# Patient Record
Sex: MALE | Race: WHITE | Employment: OTHER | URBAN - METROPOLITAN AREA
[De-identification: names, ages, dates, MRNs, and addresses within clinical notes are randomized per-mention and may not be internally consistent; named-entity substitution may affect disease eponyms.]

---

## 2017-01-03 ENCOUNTER — PROCEDURE VISIT (OUTPATIENT)
Dept: PHYSICAL MEDICINE AND REHAB | Age: 57
End: 2017-01-03

## 2017-01-03 DIAGNOSIS — M79.10 MYALGIA: ICD-10-CM

## 2017-01-03 DIAGNOSIS — M62.830 MUSCLE SPASM OF BACK: ICD-10-CM

## 2017-01-03 DIAGNOSIS — M79.7 FIBROMYALGIA: Primary | ICD-10-CM

## 2017-01-03 PROCEDURE — 20552 NJX 1/MLT TRIGGER POINT 1/2: CPT | Performed by: PHYSICAL MEDICINE & REHABILITATION

## 2017-01-09 RX ORDER — BUDESONIDE AND FORMOTEROL FUMARATE DIHYDRATE 160; 4.5 UG/1; UG/1
AEROSOL RESPIRATORY (INHALATION)
Qty: 10.2 INHALER | Refills: 3 | Status: SHIPPED | OUTPATIENT
Start: 2017-01-09 | End: 2018-03-30 | Stop reason: SDUPTHER

## 2017-01-23 RX ORDER — PRAVASTATIN SODIUM 40 MG
TABLET ORAL
Qty: 30 TABLET | Refills: 3 | Status: SHIPPED | OUTPATIENT
Start: 2017-01-23 | End: 2017-05-28 | Stop reason: SDUPTHER

## 2017-01-23 RX ORDER — LIDOCAINE 50 MG/G
OINTMENT TOPICAL
Refills: 0 | Status: ON HOLD | COMMUNITY
Start: 2017-01-10 | End: 2019-01-18 | Stop reason: ALTCHOICE

## 2017-01-23 RX ORDER — AMITRIPTYLINE HYDROCHLORIDE 50 MG/1
TABLET, FILM COATED ORAL
Qty: 30 TABLET | Refills: 3 | Status: SHIPPED | OUTPATIENT
Start: 2017-01-23 | End: 2017-05-28 | Stop reason: SDUPTHER

## 2017-01-30 DIAGNOSIS — G89.29 CHRONIC BILATERAL LOW BACK PAIN WITHOUT SCIATICA: ICD-10-CM

## 2017-01-30 DIAGNOSIS — M79.605 BILATERAL LEG PAIN: ICD-10-CM

## 2017-01-30 DIAGNOSIS — M54.50 CHRONIC BILATERAL LOW BACK PAIN WITHOUT SCIATICA: ICD-10-CM

## 2017-01-30 DIAGNOSIS — M62.830 MUSCLE SPASM OF BACK: ICD-10-CM

## 2017-01-30 DIAGNOSIS — M79.604 BILATERAL LEG PAIN: ICD-10-CM

## 2017-01-30 DIAGNOSIS — Z79.899 HIGH RISK MEDICATION USE: ICD-10-CM

## 2017-01-30 RX ORDER — HYDROCODONE BITARTRATE AND ACETAMINOPHEN 5; 325 MG/1; MG/1
TABLET ORAL
Qty: 40 TABLET | Refills: 0 | Status: SHIPPED | OUTPATIENT
Start: 2017-01-30 | End: 2017-02-15 | Stop reason: SDUPTHER

## 2017-02-07 ENCOUNTER — PROCEDURE VISIT (OUTPATIENT)
Dept: PHYSICAL MEDICINE AND REHAB | Age: 57
End: 2017-02-07

## 2017-02-07 DIAGNOSIS — M79.10 MYALGIA: Primary | ICD-10-CM

## 2017-02-07 PROCEDURE — 20552 NJX 1/MLT TRIGGER POINT 1/2: CPT | Performed by: PHYSICAL MEDICINE & REHABILITATION

## 2017-02-15 ENCOUNTER — OFFICE VISIT (OUTPATIENT)
Dept: PHYSICAL MEDICINE AND REHAB | Age: 57
End: 2017-02-15

## 2017-02-15 VITALS
BODY MASS INDEX: 29.54 KG/M2 | SYSTOLIC BLOOD PRESSURE: 160 MMHG | HEIGHT: 71 IN | WEIGHT: 211 LBS | DIASTOLIC BLOOD PRESSURE: 72 MMHG

## 2017-02-15 DIAGNOSIS — M51.36 DDD (DEGENERATIVE DISC DISEASE), LUMBAR: Primary | Chronic | ICD-10-CM

## 2017-02-15 DIAGNOSIS — Z79.899 HIGH RISK MEDICATION USE: ICD-10-CM

## 2017-02-15 DIAGNOSIS — M79.604 BILATERAL LEG PAIN: ICD-10-CM

## 2017-02-15 DIAGNOSIS — Z72.0 TOBACCO ABUSE: ICD-10-CM

## 2017-02-15 DIAGNOSIS — F10.20 ALCOHOLIC (HCC): ICD-10-CM

## 2017-02-15 DIAGNOSIS — M54.50 CHRONIC BILATERAL LOW BACK PAIN WITHOUT SCIATICA: ICD-10-CM

## 2017-02-15 DIAGNOSIS — M53.3 SI (SACROILIAC) PAIN: ICD-10-CM

## 2017-02-15 DIAGNOSIS — G89.29 CHRONIC BILATERAL LOW BACK PAIN WITHOUT SCIATICA: ICD-10-CM

## 2017-02-15 DIAGNOSIS — M62.830 MUSCLE SPASM OF BACK: ICD-10-CM

## 2017-02-15 DIAGNOSIS — M79.605 BILATERAL LEG PAIN: ICD-10-CM

## 2017-02-15 DIAGNOSIS — J42 CHRONIC BRONCHITIS, UNSPECIFIED CHRONIC BRONCHITIS TYPE (HCC): ICD-10-CM

## 2017-02-15 DIAGNOSIS — M54.17 LUMBOSACRAL RADICULOPATHY AT S1: ICD-10-CM

## 2017-02-15 DIAGNOSIS — M79.7 FIBROMYALGIA MUSCLE PAIN: ICD-10-CM

## 2017-02-15 DIAGNOSIS — G62.1 ALCOHOLIC POLYNEUROPATHY (HCC): Chronic | ICD-10-CM

## 2017-02-15 DIAGNOSIS — F41.9 ANXIETY DISORDER, UNSPECIFIED TYPE: ICD-10-CM

## 2017-02-15 PROCEDURE — 99213 OFFICE O/P EST LOW 20 MIN: CPT | Performed by: PHYSICAL MEDICINE & REHABILITATION

## 2017-02-15 RX ORDER — HYDROCODONE BITARTRATE AND ACETAMINOPHEN 5; 325 MG/1; MG/1
TABLET ORAL
Qty: 70 TABLET | Refills: 0 | Status: SHIPPED | OUTPATIENT
Start: 2017-02-15 | End: 2017-04-11 | Stop reason: SDUPTHER

## 2017-02-15 ASSESSMENT — ENCOUNTER SYMPTOMS
PHOTOPHOBIA: 0
ABDOMINAL PAIN: 0
APNEA: 0
DIARRHEA: 0
CHEST TIGHTNESS: 0
BACK PAIN: 1
BOWEL INCONTINENCE: 0
GASTROINTESTINAL NEGATIVE: 1
SHORTNESS OF BREATH: 0
VOMITING: 0
WHEEZING: 0
VISUAL CHANGE: 0
ALLERGIC/IMMUNOLOGIC NEGATIVE: 1
CONSTIPATION: 0
RECTAL PAIN: 0

## 2017-02-21 ENCOUNTER — PROCEDURE VISIT (OUTPATIENT)
Dept: PHYSICAL MEDICINE AND REHAB | Age: 57
End: 2017-02-21

## 2017-02-21 DIAGNOSIS — M25.552 LEFT HIP PAIN: Primary | ICD-10-CM

## 2017-02-21 PROCEDURE — 20610 DRAIN/INJ JOINT/BURSA W/O US: CPT | Performed by: PHYSICAL MEDICINE & REHABILITATION

## 2017-03-14 ENCOUNTER — PROCEDURE VISIT (OUTPATIENT)
Dept: PHYSICAL MEDICINE AND REHAB | Age: 57
End: 2017-03-14

## 2017-03-14 DIAGNOSIS — M79.10 MYALGIA: Primary | ICD-10-CM

## 2017-03-14 PROCEDURE — 20553 NJX 1/MLT TRIGGER POINTS 3/>: CPT | Performed by: PHYSICAL MEDICINE & REHABILITATION

## 2017-04-11 DIAGNOSIS — M79.604 BILATERAL LEG PAIN: ICD-10-CM

## 2017-04-11 DIAGNOSIS — M62.830 MUSCLE SPASM OF BACK: ICD-10-CM

## 2017-04-11 DIAGNOSIS — G89.29 CHRONIC BILATERAL LOW BACK PAIN WITHOUT SCIATICA: ICD-10-CM

## 2017-04-11 DIAGNOSIS — M79.605 BILATERAL LEG PAIN: ICD-10-CM

## 2017-04-11 DIAGNOSIS — Z79.899 HIGH RISK MEDICATION USE: ICD-10-CM

## 2017-04-11 DIAGNOSIS — M54.50 CHRONIC BILATERAL LOW BACK PAIN WITHOUT SCIATICA: ICD-10-CM

## 2017-04-12 RX ORDER — HYDROCODONE BITARTRATE AND ACETAMINOPHEN 5; 325 MG/1; MG/1
TABLET ORAL
Qty: 70 TABLET | Refills: 0 | Status: SHIPPED | OUTPATIENT
Start: 2017-04-12 | End: 2017-05-18 | Stop reason: SDUPTHER

## 2017-04-17 ENCOUNTER — HOSPITAL ENCOUNTER (INPATIENT)
Age: 57
LOS: 2 days | Discharge: HOME | DRG: 123 | End: 2017-04-19
Attending: EMERGENCY MEDICINE | Admitting: INTERNAL MEDICINE
Payer: COMMERCIAL

## 2017-04-17 ENCOUNTER — APPOINTMENT (OUTPATIENT)
Dept: CT IMAGING | Age: 57
DRG: 123 | End: 2017-04-17
Payer: COMMERCIAL

## 2017-04-17 DIAGNOSIS — H53.9 VISUAL CHANGES: ICD-10-CM

## 2017-04-17 DIAGNOSIS — I63.10 CEREBROVASCULAR ACCIDENT (CVA) DUE TO EMBOLISM OF PRECEREBRAL ARTERY (HCC): ICD-10-CM

## 2017-04-17 DIAGNOSIS — H02.401 PTOSIS OF RIGHT EYELID: Primary | ICD-10-CM

## 2017-04-17 LAB
ALBUMIN SERPL-MCNC: 4.4 G/DL (ref 3.9–4.9)
ALP BLD-CCNC: 142 U/L (ref 35–104)
ALT SERPL-CCNC: 51 U/L (ref 0–41)
ANION GAP SERPL CALCULATED.3IONS-SCNC: 13 MEQ/L (ref 7–13)
APTT: 31.3 SEC (ref 21.6–35.4)
AST SERPL-CCNC: 32 U/L (ref 0–40)
BASOPHILS ABSOLUTE: 0.1 K/UL (ref 0–0.2)
BASOPHILS RELATIVE PERCENT: 1 %
BILIRUB SERPL-MCNC: 0.6 MG/DL (ref 0–1.2)
BUN BLDV-MCNC: 12 MG/DL (ref 6–20)
CALCIUM SERPL-MCNC: 9.8 MG/DL (ref 8.6–10.2)
CHLORIDE BLD-SCNC: 96 MEQ/L (ref 98–107)
CO2: 25 MEQ/L (ref 22–29)
CREAT SERPL-MCNC: 0.72 MG/DL (ref 0.7–1.2)
EOSINOPHILS ABSOLUTE: 0.2 K/UL (ref 0–0.7)
EOSINOPHILS RELATIVE PERCENT: 1.4 %
GFR AFRICAN AMERICAN: >60
GFR NON-AFRICAN AMERICAN: >60
GLOBULIN: 3.8 G/DL (ref 2.3–3.5)
GLUCOSE BLD-MCNC: 97 MG/DL (ref 74–109)
HCT VFR BLD CALC: 49 % (ref 42–52)
HEMOGLOBIN: 16.6 G/DL (ref 14–18)
INR BLD: 1
LYMPHOCYTES ABSOLUTE: 3 K/UL (ref 1–4.8)
LYMPHOCYTES RELATIVE PERCENT: 22.4 %
MCH RBC QN AUTO: 30.7 PG (ref 27–31.3)
MCHC RBC AUTO-ENTMCNC: 33.9 % (ref 33–37)
MCV RBC AUTO: 90.6 FL (ref 80–100)
MONOCYTES ABSOLUTE: 1.3 K/UL (ref 0.2–0.8)
MONOCYTES RELATIVE PERCENT: 10 %
NEUTROPHILS ABSOLUTE: 8.8 K/UL (ref 1.4–6.5)
NEUTROPHILS RELATIVE PERCENT: 65.2 %
PDW BLD-RTO: 13.9 % (ref 11.5–14.5)
PLATELET # BLD: 286 K/UL (ref 130–400)
POTASSIUM SERPL-SCNC: 3.9 MEQ/L (ref 3.5–5.1)
PROTHROMBIN TIME: 10.5 SEC (ref 8.1–13.7)
RBC # BLD: 5.41 M/UL (ref 4.7–6.1)
SEDIMENTATION RATE, ERYTHROCYTE: 5 MM (ref 0–20)
SODIUM BLD-SCNC: 134 MEQ/L (ref 132–144)
TOTAL PROTEIN: 8.2 G/DL (ref 6.4–8.1)
WBC # BLD: 13.4 K/UL (ref 4.8–10.8)

## 2017-04-17 PROCEDURE — 85730 THROMBOPLASTIN TIME PARTIAL: CPT

## 2017-04-17 PROCEDURE — 6370000000 HC RX 637 (ALT 250 FOR IP): Performed by: EMERGENCY MEDICINE

## 2017-04-17 PROCEDURE — 94640 AIRWAY INHALATION TREATMENT: CPT

## 2017-04-17 PROCEDURE — 2580000003 HC RX 258: Performed by: RADIOLOGY

## 2017-04-17 PROCEDURE — 36415 COLL VENOUS BLD VENIPUNCTURE: CPT

## 2017-04-17 PROCEDURE — 80053 COMPREHEN METABOLIC PANEL: CPT

## 2017-04-17 PROCEDURE — 85025 COMPLETE CBC W/AUTO DIFF WBC: CPT

## 2017-04-17 PROCEDURE — 2580000003 HC RX 258: Performed by: EMERGENCY MEDICINE

## 2017-04-17 PROCEDURE — 6360000004 HC RX CONTRAST MEDICATION: Performed by: RADIOLOGY

## 2017-04-17 PROCEDURE — 93005 ELECTROCARDIOGRAM TRACING: CPT

## 2017-04-17 PROCEDURE — 85610 PROTHROMBIN TIME: CPT

## 2017-04-17 PROCEDURE — 6370000000 HC RX 637 (ALT 250 FOR IP): Performed by: INTERNAL MEDICINE

## 2017-04-17 PROCEDURE — 85652 RBC SED RATE AUTOMATED: CPT

## 2017-04-17 PROCEDURE — 70496 CT ANGIOGRAPHY HEAD: CPT

## 2017-04-17 PROCEDURE — 87086 URINE CULTURE/COLONY COUNT: CPT

## 2017-04-17 PROCEDURE — 99285 EMERGENCY DEPT VISIT HI MDM: CPT

## 2017-04-17 PROCEDURE — 94664 DEMO&/EVAL PT USE INHALER: CPT

## 2017-04-17 PROCEDURE — 1210000000 HC MED SURG R&B

## 2017-04-17 PROCEDURE — 6360000002 HC RX W HCPCS: Performed by: EMERGENCY MEDICINE

## 2017-04-17 PROCEDURE — 70450 CT HEAD/BRAIN W/O DYE: CPT

## 2017-04-17 PROCEDURE — 81001 URINALYSIS AUTO W/SCOPE: CPT

## 2017-04-17 PROCEDURE — 2580000003 HC RX 258: Performed by: INTERNAL MEDICINE

## 2017-04-17 PROCEDURE — 70498 CT ANGIOGRAPHY NECK: CPT

## 2017-04-17 RX ORDER — HEPARIN SODIUM 1000 [USP'U]/ML
40 INJECTION, SOLUTION INTRAVENOUS; SUBCUTANEOUS PRN
Status: DISCONTINUED | OUTPATIENT
Start: 2017-04-17 | End: 2017-04-17

## 2017-04-17 RX ORDER — 0.9 % SODIUM CHLORIDE 0.9 %
500 INTRAVENOUS SOLUTION INTRAVENOUS ONCE
Status: COMPLETED | OUTPATIENT
Start: 2017-04-17 | End: 2017-04-17

## 2017-04-17 RX ORDER — HEPARIN SODIUM 10000 [USP'U]/100ML
18 INJECTION, SOLUTION INTRAVENOUS CONTINUOUS
Status: DISCONTINUED | OUTPATIENT
Start: 2017-04-17 | End: 2017-04-17

## 2017-04-17 RX ORDER — ASPIRIN 325 MG
325 TABLET ORAL DAILY
Status: DISCONTINUED | OUTPATIENT
Start: 2017-04-18 | End: 2017-04-17

## 2017-04-17 RX ORDER — ACETAMINOPHEN 325 MG/1
650 TABLET ORAL EVERY 4 HOURS PRN
Status: DISCONTINUED | OUTPATIENT
Start: 2017-04-17 | End: 2017-04-19 | Stop reason: HOSPADM

## 2017-04-17 RX ORDER — PREDNISONE 20 MG/1
60 TABLET ORAL ONCE
Status: COMPLETED | OUTPATIENT
Start: 2017-04-17 | End: 2017-04-17

## 2017-04-17 RX ORDER — GABAPENTIN 400 MG/1
800 CAPSULE ORAL 3 TIMES DAILY
Status: DISCONTINUED | OUTPATIENT
Start: 2017-04-17 | End: 2017-04-19 | Stop reason: HOSPADM

## 2017-04-17 RX ORDER — ALBUTEROL SULFATE 2.5 MG/3ML
2.5 SOLUTION RESPIRATORY (INHALATION) EVERY 6 HOURS PRN
Status: DISCONTINUED | OUTPATIENT
Start: 2017-04-17 | End: 2017-04-17

## 2017-04-17 RX ORDER — SODIUM CHLORIDE 9 MG/ML
INJECTION, SOLUTION INTRAVENOUS CONTINUOUS
Status: DISCONTINUED | OUTPATIENT
Start: 2017-04-17 | End: 2017-04-19 | Stop reason: HOSPADM

## 2017-04-17 RX ORDER — SODIUM CHLORIDE 0.9 % (FLUSH) 0.9 %
3 SYRINGE (ML) INJECTION EVERY 8 HOURS
Status: DISCONTINUED | OUTPATIENT
Start: 2017-04-17 | End: 2017-04-19 | Stop reason: HOSPADM

## 2017-04-17 RX ORDER — SODIUM CHLORIDE 0.9 % (FLUSH) 0.9 %
10 SYRINGE (ML) INJECTION EVERY 12 HOURS SCHEDULED
Status: DISCONTINUED | OUTPATIENT
Start: 2017-04-17 | End: 2017-04-19 | Stop reason: HOSPADM

## 2017-04-17 RX ORDER — SODIUM CHLORIDE 0.9 % (FLUSH) 0.9 %
10 SYRINGE (ML) INJECTION ONCE
Status: COMPLETED | OUTPATIENT
Start: 2017-04-17 | End: 2017-04-17

## 2017-04-17 RX ORDER — HEPARIN SODIUM 10000 [USP'U]/100ML
12 INJECTION, SOLUTION INTRAVENOUS CONTINUOUS
Status: DISCONTINUED | OUTPATIENT
Start: 2017-04-17 | End: 2017-04-17

## 2017-04-17 RX ORDER — SODIUM CHLORIDE 0.9 % (FLUSH) 0.9 %
10 SYRINGE (ML) INJECTION PRN
Status: DISCONTINUED | OUTPATIENT
Start: 2017-04-17 | End: 2017-04-19 | Stop reason: HOSPADM

## 2017-04-17 RX ORDER — AMITRIPTYLINE HYDROCHLORIDE 25 MG/1
50 TABLET, FILM COATED ORAL NIGHTLY
Status: DISCONTINUED | OUTPATIENT
Start: 2017-04-17 | End: 2017-04-19 | Stop reason: HOSPADM

## 2017-04-17 RX ORDER — HEPARIN SODIUM 1000 [USP'U]/ML
80 INJECTION, SOLUTION INTRAVENOUS; SUBCUTANEOUS PRN
Status: DISCONTINUED | OUTPATIENT
Start: 2017-04-17 | End: 2017-04-17

## 2017-04-17 RX ORDER — ONDANSETRON 2 MG/ML
4 INJECTION INTRAMUSCULAR; INTRAVENOUS EVERY 6 HOURS PRN
Status: DISCONTINUED | OUTPATIENT
Start: 2017-04-17 | End: 2017-04-19 | Stop reason: HOSPADM

## 2017-04-17 RX ORDER — ALBUTEROL SULFATE 2.5 MG/3ML
2.5 SOLUTION RESPIRATORY (INHALATION) EVERY 4 HOURS PRN
Status: DISCONTINUED | OUTPATIENT
Start: 2017-04-17 | End: 2017-04-19 | Stop reason: HOSPADM

## 2017-04-17 RX ORDER — PRAVASTATIN SODIUM 40 MG
40 TABLET ORAL NIGHTLY
Status: DISCONTINUED | OUTPATIENT
Start: 2017-04-17 | End: 2017-04-19 | Stop reason: HOSPADM

## 2017-04-17 RX ADMIN — MOMETASONE FUROATE AND FORMOTEROL FUMARATE DIHYDRATE 2 PUFF: 200; 5 AEROSOL RESPIRATORY (INHALATION) at 20:34

## 2017-04-17 RX ADMIN — AMITRIPTYLINE HYDROCHLORIDE 50 MG: 25 TABLET, FILM COATED ORAL at 21:23

## 2017-04-17 RX ADMIN — Medication 10 ML: at 16:33

## 2017-04-17 RX ADMIN — SODIUM CHLORIDE: 9 INJECTION, SOLUTION INTRAVENOUS at 21:23

## 2017-04-17 RX ADMIN — HEPARIN SODIUM AND DEXTROSE 12 UNITS/KG/HR: 10000; 5 INJECTION INTRAVENOUS at 18:40

## 2017-04-17 RX ADMIN — ASPIRIN 325 MG: 325 TABLET, DELAYED RELEASE ORAL at 21:23

## 2017-04-17 RX ADMIN — GABAPENTIN 800 MG: 400 CAPSULE ORAL at 21:23

## 2017-04-17 RX ADMIN — IOPAMIDOL 100 ML: 755 INJECTION, SOLUTION INTRAVENOUS at 16:03

## 2017-04-17 RX ADMIN — Medication 3 ML: at 14:22

## 2017-04-17 RX ADMIN — SODIUM CHLORIDE 500 ML: 9 INJECTION, SOLUTION INTRAVENOUS at 14:23

## 2017-04-17 RX ADMIN — PRAVASTATIN SODIUM 40 MG: 40 TABLET ORAL at 21:23

## 2017-04-17 RX ADMIN — PREDNISONE 60 MG: 20 TABLET ORAL at 17:15

## 2017-04-17 ASSESSMENT — ENCOUNTER SYMPTOMS
ABDOMINAL PAIN: 0
VOICE CHANGE: 0
VOMITING: 0
DIARRHEA: 0
EYE PAIN: 0
SHORTNESS OF BREATH: 0
COUGH: 0
TROUBLE SWALLOWING: 0
PHOTOPHOBIA: 0
EYE ITCHING: 0
NAUSEA: 0
EYE REDNESS: 0
EYE DISCHARGE: 0
BACK PAIN: 0

## 2017-04-18 ENCOUNTER — APPOINTMENT (OUTPATIENT)
Dept: MRI IMAGING | Age: 57
DRG: 123 | End: 2017-04-18
Payer: COMMERCIAL

## 2017-04-18 PROBLEM — H02.409 PTOSIS: Status: ACTIVE | Noted: 2017-04-18

## 2017-04-18 PROBLEM — I10 HTN (HYPERTENSION): Chronic | Status: ACTIVE | Noted: 2017-04-18

## 2017-04-18 PROBLEM — I77.9 CAROTID ARTERY DISORDER (HCC): Chronic | Status: ACTIVE | Noted: 2017-04-18

## 2017-04-18 PROBLEM — H49.01: Status: ACTIVE | Noted: 2017-04-18

## 2017-04-18 LAB
BACTERIA: NORMAL /HPF
BILIRUBIN URINE: NEGATIVE
BLOOD, URINE: NEGATIVE
C-REACTIVE PROTEIN, HIGH SENSITIVITY: 4.3 MG/L (ref 0–5)
CHOLESTEROL, TOTAL: 199 MG/DL (ref 0–199)
CLARITY: CLEAR
COLOR: YELLOW
FOLATE: 15.2 NG/ML (ref 7.3–26.1)
GFR AFRICAN AMERICAN: >60
GFR NON-AFRICAN AMERICAN: >60
GLUCOSE URINE: NEGATIVE MG/DL
HDLC SERPL-MCNC: 34 MG/DL (ref 40–59)
KETONES, URINE: NEGATIVE MG/DL
LDL CHOLESTEROL CALCULATED: 139 MG/DL (ref 0–129)
LEUKOCYTE ESTERASE, URINE: ABNORMAL
LV EF: 65 %
LVEF MODALITY: NORMAL
NITRITE, URINE: NEGATIVE
PERFORMED ON: ABNORMAL
PH UA: 7 (ref 5–9)
POC CREATININE: 0.7 MG/DL (ref 0.9–1.3)
POC SAMPLE TYPE: ABNORMAL
PROTEIN UA: NEGATIVE MG/DL
RBC UA: NORMAL /HPF (ref 0–2)
SPECIFIC GRAVITY UA: 1.03 (ref 1–1.03)
TRIGL SERPL-MCNC: 132 MG/DL (ref 0–200)
TROPONIN: <0.01 NG/ML (ref 0–0.01)
UROBILINOGEN, URINE: 1 E.U./DL
VITAMIN B-12: 379 PG/ML (ref 211–946)
WBC UA: NORMAL /HPF (ref 0–5)

## 2017-04-18 PROCEDURE — 2500000003 HC RX 250 WO HCPCS

## 2017-04-18 PROCEDURE — 82607 VITAMIN B-12: CPT

## 2017-04-18 PROCEDURE — 82746 ASSAY OF FOLIC ACID SERUM: CPT

## 2017-04-18 PROCEDURE — 2580000003 HC RX 258: Performed by: INTERNAL MEDICINE

## 2017-04-18 PROCEDURE — 70551 MRI BRAIN STEM W/O DYE: CPT

## 2017-04-18 PROCEDURE — 70547 MR ANGIOGRAPHY NECK W/O DYE: CPT

## 2017-04-18 PROCEDURE — 86141 C-REACTIVE PROTEIN HS: CPT

## 2017-04-18 PROCEDURE — 36415 COLL VENOUS BLD VENIPUNCTURE: CPT

## 2017-04-18 PROCEDURE — 80061 LIPID PANEL: CPT

## 2017-04-18 PROCEDURE — 6370000000 HC RX 637 (ALT 250 FOR IP): Performed by: PSYCHIATRY & NEUROLOGY

## 2017-04-18 PROCEDURE — 6370000000 HC RX 637 (ALT 250 FOR IP): Performed by: INTERNAL MEDICINE

## 2017-04-18 PROCEDURE — 84484 ASSAY OF TROPONIN QUANT: CPT

## 2017-04-18 PROCEDURE — 94640 AIRWAY INHALATION TREATMENT: CPT

## 2017-04-18 PROCEDURE — 1210000000 HC MED SURG R&B

## 2017-04-18 PROCEDURE — 70544 MR ANGIOGRAPHY HEAD W/O DYE: CPT

## 2017-04-18 PROCEDURE — 83519 RIA NONANTIBODY: CPT

## 2017-04-18 PROCEDURE — 93306 TTE W/DOPPLER COMPLETE: CPT

## 2017-04-18 RX ORDER — CLOPIDOGREL BISULFATE 75 MG/1
75 TABLET ORAL DAILY
Status: DISCONTINUED | OUTPATIENT
Start: 2017-04-18 | End: 2017-04-19 | Stop reason: HOSPADM

## 2017-04-18 RX ORDER — WATER FOR INJ.,BACTERIOSTATIC
VIAL (ML) INJECTION
Status: COMPLETED
Start: 2017-04-18 | End: 2017-04-18

## 2017-04-18 RX ADMIN — CLOPIDOGREL BISULFATE 75 MG: 75 TABLET ORAL at 10:13

## 2017-04-18 RX ADMIN — GABAPENTIN 800 MG: 400 CAPSULE ORAL at 13:30

## 2017-04-18 RX ADMIN — ASPIRIN 325 MG: 325 TABLET, DELAYED RELEASE ORAL at 10:13

## 2017-04-18 RX ADMIN — Medication 10 ML: at 20:28

## 2017-04-18 RX ADMIN — GABAPENTIN 800 MG: 400 CAPSULE ORAL at 20:25

## 2017-04-18 RX ADMIN — GABAPENTIN 800 MG: 400 CAPSULE ORAL at 10:14

## 2017-04-18 RX ADMIN — MOMETASONE FUROATE AND FORMOTEROL FUMARATE DIHYDRATE 2 PUFF: 200; 5 AEROSOL RESPIRATORY (INHALATION) at 08:39

## 2017-04-18 RX ADMIN — PRAVASTATIN SODIUM 40 MG: 40 TABLET ORAL at 20:25

## 2017-04-18 RX ADMIN — AMITRIPTYLINE HYDROCHLORIDE 50 MG: 25 TABLET, FILM COATED ORAL at 20:25

## 2017-04-18 RX ADMIN — MOMETASONE FUROATE AND FORMOTEROL FUMARATE DIHYDRATE 2 PUFF: 200; 5 AEROSOL RESPIRATORY (INHALATION) at 19:24

## 2017-04-18 RX ADMIN — Medication 10 ML: at 10:14

## 2017-04-18 RX ADMIN — BACTERIOSTATIC WATER: 1 INJECTION, SOLUTION INTRAMUSCULAR; INTRAVENOUS; SUBCUTANEOUS at 10:12

## 2017-04-19 VITALS
OXYGEN SATURATION: 95 % | HEART RATE: 89 BPM | SYSTOLIC BLOOD PRESSURE: 115 MMHG | RESPIRATION RATE: 16 BRPM | TEMPERATURE: 97.7 F | HEIGHT: 71 IN | WEIGHT: 210 LBS | BODY MASS INDEX: 29.4 KG/M2 | DIASTOLIC BLOOD PRESSURE: 97 MMHG

## 2017-04-19 LAB
ACETYLCHOLINE BINDING ANTIBODY: 0.1 NMOL/L (ref 0–0.4)
ACETYLCHOLINE BLOCKING AB: 0 % (ref 0–26)
URINE CULTURE, ROUTINE: NORMAL

## 2017-04-19 PROCEDURE — G8980 MOBILITY D/C STATUS: HCPCS

## 2017-04-19 PROCEDURE — G8978 MOBILITY CURRENT STATUS: HCPCS

## 2017-04-19 PROCEDURE — G8989 SELF CARE D/C STATUS: HCPCS

## 2017-04-19 PROCEDURE — 6370000000 HC RX 637 (ALT 250 FOR IP): Performed by: INTERNAL MEDICINE

## 2017-04-19 PROCEDURE — 6370000000 HC RX 637 (ALT 250 FOR IP): Performed by: PSYCHIATRY & NEUROLOGY

## 2017-04-19 PROCEDURE — G8987 SELF CARE CURRENT STATUS: HCPCS

## 2017-04-19 PROCEDURE — 97165 OT EVAL LOW COMPLEX 30 MIN: CPT

## 2017-04-19 PROCEDURE — 97112 NEUROMUSCULAR REEDUCATION: CPT

## 2017-04-19 PROCEDURE — 2580000003 HC RX 258: Performed by: INTERNAL MEDICINE

## 2017-04-19 PROCEDURE — G8988 SELF CARE GOAL STATUS: HCPCS

## 2017-04-19 PROCEDURE — 97161 PT EVAL LOW COMPLEX 20 MIN: CPT

## 2017-04-19 PROCEDURE — G8979 MOBILITY GOAL STATUS: HCPCS

## 2017-04-19 PROCEDURE — 94640 AIRWAY INHALATION TREATMENT: CPT

## 2017-04-19 RX ORDER — CLOPIDOGREL BISULFATE 75 MG/1
75 TABLET ORAL DAILY
Qty: 30 TABLET | Refills: 1 | Status: SHIPPED | OUTPATIENT
Start: 2017-04-19 | End: 2018-03-27 | Stop reason: SDUPTHER

## 2017-04-19 RX ADMIN — MOMETASONE FUROATE AND FORMOTEROL FUMARATE DIHYDRATE 2 PUFF: 200; 5 AEROSOL RESPIRATORY (INHALATION) at 10:00

## 2017-04-19 RX ADMIN — Medication 10 ML: at 08:33

## 2017-04-19 RX ADMIN — ASPIRIN 325 MG: 325 TABLET, DELAYED RELEASE ORAL at 08:32

## 2017-04-19 RX ADMIN — CLOPIDOGREL BISULFATE 75 MG: 75 TABLET ORAL at 08:32

## 2017-04-19 RX ADMIN — GABAPENTIN 800 MG: 400 CAPSULE ORAL at 08:32

## 2017-04-20 ENCOUNTER — TELEPHONE (OUTPATIENT)
Dept: INTERNAL MEDICINE | Age: 57
End: 2017-04-20

## 2017-04-20 LAB
EKG ATRIAL RATE: 117 BPM
EKG P AXIS: 63 DEGREES
EKG P-R INTERVAL: 166 MS
EKG Q-T INTERVAL: 340 MS
EKG QRS DURATION: 98 MS
EKG QTC CALCULATION (BAZETT): 474 MS
EKG R AXIS: 86 DEGREES
EKG T AXIS: 71 DEGREES
EKG VENTRICULAR RATE: 117 BPM

## 2017-04-27 ENCOUNTER — OFFICE VISIT (OUTPATIENT)
Dept: INTERNAL MEDICINE | Age: 57
End: 2017-04-27

## 2017-04-27 VITALS
BODY MASS INDEX: 29.43 KG/M2 | SYSTOLIC BLOOD PRESSURE: 128 MMHG | TEMPERATURE: 98.7 F | WEIGHT: 211 LBS | DIASTOLIC BLOOD PRESSURE: 80 MMHG | HEART RATE: 100 BPM | OXYGEN SATURATION: 96 %

## 2017-04-27 DIAGNOSIS — I10 ESSENTIAL HYPERTENSION: Primary | Chronic | ICD-10-CM

## 2017-04-27 DIAGNOSIS — Z72.0 SMOKING TRYING TO QUIT: ICD-10-CM

## 2017-04-27 PROCEDURE — 99213 OFFICE O/P EST LOW 20 MIN: CPT | Performed by: INTERNAL MEDICINE

## 2017-04-27 RX ORDER — LOSARTAN POTASSIUM 25 MG/1
25 TABLET ORAL DAILY
Qty: 30 TABLET | Refills: 3 | Status: SHIPPED | OUTPATIENT
Start: 2017-04-27 | End: 2017-08-27 | Stop reason: SDUPTHER

## 2017-04-27 ASSESSMENT — ENCOUNTER SYMPTOMS
TROUBLE SWALLOWING: 0
GASTROINTESTINAL NEGATIVE: 1
CHOKING: 0
SHORTNESS OF BREATH: 0
COUGH: 0
VOICE CHANGE: 0

## 2017-05-16 LAB — ACETYLCHOL MODUL AB: 5 %

## 2017-05-18 ENCOUNTER — OFFICE VISIT (OUTPATIENT)
Dept: PHYSICAL MEDICINE AND REHAB | Age: 57
End: 2017-05-18

## 2017-05-18 VITALS
DIASTOLIC BLOOD PRESSURE: 60 MMHG | SYSTOLIC BLOOD PRESSURE: 120 MMHG | WEIGHT: 211 LBS | HEIGHT: 72 IN | BODY MASS INDEX: 28.58 KG/M2

## 2017-05-18 DIAGNOSIS — G62.1 ALCOHOLIC POLYNEUROPATHY (HCC): Chronic | ICD-10-CM

## 2017-05-18 DIAGNOSIS — F81.0 BASIC LEARNING DISABILITY, READING: Chronic | ICD-10-CM

## 2017-05-18 DIAGNOSIS — M54.50 CHRONIC BILATERAL LOW BACK PAIN WITHOUT SCIATICA: ICD-10-CM

## 2017-05-18 DIAGNOSIS — H49.01 CN III PALSY, RIGHT EYE: ICD-10-CM

## 2017-05-18 DIAGNOSIS — D47.2 IGG MONOCLONAL GAMMOPATHY OF UNCERTAIN SIGNIFICANCE: ICD-10-CM

## 2017-05-18 DIAGNOSIS — F41.9 ANXIETY DISORDER, UNSPECIFIED TYPE: ICD-10-CM

## 2017-05-18 DIAGNOSIS — Z79.899 HIGH RISK MEDICATION USE: ICD-10-CM

## 2017-05-18 DIAGNOSIS — M79.604 BILATERAL LEG PAIN: ICD-10-CM

## 2017-05-18 DIAGNOSIS — M62.830 MUSCLE SPASM OF BACK: ICD-10-CM

## 2017-05-18 DIAGNOSIS — G89.29 CHRONIC BILATERAL LOW BACK PAIN WITHOUT SCIATICA: ICD-10-CM

## 2017-05-18 DIAGNOSIS — H02.401 PTOSIS, RIGHT: ICD-10-CM

## 2017-05-18 DIAGNOSIS — M79.605 BILATERAL LEG PAIN: ICD-10-CM

## 2017-05-18 DIAGNOSIS — M51.36 DDD (DEGENERATIVE DISC DISEASE), LUMBAR: Chronic | ICD-10-CM

## 2017-05-18 DIAGNOSIS — M53.3 SI (SACROILIAC) PAIN: Primary | ICD-10-CM

## 2017-05-18 DIAGNOSIS — M54.17 LUMBOSACRAL RADICULOPATHY AT S1: ICD-10-CM

## 2017-05-18 DIAGNOSIS — M79.672 LEFT FOOT PAIN: ICD-10-CM

## 2017-05-18 DIAGNOSIS — R74.8 ALKALINE PHOSPHATASE ELEVATION: ICD-10-CM

## 2017-05-18 PROCEDURE — 99214 OFFICE O/P EST MOD 30 MIN: CPT | Performed by: PHYSICAL MEDICINE & REHABILITATION

## 2017-05-18 RX ORDER — GABAPENTIN 800 MG/1
TABLET ORAL
Qty: 90 TABLET | Refills: 3 | Status: SHIPPED | OUTPATIENT
Start: 2017-05-18 | End: 2017-07-21 | Stop reason: SDUPTHER

## 2017-05-18 RX ORDER — HYDROCODONE BITARTRATE AND ACETAMINOPHEN 5; 325 MG/1; MG/1
TABLET ORAL
Qty: 70 TABLET | Refills: 0 | Status: SHIPPED | OUTPATIENT
Start: 2017-05-18 | End: 2017-06-15 | Stop reason: SDUPTHER

## 2017-05-18 ASSESSMENT — ENCOUNTER SYMPTOMS
CHEST TIGHTNESS: 0
WHEEZING: 0
SHORTNESS OF BREATH: 0
DIARRHEA: 0
ALLERGIC/IMMUNOLOGIC NEGATIVE: 1
CONSTIPATION: 0
PHOTOPHOBIA: 0
APNEA: 0
BOWEL INCONTINENCE: 0
ABDOMINAL PAIN: 0
BACK PAIN: 1
VOMITING: 0
VISUAL CHANGE: 0

## 2017-05-30 RX ORDER — PRAVASTATIN SODIUM 40 MG
TABLET ORAL
Qty: 30 TABLET | Refills: 3 | Status: SHIPPED | OUTPATIENT
Start: 2017-05-30 | End: 2017-09-26 | Stop reason: SDUPTHER

## 2017-05-30 RX ORDER — AMITRIPTYLINE HYDROCHLORIDE 50 MG/1
TABLET, FILM COATED ORAL
Qty: 30 TABLET | Refills: 3 | Status: SHIPPED | OUTPATIENT
Start: 2017-05-30 | End: 2017-09-26 | Stop reason: SDUPTHER

## 2017-05-30 RX ORDER — ACETAMINOPHEN/DIPHENHYDRAMINE 500MG-25MG
TABLET ORAL
Qty: 30 TABLET | Refills: 3 | Status: SHIPPED | OUTPATIENT
Start: 2017-05-30 | End: 2017-09-26 | Stop reason: SDUPTHER

## 2017-06-06 ENCOUNTER — PROCEDURE VISIT (OUTPATIENT)
Dept: PHYSICAL MEDICINE AND REHAB | Age: 57
End: 2017-06-06

## 2017-06-06 DIAGNOSIS — M53.3 SI (SACROILIAC) PAIN: ICD-10-CM

## 2017-06-06 DIAGNOSIS — M79.7 FIBROMYALGIA: Primary | ICD-10-CM

## 2017-06-06 DIAGNOSIS — M79.10 MYALGIA: ICD-10-CM

## 2017-06-06 DIAGNOSIS — M62.830 MUSCLE SPASM OF BACK: ICD-10-CM

## 2017-06-06 PROCEDURE — 20553 NJX 1/MLT TRIGGER POINTS 3/>: CPT | Performed by: PHYSICAL MEDICINE & REHABILITATION

## 2017-06-07 ENCOUNTER — OFFICE VISIT (OUTPATIENT)
Dept: INTERNAL MEDICINE | Age: 57
End: 2017-06-07

## 2017-06-07 VITALS
DIASTOLIC BLOOD PRESSURE: 70 MMHG | HEIGHT: 71 IN | WEIGHT: 217 LBS | BODY MASS INDEX: 30.38 KG/M2 | SYSTOLIC BLOOD PRESSURE: 130 MMHG | OXYGEN SATURATION: 96 % | HEART RATE: 94 BPM | TEMPERATURE: 97.3 F

## 2017-06-07 DIAGNOSIS — J44.9 CHRONIC OBSTRUCTIVE PULMONARY DISEASE, UNSPECIFIED COPD TYPE (HCC): ICD-10-CM

## 2017-06-07 DIAGNOSIS — I10 ESSENTIAL HYPERTENSION: Primary | Chronic | ICD-10-CM

## 2017-06-07 PROCEDURE — 99213 OFFICE O/P EST LOW 20 MIN: CPT | Performed by: INTERNAL MEDICINE

## 2017-06-07 ASSESSMENT — ENCOUNTER SYMPTOMS
COUGH: 0
WHEEZING: 0
BACK PAIN: 1
TROUBLE SWALLOWING: 0
VOICE CHANGE: 0
SHORTNESS OF BREATH: 0
GASTROINTESTINAL NEGATIVE: 1
ABDOMINAL PAIN: 0
EYE PAIN: 0
RESPIRATORY NEGATIVE: 1

## 2017-06-07 ASSESSMENT — COPD QUESTIONNAIRES: COPD: 1

## 2017-06-15 DIAGNOSIS — M79.605 BILATERAL LEG PAIN: ICD-10-CM

## 2017-06-15 DIAGNOSIS — G89.29 CHRONIC BILATERAL LOW BACK PAIN WITHOUT SCIATICA: ICD-10-CM

## 2017-06-15 DIAGNOSIS — M79.604 BILATERAL LEG PAIN: ICD-10-CM

## 2017-06-15 DIAGNOSIS — M62.830 MUSCLE SPASM OF BACK: ICD-10-CM

## 2017-06-15 DIAGNOSIS — M54.50 CHRONIC BILATERAL LOW BACK PAIN WITHOUT SCIATICA: ICD-10-CM

## 2017-06-15 DIAGNOSIS — Z79.899 HIGH RISK MEDICATION USE: ICD-10-CM

## 2017-06-15 RX ORDER — HYDROCODONE BITARTRATE AND ACETAMINOPHEN 5; 325 MG/1; MG/1
TABLET ORAL
Qty: 70 TABLET | Refills: 0 | Status: SHIPPED | OUTPATIENT
Start: 2017-06-15 | End: 2017-07-11 | Stop reason: SDUPTHER

## 2017-07-11 ENCOUNTER — PROCEDURE VISIT (OUTPATIENT)
Dept: PHYSICAL MEDICINE AND REHAB | Age: 57
End: 2017-07-11

## 2017-07-11 DIAGNOSIS — M62.830 MUSCLE SPASM OF BACK: ICD-10-CM

## 2017-07-11 DIAGNOSIS — M79.605 BILATERAL LEG PAIN: ICD-10-CM

## 2017-07-11 DIAGNOSIS — M79.10 MYALGIA: ICD-10-CM

## 2017-07-11 DIAGNOSIS — M79.604 BILATERAL LEG PAIN: ICD-10-CM

## 2017-07-11 DIAGNOSIS — M79.7 FIBROMYALGIA: Primary | ICD-10-CM

## 2017-07-11 DIAGNOSIS — Z79.899 HIGH RISK MEDICATION USE: ICD-10-CM

## 2017-07-11 DIAGNOSIS — G89.29 CHRONIC BILATERAL LOW BACK PAIN WITHOUT SCIATICA: ICD-10-CM

## 2017-07-11 DIAGNOSIS — M54.50 CHRONIC BILATERAL LOW BACK PAIN WITHOUT SCIATICA: ICD-10-CM

## 2017-07-11 PROCEDURE — 20552 NJX 1/MLT TRIGGER POINT 1/2: CPT | Performed by: PHYSICAL MEDICINE & REHABILITATION

## 2017-07-11 RX ORDER — HYDROCODONE BITARTRATE AND ACETAMINOPHEN 5; 325 MG/1; MG/1
TABLET ORAL
Qty: 70 TABLET | Refills: 0 | Status: SHIPPED | OUTPATIENT
Start: 2017-07-11 | End: 2017-08-11 | Stop reason: SDUPTHER

## 2017-07-21 ENCOUNTER — OFFICE VISIT (OUTPATIENT)
Dept: PHYSICAL MEDICINE AND REHAB | Age: 57
End: 2017-07-21

## 2017-07-21 VITALS
SYSTOLIC BLOOD PRESSURE: 130 MMHG | BODY MASS INDEX: 29.53 KG/M2 | HEIGHT: 72 IN | DIASTOLIC BLOOD PRESSURE: 58 MMHG | WEIGHT: 218 LBS

## 2017-07-21 DIAGNOSIS — G89.29 CHRONIC MIDLINE LOW BACK PAIN WITH BILATERAL SCIATICA: ICD-10-CM

## 2017-07-21 DIAGNOSIS — G62.1 ALCOHOLIC POLYNEUROPATHY (HCC): Chronic | ICD-10-CM

## 2017-07-21 DIAGNOSIS — M79.672 LEFT FOOT PAIN: ICD-10-CM

## 2017-07-21 DIAGNOSIS — M54.41 CHRONIC MIDLINE LOW BACK PAIN WITH BILATERAL SCIATICA: ICD-10-CM

## 2017-07-21 DIAGNOSIS — M54.17 LUMBOSACRAL RADICULOPATHY AT S1: Primary | ICD-10-CM

## 2017-07-21 DIAGNOSIS — M54.42 CHRONIC MIDLINE LOW BACK PAIN WITH BILATERAL SCIATICA: ICD-10-CM

## 2017-07-21 DIAGNOSIS — Z79.899 HIGH RISK MEDICATION USE: ICD-10-CM

## 2017-07-21 DIAGNOSIS — M79.10 MYALGIA: ICD-10-CM

## 2017-07-21 DIAGNOSIS — D47.2 IGG MONOCLONAL GAMMOPATHY OF UNCERTAIN SIGNIFICANCE: ICD-10-CM

## 2017-07-21 PROCEDURE — 99213 OFFICE O/P EST LOW 20 MIN: CPT | Performed by: PHYSICAL MEDICINE & REHABILITATION

## 2017-07-21 RX ORDER — GABAPENTIN 800 MG/1
TABLET ORAL
Qty: 90 TABLET | Refills: 3 | Status: SHIPPED | OUTPATIENT
Start: 2017-07-21 | End: 2017-12-28 | Stop reason: SDUPTHER

## 2017-07-21 ASSESSMENT — ENCOUNTER SYMPTOMS
ABDOMINAL PAIN: 0
NAUSEA: 0
APNEA: 0
VISUAL CHANGE: 0
WHEEZING: 0
ALLERGIC/IMMUNOLOGIC NEGATIVE: 1
DIARRHEA: 0
BOWEL INCONTINENCE: 0
VOMITING: 0
PHOTOPHOBIA: 0
CHEST TIGHTNESS: 0
CONSTIPATION: 0
BACK PAIN: 1
SHORTNESS OF BREATH: 0

## 2017-08-11 DIAGNOSIS — M54.50 CHRONIC BILATERAL LOW BACK PAIN WITHOUT SCIATICA: ICD-10-CM

## 2017-08-11 DIAGNOSIS — G89.29 CHRONIC BILATERAL LOW BACK PAIN WITHOUT SCIATICA: ICD-10-CM

## 2017-08-11 DIAGNOSIS — M79.604 BILATERAL LEG PAIN: ICD-10-CM

## 2017-08-11 DIAGNOSIS — Z79.899 HIGH RISK MEDICATION USE: ICD-10-CM

## 2017-08-11 DIAGNOSIS — M62.830 MUSCLE SPASM OF BACK: ICD-10-CM

## 2017-08-11 DIAGNOSIS — M79.605 BILATERAL LEG PAIN: ICD-10-CM

## 2017-08-11 RX ORDER — HYDROCODONE BITARTRATE AND ACETAMINOPHEN 5; 325 MG/1; MG/1
TABLET ORAL
Qty: 70 TABLET | Refills: 0 | Status: SHIPPED | OUTPATIENT
Start: 2017-08-11 | End: 2017-09-12 | Stop reason: SDUPTHER

## 2017-08-14 ENCOUNTER — PROCEDURE VISIT (OUTPATIENT)
Dept: PHYSICAL MEDICINE AND REHAB | Age: 57
End: 2017-08-14

## 2017-08-14 DIAGNOSIS — M79.10 MYALGIA: ICD-10-CM

## 2017-08-14 DIAGNOSIS — M79.7 FIBROMYALGIA: Primary | ICD-10-CM

## 2017-08-14 PROCEDURE — 20553 NJX 1/MLT TRIGGER POINTS 3/>: CPT | Performed by: PHYSICAL MEDICINE & REHABILITATION

## 2017-08-28 RX ORDER — INFLUENZA VIRUS VACCINE 15; 15; 15; 15 UG/.5ML; UG/.5ML; UG/.5ML; UG/.5ML
SUSPENSION INTRAMUSCULAR
Refills: 0 | COMMUNITY
Start: 2017-08-15 | End: 2017-11-27 | Stop reason: ALTCHOICE

## 2017-08-28 RX ORDER — LOSARTAN POTASSIUM 25 MG/1
TABLET ORAL
Qty: 30 TABLET | Refills: 3 | Status: SHIPPED | OUTPATIENT
Start: 2017-08-28 | End: 2017-12-24 | Stop reason: SDUPTHER

## 2017-08-29 ENCOUNTER — OFFICE VISIT (OUTPATIENT)
Dept: INTERNAL MEDICINE | Age: 57
End: 2017-08-29

## 2017-08-29 VITALS
DIASTOLIC BLOOD PRESSURE: 88 MMHG | BODY MASS INDEX: 30.07 KG/M2 | OXYGEN SATURATION: 93 % | HEART RATE: 90 BPM | TEMPERATURE: 96.3 F | SYSTOLIC BLOOD PRESSURE: 128 MMHG | WEIGHT: 222 LBS | HEIGHT: 72 IN

## 2017-08-29 DIAGNOSIS — Z01.818 PREOP GENERAL PHYSICAL EXAM: Primary | ICD-10-CM

## 2017-08-29 PROCEDURE — 99213 OFFICE O/P EST LOW 20 MIN: CPT | Performed by: INTERNAL MEDICINE

## 2017-08-29 RX ORDER — LANOLIN ALCOHOL/MO/W.PET/CERES
1000 CREAM (GRAM) TOPICAL DAILY
Qty: 30 TABLET | Refills: 5 | Status: SHIPPED | OUTPATIENT
Start: 2017-08-29 | End: 2017-09-18

## 2017-08-29 ASSESSMENT — ENCOUNTER SYMPTOMS
RESPIRATORY NEGATIVE: 1
BACK PAIN: 1
COUGH: 0
TROUBLE SWALLOWING: 0
VOICE CHANGE: 0
EYE PAIN: 0
GASTROINTESTINAL NEGATIVE: 1
SHORTNESS OF BREATH: 0
WHEEZING: 0
ABDOMINAL PAIN: 0

## 2017-09-12 ENCOUNTER — PROCEDURE VISIT (OUTPATIENT)
Dept: PHYSICAL MEDICINE AND REHAB | Age: 57
End: 2017-09-12

## 2017-09-12 DIAGNOSIS — G89.29 CHRONIC BILATERAL LOW BACK PAIN WITHOUT SCIATICA: ICD-10-CM

## 2017-09-12 DIAGNOSIS — M79.605 BILATERAL LEG PAIN: ICD-10-CM

## 2017-09-12 DIAGNOSIS — M79.604 BILATERAL LEG PAIN: ICD-10-CM

## 2017-09-12 DIAGNOSIS — M79.10 MYALGIA: ICD-10-CM

## 2017-09-12 DIAGNOSIS — M79.7 FIBROMYALGIA: Primary | ICD-10-CM

## 2017-09-12 DIAGNOSIS — M62.830 MUSCLE SPASM OF BACK: ICD-10-CM

## 2017-09-12 DIAGNOSIS — M54.50 CHRONIC BILATERAL LOW BACK PAIN WITHOUT SCIATICA: ICD-10-CM

## 2017-09-12 DIAGNOSIS — Z79.899 HIGH RISK MEDICATION USE: ICD-10-CM

## 2017-09-12 PROCEDURE — 20553 NJX 1/MLT TRIGGER POINTS 3/>: CPT | Performed by: PHYSICAL MEDICINE & REHABILITATION

## 2017-09-12 RX ORDER — HYDROCODONE BITARTRATE AND ACETAMINOPHEN 5; 325 MG/1; MG/1
TABLET ORAL
Qty: 70 TABLET | Refills: 0 | Status: SHIPPED | OUTPATIENT
Start: 2017-09-12 | End: 2017-10-12 | Stop reason: SDUPTHER

## 2017-09-18 ENCOUNTER — TELEPHONE (OUTPATIENT)
Dept: INTERNAL MEDICINE | Age: 57
End: 2017-09-18

## 2017-09-18 RX ORDER — PSYLLIUM HUSK 3.4 G/7G
POWDER ORAL
Refills: 0 | COMMUNITY
Start: 2017-08-29 | End: 2018-02-27

## 2017-09-18 RX ORDER — DEXTROMETHORPHAN HYDROBROMIDE AND PROMETHAZINE HYDROCHLORIDE 15; 6.25 MG/5ML; MG/5ML
5 SYRUP ORAL 4 TIMES DAILY PRN
Qty: 118 ML | Refills: 0 | Status: SHIPPED | OUTPATIENT
Start: 2017-09-18 | End: 2018-01-22 | Stop reason: SDUPTHER

## 2017-09-21 ENCOUNTER — OFFICE VISIT (OUTPATIENT)
Dept: PHYSICAL MEDICINE AND REHAB | Age: 57
End: 2017-09-21

## 2017-09-21 VITALS
BODY MASS INDEX: 30.34 KG/M2 | SYSTOLIC BLOOD PRESSURE: 148 MMHG | DIASTOLIC BLOOD PRESSURE: 56 MMHG | WEIGHT: 224 LBS | HEIGHT: 72 IN

## 2017-09-21 DIAGNOSIS — Z79.899 HIGH RISK MEDICATION USE: ICD-10-CM

## 2017-09-21 DIAGNOSIS — M53.3 SI (SACROILIAC) PAIN: ICD-10-CM

## 2017-09-21 DIAGNOSIS — G89.29 CHRONIC MIDLINE LOW BACK PAIN WITH BILATERAL SCIATICA: ICD-10-CM

## 2017-09-21 DIAGNOSIS — M54.42 CHRONIC MIDLINE LOW BACK PAIN WITH BILATERAL SCIATICA: ICD-10-CM

## 2017-09-21 DIAGNOSIS — M79.10 MYALGIA: ICD-10-CM

## 2017-09-21 DIAGNOSIS — G62.1 ALCOHOLIC POLYNEUROPATHY (HCC): Chronic | ICD-10-CM

## 2017-09-21 DIAGNOSIS — M54.41 CHRONIC MIDLINE LOW BACK PAIN WITH BILATERAL SCIATICA: ICD-10-CM

## 2017-09-21 DIAGNOSIS — M54.17 LUMBOSACRAL RADICULOPATHY AT S1: Primary | ICD-10-CM

## 2017-09-21 DIAGNOSIS — F41.1 GENERALIZED ANXIETY DISORDER: ICD-10-CM

## 2017-09-21 DIAGNOSIS — M51.36 DDD (DEGENERATIVE DISC DISEASE), LUMBAR: Chronic | ICD-10-CM

## 2017-09-21 LAB
AMPHETAMINE SCREEN, URINE: NORMAL
BARBITURATE SCREEN URINE: NORMAL
BENZODIAZEPINE SCREEN, URINE: NORMAL
CANNABINOID SCREEN URINE: NORMAL
COCAINE METABOLITE SCREEN URINE: NORMAL
Lab: NORMAL
OPIATE SCREEN URINE: NORMAL
PHENCYCLIDINE SCREEN URINE: NORMAL

## 2017-09-21 PROCEDURE — 99214 OFFICE O/P EST MOD 30 MIN: CPT | Performed by: PHYSICAL MEDICINE & REHABILITATION

## 2017-09-21 ASSESSMENT — ENCOUNTER SYMPTOMS
APNEA: 0
BACK PAIN: 1
PHOTOPHOBIA: 0
VOMITING: 0
SHORTNESS OF BREATH: 0
CONSTIPATION: 0
ALLERGIC/IMMUNOLOGIC NEGATIVE: 1
CHEST TIGHTNESS: 0
WHEEZING: 0
DIARRHEA: 0
NAUSEA: 0
BOWEL INCONTINENCE: 0
VISUAL CHANGE: 0
ABDOMINAL PAIN: 0

## 2017-09-26 RX ORDER — PRAVASTATIN SODIUM 40 MG
TABLET ORAL
Qty: 30 TABLET | Refills: 3 | Status: SHIPPED | OUTPATIENT
Start: 2017-09-26 | End: 2018-01-22 | Stop reason: SDUPTHER

## 2017-09-26 RX ORDER — AMITRIPTYLINE HYDROCHLORIDE 50 MG/1
TABLET, FILM COATED ORAL
Qty: 30 TABLET | Refills: 3 | Status: SHIPPED | OUTPATIENT
Start: 2017-09-26 | End: 2018-01-22 | Stop reason: SDUPTHER

## 2017-09-26 RX ORDER — ACETAMINOPHEN/DIPHENHYDRAMINE 500MG-25MG
TABLET ORAL
Qty: 30 TABLET | Refills: 3 | Status: SHIPPED | OUTPATIENT
Start: 2017-09-26 | End: 2018-01-22 | Stop reason: SDUPTHER

## 2017-10-12 DIAGNOSIS — M54.50 CHRONIC BILATERAL LOW BACK PAIN WITHOUT SCIATICA: ICD-10-CM

## 2017-10-12 DIAGNOSIS — M79.604 BILATERAL LEG PAIN: ICD-10-CM

## 2017-10-12 DIAGNOSIS — G89.29 CHRONIC BILATERAL LOW BACK PAIN WITHOUT SCIATICA: ICD-10-CM

## 2017-10-12 DIAGNOSIS — M62.830 MUSCLE SPASM OF BACK: ICD-10-CM

## 2017-10-12 DIAGNOSIS — M79.605 BILATERAL LEG PAIN: ICD-10-CM

## 2017-10-12 DIAGNOSIS — Z79.899 HIGH RISK MEDICATION USE: ICD-10-CM

## 2017-10-13 RX ORDER — HYDROCODONE BITARTRATE AND ACETAMINOPHEN 5; 325 MG/1; MG/1
TABLET ORAL
Qty: 70 TABLET | Refills: 0 | Status: SHIPPED | OUTPATIENT
Start: 2017-10-13 | End: 2017-11-14 | Stop reason: SDUPTHER

## 2017-10-17 ENCOUNTER — PROCEDURE VISIT (OUTPATIENT)
Dept: PHYSICAL MEDICINE AND REHAB | Age: 57
End: 2017-10-17

## 2017-10-17 DIAGNOSIS — M54.32 SCIATICA OF LEFT SIDE: Primary | ICD-10-CM

## 2017-10-17 PROCEDURE — 64445 NJX AA&/STRD SCIATIC NRV IMG: CPT | Performed by: PHYSICAL MEDICINE & REHABILITATION

## 2017-10-31 ENCOUNTER — PROCEDURE VISIT (OUTPATIENT)
Dept: PHYSICAL MEDICINE AND REHAB | Age: 57
End: 2017-10-31

## 2017-10-31 DIAGNOSIS — M70.62 GREATER TROCHANTERIC BURSITIS OF LEFT HIP: Primary | ICD-10-CM

## 2017-10-31 PROCEDURE — 20610 DRAIN/INJ JOINT/BURSA W/O US: CPT | Performed by: PHYSICAL MEDICINE & REHABILITATION

## 2017-11-14 ENCOUNTER — PROCEDURE VISIT (OUTPATIENT)
Dept: PHYSICAL MEDICINE AND REHAB | Age: 57
End: 2017-11-14

## 2017-11-14 DIAGNOSIS — Z79.899 HIGH RISK MEDICATION USE: ICD-10-CM

## 2017-11-14 DIAGNOSIS — M54.50 CHRONIC BILATERAL LOW BACK PAIN WITHOUT SCIATICA: ICD-10-CM

## 2017-11-14 DIAGNOSIS — M79.605 BILATERAL LEG PAIN: ICD-10-CM

## 2017-11-14 DIAGNOSIS — M62.830 MUSCLE SPASM OF BACK: ICD-10-CM

## 2017-11-14 DIAGNOSIS — M79.604 BILATERAL LEG PAIN: ICD-10-CM

## 2017-11-14 DIAGNOSIS — M79.10 MYALGIA: ICD-10-CM

## 2017-11-14 DIAGNOSIS — M79.7 FIBROMYALGIA: Primary | ICD-10-CM

## 2017-11-14 DIAGNOSIS — G89.29 CHRONIC BILATERAL LOW BACK PAIN WITHOUT SCIATICA: ICD-10-CM

## 2017-11-14 PROCEDURE — 20553 NJX 1/MLT TRIGGER POINTS 3/>: CPT | Performed by: PHYSICAL MEDICINE & REHABILITATION

## 2017-11-14 RX ORDER — HYDROCODONE BITARTRATE AND ACETAMINOPHEN 5; 325 MG/1; MG/1
TABLET ORAL
Qty: 70 TABLET | Refills: 0 | Status: SHIPPED | OUTPATIENT
Start: 2017-11-14 | End: 2017-12-11 | Stop reason: SDUPTHER

## 2017-11-27 ENCOUNTER — OFFICE VISIT (OUTPATIENT)
Dept: PHYSICAL MEDICINE AND REHAB | Age: 57
End: 2017-11-27

## 2017-11-27 VITALS
SYSTOLIC BLOOD PRESSURE: 136 MMHG | DIASTOLIC BLOOD PRESSURE: 56 MMHG | WEIGHT: 228 LBS | HEIGHT: 72 IN | BODY MASS INDEX: 30.88 KG/M2

## 2017-11-27 DIAGNOSIS — F10.21 PERSONAL HISTORY OF ALCOHOLISM (HCC): Chronic | ICD-10-CM

## 2017-11-27 DIAGNOSIS — M51.36 DDD (DEGENERATIVE DISC DISEASE), LUMBAR: Chronic | ICD-10-CM

## 2017-11-27 DIAGNOSIS — G62.1 ALCOHOLIC POLYNEUROPATHY (HCC): Primary | Chronic | ICD-10-CM

## 2017-11-27 DIAGNOSIS — Z79.899 HIGH RISK MEDICATION USE: ICD-10-CM

## 2017-11-27 DIAGNOSIS — M79.10 MYALGIA: ICD-10-CM

## 2017-11-27 DIAGNOSIS — F81.0 BASIC LEARNING DISABILITY, READING: Chronic | ICD-10-CM

## 2017-11-27 PROCEDURE — 4004F PT TOBACCO SCREEN RCVD TLK: CPT | Performed by: PHYSICAL MEDICINE & REHABILITATION

## 2017-11-27 PROCEDURE — 99213 OFFICE O/P EST LOW 20 MIN: CPT | Performed by: PHYSICAL MEDICINE & REHABILITATION

## 2017-11-27 PROCEDURE — G8427 DOCREV CUR MEDS BY ELIG CLIN: HCPCS | Performed by: PHYSICAL MEDICINE & REHABILITATION

## 2017-11-27 PROCEDURE — G8484 FLU IMMUNIZE NO ADMIN: HCPCS | Performed by: PHYSICAL MEDICINE & REHABILITATION

## 2017-11-27 PROCEDURE — G8598 ASA/ANTIPLAT THER USED: HCPCS | Performed by: PHYSICAL MEDICINE & REHABILITATION

## 2017-11-27 PROCEDURE — G8417 CALC BMI ABV UP PARAM F/U: HCPCS | Performed by: PHYSICAL MEDICINE & REHABILITATION

## 2017-11-27 PROCEDURE — 3017F COLORECTAL CA SCREEN DOC REV: CPT | Performed by: PHYSICAL MEDICINE & REHABILITATION

## 2017-11-27 ASSESSMENT — ENCOUNTER SYMPTOMS
CHEST TIGHTNESS: 0
ABDOMINAL PAIN: 0
NAUSEA: 0
DIARRHEA: 0
PHOTOPHOBIA: 0
WHEEZING: 0
VOMITING: 0
CONSTIPATION: 0
VISUAL CHANGE: 0
BOWEL INCONTINENCE: 0
APNEA: 0
SHORTNESS OF BREATH: 0
BACK PAIN: 1
ALLERGIC/IMMUNOLOGIC NEGATIVE: 1

## 2017-11-27 NOTE — PROGRESS NOTES
fatigue, visual change, headaches or abdominal pain. He does not admit to suicidal ideas. He does not have a plan to commit suicide. He does not contemplate harming himself. He has not already injured self. He does not contemplate injuring another person. He has not already  injured another person. Risk factors that are present for mental illness include substance abuse.        Past Medical History:   Diagnosis Date    Alcoholic polyneuropathy (HCC)     Chronic back pain greater than 3 months duration     CN III palsy, right eye 2017    Dr Roe Wynne    COPD (chronic obstructive pulmonary disease) ([facility] [address] 2014    DDD (degenerative disc disease), lumbar 3/23/2015    Elevated liver enzymes 2014    IgA monoclonal gammopathy 2015    Dr Shabana Borjas    Insomnia, unspecified     LBP (low back pain)     Dr Kristen Levin Occlusion and stenosis of carotid artery without mention of cerebral infarction     Personal history of alcoholism ([facility] [address]     quit 11/05/2010, relapsed 2016    Sensorimotor neuropathy ([facility] [address]     Bilateral lower extremities-EMG 03/23/15 ([place])    Smoking trying to quit     Traumatic compression fracture of T11 thoracic vertebra ([facility] [address] 2188    Umbilical hernia      Past Surgical History:   Procedure Laterality Date    COLONOSCOPY  8/19/2014    TONSILLECTOMY      UPPER GASTROINTESTINAL ENDOSCOPY  8/19/2014    VENTRAL HERNIA REPAIR  09/01/15    W/MESH AND W/UMBILECTOMY     Social History     Social History    Marital status: Single     Spouse name: N/A    Number of children: 1    Years of education: N/A     Occupational History    SSI for learning disability      Social History Main Topics    Smoking status: Current Some Day Smoker     Packs/day: 0.50     Years: 40.00     Types: Cigarettes     Last attempt to quit: 12/31/2015    Smokeless tobacco: Never Used    Alcohol use No      Comment: QUIT 2010-past addiction     Drug use: No    Sexual activity: Not Asked     Other Topics Concern    None Social History Narrative    Born in [place], one of 5    Came to [place] at age 1 with parents    Never , one daughter    Never worked, disabled since 12 (mental)    Lives in [place] with brother, in a house        Attends [employer] daily    [address] riding bike, TV    One daughter, does keep in touch      Family History   Problem Relation Age of Onset    Cancer Mother      brain, dec 79    Alcohol Abuse Father        No Known Allergies    Review of Systems   Constitutional: Negative for activity change, fatigue, fever and unexpected weight change. Eyes: Positive for visual disturbance. Negative for photophobia. Respiratory: Negative for apnea, chest tightness, shortness of breath and wheezing. Cardiovascular: Negative for chest pain, palpitations and leg swelling. Gastrointestinal: Negative for abdominal pain, bowel incontinence, constipation, diarrhea, nausea and vomiting. Endocrine: Negative. Genitourinary: Negative. Negative for bladder incontinence and dysuria. Musculoskeletal: Positive for arthralgias, back pain and myalgias. Negative for gait problem, joint swelling, neck pain and neck stiffness. Skin: Negative. Allergic/Immunologic: Negative. Neurological: Positive for weakness and numbness. Negative for dizziness, light-headedness and headaches. Hematological: Negative. Psychiatric/Behavioral: Negative for dysphoric mood, hallucinations and sleep disturbance. The patient is not nervous/anxious. Objective    Vitals:    11/27/17 1106   BP: (!) 136/56   Weight: 228 lb (103.4 kg)   Height: 5' 11.5\" (1.816 m)     Pain Score: Zero (while sitting)     Physical Exam   Constitutional: He is oriented to person, place, and time. Vital signs are normal. He appears well-developed and well-nourished. Non-toxic appearance. He does not have a sickly appearance. He does not appear ill. No distress. HENT:   Head: Normocephalic and atraumatic.    Right Ear: Hearing normal.   Left Ear: Hearing normal.   Nose: Nose normal.   Mouth/Throat: Oropharynx is clear and moist and mucous membranes are normal. No oral lesions. Normal dentition. No oropharyngeal exudate. Right 3rd nerve palsy    No signs of toxic erosions. Eyes: Conjunctivae are normal. Pupils are equal, round, and reactive to light. Left eye exhibits no chemosis, no discharge and no exudate. No scleral icterus. Right eye exhibits abnormal extraocular motion. Right 3rd nerve palsy   Neck: Normal range of motion. Neck supple. No JVD present. No neck rigidity. No tracheal deviation and no edema present. No thyromegaly present. Cardiovascular: Intact distal pulses. Exam reveals no decreased pulses. Pulmonary/Chest: Effort normal. No accessory muscle usage. No apnea, no tachypnea and no bradypnea. No respiratory distress. He has no wheezes. He exhibits no tenderness. Abdominal: Soft. Bowel sounds are normal. He exhibits no distension and no mass. There is no hepatosplenomegaly. There is no tenderness. There is no rebound, no guarding and no CVA tenderness. A hernia is present. Hernia confirmed positive in the ventral area. Umbilical hernia   Musculoskeletal: He exhibits tenderness. He exhibits no edema. Right shoulder: Normal.        Left shoulder: Normal.        Right elbow: Normal.       Left elbow: Normal.        Right wrist: Normal.        Left wrist: Normal.        Right hip: Normal.        Left hip: Normal.        Right knee: Normal.        Left knee: Normal.        Right ankle: Normal. Achilles tendon normal.        Left ankle: Normal. Achilles tendon normal.        Cervical back: He exhibits decreased range of motion, tenderness, bony tenderness, pain and spasm. Thoracic back: He exhibits decreased range of motion, tenderness, bony tenderness, pain and spasm. Lumbar back: He exhibits decreased range of motion, tenderness, bony tenderness and pain.  He exhibits no swelling, no 11/05/2010, relapsed 2016    Sensorimotor neuropathy ([facility] [address]     Bilateral lower extremities-EMG 03/23/15 ([place])    Smoking trying to quit     Traumatic compression fracture of T11 thoracic vertebra ([facility] [address] 0589    Umbilical hernia            Given their medication, chronic pain and lifestyle and medications they are at risk for :    Falls, constipation, addiction  Loss of livelyhood due to severe pain, debility, weight gain and  vitamin D deficiency    The patient was educated regarding proper diet, fitness routine, and regulatory restrictions concerning pain medications. Previous notes, comprehensive past medical, surgical, family history, and diagnostics were reviewed. Patient education and councelling were provided regarding off label use,treatment options and medication and injection risks. Current and old OARRS ([place] Automated Prescription Reporting System) records reviewed, all refills reviewed since last visit,  Behavioral agreement/[place]ICK regulations   and Toxicology screen was reviewed with patient and is up to date. There are no current red flags. They are making good progress regarding pain relief, they are performing at a functional level regarding activities of daily living and psychological functioning, they're not having any adverse effects or side effects from the current medications, and I see no findings of aberrant drug taking her addiction related behaviors. Attestation: The Prescription Monitoring Report for this patient was reviewed today. (Jory Restrepo DO)  Documentation: Obtaining appropriate analgesic effect of treatment., No signs of potential drug abuse or diversion identified., Existing medication contract. (Jory Restrepo DO)       Patient is currently taking:       I have discontinued Mr. Tasha Rust QUADRIVALENT.  I am also having him maintain his SYMBICORT, lidocaine, clopidogrel, gabapentin, losartan, RA VITAMIN B-12 TR, RA ASPIRIN EC, amitriptyline, pravastatin, and HYDROcodone-acetaminophen. I also recommend the following Medications:    No orders of the defined types were placed in this encounter.       -which helps with pain and function. Otherwise, continue the current pain medications that I have prescibed. Radiologic:   Old films reviewed  ,     I discussed results with patients. see Follow up plans below  For any new studies. Care Everywhere Updates:  requested and reviewed. No new issues noted.              Labs:  Previous labs reviewed     Lab Results   Component Value Date     04/17/2017    K 3.9 04/17/2017    CL 96 04/17/2017    CO2 25 04/17/2017    BUN 12 04/17/2017    CREATININE 0.7 04/17/2017    CREATININE 0.72 04/17/2017    CALCIUM 9.8 04/17/2017    LABALBU 4.4 04/17/2017    BILITOT 0.6 04/17/2017    ALKPHOS 142 04/17/2017    AST 32 04/17/2017    ALT 51 04/17/2017     Lab Results   Component Value Date    WBC 13.4 04/17/2017    RBC 5.41 04/17/2017    HGB 16.6 04/17/2017    HCT 49.0 04/17/2017    MCV 90.6 04/17/2017    MCH 30.7 04/17/2017    MCHC 33.9 04/17/2017    RDW 13.9 04/17/2017     04/17/2017    MPV 8.8 08/25/2015       Lab Results   Component Value Date    LABAMPH Neg 09/21/2017    BARBSCNU Neg 09/21/2017    LABBENZ Neg 09/21/2017    CANSU Neg 09/21/2017    COCAIMETSCRU Neg 09/21/2017    PHENCYCLIDINESCREENURINE Neg 09/21/2017    DSCOMMENT see below 09/21/2017       Lab Results   Component Value Date    CODEINE Not Detected 09/20/2016    MORPHINE Not Detected 09/20/2016    ACETYLMORPHI Not Detected 09/20/2016    OXYCODONE Not Detected 09/20/2016    NOROXYCODONE Not Detected 09/20/2016    NOROXYMU Not Detected 09/20/2016    [facility] Not Detected 09/20/2016    NORHYDU Not Detected 09/20/2016    HYDROMO Not Detected 09/20/2016    Daquan Epley Not Detected 09/20/2016    Maybelle Asa Not Detected 09/20/2016    FENTA Not Detected 09/20/2016    NORFENT Not Detected 09/20/2016    MEPERIDINE Not Detected

## 2017-12-11 DIAGNOSIS — Z79.899 HIGH RISK MEDICATION USE: ICD-10-CM

## 2017-12-11 DIAGNOSIS — M79.604 BILATERAL LEG PAIN: ICD-10-CM

## 2017-12-11 DIAGNOSIS — M62.830 MUSCLE SPASM OF BACK: ICD-10-CM

## 2017-12-11 DIAGNOSIS — M54.50 CHRONIC BILATERAL LOW BACK PAIN WITHOUT SCIATICA: ICD-10-CM

## 2017-12-11 DIAGNOSIS — M79.605 BILATERAL LEG PAIN: ICD-10-CM

## 2017-12-11 DIAGNOSIS — G89.29 CHRONIC BILATERAL LOW BACK PAIN WITHOUT SCIATICA: ICD-10-CM

## 2017-12-11 RX ORDER — HYDROCODONE BITARTRATE AND ACETAMINOPHEN 5; 325 MG/1; MG/1
TABLET ORAL
Qty: 70 TABLET | Refills: 0 | Status: SHIPPED | OUTPATIENT
Start: 2017-12-11 | End: 2018-01-05 | Stop reason: SDUPTHER

## 2017-12-26 RX ORDER — LOSARTAN POTASSIUM 25 MG/1
TABLET ORAL
Qty: 30 TABLET | Refills: 3 | Status: SHIPPED | OUTPATIENT
Start: 2017-12-26 | End: 2018-02-16 | Stop reason: SDUPTHER

## 2017-12-28 DIAGNOSIS — M79.672 LEFT FOOT PAIN: ICD-10-CM

## 2017-12-28 DIAGNOSIS — D47.2 IGG MONOCLONAL GAMMOPATHY OF UNCERTAIN SIGNIFICANCE: ICD-10-CM

## 2017-12-28 DIAGNOSIS — G62.1 ALCOHOLIC POLYNEUROPATHY (HCC): Chronic | ICD-10-CM

## 2017-12-28 DIAGNOSIS — M54.17 LUMBOSACRAL RADICULOPATHY AT S1: ICD-10-CM

## 2018-01-02 RX ORDER — GABAPENTIN 800 MG/1
TABLET ORAL
Qty: 90 TABLET | Refills: 3 | Status: SHIPPED | OUTPATIENT
Start: 2018-01-02 | End: 2018-03-01 | Stop reason: SDUPTHER

## 2018-01-04 ENCOUNTER — PROCEDURE VISIT (OUTPATIENT)
Dept: PHYSICAL MEDICINE AND REHAB | Age: 58
End: 2018-01-04

## 2018-01-04 DIAGNOSIS — M79.7 FIBROMYALGIA: Primary | ICD-10-CM

## 2018-01-04 DIAGNOSIS — M79.10 MYALGIA: ICD-10-CM

## 2018-01-04 PROCEDURE — 20552 NJX 1/MLT TRIGGER POINT 1/2: CPT | Performed by: PHYSICAL MEDICINE & REHABILITATION

## 2018-01-05 DIAGNOSIS — M79.605 BILATERAL LEG PAIN: ICD-10-CM

## 2018-01-05 DIAGNOSIS — G89.29 CHRONIC BILATERAL LOW BACK PAIN WITHOUT SCIATICA: ICD-10-CM

## 2018-01-05 DIAGNOSIS — M62.830 MUSCLE SPASM OF BACK: ICD-10-CM

## 2018-01-05 DIAGNOSIS — Z79.899 HIGH RISK MEDICATION USE: ICD-10-CM

## 2018-01-05 DIAGNOSIS — M79.604 BILATERAL LEG PAIN: ICD-10-CM

## 2018-01-05 DIAGNOSIS — M54.50 CHRONIC BILATERAL LOW BACK PAIN WITHOUT SCIATICA: ICD-10-CM

## 2018-01-05 RX ORDER — HYDROCODONE BITARTRATE AND ACETAMINOPHEN 5; 325 MG/1; MG/1
TABLET ORAL
Qty: 70 TABLET | Refills: 0 | Status: SHIPPED | OUTPATIENT
Start: 2018-01-11 | End: 2018-02-21 | Stop reason: SDUPTHER

## 2018-01-22 RX ORDER — DEXTROMETHORPHAN HYDROBROMIDE AND PROMETHAZINE HYDROCHLORIDE 15; 6.25 MG/5ML; MG/5ML
SYRUP ORAL
Qty: 118 ML | Refills: 0 | Status: SHIPPED | OUTPATIENT
Start: 2018-01-22 | End: 2018-02-16

## 2018-01-23 RX ORDER — PRAVASTATIN SODIUM 40 MG
TABLET ORAL
Qty: 30 TABLET | Refills: 3 | Status: SHIPPED | OUTPATIENT
Start: 2018-01-23 | End: 2018-05-22 | Stop reason: SDUPTHER

## 2018-01-23 RX ORDER — ACETAMINOPHEN/DIPHENHYDRAMINE 500MG-25MG
TABLET ORAL
Qty: 30 TABLET | Refills: 3 | Status: SHIPPED | OUTPATIENT
Start: 2018-01-23 | End: 2018-05-22 | Stop reason: SDUPTHER

## 2018-01-23 RX ORDER — AMITRIPTYLINE HYDROCHLORIDE 50 MG/1
TABLET, FILM COATED ORAL
Qty: 30 TABLET | Refills: 3 | Status: SHIPPED | OUTPATIENT
Start: 2018-01-23 | End: 2018-05-22 | Stop reason: SDUPTHER

## 2018-01-26 ENCOUNTER — OFFICE VISIT (OUTPATIENT)
Dept: PHYSICAL MEDICINE AND REHAB | Age: 58
End: 2018-01-26
Payer: COMMERCIAL

## 2018-01-26 VITALS
SYSTOLIC BLOOD PRESSURE: 126 MMHG | BODY MASS INDEX: 29.12 KG/M2 | WEIGHT: 208 LBS | DIASTOLIC BLOOD PRESSURE: 46 MMHG | HEIGHT: 71 IN

## 2018-01-26 DIAGNOSIS — M79.10 MYALGIA: ICD-10-CM

## 2018-01-26 DIAGNOSIS — G62.1 ALCOHOLIC POLYNEUROPATHY (HCC): Chronic | ICD-10-CM

## 2018-01-26 DIAGNOSIS — M51.36 DDD (DEGENERATIVE DISC DISEASE), LUMBAR: Primary | Chronic | ICD-10-CM

## 2018-01-26 DIAGNOSIS — Z79.899 HIGH RISK MEDICATION USE: ICD-10-CM

## 2018-01-26 PROCEDURE — 3017F COLORECTAL CA SCREEN DOC REV: CPT | Performed by: PHYSICAL MEDICINE & REHABILITATION

## 2018-01-26 PROCEDURE — G8417 CALC BMI ABV UP PARAM F/U: HCPCS | Performed by: PHYSICAL MEDICINE & REHABILITATION

## 2018-01-26 PROCEDURE — G8484 FLU IMMUNIZE NO ADMIN: HCPCS | Performed by: PHYSICAL MEDICINE & REHABILITATION

## 2018-01-26 PROCEDURE — G8427 DOCREV CUR MEDS BY ELIG CLIN: HCPCS | Performed by: PHYSICAL MEDICINE & REHABILITATION

## 2018-01-26 PROCEDURE — 4004F PT TOBACCO SCREEN RCVD TLK: CPT | Performed by: PHYSICAL MEDICINE & REHABILITATION

## 2018-01-26 PROCEDURE — 20552 NJX 1/MLT TRIGGER POINT 1/2: CPT | Performed by: PHYSICAL MEDICINE & REHABILITATION

## 2018-01-26 PROCEDURE — G8598 ASA/ANTIPLAT THER USED: HCPCS | Performed by: PHYSICAL MEDICINE & REHABILITATION

## 2018-01-26 PROCEDURE — 99213 OFFICE O/P EST LOW 20 MIN: CPT | Performed by: PHYSICAL MEDICINE & REHABILITATION

## 2018-01-26 ASSESSMENT — ENCOUNTER SYMPTOMS
CHEST TIGHTNESS: 0
ALLERGIC/IMMUNOLOGIC NEGATIVE: 1
NAUSEA: 0
SHORTNESS OF BREATH: 0
WHEEZING: 0
BOWEL INCONTINENCE: 0
VOMITING: 0
CONSTIPATION: 0
PHOTOPHOBIA: 0
DIARRHEA: 0
VISUAL CHANGE: 0
APNEA: 0
ABDOMINAL PAIN: 0
BACK PAIN: 1

## 2018-01-26 NOTE — PROGRESS NOTES
mucous membranes are normal. No oral lesions. Normal dentition. No oropharyngeal exudate. Right 3rd nerve palsy    No signs of toxic erosions. Eyes: Conjunctivae are normal. Pupils are equal, round, and reactive to light. Left eye exhibits no chemosis, no discharge and no exudate. No scleral icterus. Right eye exhibits abnormal extraocular motion. Right 3rd nerve palsy   Neck: Normal range of motion. Neck supple. No JVD present. No neck rigidity. No tracheal deviation and no edema present. No thyromegaly present. Cardiovascular: Intact distal pulses. Exam reveals no decreased pulses. Pulmonary/Chest: Effort normal. No accessory muscle usage. No apnea, no tachypnea and no bradypnea. No respiratory distress. He has no wheezes. He exhibits no tenderness. Abdominal: Soft. Bowel sounds are normal. He exhibits no distension and no mass. There is no hepatosplenomegaly. There is no tenderness. There is no rebound, no guarding and no CVA tenderness. A hernia is present. Hernia confirmed positive in the ventral area. Umbilical hernia   Musculoskeletal: He exhibits tenderness. He exhibits no edema. Right shoulder: Normal.        Left shoulder: Normal.        Right elbow: Normal.       Left elbow: Normal.        Right wrist: Normal.        Left wrist: Normal.        Right hip: Normal.        Left hip: Normal.        Right knee: Normal.        Left knee: Normal.        Right ankle: Normal. Achilles tendon normal.        Left ankle: Normal. Achilles tendon normal.        Cervical back: He exhibits decreased range of motion, tenderness, bony tenderness, pain and spasm. Thoracic back: He exhibits decreased range of motion, tenderness, bony tenderness, pain and spasm. Lumbar back: He exhibits decreased range of motion, tenderness, bony tenderness and pain. He exhibits no swelling, no edema, no deformity, no laceration, no spasm and normal pulse.         Back:         Right upper arm: Normal. Left upper arm: Normal.        Right forearm: Normal.        Left forearm: Normal.        Right hand: Normal.        Left hand: Normal.        Right upper leg: Normal.        Left upper leg: Normal.        Right lower leg: Normal.        Left lower leg: Normal.        Legs:       Right foot: Normal.        Left foot: Normal.   Tender areas are indicated by numbered spot         Lymphadenopathy:     He has no cervical adenopathy. Neurological: He is alert and oriented to person, place, and time. He displays abnormal reflex. He displays no atrophy and no tremor. A sensory deficit is present. No cranial nerve deficit. He exhibits normal muscle tone. Coordination normal. He displays no Babinski's sign on the right side. He displays no Babinski's sign on the left side. Skin: Skin is warm, dry and intact. No abrasion, no bruising, no ecchymosis, no laceration, no petechiae and no rash noted. Rash is not macular, not pustular and not urticarial. He is not diaphoretic. No cyanosis or erythema. No pallor. Nails show no clubbing. Psychiatric: He has a normal mood and affect. His behavior is normal. Judgment and thought content normal. His mood appears not anxious. His affect is not angry, not blunt, not labile and not inappropriate. His speech is not rapid and/or pressured, not delayed, not tangential and not slurred. He is not agitated, not aggressive, not hyperactive, not slowed, not withdrawn, not actively hallucinating and not combative. Thought content is not paranoid and not delusional. Cognition and memory are normal. Cognition and memory are not impaired. He does not express impulsivity or inappropriate judgment. He does not exhibit a depressed mood. He expresses no homicidal and no suicidal ideation. He expresses no suicidal plans and no homicidal plans. He is communicative. He exhibits normal recent memory and normal remote memory. He is attentive. Vitals reviewed.     Ortho Exam  Neurologic Exam Mental Status   Oriented to person, place, and time. Speech: not slurred     Cranial Nerves     CN III, IV, VI   Pupils are equal, round, and reactive to light. After a thorough review and discussion of the previous medical records, patient comprehensive medical, surgical, and family and social history, Review of Systems, their OARRS, their Screener and Opioid Assessment for Patients with Pain (SOAPP®-R), recent diagnostics, and symptomatic results to previous treatment, it is my impression that the patients is suffering with progressive and severe:    1. DDD (degenerative disc disease), lumbar     2. Alcoholic polyneuropathy ([address]     3. High risk medication use - 07/20/17 OARRS PM&R, 11/22/17 OARRS PM&R, 02/15/17 Med Contract PM&R, 09/21/17 Urine Drug Screen: negative PM&R     4.  Myalgia  [place] INJECT TRIGGER POINTS, 3 OR GREATER    [address]552 - [place] INJECT TRIGGER POINT, 1 OR 2       I am also concerned by lifestyle and mood issues including:    Past Medical History:   Diagnosis Date    Alcoholic polyneuropathy (HCC)     Chronic back pain greater than 3 months duration     CN III palsy, right eye 2017    Dr Corey Rao    COPD (chronic obstructive pulmonary disease) ([facility] [address] 2014    DDD (degenerative disc disease), lumbar 3/23/2015    Elevated liver enzymes 2014    IgA monoclonal gammopathy 2015    Dr Horacio Wilkerson    Insomnia, unspecified     LBP (low back pain)     Dr Eladio Briseno Occlusion and stenosis of carotid artery without mention of cerebral infarction     Personal history of alcoholism ([address]     quit 11/05/2010, relapsed 2016    Sensorimotor neuropathy ([address]     Bilateral lower extremities-EMG 03/23/15 ([place])    Smoking trying to quit     Traumatic compression fracture of T11 thoracic vertebra ([address] 0172    Umbilical hernia            Given their medication, chronic pain and lifestyle and medications they are at risk for :    Falls, constipation, addiction  Loss of livelyhood due to severe pain,

## 2018-02-16 ENCOUNTER — OFFICE VISIT (OUTPATIENT)
Dept: INTERNAL MEDICINE CLINIC | Age: 58
End: 2018-02-16
Payer: COMMERCIAL

## 2018-02-16 VITALS
BODY MASS INDEX: 31.5 KG/M2 | DIASTOLIC BLOOD PRESSURE: 98 MMHG | WEIGHT: 225 LBS | SYSTOLIC BLOOD PRESSURE: 140 MMHG | HEART RATE: 98 BPM | HEIGHT: 71 IN | TEMPERATURE: 96 F | OXYGEN SATURATION: 95 %

## 2018-02-16 DIAGNOSIS — J44.9 CHRONIC OBSTRUCTIVE PULMONARY DISEASE, UNSPECIFIED COPD TYPE (HCC): Chronic | ICD-10-CM

## 2018-02-16 DIAGNOSIS — I10 UNCONTROLLED HYPERTENSION: Primary | Chronic | ICD-10-CM

## 2018-02-16 PROCEDURE — 4004F PT TOBACCO SCREEN RCVD TLK: CPT | Performed by: INTERNAL MEDICINE

## 2018-02-16 PROCEDURE — G8427 DOCREV CUR MEDS BY ELIG CLIN: HCPCS | Performed by: INTERNAL MEDICINE

## 2018-02-16 PROCEDURE — G8926 SPIRO NO PERF OR DOC: HCPCS | Performed by: INTERNAL MEDICINE

## 2018-02-16 PROCEDURE — 3017F COLORECTAL CA SCREEN DOC REV: CPT | Performed by: INTERNAL MEDICINE

## 2018-02-16 PROCEDURE — 3023F SPIROM DOC REV: CPT | Performed by: INTERNAL MEDICINE

## 2018-02-16 PROCEDURE — 99213 OFFICE O/P EST LOW 20 MIN: CPT | Performed by: INTERNAL MEDICINE

## 2018-02-16 PROCEDURE — G8417 CALC BMI ABV UP PARAM F/U: HCPCS | Performed by: INTERNAL MEDICINE

## 2018-02-16 PROCEDURE — G8598 ASA/ANTIPLAT THER USED: HCPCS | Performed by: INTERNAL MEDICINE

## 2018-02-16 PROCEDURE — G8484 FLU IMMUNIZE NO ADMIN: HCPCS | Performed by: INTERNAL MEDICINE

## 2018-02-16 RX ORDER — GABAPENTIN 800 MG/1
TABLET ORAL
Refills: 0 | COMMUNITY
Start: 2018-02-02 | End: 2018-03-02

## 2018-02-16 RX ORDER — LOSARTAN POTASSIUM 100 MG/1
TABLET ORAL
Qty: 30 TABLET | Refills: 2 | Status: SHIPPED | OUTPATIENT
Start: 2018-02-16 | End: 2018-05-14 | Stop reason: SDUPTHER

## 2018-02-16 RX ORDER — CARBOXYMETHYLCELLULOSE SODIUM 5 MG/ML
SOLUTION/ DROPS OPHTHALMIC
Refills: 1 | COMMUNITY
Start: 2018-02-01

## 2018-02-16 RX ORDER — GUAIFENESIN AND DEXTROMETHORPHAN HYDROBROMIDE 100; 10 MG/5ML; MG/5ML
5 SOLUTION ORAL EVERY 4 HOURS PRN
Qty: 120 ML | Refills: 1 | Status: SHIPPED | OUTPATIENT
Start: 2018-02-16 | End: 2018-11-15 | Stop reason: ALTCHOICE

## 2018-02-16 ASSESSMENT — ENCOUNTER SYMPTOMS
EYE PAIN: 0
ABDOMINAL PAIN: 0
GASTROINTESTINAL NEGATIVE: 1
WHEEZING: 0
BACK PAIN: 1
CHOKING: 0
CHEST TIGHTNESS: 0
COUGH: 1
TROUBLE SWALLOWING: 0
VOICE CHANGE: 0
SHORTNESS OF BREATH: 0

## 2018-02-16 ASSESSMENT — PATIENT HEALTH QUESTIONNAIRE - PHQ9
1. LITTLE INTEREST OR PLEASURE IN DOING THINGS: 0
SUM OF ALL RESPONSES TO PHQ QUESTIONS 1-9: 0
SUM OF ALL RESPONSES TO PHQ9 QUESTIONS 1 & 2: 0
2. FEELING DOWN, DEPRESSED OR HOPELESS: 0

## 2018-02-16 NOTE — PROGRESS NOTES
facility-administered medications on file prior to visit. Review of Systems   Constitutional: Negative for chills, diaphoresis, fatigue and fever. HENT: Positive for congestion. Negative for nosebleeds, postnasal drip, trouble swallowing and voice change. Eyes: Positive for visual disturbance (due to CN palsy). Negative for pain. Respiratory: Positive for cough. Negative for choking, chest tightness, shortness of breath and wheezing. Cardiovascular: Negative. Negative for chest pain, palpitations and leg swelling. Gastrointestinal: Negative. Negative for abdominal pain. Endocrine: Negative. Negative for cold intolerance, heat intolerance, polydipsia and polyuria. Genitourinary: Negative for hematuria. Musculoskeletal: Positive for back pain. Negative for neck pain and neck stiffness. Skin: Negative for rash. Neurological: Negative. Negative for dizziness, tremors, speech difficulty, weakness, light-headedness, numbness and headaches. Hematological: Does not bruise/bleed easily. Psychiatric/Behavioral: Positive for dysphoric mood. Negative for confusion, decreased concentration and sleep disturbance. The patient is nervous/anxious. Vitals:    02/16/18 1208 02/16/18 1226   BP: (!) 130/100 (!) 140/98   Site: Right Arm    Position: Sitting    Cuff Size: Medium Adult    Pulse: 98    Temp: 96 °F (35.6 °C)    TempSrc: Tympanic    SpO2: 95%    Weight: 225 lb (102.1 kg)    Height: 5' 11\" (1.803 m)        Physical Exam   Constitutional: He is oriented to person, place, and time. He appears distressed (anxious, depressed). Overweight, depressed, poorly groomed    HENT:   Head: Atraumatic. Eyes: Right eye exhibits no discharge. Left eye exhibits no discharge. No scleral icterus. Neck: Normal range of motion. Neck supple. No JVD present. No thyromegaly present. Cardiovascular: Regular rhythm and normal heart sounds. Exam reveals no gallop.     Pulmonary/Chest: Effort normal. No daily effort to improve diet (a consistent high fiber, low calorie, low sodium diet) and exercise habits (more aerobic exercise as tolerated), better stress management, will result in improved health and help in better management of the current chronic conditions in the long run. The 5 A steps strategy for tobacco cessation (Ask current status, Advise to quit, Assess wiliness to quit, Assist by suggesting a quit plan, Arrange for follow up) were followed today, with patient's cooperation. Side effects, adverse effects of the medication prescribed (or refilled) today, as well as treatment plan/ rationale and result expectations have (again) been discussed with the patient who expresses understanding and consents to proceed as outlined above. Additional advise included in the \"after visit summary\". Orders Placed This Encounter   Medications    Dextromethorphan-guaiFENesin (Q-TUSSIN DM)  MG/5ML SYRP     Sig: Take 5 mLs by mouth every 4 hours as needed for Cough     Dispense:  120 mL     Refill:  1    losartan (COZAAR) 100 MG tablet     Sig: Take 1 tab po daily     Dispense:  30 tablet     Refill:  2       Orders Placed This Encounter   Procedures    XR CHEST STANDARD (2 VW)     Standing Status:   Future     Standing Expiration Date:   2/16/2019     Further workup and plan will be determined based on clinical progression and follow up test/ treatment results. Close follow up needed to evaluate treatment results and for care coordination. Return in about 6 weeks (around 3/30/2018). I have reviewed the patient's medical and surgical, family and social history, health maintenance schedule, and updated the computerized patient record. Please note this report has been partially produced by using speech recognition hardware. It may contain errors related to the system, including grammar, punctuation and spelling as well as words and phrases that may seem inaccurate.  For any questions or concerns, please feel free to contact me for clarification.         Electronically signed by Claribel Garcia MD

## 2018-02-21 DIAGNOSIS — M79.604 BILATERAL LEG PAIN: ICD-10-CM

## 2018-02-21 DIAGNOSIS — M79.605 BILATERAL LEG PAIN: ICD-10-CM

## 2018-02-21 DIAGNOSIS — M54.17 LUMBOSACRAL RADICULOPATHY AT S1: ICD-10-CM

## 2018-02-21 DIAGNOSIS — M54.50 CHRONIC BILATERAL LOW BACK PAIN WITHOUT SCIATICA: ICD-10-CM

## 2018-02-21 DIAGNOSIS — M79.672 LEFT FOOT PAIN: ICD-10-CM

## 2018-02-21 DIAGNOSIS — Z79.899 HIGH RISK MEDICATION USE: ICD-10-CM

## 2018-02-21 DIAGNOSIS — D47.2 IGG MONOCLONAL GAMMOPATHY OF UNCERTAIN SIGNIFICANCE: ICD-10-CM

## 2018-02-21 DIAGNOSIS — G62.1 ALCOHOLIC POLYNEUROPATHY (HCC): Chronic | ICD-10-CM

## 2018-02-21 DIAGNOSIS — G89.29 CHRONIC BILATERAL LOW BACK PAIN WITHOUT SCIATICA: ICD-10-CM

## 2018-02-21 DIAGNOSIS — M62.830 MUSCLE SPASM OF BACK: ICD-10-CM

## 2018-02-21 RX ORDER — GABAPENTIN 800 MG/1
TABLET ORAL
Qty: 90 TABLET | Refills: 3 | Status: CANCELLED | OUTPATIENT
Start: 2018-02-21 | End: 2018-05-21

## 2018-02-23 RX ORDER — HYDROCODONE BITARTRATE AND ACETAMINOPHEN 5; 325 MG/1; MG/1
TABLET ORAL
Qty: 70 TABLET | Refills: 0 | Status: SHIPPED | OUTPATIENT
Start: 2018-02-23 | End: 2018-03-26 | Stop reason: SDUPTHER

## 2018-02-27 RX ORDER — PSYLLIUM HUSK 3.4 G/7G
POWDER ORAL
Qty: 30 TABLET | Refills: 5 | Status: SHIPPED | OUTPATIENT
Start: 2018-02-27 | End: 2018-08-17 | Stop reason: SDUPTHER

## 2018-02-27 RX ORDER — LOSARTAN POTASSIUM 25 MG/1
TABLET ORAL
Refills: 0 | COMMUNITY
Start: 2018-02-22 | End: 2018-05-15

## 2018-03-01 DIAGNOSIS — D47.2 IGG MONOCLONAL GAMMOPATHY OF UNCERTAIN SIGNIFICANCE: ICD-10-CM

## 2018-03-01 DIAGNOSIS — M54.17 LUMBOSACRAL RADICULOPATHY AT S1: ICD-10-CM

## 2018-03-01 DIAGNOSIS — M79.672 LEFT FOOT PAIN: ICD-10-CM

## 2018-03-01 DIAGNOSIS — G62.1 ALCOHOLIC POLYNEUROPATHY (HCC): Chronic | ICD-10-CM

## 2018-03-02 RX ORDER — GABAPENTIN 800 MG/1
TABLET ORAL
Qty: 90 TABLET | Refills: 3 | Status: SHIPPED | OUTPATIENT
Start: 2018-03-02 | End: 2018-09-19 | Stop reason: SDUPTHER

## 2018-03-12 ENCOUNTER — PROCEDURE VISIT (OUTPATIENT)
Dept: PHYSICAL MEDICINE AND REHAB | Age: 58
End: 2018-03-12
Payer: COMMERCIAL

## 2018-03-12 DIAGNOSIS — M79.7 FIBROMYALGIA: Primary | ICD-10-CM

## 2018-03-12 DIAGNOSIS — M79.10 MYALGIA: ICD-10-CM

## 2018-03-12 PROCEDURE — 20552 NJX 1/MLT TRIGGER POINT 1/2: CPT | Performed by: PHYSICAL MEDICINE & REHABILITATION

## 2018-03-20 ENCOUNTER — HOSPITAL ENCOUNTER (OUTPATIENT)
Dept: GENERAL RADIOLOGY | Age: 58
Discharge: HOME | End: 2018-03-22
Payer: COMMERCIAL

## 2018-03-20 DIAGNOSIS — J44.9 CHRONIC OBSTRUCTIVE PULMONARY DISEASE, UNSPECIFIED COPD TYPE (HCC): Chronic | ICD-10-CM

## 2018-03-20 PROCEDURE — 71046 X-RAY EXAM CHEST 2 VIEWS: CPT

## 2018-03-23 NOTE — PATIENT INSTRUCTIONS
Patient Education        Chronic Obstructive Pulmonary Disease (COPD): Care Instructions  Your Care Instructions    Chronic obstructive pulmonary disease (COPD) is a general term for a group of lung diseases, including emphysema and chronic bronchitis. People with COPD have decreased airflow in and out of the lungs, which makes it hard to breathe. The airways also can get clogged with thick mucus. Cigarette smoking is a major cause of COPD. Although there is no cure for COPD, you can slow its progress. Following your treatment plan and taking care of yourself can help you feel better and live longer. Follow-up care is a key part of your treatment and safety. Be sure to make and go to all appointments, and call your doctor if you are having problems. It's also a good idea to know your test results and keep a list of the medicines you take. How can you care for yourself at home? ?Staying healthy  ? · Do not smoke. This is the most important step you can take to prevent more damage to your lungs. If you need help quitting, talk to your doctor about stop-smoking programs and medicines. These can increase your chances of quitting for good. ? · Avoid colds and flu. Get a pneumococcal vaccine shot. If you have had one before, ask your doctor whether you need a second dose. Get the flu vaccine every fall. If you must be around people with colds or the flu, wash your hands often. ? · Avoid secondhand smoke, air pollution, and high altitudes. Also avoid cold, dry air and hot, humid air. Stay at home with your windows closed when air pollution is bad. ?Medicines and oxygen therapy  ? · Take your medicines exactly as prescribed. Call your doctor if you think you are having a problem with your medicine. ? · You may be taking medicines such as:  ¨ Bronchodilators. These help open your airways and make breathing easier.  Bronchodilators are either short-acting (work for 6 to 9 hours) or long-acting (work for [employer] hours). You inhale most bronchodilators, so they start to act quickly. Always carry your quick-relief inhaler with you in case you need it while you are away from home. ¨ Corticosteroids (prednisone, budesonide). These reduce airway inflammation. They come in pill or inhaled form. You must take these medicines every day for them to work well. ? · A spacer may help you get more inhaled medicine to your lungs. Ask your doctor or pharmacist if a spacer is right for you. If it is, ask how to use it properly. ? · Do not take any vitamins, over-the-counter medicine, or herbal products without talking to your doctor first.   ? · If your doctor prescribed antibiotics, take them as directed. Do not stop taking them just because you feel better. You need to take the full course of antibiotics. ? · Oxygen therapy boosts the amount of oxygen in your blood and helps you breathe easier. Use the flow rate your doctor has recommended, and do not change it without talking to your doctor first.   Activity  ? · Get regular exercise. Walking is an easy way to get exercise. Start out slowly, and walk a little more each day. ? · Pay attention to your breathing. You are exercising too hard if you cannot talk while you are exercising. ? · Take short rest breaks when doing household chores and other activities. ? · Learn breathing methods-such as breathing through pursed lips-to help you become less short of breath. ? · If your doctor has not set you up with a pulmonary rehabilitation program, talk to him or her about whether rehab is right for you. Rehab includes exercise programs, education about your disease and how to manage it, help with diet and other changes, and emotional support. Diet  ? · Eat regular, healthy meals. Use bronchodilators about 1 hour before you eat to make it easier to eat. Eat several small meals instead of three large ones. Drink beverages at the end of the meal. Avoid foods that are hard to chew.

## 2018-03-26 ENCOUNTER — OFFICE VISIT (OUTPATIENT)
Dept: PHYSICAL MEDICINE AND REHAB | Age: 58
End: 2018-03-26
Payer: COMMERCIAL

## 2018-03-26 VITALS
SYSTOLIC BLOOD PRESSURE: 110 MMHG | WEIGHT: 227 LBS | HEIGHT: 72 IN | BODY MASS INDEX: 30.75 KG/M2 | DIASTOLIC BLOOD PRESSURE: 50 MMHG

## 2018-03-26 DIAGNOSIS — Z79.899 HIGH RISK MEDICATION USE: ICD-10-CM

## 2018-03-26 DIAGNOSIS — M79.605 BILATERAL LEG PAIN: ICD-10-CM

## 2018-03-26 DIAGNOSIS — M51.36 DDD (DEGENERATIVE DISC DISEASE), LUMBAR: Primary | Chronic | ICD-10-CM

## 2018-03-26 DIAGNOSIS — M54.17 LUMBOSACRAL RADICULOPATHY AT S1: ICD-10-CM

## 2018-03-26 DIAGNOSIS — M79.10 MYALGIA: ICD-10-CM

## 2018-03-26 DIAGNOSIS — F41.1 GENERALIZED ANXIETY DISORDER: ICD-10-CM

## 2018-03-26 DIAGNOSIS — M62.830 MUSCLE SPASM OF BACK: ICD-10-CM

## 2018-03-26 DIAGNOSIS — S06.9X9S TRAUMATIC BRAIN INJURY WITH LOSS OF CONSCIOUSNESS, SEQUELA (HCC): Chronic | ICD-10-CM

## 2018-03-26 DIAGNOSIS — M79.604 BILATERAL LEG PAIN: ICD-10-CM

## 2018-03-26 DIAGNOSIS — G62.1 ALCOHOLIC POLYNEUROPATHY (HCC): Chronic | ICD-10-CM

## 2018-03-26 DIAGNOSIS — G89.29 CHRONIC BILATERAL LOW BACK PAIN WITHOUT SCIATICA: ICD-10-CM

## 2018-03-26 DIAGNOSIS — M54.50 CHRONIC BILATERAL LOW BACK PAIN WITHOUT SCIATICA: ICD-10-CM

## 2018-03-26 DIAGNOSIS — M53.3 SI (SACROILIAC) PAIN: ICD-10-CM

## 2018-03-26 PROCEDURE — 4004F PT TOBACCO SCREEN RCVD TLK: CPT | Performed by: PHYSICAL MEDICINE & REHABILITATION

## 2018-03-26 PROCEDURE — 99214 OFFICE O/P EST MOD 30 MIN: CPT | Performed by: PHYSICAL MEDICINE & REHABILITATION

## 2018-03-26 PROCEDURE — G8417 CALC BMI ABV UP PARAM F/U: HCPCS | Performed by: PHYSICAL MEDICINE & REHABILITATION

## 2018-03-26 PROCEDURE — G8427 DOCREV CUR MEDS BY ELIG CLIN: HCPCS | Performed by: PHYSICAL MEDICINE & REHABILITATION

## 2018-03-26 PROCEDURE — G8598 ASA/ANTIPLAT THER USED: HCPCS | Performed by: PHYSICAL MEDICINE & REHABILITATION

## 2018-03-26 PROCEDURE — 3017F COLORECTAL CA SCREEN DOC REV: CPT | Performed by: PHYSICAL MEDICINE & REHABILITATION

## 2018-03-26 PROCEDURE — G8484 FLU IMMUNIZE NO ADMIN: HCPCS | Performed by: PHYSICAL MEDICINE & REHABILITATION

## 2018-03-26 RX ORDER — HYDROCODONE BITARTRATE AND ACETAMINOPHEN 5; 325 MG/1; MG/1
TABLET ORAL
Qty: 70 TABLET | Refills: 0 | Status: SHIPPED | OUTPATIENT
Start: 2018-03-30 | End: 2018-03-26 | Stop reason: DRUGHIGH

## 2018-03-26 RX ORDER — HYDROCODONE BITARTRATE AND ACETAMINOPHEN 5; 325 MG/1; MG/1
TABLET ORAL
Qty: 60 TABLET | Refills: 0 | Status: SHIPPED | OUTPATIENT
Start: 2018-03-30 | End: 2018-03-26 | Stop reason: DRUGHIGH

## 2018-03-26 RX ORDER — HYDROCODONE BITARTRATE AND ACETAMINOPHEN 5; 325 MG/1; MG/1
TABLET ORAL
Qty: 60 TABLET | Refills: 0 | Status: SHIPPED | OUTPATIENT
Start: 2018-03-30 | End: 2018-03-26 | Stop reason: SDUPTHER

## 2018-03-26 RX ORDER — HYDROCODONE BITARTRATE AND ACETAMINOPHEN 5; 325 MG/1; MG/1
TABLET ORAL
Qty: 45 TABLET | Refills: 0 | Status: SHIPPED | OUTPATIENT
Start: 2018-03-30 | End: 2018-04-23 | Stop reason: SDUPTHER

## 2018-03-26 ASSESSMENT — ENCOUNTER SYMPTOMS
NAUSEA: 0
APNEA: 0
ALLERGIC/IMMUNOLOGIC NEGATIVE: 1
PHOTOPHOBIA: 0
CHEST TIGHTNESS: 0
VISUAL CHANGE: 0
DIARRHEA: 0
WHEEZING: 0
BACK PAIN: 1
VOMITING: 0
BOWEL INCONTINENCE: 0
CONSTIPATION: 0
ABDOMINAL PAIN: 0
SHORTNESS OF BREATH: 1

## 2018-03-26 NOTE — PROGRESS NOTES
Subjective  Vipin Said, 62 y.o. male presents today with:       Back Pain TP injections 03/12/18 gave 45-50% reduction in pain lasting maybe 1 day and would like to schedule for more. Hip Pain left hip    Leg Pain left leg    Foot Pain left foot    Medication Refill [place], pill count today-compliant       He is doing well on his condition at current doses -but is showing signs of decreased function. He is unkempt today and smells heavily of cigarettes. However  He is able to have Positive interactions with his friends family and coworkers. Still no sign of addiction- on  2-3 pills per day --  I will wean him to 2 pills a day and then 240 pills per month. Back Pain   This is a chronic problem. The current episode started more than 1 year ago. The problem occurs daily. The problem is unchanged. The pain is present in the lumbar spine. Radiates to: left leg, left foot. The pain is at a severity of 4/10 (Pain ranges from 2-10/10. 10/10 with walking long distances. ). The pain is moderate. The pain is the same all the time. The symptoms are aggravated by position. Associated symptoms include leg pain, numbness and weakness. Pertinent negatives include no abdominal pain, bladder incontinence, bowel incontinence, chest pain, dysuria, fever, headaches, paresis or perianal numbness. Risk factors include lack of exercise and sedentary lifestyle. He has tried chiropractic manipulation, ice, walking, home exercises, analgesics and NSAIDs (Narcotic Pain Medications, gabapentin, Trigger Point injections, Sciatica Block, Tylenol) for the symptoms. The treatment provided moderate relief. Mental Health Problem   The primary symptoms do not include dysphoric mood, delusions or hallucinations. Primary symptoms comment: etoh rel neuropathy--pt has not drank in 6 years --he is committed to no more ETOH use. The current episode started more than 1 month ago. This is a chronic problem.    The onset of the or inappropriate judgment. He does not exhibit a depressed mood. He expresses no homicidal and no suicidal ideation. He expresses no suicidal plans and no homicidal plans. He is communicative. He exhibits normal recent memory and normal remote memory. He is attentive. Vitals reviewed. Ortho Exam  Neurologic Exam     Mental Status   Oriented to person, place, and time. Speech: not slurred     Cranial Nerves     CN III, IV, VI   Pupils are equal, round, and reactive to light. After a thorough review and discussion of the previous medical records, patient comprehensive medical, surgical, and family and social history, Review of Systems, their OARRS, their Screener and Opioid Assessment for Patients with Pain (SOAPP®-R), recent diagnostics, and symptomatic results to previous treatment, it is my impression that the patients is suffering with progressive and severe:    1. DDD (degenerative disc disease), lumbar     2. Lumbosacral radiculopathy at S1     3. High risk medication use - 11/22/17 OARRS PM&R, 01/25/18 OARRS PM&R, 02/15/17 Med Contract PM&R, 09/21/17 Urine Drug Screen: negative PM&R     4. Alcoholic polyneuropathy ([facility] [address]     5. Traumatic brain injury with loss of consciousness, sequela ([facility] [address]     6. Generalized anxiety disorder     7. Muscle spasm of back  TX INJECT TRIGGER POINT, 1 OR 2    TX INJECT TRIGGER POINT, 1 OR 2    HYDROcodone-acetaminophen (NORCO) 5-325 MG per tablet    DISCONTINUED: HYDROcodone-acetaminophen (NORCO) 5-325 MG per tablet    DISCONTINUED: HYDROcodone-acetaminophen (NORCO) 5-325 MG per tablet   8. SI (sacroiliac) pain     9. Chronic bilateral low back pain without sciatica  HYDROcodone-acetaminophen (NORCO) 5-325 MG per tablet    DISCONTINUED: HYDROcodone-acetaminophen (NORCO) 5-325 MG per tablet    DISCONTINUED: HYDROcodone-acetaminophen (NORCO) 5-325 MG per tablet   10.  Bilateral leg pain  HYDROcodone-acetaminophen (NORCO) 5-325 MG per tablet    DISCONTINUED: function. Otherwise, continue the current pain medications that I have prescibed. Radiologic:   Old films reviewed   THE PULMONARY VASCULAR APPEARS MILDLY CONGESTED. COULD REPRESENT COMPONENT OF EARLY CHF. CORRELATE CLINICALLY. RADIOGRAPHIC FINDINGS SUGGESTIVE OF COPD.            ,     I discussed results with patients. see Follow up plans below  For any new studies. Care Everywhere Updates:  requested and reviewed. No new issues noted.              Labs:  Previous labs reviewed     Lab Results   Component Value Date     04/17/2017    K 3.9 04/17/2017    CL 96 04/17/2017    CO2 25 04/17/2017    BUN 12 04/17/2017    CREATININE 0.7 04/17/2017    CREATININE 0.72 04/17/2017    CALCIUM 9.8 04/17/2017    LABALBU 4.4 04/17/2017    BILITOT 0.6 04/17/2017    ALKPHOS 142 04/17/2017    AST 32 04/17/2017    ALT 51 04/17/2017     Lab Results   Component Value Date    WBC 13.4 04/17/2017    RBC 5.41 04/17/2017    HGB 16.6 04/17/2017    HCT 49.0 04/17/2017    MCV 90.6 04/17/2017    MCH 30.7 04/17/2017    MCHC 33.9 04/17/2017    RDW 13.9 04/17/2017     04/17/2017    MPV 8.8 08/25/2015       Lab Results   Component Value Date    LABAMPH Neg 09/21/2017    BARBSCNU Neg 09/21/2017    LABBENZ Neg 09/21/2017    CANSU Neg 09/21/2017    COCAIMETSCRU Neg 09/21/2017    PHENCYCLIDINESCREENURINE Neg 09/21/2017    DSCOMMENT see below 09/21/2017       Lab Results   Component Value Date    CODEINE Not Detected 09/20/2016    MORPHINE Not Detected 09/20/2016    ACETYLMORPHI Not Detected 09/20/2016    OXYCODONE Not Detected 09/20/2016    NOROXYCODONE Not Detected 09/20/2016    NOROXYMU Not Detected 09/20/2016    [facility] Not Detected 09/20/2016    NORHYDU Not Detected 09/20/2016    HYDROMO Not Detected 09/20/2016    Kermitt Norse Not Detected 09/20/2016    Sharlee Cherie Not Detected 09/20/2016    FENTA Not Detected 09/20/2016    NORFENT Not Detected 09/20/2016    MEPERIDINE Not Detected 09/20/2016    TAPENU Not Detected 09/20/2016    TAPOSULFUR Not Detected 09/20/2016    METHADONE Not Detected 09/20/2016    LABPROP Not Detected 09/20/2016    TRAM Not Detected 09/20/2016    AMPH Not Detected 09/20/2016    METHAMP Not Detected 09/20/2016    MDMA Not Detected 09/20/2016    ECMDA Not Detected 09/20/2016       Lab Results   Component Value Date    PHENTERMINE Not Detected 09/20/2016    BENZOYL Not Detected 09/20/2016    Aniyah Matter Not Detected 09/20/2016    ALPHAOHALPRA Not Detected 09/20/2016    CLONAZEPAM Not Detected 09/20/2016    Clayborne Rudy Not Detected 09/20/2016    DIAZEP Not Detected 09/20/2016    CESAR Not Detected 09/20/2016    OXAZ Not Detected 09/20/2016    Levester Jeannine Not Detected 09/20/2016    LORAZEPAM Not Detected 09/20/2016    MIDAZOLAM Not Detected 09/20/2016    ZOLPIDEM Not Detected 09/20/2016    CORWIN Not Detected 09/20/2016    ETG Not Detected 09/20/2016    MARIJMET Not Detected 09/20/2016    PCP Not Detected 09/20/2016    PAINMGTDRUGP See Below 09/20/2016    EERPAINMGTPA See Note 09/20/2016    LABCREA 55.5 09/20/2016         , I discussed results with patient. See follow-up plans for new studies. Therapies:  HEP-gentle stretching and relaxation techniques-demonstrated with patient-they are to do them twice a day. They are also advised to make the following lifestyle changes:  Goals      Exercise 3x per week (30 min per time)      SOAPP-R GOAL LESS THAN             09/20/16 SCORE: 13-MODERATE RISK   12/14/16 score: 9-low-moderate risk   02/15/17 score: 12-moderate risk   05/18/17 score: 14-moderate risk  07/21/17 score: 24-high risk  09/21/17 score: 11-moderate risk  11/27/17 score: 10-moderate risk  01/26/18 score: 8-low risk  03/26/18 score: 12-moderate risk       Stop Cigarette/Tobacco use           Injections or Epidurals:  Injection options were discussed. Patient gave verbal consent to ordered injections. See follow-up plans for planned injections.     Supplements:  Vitamin D,   Education was given on:   Dietary and Fitness             Follow up with Primary Care Physician regarding their general medical needs. They are to follow up in 2 months to review medication, efficacy of injections, pill counts, OARRS check, SOAPPR assessment, review diagnostics, to review previous and future treatment plans and assess appropriateness for continued therapy.         New Diagnostics  Orders Placed This Encounter   Procedures    MN INJECT TRIGGER POINT, 1 OR 2    MN INJECT TRIGGER POINT, 1 OR 2       Sherrie Swan, DO

## 2018-03-27 RX ORDER — CLOPIDOGREL BISULFATE 75 MG/1
75 TABLET ORAL DAILY
Qty: 30 TABLET | Refills: 1 | Status: SHIPPED | OUTPATIENT
Start: 2018-03-27 | End: 2018-05-22 | Stop reason: SDUPTHER

## 2018-04-02 RX ORDER — BUDESONIDE AND FORMOTEROL FUMARATE DIHYDRATE 160; 4.5 UG/1; UG/1
AEROSOL RESPIRATORY (INHALATION)
Qty: 10.2 G | Refills: 3 | Status: SHIPPED | OUTPATIENT
Start: 2018-04-02 | End: 2018-08-02 | Stop reason: SDUPTHER

## 2018-04-23 ENCOUNTER — PROCEDURE VISIT (OUTPATIENT)
Dept: PHYSICAL MEDICINE AND REHAB | Age: 58
End: 2018-04-23
Payer: COMMERCIAL

## 2018-04-23 DIAGNOSIS — M79.605 BILATERAL LEG PAIN: ICD-10-CM

## 2018-04-23 DIAGNOSIS — M62.830 MUSCLE SPASM OF BACK: ICD-10-CM

## 2018-04-23 DIAGNOSIS — M79.10 MYALGIA: ICD-10-CM

## 2018-04-23 DIAGNOSIS — Z79.899 HIGH RISK MEDICATION USE: ICD-10-CM

## 2018-04-23 DIAGNOSIS — M79.7 FIBROMYALGIA: Primary | ICD-10-CM

## 2018-04-23 DIAGNOSIS — G89.29 CHRONIC BILATERAL LOW BACK PAIN WITHOUT SCIATICA: ICD-10-CM

## 2018-04-23 DIAGNOSIS — M79.604 BILATERAL LEG PAIN: ICD-10-CM

## 2018-04-23 DIAGNOSIS — M54.50 CHRONIC BILATERAL LOW BACK PAIN WITHOUT SCIATICA: ICD-10-CM

## 2018-04-23 PROCEDURE — 20552 NJX 1/MLT TRIGGER POINT 1/2: CPT | Performed by: PHYSICAL MEDICINE & REHABILITATION

## 2018-04-23 RX ORDER — HYDROCODONE BITARTRATE AND ACETAMINOPHEN 5; 325 MG/1; MG/1
TABLET ORAL
Qty: 45 TABLET | Refills: 0 | Status: SHIPPED | OUTPATIENT
Start: 2018-04-23 | End: 2018-05-21 | Stop reason: SDUPTHER

## 2018-05-15 RX ORDER — LOSARTAN POTASSIUM 100 MG/1
TABLET ORAL
Qty: 30 TABLET | Refills: 2 | Status: SHIPPED | OUTPATIENT
Start: 2018-05-15 | End: 2018-08-17 | Stop reason: SDUPTHER

## 2018-05-21 DIAGNOSIS — M62.830 MUSCLE SPASM OF BACK: ICD-10-CM

## 2018-05-21 DIAGNOSIS — M54.50 CHRONIC BILATERAL LOW BACK PAIN WITHOUT SCIATICA: ICD-10-CM

## 2018-05-21 DIAGNOSIS — M79.604 BILATERAL LEG PAIN: ICD-10-CM

## 2018-05-21 DIAGNOSIS — G89.29 CHRONIC BILATERAL LOW BACK PAIN WITHOUT SCIATICA: ICD-10-CM

## 2018-05-21 DIAGNOSIS — M79.605 BILATERAL LEG PAIN: ICD-10-CM

## 2018-05-21 DIAGNOSIS — Z79.899 HIGH RISK MEDICATION USE: ICD-10-CM

## 2018-05-21 RX ORDER — HYDROCODONE BITARTRATE AND ACETAMINOPHEN 5; 325 MG/1; MG/1
1 TABLET ORAL DAILY PRN
Qty: 45 TABLET | Refills: 0 | Status: SHIPPED | OUTPATIENT
Start: 2018-05-23 | End: 2018-06-14 | Stop reason: SDUPTHER

## 2018-05-22 RX ORDER — ACETAMINOPHEN/DIPHENHYDRAMINE 500MG-25MG
TABLET ORAL
Qty: 30 TABLET | Refills: 3 | Status: SHIPPED | OUTPATIENT
Start: 2018-05-22 | End: 2018-09-14 | Stop reason: SDUPTHER

## 2018-05-22 RX ORDER — AMITRIPTYLINE HYDROCHLORIDE 50 MG/1
TABLET, FILM COATED ORAL
Qty: 30 TABLET | Refills: 3 | Status: SHIPPED | OUTPATIENT
Start: 2018-05-22 | End: 2018-09-14 | Stop reason: SDUPTHER

## 2018-05-22 RX ORDER — PRAVASTATIN SODIUM 40 MG
TABLET ORAL
Qty: 30 TABLET | Refills: 3 | Status: SHIPPED | OUTPATIENT
Start: 2018-05-22 | End: 2018-09-14 | Stop reason: SDUPTHER

## 2018-05-22 RX ORDER — CLOPIDOGREL BISULFATE 75 MG/1
TABLET ORAL
Qty: 30 TABLET | Refills: 3 | Status: SHIPPED | OUTPATIENT
Start: 2018-05-22 | End: 2018-09-14 | Stop reason: SDUPTHER

## 2018-05-23 ENCOUNTER — PROCEDURE VISIT (OUTPATIENT)
Dept: PHYSICAL MEDICINE AND REHAB | Age: 58
End: 2018-05-23
Payer: COMMERCIAL

## 2018-05-23 DIAGNOSIS — M79.10 MYALGIA: ICD-10-CM

## 2018-05-23 DIAGNOSIS — M62.830 MUSCLE SPASM OF BACK: ICD-10-CM

## 2018-05-23 PROCEDURE — 20552 NJX 1/MLT TRIGGER POINT 1/2: CPT | Performed by: PHYSICAL MEDICINE & REHABILITATION

## 2018-06-14 ENCOUNTER — OFFICE VISIT (OUTPATIENT)
Dept: PHYSICAL MEDICINE AND REHAB | Age: 58
End: 2018-06-14
Payer: COMMERCIAL

## 2018-06-14 VITALS
DIASTOLIC BLOOD PRESSURE: 80 MMHG | WEIGHT: 223 LBS | HEIGHT: 72 IN | BODY MASS INDEX: 30.2 KG/M2 | RESPIRATION RATE: 18 BRPM | SYSTOLIC BLOOD PRESSURE: 138 MMHG | HEART RATE: 109 BPM

## 2018-06-14 DIAGNOSIS — F06.4 ANXIETY DISORDER DUE TO KNOWN PHYSIOLOGICAL CONDITION: ICD-10-CM

## 2018-06-14 DIAGNOSIS — M54.42 CHRONIC MIDLINE LOW BACK PAIN WITH BILATERAL SCIATICA: Primary | ICD-10-CM

## 2018-06-14 DIAGNOSIS — M62.830 MUSCLE SPASM OF BACK: ICD-10-CM

## 2018-06-14 DIAGNOSIS — M54.41 CHRONIC MIDLINE LOW BACK PAIN WITH BILATERAL SCIATICA: Primary | ICD-10-CM

## 2018-06-14 DIAGNOSIS — F10.20 ALCOHOLIC (HCC): ICD-10-CM

## 2018-06-14 DIAGNOSIS — M54.50 CHRONIC BILATERAL LOW BACK PAIN WITHOUT SCIATICA: ICD-10-CM

## 2018-06-14 DIAGNOSIS — M79.604 BILATERAL LEG PAIN: ICD-10-CM

## 2018-06-14 DIAGNOSIS — Z79.899 HIGH RISK MEDICATION USE: ICD-10-CM

## 2018-06-14 DIAGNOSIS — Z72.0 TOBACCO ABUSE: ICD-10-CM

## 2018-06-14 DIAGNOSIS — S06.9X2D TRAUMATIC BRAIN INJURY, WITH LOSS OF CONSCIOUSNESS OF 31 MINUTES TO 59 MINUTES, SUBSEQUENT ENCOUNTER: Chronic | ICD-10-CM

## 2018-06-14 DIAGNOSIS — M53.3 SI (SACROILIAC) PAIN: ICD-10-CM

## 2018-06-14 DIAGNOSIS — M79.605 BILATERAL LEG PAIN: ICD-10-CM

## 2018-06-14 DIAGNOSIS — G89.29 CHRONIC MIDLINE LOW BACK PAIN WITH BILATERAL SCIATICA: Primary | ICD-10-CM

## 2018-06-14 DIAGNOSIS — M54.17 LUMBOSACRAL RADICULOPATHY AT S1: ICD-10-CM

## 2018-06-14 DIAGNOSIS — F41.1 GENERALIZED ANXIETY DISORDER: ICD-10-CM

## 2018-06-14 DIAGNOSIS — G62.1 ALCOHOLIC POLYNEUROPATHY (HCC): Chronic | ICD-10-CM

## 2018-06-14 DIAGNOSIS — G89.29 CHRONIC BILATERAL LOW BACK PAIN WITHOUT SCIATICA: ICD-10-CM

## 2018-06-14 PROCEDURE — G8428 CUR MEDS NOT DOCUMENT: HCPCS | Performed by: PHYSICAL MEDICINE & REHABILITATION

## 2018-06-14 PROCEDURE — 3017F COLORECTAL CA SCREEN DOC REV: CPT | Performed by: PHYSICAL MEDICINE & REHABILITATION

## 2018-06-14 PROCEDURE — G8598 ASA/ANTIPLAT THER USED: HCPCS | Performed by: PHYSICAL MEDICINE & REHABILITATION

## 2018-06-14 PROCEDURE — 4004F PT TOBACCO SCREEN RCVD TLK: CPT | Performed by: PHYSICAL MEDICINE & REHABILITATION

## 2018-06-14 PROCEDURE — G8417 CALC BMI ABV UP PARAM F/U: HCPCS | Performed by: PHYSICAL MEDICINE & REHABILITATION

## 2018-06-14 PROCEDURE — 99213 OFFICE O/P EST LOW 20 MIN: CPT | Performed by: PHYSICAL MEDICINE & REHABILITATION

## 2018-06-14 RX ORDER — HYDROCODONE BITARTRATE AND ACETAMINOPHEN 5; 325 MG/1; MG/1
1 TABLET ORAL DAILY PRN
Qty: 45 TABLET | Refills: 0 | Status: SHIPPED | OUTPATIENT
Start: 2018-06-22 | End: 2018-07-12 | Stop reason: SDUPTHER

## 2018-06-14 ASSESSMENT — ENCOUNTER SYMPTOMS
VISUAL CHANGE: 0
BOWEL INCONTINENCE: 0
PHOTOPHOBIA: 0
ALLERGIC/IMMUNOLOGIC NEGATIVE: 1
CONSTIPATION: 0
APNEA: 0
BACK PAIN: 1
SHORTNESS OF BREATH: 1
VOMITING: 0
NAUSEA: 0
DIARRHEA: 0
WHEEZING: 0
CHEST TIGHTNESS: 0
ABDOMINAL PAIN: 0

## 2018-07-09 ENCOUNTER — PROCEDURE VISIT (OUTPATIENT)
Dept: PHYSICAL MEDICINE AND REHAB | Age: 58
End: 2018-07-09
Payer: COMMERCIAL

## 2018-07-09 DIAGNOSIS — M79.7 FIBROMYALGIA: ICD-10-CM

## 2018-07-09 DIAGNOSIS — M79.10 MYALGIA: Primary | ICD-10-CM

## 2018-07-09 PROCEDURE — 20552 NJX 1/MLT TRIGGER POINT 1/2: CPT | Performed by: PHYSICAL MEDICINE & REHABILITATION

## 2018-07-12 DIAGNOSIS — Z79.899 HIGH RISK MEDICATION USE: ICD-10-CM

## 2018-07-12 DIAGNOSIS — G89.29 CHRONIC BILATERAL LOW BACK PAIN WITHOUT SCIATICA: ICD-10-CM

## 2018-07-12 DIAGNOSIS — M54.50 CHRONIC BILATERAL LOW BACK PAIN WITHOUT SCIATICA: ICD-10-CM

## 2018-07-12 DIAGNOSIS — M79.604 BILATERAL LEG PAIN: ICD-10-CM

## 2018-07-12 DIAGNOSIS — M79.605 BILATERAL LEG PAIN: ICD-10-CM

## 2018-07-12 DIAGNOSIS — M62.830 MUSCLE SPASM OF BACK: ICD-10-CM

## 2018-07-12 RX ORDER — HYDROCODONE BITARTRATE AND ACETAMINOPHEN 5; 325 MG/1; MG/1
1 TABLET ORAL DAILY PRN
Qty: 45 TABLET | Refills: 0 | Status: CANCELLED | OUTPATIENT
Start: 2018-07-21 | End: 2018-08-20

## 2018-07-12 RX ORDER — HYDROCODONE BITARTRATE AND ACETAMINOPHEN 5; 325 MG/1; MG/1
1 TABLET ORAL DAILY PRN
Qty: 45 TABLET | Refills: 0 | Status: SHIPPED | OUTPATIENT
Start: 2018-07-22 | End: 2018-08-07

## 2018-08-02 ENCOUNTER — OFFICE VISIT (OUTPATIENT)
Dept: INTERNAL MEDICINE CLINIC | Age: 58
End: 2018-08-02
Payer: COMMERCIAL

## 2018-08-02 VITALS
OXYGEN SATURATION: 97 % | HEIGHT: 72 IN | DIASTOLIC BLOOD PRESSURE: 60 MMHG | SYSTOLIC BLOOD PRESSURE: 102 MMHG | HEART RATE: 104 BPM | BODY MASS INDEX: 30.88 KG/M2 | WEIGHT: 228 LBS

## 2018-08-02 DIAGNOSIS — J44.9 CHRONIC OBSTRUCTIVE PULMONARY DISEASE, UNSPECIFIED COPD TYPE (HCC): Chronic | ICD-10-CM

## 2018-08-02 DIAGNOSIS — I10 ESSENTIAL HYPERTENSION: Primary | Chronic | ICD-10-CM

## 2018-08-02 DIAGNOSIS — Z23 NEED FOR 23-POLYVALENT PNEUMOCOCCAL POLYSACCHARIDE VACCINE: ICD-10-CM

## 2018-08-02 PROCEDURE — 3023F SPIROM DOC REV: CPT | Performed by: INTERNAL MEDICINE

## 2018-08-02 PROCEDURE — G8598 ASA/ANTIPLAT THER USED: HCPCS | Performed by: INTERNAL MEDICINE

## 2018-08-02 PROCEDURE — 90732 PPSV23 VACC 2 YRS+ SUBQ/IM: CPT | Performed by: INTERNAL MEDICINE

## 2018-08-02 PROCEDURE — 3017F COLORECTAL CA SCREEN DOC REV: CPT | Performed by: INTERNAL MEDICINE

## 2018-08-02 PROCEDURE — 99214 OFFICE O/P EST MOD 30 MIN: CPT | Performed by: INTERNAL MEDICINE

## 2018-08-02 PROCEDURE — 4004F PT TOBACCO SCREEN RCVD TLK: CPT | Performed by: INTERNAL MEDICINE

## 2018-08-02 PROCEDURE — G8427 DOCREV CUR MEDS BY ELIG CLIN: HCPCS | Performed by: INTERNAL MEDICINE

## 2018-08-02 PROCEDURE — G0009 ADMIN PNEUMOCOCCAL VACCINE: HCPCS | Performed by: INTERNAL MEDICINE

## 2018-08-02 PROCEDURE — G8417 CALC BMI ABV UP PARAM F/U: HCPCS | Performed by: INTERNAL MEDICINE

## 2018-08-02 PROCEDURE — G8926 SPIRO NO PERF OR DOC: HCPCS | Performed by: INTERNAL MEDICINE

## 2018-08-02 RX ORDER — BUDESONIDE AND FORMOTEROL FUMARATE DIHYDRATE 160; 4.5 UG/1; UG/1
AEROSOL RESPIRATORY (INHALATION)
Qty: 10.2 G | Refills: 5 | Status: SHIPPED | OUTPATIENT
Start: 2018-08-02 | End: 2019-11-22 | Stop reason: SDUPTHER

## 2018-08-02 ASSESSMENT — COPD QUESTIONNAIRES: COPD: 1

## 2018-08-02 ASSESSMENT — ENCOUNTER SYMPTOMS
COUGH: 1
CHOKING: 0
CHEST TIGHTNESS: 0
WHEEZING: 0
TROUBLE SWALLOWING: 0
ABDOMINAL PAIN: 0
GASTROINTESTINAL NEGATIVE: 1
BACK PAIN: 1
VOICE CHANGE: 0
SHORTNESS OF BREATH: 0
EYE PAIN: 0

## 2018-08-03 NOTE — PROGRESS NOTES
Kurtis Lopez is a 62 y.o. male smoker, history of alcoholism, obesity, carotid artery stenosis, who presents with     Chief Complaint   Patient presents with    Hypertension    COPD    Immunizations       Interim history: Since the past office visit, the patient has not been in the emergency room or hospital. He  continues to follow with pain management, goes to a meetings. States he's been sober from alcohol. Unfortunately, he has not been able to quit smoking. The following laboratory reports since the past visit were reviewed (the ones pertinent to today's visit were discussed with the patient):    No visits with results within 3 Month(s) from this visit. Latest known visit with results is:   Orders Only on 09/21/2017   Component Date Value Ref Range Status    Amphetamine Screen, Urine 09/21/2017 Neg  Negative <1000 ng/mL Final    Barbiturate Screen, Ur 09/21/2017 Neg  Negative < 200 ng/mL Final    Benzodiazepine Screen, Urine 09/21/2017 Neg  Negative < 200 ng/mL Final    Cannabinoid Scrn, Ur 09/21/2017 Neg  Negative < 50 ng/mL Final    Cocaine Metabolite Screen, Urine 09/21/2017 Neg  Negative < 300 ng/mL Final    Opiate Scrn, Ur 09/21/2017 Neg  Negative < 300 ng/mL Final    PCP Screen, Urine 09/21/2017 Neg  Negative < 25 ng/mL Final    Drug Screen Comment: 09/21/2017 see below   Final    Comment: This method is a screening test to detect only these drug  classes as part of a medical workup. Confirmatory testing  by another method should be ordered if clinically indicated. HPI:    Hypertension   This is a new problem. The current episode started 1 to 4 weeks ago. The problem has been gradually improving since onset. The problem is controlled. Associated symptoms include anxiety. Pertinent negatives include no chest pain, headaches, neck pain, palpitations, peripheral edema, PND or shortness of breath. Agents associated with hypertension include NSAIDs.  Risk factors for coronary artery disease include male gender, smoking/tobacco exposure and sedentary lifestyle. Past treatments include nothing. Compliance problems include diet and exercise. Hypertensive end-organ damage includes PVD. There is no history of angina, CAD/MI or CVA. There is no history of renovascular disease, sleep apnea or a thyroid problem. COPD   He complains of cough. There is no shortness of breath or wheezing. This is a chronic problem. The current episode started more than 1 year ago. The problem occurs intermittently. The cough is dry, hacking and paroxysmal. Pertinent negatives include no appetite change, chest pain, fever, headaches, nasal congestion, PND, postnasal drip or trouble swallowing. His symptoms are aggravated by change in weather, exposure to smoke, lying down and URI. His symptoms are alleviated by beta-agonist, oral steroids and steroid inhaler. He reports significant improvement on treatment. His symptoms are not alleviated by OTC cough suppressant and OTC inhaler. Risk factors for lung disease include smoking/tobacco exposure. His past medical history is significant for COPD. There is no history of bronchiectasis or pneumonia. More detail above in the chief complaint(s), interim history and below in the review of systems.      Past Medical History:   Diagnosis Date    Alcoholic polyneuropathy (HCC)     Chronic back pain greater than 3 months duration     CN III palsy, right eye 2017    Dr Marty Chilel COPD (chronic obstructive pulmonary disease) ([address] 2014    DDD (degenerative disc disease), lumbar 3/23/2015    Elevated liver enzymes 2014    IgA monoclonal gammopathy 2015    Dr Dwayne Desai    Insomnia, unspecified     LBP (low back pain)     Dr Dl Ochoa Occlusion and stenosis of carotid artery without mention of cerebral infarction     Personal history of alcoholism ([address]     quit 11/05/2010, relapsed 2016    Sensorimotor neuropathy ([address]     Bilateral lower extremities-EMG 03/23/15 ([facility]) tablet 3    pravastatin (PRAVACHOL) 40 MG tablet take 1 tablet by mouth once daily 30 tablet 3    clopidogrel (PLAVIX) 75 MG tablet take 1 tablet by mouth once daily 30 tablet 3    losartan (COZAAR) 100 MG tablet take 1 tablet by mouth once daily 30 tablet 2    RA VITAMIN B-12 TR 1000 MCG TBCR take 1 tablet by mouth once daily 30 tablet 5    REFRESH TEARS 0.5 % SOLN ophthalmic solution instill 1 drop into both eyes four times a day  1    Dextromethorphan-guaiFENesin (Q-TUSSIN DM)  MG/5ML SYRP Take 5 mLs by mouth every 4 hours as needed for Cough 120 mL 1    lidocaine (XYLOCAINE) 5 % ointment   0    gabapentin (NEURONTIN) 800 MG tablet take 1 tablet by mouth three times a day. 90 tablet 3     No current facility-administered medications on file prior to visit. Review of Systems   Constitutional: Negative for appetite change, chills, diaphoresis, fatigue and fever. HENT: Positive for congestion. Negative for nosebleeds, postnasal drip, trouble swallowing and voice change. Eyes: Positive for visual disturbance (due to CN palsy). Negative for pain. Respiratory: Positive for cough. Negative for choking, chest tightness, shortness of breath and wheezing. Cardiovascular: Negative for chest pain, palpitations, leg swelling and PND. Gastrointestinal: Negative. Negative for abdominal pain. Endocrine: Negative for cold intolerance, heat intolerance, polydipsia and polyuria. Genitourinary: Negative for hematuria. Musculoskeletal: Positive for back pain. Negative for neck pain and neck stiffness. Skin: Negative for rash. Neurological: Negative for dizziness, tremors, speech difficulty, weakness, light-headedness, numbness and headaches. Hematological: Does not bruise/bleed easily. Psychiatric/Behavioral: Positive for dysphoric mood. Negative for confusion, decreased concentration and sleep disturbance. The patient is nervous/anxious.         Vitals:    08/02/18 1737 08/02/18 1747 BP: (!) 152/70 102/60   Site: Right Arm Right Arm   Position: Sitting Sitting   Cuff Size: Medium Adult Medium Adult   Pulse: 104    SpO2: 97%    Weight: 228 lb (103.4 kg)    Height: 5' 11.5\" (1.816 m)        Physical Exam   Constitutional: He is oriented to person, place, and time. No distress. Overweight, depressed, poorly groomed    HENT:   Head: Atraumatic. Eyes: Right conjunctiva is injected. Left conjunctiva is injected. No scleral icterus. Neck: Normal range of motion. Neck supple. No JVD present. No thyromegaly present. Cardiovascular: Regular rhythm and normal heart sounds. Exam reveals no gallop. Pulmonary/Chest: Effort normal. No respiratory distress. He has no wheezes. He has no rales. Diminished breath sounds bilaterally     Abdominal: Soft. He exhibits no distension. Musculoskeletal: Normal range of motion. Lymphadenopathy:     He has no cervical adenopathy. Neurological: He is alert and oriented to person, place, and time. Skin: Skin is warm and dry. Psychiatric: His speech is normal and behavior is normal. Judgment normal. His mood appears anxious. He is not slowed and not withdrawn. Thought content is not delusional. Cognition and memory are normal. He does not express inappropriate judgment. He does not exhibit a depressed mood. He exhibits normal recent memory and normal remote memory. Insight and motivation improved  He is attentive. Assessment:    Claude Angus was seen today for hypertension, copd and immunizations. Diagnoses and all orders for this visit:    Essential hypertension  Comments:  Stable on current medication  Orders:  -     CBC; Future  -     Comprehensive Metabolic Panel; Future  -     Lipid Panel; Future  -     TSH without Reflex; Future  -     Urinalysis;  Future    Chronic obstructive pulmonary disease, unspecified COPD type ([facility] [address]  Comments:  Continue current inhalers and continue to focus on smoking cessation    Need for 23-polyvalent pneumococcal Order Specific Question:   SPECIFY(EX-CATH,MIDSTREAM,CYSTO,ETC)? Answer:   Midstream     Further workup and plan will be determined based on clinical progression and follow up test/ treatment results. t  Close follow up needed to evaluate treatment results and for care coordination. Return in about 3 months (around 11/2/2018). I have reviewed the patient's medical and surgical, family and social history, health maintenance schedule, and updated the computerized patient record. Please note this report has been partially produced by using speech recognition hardware. It may contain errors related to the system, including grammar, punctuation and spelling as well as words and phrases that may seem inaccurate. For any questions or concerns, please feel free to contact me for clarification.         Electronically signed by Nilson Jackson MD

## 2018-08-07 ENCOUNTER — OFFICE VISIT (OUTPATIENT)
Dept: PHYSICAL MEDICINE AND REHAB | Age: 58
End: 2018-08-07
Payer: COMMERCIAL

## 2018-08-07 VITALS
HEIGHT: 61 IN | DIASTOLIC BLOOD PRESSURE: 62 MMHG | SYSTOLIC BLOOD PRESSURE: 120 MMHG | WEIGHT: 228 LBS | BODY MASS INDEX: 43.05 KG/M2

## 2018-08-07 DIAGNOSIS — G89.29 CHRONIC BILATERAL LOW BACK PAIN WITHOUT SCIATICA: ICD-10-CM

## 2018-08-07 DIAGNOSIS — G89.29 CHRONIC MIDLINE LOW BACK PAIN WITH BILATERAL SCIATICA: ICD-10-CM

## 2018-08-07 DIAGNOSIS — M54.42 CHRONIC MIDLINE LOW BACK PAIN WITH BILATERAL SCIATICA: ICD-10-CM

## 2018-08-07 DIAGNOSIS — Z79.899 HIGH RISK MEDICATION USE: ICD-10-CM

## 2018-08-07 DIAGNOSIS — M53.3 SI (SACROILIAC) PAIN: Primary | ICD-10-CM

## 2018-08-07 DIAGNOSIS — M62.830 MUSCLE SPASM OF BACK: ICD-10-CM

## 2018-08-07 DIAGNOSIS — M54.41 CHRONIC MIDLINE LOW BACK PAIN WITH BILATERAL SCIATICA: ICD-10-CM

## 2018-08-07 DIAGNOSIS — M79.605 BILATERAL LEG PAIN: ICD-10-CM

## 2018-08-07 DIAGNOSIS — M79.10 MYALGIA: ICD-10-CM

## 2018-08-07 DIAGNOSIS — F06.4 ANXIETY DISORDER DUE TO KNOWN PHYSIOLOGICAL CONDITION: ICD-10-CM

## 2018-08-07 DIAGNOSIS — M79.604 BILATERAL LEG PAIN: ICD-10-CM

## 2018-08-07 DIAGNOSIS — Z72.0 TOBACCO ABUSE: ICD-10-CM

## 2018-08-07 DIAGNOSIS — F10.20 ALCOHOLIC (HCC): ICD-10-CM

## 2018-08-07 DIAGNOSIS — M54.17 LUMBOSACRAL RADICULOPATHY AT S1: ICD-10-CM

## 2018-08-07 DIAGNOSIS — M54.50 CHRONIC BILATERAL LOW BACK PAIN WITHOUT SCIATICA: ICD-10-CM

## 2018-08-07 DIAGNOSIS — M51.36 DDD (DEGENERATIVE DISC DISEASE), LUMBAR: Chronic | ICD-10-CM

## 2018-08-07 DIAGNOSIS — J41.0 SIMPLE CHRONIC BRONCHITIS (HCC): ICD-10-CM

## 2018-08-07 DIAGNOSIS — S06.9X9D TRAUMATIC BRAIN INJURY WITH LOSS OF CONSCIOUSNESS, SUBSEQUENT ENCOUNTER: Chronic | ICD-10-CM

## 2018-08-07 DIAGNOSIS — G62.1 ALCOHOLIC POLYNEUROPATHY (HCC): Chronic | ICD-10-CM

## 2018-08-07 PROCEDURE — G8427 DOCREV CUR MEDS BY ELIG CLIN: HCPCS | Performed by: PHYSICAL MEDICINE & REHABILITATION

## 2018-08-07 PROCEDURE — 99214 OFFICE O/P EST MOD 30 MIN: CPT | Performed by: PHYSICAL MEDICINE & REHABILITATION

## 2018-08-07 PROCEDURE — 4004F PT TOBACCO SCREEN RCVD TLK: CPT | Performed by: PHYSICAL MEDICINE & REHABILITATION

## 2018-08-07 PROCEDURE — G8417 CALC BMI ABV UP PARAM F/U: HCPCS | Performed by: PHYSICAL MEDICINE & REHABILITATION

## 2018-08-07 PROCEDURE — G8598 ASA/ANTIPLAT THER USED: HCPCS | Performed by: PHYSICAL MEDICINE & REHABILITATION

## 2018-08-07 PROCEDURE — G8926 SPIRO NO PERF OR DOC: HCPCS | Performed by: PHYSICAL MEDICINE & REHABILITATION

## 2018-08-07 PROCEDURE — 3023F SPIROM DOC REV: CPT | Performed by: PHYSICAL MEDICINE & REHABILITATION

## 2018-08-07 PROCEDURE — 3017F COLORECTAL CA SCREEN DOC REV: CPT | Performed by: PHYSICAL MEDICINE & REHABILITATION

## 2018-08-07 RX ORDER — HYDROCODONE BITARTRATE AND ACETAMINOPHEN 5; 325 MG/1; MG/1
1 TABLET ORAL EVERY 4 HOURS PRN
Qty: 60 TABLET | Refills: 0 | Status: SHIPPED | OUTPATIENT
Start: 2018-08-07 | End: 2018-09-13 | Stop reason: SDUPTHER

## 2018-08-07 ASSESSMENT — ENCOUNTER SYMPTOMS
NAUSEA: 0
CONSTIPATION: 0
COLOR CHANGE: 0
APNEA: 0
DIARRHEA: 0
ABDOMINAL PAIN: 0
SHORTNESS OF BREATH: 1
BOWEL INCONTINENCE: 0
PHOTOPHOBIA: 0
VISUAL CHANGE: 0
CHEST TIGHTNESS: 0
WHEEZING: 0
BACK PAIN: 1
VOMITING: 0

## 2018-08-07 NOTE — PROGRESS NOTES
Smokeless tobacco: Never Used    Alcohol use No      Comment: QUIT 2010-past addiction     Drug use: No    Sexual activity: Not Asked     Other Topics Concern    None     Social History Narrative    Born in [place], one of 5    Came to [place] at age 1 with parents    Never , one daughter    Never worked, disabled since 12 (mental)    Lives in [facility] with brother, in a house        Attends [employer] daily    [employer] riding bike, TV    One daughter, does keep in touch      Family History   Problem Relation Age of Onset    Cancer Mother         brain, dec 79    Alcohol Abuse Father        No Known Allergies    Review of Systems   Constitutional: Negative for activity change, fatigue, fever and unexpected weight change. Eyes: Negative for photophobia and visual disturbance. Respiratory: Positive for shortness of breath. Negative for apnea, chest tightness and wheezing. Cardiovascular: Negative for chest pain, palpitations and leg swelling. Gastrointestinal: Negative for abdominal pain, bowel incontinence, constipation, diarrhea, nausea and vomiting. Endocrine: Negative. Genitourinary: Negative. Negative for bladder incontinence and dysuria. Musculoskeletal: Positive for arthralgias, back pain and myalgias. Negative for gait problem, joint swelling, neck pain and neck stiffness. Skin: Negative. Negative for color change, pallor and wound. Allergic/Immunologic: Positive for immunocompromised state. Neurological: Positive for weakness and numbness. Negative for dizziness, light-headedness and headaches. Hematological: Negative. Psychiatric/Behavioral: Negative for dysphoric mood, hallucinations and sleep disturbance. The patient is not nervous/anxious.         Objective    Vitals:    08/07/18 1324   BP: 120/62   Site: Right Arm   Position: Sitting   Cuff Size: Medium Adult   Weight: 228 lb (103.4 kg)   Height: 5' 1.32\" (1.558 m)     Pain Score: EIGHT     Physical Exam Constitutional: He is oriented to person, place, and time. Vital signs are normal. He appears well-developed and well-nourished. Non-toxic appearance. He does not have a sickly appearance. He does not appear ill. No distress. HENT:   Head: Normocephalic and atraumatic. Right Ear: Hearing normal.   Left Ear: Hearing normal.   Nose: Nose normal.   Mouth/Throat: Oropharynx is clear and moist and mucous membranes are normal. No oral lesions. Normal dentition. No oropharyngeal exudate. Right 3rd nerve palsy    No signs of toxic erosions. Eyes: Conjunctivae are normal. Pupils are equal, round, and reactive to light. Left eye exhibits no chemosis, no discharge and no exudate. No scleral icterus. Right eye exhibits abnormal extraocular motion. Right 3rd nerve palsy   Neck: Normal range of motion. Neck supple. No JVD present. No neck rigidity. No tracheal deviation and no edema present. No thyromegaly present. Cardiovascular: Intact distal pulses. Exam reveals no decreased pulses. Pulmonary/Chest: Effort normal. No accessory muscle usage. No apnea, no tachypnea and no bradypnea. No respiratory distress. He has no wheezes. He exhibits no tenderness. Abdominal: Soft. Bowel sounds are normal. He exhibits no distension and no mass. There is no hepatosplenomegaly. There is no tenderness. There is no rebound, no guarding and no CVA tenderness. A hernia is present. Hernia confirmed positive in the ventral area. Umbilical hernia   Musculoskeletal: He exhibits tenderness. He exhibits no edema.         Right shoulder: Normal.        Left shoulder: Normal.        Right elbow: Normal.       Left elbow: Normal.        Right wrist: Normal.        Left wrist: Normal.        Right hip: Normal.        Left hip: Normal.        Right knee: Normal.        Left knee: Normal.        Right ankle: Normal. Achilles tendon normal.        Left ankle: Normal. Achilles tendon normal.        Cervical back: He exhibits TRIGGER POINTS, 3 OR GREATER   12. Myalgia     13. Chronic bilateral low back pain without sciatica     14. Bilateral leg pain     15. Muscle spasm of back     16. High risk medication use         I am also concerned by lifestyle and mood issues including:    Past Medical History:   Diagnosis Date    Alcoholic polyneuropathy (HCC)     Chronic back pain greater than 3 months duration     CN III palsy, right eye 2017    Dr Elke Vanegas COPD (chronic obstructive pulmonary disease) ([facility] [address] 2014    DDD (degenerative disc disease), lumbar 3/23/2015    Elevated liver enzymes 2014    IgA monoclonal gammopathy 2015    Dr Jenna Herndon    Insomnia, unspecified     LBP (low back pain)     Dr Leeann Sampson Occlusion and stenosis of carotid artery without mention of cerebral infarction     Personal history of alcoholism ([facility] [address]     quit 11/05/2010, relapsed 2016    Sensorimotor neuropathy     Bilateral lower extremities-EMG 03/23/15 ([facility])    Smoking trying to quit     Traumatic compression fracture of T11 thoracic vertebra ([facility] [address] [id number]    Umbilical hernia            Given their medication, chronic pain and lifestyle and medications they are at risk for :    Falls, constipation, addiction  Loss of livelyhood due to severe pain, debility, weight gain and  vitamin D deficiency    The patient was educated regarding proper diet, fitness routine, and regulatory restrictions concerning pain medications. Previous notes, comprehensive past medical, surgical, family history, and diagnostics were reviewed. Patient education and councelling were provided regarding off label use,treatment options and medication and injection risks. Current and old OARRS ([place] Automated Prescription Reporting System) records reviewed, all refills reviewed since last visit,  Behavioral agreement/[facility] regulations   and Toxicology screen was reviewed with patient and is up to date. There are no current red flags.    They are making good progress

## 2018-08-08 DIAGNOSIS — I10 ESSENTIAL HYPERTENSION: Chronic | ICD-10-CM

## 2018-08-08 LAB
ALBUMIN SERPL-MCNC: 3.7 G/DL (ref 3.9–4.9)
ALP BLD-CCNC: 124 U/L (ref 35–104)
ALT SERPL-CCNC: 50 U/L (ref 0–41)
ANION GAP SERPL CALCULATED.3IONS-SCNC: 13 MEQ/L (ref 7–13)
AST SERPL-CCNC: 34 U/L (ref 0–40)
BILIRUB SERPL-MCNC: 0.4 MG/DL (ref 0–1.2)
BILIRUBIN URINE: NEGATIVE
BLOOD, URINE: NEGATIVE
BUN BLDV-MCNC: 11 MG/DL (ref 6–20)
CALCIUM SERPL-MCNC: 9.3 MG/DL (ref 8.6–10.2)
CHLORIDE BLD-SCNC: 99 MEQ/L (ref 98–107)
CHOLESTEROL, TOTAL: 195 MG/DL (ref 0–199)
CLARITY: CLEAR
CO2: 27 MEQ/L (ref 22–29)
COLOR: YELLOW
CREAT SERPL-MCNC: 0.7 MG/DL (ref 0.7–1.2)
EPITHELIAL CELLS, UA: NORMAL /HPF
GFR AFRICAN AMERICAN: >60
GFR NON-AFRICAN AMERICAN: >60
GLOBULIN: 3.9 G/DL (ref 2.3–3.5)
GLUCOSE BLD-MCNC: 103 MG/DL (ref 74–109)
GLUCOSE URINE: NEGATIVE MG/DL
HCT VFR BLD CALC: 49.4 % (ref 42–52)
HDLC SERPL-MCNC: 34 MG/DL (ref 40–59)
HEMOGLOBIN: 17.1 G/DL (ref 14–18)
KETONES, URINE: NEGATIVE MG/DL
LDL CHOLESTEROL CALCULATED: 92 MG/DL (ref 0–129)
LEUKOCYTE ESTERASE, URINE: ABNORMAL
MCH RBC QN AUTO: 32.1 PG (ref 27–31.3)
MCHC RBC AUTO-ENTMCNC: 34.7 % (ref 33–37)
MCV RBC AUTO: 92.6 FL (ref 80–100)
NITRITE, URINE: NEGATIVE
PDW BLD-RTO: 14.1 % (ref 11.5–14.5)
PH UA: 6 (ref 5–9)
PLATELET # BLD: 331 K/UL (ref 130–400)
POTASSIUM SERPL-SCNC: 4.5 MEQ/L (ref 3.5–5.1)
PROTEIN UA: NEGATIVE MG/DL
RBC # BLD: 5.34 M/UL (ref 4.7–6.1)
RBC UA: NORMAL /HPF (ref 0–2)
SODIUM BLD-SCNC: 139 MEQ/L (ref 132–144)
SPECIFIC GRAVITY UA: 1.02 (ref 1–1.03)
TOTAL PROTEIN: 7.6 G/DL (ref 6.4–8.1)
TRIGL SERPL-MCNC: 347 MG/DL (ref 0–200)
TSH SERPL DL<=0.05 MIU/L-ACNC: 2.17 UIU/ML (ref 0.27–4.2)
UROBILINOGEN, URINE: 1 E.U./DL
WBC # BLD: 10.1 K/UL (ref 4.8–10.8)
WBC UA: NORMAL /HPF (ref 0–5)

## 2018-08-13 ENCOUNTER — PROCEDURE VISIT (OUTPATIENT)
Dept: PHYSICAL MEDICINE AND REHAB | Age: 58
End: 2018-08-13

## 2018-08-13 DIAGNOSIS — M79.7 FIBROMYALGIA: Primary | ICD-10-CM

## 2018-08-13 DIAGNOSIS — G89.29 CHRONIC BILATERAL LOW BACK PAIN WITHOUT SCIATICA: ICD-10-CM

## 2018-08-13 DIAGNOSIS — M79.10 MYALGIA: ICD-10-CM

## 2018-08-13 DIAGNOSIS — M54.50 CHRONIC BILATERAL LOW BACK PAIN WITHOUT SCIATICA: ICD-10-CM

## 2018-08-13 PROCEDURE — 20552 NJX 1/MLT TRIGGER POINT 1/2: CPT | Performed by: PHYSICAL MEDICINE & REHABILITATION

## 2018-08-16 RX ORDER — LIDOCAINE HYDROCHLORIDE 5 MG/ML
30 INJECTION, SOLUTION INFILTRATION; INTRAVENOUS ONCE
Status: COMPLETED | OUTPATIENT
Start: 2018-08-16 | End: 2018-08-16

## 2018-08-16 RX ADMIN — LIDOCAINE HYDROCHLORIDE 30 ML: 5 INJECTION, SOLUTION INFILTRATION; INTRAVENOUS at 16:32

## 2018-08-20 RX ORDER — PSYLLIUM HUSK 3.4 G/7G
POWDER ORAL
Qty: 30 TABLET | Refills: 5 | Status: SHIPPED | OUTPATIENT
Start: 2018-08-20 | End: 2019-02-05 | Stop reason: SDUPTHER

## 2018-08-20 RX ORDER — LOSARTAN POTASSIUM 100 MG/1
TABLET ORAL
Qty: 30 TABLET | Refills: 3 | Status: SHIPPED | OUTPATIENT
Start: 2018-08-20 | End: 2018-11-15 | Stop reason: SDUPTHER

## 2018-09-13 DIAGNOSIS — Z79.899 HIGH RISK MEDICATION USE: ICD-10-CM

## 2018-09-13 DIAGNOSIS — M53.3 SI (SACROILIAC) PAIN: ICD-10-CM

## 2018-09-13 DIAGNOSIS — G89.29 CHRONIC MIDLINE LOW BACK PAIN WITH BILATERAL SCIATICA: ICD-10-CM

## 2018-09-13 DIAGNOSIS — M51.36 DDD (DEGENERATIVE DISC DISEASE), LUMBAR: Chronic | ICD-10-CM

## 2018-09-13 DIAGNOSIS — M54.42 CHRONIC MIDLINE LOW BACK PAIN WITH BILATERAL SCIATICA: ICD-10-CM

## 2018-09-13 DIAGNOSIS — M54.41 CHRONIC MIDLINE LOW BACK PAIN WITH BILATERAL SCIATICA: ICD-10-CM

## 2018-09-13 DIAGNOSIS — G62.1 ALCOHOLIC POLYNEUROPATHY (HCC): Chronic | ICD-10-CM

## 2018-09-13 RX ORDER — HYDROCODONE BITARTRATE AND ACETAMINOPHEN 5; 325 MG/1; MG/1
1 TABLET ORAL EVERY 4 HOURS PRN
Qty: 60 TABLET | Refills: 0 | Status: SHIPPED | OUTPATIENT
Start: 2018-09-13 | End: 2018-10-04 | Stop reason: SDUPTHER

## 2018-09-14 RX ORDER — PRAVASTATIN SODIUM 40 MG
TABLET ORAL
Qty: 30 TABLET | Refills: 5 | Status: ON HOLD | OUTPATIENT
Start: 2018-09-14 | End: 2019-01-19 | Stop reason: ALTCHOICE

## 2018-09-14 RX ORDER — AMITRIPTYLINE HYDROCHLORIDE 50 MG/1
TABLET, FILM COATED ORAL
Qty: 30 TABLET | Refills: 5 | Status: SHIPPED | OUTPATIENT
Start: 2018-09-14 | End: 2019-03-04 | Stop reason: SDUPTHER

## 2018-09-14 RX ORDER — ACETAMINOPHEN/DIPHENHYDRAMINE 500MG-25MG
TABLET ORAL
Qty: 30 TABLET | Refills: 5 | Status: SHIPPED | OUTPATIENT
Start: 2018-09-14 | End: 2019-03-04 | Stop reason: SDUPTHER

## 2018-09-14 RX ORDER — CLOPIDOGREL BISULFATE 75 MG/1
TABLET ORAL
Qty: 30 TABLET | Refills: 5 | Status: SHIPPED | OUTPATIENT
Start: 2018-09-14 | End: 2019-03-04 | Stop reason: SDUPTHER

## 2018-09-17 ENCOUNTER — PROCEDURE VISIT (OUTPATIENT)
Dept: PHYSICAL MEDICINE AND REHAB | Age: 58
End: 2018-09-17
Payer: COMMERCIAL

## 2018-09-17 DIAGNOSIS — M79.7 FIBROMYALGIA: Primary | ICD-10-CM

## 2018-09-17 DIAGNOSIS — M79.10 MYALGIA: ICD-10-CM

## 2018-09-17 DIAGNOSIS — F06.4 ANXIETY DISORDER DUE TO KNOWN PHYSIOLOGICAL CONDITION: ICD-10-CM

## 2018-09-17 DIAGNOSIS — F10.20 ALCOHOLIC (HCC): ICD-10-CM

## 2018-09-17 DIAGNOSIS — M51.36 DDD (DEGENERATIVE DISC DISEASE), LUMBAR: Chronic | ICD-10-CM

## 2018-09-17 PROCEDURE — 20552 NJX 1/MLT TRIGGER POINT 1/2: CPT | Performed by: PHYSICAL MEDICINE & REHABILITATION

## 2018-09-19 DIAGNOSIS — D47.2 IGG MONOCLONAL GAMMOPATHY OF UNCERTAIN SIGNIFICANCE: ICD-10-CM

## 2018-09-19 DIAGNOSIS — M54.17 LUMBOSACRAL RADICULOPATHY AT S1: ICD-10-CM

## 2018-09-19 DIAGNOSIS — G62.1 ALCOHOLIC POLYNEUROPATHY (HCC): Chronic | ICD-10-CM

## 2018-09-19 DIAGNOSIS — M79.672 LEFT FOOT PAIN: ICD-10-CM

## 2018-09-20 RX ORDER — GABAPENTIN 800 MG/1
TABLET ORAL
Qty: 90 TABLET | Refills: 3 | Status: SHIPPED | OUTPATIENT
Start: 2018-09-20 | End: 2019-01-02 | Stop reason: SDUPTHER

## 2018-09-21 RX ORDER — LIDOCAINE HYDROCHLORIDE 5 MG/ML
30 INJECTION, SOLUTION INFILTRATION; INTRAVENOUS ONCE
Status: COMPLETED | OUTPATIENT
Start: 2018-09-21 | End: 2018-09-21

## 2018-09-21 RX ADMIN — LIDOCAINE HYDROCHLORIDE 30 ML: 5 INJECTION, SOLUTION INFILTRATION; INTRAVENOUS at 09:54

## 2018-10-04 ENCOUNTER — OFFICE VISIT (OUTPATIENT)
Dept: PHYSICAL MEDICINE AND REHAB | Age: 58
End: 2018-10-04
Payer: COMMERCIAL

## 2018-10-04 VITALS
WEIGHT: 230 LBS | SYSTOLIC BLOOD PRESSURE: 120 MMHG | DIASTOLIC BLOOD PRESSURE: 62 MMHG | BODY MASS INDEX: 43.43 KG/M2 | HEIGHT: 61 IN

## 2018-10-04 DIAGNOSIS — M51.36 DDD (DEGENERATIVE DISC DISEASE), LUMBAR: Chronic | ICD-10-CM

## 2018-10-04 DIAGNOSIS — G62.1 ALCOHOLIC POLYNEUROPATHY (HCC): Chronic | ICD-10-CM

## 2018-10-04 DIAGNOSIS — Z79.899 HIGH RISK MEDICATION USE: ICD-10-CM

## 2018-10-04 DIAGNOSIS — M54.41 CHRONIC MIDLINE LOW BACK PAIN WITH BILATERAL SCIATICA: ICD-10-CM

## 2018-10-04 DIAGNOSIS — M53.3 SI (SACROILIAC) PAIN: ICD-10-CM

## 2018-10-04 DIAGNOSIS — M79.10 MYALGIA: Primary | ICD-10-CM

## 2018-10-04 DIAGNOSIS — G89.29 CHRONIC MIDLINE LOW BACK PAIN WITH BILATERAL SCIATICA: ICD-10-CM

## 2018-10-04 DIAGNOSIS — M54.42 CHRONIC MIDLINE LOW BACK PAIN WITH BILATERAL SCIATICA: ICD-10-CM

## 2018-10-04 PROCEDURE — G8484 FLU IMMUNIZE NO ADMIN: HCPCS | Performed by: PHYSICAL MEDICINE & REHABILITATION

## 2018-10-04 PROCEDURE — 3017F COLORECTAL CA SCREEN DOC REV: CPT | Performed by: PHYSICAL MEDICINE & REHABILITATION

## 2018-10-04 PROCEDURE — G8417 CALC BMI ABV UP PARAM F/U: HCPCS | Performed by: PHYSICAL MEDICINE & REHABILITATION

## 2018-10-04 PROCEDURE — 99214 OFFICE O/P EST MOD 30 MIN: CPT | Performed by: PHYSICAL MEDICINE & REHABILITATION

## 2018-10-04 PROCEDURE — G8427 DOCREV CUR MEDS BY ELIG CLIN: HCPCS | Performed by: PHYSICAL MEDICINE & REHABILITATION

## 2018-10-04 PROCEDURE — 4004F PT TOBACCO SCREEN RCVD TLK: CPT | Performed by: PHYSICAL MEDICINE & REHABILITATION

## 2018-10-04 PROCEDURE — G8598 ASA/ANTIPLAT THER USED: HCPCS | Performed by: PHYSICAL MEDICINE & REHABILITATION

## 2018-10-04 RX ORDER — HYDROCODONE BITARTRATE AND ACETAMINOPHEN 5; 325 MG/1; MG/1
1 TABLET ORAL EVERY 4 HOURS PRN
Qty: 60 TABLET | Refills: 0 | Status: SHIPPED | OUTPATIENT
Start: 2018-10-12 | End: 2018-11-12 | Stop reason: SDUPTHER

## 2018-10-04 ASSESSMENT — ENCOUNTER SYMPTOMS
BOWEL INCONTINENCE: 0
APNEA: 0
BACK PAIN: 1
PHOTOPHOBIA: 0
NAUSEA: 0
COLOR CHANGE: 0
ABDOMINAL PAIN: 0
CHEST TIGHTNESS: 0
WHEEZING: 0
VISUAL CHANGE: 0
VOMITING: 0
DIARRHEA: 0
SHORTNESS OF BREATH: 1
CONSTIPATION: 0

## 2018-10-04 NOTE — PROGRESS NOTES
aggressive, not hyperactive, not slowed, not withdrawn, not actively hallucinating and not combative. Thought content is not paranoid and not delusional. Cognition and memory are normal. Cognition and memory are not impaired. He does not express impulsivity or inappropriate judgment. He does not exhibit a depressed mood. He expresses no homicidal and no suicidal ideation. He expresses no suicidal plans and no homicidal plans. He is communicative. He exhibits normal recent memory and normal remote memory. He is attentive. Vitals reviewed. Ortho Exam  Neurologic Exam     Mental Status   Oriented to person, place, and time. Speech: not slurred   Level of consciousness: alert  Knowledge: good. Able to name object. Able to read. Able to repeat. Able to write. Cranial Nerves     CN III, IV, VI   Pupils are equal, round, and reactive to light. After a thorough review and discussion of the previous medical records, patient comprehensive medical, surgical, and family and social history, Review of Systems, their OARRS, their Screener and Opioid Assessment for Patients with Pain (SOAPP®-R), recent diagnostics, and symptomatic results to previous treatment, it is my impression that the patients is suffering with progressive and severe:     Diagnosis Orders   1. Myalgia  MS INJECT TRIGGER POINTS, 3 OR GREATER    MS INJECT TRIGGER POINTS, 3 OR GREATER   2. SI (sacroiliac) pain  HYDROcodone-acetaminophen (NORCO) 5-325 MG per tablet   3. Chronic midline low back pain with bilateral sciatica  HYDROcodone-acetaminophen (NORCO) 5-325 MG per tablet   4. Alcoholic polyneuropathy (HCC)  HYDROcodone-acetaminophen (NORCO) 5-325 MG per tablet   5. DDD (degenerative disc disease), lumbar  HYDROcodone-acetaminophen (NORCO) 5-325 MG per tablet   6.  High risk medication use - 01/25/18 OARRS PM&R, 03/23/18 OARRS PM&R, 02/15/17 Med Contract PM&R, 09/21/17 Urine Drug Screen: negative PM&R  Urine Drug Screen

## 2018-10-29 ENCOUNTER — TELEPHONE (OUTPATIENT)
Dept: PHYSICAL MEDICINE AND REHAB | Age: 58
End: 2018-10-29

## 2018-10-30 DIAGNOSIS — Z79.899 HIGH RISK MEDICATION USE: ICD-10-CM

## 2018-11-12 DIAGNOSIS — G89.29 CHRONIC MIDLINE LOW BACK PAIN WITH BILATERAL SCIATICA: ICD-10-CM

## 2018-11-12 DIAGNOSIS — M53.3 SI (SACROILIAC) PAIN: ICD-10-CM

## 2018-11-12 DIAGNOSIS — G62.1 ALCOHOLIC POLYNEUROPATHY (HCC): Chronic | ICD-10-CM

## 2018-11-12 DIAGNOSIS — Z79.899 HIGH RISK MEDICATION USE: ICD-10-CM

## 2018-11-12 DIAGNOSIS — M54.41 CHRONIC MIDLINE LOW BACK PAIN WITH BILATERAL SCIATICA: ICD-10-CM

## 2018-11-12 DIAGNOSIS — M51.36 DDD (DEGENERATIVE DISC DISEASE), LUMBAR: Chronic | ICD-10-CM

## 2018-11-12 DIAGNOSIS — M54.42 CHRONIC MIDLINE LOW BACK PAIN WITH BILATERAL SCIATICA: ICD-10-CM

## 2018-11-12 RX ORDER — HYDROCODONE BITARTRATE AND ACETAMINOPHEN 5; 325 MG/1; MG/1
1 TABLET ORAL EVERY 4 HOURS PRN
Qty: 60 TABLET | Refills: 0 | Status: SHIPPED | OUTPATIENT
Start: 2018-11-12 | End: 2018-12-12 | Stop reason: SDUPTHER

## 2018-11-15 ENCOUNTER — OFFICE VISIT (OUTPATIENT)
Dept: INTERNAL MEDICINE CLINIC | Age: 58
End: 2018-11-15
Payer: COMMERCIAL

## 2018-11-15 VITALS
DIASTOLIC BLOOD PRESSURE: 88 MMHG | WEIGHT: 227 LBS | BODY MASS INDEX: 30.75 KG/M2 | SYSTOLIC BLOOD PRESSURE: 110 MMHG | OXYGEN SATURATION: 96 % | HEART RATE: 92 BPM | TEMPERATURE: 97 F | HEIGHT: 72 IN

## 2018-11-15 DIAGNOSIS — I10 ESSENTIAL HYPERTENSION: Primary | ICD-10-CM

## 2018-11-15 DIAGNOSIS — I65.29 STENOSIS OF CAROTID ARTERY, UNSPECIFIED LATERALITY: ICD-10-CM

## 2018-11-15 PROCEDURE — G8427 DOCREV CUR MEDS BY ELIG CLIN: HCPCS | Performed by: INTERNAL MEDICINE

## 2018-11-15 PROCEDURE — 99213 OFFICE O/P EST LOW 20 MIN: CPT | Performed by: INTERNAL MEDICINE

## 2018-11-15 PROCEDURE — G8598 ASA/ANTIPLAT THER USED: HCPCS | Performed by: INTERNAL MEDICINE

## 2018-11-15 PROCEDURE — G8417 CALC BMI ABV UP PARAM F/U: HCPCS | Performed by: INTERNAL MEDICINE

## 2018-11-15 PROCEDURE — G8482 FLU IMMUNIZE ORDER/ADMIN: HCPCS | Performed by: INTERNAL MEDICINE

## 2018-11-15 PROCEDURE — 3017F COLORECTAL CA SCREEN DOC REV: CPT | Performed by: INTERNAL MEDICINE

## 2018-11-15 PROCEDURE — 4004F PT TOBACCO SCREEN RCVD TLK: CPT | Performed by: INTERNAL MEDICINE

## 2018-11-15 RX ORDER — LOSARTAN POTASSIUM 100 MG/1
TABLET ORAL
Qty: 30 TABLET | Refills: 5 | Status: SHIPPED | OUTPATIENT
Start: 2018-11-15 | End: 2019-01-03

## 2018-11-15 ASSESSMENT — ENCOUNTER SYMPTOMS
EYE PAIN: 0
COUGH: 0
PHOTOPHOBIA: 0
BACK PAIN: 1
SHORTNESS OF BREATH: 0
CHOKING: 0
TROUBLE SWALLOWING: 0
VOICE CHANGE: 0
ABDOMINAL PAIN: 0
BLOOD IN STOOL: 0

## 2018-11-16 NOTE — PROGRESS NOTES
Adolph Askew is a 62 y.o. male smoker, history of COPD, low back pain, carotid artery stenosis, who presents with     Chief Complaint   Patient presents with    Hypertension    Discuss Labs    Medication Refill       Interim history: Since his last office visit with me 3 months ago, the patient has followed with a pain specialist.  Chaya Morris to maintain sobriety from alcohol but has been unable to quit smoking, smokes up to one pack cigarettes per day. No emergency room visits and has not seen any specialists, no new medications. Overall, he feels well. The following laboratory reports since the past visit were reviewed (the ones pertinent to today's visit were discussed with the patient):    Orders Only on 10/30/2018   Component Date Value Ref Range Status    Amphetamine Screen, Urine 10/30/2018 Neg  Negative <1000 ng/mL Final    Barbiturate Screen, Ur 10/30/2018 Neg  Negative < 200 ng/mL Final    Benzodiazepine Screen, Urine 10/30/2018 Neg  Negative < 200 ng/mL Final    Cannabinoid Scrn, Ur 10/30/2018 Neg  Negative < 50 ng/mL Final    Cocaine Metabolite Screen, Urine 10/30/2018 Neg  Negative < 300 ng/mL Final    Opiate Scrn, Ur 10/30/2018 Neg  Negative < 300 ng/mL Final    PCP Screen, Urine 10/30/2018 Neg  Negative < 25 ng/mL Final    Drug Screen Comment: 10/30/2018 see below   Final    Comment: This method is a screening test to detect only these drug  classes as part of a medical workup. Confirmatory testing  by another method should be ordered if clinically indicated. HPI:    Hypertension   This is a chronic problem. The current episode started more than 1 month ago. The problem is controlled. Associated symptoms include anxiety. Pertinent negatives include no chest pain, headaches, neck pain, palpitations, peripheral edema or shortness of breath. Agents associated with hypertension include NSAIDs.  Risk factors for coronary artery disease include male gender, smoking/tobacco exposure, sedentary lifestyle and obesity. Past treatments include angiotensin blockers. Compliance problems include diet and exercise. Hypertensive end-organ damage includes PVD. There is no history of angina, CAD/MI or CVA. There is no history of chronic renal disease, renovascular disease, sleep apnea or a thyroid problem. More detail above in the chiefcomplaint(s), interim history and below in the review of systems.      Past Medical History:   Diagnosis Date    Alcoholic polyneuropathy (HCC)     Chronic back pain greater than 3 months duration     CN III palsy, right eye 2017    Dr Lady Snyder    COPD (chronic obstructive pulmonary disease) ([facility] [address] 2014    DDD (degenerative disc disease), lumbar 3/23/2015    Elevated liver enzymes 2014    IgA monoclonal gammopathy 2015    Dr Ora Rob    Insomnia, unspecified     LBP (low back pain)     Dr Nay Ovalle Occlusion and stenosis of carotid artery without mention of cerebral infarction     Personal history of alcoholism ([facility] [address]     quit 11/05/2010, relapsed 2016    Sensorimotor neuropathy     Bilateral lower extremities-EMG 03/23/15 ([place])    Smoking trying to quit     Traumatic compression fracture of T11 thoracic vertebra ([facility] [address] 7761    Umbilical hernia        Past Surgical History:   Procedure Laterality Date    COLONOSCOPY  8/19/2014    TONSILLECTOMY      UPPER GASTROINTESTINAL ENDOSCOPY  8/19/2014    VENTRAL HERNIA REPAIR  09/01/15    W/MESH AND W/UMBILECTOMY       Social History     Social History    Marital status: Single     Spouse name: N/A    Number of children: 1    Years of education: N/A     Occupational History    [facility]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 Thought content is not delusional. He does not express inappropriate judgment. He does not exhibit a depressed mood. He exhibits normal recent memory and normal remote memory. Insight and motivation improved  He is attentive. Assessment:    Chelsea Dos Santos was seen today for hypertension, discuss labs and medication refill. Diagnoses and all orders for this visit:    Essential hypertension             Stable on the current medication, to focus more on lifestyle especially weight loss and smoking cessation    Stenosis of carotid artery, unspecified laterality  -     US CAROTID ARTERY LEFT; Future    Other orders  -     losartan (COZAAR) 100 MG tablet; take 1 tablet by mouth once daily        Plan:    Reviewed with the patient (/and caregiver if present): current health status, medications, activities and diet. See also orders and comments in the assessment section. Today's diagnosis (in the context ofchronic problems) was discussed with patient (/and caregiver if present), questions answered. Patient counseled and encouraged re: daily effort to improve diet (a high fiber, low calorie, low sodium and caffeine diet, divided into 3 main meals daily) and exercise habits (more aerobic exercise as tolerated), better stress management, will result in improved health and help in better management of the current chronic conditions in the long run. Smoking cessation discussed, risks explained, motivational counseling done, smoking cessation aids offered, patient prefers to focus on a personal approach to this problem and ask for our help as needed. Side effects, adverse effects of the medication prescribed (or refilled) today, treatment plan/ rationale and result expectations have (again) been discussed with the patient who expresses understanding and consents to proceed as outlined above. Additional advise included in the \"after visit summary\".        Orders Placed This Encounter Medications    losartan (COZAAR) 100 MG tablet     Sig: take 1 tablet by mouth once daily     Dispense:  30 tablet     Refill:  5       Orders Placed This Encounter   Procedures    US CAROTID ARTERY LEFT     Standing Status:   Future     Standing Expiration Date:   3/15/2019   Further workup and plan will be determined based on clinical progression and follow up test/ treatment results. Close follow up needed to evaluate treatment results and for care coordination. Return in about 4 months (around 3/15/2019). I have reviewed the patient's medical and surgical, family and social history, health maintenance schedule, and updated the computerized patient record. Please note this report has been partially produced by using speech recognition hardware. It may contain errors related to the system, including grammar, punctuation and spelling as well as words and phrases that may seem inaccurate. For anyquestions or concerns, please feel free to contact me for clarification.         Electronically signed by Jyotsna Thompson MD

## 2018-12-04 ENCOUNTER — HOSPITAL ENCOUNTER (OUTPATIENT)
Dept: ULTRASOUND IMAGING | Age: 58
Discharge: HOME | End: 2018-12-06
Payer: COMMERCIAL

## 2018-12-04 DIAGNOSIS — I65.29 STENOSIS OF CAROTID ARTERY, UNSPECIFIED LATERALITY: ICD-10-CM

## 2018-12-04 PROCEDURE — 93880 EXTRACRANIAL BILAT STUDY: CPT

## 2018-12-12 DIAGNOSIS — G89.29 CHRONIC MIDLINE LOW BACK PAIN WITH BILATERAL SCIATICA: ICD-10-CM

## 2018-12-12 DIAGNOSIS — M51.36 DDD (DEGENERATIVE DISC DISEASE), LUMBAR: Chronic | ICD-10-CM

## 2018-12-12 DIAGNOSIS — Z79.899 HIGH RISK MEDICATION USE: ICD-10-CM

## 2018-12-12 DIAGNOSIS — M53.3 SI (SACROILIAC) PAIN: ICD-10-CM

## 2018-12-12 DIAGNOSIS — M54.42 CHRONIC MIDLINE LOW BACK PAIN WITH BILATERAL SCIATICA: ICD-10-CM

## 2018-12-12 DIAGNOSIS — M54.41 CHRONIC MIDLINE LOW BACK PAIN WITH BILATERAL SCIATICA: ICD-10-CM

## 2018-12-12 DIAGNOSIS — G62.1 ALCOHOLIC POLYNEUROPATHY (HCC): Chronic | ICD-10-CM

## 2018-12-13 RX ORDER — HYDROCODONE BITARTRATE AND ACETAMINOPHEN 5; 325 MG/1; MG/1
1 TABLET ORAL EVERY 4 HOURS PRN
Qty: 60 TABLET | Refills: 0 | Status: SHIPPED | OUTPATIENT
Start: 2018-12-13 | End: 2019-01-02 | Stop reason: SDUPTHER

## 2018-12-14 ENCOUNTER — TELEPHONE (OUTPATIENT)
Dept: ENDOCRINOLOGY | Age: 58
End: 2018-12-14

## 2018-12-17 ENCOUNTER — TELEPHONE (OUTPATIENT)
Dept: INTERNAL MEDICINE CLINIC | Age: 58
End: 2018-12-17

## 2018-12-17 DIAGNOSIS — I65.22 LEFT CAROTID STENOSIS: Primary | ICD-10-CM

## 2018-12-27 ENCOUNTER — PROCEDURE VISIT (OUTPATIENT)
Dept: PHYSICAL MEDICINE AND REHAB | Age: 58
End: 2018-12-27
Payer: COMMERCIAL

## 2018-12-27 DIAGNOSIS — M25.552 PAIN OF LEFT HIP JOINT: Primary | ICD-10-CM

## 2018-12-27 PROCEDURE — 20611 DRAIN/INJ JOINT/BURSA W/US: CPT | Performed by: PHYSICAL MEDICINE & REHABILITATION

## 2018-12-28 RX ORDER — LIDOCAINE HYDROCHLORIDE 20 MG/ML
2 INJECTION, SOLUTION EPIDURAL; INFILTRATION; INTRACAUDAL; PERINEURAL ONCE
Status: COMPLETED | OUTPATIENT
Start: 2018-12-28 | End: 2018-12-28

## 2018-12-28 RX ORDER — ROPIVACAINE HYDROCHLORIDE 5 MG/ML
8 INJECTION, SOLUTION EPIDURAL; INFILTRATION; PERINEURAL ONCE
Status: COMPLETED | OUTPATIENT
Start: 2018-12-28 | End: 2018-12-28

## 2018-12-28 RX ADMIN — LIDOCAINE HYDROCHLORIDE 2 ML: 20 INJECTION, SOLUTION EPIDURAL; INFILTRATION; INTRACAUDAL; PERINEURAL at 13:53

## 2018-12-28 RX ADMIN — ROPIVACAINE HYDROCHLORIDE 8 ML: 5 INJECTION, SOLUTION EPIDURAL; INFILTRATION; PERINEURAL at 13:51

## 2019-01-02 ENCOUNTER — OFFICE VISIT (OUTPATIENT)
Dept: PHYSICAL MEDICINE AND REHAB | Age: 59
End: 2019-01-02
Payer: COMMERCIAL

## 2019-01-02 VITALS
WEIGHT: 227 LBS | SYSTOLIC BLOOD PRESSURE: 142 MMHG | HEIGHT: 61 IN | BODY MASS INDEX: 42.86 KG/M2 | DIASTOLIC BLOOD PRESSURE: 72 MMHG

## 2019-01-02 DIAGNOSIS — M54.42 CHRONIC MIDLINE LOW BACK PAIN WITH BILATERAL SCIATICA: ICD-10-CM

## 2019-01-02 DIAGNOSIS — Z79.899 HIGH RISK MEDICATION USE: ICD-10-CM

## 2019-01-02 DIAGNOSIS — G89.29 CHRONIC MIDLINE LOW BACK PAIN WITH BILATERAL SCIATICA: ICD-10-CM

## 2019-01-02 DIAGNOSIS — M79.672 LEFT FOOT PAIN: ICD-10-CM

## 2019-01-02 DIAGNOSIS — D47.2 IGG MONOCLONAL GAMMOPATHY OF UNCERTAIN SIGNIFICANCE: ICD-10-CM

## 2019-01-02 DIAGNOSIS — G62.1 ALCOHOLIC POLYNEUROPATHY (HCC): Chronic | ICD-10-CM

## 2019-01-02 DIAGNOSIS — M79.10 MYALGIA: Primary | ICD-10-CM

## 2019-01-02 DIAGNOSIS — M51.36 DDD (DEGENERATIVE DISC DISEASE), LUMBAR: Chronic | ICD-10-CM

## 2019-01-02 DIAGNOSIS — M54.41 CHRONIC MIDLINE LOW BACK PAIN WITH BILATERAL SCIATICA: ICD-10-CM

## 2019-01-02 DIAGNOSIS — M54.17 LUMBOSACRAL RADICULOPATHY AT S1: ICD-10-CM

## 2019-01-02 DIAGNOSIS — M53.3 SI (SACROILIAC) PAIN: ICD-10-CM

## 2019-01-02 PROCEDURE — 3017F COLORECTAL CA SCREEN DOC REV: CPT | Performed by: PHYSICAL MEDICINE & REHABILITATION

## 2019-01-02 PROCEDURE — G8417 CALC BMI ABV UP PARAM F/U: HCPCS | Performed by: PHYSICAL MEDICINE & REHABILITATION

## 2019-01-02 PROCEDURE — 99214 OFFICE O/P EST MOD 30 MIN: CPT | Performed by: PHYSICAL MEDICINE & REHABILITATION

## 2019-01-02 PROCEDURE — 4004F PT TOBACCO SCREEN RCVD TLK: CPT | Performed by: PHYSICAL MEDICINE & REHABILITATION

## 2019-01-02 PROCEDURE — G8598 ASA/ANTIPLAT THER USED: HCPCS | Performed by: PHYSICAL MEDICINE & REHABILITATION

## 2019-01-02 PROCEDURE — G8427 DOCREV CUR MEDS BY ELIG CLIN: HCPCS | Performed by: PHYSICAL MEDICINE & REHABILITATION

## 2019-01-02 PROCEDURE — G8482 FLU IMMUNIZE ORDER/ADMIN: HCPCS | Performed by: PHYSICAL MEDICINE & REHABILITATION

## 2019-01-02 RX ORDER — HYDROCODONE BITARTRATE AND ACETAMINOPHEN 5; 325 MG/1; MG/1
1 TABLET ORAL EVERY 4 HOURS PRN
Qty: 60 TABLET | Refills: 0 | Status: SHIPPED | OUTPATIENT
Start: 2019-01-12 | End: 2019-01-29 | Stop reason: SDUPTHER

## 2019-01-02 RX ORDER — GABAPENTIN 800 MG/1
TABLET ORAL
Qty: 90 TABLET | Refills: 3 | Status: SHIPPED | OUTPATIENT
Start: 2019-01-02 | End: 2019-05-06 | Stop reason: SDUPTHER

## 2019-01-02 RX ORDER — NALOXONE HYDROCHLORIDE 4 MG/.1ML
1 SPRAY NASAL PRN
Qty: 1 EACH | Refills: 2 | Status: SHIPPED | OUTPATIENT
Start: 2019-01-02 | End: 2020-06-23 | Stop reason: ALTCHOICE

## 2019-01-02 ASSESSMENT — ENCOUNTER SYMPTOMS
CONSTIPATION: 0
ABDOMINAL PAIN: 0
BACK PAIN: 1
NAUSEA: 0
PHOTOPHOBIA: 0
CHEST TIGHTNESS: 0
VOMITING: 0
BOWEL INCONTINENCE: 0
SHORTNESS OF BREATH: 1
APNEA: 0
DIARRHEA: 0
COLOR CHANGE: 0
WHEEZING: 0
VISUAL CHANGE: 0

## 2019-01-03 ENCOUNTER — OFFICE VISIT (OUTPATIENT)
Dept: INTERNAL MEDICINE CLINIC | Age: 59
End: 2019-01-03
Payer: COMMERCIAL

## 2019-01-03 VITALS
TEMPERATURE: 97.6 F | HEIGHT: 72 IN | SYSTOLIC BLOOD PRESSURE: 146 MMHG | WEIGHT: 227 LBS | DIASTOLIC BLOOD PRESSURE: 80 MMHG | HEART RATE: 103 BPM | BODY MASS INDEX: 30.75 KG/M2 | OXYGEN SATURATION: 98 %

## 2019-01-03 DIAGNOSIS — I10 UNCONTROLLED HYPERTENSION: Primary | ICD-10-CM

## 2019-01-03 PROCEDURE — G8417 CALC BMI ABV UP PARAM F/U: HCPCS | Performed by: INTERNAL MEDICINE

## 2019-01-03 PROCEDURE — 4004F PT TOBACCO SCREEN RCVD TLK: CPT | Performed by: INTERNAL MEDICINE

## 2019-01-03 PROCEDURE — G8482 FLU IMMUNIZE ORDER/ADMIN: HCPCS | Performed by: INTERNAL MEDICINE

## 2019-01-03 PROCEDURE — 99213 OFFICE O/P EST LOW 20 MIN: CPT | Performed by: INTERNAL MEDICINE

## 2019-01-03 PROCEDURE — 3017F COLORECTAL CA SCREEN DOC REV: CPT | Performed by: INTERNAL MEDICINE

## 2019-01-03 PROCEDURE — G8427 DOCREV CUR MEDS BY ELIG CLIN: HCPCS | Performed by: INTERNAL MEDICINE

## 2019-01-03 PROCEDURE — G8598 ASA/ANTIPLAT THER USED: HCPCS | Performed by: INTERNAL MEDICINE

## 2019-01-03 RX ORDER — VALSARTAN AND HYDROCHLOROTHIAZIDE 80; 12.5 MG/1; MG/1
1 TABLET, FILM COATED ORAL DAILY
Qty: 30 TABLET | Refills: 0 | Status: SHIPPED | OUTPATIENT
Start: 2019-01-03 | End: 2019-02-04 | Stop reason: SDUPTHER

## 2019-01-03 ASSESSMENT — ENCOUNTER SYMPTOMS
VOICE CHANGE: 0
ABDOMINAL PAIN: 0
BLOOD IN STOOL: 0
BACK PAIN: 1
COUGH: 0
EYE PAIN: 0
PHOTOPHOBIA: 0
SHORTNESS OF BREATH: 0
TROUBLE SWALLOWING: 0

## 2019-01-10 ENCOUNTER — PROCEDURE VISIT (OUTPATIENT)
Dept: PHYSICAL MEDICINE AND REHAB | Age: 59
End: 2019-01-10
Payer: COMMERCIAL

## 2019-01-10 DIAGNOSIS — M79.10 MYALGIA: ICD-10-CM

## 2019-01-10 DIAGNOSIS — M79.7 FIBROMYALGIA: Primary | ICD-10-CM

## 2019-01-10 PROCEDURE — 20553 NJX 1/MLT TRIGGER POINTS 3/>: CPT | Performed by: PHYSICAL MEDICINE & REHABILITATION

## 2019-01-10 RX ORDER — LIDOCAINE HYDROCHLORIDE 10 MG/ML
16 INJECTION, SOLUTION INFILTRATION; PERINEURAL ONCE
Status: COMPLETED | OUTPATIENT
Start: 2019-01-10 | End: 2019-01-10

## 2019-01-10 RX ADMIN — LIDOCAINE HYDROCHLORIDE 16 ML: 10 INJECTION, SOLUTION INFILTRATION; PERINEURAL at 15:01

## 2019-01-11 ENCOUNTER — HOSPITAL ENCOUNTER (OUTPATIENT)
Dept: PREADMISSION TESTING | Age: 59
Discharge: HOME | End: 2019-01-15
Payer: COMMERCIAL

## 2019-01-11 VITALS
HEIGHT: 72 IN | HEART RATE: 116 BPM | WEIGHT: 227 LBS | BODY MASS INDEX: 30.75 KG/M2 | TEMPERATURE: 98.8 F | DIASTOLIC BLOOD PRESSURE: 68 MMHG | RESPIRATION RATE: 20 BRPM | OXYGEN SATURATION: 98 % | SYSTOLIC BLOOD PRESSURE: 96 MMHG

## 2019-01-11 LAB
ABO/RH: NORMAL
ALBUMIN SERPL-MCNC: 4.3 G/DL (ref 3.9–4.9)
ALP BLD-CCNC: 168 U/L (ref 35–104)
ALT SERPL-CCNC: 112 U/L (ref 0–41)
ANION GAP SERPL CALCULATED.3IONS-SCNC: 13 MEQ/L (ref 7–13)
ANTIBODY SCREEN: NORMAL
AST SERPL-CCNC: 61 U/L (ref 0–40)
BASE EXCESS ARTERIAL: 4 (ref -3–3)
BILIRUB SERPL-MCNC: 0.7 MG/DL (ref 0–1.2)
BUN BLDV-MCNC: 13 MG/DL (ref 6–20)
CALCIUM IONIZED: 1.22 MMOL/L (ref 1.12–1.32)
CALCIUM SERPL-MCNC: 9.9 MG/DL (ref 8.6–10.2)
CHLORIDE BLD-SCNC: 96 MEQ/L (ref 98–107)
CO2: 28 MEQ/L (ref 22–29)
CREAT SERPL-MCNC: 0.75 MG/DL (ref 0.7–1.2)
EKG ATRIAL RATE: 108 BPM
EKG P AXIS: 54 DEGREES
EKG P-R INTERVAL: 172 MS
EKG Q-T INTERVAL: 360 MS
EKG QRS DURATION: 102 MS
EKG QTC CALCULATION (BAZETT): 482 MS
EKG R AXIS: 93 DEGREES
EKG T AXIS: 80 DEGREES
EKG VENTRICULAR RATE: 108 BPM
GFR AFRICAN AMERICAN: >60
GFR AFRICAN AMERICAN: >60
GFR NON-AFRICAN AMERICAN: >60
GFR NON-AFRICAN AMERICAN: >60
GLOBULIN: 4.2 G/DL (ref 2.3–3.5)
GLUCOSE BLD-MCNC: 109 MG/DL (ref 60–115)
GLUCOSE BLD-MCNC: 110 MG/DL (ref 74–109)
HCO3 ARTERIAL: 28 MMOL/L (ref 21–29)
HCT VFR BLD CALC: 51.5 % (ref 42–52)
HEMOGLOBIN: 18.4 G/DL (ref 14–18)
HEMOGLOBIN: 19.7 GM/DL (ref 13.5–17.5)
LACTATE: 1.24 MMOL/L (ref 0.4–2)
MCH RBC QN AUTO: 32.7 PG (ref 27–31.3)
MCHC RBC AUTO-ENTMCNC: 35.6 % (ref 33–37)
MCV RBC AUTO: 91.8 FL (ref 80–100)
O2 SAT, ARTERIAL: 94 % (ref 93–100)
PCO2 ARTERIAL: 41 MM HG (ref 35–45)
PDW BLD-RTO: 14.2 % (ref 11.5–14.5)
PERFORMED ON: ABNORMAL
PH ARTERIAL: 7.44 (ref 7.35–7.45)
PLATELET # BLD: 308 K/UL (ref 130–400)
PO2 ARTERIAL: 67 MM HG (ref 75–108)
POC CHLORIDE: 102 MEQ/L (ref 99–110)
POC CREATININE: 0.7 MG/DL (ref 0.9–1.3)
POC HEMATOCRIT: 58 % (ref 41–53)
POC POTASSIUM: 4.1 MEQ/L (ref 3.5–5.1)
POC SAMPLE TYPE: ABNORMAL
POC SODIUM: 136 MEQ/L (ref 136–145)
POTASSIUM SERPL-SCNC: 4.6 MEQ/L (ref 3.5–5.1)
RBC # BLD: 5.61 M/UL (ref 4.7–6.1)
SODIUM BLD-SCNC: 137 MEQ/L (ref 132–144)
TCO2 ARTERIAL: 29 (ref 22–29)
TOTAL PROTEIN: 8.5 G/DL (ref 6.4–8.1)
WBC # BLD: 13.1 K/UL (ref 4.8–10.8)

## 2019-01-11 PROCEDURE — 82803 BLOOD GASES ANY COMBINATION: CPT

## 2019-01-11 PROCEDURE — 84132 ASSAY OF SERUM POTASSIUM: CPT

## 2019-01-11 PROCEDURE — 82330 ASSAY OF CALCIUM: CPT

## 2019-01-11 PROCEDURE — 86850 RBC ANTIBODY SCREEN: CPT

## 2019-01-11 PROCEDURE — 82565 ASSAY OF CREATININE: CPT

## 2019-01-11 PROCEDURE — 82435 ASSAY OF BLOOD CHLORIDE: CPT

## 2019-01-11 PROCEDURE — 86900 BLOOD TYPING SEROLOGIC ABO: CPT

## 2019-01-11 PROCEDURE — 85027 COMPLETE CBC AUTOMATED: CPT

## 2019-01-11 PROCEDURE — 85014 HEMATOCRIT: CPT

## 2019-01-11 PROCEDURE — 93010 ELECTROCARDIOGRAM REPORT: CPT | Performed by: INTERNAL MEDICINE

## 2019-01-11 PROCEDURE — 80053 COMPREHEN METABOLIC PANEL: CPT

## 2019-01-11 PROCEDURE — 86901 BLOOD TYPING SEROLOGIC RH(D): CPT

## 2019-01-11 PROCEDURE — 93005 ELECTROCARDIOGRAM TRACING: CPT

## 2019-01-11 PROCEDURE — 84295 ASSAY OF SERUM SODIUM: CPT

## 2019-01-11 PROCEDURE — 83605 ASSAY OF LACTIC ACID: CPT

## 2019-01-11 RX ORDER — LIDOCAINE HYDROCHLORIDE 10 MG/ML
1 INJECTION, SOLUTION EPIDURAL; INFILTRATION; INTRACAUDAL; PERINEURAL
Status: CANCELLED | OUTPATIENT
Start: 2019-01-11 | End: 2019-01-11

## 2019-01-11 RX ORDER — SODIUM CHLORIDE 0.9 % (FLUSH) 0.9 %
10 SYRINGE (ML) INJECTION EVERY 12 HOURS SCHEDULED
Status: CANCELLED | OUTPATIENT
Start: 2019-01-11

## 2019-01-11 RX ORDER — SODIUM CHLORIDE 0.9 % (FLUSH) 0.9 %
10 SYRINGE (ML) INJECTION PRN
Status: CANCELLED | OUTPATIENT
Start: 2019-01-11

## 2019-01-11 RX ORDER — CEFAZOLIN SODIUM 2 G/50ML
2 SOLUTION INTRAVENOUS ONCE
Status: CANCELLED | OUTPATIENT
Start: 2019-01-18

## 2019-01-11 RX ORDER — SODIUM CHLORIDE, SODIUM LACTATE, POTASSIUM CHLORIDE, CALCIUM CHLORIDE 600; 310; 30; 20 MG/100ML; MG/100ML; MG/100ML; MG/100ML
INJECTION, SOLUTION INTRAVENOUS CONTINUOUS
Status: CANCELLED | OUTPATIENT
Start: 2019-01-11

## 2019-01-11 RX ORDER — DEXAMETHASONE SODIUM PHOSPHATE 4 MG/ML
4 INJECTION, SOLUTION INTRA-ARTICULAR; INTRALESIONAL; INTRAMUSCULAR; INTRAVENOUS; SOFT TISSUE ONCE
Status: CANCELLED | OUTPATIENT
Start: 2019-01-18

## 2019-01-15 ENCOUNTER — ANESTHESIA EVENT (OUTPATIENT)
Dept: OPERATING ROOM | Age: 59
DRG: 039 | End: 2019-01-15
Payer: COMMERCIAL

## 2019-01-18 ENCOUNTER — APPOINTMENT (OUTPATIENT)
Dept: GENERAL RADIOLOGY | Age: 59
DRG: 039 | End: 2019-01-18
Attending: THORACIC SURGERY (CARDIOTHORACIC VASCULAR SURGERY)
Payer: COMMERCIAL

## 2019-01-18 ENCOUNTER — ANESTHESIA (OUTPATIENT)
Dept: OPERATING ROOM | Age: 59
DRG: 039 | End: 2019-01-18
Payer: COMMERCIAL

## 2019-01-18 ENCOUNTER — HOSPITAL ENCOUNTER (INPATIENT)
Age: 59
LOS: 1 days | Discharge: HOME | DRG: 039 | End: 2019-01-19
Attending: THORACIC SURGERY (CARDIOTHORACIC VASCULAR SURGERY) | Admitting: THORACIC SURGERY (CARDIOTHORACIC VASCULAR SURGERY)
Payer: COMMERCIAL

## 2019-01-18 VITALS — TEMPERATURE: 99 F | OXYGEN SATURATION: 92 %

## 2019-01-18 PROBLEM — I65.22 CAROTID STENOSIS, SYMPTOMATIC W/O INFARCT, LEFT: Status: ACTIVE | Noted: 2019-01-18

## 2019-01-18 PROCEDURE — 6360000004 HC RX CONTRAST MEDICATION: Performed by: THORACIC SURGERY (CARDIOTHORACIC VASCULAR SURGERY)

## 2019-01-18 PROCEDURE — 88304 TISSUE EXAM BY PATHOLOGIST: CPT

## 2019-01-18 PROCEDURE — 2709999900 HC NON-CHARGEABLE SUPPLY: Performed by: THORACIC SURGERY (CARDIOTHORACIC VASCULAR SURGERY)

## 2019-01-18 PROCEDURE — 03UN0JZ SUPPLEMENT LEFT EXTERNAL CAROTID ARTERY WITH SYNTHETIC SUBSTITUTE, OPEN APPROACH: ICD-10-PCS | Performed by: THORACIC SURGERY (CARDIOTHORACIC VASCULAR SURGERY)

## 2019-01-18 PROCEDURE — 88311 DECALCIFY TISSUE: CPT

## 2019-01-18 PROCEDURE — 6360000002 HC RX W HCPCS: Performed by: THORACIC SURGERY (CARDIOTHORACIC VASCULAR SURGERY)

## 2019-01-18 PROCEDURE — 3600000015 HC SURGERY LEVEL 5 ADDTL 15MIN: Performed by: THORACIC SURGERY (CARDIOTHORACIC VASCULAR SURGERY)

## 2019-01-18 PROCEDURE — 6360000002 HC RX W HCPCS: Performed by: NURSE PRACTITIONER

## 2019-01-18 PROCEDURE — 2500000003 HC RX 250 WO HCPCS: Performed by: THORACIC SURGERY (CARDIOTHORACIC VASCULAR SURGERY)

## 2019-01-18 PROCEDURE — 3700000000 HC ANESTHESIA ATTENDED CARE: Performed by: THORACIC SURGERY (CARDIOTHORACIC VASCULAR SURGERY)

## 2019-01-18 PROCEDURE — 3600000005 HC SURGERY LEVEL 5 BASE: Performed by: THORACIC SURGERY (CARDIOTHORACIC VASCULAR SURGERY)

## 2019-01-18 PROCEDURE — 03UJ0JZ SUPPLEMENT LEFT COMMON CAROTID ARTERY WITH SYNTHETIC SUBSTITUTE, OPEN APPROACH: ICD-10-PCS | Performed by: THORACIC SURGERY (CARDIOTHORACIC VASCULAR SURGERY)

## 2019-01-18 PROCEDURE — 6370000000 HC RX 637 (ALT 250 FOR IP): Performed by: NURSE ANESTHETIST, CERTIFIED REGISTERED

## 2019-01-18 PROCEDURE — 2500000003 HC RX 250 WO HCPCS: Performed by: NURSE ANESTHETIST, CERTIFIED REGISTERED

## 2019-01-18 PROCEDURE — 03CL0ZZ EXTIRPATION OF MATTER FROM LEFT INTERNAL CAROTID ARTERY, OPEN APPROACH: ICD-10-PCS | Performed by: THORACIC SURGERY (CARDIOTHORACIC VASCULAR SURGERY)

## 2019-01-18 PROCEDURE — 2580000003 HC RX 258: Performed by: NURSE PRACTITIONER

## 2019-01-18 PROCEDURE — L8670 VASCULAR GRAFT, SYNTHETIC: HCPCS | Performed by: THORACIC SURGERY (CARDIOTHORACIC VASCULAR SURGERY)

## 2019-01-18 PROCEDURE — 6370000000 HC RX 637 (ALT 250 FOR IP)

## 2019-01-18 PROCEDURE — 76000 FLUOROSCOPY <1 HR PHYS/QHP: CPT

## 2019-01-18 PROCEDURE — 2580000003 HC RX 258

## 2019-01-18 PROCEDURE — 03UL0JZ SUPPLEMENT LEFT INTERNAL CAROTID ARTERY WITH SYNTHETIC SUBSTITUTE, OPEN APPROACH: ICD-10-PCS | Performed by: THORACIC SURGERY (CARDIOTHORACIC VASCULAR SURGERY)

## 2019-01-18 PROCEDURE — 2580000003 HC RX 258: Performed by: ANESTHESIOLOGY

## 2019-01-18 PROCEDURE — P9041 ALBUMIN (HUMAN),5%, 50ML: HCPCS | Performed by: NURSE ANESTHETIST, CERTIFIED REGISTERED

## 2019-01-18 PROCEDURE — 2580000003 HC RX 258: Performed by: THORACIC SURGERY (CARDIOTHORACIC VASCULAR SURGERY)

## 2019-01-18 PROCEDURE — 2580000003 HC RX 258: Performed by: NURSE ANESTHETIST, CERTIFIED REGISTERED

## 2019-01-18 PROCEDURE — 03CN0Z6 EXTIRPATE MATTER FROM L EXT CAROTID, BIFURC, OPEN: ICD-10-PCS | Performed by: THORACIC SURGERY (CARDIOTHORACIC VASCULAR SURGERY)

## 2019-01-18 PROCEDURE — 6360000002 HC RX W HCPCS: Performed by: NURSE ANESTHETIST, CERTIFIED REGISTERED

## 2019-01-18 PROCEDURE — 2720000010 HC SURG SUPPLY STERILE: Performed by: THORACIC SURGERY (CARDIOTHORACIC VASCULAR SURGERY)

## 2019-01-18 PROCEDURE — 7100000000 HC PACU RECOVERY - FIRST 15 MIN: Performed by: THORACIC SURGERY (CARDIOTHORACIC VASCULAR SURGERY)

## 2019-01-18 PROCEDURE — 7100000001 HC PACU RECOVERY - ADDTL 15 MIN: Performed by: THORACIC SURGERY (CARDIOTHORACIC VASCULAR SURGERY)

## 2019-01-18 PROCEDURE — 3700000001 HC ADD 15 MINUTES (ANESTHESIA): Performed by: THORACIC SURGERY (CARDIOTHORACIC VASCULAR SURGERY)

## 2019-01-18 PROCEDURE — 2000000000 HC ICU R&B

## 2019-01-18 PROCEDURE — 03CJ0ZZ EXTIRPATION OF MATTER FROM LEFT COMMON CAROTID ARTERY, OPEN APPROACH: ICD-10-PCS | Performed by: THORACIC SURGERY (CARDIOTHORACIC VASCULAR SURGERY)

## 2019-01-18 PROCEDURE — 6370000000 HC RX 637 (ALT 250 FOR IP): Performed by: THORACIC SURGERY (CARDIOTHORACIC VASCULAR SURGERY)

## 2019-01-18 DEVICE — GRAFT VSCLR L3XW1N THCKNSS 76MM TPRD END KNTTD DBLE VLR ST I: Type: IMPLANTABLE DEVICE | Site: CAROTID | Status: FUNCTIONAL

## 2019-01-18 RX ORDER — SODIUM CHLORIDE, SODIUM LACTATE, POTASSIUM CHLORIDE, CALCIUM CHLORIDE 600; 310; 30; 20 MG/100ML; MG/100ML; MG/100ML; MG/100ML
INJECTION, SOLUTION INTRAVENOUS
Status: COMPLETED
Start: 2019-01-18 | End: 2019-01-18

## 2019-01-18 RX ORDER — HYDROCODONE BITARTRATE AND ACETAMINOPHEN 5; 325 MG/1; MG/1
1 TABLET ORAL PRN
Status: DISCONTINUED | OUTPATIENT
Start: 2019-01-18 | End: 2019-01-18 | Stop reason: HOSPADM

## 2019-01-18 RX ORDER — LORAZEPAM 2 MG/ML
INJECTION INTRAMUSCULAR PRN
Status: DISCONTINUED | OUTPATIENT
Start: 2019-01-18 | End: 2019-01-18 | Stop reason: SDUPTHER

## 2019-01-18 RX ORDER — MAGNESIUM HYDROXIDE 1200 MG/15ML
LIQUID ORAL CONTINUOUS PRN
Status: COMPLETED | OUTPATIENT
Start: 2019-01-18 | End: 2019-01-18

## 2019-01-18 RX ORDER — ONDANSETRON 2 MG/ML
INJECTION INTRAMUSCULAR; INTRAVENOUS PRN
Status: DISCONTINUED | OUTPATIENT
Start: 2019-01-18 | End: 2019-01-18 | Stop reason: SDUPTHER

## 2019-01-18 RX ORDER — ROCURONIUM BROMIDE 10 MG/ML
INJECTION, SOLUTION INTRAVENOUS PRN
Status: DISCONTINUED | OUTPATIENT
Start: 2019-01-18 | End: 2019-01-18 | Stop reason: SDUPTHER

## 2019-01-18 RX ORDER — DIPHENHYDRAMINE HYDROCHLORIDE 50 MG/ML
12.5 INJECTION INTRAMUSCULAR; INTRAVENOUS
Status: DISCONTINUED | OUTPATIENT
Start: 2019-01-18 | End: 2019-01-18 | Stop reason: HOSPADM

## 2019-01-18 RX ORDER — PROPOFOL 10 MG/ML
INJECTION, EMULSION INTRAVENOUS PRN
Status: DISCONTINUED | OUTPATIENT
Start: 2019-01-18 | End: 2019-01-18 | Stop reason: SDUPTHER

## 2019-01-18 RX ORDER — CEFAZOLIN SODIUM 2 G/50ML
2 SOLUTION INTRAVENOUS ONCE
Status: COMPLETED | OUTPATIENT
Start: 2019-01-18 | End: 2019-01-18

## 2019-01-18 RX ORDER — SODIUM CHLORIDE, SODIUM LACTATE, POTASSIUM CHLORIDE, CALCIUM CHLORIDE 600; 310; 30; 20 MG/100ML; MG/100ML; MG/100ML; MG/100ML
INJECTION, SOLUTION INTRAVENOUS CONTINUOUS
Status: DISCONTINUED | OUTPATIENT
Start: 2019-01-18 | End: 2019-01-18

## 2019-01-18 RX ORDER — SODIUM CHLORIDE 9 MG/ML
INJECTION, SOLUTION INTRAVENOUS
Status: DISCONTINUED
Start: 2019-01-18 | End: 2019-01-18

## 2019-01-18 RX ORDER — ALBUMIN, HUMAN INJ 5% 5 %
SOLUTION INTRAVENOUS PRN
Status: DISCONTINUED | OUTPATIENT
Start: 2019-01-18 | End: 2019-01-18 | Stop reason: SDUPTHER

## 2019-01-18 RX ORDER — MEPERIDINE HYDROCHLORIDE 25 MG/ML
12.5 INJECTION INTRAMUSCULAR; INTRAVENOUS; SUBCUTANEOUS EVERY 5 MIN PRN
Status: DISCONTINUED | OUTPATIENT
Start: 2019-01-18 | End: 2019-01-18 | Stop reason: HOSPADM

## 2019-01-18 RX ORDER — LORAZEPAM 2 MG/ML
INJECTION INTRAMUSCULAR
Status: COMPLETED
Start: 2019-01-18 | End: 2019-01-18

## 2019-01-18 RX ORDER — SODIUM CHLORIDE 0.9 % (FLUSH) 0.9 %
10 SYRINGE (ML) INJECTION PRN
Status: DISCONTINUED | OUTPATIENT
Start: 2019-01-18 | End: 2019-01-18 | Stop reason: HOSPADM

## 2019-01-18 RX ORDER — HYDRALAZINE HYDROCHLORIDE 20 MG/ML
5 INJECTION INTRAMUSCULAR; INTRAVENOUS EVERY 6 HOURS PRN
Status: DISCONTINUED | OUTPATIENT
Start: 2019-01-18 | End: 2019-01-19 | Stop reason: HOSPADM

## 2019-01-18 RX ORDER — DEXAMETHASONE SODIUM PHOSPHATE 4 MG/ML
4 INJECTION, SOLUTION INTRA-ARTICULAR; INTRALESIONAL; INTRAMUSCULAR; INTRAVENOUS; SOFT TISSUE EVERY 6 HOURS
Status: DISCONTINUED | OUTPATIENT
Start: 2019-01-18 | End: 2019-01-19 | Stop reason: HOSPADM

## 2019-01-18 RX ORDER — LABETALOL HYDROCHLORIDE 5 MG/ML
INJECTION, SOLUTION INTRAVENOUS PRN
Status: DISCONTINUED | OUTPATIENT
Start: 2019-01-18 | End: 2019-01-18 | Stop reason: SDUPTHER

## 2019-01-18 RX ORDER — LIDOCAINE HYDROCHLORIDE 20 MG/ML
INJECTION, SOLUTION INFILTRATION; PERINEURAL PRN
Status: DISCONTINUED | OUTPATIENT
Start: 2019-01-18 | End: 2019-01-18 | Stop reason: SDUPTHER

## 2019-01-18 RX ORDER — TRAMADOL HYDROCHLORIDE 50 MG/1
50 TABLET ORAL EVERY 6 HOURS PRN
Status: DISCONTINUED | OUTPATIENT
Start: 2019-01-18 | End: 2019-01-19 | Stop reason: HOSPADM

## 2019-01-18 RX ORDER — HYDROCODONE BITARTRATE AND ACETAMINOPHEN 5; 325 MG/1; MG/1
2 TABLET ORAL PRN
Status: DISCONTINUED | OUTPATIENT
Start: 2019-01-18 | End: 2019-01-18 | Stop reason: HOSPADM

## 2019-01-18 RX ORDER — METOCLOPRAMIDE HYDROCHLORIDE 5 MG/ML
10 INJECTION INTRAMUSCULAR; INTRAVENOUS
Status: DISCONTINUED | OUTPATIENT
Start: 2019-01-18 | End: 2019-01-18 | Stop reason: HOSPADM

## 2019-01-18 RX ORDER — LIDOCAINE HYDROCHLORIDE 10 MG/ML
1 INJECTION, SOLUTION EPIDURAL; INFILTRATION; INTRACAUDAL; PERINEURAL
Status: DISCONTINUED | OUTPATIENT
Start: 2019-01-18 | End: 2019-01-18 | Stop reason: HOSPADM

## 2019-01-18 RX ORDER — HEPARIN SODIUM 5000 [USP'U]/ML
INJECTION, SOLUTION INTRAVENOUS; SUBCUTANEOUS PRN
Status: DISCONTINUED | OUTPATIENT
Start: 2019-01-18 | End: 2019-01-18 | Stop reason: SDUPTHER

## 2019-01-18 RX ORDER — TRAMADOL HYDROCHLORIDE 50 MG/1
100 TABLET ORAL EVERY 6 HOURS PRN
Status: DISCONTINUED | OUTPATIENT
Start: 2019-01-18 | End: 2019-01-19 | Stop reason: HOSPADM

## 2019-01-18 RX ORDER — ASPIRIN 81 MG/1
81 TABLET ORAL DAILY
Status: DISCONTINUED | OUTPATIENT
Start: 2019-01-18 | End: 2019-01-19 | Stop reason: HOSPADM

## 2019-01-18 RX ORDER — ALBUTEROL SULFATE 90 UG/1
AEROSOL, METERED RESPIRATORY (INHALATION) PRN
Status: DISCONTINUED | OUTPATIENT
Start: 2019-01-18 | End: 2019-01-18 | Stop reason: SDUPTHER

## 2019-01-18 RX ORDER — FENTANYL CITRATE 50 UG/ML
INJECTION, SOLUTION INTRAMUSCULAR; INTRAVENOUS PRN
Status: DISCONTINUED | OUTPATIENT
Start: 2019-01-18 | End: 2019-01-18 | Stop reason: SDUPTHER

## 2019-01-18 RX ORDER — DEXAMETHASONE SODIUM PHOSPHATE 4 MG/ML
4 INJECTION, SOLUTION INTRA-ARTICULAR; INTRALESIONAL; INTRAMUSCULAR; INTRAVENOUS; SOFT TISSUE ONCE
Status: COMPLETED | OUTPATIENT
Start: 2019-01-18 | End: 2019-01-18

## 2019-01-18 RX ORDER — KETOROLAC TROMETHAMINE 30 MG/ML
30 INJECTION, SOLUTION INTRAMUSCULAR; INTRAVENOUS EVERY 6 HOURS
Status: DISCONTINUED | OUTPATIENT
Start: 2019-01-18 | End: 2019-01-19 | Stop reason: HOSPADM

## 2019-01-18 RX ORDER — FENTANYL CITRATE 50 UG/ML
50 INJECTION, SOLUTION INTRAMUSCULAR; INTRAVENOUS EVERY 10 MIN PRN
Status: DISCONTINUED | OUTPATIENT
Start: 2019-01-18 | End: 2019-01-18 | Stop reason: HOSPADM

## 2019-01-18 RX ORDER — SODIUM CHLORIDE 450 MG/100ML
INJECTION, SOLUTION INTRAVENOUS
Status: COMPLETED
Start: 2019-01-18 | End: 2019-01-18

## 2019-01-18 RX ORDER — SODIUM CHLORIDE 0.9 % (FLUSH) 0.9 %
10 SYRINGE (ML) INJECTION EVERY 12 HOURS SCHEDULED
Status: DISCONTINUED | OUTPATIENT
Start: 2019-01-18 | End: 2019-01-18 | Stop reason: HOSPADM

## 2019-01-18 RX ORDER — ONDANSETRON 2 MG/ML
4 INJECTION INTRAMUSCULAR; INTRAVENOUS
Status: DISCONTINUED | OUTPATIENT
Start: 2019-01-18 | End: 2019-01-18 | Stop reason: HOSPADM

## 2019-01-18 RX ORDER — ESMOLOL HYDROCHLORIDE 10 MG/ML
INJECTION INTRAVENOUS PRN
Status: DISCONTINUED | OUTPATIENT
Start: 2019-01-18 | End: 2019-01-18 | Stop reason: SDUPTHER

## 2019-01-18 RX ORDER — SODIUM CHLORIDE 450 MG/100ML
INJECTION, SOLUTION INTRAVENOUS CONTINUOUS
Status: ACTIVE | OUTPATIENT
Start: 2019-01-18 | End: 2019-01-19

## 2019-01-18 RX ADMIN — PROPOFOL 150 MG: 10 INJECTION, EMULSION INTRAVENOUS at 08:39

## 2019-01-18 RX ADMIN — LORAZEPAM 2 MG: 2 INJECTION INTRAMUSCULAR; INTRAVENOUS at 09:07

## 2019-01-18 RX ADMIN — ONDANSETRON 4 MG: 2 INJECTION INTRAMUSCULAR; INTRAVENOUS at 10:54

## 2019-01-18 RX ADMIN — PHENYLEPHRINE HYDROCHLORIDE 200 MCG: 10 INJECTION INTRAVENOUS at 09:18

## 2019-01-18 RX ADMIN — KETOROLAC TROMETHAMINE 30 MG: 30 INJECTION, SOLUTION INTRAMUSCULAR; INTRAVENOUS at 13:58

## 2019-01-18 RX ADMIN — ALBUTEROL SULFATE 2 PUFF: 90 AEROSOL, METERED RESPIRATORY (INHALATION) at 11:26

## 2019-01-18 RX ADMIN — SUGAMMADEX 206 MG: 100 INJECTION, SOLUTION INTRAVENOUS at 11:15

## 2019-01-18 RX ADMIN — SODIUM CHLORIDE, POTASSIUM CHLORIDE, SODIUM LACTATE AND CALCIUM CHLORIDE: 600; 310; 30; 20 INJECTION, SOLUTION INTRAVENOUS at 09:55

## 2019-01-18 RX ADMIN — PHENYLEPHRINE HYDROCHLORIDE 100 MCG: 10 INJECTION INTRAVENOUS at 08:51

## 2019-01-18 RX ADMIN — FENTANYL CITRATE 100 MCG: 50 INJECTION, SOLUTION INTRAMUSCULAR; INTRAVENOUS at 08:40

## 2019-01-18 RX ADMIN — PHENYLEPHRINE HYDROCHLORIDE 100 MCG: 10 INJECTION INTRAVENOUS at 08:48

## 2019-01-18 RX ADMIN — SODIUM CHLORIDE, POTASSIUM CHLORIDE, SODIUM LACTATE AND CALCIUM CHLORIDE: 600; 310; 30; 20 INJECTION, SOLUTION INTRAVENOUS at 09:28

## 2019-01-18 RX ADMIN — SODIUM CHLORIDE, POTASSIUM CHLORIDE, SODIUM LACTATE AND CALCIUM CHLORIDE: 600; 310; 30; 20 INJECTION, SOLUTION INTRAVENOUS at 06:36

## 2019-01-18 RX ADMIN — ROCURONIUM BROMIDE 30 MG: 10 INJECTION INTRAVENOUS at 10:47

## 2019-01-18 RX ADMIN — KETOROLAC TROMETHAMINE 30 MG: 30 INJECTION, SOLUTION INTRAMUSCULAR; INTRAVENOUS at 20:29

## 2019-01-18 RX ADMIN — ESMOLOL HYDROCHLORIDE 20 MG: 100 INJECTION, SOLUTION INTRAVENOUS at 11:18

## 2019-01-18 RX ADMIN — PHENYLEPHRINE HYDROCHLORIDE 200 MCG: 10 INJECTION INTRAVENOUS at 08:55

## 2019-01-18 RX ADMIN — CEFAZOLIN SODIUM 2 G: 2 SOLUTION INTRAVENOUS at 08:34

## 2019-01-18 RX ADMIN — ROCURONIUM BROMIDE 10 MG: 10 INJECTION INTRAVENOUS at 10:01

## 2019-01-18 RX ADMIN — DEXAMETHASONE SODIUM PHOSPHATE 4 MG: 4 INJECTION, SOLUTION INTRA-ARTICULAR; INTRALESIONAL; INTRAMUSCULAR; INTRAVENOUS; SOFT TISSUE at 13:58

## 2019-01-18 RX ADMIN — PHENYLEPHRINE HYDROCHLORIDE 200 MCG: 10 INJECTION INTRAVENOUS at 09:20

## 2019-01-18 RX ADMIN — PHENYLEPHRINE HYDROCHLORIDE 100 MCG: 10 INJECTION INTRAVENOUS at 08:49

## 2019-01-18 RX ADMIN — PHENYLEPHRINE HYDROCHLORIDE 200 MCG: 10 INJECTION INTRAVENOUS at 09:15

## 2019-01-18 RX ADMIN — HEPARIN SODIUM 5000 UNITS: 5000 INJECTION INTRAVENOUS; SUBCUTANEOUS at 09:38

## 2019-01-18 RX ADMIN — PHENYLEPHRINE HYDROCHLORIDE 200 MCG: 10 INJECTION INTRAVENOUS at 08:54

## 2019-01-18 RX ADMIN — ROCURONIUM BROMIDE 50 MG: 10 INJECTION INTRAVENOUS at 09:09

## 2019-01-18 RX ADMIN — SODIUM CHLORIDE, POTASSIUM CHLORIDE, SODIUM LACTATE AND CALCIUM CHLORIDE: 600; 310; 30; 20 INJECTION, SOLUTION INTRAVENOUS at 06:41

## 2019-01-18 RX ADMIN — ROCURONIUM BROMIDE 50 MG: 10 INJECTION INTRAVENOUS at 08:40

## 2019-01-18 RX ADMIN — LABETALOL HYDROCHLORIDE 40 MG: 5 INJECTION, SOLUTION INTRAVENOUS at 08:40

## 2019-01-18 RX ADMIN — ALBUMIN (HUMAN) 50 G: 12.5 INJECTION, SOLUTION INTRAVENOUS at 09:28

## 2019-01-18 RX ADMIN — LIDOCAINE HYDROCHLORIDE 50 MG: 20 INJECTION, SOLUTION INFILTRATION; PERINEURAL at 08:39

## 2019-01-18 RX ADMIN — SODIUM CHLORIDE: 450 INJECTION, SOLUTION INTRAVENOUS at 13:57

## 2019-01-18 RX ADMIN — DEXAMETHASONE SODIUM PHOSPHATE 4 MG: 4 INJECTION, SOLUTION INTRAMUSCULAR; INTRAVENOUS at 08:31

## 2019-01-18 RX ADMIN — PHENYLEPHRINE HYDROCHLORIDE 100 MCG: 10 INJECTION INTRAVENOUS at 08:50

## 2019-01-18 RX ADMIN — PHENYLEPHRINE HYDROCHLORIDE 200 MCG: 10 INJECTION INTRAVENOUS at 08:53

## 2019-01-18 RX ADMIN — ESMOLOL HYDROCHLORIDE 20 MG: 100 INJECTION, SOLUTION INTRAVENOUS at 11:27

## 2019-01-18 RX ADMIN — SODIUM CHLORIDE: 4.5 INJECTION, SOLUTION INTRAVENOUS at 13:57

## 2019-01-18 RX ADMIN — PROPOFOL 30 MG: 10 INJECTION, EMULSION INTRAVENOUS at 10:47

## 2019-01-18 RX ADMIN — PHENYLEPHRINE HYDROCHLORIDE 200 MCG: 10 INJECTION INTRAVENOUS at 09:17

## 2019-01-18 RX ADMIN — FENTANYL CITRATE 100 MCG: 50 INJECTION, SOLUTION INTRAMUSCULAR; INTRAVENOUS at 08:39

## 2019-01-18 RX ADMIN — ESMOLOL HYDROCHLORIDE 20 MG: 100 INJECTION, SOLUTION INTRAVENOUS at 11:25

## 2019-01-18 RX ADMIN — PHENYLEPHRINE HYDROCHLORIDE 200 MCG: 10 INJECTION INTRAVENOUS at 08:52

## 2019-01-18 RX ADMIN — DEXAMETHASONE SODIUM PHOSPHATE 4 MG: 4 INJECTION, SOLUTION INTRA-ARTICULAR; INTRALESIONAL; INTRAMUSCULAR; INTRAVENOUS; SOFT TISSUE at 18:53

## 2019-01-18 RX ADMIN — ROCURONIUM BROMIDE 10 MG: 10 INJECTION INTRAVENOUS at 09:42

## 2019-01-18 RX ADMIN — PHENYLEPHRINE HYDROCHLORIDE 100 MCG/MIN: 10 INJECTION INTRAVENOUS at 08:50

## 2019-01-18 ASSESSMENT — PULMONARY FUNCTION TESTS
PIF_VALUE: 22
PIF_VALUE: 35
PIF_VALUE: 29
PIF_VALUE: 26
PIF_VALUE: 0
PIF_VALUE: 29
PIF_VALUE: 28
PIF_VALUE: 27
PIF_VALUE: 29
PIF_VALUE: 34
PIF_VALUE: 34
PIF_VALUE: 30
PIF_VALUE: 35
PIF_VALUE: 0
PIF_VALUE: 35
PIF_VALUE: 15
PIF_VALUE: 0
PIF_VALUE: 29
PIF_VALUE: 0
PIF_VALUE: 33
PIF_VALUE: 27
PIF_VALUE: 27
PIF_VALUE: 20
PIF_VALUE: 34
PIF_VALUE: 34
PIF_VALUE: 35
PIF_VALUE: 29
PIF_VALUE: 20
PIF_VALUE: 27
PIF_VALUE: 3
PIF_VALUE: 28
PIF_VALUE: 37
PIF_VALUE: 27
PIF_VALUE: 27
PIF_VALUE: 26
PIF_VALUE: 35
PIF_VALUE: 34
PIF_VALUE: 33
PIF_VALUE: 28
PIF_VALUE: 35
PIF_VALUE: 34
PIF_VALUE: 34
PIF_VALUE: 35
PIF_VALUE: 35
PIF_VALUE: 0
PIF_VALUE: 28
PIF_VALUE: 29
PIF_VALUE: 34
PIF_VALUE: 28
PIF_VALUE: 34
PIF_VALUE: 14
PIF_VALUE: 34
PIF_VALUE: 1
PIF_VALUE: 28
PIF_VALUE: 34
PIF_VALUE: 34
PIF_VALUE: 35
PIF_VALUE: 35
PIF_VALUE: 28
PIF_VALUE: 27
PIF_VALUE: 3
PIF_VALUE: 1
PIF_VALUE: 34
PIF_VALUE: 20
PIF_VALUE: 28
PIF_VALUE: 12
PIF_VALUE: 36
PIF_VALUE: 27
PIF_VALUE: 2
PIF_VALUE: 15
PIF_VALUE: 35
PIF_VALUE: 15
PIF_VALUE: 1
PIF_VALUE: 29
PIF_VALUE: 34
PIF_VALUE: 36
PIF_VALUE: 22
PIF_VALUE: 35
PIF_VALUE: 29
PIF_VALUE: 29
PIF_VALUE: 36
PIF_VALUE: 1
PIF_VALUE: 35
PIF_VALUE: 1
PIF_VALUE: 29
PIF_VALUE: 1
PIF_VALUE: 35
PIF_VALUE: 28
PIF_VALUE: 29
PIF_VALUE: 0
PIF_VALUE: 4
PIF_VALUE: 34
PIF_VALUE: 36
PIF_VALUE: 0
PIF_VALUE: 20
PIF_VALUE: 33
PIF_VALUE: 35
PIF_VALUE: 35
PIF_VALUE: 0
PIF_VALUE: 28
PIF_VALUE: 6
PIF_VALUE: 15
PIF_VALUE: 28
PIF_VALUE: 28
PIF_VALUE: 34
PIF_VALUE: 28
PIF_VALUE: 29
PIF_VALUE: 35
PIF_VALUE: 15
PIF_VALUE: 41
PIF_VALUE: 34
PIF_VALUE: 29
PIF_VALUE: 27
PIF_VALUE: 0
PIF_VALUE: 33
PIF_VALUE: 28
PIF_VALUE: 34
PIF_VALUE: 15
PIF_VALUE: 36
PIF_VALUE: 0
PIF_VALUE: 35
PIF_VALUE: 15
PIF_VALUE: 1
PIF_VALUE: 35
PIF_VALUE: 29
PIF_VALUE: 29
PIF_VALUE: 33
PIF_VALUE: 15
PIF_VALUE: 34
PIF_VALUE: 26
PIF_VALUE: 34
PIF_VALUE: 35
PIF_VALUE: 34
PIF_VALUE: 34
PIF_VALUE: 33
PIF_VALUE: 34
PIF_VALUE: 1
PIF_VALUE: 0
PIF_VALUE: 0
PIF_VALUE: 29
PIF_VALUE: 9
PIF_VALUE: 28
PIF_VALUE: 26
PIF_VALUE: 0
PIF_VALUE: 35
PIF_VALUE: 35
PIF_VALUE: 34
PIF_VALUE: 1
PIF_VALUE: 35
PIF_VALUE: 28
PIF_VALUE: 20
PIF_VALUE: 34
PIF_VALUE: 26
PIF_VALUE: 34
PIF_VALUE: 1
PIF_VALUE: 34
PIF_VALUE: 0
PIF_VALUE: 27
PIF_VALUE: 35
PIF_VALUE: 25
PIF_VALUE: 35
PIF_VALUE: 35
PIF_VALUE: 0
PIF_VALUE: 5
PIF_VALUE: 29
PIF_VALUE: 21
PIF_VALUE: 27
PIF_VALUE: 20
PIF_VALUE: 1
PIF_VALUE: 29
PIF_VALUE: 0
PIF_VALUE: 33
PIF_VALUE: 30
PIF_VALUE: 15
PIF_VALUE: 3
PIF_VALUE: 26
PIF_VALUE: 27
PIF_VALUE: 29
PIF_VALUE: 34
PIF_VALUE: 35
PIF_VALUE: 34
PIF_VALUE: 35
PIF_VALUE: 20
PIF_VALUE: 28
PIF_VALUE: 28
PIF_VALUE: 27
PIF_VALUE: 34
PIF_VALUE: 27
PIF_VALUE: 0
PIF_VALUE: 26
PIF_VALUE: 27
PIF_VALUE: 29
PIF_VALUE: 34
PIF_VALUE: 29
PIF_VALUE: 35
PIF_VALUE: 34
PIF_VALUE: 27
PIF_VALUE: 35

## 2019-01-18 ASSESSMENT — PAIN SCALES - GENERAL
PAINLEVEL_OUTOF10: 0

## 2019-01-18 ASSESSMENT — COPD QUESTIONNAIRES: CAT_SEVERITY: NO INTERVAL CHANGE

## 2019-01-19 VITALS
RESPIRATION RATE: 23 BRPM | OXYGEN SATURATION: 92 % | TEMPERATURE: 98.4 F | DIASTOLIC BLOOD PRESSURE: 69 MMHG | HEIGHT: 72 IN | SYSTOLIC BLOOD PRESSURE: 101 MMHG | HEART RATE: 114 BPM | WEIGHT: 227 LBS | BODY MASS INDEX: 30.75 KG/M2

## 2019-01-19 LAB
ANION GAP SERPL CALCULATED.3IONS-SCNC: 15 MEQ/L (ref 7–13)
BUN BLDV-MCNC: 18 MG/DL (ref 6–20)
CALCIUM SERPL-MCNC: 9.1 MG/DL (ref 8.6–10.2)
CHLORIDE BLD-SCNC: 101 MEQ/L (ref 98–107)
CHOLESTEROL, TOTAL: 162 MG/DL (ref 0–199)
CK MB: 55.7 NG/ML (ref 0–6.7)
CO2: 25 MEQ/L (ref 22–29)
CREAT SERPL-MCNC: 1.05 MG/DL (ref 0.7–1.2)
CREATINE KINASE-MB INDEX: 1.1 % (ref 0–3.5)
EKG ATRIAL RATE: 108 BPM
EKG P AXIS: 53 DEGREES
EKG P-R INTERVAL: 186 MS
EKG Q-T INTERVAL: 346 MS
EKG QRS DURATION: 92 MS
EKG QTC CALCULATION (BAZETT): 463 MS
EKG R AXIS: 87 DEGREES
EKG T AXIS: 77 DEGREES
EKG VENTRICULAR RATE: 108 BPM
GFR AFRICAN AMERICAN: >60
GFR NON-AFRICAN AMERICAN: >60
GLUCOSE BLD-MCNC: 158 MG/DL (ref 74–109)
HCT VFR BLD CALC: 42.3 % (ref 42–52)
HDLC SERPL-MCNC: 39 MG/DL (ref 40–59)
HEMOGLOBIN: 14.5 G/DL (ref 14–18)
LDL CHOLESTEROL CALCULATED: 95 MG/DL (ref 0–129)
MCH RBC QN AUTO: 32.1 PG (ref 27–31.3)
MCHC RBC AUTO-ENTMCNC: 34.1 % (ref 33–37)
MCV RBC AUTO: 93.9 FL (ref 80–100)
PDW BLD-RTO: 13.4 % (ref 11.5–14.5)
PLATELET # BLD: 276 K/UL (ref 130–400)
POTASSIUM SERPL-SCNC: 4.9 MEQ/L (ref 3.5–5.1)
RBC # BLD: 4.51 M/UL (ref 4.7–6.1)
SODIUM BLD-SCNC: 141 MEQ/L (ref 132–144)
TOTAL CK: 5157 U/L (ref 0–190)
TRIGL SERPL-MCNC: 139 MG/DL (ref 0–200)
TROPONIN: <0.01 NG/ML (ref 0–0.01)
WBC # BLD: 20 K/UL (ref 4.8–10.8)

## 2019-01-19 PROCEDURE — 6370000000 HC RX 637 (ALT 250 FOR IP): Performed by: INTERNAL MEDICINE

## 2019-01-19 PROCEDURE — 85027 COMPLETE CBC AUTOMATED: CPT

## 2019-01-19 PROCEDURE — 80061 LIPID PANEL: CPT

## 2019-01-19 PROCEDURE — 84484 ASSAY OF TROPONIN QUANT: CPT

## 2019-01-19 PROCEDURE — 82550 ASSAY OF CK (CPK): CPT

## 2019-01-19 PROCEDURE — 80048 BASIC METABOLIC PNL TOTAL CA: CPT

## 2019-01-19 PROCEDURE — 2500000003 HC RX 250 WO HCPCS

## 2019-01-19 PROCEDURE — 6370000000 HC RX 637 (ALT 250 FOR IP): Performed by: THORACIC SURGERY (CARDIOTHORACIC VASCULAR SURGERY)

## 2019-01-19 PROCEDURE — 82553 CREATINE MB FRACTION: CPT

## 2019-01-19 PROCEDURE — 2580000003 HC RX 258: Performed by: INTERNAL MEDICINE

## 2019-01-19 PROCEDURE — 93005 ELECTROCARDIOGRAM TRACING: CPT

## 2019-01-19 PROCEDURE — 6360000002 HC RX W HCPCS: Performed by: THORACIC SURGERY (CARDIOTHORACIC VASCULAR SURGERY)

## 2019-01-19 PROCEDURE — 6360000002 HC RX W HCPCS: Performed by: INTERNAL MEDICINE

## 2019-01-19 PROCEDURE — 36415 COLL VENOUS BLD VENIPUNCTURE: CPT

## 2019-01-19 RX ORDER — ICOSAPENT ETHYL 1000 MG/1
CAPSULE ORAL
Refills: 0 | COMMUNITY
Start: 2019-01-14 | End: 2020-12-29 | Stop reason: SDUPTHER

## 2019-01-19 RX ORDER — VALSARTAN AND HYDROCHLOROTHIAZIDE 80; 12.5 MG/1; MG/1
1 TABLET, FILM COATED ORAL DAILY
Status: DISCONTINUED | OUTPATIENT
Start: 2019-01-19 | End: 2019-01-19 | Stop reason: HOSPADM

## 2019-01-19 RX ORDER — SODIUM CHLORIDE 9 MG/ML
INJECTION, SOLUTION INTRAVENOUS CONTINUOUS
Status: DISPENSED | OUTPATIENT
Start: 2019-01-19 | End: 2019-01-19

## 2019-01-19 RX ORDER — METOPROLOL SUCCINATE 25 MG/1
25 TABLET, EXTENDED RELEASE ORAL DAILY
Status: DISCONTINUED | OUTPATIENT
Start: 2019-01-19 | End: 2019-01-19 | Stop reason: HOSPADM

## 2019-01-19 RX ORDER — ROSUVASTATIN CALCIUM 40 MG/1
TABLET, COATED ORAL
Refills: 0 | COMMUNITY
Start: 2019-01-11 | End: 2020-03-25 | Stop reason: SDUPTHER

## 2019-01-19 RX ORDER — METOPROLOL TARTRATE 5 MG/5ML
INJECTION INTRAVENOUS
Status: COMPLETED
Start: 2019-01-19 | End: 2019-01-19

## 2019-01-19 RX ORDER — METOPROLOL SUCCINATE 25 MG/1
25 TABLET, EXTENDED RELEASE ORAL DAILY
Qty: 30 TABLET | Refills: 5 | Status: SHIPPED | OUTPATIENT
Start: 2019-01-19 | End: 2020-09-09 | Stop reason: SDUPTHER

## 2019-01-19 RX ORDER — METOPROLOL TARTRATE 5 MG/5ML
5 INJECTION INTRAVENOUS EVERY 6 HOURS PRN
Status: DISCONTINUED | OUTPATIENT
Start: 2019-01-19 | End: 2019-01-19 | Stop reason: HOSPADM

## 2019-01-19 RX ADMIN — DEXAMETHASONE SODIUM PHOSPHATE 4 MG: 4 INJECTION, SOLUTION INTRA-ARTICULAR; INTRALESIONAL; INTRAMUSCULAR; INTRAVENOUS; SOFT TISSUE at 05:36

## 2019-01-19 RX ADMIN — KETOROLAC TROMETHAMINE 30 MG: 30 INJECTION, SOLUTION INTRAMUSCULAR; INTRAVENOUS at 01:57

## 2019-01-19 RX ADMIN — VALSARTAN AND HYDROCHLOROTHIAZIDE 1 TABLET: 80; 12.5 TABLET, FILM COATED ORAL at 10:15

## 2019-01-19 RX ADMIN — HYDRALAZINE HYDROCHLORIDE 5 MG: 20 INJECTION INTRAMUSCULAR; INTRAVENOUS at 00:18

## 2019-01-19 RX ADMIN — DEXAMETHASONE SODIUM PHOSPHATE 4 MG: 4 INJECTION, SOLUTION INTRA-ARTICULAR; INTRALESIONAL; INTRAMUSCULAR; INTRAVENOUS; SOFT TISSUE at 00:17

## 2019-01-19 RX ADMIN — DEXAMETHASONE SODIUM PHOSPHATE 4 MG: 4 INJECTION, SOLUTION INTRA-ARTICULAR; INTRALESIONAL; INTRAMUSCULAR; INTRAVENOUS; SOFT TISSUE at 11:35

## 2019-01-19 RX ADMIN — SODIUM CHLORIDE 250 ML: 9 INJECTION, SOLUTION INTRAVENOUS at 10:18

## 2019-01-19 RX ADMIN — HYDRALAZINE HYDROCHLORIDE 5 MG: 20 INJECTION INTRAMUSCULAR; INTRAVENOUS at 06:07

## 2019-01-19 RX ADMIN — ASPIRIN 81 MG: 81 TABLET, COATED ORAL at 09:39

## 2019-01-19 RX ADMIN — KETOROLAC TROMETHAMINE 30 MG: 30 INJECTION, SOLUTION INTRAMUSCULAR; INTRAVENOUS at 08:04

## 2019-01-19 RX ADMIN — METOPROLOL SUCCINATE 25 MG: 25 TABLET, EXTENDED RELEASE ORAL at 10:19

## 2019-01-19 RX ADMIN — METOPROLOL TARTRATE: 5 INJECTION, SOLUTION INTRAVENOUS at 08:52

## 2019-01-19 ASSESSMENT — PAIN SCALES - GENERAL
PAINLEVEL_OUTOF10: 0
PAINLEVEL_OUTOF10: 3
PAINLEVEL_OUTOF10: 0
PAINLEVEL_OUTOF10: 0

## 2019-01-19 ASSESSMENT — PAIN DESCRIPTION - PAIN TYPE: TYPE: SURGICAL PAIN

## 2019-01-19 ASSESSMENT — PAIN DESCRIPTION - ORIENTATION: ORIENTATION: LEFT

## 2019-01-19 ASSESSMENT — PAIN DESCRIPTION - LOCATION: LOCATION: NECK

## 2019-01-22 ENCOUNTER — TELEPHONE (OUTPATIENT)
Dept: INTERNAL MEDICINE CLINIC | Age: 59
End: 2019-01-22

## 2019-01-25 ENCOUNTER — OFFICE VISIT (OUTPATIENT)
Dept: INTERNAL MEDICINE CLINIC | Age: 59
End: 2019-01-25
Payer: COMMERCIAL

## 2019-01-25 VITALS
BODY MASS INDEX: 31.22 KG/M2 | WEIGHT: 227 LBS | HEART RATE: 86 BPM | DIASTOLIC BLOOD PRESSURE: 60 MMHG | SYSTOLIC BLOOD PRESSURE: 120 MMHG | OXYGEN SATURATION: 98 %

## 2019-01-25 DIAGNOSIS — J44.9 CHRONIC OBSTRUCTIVE PULMONARY DISEASE, UNSPECIFIED COPD TYPE (HCC): ICD-10-CM

## 2019-01-25 DIAGNOSIS — E78.5 HYPERLIPIDEMIA, UNSPECIFIED HYPERLIPIDEMIA TYPE: ICD-10-CM

## 2019-01-25 DIAGNOSIS — Z98.890 S/P CAROTID ENDARTERECTOMY: Primary | ICD-10-CM

## 2019-01-25 PROCEDURE — 99495 TRANSJ CARE MGMT MOD F2F 14D: CPT | Performed by: INTERNAL MEDICINE

## 2019-01-29 ENCOUNTER — PROCEDURE VISIT (OUTPATIENT)
Dept: PHYSICAL MEDICINE AND REHAB | Age: 59
End: 2019-01-29
Payer: COMMERCIAL

## 2019-01-29 DIAGNOSIS — M54.17 LUMBOSACRAL RADICULOPATHY AT S1: ICD-10-CM

## 2019-01-29 DIAGNOSIS — M54.42 CHRONIC MIDLINE LOW BACK PAIN WITH BILATERAL SCIATICA: ICD-10-CM

## 2019-01-29 DIAGNOSIS — G62.1 ALCOHOLIC POLYNEUROPATHY (HCC): Chronic | ICD-10-CM

## 2019-01-29 DIAGNOSIS — M70.61 GREATER TROCHANTERIC BURSITIS OF RIGHT HIP: ICD-10-CM

## 2019-01-29 DIAGNOSIS — M53.3 SI (SACROILIAC) PAIN: ICD-10-CM

## 2019-01-29 DIAGNOSIS — G89.29 CHRONIC MIDLINE LOW BACK PAIN WITH BILATERAL SCIATICA: ICD-10-CM

## 2019-01-29 DIAGNOSIS — Z79.899 HIGH RISK MEDICATION USE: ICD-10-CM

## 2019-01-29 DIAGNOSIS — M51.36 DDD (DEGENERATIVE DISC DISEASE), LUMBAR: Chronic | ICD-10-CM

## 2019-01-29 DIAGNOSIS — M54.41 CHRONIC MIDLINE LOW BACK PAIN WITH BILATERAL SCIATICA: ICD-10-CM

## 2019-01-29 DIAGNOSIS — R41.89 COGNITIVE DEFICIT DUE TO OLD HEAD INJURY: Chronic | ICD-10-CM

## 2019-01-29 DIAGNOSIS — M62.830 MUSCLE SPASM OF BACK: ICD-10-CM

## 2019-01-29 DIAGNOSIS — F10.20 ALCOHOLIC (HCC): ICD-10-CM

## 2019-01-29 DIAGNOSIS — G62.1 ALCOHOLIC POLYNEUROPATHY (HCC): Primary | Chronic | ICD-10-CM

## 2019-01-29 DIAGNOSIS — S09.90XS COGNITIVE DEFICIT DUE TO OLD HEAD INJURY: Chronic | ICD-10-CM

## 2019-01-29 PROCEDURE — 20611 DRAIN/INJ JOINT/BURSA W/US: CPT | Performed by: PHYSICAL MEDICINE & REHABILITATION

## 2019-01-29 RX ORDER — HYDROCODONE BITARTRATE AND ACETAMINOPHEN 5; 325 MG/1; MG/1
1 TABLET ORAL EVERY 4 HOURS PRN
Qty: 60 TABLET | Refills: 0 | Status: SHIPPED | OUTPATIENT
Start: 2019-02-11 | End: 2019-03-06 | Stop reason: SDUPTHER

## 2019-01-29 RX ORDER — LIDOCAINE HYDROCHLORIDE 20 MG/ML
2 INJECTION, SOLUTION INFILTRATION; PERINEURAL ONCE
Status: COMPLETED | OUTPATIENT
Start: 2019-01-29 | End: 2019-01-29

## 2019-01-29 RX ORDER — ROPIVACAINE HYDROCHLORIDE 5 MG/ML
8 INJECTION, SOLUTION EPIDURAL; INFILTRATION; PERINEURAL ONCE
Status: COMPLETED | OUTPATIENT
Start: 2019-01-29 | End: 2019-01-29

## 2019-01-29 RX ADMIN — LIDOCAINE HYDROCHLORIDE 2 ML: 20 INJECTION, SOLUTION INFILTRATION; PERINEURAL at 16:07

## 2019-01-29 RX ADMIN — ROPIVACAINE HYDROCHLORIDE 8 ML: 5 INJECTION, SOLUTION EPIDURAL; INFILTRATION; PERINEURAL at 16:10

## 2019-02-05 RX ORDER — PSYLLIUM HUSK 3.4 G/7G
POWDER ORAL
Qty: 30 TABLET | Refills: 5 | Status: SHIPPED | OUTPATIENT
Start: 2019-02-05 | End: 2019-07-31 | Stop reason: SDUPTHER

## 2019-02-05 RX ORDER — VALSARTAN AND HYDROCHLOROTHIAZIDE 80; 12.5 MG/1; MG/1
TABLET, FILM COATED ORAL
Qty: 30 TABLET | Refills: 5 | Status: SHIPPED | OUTPATIENT
Start: 2019-02-05 | End: 2019-07-31 | Stop reason: SDUPTHER

## 2019-02-05 RX ORDER — CIPROFLOXACIN 250 MG/1
TABLET, FILM COATED ORAL
Refills: 0 | COMMUNITY
Start: 2019-01-28 | End: 2019-06-12 | Stop reason: ALTCHOICE

## 2019-02-27 ENCOUNTER — PROCEDURE VISIT (OUTPATIENT)
Dept: PHYSICAL MEDICINE AND REHAB | Age: 59
End: 2019-02-27
Payer: COMMERCIAL

## 2019-02-27 DIAGNOSIS — M79.7 FIBROMYALGIA: Primary | ICD-10-CM

## 2019-02-27 PROCEDURE — 20553 NJX 1/MLT TRIGGER POINTS 3/>: CPT | Performed by: PHYSICAL MEDICINE & REHABILITATION

## 2019-02-28 RX ORDER — LIDOCAINE HYDROCHLORIDE 10 MG/ML
16 INJECTION, SOLUTION INFILTRATION; PERINEURAL ONCE
Status: COMPLETED | OUTPATIENT
Start: 2019-02-28 | End: 2019-02-28

## 2019-02-28 RX ADMIN — LIDOCAINE HYDROCHLORIDE 16 ML: 10 INJECTION, SOLUTION INFILTRATION; PERINEURAL at 17:58

## 2019-03-04 RX ORDER — AMITRIPTYLINE HYDROCHLORIDE 50 MG/1
TABLET, FILM COATED ORAL
Qty: 30 TABLET | Refills: 5 | Status: SHIPPED | OUTPATIENT
Start: 2019-03-04 | End: 2019-08-26 | Stop reason: SDUPTHER

## 2019-03-04 RX ORDER — CLOPIDOGREL BISULFATE 75 MG/1
TABLET ORAL
Qty: 30 TABLET | Refills: 5 | Status: SHIPPED | OUTPATIENT
Start: 2019-03-04 | End: 2019-08-26 | Stop reason: SDUPTHER

## 2019-03-04 RX ORDER — ACETAMINOPHEN/DIPHENHYDRAMINE 500MG-25MG
TABLET ORAL
Qty: 30 TABLET | Refills: 5 | Status: SHIPPED | OUTPATIENT
Start: 2019-03-04 | End: 2019-08-26 | Stop reason: SDUPTHER

## 2019-03-06 ENCOUNTER — OFFICE VISIT (OUTPATIENT)
Dept: PHYSICAL MEDICINE AND REHAB | Age: 59
End: 2019-03-06
Payer: COMMERCIAL

## 2019-03-06 VITALS
BODY MASS INDEX: 31.36 KG/M2 | WEIGHT: 224 LBS | HEIGHT: 71 IN | DIASTOLIC BLOOD PRESSURE: 58 MMHG | SYSTOLIC BLOOD PRESSURE: 108 MMHG

## 2019-03-06 DIAGNOSIS — Z79.899 HIGH RISK MEDICATION USE: ICD-10-CM

## 2019-03-06 DIAGNOSIS — M54.42 CHRONIC MIDLINE LOW BACK PAIN WITH BILATERAL SCIATICA: ICD-10-CM

## 2019-03-06 DIAGNOSIS — M25.512 BILATERAL SHOULDER PAIN, UNSPECIFIED CHRONICITY: ICD-10-CM

## 2019-03-06 DIAGNOSIS — M79.10 MYALGIA: Primary | ICD-10-CM

## 2019-03-06 DIAGNOSIS — M51.36 DDD (DEGENERATIVE DISC DISEASE), LUMBAR: Chronic | ICD-10-CM

## 2019-03-06 DIAGNOSIS — G62.1 ALCOHOLIC POLYNEUROPATHY (HCC): Chronic | ICD-10-CM

## 2019-03-06 DIAGNOSIS — M25.511 BILATERAL SHOULDER PAIN, UNSPECIFIED CHRONICITY: ICD-10-CM

## 2019-03-06 DIAGNOSIS — M53.3 SI (SACROILIAC) PAIN: ICD-10-CM

## 2019-03-06 DIAGNOSIS — M54.41 CHRONIC MIDLINE LOW BACK PAIN WITH BILATERAL SCIATICA: ICD-10-CM

## 2019-03-06 DIAGNOSIS — G89.29 CHRONIC MIDLINE LOW BACK PAIN WITH BILATERAL SCIATICA: ICD-10-CM

## 2019-03-06 PROCEDURE — G8427 DOCREV CUR MEDS BY ELIG CLIN: HCPCS | Performed by: PHYSICAL MEDICINE & REHABILITATION

## 2019-03-06 PROCEDURE — 1036F TOBACCO NON-USER: CPT | Performed by: PHYSICAL MEDICINE & REHABILITATION

## 2019-03-06 PROCEDURE — 99214 OFFICE O/P EST MOD 30 MIN: CPT | Performed by: PHYSICAL MEDICINE & REHABILITATION

## 2019-03-06 PROCEDURE — G8417 CALC BMI ABV UP PARAM F/U: HCPCS | Performed by: PHYSICAL MEDICINE & REHABILITATION

## 2019-03-06 PROCEDURE — 3017F COLORECTAL CA SCREEN DOC REV: CPT | Performed by: PHYSICAL MEDICINE & REHABILITATION

## 2019-03-06 PROCEDURE — G8482 FLU IMMUNIZE ORDER/ADMIN: HCPCS | Performed by: PHYSICAL MEDICINE & REHABILITATION

## 2019-03-06 PROCEDURE — G8598 ASA/ANTIPLAT THER USED: HCPCS | Performed by: PHYSICAL MEDICINE & REHABILITATION

## 2019-03-06 RX ORDER — HYDROCODONE BITARTRATE AND ACETAMINOPHEN 5; 325 MG/1; MG/1
1 TABLET ORAL EVERY 4 HOURS PRN
Qty: 60 TABLET | Refills: 0 | Status: SHIPPED | OUTPATIENT
Start: 2019-03-06 | End: 2019-03-27 | Stop reason: SDUPTHER

## 2019-03-06 RX ORDER — NALOXONE HYDROCHLORIDE 4 MG/.1ML
1 SPRAY NASAL PRN
Qty: 1 EACH | Refills: 0 | Status: CANCELLED | OUTPATIENT
Start: 2019-03-06

## 2019-03-06 ASSESSMENT — PATIENT HEALTH QUESTIONNAIRE - PHQ9
2. FEELING DOWN, DEPRESSED OR HOPELESS: 0
SUM OF ALL RESPONSES TO PHQ QUESTIONS 1-9: 0
SUM OF ALL RESPONSES TO PHQ9 QUESTIONS 1 & 2: 0
SUM OF ALL RESPONSES TO PHQ QUESTIONS 1-9: 0
1. LITTLE INTEREST OR PLEASURE IN DOING THINGS: 0

## 2019-03-06 ASSESSMENT — ENCOUNTER SYMPTOMS
BOWEL INCONTINENCE: 0
VOMITING: 0
WHEEZING: 0
ABDOMINAL PAIN: 0
PHOTOPHOBIA: 0
SHORTNESS OF BREATH: 1
DIARRHEA: 0
NAUSEA: 0
APNEA: 0
VOICE CHANGE: 1
VISUAL CHANGE: 0
CONSTIPATION: 1
CHEST TIGHTNESS: 0
COLOR CHANGE: 0
BACK PAIN: 1

## 2019-03-25 ENCOUNTER — PROCEDURE VISIT (OUTPATIENT)
Dept: PHYSICAL MEDICINE AND REHAB | Age: 59
End: 2019-03-25
Payer: COMMERCIAL

## 2019-03-25 DIAGNOSIS — M70.62 GREATER TROCHANTERIC BURSITIS OF LEFT HIP: ICD-10-CM

## 2019-03-25 PROCEDURE — 20611 DRAIN/INJ JOINT/BURSA W/US: CPT | Performed by: PHYSICAL MEDICINE & REHABILITATION

## 2019-03-27 DIAGNOSIS — Z79.899 HIGH RISK MEDICATION USE: ICD-10-CM

## 2019-03-27 DIAGNOSIS — M54.41 CHRONIC MIDLINE LOW BACK PAIN WITH BILATERAL SCIATICA: ICD-10-CM

## 2019-03-27 DIAGNOSIS — M53.3 SI (SACROILIAC) PAIN: ICD-10-CM

## 2019-03-27 DIAGNOSIS — M51.36 DDD (DEGENERATIVE DISC DISEASE), LUMBAR: Chronic | ICD-10-CM

## 2019-03-27 DIAGNOSIS — G62.1 ALCOHOLIC POLYNEUROPATHY (HCC): Chronic | ICD-10-CM

## 2019-03-27 DIAGNOSIS — M54.42 CHRONIC MIDLINE LOW BACK PAIN WITH BILATERAL SCIATICA: ICD-10-CM

## 2019-03-27 DIAGNOSIS — G89.29 CHRONIC MIDLINE LOW BACK PAIN WITH BILATERAL SCIATICA: ICD-10-CM

## 2019-03-27 RX ORDER — HYDROCODONE BITARTRATE AND ACETAMINOPHEN 5; 325 MG/1; MG/1
1 TABLET ORAL EVERY 4 HOURS PRN
Qty: 60 TABLET | Refills: 0 | Status: SHIPPED | OUTPATIENT
Start: 2019-03-27 | End: 2019-03-27 | Stop reason: SDUPTHER

## 2019-03-27 RX ORDER — HYDROCODONE BITARTRATE AND ACETAMINOPHEN 5; 325 MG/1; MG/1
1 TABLET ORAL EVERY 4 HOURS PRN
Qty: 60 TABLET | Refills: 0 | Status: SHIPPED | OUTPATIENT
Start: 2019-03-27 | End: 2019-05-06 | Stop reason: SDUPTHER

## 2019-03-28 ENCOUNTER — OFFICE VISIT (OUTPATIENT)
Dept: INTERNAL MEDICINE CLINIC | Age: 59
End: 2019-03-28
Payer: COMMERCIAL

## 2019-03-28 VITALS
TEMPERATURE: 96.7 F | WEIGHT: 225 LBS | SYSTOLIC BLOOD PRESSURE: 120 MMHG | DIASTOLIC BLOOD PRESSURE: 60 MMHG | HEART RATE: 105 BPM | HEIGHT: 71 IN | OXYGEN SATURATION: 95 % | BODY MASS INDEX: 31.5 KG/M2

## 2019-03-28 DIAGNOSIS — K59.04 CHRONIC IDIOPATHIC CONSTIPATION: ICD-10-CM

## 2019-03-28 DIAGNOSIS — I10 ESSENTIAL HYPERTENSION: Primary | ICD-10-CM

## 2019-03-28 PROCEDURE — 99213 OFFICE O/P EST LOW 20 MIN: CPT | Performed by: INTERNAL MEDICINE

## 2019-03-28 PROCEDURE — G8417 CALC BMI ABV UP PARAM F/U: HCPCS | Performed by: INTERNAL MEDICINE

## 2019-03-28 PROCEDURE — 3017F COLORECTAL CA SCREEN DOC REV: CPT | Performed by: INTERNAL MEDICINE

## 2019-03-28 PROCEDURE — G8482 FLU IMMUNIZE ORDER/ADMIN: HCPCS | Performed by: INTERNAL MEDICINE

## 2019-03-28 PROCEDURE — G8598 ASA/ANTIPLAT THER USED: HCPCS | Performed by: INTERNAL MEDICINE

## 2019-03-28 PROCEDURE — G8427 DOCREV CUR MEDS BY ELIG CLIN: HCPCS | Performed by: INTERNAL MEDICINE

## 2019-03-28 PROCEDURE — 1036F TOBACCO NON-USER: CPT | Performed by: INTERNAL MEDICINE

## 2019-03-28 RX ORDER — GABAPENTIN 800 MG/1
TABLET ORAL
COMMUNITY
Start: 2019-03-15 | End: 2019-05-07

## 2019-03-28 RX ORDER — LIDOCAINE HYDROCHLORIDE 10 MG/ML
8 INJECTION, SOLUTION INFILTRATION; PERINEURAL ONCE
Status: COMPLETED | OUTPATIENT
Start: 2019-03-28 | End: 2019-03-28

## 2019-03-28 RX ORDER — SENNA PLUS 8.6 MG/1
1 TABLET ORAL DAILY
Qty: 30 TABLET | Refills: 3 | Status: SHIPPED | OUTPATIENT
Start: 2019-03-28 | End: 2019-06-13

## 2019-03-28 RX ORDER — LIDOCAINE HYDROCHLORIDE 20 MG/ML
5 INJECTION, SOLUTION INFILTRATION; PERINEURAL ONCE
Status: COMPLETED | OUTPATIENT
Start: 2019-03-28 | End: 2019-03-28

## 2019-03-28 RX ADMIN — LIDOCAINE HYDROCHLORIDE 5 ML: 20 INJECTION, SOLUTION INFILTRATION; PERINEURAL at 12:02

## 2019-03-28 RX ADMIN — LIDOCAINE HYDROCHLORIDE 8 ML: 10 INJECTION, SOLUTION INFILTRATION; PERINEURAL at 12:01

## 2019-03-28 ASSESSMENT — ENCOUNTER SYMPTOMS
TROUBLE SWALLOWING: 0
ABDOMINAL PAIN: 0
VOICE CHANGE: 0
EYE PAIN: 0
PHOTOPHOBIA: 0
NAUSEA: 0
BLOOD IN STOOL: 0
COUGH: 0
HEMATOCHEZIA: 0
CONSTIPATION: 1
RECTAL PAIN: 0
BACK PAIN: 1
DIARRHEA: 0
SHORTNESS OF BREATH: 0

## 2019-04-01 ENCOUNTER — PROCEDURE VISIT (OUTPATIENT)
Dept: PHYSICAL MEDICINE AND REHAB | Age: 59
End: 2019-04-01
Payer: COMMERCIAL

## 2019-04-01 DIAGNOSIS — M79.7 FIBROMYALGIA: Primary | ICD-10-CM

## 2019-04-01 PROCEDURE — 20553 NJX 1/MLT TRIGGER POINTS 3/>: CPT | Performed by: PHYSICAL MEDICINE & REHABILITATION

## 2019-04-05 RX ORDER — LIDOCAINE HYDROCHLORIDE 10 MG/ML
16 INJECTION, SOLUTION INFILTRATION; PERINEURAL ONCE
Status: COMPLETED | OUTPATIENT
Start: 2019-04-05 | End: 2019-04-05

## 2019-04-05 RX ADMIN — LIDOCAINE HYDROCHLORIDE 16 ML: 10 INJECTION, SOLUTION INFILTRATION; PERINEURAL at 11:57

## 2019-04-15 ENCOUNTER — PROCEDURE VISIT (OUTPATIENT)
Dept: PHYSICAL MEDICINE AND REHAB | Age: 59
End: 2019-04-15
Payer: COMMERCIAL

## 2019-04-15 DIAGNOSIS — M70.62 GREATER TROCHANTERIC BURSITIS OF LEFT HIP: Primary | ICD-10-CM

## 2019-04-15 PROCEDURE — 20611 DRAIN/INJ JOINT/BURSA W/US: CPT | Performed by: PHYSICAL MEDICINE & REHABILITATION

## 2019-04-16 RX ORDER — LIDOCAINE HYDROCHLORIDE 20 MG/ML
2 INJECTION, SOLUTION INFILTRATION; PERINEURAL ONCE
Status: COMPLETED | OUTPATIENT
Start: 2019-04-16 | End: 2019-04-16

## 2019-04-16 RX ADMIN — LIDOCAINE HYDROCHLORIDE 2 ML: 20 INJECTION, SOLUTION INFILTRATION; PERINEURAL at 09:34

## 2019-04-16 NOTE — PROGRESS NOTES
Patient was here today for joint injections guided by U/S of Left hip. Patient placed in upright position areas marked and prepped with alcohol. Sites injected with  2% 2cc Lidocaine and 20 mg of Kenalog administered  By provider.

## 2019-05-06 DIAGNOSIS — M54.41 CHRONIC MIDLINE LOW BACK PAIN WITH BILATERAL SCIATICA: ICD-10-CM

## 2019-05-06 DIAGNOSIS — M54.42 CHRONIC MIDLINE LOW BACK PAIN WITH BILATERAL SCIATICA: ICD-10-CM

## 2019-05-06 DIAGNOSIS — M53.3 SI (SACROILIAC) PAIN: ICD-10-CM

## 2019-05-06 DIAGNOSIS — M54.17 LUMBOSACRAL RADICULOPATHY AT S1: ICD-10-CM

## 2019-05-06 DIAGNOSIS — Z79.899 HIGH RISK MEDICATION USE: ICD-10-CM

## 2019-05-06 DIAGNOSIS — M51.36 DDD (DEGENERATIVE DISC DISEASE), LUMBAR: Chronic | ICD-10-CM

## 2019-05-06 DIAGNOSIS — M79.672 LEFT FOOT PAIN: ICD-10-CM

## 2019-05-06 DIAGNOSIS — G89.29 CHRONIC MIDLINE LOW BACK PAIN WITH BILATERAL SCIATICA: ICD-10-CM

## 2019-05-06 DIAGNOSIS — G62.1 ALCOHOLIC POLYNEUROPATHY (HCC): Chronic | ICD-10-CM

## 2019-05-06 DIAGNOSIS — D47.2 IGG MONOCLONAL GAMMOPATHY OF UNCERTAIN SIGNIFICANCE: ICD-10-CM

## 2019-05-07 RX ORDER — HYDROCODONE BITARTRATE AND ACETAMINOPHEN 5; 325 MG/1; MG/1
1 TABLET ORAL EVERY 4 HOURS PRN
Qty: 60 TABLET | Refills: 0 | Status: SHIPPED | OUTPATIENT
Start: 2019-05-07 | End: 2019-05-21

## 2019-05-07 RX ORDER — GABAPENTIN 800 MG/1
TABLET ORAL
Qty: 90 TABLET | Refills: 3 | Status: SHIPPED | OUTPATIENT
Start: 2019-05-07 | End: 2019-06-12 | Stop reason: SDUPTHER

## 2019-05-14 ENCOUNTER — PROCEDURE VISIT (OUTPATIENT)
Dept: PHYSICAL MEDICINE AND REHAB | Age: 59
End: 2019-05-14
Payer: COMMERCIAL

## 2019-05-14 DIAGNOSIS — M79.7 FIBROMYALGIA: Primary | ICD-10-CM

## 2019-05-14 PROCEDURE — 20553 NJX 1/MLT TRIGGER POINTS 3/>: CPT | Performed by: PHYSICAL MEDICINE & REHABILITATION

## 2019-05-14 RX ORDER — LIDOCAINE HYDROCHLORIDE 10 MG/ML
16 INJECTION, SOLUTION INFILTRATION; PERINEURAL ONCE
Status: COMPLETED | OUTPATIENT
Start: 2019-05-14 | End: 2019-05-14

## 2019-05-14 RX ADMIN — LIDOCAINE HYDROCHLORIDE 16 ML: 10 INJECTION, SOLUTION INFILTRATION; PERINEURAL at 14:45

## 2019-05-14 NOTE — PROGRESS NOTES
Patient was here today for TP injections of lower back. Patient placed in upright position areas marked and prepped with alcohol. Sites injected with 16cc 1% Lidocaine administered  By provider.

## 2019-05-21 ENCOUNTER — OFFICE VISIT (OUTPATIENT)
Dept: PHYSICAL MEDICINE AND REHAB | Age: 59
End: 2019-05-21
Payer: COMMERCIAL

## 2019-05-21 VITALS
WEIGHT: 225 LBS | DIASTOLIC BLOOD PRESSURE: 58 MMHG | BODY MASS INDEX: 31.5 KG/M2 | SYSTOLIC BLOOD PRESSURE: 130 MMHG | HEIGHT: 71 IN

## 2019-05-21 DIAGNOSIS — F06.4 ANXIETY DISORDER DUE TO KNOWN PHYSIOLOGICAL CONDITION: ICD-10-CM

## 2019-05-21 DIAGNOSIS — G89.29 CHRONIC MIDLINE LOW BACK PAIN WITH BILATERAL SCIATICA: ICD-10-CM

## 2019-05-21 DIAGNOSIS — M54.17 LUMBOSACRAL RADICULOPATHY AT S1: ICD-10-CM

## 2019-05-21 DIAGNOSIS — M51.36 DDD (DEGENERATIVE DISC DISEASE), LUMBAR: Primary | Chronic | ICD-10-CM

## 2019-05-21 DIAGNOSIS — R41.89 COGNITIVE DEFICIT DUE TO OLD HEAD INJURY: Chronic | ICD-10-CM

## 2019-05-21 DIAGNOSIS — Z79.899 HIGH RISK MEDICATION USE: ICD-10-CM

## 2019-05-21 DIAGNOSIS — S06.9X9S TRAUMATIC BRAIN INJURY WITH LOSS OF CONSCIOUSNESS, SEQUELA (HCC): Chronic | ICD-10-CM

## 2019-05-21 DIAGNOSIS — G62.1 ALCOHOLIC POLYNEUROPATHY (HCC): Chronic | ICD-10-CM

## 2019-05-21 DIAGNOSIS — M54.42 CHRONIC MIDLINE LOW BACK PAIN WITH BILATERAL SCIATICA: ICD-10-CM

## 2019-05-21 DIAGNOSIS — M54.41 CHRONIC MIDLINE LOW BACK PAIN WITH BILATERAL SCIATICA: ICD-10-CM

## 2019-05-21 DIAGNOSIS — M53.3 SI (SACROILIAC) PAIN: ICD-10-CM

## 2019-05-21 DIAGNOSIS — S09.90XS COGNITIVE DEFICIT DUE TO OLD HEAD INJURY: Chronic | ICD-10-CM

## 2019-05-21 PROCEDURE — G8427 DOCREV CUR MEDS BY ELIG CLIN: HCPCS | Performed by: PHYSICAL MEDICINE & REHABILITATION

## 2019-05-21 PROCEDURE — G8598 ASA/ANTIPLAT THER USED: HCPCS | Performed by: PHYSICAL MEDICINE & REHABILITATION

## 2019-05-21 PROCEDURE — 99214 OFFICE O/P EST MOD 30 MIN: CPT | Performed by: PHYSICAL MEDICINE & REHABILITATION

## 2019-05-21 PROCEDURE — 3017F COLORECTAL CA SCREEN DOC REV: CPT | Performed by: PHYSICAL MEDICINE & REHABILITATION

## 2019-05-21 PROCEDURE — 1036F TOBACCO NON-USER: CPT | Performed by: PHYSICAL MEDICINE & REHABILITATION

## 2019-05-21 PROCEDURE — G8417 CALC BMI ABV UP PARAM F/U: HCPCS | Performed by: PHYSICAL MEDICINE & REHABILITATION

## 2019-05-21 RX ORDER — HYDROCODONE BITARTRATE AND ACETAMINOPHEN 7.5; 325 MG/1; MG/1
1 TABLET ORAL 2 TIMES DAILY
Qty: 60 TABLET | Refills: 0 | Status: SHIPPED | OUTPATIENT
Start: 2019-05-21 | End: 2019-06-19 | Stop reason: SDUPTHER

## 2019-05-21 ASSESSMENT — ENCOUNTER SYMPTOMS
DIARRHEA: 0
BOWEL INCONTINENCE: 0
VOMITING: 0
ABDOMINAL PAIN: 0
BACK PAIN: 1
VISUAL CHANGE: 0
CHEST TIGHTNESS: 0
APNEA: 0
VOICE CHANGE: 1
CONSTIPATION: 1
SHORTNESS OF BREATH: 1
COLOR CHANGE: 0
PHOTOPHOBIA: 0
WHEEZING: 0
NAUSEA: 0

## 2019-05-21 NOTE — PROGRESS NOTES
Subjective  Levi Paredes, 62 y.o. male presents today with:    Chief Complaint           Hip Pain Left hip 3/25/19 80% pain reduction X 1 day    Back Pain TP 4/1/19 80% pain reduction X 1 day TP 5/14/19 80% pain reduction X 1 day    Medication Refill pill count today- Pt compliant Would like to discuss higher dosage Noroc    Mental Health Problem        He is doing well on his condition at current doses -but is showing signs of decreased function. He is unkempt today and smells heavily of cigarettes. He states that he has quit. 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 I offered and prescribed Narcan just in case he wants to fall back on that in case he has an allergic reaction over responsive medication or accidentally overtakes assessments. However  He is able to have Positive interactions with his friends family. He notes that he currently is 60 per month. Active in AA does not know some eating. No signs of overuse or abuse. Back Pain   This is a chronic problem. The current episode started more than 1 year ago. The problem occurs daily. The problem is unchanged. The pain is present in the lumbar spine. Radiates to: left leg, left foot. The pain is at a severity of 3/10 (Pain ranges from 2-10/10. 10/10 with walking long distances. ). The pain is moderate. The pain is the same all the time. The symptoms are aggravated by position. Associated symptoms include leg pain, numbness (Left side of throat) and weakness. Pertinent negatives include no abdominal pain, bladder incontinence, bowel incontinence, chest pain, dysuria, fever, headaches, paresis or perianal numbness. Risk factors include lack of exercise, sedentary lifestyle and poor posture.  He has tried chiropractic manipulation, ice, walking, home exercises, analgesics and NSAIDs (Narcotic Pain Medications, gabapentin, Trigger Point injections, Sciatica Block, Tylenol) for the symptoms. The treatment provided significant relief. Mental Health Problem   The primary symptoms do not include dysphoric mood, delusions or hallucinations. Primary symptoms comment: etoh rel neuropathy--pt has not drank in 6 years --he is committed to no more ETOH use. The current episode started more than 1 month ago. This is a chronic problem. The onset of the illness is precipitated by a stressful event. The degree of incapacity that he is experiencing as a consequence of his illness is mild. Additional symptoms of the illness do not include unexpected weight change, fatigue, visual change, headaches or abdominal pain. He does not admit to suicidal ideas. He does not have a plan to commit suicide. He does not contemplate harming himself. He has not already injured self. He does not contemplate injuring another person. He has not already  injured another person. Risk factors that are present for mental illness include substance abuse.        Past Medical History:   Diagnosis Date    Al[facility] poly[facility]uropathy ([facility] [address]     Asthma     CAD (coronary artery disease)     [address] [facility]    Chronic back pain greater than 3 months duration     CN III palsy, right eye 2017    Dr Jackson Guerrier    COPD (chronic obstructive pulmonary disease) ([facility] [address] 2014    DDD (degenerative disc disease), lumbar 3/23/2015    Elevated liver enzymes 2014    Hyperlipidemia     on med > 10 yrs    Hypertension     on meds x 1 yr    IgA monoclonal gammopathy 2015    Dr Tello Shoulder    Insomnia, unspecified     LBP (low back pain)     Dr Pedro Esquivel Occlusion and stenosis of carotid artery without mention of cerebral infarction     Dr Varela Carry Personal history of alcoholism ([facility] [address]     quit 11/05/2010, relapsed 2016    S/P carotid endarterectomy 01/2019    Dr Andie Jain    Sensorimotor neuropathy     Bilateral lower extremities-EMG 03/23/15 ([place])    Smoking trying to quit     Traumatic compression fracture of T11 thoracic vertebra ([facility] [address] 3399    Umbilical hernia      Past Surgical History:   Procedure Laterality Date    CAROTID ENDARTERECTOMY Left 2019    LEFT CAROTID ENDARTERECTOMY performed by Ramses Bob MD at [address] [place] [address]  2014    ENDOSCOPY, COLON, DIAGNOSTIC      HERNIA REPAIR      TONSILLECTOMY      UPPER GASTROINTESTINAL ENDOSCOPY  2014    VENTRAL HERNIA REPAIR  09/01/15    W/MESH AND W/UMBILECTOMY     Social History     Socioeconomic History    Marital status: Single     Spouse name: None    Number of children: 1    Years of education: None    Highest education level: None   Occupational History    Occupation: [employer] for learning disability   Social Needs    Financial resource strain: None    Food insecurity:     Worry: None     Inability: None    Transportation needs:     Medical: None     Non-medical: None   Tobacco Use    Smoking status: Former Smoker     Packs/day: 0.50     Years: 40.00     Pack years: 20.00     Types: Cigarettes     Start date:      Last attempt to quit: 3/23/2019     Years since quittin.1    Smokeless tobacco: Never Used    Tobacco comment: patient would like to quit, encouraged to see PCP   Substance and Sexual Activity    Alcohol use: No     Alcohol/week: 0.0 oz     Comment: QUIT -past addiction     Drug use: No    Sexual activity: None   Lifestyle    Physical activity:     Days per week: None     Minutes per session: None    Stress: None   Relationships    Social connections:     Talks on phone: None     Gets together: None     Attends Yazdanism service: None     Active member of club or organization: None     Attends meetings of clubs or organizations: None     Relationship status: None    Intimate partner violence:     Fear of current or ex partner: None     Emotionally abused: None     Physically abused: None     Forced sexual activity: None   Other Topics Concern    None   Social History Narrative    Born in [facility] exhibits decreased range of motion, tenderness, bony tenderness, pain and spasm. Thoracic back: He exhibits decreased range of motion, tenderness, bony tenderness, pain and spasm. Lumbar back: He exhibits decreased range of motion, tenderness, bony tenderness and pain. He exhibits no swelling, no edema, no deformity, no laceration, no spasm and normal pulse. Back:         Right upper arm: Normal.        Left upper arm: Normal.        Right forearm: Normal.        Left forearm: Normal.        Right hand: Normal.        Left hand: Normal.        Right upper leg: Normal.        Left upper leg: Normal.        Right lower leg: Normal.        Left lower leg: Normal.        Legs:       Right foot: Normal.        Left foot: Normal.   Tender areas are indicated by numbered spot         Lymphadenopathy:     He has no cervical adenopathy. Neurological: He is alert and oriented to person, place, and time. He displays abnormal reflex. He displays no atrophy and no tremor. A sensory deficit is present. No cranial nerve deficit. He exhibits normal muscle tone. Coordination normal. He displays no Babinski's sign on the right side. He displays no Babinski's sign on the left side. Skin: Skin is warm, dry and intact. No abrasion, no bruising, no ecchymosis, no laceration, no petechiae and no rash noted. Rash is not macular, not pustular and not urticarial. He is not diaphoretic. No cyanosis or erythema. No pallor. Nails show no clubbing. Psychiatric: He has a normal mood and affect. His behavior is normal. Judgment and thought content normal. His mood appears not anxious. His affect is not angry, not blunt, not labile and not inappropriate. His speech is not rapid and/or pressured, not delayed, not tangential and not slurred. He is not agitated, not aggressive, not hyperactive, not slowed, not withdrawn, not actively hallucinating and not combative.  Thought content is not paranoid and not delusional. from the current medications, and I see no findings of aberrant drug taking or addiction related behaviors. The patient is aware that they have a chronic pain condition and they may require opiates dosing for life. All efforts will be made to wean to the lowest effective dose. Other therapies for pain have not been effective including nonopiate medications. Injections and exercises are only partially effective. A Rx for Narcan was offered to help prevent accidental overdose. RX Monitoring 5/21/2019   Attestation The Prescription Monitoring Report for this patient was reviewed today. Chronic Pain Routine Monitoring No signs of potential drug abuse or diversion identified: otherwise, see note documentation;Obtaining appropriate analgesic effect of treatment.;Possible medication side effects, risk of tolerance/dependence & alternative treatments discussed. Chronic Pain > 50 MEDD Obtained or confirmened \"Consent of Opioid Use\" on file.;Considered consultation with a specialist.;Re-evaluated the status of the patient's underlying condition causing pain. Chronic Pain > 80 MEDD -       Attestation: The Prescription Monitoring Report for this patient was reviewed today. (Manoj Carballo, )  Chronic Pain Routine Monitoring: No signs of potential drug abuse or diversion identified: otherwise, see note documentation, Obtaining appropriate analgesic effect of treatment. , Possible medication side effects, risk of tolerance/dependence & alternative treatments discussed. (Manoj Carballo, )       Patient is currently taking:       I am having Ranjith Rodriguez maintain his REFRESH TEARS, budesonide-formoterol, naloxone, rosuvastatin, VASCEPA, metoprolol succinate, valsartan-hydrochlorothiazide, RA VITAMIN B-12 TR, ciprofloxacin, amitriptyline, RA ASPIRIN EC, clopidogrel, senna, HYDROcodone-acetaminophen, and gabapentin.               I also recommend the following Medications:    No orders of the defined types were 09/20/2016    TAPOSULFUR Not Detected 09/20/2016    METHADONE Not Detected 09/20/2016    LABPROP Not Detected 09/20/2016    TRAM Not Detected 09/20/2016    AMPH Not Detected 09/20/2016    METHAMP Not Detected 09/20/2016    MDMA Not Detected 09/20/2016    ECMDA Not Detected 09/20/2016       Lab Results   Component Value Date    PHENTERMINE Not Detected 09/20/2016    BENZOYL Not Detected 09/20/2016    Joellyn Valdo Not Detected 09/20/2016    ALPHAOHALPRA Not Detected 09/20/2016    CLONAZEPAM Not Detected 09/20/2016    Gwendlyn Kita Not Detected 09/20/2016    DIAZEP Not Detected 09/20/2016    CESAR Not Detected 09/20/2016    OXAZ Not Detected 09/20/2016    Judeen Mimi Not Detected 09/20/2016    LORAZEPAM Not Detected 09/20/2016    MIDAZOLAM Not Detected 09/20/2016    ZOLPIDEM Not Detected 09/20/2016    CORWIN Not Detected 09/20/2016    ETG Not Detected 09/20/2016    MARIJMET Not Detected 09/20/2016    PCP Not Detected 09/20/2016    PAINMGTDRUGP See Below 09/20/2016    EERPAINMGTPA See Note 09/20/2016    LABCREA 55.5 09/20/2016         , I discussed results with patient. See follow-up plans for new studies. Therapies:  HEP-gentle stretching and relaxation techniques-demonstrated with patient-they are to do them twice a day. They are also advised to make the following lifestyle changes:  Goals      Exercise 3x per week (30 min per time)      SOAPP-R GOAL LESS THAN       09/20/16 SCORE: 13-MODERATE RISK   12/14/16 score: 9-low-moderate risk   02/15/17 score: 12-moderate risk   05/18/17 score: 14-moderate risk  07/21/17 score: 24-high risk  09/21/17 score: 11-moderate risk  11/27/17 score: 10-moderate risk  01/26/18 score: 8-low risk  03/26/18 score: 12-moderate risk  6/14/18 SCORE: 4-LOW RISK  8/7/18 SCORE: 6-LOW RISK   10/4/18 score: 11- moderate risk  3/6/19 score: 6- low risk  5/21/19 score 11- low risk       Stop Cigarette/Tobacco use           Injections or Epidurals:  Injection options were discussed.    Patient gave verbal consent to ordered injections. See follow-up plans for planned injections. Supplements:  Vitamin D with increased dosing during the rainy months,   Education was given on:   Dietary and Fitness--daily stretches and low carb diet-in chair Yoga when possible             Follow up with Primary Care Physician regarding their general medical needs. They are to follow up in 2 months to review medication, efficacy of injections, pill counts, OARRS check, SOAPPR assessment, review diagnostics, to review previous and future treatment plans and assess appropriateness for continued therapy. New Diagnostics  No orders of the defined types were placed in this encounter.       Juanjo Carlisle, DO

## 2019-06-12 ENCOUNTER — OFFICE VISIT (OUTPATIENT)
Dept: FAMILY MEDICINE CLINIC | Age: 59
End: 2019-06-12
Payer: COMMERCIAL

## 2019-06-12 VITALS
SYSTOLIC BLOOD PRESSURE: 128 MMHG | OXYGEN SATURATION: 98 % | HEIGHT: 71 IN | HEART RATE: 83 BPM | WEIGHT: 225 LBS | RESPIRATION RATE: 16 BRPM | TEMPERATURE: 98.1 F | BODY MASS INDEX: 31.5 KG/M2 | DIASTOLIC BLOOD PRESSURE: 60 MMHG

## 2019-06-12 DIAGNOSIS — D72.829 LEUKOCYTOSIS, UNSPECIFIED TYPE: ICD-10-CM

## 2019-06-12 DIAGNOSIS — Z72.0 TOBACCO ABUSE: ICD-10-CM

## 2019-06-12 DIAGNOSIS — K59.09 OTHER CONSTIPATION: Chronic | ICD-10-CM

## 2019-06-12 DIAGNOSIS — R73.09 ABNORMAL GLUCOSE: ICD-10-CM

## 2019-06-12 DIAGNOSIS — J41.0 SIMPLE CHRONIC BRONCHITIS (HCC): ICD-10-CM

## 2019-06-12 DIAGNOSIS — I10 ESSENTIAL HYPERTENSION: Primary | Chronic | ICD-10-CM

## 2019-06-12 PROCEDURE — G8598 ASA/ANTIPLAT THER USED: HCPCS | Performed by: FAMILY MEDICINE

## 2019-06-12 PROCEDURE — 1036F TOBACCO NON-USER: CPT | Performed by: FAMILY MEDICINE

## 2019-06-12 PROCEDURE — G8417 CALC BMI ABV UP PARAM F/U: HCPCS | Performed by: FAMILY MEDICINE

## 2019-06-12 PROCEDURE — G8427 DOCREV CUR MEDS BY ELIG CLIN: HCPCS | Performed by: FAMILY MEDICINE

## 2019-06-12 PROCEDURE — 3023F SPIROM DOC REV: CPT | Performed by: FAMILY MEDICINE

## 2019-06-12 PROCEDURE — 99214 OFFICE O/P EST MOD 30 MIN: CPT | Performed by: FAMILY MEDICINE

## 2019-06-12 PROCEDURE — G8926 SPIRO NO PERF OR DOC: HCPCS | Performed by: FAMILY MEDICINE

## 2019-06-12 PROCEDURE — 3017F COLORECTAL CA SCREEN DOC REV: CPT | Performed by: FAMILY MEDICINE

## 2019-06-12 RX ORDER — AMOXICILLIN 250 MG
2 CAPSULE ORAL DAILY PRN
Qty: 60 TABLET | Refills: 11 | Status: SHIPPED | OUTPATIENT
Start: 2019-06-12 | End: 2020-06-25 | Stop reason: SDUPTHER

## 2019-06-12 RX ORDER — GABAPENTIN 800 MG/1
TABLET ORAL
Refills: 0 | COMMUNITY
Start: 2019-06-10 | End: 2020-03-12

## 2019-06-12 NOTE — PROGRESS NOTES
Subjective  Terrienoch Machado, 62 y.o. male presents today with:  Chief Complaint   Patient presents with   [facility] Doctor     Former patient of Dr. Ian Pedro here for his 3 month follow up. HPI    This is a new patient to me. I have reviewed the past medical and surgical history, social history and family history provided. I have reviewed  medication, previous testing and working diagnoses. I have reviewed the allergies and health maintenance information and correlated it into my decision making for this patient for care, diagnostics, consultations and treatment for today's visit. A vague historian who informed this writer that he hates all doctors. Patient is here for f/u HTN. Is compliant with meds and has no side effects from them. Avoids added salt. Does not eat a healthy, low-carb diet nor does he exercise. Has no chest pain, shortness of breath, palpitations or edema. Understands that tobacco use causes significant health problems he does not intend to quit smoking. He is followed by pulmonology for his COPD. COPD. On LABA/ICS and prn albuterol - less than 3 times a week. No significant cough, mild baseline shortness of breath, occasional wheezing, mild, occasional chest tightness. Is as though he has been developing chronic leukocytosis which has been evaluated by Dr. Dinah Mcdonough in the past.     He has chronic constipation and his insurance would not pay for Colace. He does not drink adequate water. He does not consume adequate fiber. He does not exercise. No other questions and or concerns for today's visit      Review of Systems  Fevers, chills, sweats. No nausea, vomiting, diarrhea. No abdominal pain. No swollen glands. No rashes.     Past Medical History:   Diagnosis Date    Alcoholic polyneuropathy ([address]     Asthma     CAD (coronary artery disease)     [facility]    Chronic back pain greater than 3 months duration     CN III palsy, right eye 2017    Dr Afua Hernandez COPD (chronic obstructive pulmonary disease) [facility]     DDD (degenerative disc disease), lumbar 3/23/2015    Elevated liver enzymes 2014    Hyperlipidemia     on med > 10 yrs    Hypertension     on meds x 1 yr    IgA monoclonal gammopathy     Dr Brando Menon    Insomnia, unspecified     LBP (low back pain)     Dr Haro Seat Occlusion and stenosis of carotid artery without mention of cerebral infarction     Dr Beth Mehta history of alcoholism ([facility]     quit 2010, relapsed 2016    S/P carotid endarterectomy 2019    Dr Dayana Glaser    Sensorimotor neuropathy     Bilateral lower extremities-EMG 03/23/15 (Yaima Banegas)    Smoking trying to quit     Traumatic compression fracture of T11 thoracic vertebra ([facility] 4473    Umbilical hernia      Past Surgical History:   Procedure Laterality Date    CAROTID ENDARTERECTOMY Left 2019    LEFT CAROTID ENDARTERECTOMY performed by Madeline Barnard MD at [address] [place] [address]  2014    ENDOSCOPY, COLON, DIAGNOSTIC      HERNIA REPAIR      TONSILLECTOMY      UPPER GASTROINTESTINAL ENDOSCOPY  2014    VENTRAL HERNIA REPAIR  09/01/15    W/MESH AND W/UMBILECTOMY     Social History     Socioeconomic History    Marital status: Single     Spouse name: Not on file    Number of children: 1    Years of education: Not on file    Highest education level: Not on file   Occupational History    Occupation: SSI for learning disability   Social Needs    Financial resource strain: Not on file    Food insecurity:     Worry: Not on file     Inability: Not on file    Transportation needs:     Medical: Not on file     Non-medical: Not on file   Tobacco Use    Smoking status: Former Smoker     Packs/day: 0.50     Years: 40.00     Pack years: 20.00     Types: Cigarettes     Start date:      Last attempt to quit: 3/23/2019     Years since quittin.2    Smokeless tobacco: Never Used    Tobacco comment: patient would like to quit, encouraged to see PCP Substance and Sexual Activity    Alcohol use: No     Alcohol/week: 0.0 oz     Comment: QUIT 2010-past addiction     Drug use: No    Sexual activity: Not on file   Lifestyle    Physical activity:     Days per week: Not on file     Minutes per session: Not on file    Stress: Not on file   Relationships    Social connections:     Talks on phone: Not on file     Gets together: Not on file     Attends Hindu service: Not on file     Active member of club or organization: Not on file     Attends meetings of clubs or organizations: Not on file     Relationship status: Not on file    Intimate partner violence:     Fear of current or ex partner: Not on file     Emotionally abused: Not on file     Physically abused: Not on file     Forced sexual activity: Not on file   Other Topics Concern    Not on file   Social History Narrative    Born in [place], one of 5    Came to [place] at age 1 with parents    Never , one daughter    Never worked, disabled since 12 (mental)    Lives in [facility] with brother, in a house        Attends [employer] Martinez daily    Hobbies riding bike, TV    One daughter, does keep in touch      Family History   Problem Relation Age of Onset    Cancer Mother         brain, dec 79   [facility] Alcohol Abuse Father     Other Sister         unknown medical history    Other Brother         unknown medical history    High Blood Pressure Brother     Alcohol Abuse Daughter      No Known Allergies  Current Outpatient Medications   Medication Sig Dispense Refill    gabapentin (NEURONTIN) 800 MG tablet   0    senna-docusate (PERICOLACE) 8.6-50 MG per tablet Take 2 tablets by mouth daily as needed for Constipation 60 tablet 11    HYDROcodone-acetaminophen (NORCO) 7.5-325 MG per tablet Take 1 tablet by mouth 2 times daily for 30 days.  Intended supply: 30 days 60 tablet 0    amitriptyline (ELAVIL) 50 MG tablet take 1 tablet by mouth at bedtime 30 tablet 5    RA ASPIRIN EC 81 MG EC tablet take 1 tablet by affect. No results found for: LABA1C  Lab Results   Component Value Date    CREATININE 1.05 01/19/2019     Lab Results   Component Value Date     (H) 01/11/2019    AST 61 (H) 01/11/2019     Lab Results   Component Value Date    CHOL 162 01/19/2019    TRIG 139 01/19/2019    HDL 39 (L) 01/19/2019    [address] 01/19/2019        Assessment & Plan   Visit Diagnoses and Associated Orders     Essential hypertension    -  Primary    Stable and well-controlled on ARB and HCTZ. Tobacco abuse        Patient not willing to consider tobacco cessation. Understands complications related to tobacco use. Simple chronic bronchitis (HCC)        Table, fair control on Symbicort         Leukocytosis, unspecified type        Recheck CBC. CBC With Auto Differential [id number] Custom]   - Future Order         Abnormal glucose        States he does not want to know if he has diabetes and if he does have diabetes he does not want to treat it. Hemoglobin A1C [[id number] Custom]   - Future Order         Other constipation        Senna docusate as prescribed    senna-docusate (PERICOLACE) 8.6-50 MG per tablet [[id number]]           ORDERS WITHOUT AN ASSOCIATED DIAGNOSIS    gabapentin (NEURONTIN) 800 MG tablet [[id number]]          Given patient's reluctance to treat possible diabetes, it was suggested that he may want to find a different clinician who is willing to ignore diabetes. He then indicated he will play it by ear. Reviewed with the patient: all disease processes, current clinical status, medications, activities and diet.      Side effects, adverse effects of the medication prescribed today, as well as treatment plan/ rationale and result expectations have been discussed with the patient who expresses understanding and desires to proceed.     Close follow up to evaluate treatment results and for coordination of care.   I have reviewed the patient's medical history in detail and updated the computerized patient record. More than 50% of the appointment was spent in face-to-face counseling, education and care coordination. Please note this report has been partially produced using speech recognition software and may contain mistakes related to that system including errors in grammar, punctuation and spelling as well as words and phrases that may seem inappropriate. If there are questions or concerns, please feel free to contact me to clarify. Orders Placed This Encounter   Procedures    CBC With Auto Differential     Standing Status:   Future     Standing Expiration Date:   6/12/2020    Hemoglobin A1C     Standing Status:   Future     Standing Expiration Date:   8/12/2019     Orders Placed This Encounter   Medications    senna-docusate (PERICOLACE) 8.6-50 MG per tablet     Sig: Take 2 tablets by mouth daily as needed for Constipation     Dispense:  60 tablet     Refill:  11     Medications Discontinued During This Encounter   Medication Reason    gabapentin (NEURONTIN) 008 MG tablet DUPLICATE    ciprofloxacin (CIPRO) 250 MG tablet Therapy completed    senna (SENOKOT) 8.6 MG tablet LIST CLEANUP     Return in about 3 months (around 9/12/2019) for HTN, COPD. Controlled Substance Monitoring:    Acute and Chronic Pain Monitoring:   RX Monitoring 5/21/2019   Attestation The Prescription Monitoring Report for this patient was reviewed today. Periodic Controlled Substance Monitoring No signs of potential drug abuse or diversion identified: otherwise, see note documentation;Obtaining appropriate analgesic effect of treatment.;Possible medication side effects, risk of tolerance/dependence & alternative treatments discussed. Chronic Pain > 50 MEDD Obtained or confirmened \"Consent of Opioid Use\" on file.;Considered consultation with a specialist.;Re-evaluated the status of the patient's underlying condition causing pain.    Chronic Pain > 80 MEDD -           James SEGURA MD

## 2019-06-19 DIAGNOSIS — G89.29 CHRONIC MIDLINE LOW BACK PAIN WITH BILATERAL SCIATICA: ICD-10-CM

## 2019-06-19 DIAGNOSIS — Z79.899 HIGH RISK MEDICATION USE: ICD-10-CM

## 2019-06-19 DIAGNOSIS — M53.3 SI (SACROILIAC) PAIN: ICD-10-CM

## 2019-06-19 DIAGNOSIS — M54.41 CHRONIC MIDLINE LOW BACK PAIN WITH BILATERAL SCIATICA: ICD-10-CM

## 2019-06-19 DIAGNOSIS — M51.36 DDD (DEGENERATIVE DISC DISEASE), LUMBAR: Chronic | ICD-10-CM

## 2019-06-19 DIAGNOSIS — M54.17 LUMBOSACRAL RADICULOPATHY AT S1: ICD-10-CM

## 2019-06-19 DIAGNOSIS — M54.42 CHRONIC MIDLINE LOW BACK PAIN WITH BILATERAL SCIATICA: ICD-10-CM

## 2019-06-19 RX ORDER — HYDROCODONE BITARTRATE AND ACETAMINOPHEN 7.5; 325 MG/1; MG/1
1 TABLET ORAL 2 TIMES DAILY
Qty: 60 TABLET | Refills: 0 | Status: SHIPPED | OUTPATIENT
Start: 2019-06-20 | End: 2019-07-25 | Stop reason: SDUPTHER

## 2019-06-27 ENCOUNTER — PROCEDURE VISIT (OUTPATIENT)
Dept: PHYSICAL MEDICINE AND REHAB | Age: 59
End: 2019-06-27
Payer: COMMERCIAL

## 2019-06-27 DIAGNOSIS — M54.32 SCIATICA OF LEFT SIDE: Primary | ICD-10-CM

## 2019-06-27 PROCEDURE — 76942 ECHO GUIDE FOR BIOPSY: CPT | Performed by: PHYSICAL MEDICINE & REHABILITATION

## 2019-06-27 PROCEDURE — 64418 NJX AA&/STRD SPRSCAP NRV: CPT | Performed by: PHYSICAL MEDICINE & REHABILITATION

## 2019-06-27 RX ADMIN — LIDOCAINE HYDROCHLORIDE 5 ML: 20 INJECTION, SOLUTION INFILTRATION; PERINEURAL at 09:19

## 2019-06-27 RX ADMIN — LIDOCAINE HYDROCHLORIDE 8 ML: 10 INJECTION, SOLUTION INFILTRATION; PERINEURAL at 09:18

## 2019-06-28 RX ORDER — LIDOCAINE HYDROCHLORIDE 20 MG/ML
5 INJECTION, SOLUTION INFILTRATION; PERINEURAL ONCE
Status: COMPLETED | OUTPATIENT
Start: 2019-06-27 | End: 2019-06-27

## 2019-06-28 RX ORDER — LIDOCAINE HYDROCHLORIDE 10 MG/ML
8 INJECTION, SOLUTION INFILTRATION; PERINEURAL ONCE
Status: COMPLETED | OUTPATIENT
Start: 2019-06-27 | End: 2019-06-27

## 2019-07-11 ENCOUNTER — PROCEDURE VISIT (OUTPATIENT)
Dept: PHYSICAL MEDICINE AND REHAB | Age: 59
End: 2019-07-11
Payer: COMMERCIAL

## 2019-07-11 DIAGNOSIS — M54.32 SCIATICA OF LEFT SIDE: Primary | ICD-10-CM

## 2019-07-11 PROCEDURE — 76942 ECHO GUIDE FOR BIOPSY: CPT | Performed by: PHYSICAL MEDICINE & REHABILITATION

## 2019-07-11 PROCEDURE — 64445 NJX AA&/STRD SCIATIC NRV IMG: CPT | Performed by: PHYSICAL MEDICINE & REHABILITATION

## 2019-07-11 RX ADMIN — LIDOCAINE HYDROCHLORIDE 16 ML: 10 INJECTION, SOLUTION INFILTRATION; PERINEURAL at 08:21

## 2019-07-11 RX ADMIN — LIDOCAINE HYDROCHLORIDE 5 ML: 20 INJECTION, SOLUTION INFILTRATION; PERINEURAL at 08:22

## 2019-07-12 RX ORDER — LIDOCAINE HYDROCHLORIDE 20 MG/ML
5 INJECTION, SOLUTION INFILTRATION; PERINEURAL ONCE
Status: COMPLETED | OUTPATIENT
Start: 2019-07-12 | End: 2019-07-11

## 2019-07-12 RX ORDER — LIDOCAINE HYDROCHLORIDE 10 MG/ML
16 INJECTION, SOLUTION INFILTRATION; PERINEURAL ONCE
Status: COMPLETED | OUTPATIENT
Start: 2019-07-12 | End: 2019-07-11

## 2019-07-25 ENCOUNTER — OFFICE VISIT (OUTPATIENT)
Dept: PHYSICAL MEDICINE AND REHAB | Age: 59
End: 2019-07-25
Payer: COMMERCIAL

## 2019-07-25 VITALS
BODY MASS INDEX: 30.75 KG/M2 | SYSTOLIC BLOOD PRESSURE: 108 MMHG | WEIGHT: 227 LBS | DIASTOLIC BLOOD PRESSURE: 54 MMHG | HEIGHT: 72 IN

## 2019-07-25 DIAGNOSIS — M54.42 CHRONIC MIDLINE LOW BACK PAIN WITH BILATERAL SCIATICA: ICD-10-CM

## 2019-07-25 DIAGNOSIS — M51.36 DDD (DEGENERATIVE DISC DISEASE), LUMBAR: Primary | Chronic | ICD-10-CM

## 2019-07-25 DIAGNOSIS — F41.1 GENERALIZED ANXIETY DISORDER: ICD-10-CM

## 2019-07-25 DIAGNOSIS — G62.1 ALCOHOLIC POLYNEUROPATHY (HCC): Chronic | ICD-10-CM

## 2019-07-25 DIAGNOSIS — M54.41 CHRONIC MIDLINE LOW BACK PAIN WITH BILATERAL SCIATICA: ICD-10-CM

## 2019-07-25 DIAGNOSIS — Z79.899 HIGH RISK MEDICATION USE: ICD-10-CM

## 2019-07-25 DIAGNOSIS — F10.20 ALCOHOLIC (HCC): ICD-10-CM

## 2019-07-25 DIAGNOSIS — M53.3 SI (SACROILIAC) PAIN: ICD-10-CM

## 2019-07-25 DIAGNOSIS — Z72.0 TOBACCO ABUSE: ICD-10-CM

## 2019-07-25 DIAGNOSIS — G89.29 CHRONIC MIDLINE LOW BACK PAIN WITH BILATERAL SCIATICA: ICD-10-CM

## 2019-07-25 DIAGNOSIS — M54.17 LUMBOSACRAL RADICULOPATHY AT S1: ICD-10-CM

## 2019-07-25 PROCEDURE — 1036F TOBACCO NON-USER: CPT | Performed by: PHYSICAL MEDICINE & REHABILITATION

## 2019-07-25 PROCEDURE — G8417 CALC BMI ABV UP PARAM F/U: HCPCS | Performed by: PHYSICAL MEDICINE & REHABILITATION

## 2019-07-25 PROCEDURE — G8598 ASA/ANTIPLAT THER USED: HCPCS | Performed by: PHYSICAL MEDICINE & REHABILITATION

## 2019-07-25 PROCEDURE — 3017F COLORECTAL CA SCREEN DOC REV: CPT | Performed by: PHYSICAL MEDICINE & REHABILITATION

## 2019-07-25 PROCEDURE — 99214 OFFICE O/P EST MOD 30 MIN: CPT | Performed by: PHYSICAL MEDICINE & REHABILITATION

## 2019-07-25 PROCEDURE — G8427 DOCREV CUR MEDS BY ELIG CLIN: HCPCS | Performed by: PHYSICAL MEDICINE & REHABILITATION

## 2019-07-25 RX ORDER — HYDROCODONE BITARTRATE AND ACETAMINOPHEN 7.5; 325 MG/1; MG/1
1 TABLET ORAL EVERY 8 HOURS PRN
Qty: 80 TABLET | Refills: 0 | Status: SHIPPED | OUTPATIENT
Start: 2019-07-25 | End: 2019-08-21 | Stop reason: SDUPTHER

## 2019-07-25 ASSESSMENT — ENCOUNTER SYMPTOMS
COUGH: 0
CONSTIPATION: 1
EYE PAIN: 0
CHEST TIGHTNESS: 0
SHORTNESS OF BREATH: 0
NAUSEA: 0
COLOR CHANGE: 0
ABDOMINAL PAIN: 0
VISUAL CHANGE: 0
APNEA: 0
WHEEZING: 0
PHOTOPHOBIA: 0
VOICE CHANGE: 1
DIARRHEA: 0
VOMITING: 0
BACK PAIN: 1
BOWEL INCONTINENCE: 0

## 2019-07-25 NOTE — PROGRESS NOTES
Subjective  Ke Pulse, 61 y.o. male presents today with:       Back Pain Left sciatic injection 6/27/19 & 7/11/19 with 85% reduction in pain lasting 1 day. Hip Pain Left    Medication Refill [place], refill & pill count today- compliant        He is doing well on his condition at current doses -but is showing signs of decreased function. He is unkempt today and smells heavily of cigarettes. He states that he has quit. 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 I offered and prescribed Narcan just in case he wants to fall back on that in case he has an allergic reaction over responsive medication or accidentally overtakes assessments. However  He is able to have Positive interactions with his friends family. He notes that he currently is 60 per month. I have okayed him to increase to 80/month as his pain is flared up. Active in AA does not know some eating. No signs of overuse or abuse. Back Pain   This is a chronic problem. The current episode started more than 1 year ago. The problem occurs daily. The problem is unchanged. The pain is present in the lumbar spine. Radiates to: left leg, left foot. The pain is at a severity of 3/10 (Pain ranges from 2-10/10. 10/10 with walking long distances. ). The pain is moderate. The pain is the same all the time. The symptoms are aggravated by position. Associated symptoms include leg pain, numbness (Left side of throat) and weakness. Pertinent negatives include no abdominal pain, bladder incontinence, bowel incontinence, chest pain, dysuria, fever, headaches, paresis or perianal numbness. Risk factors include lack of exercise, sedentary lifestyle and poor posture.  He has tried chiropractic manipulation, ice, walking, home exercises, analgesics and NSAIDs (Narcotic Pain Medications, gabapentin, Trigger Point injections, Sciatica Block, Physically abused: Not on file     Forced sexual activity: Not on file   Other Topics Concern    Not on file   Social History Narrative    Born in [place], one of 5    Came to [place] at age 1 with parents    Never , one daughter    Never worked, disabled since 12 (mental)    Lives in [place] with brother, in a house        Attends [employer] Martinez daily    [employer] riding bike, TV    One daughter, does keep in touch      Family History   Problem Relation Age of Onset    Cancer Mother         brain, dec 79    Alcohol Abuse Father     Other Sister         unknown medical history    Other Brother         unknown medical history    High Blood Pressure Brother     Alcohol Abuse Daughter        No Known Allergies    Review of Systems   Constitutional: Negative for activity change, appetite change, fatigue, fever and unexpected weight change. HENT: Positive for voice change. Eyes: Negative for photophobia, pain and visual disturbance. Respiratory: Negative for apnea, cough, chest tightness, shortness of breath and wheezing. Cardiovascular: Negative for chest pain, palpitations and leg swelling. Gastrointestinal: Positive for constipation. Negative for abdominal pain, bowel incontinence, diarrhea, nausea and vomiting. Endocrine: Negative. Genitourinary: Negative. Negative for bladder incontinence and dysuria. Musculoskeletal: Positive for arthralgias, back pain and myalgias. Negative for gait problem, joint swelling, neck pain and neck stiffness. Skin: Negative. Negative for color change, pallor and wound. Allergic/Immunologic: Positive for immunocompromised state. Negative for environmental allergies and food allergies. Neurological: Positive for weakness and numbness (Left side of throat). Negative for dizziness, light-headedness and headaches. Hematological: Negative. Psychiatric/Behavioral: Negative for dysphoric mood, hallucinations and sleep disturbance.  The patient is not risks.    Current and old OARRS ([place] Automated Prescription Reporting System) records reviewed, all refills reviewed since last visit,  Behavioral agreement/[place] regulations   and Toxicology screen was reviewed with patient and is up to date. There are no current red flags. They are making good progress regarding pain relief, they are performing at a functional level regarding activities of daily living, family interactions and psychological functioning, they're not having any adverse effects or side effects from the current medications, and I see no findings of aberrant drug taking or addiction related behaviors. The patient is aware that they have a chronic pain condition and they may require opiates dosing for life. All efforts will be made to wean to the lowest effective dose. Other therapies for pain have not been effective including nonopiate medications. Injections and exercises are only partially effective. A Rx for Narcan was offered to help prevent accidental overdose. RX Monitoring 7/25/2019   Attestation -   Periodic Controlled Substance Monitoring Possible medication side effects, risk of tolerance/dependence & alternative treatments discussed. ;No signs of potential drug abuse or diversion identified. ;Assessed functional status. ;Obtaining appropriate analgesic effect of treatment. Chronic Pain > 50 MEDD Re-evaluated the status of the patient's underlying condition causing pain.;Obtained or confirmed \"Consent for Opioid Use\" on file. Chronic Pain > 80 MEDD -       Periodic Controlled Substance Monitoring: Possible medication side effects, risk of tolerance/dependence & alternative treatments discussed., No signs of potential drug abuse or diversion identified. , Assessed functional status., Obtaining appropriate analgesic effect of treatment. (Suleman Romero, DO)       Patient is currently taking:       I have changed Ranjith Rodriguez's HYDROcodone-acetaminophen.  I am also having him

## 2019-08-01 ENCOUNTER — TELEPHONE (OUTPATIENT)
Dept: FAMILY MEDICINE CLINIC | Age: 59
End: 2019-08-01

## 2019-08-15 ENCOUNTER — PROCEDURE VISIT (OUTPATIENT)
Dept: PHYSICAL MEDICINE AND REHAB | Age: 59
End: 2019-08-15
Payer: COMMERCIAL

## 2019-08-15 DIAGNOSIS — M70.62 GREATER TROCHANTERIC BURSITIS OF LEFT HIP: Primary | ICD-10-CM

## 2019-08-15 PROCEDURE — 20611 DRAIN/INJ JOINT/BURSA W/US: CPT | Performed by: PHYSICAL MEDICINE & REHABILITATION

## 2019-08-15 RX ORDER — LIDOCAINE HYDROCHLORIDE 20 MG/ML
2 INJECTION, SOLUTION INFILTRATION; PERINEURAL ONCE
Status: COMPLETED | OUTPATIENT
Start: 2019-08-15 | End: 2019-08-15

## 2019-08-15 RX ORDER — LIDOCAINE HYDROCHLORIDE 10 MG/ML
8 INJECTION, SOLUTION INFILTRATION; PERINEURAL ONCE
Status: COMPLETED | OUTPATIENT
Start: 2019-08-15 | End: 2019-08-15

## 2019-08-15 RX ADMIN — LIDOCAINE HYDROCHLORIDE 8 ML: 10 INJECTION, SOLUTION INFILTRATION; PERINEURAL at 16:18

## 2019-08-15 RX ADMIN — LIDOCAINE HYDROCHLORIDE 2 ML: 20 INJECTION, SOLUTION INFILTRATION; PERINEURAL at 16:19

## 2019-08-21 DIAGNOSIS — M79.672 LEFT FOOT PAIN: ICD-10-CM

## 2019-08-21 DIAGNOSIS — M51.36 DDD (DEGENERATIVE DISC DISEASE), LUMBAR: Chronic | ICD-10-CM

## 2019-08-21 DIAGNOSIS — M54.42 CHRONIC MIDLINE LOW BACK PAIN WITH BILATERAL SCIATICA: ICD-10-CM

## 2019-08-21 DIAGNOSIS — D47.2 IGG MONOCLONAL GAMMOPATHY OF UNCERTAIN SIGNIFICANCE: ICD-10-CM

## 2019-08-21 DIAGNOSIS — Z79.899 HIGH RISK MEDICATION USE: ICD-10-CM

## 2019-08-21 DIAGNOSIS — G89.29 CHRONIC MIDLINE LOW BACK PAIN WITH BILATERAL SCIATICA: ICD-10-CM

## 2019-08-21 DIAGNOSIS — M54.41 CHRONIC MIDLINE LOW BACK PAIN WITH BILATERAL SCIATICA: ICD-10-CM

## 2019-08-21 DIAGNOSIS — M54.17 LUMBOSACRAL RADICULOPATHY AT S1: ICD-10-CM

## 2019-08-21 DIAGNOSIS — M53.3 SI (SACROILIAC) PAIN: ICD-10-CM

## 2019-08-21 DIAGNOSIS — G62.1 ALCOHOLIC POLYNEUROPATHY (HCC): Chronic | ICD-10-CM

## 2019-08-22 RX ORDER — GABAPENTIN 800 MG/1
TABLET ORAL
Qty: 90 TABLET | Refills: 3 | Status: SHIPPED | OUTPATIENT
Start: 2019-08-22 | End: 2019-10-17 | Stop reason: SDUPTHER

## 2019-08-22 RX ORDER — HYDROCODONE BITARTRATE AND ACETAMINOPHEN 7.5; 325 MG/1; MG/1
1 TABLET ORAL EVERY 8 HOURS PRN
Qty: 80 TABLET | Refills: 0 | Status: SHIPPED | OUTPATIENT
Start: 2019-08-23 | End: 2019-09-18 | Stop reason: SDUPTHER

## 2019-08-29 ENCOUNTER — OFFICE VISIT (OUTPATIENT)
Dept: FAMILY MEDICINE CLINIC | Age: 59
End: 2019-08-29
Payer: COMMERCIAL

## 2019-08-29 VITALS
TEMPERATURE: 97.4 F | WEIGHT: 225.6 LBS | HEIGHT: 72 IN | OXYGEN SATURATION: 99 % | RESPIRATION RATE: 16 BRPM | BODY MASS INDEX: 30.56 KG/M2 | SYSTOLIC BLOOD PRESSURE: 132 MMHG | HEART RATE: 88 BPM | DIASTOLIC BLOOD PRESSURE: 76 MMHG

## 2019-08-29 DIAGNOSIS — K59.00 CONSTIPATION, UNSPECIFIED CONSTIPATION TYPE: ICD-10-CM

## 2019-08-29 DIAGNOSIS — R49.8 IMPAIRED VOCAL QUALITY: Primary | ICD-10-CM

## 2019-08-29 PROCEDURE — G8417 CALC BMI ABV UP PARAM F/U: HCPCS | Performed by: FAMILY MEDICINE

## 2019-08-29 PROCEDURE — 99214 OFFICE O/P EST MOD 30 MIN: CPT | Performed by: FAMILY MEDICINE

## 2019-08-29 PROCEDURE — G8427 DOCREV CUR MEDS BY ELIG CLIN: HCPCS | Performed by: FAMILY MEDICINE

## 2019-08-29 PROCEDURE — 1036F TOBACCO NON-USER: CPT | Performed by: FAMILY MEDICINE

## 2019-08-29 PROCEDURE — 3017F COLORECTAL CA SCREEN DOC REV: CPT | Performed by: FAMILY MEDICINE

## 2019-08-29 PROCEDURE — G8598 ASA/ANTIPLAT THER USED: HCPCS | Performed by: FAMILY MEDICINE

## 2019-08-29 RX ORDER — POLYETHYLENE GLYCOL 3350 17 G/17G
17 POWDER, FOR SOLUTION ORAL DAILY
Qty: 1530 G | Refills: 1 | Status: SHIPPED | OUTPATIENT
Start: 2019-08-29 | End: 2019-09-28

## 2019-08-29 RX ORDER — BISACODYL 10 MG
10 SUPPOSITORY, RECTAL RECTAL DAILY
Qty: 10 SUPPOSITORY | Refills: 0 | Status: SHIPPED | OUTPATIENT
Start: 2019-08-29 | End: 2019-09-28

## 2019-08-29 NOTE — PROGRESS NOTES
Surgical History:   Procedure Laterality Date    CAROTID ENDARTERECTOMY Left 2019    LEFT CAROTID ENDARTERECTOMY performed by [facility] at [facility]  2014    ENDOSCOPY, COLON, DIAGNOSTIC      HERNIA REPAIR      TONSILLECTOMY      UPPER GASTROINTESTINAL ENDOSCOPY  2014    VENTRAL HERNIA REPAIR  09/01/15    W/MESH AND W/UMBILECTOMY     Social History     Socioeconomic History    Marital status: Single     Spouse name: Not on file    Number of children: 1    Years of education: Not on file    Highest education level: Not on file   Occupational History    Occupation: SSI for learning disability   Social Needs    Financial resource strain: Not on file    Food insecurity:     Worry: Not on file     Inability: Not on file    Transportation needs:     Medical: Not on file     Non-medical: Not on file   Tobacco Use    Smoking status: Former Smoker     Packs/day: 0.50     Years: 40.00     Pack years: 20.00     Types: Cigarettes     Start date:      Last attempt to quit: 3/23/2019     Years since quittin.4    Smokeless tobacco: Never Used    Tobacco comment: patient would like to quit, encouraged to see PCP   Substance and Sexual Activity    Alcohol use: No     Alcohol/week: 0.0 standard drinks     Comment: QUIT -past addiction     Drug use: No    Sexual activity: Not on file   Lifestyle    Physical activity:     Days per week: Not on file     Minutes per session: Not on file    Stress: Not on file   Relationships    Social connections:     Talks on phone: Not on file     Gets together: Not on file     Attends Scientologist service: Not on file     Active member of club or organization: Not on file     Attends meetings of clubs or organizations: Not on file     Relationship status: Not on file    Intimate partner violence:     Fear of current or ex partner: Not on file     Emotionally abused: Not on file     Physically abused: Not on file     Forced sexual activity: Not on file   Other Topics Concern    Not on file   Social History Narrative    Born in [place], one of 5    Came to [place] at age 1 with parents    Never , one daughter    Never worked, disabled since 12 (mental)    Lives in [facility] with brother, in a house        Attends [employer] Martinez daily    Hobbies riding bike, TV    One daughter, does keep in touch      Family History   Problem Relation Age of Onset    Cancer Mother         brain, dec 79   [facility] Alcohol Abuse Father     Other Sister         unknown medical history    Other Brother         unknown medical history    High Blood Pressure Brother     Alcohol Abuse Daughter      No Known Allergies  Current Outpatient Medications   Medication Sig Dispense Refill    bisacodyl (DULCOLAX) 10 MG suppository Place 1 suppository rectally daily 10 suppository 0    polyethylene glycol (GLYCOLAX) powder Take 17 g by mouth daily 1530 g 1    clopidogrel (PLAVIX) 75 MG tablet take 1 tablet by mouth once daily 30 tablet 0    RA ASPIRIN EC 81 MG EC tablet take 1 tablet by mouth once daily 30 tablet 0    amitriptyline (ELAVIL) 50 MG tablet take 1 tablet by mouth at bedtime 30 tablet 0    HYDROcodone-acetaminophen (NORCO) 7.5-325 MG per tablet Take 1 tablet by mouth every 8 hours as needed for Pain for up to 30 days.  Max of 80 per month--max of 3 per day-Intended supply: 30 days 80 tablet 0    gabapentin (NEURONTIN) 800 MG tablet take 1 tablet by mouth three times a day 90 tablet 3    valsartan-hydrochlorothiazide (DIOVAN-HCT) 80-12.5 MG per tablet take 1 tablet by mouth once daily 30 tablet 5    RA VITAMIN B-12 TR 1000 MCG TBCR take 1 tablet by mouth once daily 30 tablet 5    senna-docusate (PERICOLACE) 8.6-50 MG per tablet Take 2 tablets by mouth daily as needed for Constipation 60 tablet 11    rosuvastatin (CRESTOR) 40 MG tablet take 1 tablet by mouth at bedtime  0    VASCEPA 1 g CAPS capsule   0    metoprolol succinate (TOPROL XL) 25 MG extended

## 2019-09-05 ENCOUNTER — PROCEDURE VISIT (OUTPATIENT)
Dept: PHYSICAL MEDICINE AND REHAB | Age: 59
End: 2019-09-05
Payer: COMMERCIAL

## 2019-09-05 DIAGNOSIS — M70.61 GREATER TROCHANTERIC BURSITIS OF RIGHT HIP: Primary | ICD-10-CM

## 2019-09-05 PROCEDURE — 20611 DRAIN/INJ JOINT/BURSA W/US: CPT | Performed by: PHYSICAL MEDICINE & REHABILITATION

## 2019-09-05 RX ADMIN — LIDOCAINE HYDROCHLORIDE 2 ML: 20 INJECTION, SOLUTION INFILTRATION; PERINEURAL at 13:54

## 2019-09-05 RX ADMIN — LIDOCAINE HYDROCHLORIDE 8 ML: 10 INJECTION, SOLUTION INFILTRATION; PERINEURAL at 13:54

## 2019-09-06 RX ORDER — LIDOCAINE HYDROCHLORIDE 10 MG/ML
8 INJECTION, SOLUTION INFILTRATION; PERINEURAL ONCE
Status: COMPLETED | OUTPATIENT
Start: 2019-09-05 | End: 2019-09-05

## 2019-09-06 RX ORDER — LIDOCAINE HYDROCHLORIDE 20 MG/ML
2 INJECTION, SOLUTION INFILTRATION; PERINEURAL ONCE
Status: COMPLETED | OUTPATIENT
Start: 2019-09-05 | End: 2019-09-05

## 2019-09-18 DIAGNOSIS — G89.29 CHRONIC MIDLINE LOW BACK PAIN WITH BILATERAL SCIATICA: ICD-10-CM

## 2019-09-18 DIAGNOSIS — M51.36 DDD (DEGENERATIVE DISC DISEASE), LUMBAR: Chronic | ICD-10-CM

## 2019-09-18 DIAGNOSIS — M54.17 LUMBOSACRAL RADICULOPATHY AT S1: ICD-10-CM

## 2019-09-18 DIAGNOSIS — M53.3 SI (SACROILIAC) PAIN: ICD-10-CM

## 2019-09-18 DIAGNOSIS — Z79.899 HIGH RISK MEDICATION USE: ICD-10-CM

## 2019-09-18 DIAGNOSIS — M54.41 CHRONIC MIDLINE LOW BACK PAIN WITH BILATERAL SCIATICA: ICD-10-CM

## 2019-09-18 DIAGNOSIS — M54.42 CHRONIC MIDLINE LOW BACK PAIN WITH BILATERAL SCIATICA: ICD-10-CM

## 2019-09-18 RX ORDER — HYDROCODONE BITARTRATE AND ACETAMINOPHEN 7.5; 325 MG/1; MG/1
1 TABLET ORAL EVERY 8 HOURS PRN
Qty: 80 TABLET | Refills: 0 | Status: SHIPPED | OUTPATIENT
Start: 2019-09-21 | End: 2019-10-17 | Stop reason: ALTCHOICE

## 2019-09-19 ENCOUNTER — PROCEDURE VISIT (OUTPATIENT)
Dept: PHYSICAL MEDICINE AND REHAB | Age: 59
End: 2019-09-19
Payer: COMMERCIAL

## 2019-09-19 DIAGNOSIS — M79.10 MYALGIA: Primary | ICD-10-CM

## 2019-09-19 PROCEDURE — 20553 NJX 1/MLT TRIGGER POINTS 3/>: CPT | Performed by: PHYSICAL MEDICINE & REHABILITATION

## 2019-09-19 RX ORDER — LIDOCAINE HYDROCHLORIDE 10 MG/ML
24 INJECTION, SOLUTION INFILTRATION; PERINEURAL ONCE
Status: COMPLETED | OUTPATIENT
Start: 2019-09-19 | End: 2019-09-19

## 2019-09-19 RX ADMIN — LIDOCAINE HYDROCHLORIDE 24 ML: 10 INJECTION, SOLUTION INFILTRATION; PERINEURAL at 16:26

## 2019-10-03 RX ORDER — ACETAMINOPHEN/DIPHENHYDRAMINE 500MG-25MG
TABLET ORAL
Qty: 30 TABLET | Refills: 0 | Status: SHIPPED | OUTPATIENT
Start: 2019-10-03 | End: 2020-03-04 | Stop reason: SDUPTHER

## 2019-10-03 RX ORDER — AMITRIPTYLINE HYDROCHLORIDE 50 MG/1
TABLET, FILM COATED ORAL
Qty: 30 TABLET | Refills: 0 | Status: SHIPPED | OUTPATIENT
Start: 2019-10-03 | End: 2019-11-22 | Stop reason: ALTCHOICE

## 2019-10-03 RX ORDER — CLOPIDOGREL BISULFATE 75 MG/1
TABLET ORAL
Qty: 30 TABLET | Refills: 0 | Status: SHIPPED | OUTPATIENT
Start: 2019-10-03 | End: 2019-11-22 | Stop reason: SDUPTHER

## 2019-10-15 DIAGNOSIS — M54.41 CHRONIC MIDLINE LOW BACK PAIN WITH BILATERAL SCIATICA: ICD-10-CM

## 2019-10-15 DIAGNOSIS — M54.42 CHRONIC MIDLINE LOW BACK PAIN WITH BILATERAL SCIATICA: ICD-10-CM

## 2019-10-15 DIAGNOSIS — M53.3 SI (SACROILIAC) PAIN: ICD-10-CM

## 2019-10-15 DIAGNOSIS — M54.17 LUMBOSACRAL RADICULOPATHY AT S1: ICD-10-CM

## 2019-10-15 DIAGNOSIS — Z79.899 HIGH RISK MEDICATION USE: ICD-10-CM

## 2019-10-15 DIAGNOSIS — M51.36 DDD (DEGENERATIVE DISC DISEASE), LUMBAR: Chronic | ICD-10-CM

## 2019-10-15 DIAGNOSIS — G89.29 CHRONIC MIDLINE LOW BACK PAIN WITH BILATERAL SCIATICA: ICD-10-CM

## 2019-10-15 RX ORDER — HYDROCODONE BITARTRATE AND ACETAMINOPHEN 7.5; 325 MG/1; MG/1
1 TABLET ORAL EVERY 8 HOURS PRN
Qty: 80 TABLET | Refills: 0 | Status: SHIPPED | OUTPATIENT
Start: 2019-10-21 | End: 2019-10-17 | Stop reason: ALTCHOICE

## 2019-10-17 ENCOUNTER — OFFICE VISIT (OUTPATIENT)
Dept: FAMILY MEDICINE CLINIC | Age: 59
End: 2019-10-17
Payer: MEDICAID

## 2019-10-17 VITALS
HEART RATE: 75 BPM | OXYGEN SATURATION: 98 % | BODY MASS INDEX: 30.2 KG/M2 | HEIGHT: 72 IN | DIASTOLIC BLOOD PRESSURE: 60 MMHG | RESPIRATION RATE: 18 BRPM | WEIGHT: 223 LBS | SYSTOLIC BLOOD PRESSURE: 120 MMHG | TEMPERATURE: 97.8 F

## 2019-10-17 DIAGNOSIS — K59.09 OTHER CONSTIPATION: Primary | Chronic | ICD-10-CM

## 2019-10-17 DIAGNOSIS — I10 ESSENTIAL HYPERTENSION: Chronic | ICD-10-CM

## 2019-10-17 DIAGNOSIS — Z72.0 TOBACCO ABUSE: ICD-10-CM

## 2019-10-17 PROCEDURE — G8598 ASA/ANTIPLAT THER USED: HCPCS | Performed by: FAMILY MEDICINE

## 2019-10-17 PROCEDURE — G8482 FLU IMMUNIZE ORDER/ADMIN: HCPCS | Performed by: FAMILY MEDICINE

## 2019-10-17 PROCEDURE — 3017F COLORECTAL CA SCREEN DOC REV: CPT | Performed by: FAMILY MEDICINE

## 2019-10-17 PROCEDURE — 1036F TOBACCO NON-USER: CPT | Performed by: FAMILY MEDICINE

## 2019-10-17 PROCEDURE — G8417 CALC BMI ABV UP PARAM F/U: HCPCS | Performed by: FAMILY MEDICINE

## 2019-10-17 PROCEDURE — 99214 OFFICE O/P EST MOD 30 MIN: CPT | Performed by: FAMILY MEDICINE

## 2019-10-17 PROCEDURE — G8427 DOCREV CUR MEDS BY ELIG CLIN: HCPCS | Performed by: FAMILY MEDICINE

## 2019-11-04 DIAGNOSIS — M54.17 LUMBOSACRAL RADICULOPATHY AT S1: ICD-10-CM

## 2019-11-04 DIAGNOSIS — G89.29 CHRONIC MIDLINE LOW BACK PAIN WITH BILATERAL SCIATICA: ICD-10-CM

## 2019-11-04 DIAGNOSIS — M54.41 CHRONIC MIDLINE LOW BACK PAIN WITH BILATERAL SCIATICA: ICD-10-CM

## 2019-11-04 DIAGNOSIS — M54.42 CHRONIC MIDLINE LOW BACK PAIN WITH BILATERAL SCIATICA: ICD-10-CM

## 2019-11-04 DIAGNOSIS — K59.09 OTHER CONSTIPATION: Chronic | ICD-10-CM

## 2019-11-04 DIAGNOSIS — Z79.899 HIGH RISK MEDICATION USE: ICD-10-CM

## 2019-11-04 DIAGNOSIS — M53.3 SI (SACROILIAC) PAIN: ICD-10-CM

## 2019-11-04 DIAGNOSIS — M51.36 DDD (DEGENERATIVE DISC DISEASE), LUMBAR: Chronic | ICD-10-CM

## 2019-11-04 RX ORDER — HYDROCODONE BITARTRATE AND ACETAMINOPHEN 7.5; 325 MG/1; MG/1
1 TABLET ORAL EVERY 8 HOURS PRN
Qty: 80 TABLET | Refills: 0 | Status: SHIPPED | OUTPATIENT
Start: 2019-11-04 | End: 2019-11-19 | Stop reason: SDUPTHER

## 2019-11-06 ENCOUNTER — OFFICE VISIT (OUTPATIENT)
Dept: GASTROENTEROLOGY | Age: 59
End: 2019-11-06
Payer: MEDICAID

## 2019-11-06 VITALS
DIASTOLIC BLOOD PRESSURE: 62 MMHG | HEART RATE: 96 BPM | BODY MASS INDEX: 31.64 KG/M2 | WEIGHT: 226 LBS | SYSTOLIC BLOOD PRESSURE: 118 MMHG | OXYGEN SATURATION: 98 % | HEIGHT: 71 IN

## 2019-11-06 DIAGNOSIS — K92.1 BLOOD IN THE STOOL: ICD-10-CM

## 2019-11-06 DIAGNOSIS — K59.00 CONSTIPATION, UNSPECIFIED CONSTIPATION TYPE: Primary | ICD-10-CM

## 2019-11-06 PROCEDURE — 99204 OFFICE O/P NEW MOD 45 MIN: CPT | Performed by: INTERNAL MEDICINE

## 2019-11-06 RX ORDER — POLYETHYLENE GLYCOL 3350, SODIUM CHLORIDE, SODIUM BICARBONATE, POTASSIUM CHLORIDE 420; 11.2; 5.72; 1.48 G/4L; G/4L; G/4L; G/4L
4000 POWDER, FOR SOLUTION ORAL ONCE
Qty: 1 BOTTLE | Refills: 0 | Status: SHIPPED | OUTPATIENT
Start: 2019-11-06 | End: 2019-11-06

## 2019-11-06 RX ORDER — POLYETHYLENE GLYCOL 3350 17 G/17G
17 POWDER, FOR SOLUTION ORAL DAILY
Qty: 510 G | Refills: 3 | Status: SHIPPED | OUTPATIENT
Start: 2019-11-06 | End: 2019-11-06 | Stop reason: SDUPTHER

## 2019-11-06 RX ORDER — POLYETHYLENE GLYCOL 3350 17 G/17G
17 POWDER, FOR SOLUTION ORAL 2 TIMES DAILY
Qty: 510 G | Refills: 3 | Status: SHIPPED | OUTPATIENT
Start: 2019-11-06 | End: 2019-12-06

## 2019-11-06 ASSESSMENT — ENCOUNTER SYMPTOMS
DIARRHEA: 0
ABDOMINAL PAIN: 0
TROUBLE SWALLOWING: 0
RECTAL PAIN: 0
VOICE CHANGE: 0
COLOR CHANGE: 0
SHORTNESS OF BREATH: 0
ABDOMINAL DISTENTION: 0
EYE PAIN: 0
PHOTOPHOBIA: 0
NAUSEA: 0
CHEST TIGHTNESS: 0
VOMITING: 0
BLOOD IN STOOL: 1
WHEEZING: 0
EYE REDNESS: 0

## 2019-11-08 ENCOUNTER — TELEPHONE (OUTPATIENT)
Dept: GASTROENTEROLOGY | Age: 59
End: 2019-11-08

## 2019-11-08 ENCOUNTER — TELEPHONE (OUTPATIENT)
Dept: FAMILY MEDICINE CLINIC | Age: 59
End: 2019-11-08

## 2019-11-11 ENCOUNTER — TELEPHONE (OUTPATIENT)
Dept: GASTROENTEROLOGY | Age: 59
End: 2019-11-11

## 2019-11-11 ENCOUNTER — TELEPHONE (OUTPATIENT)
Dept: FAMILY MEDICINE CLINIC | Age: 59
End: 2019-11-11

## 2019-11-18 ENCOUNTER — TELEPHONE (OUTPATIENT)
Dept: GASTROENTEROLOGY | Age: 59
End: 2019-11-18

## 2019-11-19 ENCOUNTER — OFFICE VISIT (OUTPATIENT)
Dept: PHYSICAL MEDICINE AND REHAB | Age: 59
End: 2019-11-19
Payer: MEDICAID

## 2019-11-19 VITALS
WEIGHT: 226 LBS | BODY MASS INDEX: 30.61 KG/M2 | DIASTOLIC BLOOD PRESSURE: 60 MMHG | SYSTOLIC BLOOD PRESSURE: 120 MMHG | HEIGHT: 72 IN

## 2019-11-19 DIAGNOSIS — M62.830 MUSCLE SPASM OF BACK: ICD-10-CM

## 2019-11-19 DIAGNOSIS — M25.552 HIP PAIN, BILATERAL: ICD-10-CM

## 2019-11-19 DIAGNOSIS — M53.3 SI (SACROILIAC) PAIN: ICD-10-CM

## 2019-11-19 DIAGNOSIS — D47.2 IGG MONOCLONAL GAMMOPATHY OF UNCERTAIN SIGNIFICANCE: ICD-10-CM

## 2019-11-19 DIAGNOSIS — G89.29 CHRONIC MIDLINE LOW BACK PAIN WITH BILATERAL SCIATICA: ICD-10-CM

## 2019-11-19 DIAGNOSIS — M51.36 DDD (DEGENERATIVE DISC DISEASE), LUMBAR: Primary | Chronic | ICD-10-CM

## 2019-11-19 DIAGNOSIS — Z79.899 HIGH RISK MEDICATION USE: ICD-10-CM

## 2019-11-19 DIAGNOSIS — G62.1 ALCOHOLIC POLYNEUROPATHY (HCC): Chronic | ICD-10-CM

## 2019-11-19 DIAGNOSIS — M54.41 CHRONIC MIDLINE LOW BACK PAIN WITH BILATERAL SCIATICA: ICD-10-CM

## 2019-11-19 DIAGNOSIS — R41.89 COGNITIVE DEFICIT DUE TO OLD HEAD INJURY: Chronic | ICD-10-CM

## 2019-11-19 DIAGNOSIS — M79.10 MYALGIA: ICD-10-CM

## 2019-11-19 DIAGNOSIS — M54.17 LUMBOSACRAL RADICULOPATHY AT S1: ICD-10-CM

## 2019-11-19 DIAGNOSIS — S09.90XS COGNITIVE DEFICIT DUE TO OLD HEAD INJURY: Chronic | ICD-10-CM

## 2019-11-19 DIAGNOSIS — M25.551 HIP PAIN, BILATERAL: ICD-10-CM

## 2019-11-19 DIAGNOSIS — F41.1 GENERALIZED ANXIETY DISORDER: ICD-10-CM

## 2019-11-19 DIAGNOSIS — M54.42 CHRONIC MIDLINE LOW BACK PAIN WITH BILATERAL SCIATICA: ICD-10-CM

## 2019-11-19 PROCEDURE — 99214 OFFICE O/P EST MOD 30 MIN: CPT | Performed by: PHYSICAL MEDICINE & REHABILITATION

## 2019-11-19 PROCEDURE — G8482 FLU IMMUNIZE ORDER/ADMIN: HCPCS | Performed by: PHYSICAL MEDICINE & REHABILITATION

## 2019-11-19 PROCEDURE — G8417 CALC BMI ABV UP PARAM F/U: HCPCS | Performed by: PHYSICAL MEDICINE & REHABILITATION

## 2019-11-19 PROCEDURE — G8598 ASA/ANTIPLAT THER USED: HCPCS | Performed by: PHYSICAL MEDICINE & REHABILITATION

## 2019-11-19 PROCEDURE — 3017F COLORECTAL CA SCREEN DOC REV: CPT | Performed by: PHYSICAL MEDICINE & REHABILITATION

## 2019-11-19 PROCEDURE — 4004F PT TOBACCO SCREEN RCVD TLK: CPT | Performed by: PHYSICAL MEDICINE & REHABILITATION

## 2019-11-19 PROCEDURE — G8427 DOCREV CUR MEDS BY ELIG CLIN: HCPCS | Performed by: PHYSICAL MEDICINE & REHABILITATION

## 2019-11-19 RX ORDER — CLOPIDOGREL BISULFATE 75 MG/1
TABLET ORAL
Qty: 30 TABLET | Refills: 5 | OUTPATIENT
Start: 2019-11-19

## 2019-11-19 RX ORDER — HYDROCODONE BITARTRATE AND ACETAMINOPHEN 7.5; 325 MG/1; MG/1
1 TABLET ORAL EVERY 8 HOURS PRN
Qty: 80 TABLET | Refills: 0 | Status: SHIPPED | OUTPATIENT
Start: 2019-12-03 | End: 2019-12-23 | Stop reason: SDUPTHER

## 2019-11-19 ASSESSMENT — ENCOUNTER SYMPTOMS
VOMITING: 0
ABDOMINAL PAIN: 0
WHEEZING: 0
NAUSEA: 0
COUGH: 0
COLOR CHANGE: 0
VOICE CHANGE: 1
CONSTIPATION: 1
PHOTOPHOBIA: 0
EYE PAIN: 0
APNEA: 0
BACK PAIN: 1
SHORTNESS OF BREATH: 0
DIARRHEA: 0
VISUAL CHANGE: 0
BOWEL INCONTINENCE: 0
CHEST TIGHTNESS: 0

## 2019-11-22 ENCOUNTER — OFFICE VISIT (OUTPATIENT)
Dept: FAMILY MEDICINE CLINIC | Age: 59
End: 2019-11-22
Payer: MEDICAID

## 2019-11-22 VITALS
OXYGEN SATURATION: 98 % | BODY MASS INDEX: 30.34 KG/M2 | RESPIRATION RATE: 20 BRPM | TEMPERATURE: 98.2 F | HEART RATE: 76 BPM | WEIGHT: 224 LBS | HEIGHT: 72 IN | SYSTOLIC BLOOD PRESSURE: 118 MMHG | DIASTOLIC BLOOD PRESSURE: 60 MMHG

## 2019-11-22 DIAGNOSIS — I63.9 CEREBROVASCULAR ACCIDENT (CVA), UNSPECIFIED MECHANISM (HCC): Primary | Chronic | ICD-10-CM

## 2019-11-22 DIAGNOSIS — K59.09 OTHER CONSTIPATION: Chronic | ICD-10-CM

## 2019-11-22 DIAGNOSIS — J41.0 SIMPLE CHRONIC BRONCHITIS (HCC): ICD-10-CM

## 2019-11-22 PROCEDURE — 99213 OFFICE O/P EST LOW 20 MIN: CPT | Performed by: FAMILY MEDICINE

## 2019-11-22 PROCEDURE — G8926 SPIRO NO PERF OR DOC: HCPCS | Performed by: FAMILY MEDICINE

## 2019-11-22 PROCEDURE — G8427 DOCREV CUR MEDS BY ELIG CLIN: HCPCS | Performed by: FAMILY MEDICINE

## 2019-11-22 PROCEDURE — G8417 CALC BMI ABV UP PARAM F/U: HCPCS | Performed by: FAMILY MEDICINE

## 2019-11-22 PROCEDURE — 3023F SPIROM DOC REV: CPT | Performed by: FAMILY MEDICINE

## 2019-11-22 PROCEDURE — 3017F COLORECTAL CA SCREEN DOC REV: CPT | Performed by: FAMILY MEDICINE

## 2019-11-22 PROCEDURE — G8598 ASA/ANTIPLAT THER USED: HCPCS | Performed by: FAMILY MEDICINE

## 2019-11-22 PROCEDURE — G8482 FLU IMMUNIZE ORDER/ADMIN: HCPCS | Performed by: FAMILY MEDICINE

## 2019-11-22 PROCEDURE — 4004F PT TOBACCO SCREEN RCVD TLK: CPT | Performed by: FAMILY MEDICINE

## 2019-11-22 RX ORDER — CLOPIDOGREL BISULFATE 75 MG/1
TABLET ORAL
Qty: 30 TABLET | Refills: 12 | Status: SHIPPED | OUTPATIENT
Start: 2019-11-22 | End: 2020-03-25

## 2019-11-22 RX ORDER — CLOPIDOGREL BISULFATE 75 MG/1
TABLET ORAL
Qty: 30 TABLET | Refills: 0 | OUTPATIENT
Start: 2019-11-22

## 2019-11-22 RX ORDER — BUDESONIDE AND FORMOTEROL FUMARATE DIHYDRATE 160; 4.5 UG/1; UG/1
AEROSOL RESPIRATORY (INHALATION)
Qty: 10.2 G | Refills: 12 | Status: SHIPPED | OUTPATIENT
Start: 2019-11-22 | End: 2020-09-09 | Stop reason: SDUPTHER

## 2019-11-22 RX ORDER — POLYETHYLENE GLYCOL 3350, SODIUM CHLORIDE, SODIUM BICARBONATE, POTASSIUM CHLORIDE 420; 11.2; 5.72; 1.48 G/4L; G/4L; G/4L; G/4L
POWDER, FOR SOLUTION ORAL
Refills: 0 | COMMUNITY
Start: 2019-11-06 | End: 2019-11-22 | Stop reason: ALTCHOICE

## 2019-12-10 ENCOUNTER — TELEPHONE (OUTPATIENT)
Dept: GASTROENTEROLOGY | Age: 59
End: 2019-12-10

## 2019-12-18 ENCOUNTER — ANESTHESIA EVENT (OUTPATIENT)
Dept: ENDOSCOPY | Age: 59
End: 2019-12-18
Payer: COMMERCIAL

## 2019-12-18 ENCOUNTER — HOSPITAL ENCOUNTER (OUTPATIENT)
Age: 59
Setting detail: OUTPATIENT SURGERY
Discharge: HOME | End: 2019-12-18
Attending: INTERNAL MEDICINE | Admitting: INTERNAL MEDICINE
Payer: COMMERCIAL

## 2019-12-18 ENCOUNTER — ANESTHESIA (OUTPATIENT)
Dept: ENDOSCOPY | Age: 59
End: 2019-12-18
Payer: COMMERCIAL

## 2019-12-18 VITALS
SYSTOLIC BLOOD PRESSURE: 113 MMHG | BODY MASS INDEX: 31.42 KG/M2 | OXYGEN SATURATION: 96 % | TEMPERATURE: 99 F | RESPIRATION RATE: 16 BRPM | DIASTOLIC BLOOD PRESSURE: 64 MMHG | HEIGHT: 72 IN | WEIGHT: 232 LBS | HEART RATE: 95 BPM

## 2019-12-18 VITALS
DIASTOLIC BLOOD PRESSURE: 64 MMHG | RESPIRATION RATE: 25 BRPM | SYSTOLIC BLOOD PRESSURE: 141 MMHG | OXYGEN SATURATION: 99 %

## 2019-12-18 PROCEDURE — 7100000010 HC PHASE II RECOVERY - FIRST 15 MIN: Performed by: INTERNAL MEDICINE

## 2019-12-18 PROCEDURE — 3609027000 HC COLONOSCOPY: Performed by: INTERNAL MEDICINE

## 2019-12-18 PROCEDURE — 7100000011 HC PHASE II RECOVERY - ADDTL 15 MIN: Performed by: INTERNAL MEDICINE

## 2019-12-18 PROCEDURE — 6360000002 HC RX W HCPCS: Performed by: NURSE ANESTHETIST, CERTIFIED REGISTERED

## 2019-12-18 PROCEDURE — 45380 COLONOSCOPY AND BIOPSY: CPT | Performed by: INTERNAL MEDICINE

## 2019-12-18 PROCEDURE — 88305 TISSUE EXAM BY PATHOLOGIST: CPT

## 2019-12-18 PROCEDURE — 2500000003 HC RX 250 WO HCPCS: Performed by: NURSE ANESTHETIST, CERTIFIED REGISTERED

## 2019-12-18 PROCEDURE — 3700000001 HC ADD 15 MINUTES (ANESTHESIA): Performed by: INTERNAL MEDICINE

## 2019-12-18 PROCEDURE — 3700000000 HC ANESTHESIA ATTENDED CARE: Performed by: INTERNAL MEDICINE

## 2019-12-18 PROCEDURE — 2580000003 HC RX 258: Performed by: INTERNAL MEDICINE

## 2019-12-18 RX ORDER — PROPOFOL 10 MG/ML
INJECTION, EMULSION INTRAVENOUS PRN
Status: DISCONTINUED | OUTPATIENT
Start: 2019-12-18 | End: 2019-12-18 | Stop reason: SDUPTHER

## 2019-12-18 RX ORDER — SODIUM CHLORIDE 9 MG/ML
INJECTION, SOLUTION INTRAVENOUS CONTINUOUS
Status: DISCONTINUED | OUTPATIENT
Start: 2019-12-18 | End: 2019-12-18 | Stop reason: HOSPADM

## 2019-12-18 RX ORDER — SODIUM CHLORIDE 0.9 % (FLUSH) 0.9 %
10 SYRINGE (ML) INJECTION PRN
Status: DISCONTINUED | OUTPATIENT
Start: 2019-12-18 | End: 2019-12-18 | Stop reason: HOSPADM

## 2019-12-18 RX ORDER — LIDOCAINE HYDROCHLORIDE 20 MG/ML
INJECTION, SOLUTION INFILTRATION; PERINEURAL PRN
Status: DISCONTINUED | OUTPATIENT
Start: 2019-12-18 | End: 2019-12-18 | Stop reason: SDUPTHER

## 2019-12-18 RX ORDER — SODIUM CHLORIDE 0.9 % (FLUSH) 0.9 %
10 SYRINGE (ML) INJECTION EVERY 12 HOURS SCHEDULED
Status: DISCONTINUED | OUTPATIENT
Start: 2019-12-18 | End: 2019-12-18 | Stop reason: HOSPADM

## 2019-12-18 RX ORDER — LIDOCAINE HYDROCHLORIDE 10 MG/ML
1 INJECTION, SOLUTION EPIDURAL; INFILTRATION; INTRACAUDAL; PERINEURAL
Status: DISCONTINUED | OUTPATIENT
Start: 2019-12-18 | End: 2019-12-18 | Stop reason: HOSPADM

## 2019-12-18 RX ADMIN — PROPOFOL 360 MG: 10 INJECTION, EMULSION INTRAVENOUS at 10:52

## 2019-12-18 RX ADMIN — SODIUM CHLORIDE: 9 INJECTION, SOLUTION INTRAVENOUS at 10:02

## 2019-12-18 RX ADMIN — LIDOCAINE HYDROCHLORIDE 50 MG: 20 INJECTION, SOLUTION INFILTRATION; PERINEURAL at 10:52

## 2019-12-18 ASSESSMENT — PAIN - FUNCTIONAL ASSESSMENT: PAIN_FUNCTIONAL_ASSESSMENT: 0-10

## 2019-12-18 ASSESSMENT — COPD QUESTIONNAIRES: CAT_SEVERITY: NO INTERVAL CHANGE

## 2019-12-18 ASSESSMENT — PAIN SCALES - GENERAL: PAINLEVEL_OUTOF10: 0

## 2019-12-23 DIAGNOSIS — M54.41 CHRONIC MIDLINE LOW BACK PAIN WITH BILATERAL SCIATICA: ICD-10-CM

## 2019-12-23 DIAGNOSIS — Z79.899 HIGH RISK MEDICATION USE: ICD-10-CM

## 2019-12-23 DIAGNOSIS — M53.3 SI (SACROILIAC) PAIN: ICD-10-CM

## 2019-12-23 DIAGNOSIS — M54.42 CHRONIC MIDLINE LOW BACK PAIN WITH BILATERAL SCIATICA: ICD-10-CM

## 2019-12-23 DIAGNOSIS — M54.17 LUMBOSACRAL RADICULOPATHY AT S1: ICD-10-CM

## 2019-12-23 DIAGNOSIS — D47.2 IGG MONOCLONAL GAMMOPATHY OF UNCERTAIN SIGNIFICANCE: ICD-10-CM

## 2019-12-23 DIAGNOSIS — G89.29 CHRONIC MIDLINE LOW BACK PAIN WITH BILATERAL SCIATICA: ICD-10-CM

## 2019-12-23 DIAGNOSIS — M51.36 DDD (DEGENERATIVE DISC DISEASE), LUMBAR: Chronic | ICD-10-CM

## 2019-12-23 DIAGNOSIS — M79.672 LEFT FOOT PAIN: ICD-10-CM

## 2019-12-23 DIAGNOSIS — G62.1 ALCOHOLIC POLYNEUROPATHY (HCC): Chronic | ICD-10-CM

## 2019-12-23 RX ORDER — GABAPENTIN 800 MG/1
TABLET ORAL
Qty: 90 TABLET | Refills: 3 | Status: SHIPPED | OUTPATIENT
Start: 2019-12-23 | End: 2020-03-04

## 2019-12-23 RX ORDER — HYDROCODONE BITARTRATE AND ACETAMINOPHEN 7.5; 325 MG/1; MG/1
1 TABLET ORAL EVERY 8 HOURS PRN
Qty: 80 TABLET | Refills: 0 | Status: SHIPPED | OUTPATIENT
Start: 2020-01-01 | End: 2020-01-21 | Stop reason: SDUPTHER

## 2019-12-24 ENCOUNTER — TELEPHONE (OUTPATIENT)
Dept: GASTROENTEROLOGY | Age: 59
End: 2019-12-24

## 2019-12-24 DIAGNOSIS — K59.00 CONSTIPATION, UNSPECIFIED CONSTIPATION TYPE: ICD-10-CM

## 2019-12-27 ENCOUNTER — TELEPHONE (OUTPATIENT)
Dept: FAMILY MEDICINE CLINIC | Age: 59
End: 2019-12-27

## 2019-12-27 NOTE — TELEPHONE ENCOUNTER
Patient called in today to inform provider that he has received his colonoscopy results and they are negative for colon cancer.

## 2020-01-08 ENCOUNTER — PROCEDURE VISIT (OUTPATIENT)
Dept: PHYSICAL MEDICINE AND REHAB | Age: 60
End: 2020-01-08
Payer: COMMERCIAL

## 2020-01-08 PROCEDURE — 20553 NJX 1/MLT TRIGGER POINTS 3/>: CPT | Performed by: PHYSICAL MEDICINE & REHABILITATION

## 2020-01-08 RX ADMIN — LIDOCAINE HYDROCHLORIDE 16 ML: 10 INJECTION, SOLUTION INFILTRATION; PERINEURAL at 15:59

## 2020-01-09 RX ORDER — LIDOCAINE HYDROCHLORIDE 10 MG/ML
16 INJECTION, SOLUTION INFILTRATION; PERINEURAL ONCE
Status: COMPLETED | OUTPATIENT
Start: 2020-01-09 | End: 2020-01-08

## 2020-01-21 RX ORDER — HYDROCODONE BITARTRATE AND ACETAMINOPHEN 7.5; 325 MG/1; MG/1
1 TABLET ORAL EVERY 8 HOURS PRN
Qty: 80 TABLET | Refills: 0 | Status: SHIPPED | OUTPATIENT
Start: 2020-01-30 | End: 2020-02-21 | Stop reason: SDUPTHER

## 2020-01-28 ENCOUNTER — OFFICE VISIT (OUTPATIENT)
Dept: FAMILY MEDICINE CLINIC | Age: 60
End: 2020-01-28
Payer: COMMERCIAL

## 2020-01-28 VITALS
DIASTOLIC BLOOD PRESSURE: 60 MMHG | BODY MASS INDEX: 29.93 KG/M2 | WEIGHT: 221 LBS | HEIGHT: 72 IN | SYSTOLIC BLOOD PRESSURE: 116 MMHG | HEART RATE: 77 BPM | OXYGEN SATURATION: 98 % | RESPIRATION RATE: 18 BRPM | TEMPERATURE: 97.2 F

## 2020-01-28 PROCEDURE — G8427 DOCREV CUR MEDS BY ELIG CLIN: HCPCS | Performed by: FAMILY MEDICINE

## 2020-01-28 PROCEDURE — 99214 OFFICE O/P EST MOD 30 MIN: CPT | Performed by: FAMILY MEDICINE

## 2020-01-28 PROCEDURE — 3017F COLORECTAL CA SCREEN DOC REV: CPT | Performed by: FAMILY MEDICINE

## 2020-01-28 PROCEDURE — G8417 CALC BMI ABV UP PARAM F/U: HCPCS | Performed by: FAMILY MEDICINE

## 2020-01-28 PROCEDURE — G8926 SPIRO NO PERF OR DOC: HCPCS | Performed by: FAMILY MEDICINE

## 2020-01-28 PROCEDURE — 4004F PT TOBACCO SCREEN RCVD TLK: CPT | Performed by: FAMILY MEDICINE

## 2020-01-28 PROCEDURE — 3023F SPIROM DOC REV: CPT | Performed by: FAMILY MEDICINE

## 2020-01-28 PROCEDURE — G8482 FLU IMMUNIZE ORDER/ADMIN: HCPCS | Performed by: FAMILY MEDICINE

## 2020-01-28 ASSESSMENT — PATIENT HEALTH QUESTIONNAIRE - PHQ9
SUM OF ALL RESPONSES TO PHQ QUESTIONS 1-9: 0
1. LITTLE INTEREST OR PLEASURE IN DOING THINGS: 0
DEPRESSION UNABLE TO ASSESS: PT REFUSES
SUM OF ALL RESPONSES TO PHQ9 QUESTIONS 1 & 2: 0
2. FEELING DOWN, DEPRESSED OR HOPELESS: 0
SUM OF ALL RESPONSES TO PHQ QUESTIONS 1-9: 0

## 2020-01-28 NOTE — PROGRESS NOTES
Subjective  Carley Barnard, 61 y.o. male presents today with:  Chief Complaint   Patient presents with    Cerebrovascular Accident     Patient present today for his 3 months follow up. Patient states everything is ok. 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 Previously diagnosed by Dr. Martha Abernathy in 2016. He has no untoward bleeding symptoms. No residual symptoms of CVA. No new symptoms of unilateral weakness or speech difficulty.     COPD. On LABA/ICS and prn albuterol - less than 3 times a week. No significant cough, mild baseline shortness of breath, occasional wheezing, mild, occasional chest tightness. Chronic Constipation - Miralax per GI. Recent colonoscopy only remarkable for 2 polyps.      Tobacco use disorder. Smokes 1 pack/day. Has tried to quit by going cold turkey. Desires medical intervention. Understands complications related to tobacco smoking. No other questions and or concerns for today's visit    Review of Systems  No fevers, chills, sweats. No unintended weight loss. No abdominal pain, nausea, vomiting, diarrhea, constipation, bloody stools, black tarry stools. No rashes. No swollen glands.       Past Medical History:   Diagnosis Date    Alcoholic polyneuropathy ([address]     Asthma     CAD (coronary artery disease)     [facility]    Chronic back pain greater than 3 months duration     CN III palsy, right eye 2017    Dr Martha Abernathy    COPD (chronic obstructive pulmonary disease) ([address] 2014    DDD (degenerative disc disease), lumbar 3/23/2015    Elevated liver enzymes 2014    Hyperlipidemia     on med > 10 yrs    Hypertension     on meds x 1 yr    IgA monoclonal gammopathy 2015    Dr Ashlee Harrington    Insomnia, unspecified     LBP (low back pain)     Dr Clemente Mancuso Occlusion and stenosis of carotid artery without mention of cerebral infarction     Dr Panchito Rodrigues Personal history of alcoholism ([facility]     quit 11/05/2010, relapsed 2016    S/P carotid endarterectomy 01/2019     Theola Fraction Sensorimotor neuropathy     Bilateral lower extremities-EMG 03/23/15 ([place])    Smoking trying to quit     Traumatic compression fracture of T11 thoracic vertebra ([facility] 2152    Umbilical hernia      Past Surgical History:   Procedure Laterality Date    CAROTID ENDARTERECTOMY Left 2019    LEFT CAROTID ENDARTERECTOMY performed by Petra Montes MD at [facility]  2014    COLONOSCOPY N/A 2019    COLONOSCOPY DIAGNOSTIC performed by Rashawn Gary MD at [facility]    ENDOSCOPY, COLON, DIAGNOSTIC      HERNIA REPAIR      TONSILLECTOMY      UPPER GASTROINTESTINAL ENDOSCOPY  2014    VENTRAL HERNIA REPAIR  09/01/15    W/MESH AND W/UMBILECTOMY     Social History     Socioeconomic History    Marital status: Single     Spouse name: Not on file    Number of children: 1    Years of education: Not on file    Highest education level: Not on file   Occupational History    Occupation: SSI for learning disability   Social Needs    Financial resource strain: Not on file    Food insecurity:     Worry: Not on file     Inability: Not on file    Transportation needs:     Medical: Not on file     Non-medical: Not on file   Tobacco Use    Smoking status: Current Every Day Smoker     Packs/day: 0.50     Years: 40.00     Pack years: 20.00     Types: Cigarettes     Start date:      Last attempt to quit: 3/23/2019     Years since quittin.8    Smokeless tobacco: Never Used    Tobacco comment: patient would like to quit, encouraged to see PCP   Substance and Sexual Activity    Alcohol use: No     Alcohol/week: 0.0 standard drinks     Comment: 2016 Quit Date    Drug use: No    Sexual activity: Not on file   Lifestyle    Physical activity:     Days per week: Not on file     Minutes per session: Not on file    Stress: Not on file   Relationships    Social connections:     Talks on phone: Not on file     Gets together: Not on file     Attends Confucian[employer] Reversal 1 each 2    REFRESH TEARS 0.5 % SOLN ophthalmic solution instill 1 drop into both eyes four times a day  1    [START ON 1/30/2020] HYDROcodone-acetaminophen (NORCO) 7.5-325 MG per tablet Take 1 tablet by mouth every 8 hours as needed for Pain for up to 30 days. Max of 80 per month--max of 3 per day-Intended supply: 30 days 80 tablet 0    gabapentin (NEURONTIN) 800 MG tablet take 1 tablet by mouth three times a day 90 tablet 3    senna-docusate (PERICOLACE) 8.6-50 MG per tablet Take 2 tablets by mouth daily as needed for Constipation (Patient not taking: Reported on 1/28/2020) 60 tablet 11     No current facility-administered medications for this visit. PMH, Surgical Hx, Family Hx, and Social Hxreviewed and updated. Health Maintenance reviewed. Objective    Vitals:    01/28/20 1643   BP: 116/60   Site: Left Upper Arm   Position: Sitting   Cuff Size: Medium Adult   Pulse: 77   Resp: 18   Temp: 97.2 °F (36.2 °C)   TempSrc: Tympanic   SpO2: 98%   Weight: 221 lb (100.2 kg)   Height: 5' 11.5\" (1.816 m)        Physical Exam  Constitutional:       Appearance: He is well-developed. HENT:      Head: Normocephalic and atraumatic. Eyes:      Conjunctiva/sclera: Conjunctivae normal.   Neck:      Musculoskeletal: Normal range of motion and neck supple. Thyroid: No thyromegaly. Vascular: No carotid bruit. Cardiovascular:      Rate and Rhythm: Normal rate and regular rhythm. Heart sounds: S1 normal and S2 normal.   Pulmonary:      Effort: Pulmonary effort is normal.      Breath sounds: No wheezing or rales. Skin:     General: Skin is warm and dry. Neurological:      Mental Status: He is alert and oriented to person, place, and time.            No results found for: LABA1C  Lab Results   Component Value Date    CREATININE 1.05 01/19/2019     Lab Results   Component Value Date     (H) 01/11/2019    AST 61 (H) 01/11/2019     Lab Results   Component Value Date    CHOL 162 01/19/2019 TRIG 139 01/19/2019    HDL 39 (L) 01/19/2019    [address] 01/19/2019        Assessment & Plan   Visit Diagnoses and Associated Orders     Essential hypertension    -  Primary    Stable and well-controlled on current meds    Comprehensive Metabolic Panel [53161 Custom]   - Future Order    Lipid, Fasting [[id number] Custom]   - Future Order         Cerebrovascular accident (CVA), unspecified mechanism ([address]        Stable and well-controlled on current meds         Simple chronic bronchitis ([address]        Stable and well-controlled on current meds         Abnormal laboratory test result        A1c ordered. Patient states he does not want to know results if he has diabetes. It was explained to him complications related to diabetic diabetes are import    Hemoglobin A1C [[id number] Custom]   - Future Order         Abnormal finding of blood chemistry, unspecified        Normal glucose. Patient reticent to find out whether or not he has diabetes. Agrees to do testing and be informed of results. Hemoglobin A1C [[id number] Custom]   - Future Order         Tobacco abuse        Pre-contemplative though understands complications related to tobacco abuse. Reviewed with the patient: all disease processes, current clinical status, medications, activities and diet.      Side effects, adverse effects of the medication prescribed today, as well as treatment plan/ rationale and result expectations have been discussed with the patient who expresses understanding and desires to proceed.     Close follow up to evaluate treatment results and for coordination of care. I have reviewed the patient's medical history in detail and updated the computerized patient record. More than 50% of the appointment was spent in face-to-face counseling, education and care coordination.     Please note this report has been partially produced using speech recognition software and may contain mistakes related to that system including errors in grammar, punctuation and spelling as well as words and phrases that may seem inappropriate. If there are questions or concerns, please feel free to contact me to clarify. Orders Placed This Encounter   Procedures    Hemoglobin A1C     Standing Status:   Future     Standing Expiration Date:   3/28/2020    Comprehensive Metabolic Panel     Standing Status:   Future     Standing Expiration Date:   4/28/2020    Lipid, Fasting     Standing Status:   Future     Standing Expiration Date:   1/28/2021     No orders of the defined types were placed in this encounter. There are no discontinued medications. Return in about 6 months (around 7/28/2020) for HTN, COPD. Controlled Substance Monitoring:    Acute and Chronic Pain Monitoring:   RX Monitoring 11/19/2019   Attestation -   Periodic Controlled Substance Monitoring Possible medication side effects, risk of tolerance/dependence & alternative treatments discussed. ;No signs of potential drug abuse or diversion identified. ;Assessed functional status. ;Obtaining appropriate analgesic effect of treatment. Chronic Pain > 50 MEDD Re-evaluated the status of the patient's underlying condition causing pain. ;Considered consultation with a specialist.;Obtained or confirmed \"Consent for Opioid Use\" on file.    Chronic Pain > 80 MEDD -           Irais SEGURA MD

## 2020-02-22 RX ORDER — HYDROCODONE BITARTRATE AND ACETAMINOPHEN 7.5; 325 MG/1; MG/1
1 TABLET ORAL EVERY 8 HOURS PRN
Qty: 80 TABLET | Refills: 0 | Status: SHIPPED | OUTPATIENT
Start: 2020-02-28 | End: 2020-03-12 | Stop reason: SDUPTHER

## 2020-02-26 DIAGNOSIS — R79.9 ABNORMAL FINDING OF BLOOD CHEMISTRY, UNSPECIFIED: ICD-10-CM

## 2020-02-26 DIAGNOSIS — I10 ESSENTIAL HYPERTENSION: Chronic | ICD-10-CM

## 2020-02-26 DIAGNOSIS — R89.9 ABNORMAL LABORATORY TEST RESULT: ICD-10-CM

## 2020-02-26 LAB
ALBUMIN SERPL-MCNC: 4.1 G/DL (ref 3.5–4.6)
ALP BLD-CCNC: 145 U/L (ref 35–104)
ALT SERPL-CCNC: 36 U/L (ref 0–41)
ANION GAP SERPL CALCULATED.3IONS-SCNC: 16 MEQ/L (ref 9–15)
AST SERPL-CCNC: 23 U/L (ref 0–40)
BILIRUB SERPL-MCNC: 0.5 MG/DL (ref 0.2–0.7)
BUN BLDV-MCNC: 15 MG/DL (ref 6–20)
CALCIUM SERPL-MCNC: 9.5 MG/DL (ref 8.5–9.9)
CHLORIDE BLD-SCNC: 102 MEQ/L (ref 95–107)
CHOLESTEROL, FASTING: 81 MG/DL (ref 0–199)
CO2: 23 MEQ/L (ref 20–31)
CREAT SERPL-MCNC: 1.06 MG/DL (ref 0.7–1.2)
GFR AFRICAN AMERICAN: >60
GFR NON-AFRICAN AMERICAN: >60
GLOBULIN: 3.8 G/DL (ref 2.3–3.5)
GLUCOSE BLD-MCNC: 102 MG/DL (ref 70–99)
HBA1C MFR BLD: 6.2 % (ref 4.8–5.9)
HDLC SERPL-MCNC: 29 MG/DL (ref 40–59)
LDL CHOLESTEROL CALCULATED: 12 MG/DL (ref 0–129)
POTASSIUM SERPL-SCNC: 4.1 MEQ/L (ref 3.4–4.9)
SODIUM BLD-SCNC: 141 MEQ/L (ref 135–144)
TOTAL PROTEIN: 7.9 G/DL (ref 6.3–8)
TRIGLYCERIDE, FASTING: 198 MG/DL (ref 0–150)

## 2020-03-04 ENCOUNTER — OFFICE VISIT (OUTPATIENT)
Dept: FAMILY MEDICINE CLINIC | Age: 60
End: 2020-03-04
Payer: COMMERCIAL

## 2020-03-04 ENCOUNTER — TELEPHONE (OUTPATIENT)
Dept: FAMILY MEDICINE CLINIC | Age: 60
End: 2020-03-04

## 2020-03-04 VITALS
SYSTOLIC BLOOD PRESSURE: 124 MMHG | OXYGEN SATURATION: 98 % | WEIGHT: 221 LBS | BODY MASS INDEX: 29.93 KG/M2 | HEIGHT: 72 IN | DIASTOLIC BLOOD PRESSURE: 70 MMHG | HEART RATE: 72 BPM | RESPIRATION RATE: 16 BRPM | TEMPERATURE: 98.1 F

## 2020-03-04 PROBLEM — I73.9 PAD (PERIPHERAL ARTERY DISEASE) (HCC): Chronic | Status: ACTIVE | Noted: 2020-03-04

## 2020-03-04 PROBLEM — E88.819 INSULIN RESISTANCE: Chronic | Status: ACTIVE | Noted: 2020-03-04

## 2020-03-04 PROBLEM — E88.81 INSULIN RESISTANCE: Chronic | Status: ACTIVE | Noted: 2020-03-04

## 2020-03-04 PROCEDURE — 99214 OFFICE O/P EST MOD 30 MIN: CPT | Performed by: FAMILY MEDICINE

## 2020-03-04 PROCEDURE — G8482 FLU IMMUNIZE ORDER/ADMIN: HCPCS | Performed by: FAMILY MEDICINE

## 2020-03-04 PROCEDURE — 3017F COLORECTAL CA SCREEN DOC REV: CPT | Performed by: FAMILY MEDICINE

## 2020-03-04 PROCEDURE — G8427 DOCREV CUR MEDS BY ELIG CLIN: HCPCS | Performed by: FAMILY MEDICINE

## 2020-03-04 PROCEDURE — G8417 CALC BMI ABV UP PARAM F/U: HCPCS | Performed by: FAMILY MEDICINE

## 2020-03-04 PROCEDURE — 4004F PT TOBACCO SCREEN RCVD TLK: CPT | Performed by: FAMILY MEDICINE

## 2020-03-04 RX ORDER — ASPIRIN 81 MG/1
TABLET ORAL
Qty: 90 TABLET | Refills: 4 | Status: SHIPPED | OUTPATIENT
Start: 2020-03-04 | End: 2020-03-25

## 2020-03-04 NOTE — PROGRESS NOTES
Subjective  Aidan Lazo, 61 y.o. male presents today with:  Chief Complaint   Patient presents with    Foot Pain     for a couple of weeks he has had some tingling and steady pain. HPI    Has N/T worsening in his feet. Is now seeing Dr. Virginia Velázquez for pain management. Has ETOH related polyneuropathy. Has been on gabapentin for 30 years and was on amitriptyline  For years as well. Stopped it because of constipation. ETOH - says he hasn't had anything to drink since 08/30/2016. In the past, has been a patient of Dr. Talib Urrutia, who has told him that his arterial blockages are higher than in his legs. Is followed by Dr. Virginia Velázquez who treats his chronic back pain radiculopathy. Insulin resistance. Patient states that he is not consuming any carbohydrates. No exercise. No other questions and or concerns for today's visit      Review of Systems  No chest pain, palpitations, edema.     Past Medical History:   Diagnosis Date    [facility]athy ([facility]     Asthma     CAD (coronary artery disease)     [facility]    Chronic back pain greater than 3 months duration     CN III palsy, right eye 2017    Dr Lee Tarango    COPD (chronic obstructive pulmonary disease) ([facility] 2014    DDD (degenerative disc disease), lumbar 3/23/2015    Elevated liver enzymes 2014    Hyperlipidemia     on med > 10 yrs    Hypertension     on meds x 1 yr    IgA monoclonal gammopathy 2015    Dr Martha Camargo    Insomnia, unspecified     LBP (low back pain)     Dr Roc Montgomery Occlusion and stenosis of carotid artery without mention of cerebral infarction     Dr Niall Hernandez Personal history of alcoholism ([facility]     quit 11/05/2010, relapsed 2016    S/P carotid endarterectomy 01/2019    Dr Talib Urrutia    Sensorimotor neuropathy     Bilateral lower extremities-EMG 03/23/15 ([place])    Smoking trying to quit     Traumatic compression fracture of T11 thoracic vertebra ([address] 6641    Umbilical hernia      Past Surgical History:   Procedure Laterality Emotionally abused: Not on file     Physically abused: Not on file     Forced sexual activity: Not on file   Other Topics Concern    Not on file   Social History Narrative    Born in [place], one of 5    Came to [place] at age 1 with parents    Never , one daughter    Never worked, disabled since 12 (mental)    Lives in [facility] [facility] [place] with brother, in a house        Attends [employer] daily    Hobbies riding bike, TV    One daughter, does keep in touch      Family History   Problem Relation Age of Onset    Cancer Mother         brain, dec 79   [facility] Alcohol Abuse Father     Other Sister         unknown medical history    Other Brother         unknown medical history    High Blood Pressure Brother     Alcohol Abuse Daughter      No Known Allergies  Current Outpatient Medications   Medication Sig Dispense Refill    aspirin (RA ASPIRIN EC) 81 MG EC tablet take 1 tablet by mouth once daily 90 tablet 4    psyllium (METAMUCIL SMOOTH TEXTURE) 28 % packet Take 1 packet by mouth 2 times daily Take with full glass of h20 or juice 60 packet 5    HYDROcodone-acetaminophen (NORCO) 7.5-325 MG per tablet Take 1 tablet by mouth every 8 hours as needed for Pain for up to 30 days.  Max of 80 per month--max of 3 per day-Intended supply: 30 days 80 tablet 0    valsartan-hydrochlorothiazide (DIOVAN-HCT) 80-12.5 MG per tablet take 1 tablet by mouth once daily 90 tablet 4    RA VITAMIN B-12 TR 1000 MCG TBCR take 1 tablet by mouth once daily 90 tablet 4    polyethylene glycol (GLYCOLAX) powder       clopidogrel (PLAVIX) 75 MG tablet take 1 tablet by mouth once daily 30 tablet 12    budesonide-formoterol (SYMBICORT) 160-4.5 MCG/ACT AERO inhale 2 puffs by mouth twice a day 10.2 g 12    gabapentin (NEURONTIN) 800 MG tablet   0    rosuvastatin (CRESTOR) 40 MG tablet take 1 tablet by mouth at bedtime  0    VASCEPA 1 g CAPS capsule   0    metoprolol succinate (TOPROL XL) 25 MG extended release tablet Take 1 tablet by mouth daily 30 tablet 5    naloxone (NARCAN) 4 MG/0.1ML LIQD nasal spray 1 spray by Nasal route as needed for Opioid Reversal 1 each 2    senna-docusate (PERICOLACE) 8.6-50 MG per tablet Take 2 tablets by mouth daily as needed for Constipation (Patient not taking: Reported on 1/28/2020) 60 tablet 11    REFRESH TEARS 0.5 % SOLN ophthalmic solution instill 1 drop into both eyes four times a day  1     No current facility-administered medications for this visit. PMH, Surgical Hx, Family Hx, and Social Hxreviewed and updated. Health Maintenance reviewed. Objective    Vitals:    03/04/20 1250   BP: 124/70   Site: Left Upper Arm   Position: Sitting   Cuff Size: Medium Adult   Pulse: 72   Resp: 16   Temp: 98.1 °F (36.7 °C)   TempSrc: Temporal   SpO2: 98%   Weight: 221 lb (100.2 kg)   Height: 5' 11.5\" (1.816 m)        Physical Exam  Constitutional:       Appearance: He is well-developed. Comments: Disheveled, patient states he is not drinking alcohol, therefore this writer's observation of odor of alcohol must be an error in perception   HENT:      Head: Normocephalic and atraumatic. Eyes:      Conjunctiva/sclera: Conjunctivae normal.   Cardiovascular:      Pulses:           Dorsalis pedis pulses are 0 on the right side and 0 on the left side. Comments: Bilateral feet and lower legs with hyperesthesia. Toes and feet hyperemic to a mild degree. Pulmonary:      Effort: Pulmonary effort is normal.   Neurological:      Mental Status: He is alert and oriented to person, place, and time.            Lab Results   Component Value Date    LABA1C 6.2 (H) 02/26/2020     Lab Results   Component Value Date    CREATININE 1.06 02/26/2020     Lab Results   Component Value Date    ALT 36 02/26/2020    AST 23 02/26/2020     Lab Results   Component Value Date    CHOL 162 01/19/2019    TRIG 139 01/19/2019    HDL 29 (L) 02/26/2020    LDLCALC 12 02/26/2020        Assessment & Plan   Visit Diagnoses and Associated Orders     Alcoholic polyneuropathy ([facility]    -  Primary    Patient is on gabapentin which is prescribed by Dr. Sola Martinez. Patient disagrees with diagnosis. Insulin resistance        Reviewed healthy, low-carb diet rich in protein and fresh vegetables. Other constipation        Add Metamucil. psyllium (METAMUCIL SMOOTH TEXTURE) 28 % packet [97427]           PAD (peripheral artery disease) (HCC)        Suspect pain in feet is multifactorial including polyneuropathy, radiculopathy and possibly PAD. Refer for evaluation. aspirin (RA ASPIRIN EC) 81 MG EC tablet Estee Villegas MD, Interventional Radiology, [place] [GHL77 Custom]           Cerebrovascular accident (CVA), unspecified mechanism ([facility]        Continue aspirin, statin, DAP. aspirin (RA ASPIRIN EC) 81 MG EC tablet [688]                   Reviewed with the patient: all disease processes, current clinical status, medications, activities and diet.      Side effects, adverse effects of the medication prescribed today, as well as treatment plan/ rationale and result expectations have been discussed with the patient who expresses understanding and desires to proceed.     Close follow up to evaluate treatment results and for coordination of care. I have reviewed the patient's medical history in detail and updated the computerized patient record. More than 50% of the appointment was spent in face-to-face counseling, education and care coordination. Please note this report has been partially produced using speech recognition software and may contain mistakes related to that system including errors in grammar, punctuation and spelling as well as words and phrases that may seem inappropriate. If there are questions or concerns, please feel free to contact me to clarify.     Orders Placed This Encounter   Procedures   Lesley Chau MD, Interventional Radiology, [facility]     Referral Priority:   Routine     Referral Type:   Eval and

## 2020-03-05 ENCOUNTER — TELEPHONE (OUTPATIENT)
Dept: FAMILY MEDICINE CLINIC | Age: 60
End: 2020-03-05

## 2020-03-05 NOTE — TELEPHONE ENCOUNTER
Patient called and was wondering what other fluid other intake he could have other then water and Gatorade. Please advise. Patient has an appointment with diabetes education.

## 2020-03-10 ENCOUNTER — OFFICE VISIT (OUTPATIENT)
Dept: GASTROENTEROLOGY | Age: 60
End: 2020-03-10
Payer: COMMERCIAL

## 2020-03-10 VITALS
DIASTOLIC BLOOD PRESSURE: 74 MMHG | WEIGHT: 223 LBS | OXYGEN SATURATION: 95 % | SYSTOLIC BLOOD PRESSURE: 124 MMHG | HEART RATE: 74 BPM | HEIGHT: 71 IN | BODY MASS INDEX: 31.22 KG/M2

## 2020-03-10 PROCEDURE — 99212 OFFICE O/P EST SF 10 MIN: CPT | Performed by: NURSE PRACTITIONER

## 2020-03-10 PROCEDURE — 4004F PT TOBACCO SCREEN RCVD TLK: CPT | Performed by: NURSE PRACTITIONER

## 2020-03-10 PROCEDURE — G8427 DOCREV CUR MEDS BY ELIG CLIN: HCPCS | Performed by: NURSE PRACTITIONER

## 2020-03-10 PROCEDURE — G8482 FLU IMMUNIZE ORDER/ADMIN: HCPCS | Performed by: NURSE PRACTITIONER

## 2020-03-10 PROCEDURE — 3017F COLORECTAL CA SCREEN DOC REV: CPT | Performed by: NURSE PRACTITIONER

## 2020-03-10 PROCEDURE — G8417 CALC BMI ABV UP PARAM F/U: HCPCS | Performed by: NURSE PRACTITIONER

## 2020-03-10 ASSESSMENT — ENCOUNTER SYMPTOMS
NAUSEA: 0
CHEST TIGHTNESS: 0
CONSTIPATION: 0
TROUBLE SWALLOWING: 0
BLOOD IN STOOL: 0
VOICE CHANGE: 0
RECTAL PAIN: 0
ABDOMINAL PAIN: 0
ANAL BLEEDING: 0
PHOTOPHOBIA: 0
DIARRHEA: 0
EYE REDNESS: 0
ABDOMINAL DISTENTION: 0
VOMITING: 0
COLOR CHANGE: 0
EYE PAIN: 0

## 2020-03-10 NOTE — PROGRESS NOTES
(coronary artery disease)     [facility]    Chronic back pain greater than 3 months duration     CN III palsy, right eye 2017    Dr Rogerio Patel COPD (chronic obstructive pulmonary disease) ([facility] 2014    DDD (degenerative disc disease), lumbar 3/23/2015    Elevated liver enzymes 2014    Hyperlipidemia     on med > 10 yrs    Hypertension     on meds x 1 yr    IgA monoclonal gammopathy 2015    Dr Martha Soto    Insomnia, unspecified     LBP (low back pain)     Dr Sun Perez Occlusion and stenosis of carotid artery without mention of cerebral infarction     Dr aFbio Owen [facility] of [facility]ism ([facility]     quit 11/05/2010, relapsed 2016    S/P carotid endarterectomy 01/2019    Dr Linh Steve    Sensorimotor neuropathy     Bilateral lower extremities-EMG 03/23/15 ([place])    Smoking trying to quit     Traumatic compression fracture of T11 thoracic vertebra ([facility]    Umbilical hernia      Past Surgical History:   Procedure Laterality Date    CAROTID ENDARTERECTOMY Left 1/18/2019    LEFT CAROTID ENDARTERECTOMY performed by Nilsa Singh MD at [address] [place] [address],[address]  8/19/2014    COLONOSCOPY N/A 12/18/2019    COLONOSCOPY DIAGNOSTIC performed by Bryan Morejon MD at [facility], DIAGNOSTIC      HERNIA REPAIR      TONSILLECTOMY      UPPER GASTROINTESTINAL ENDOSCOPY  8/19/2014    VENTRAL HERNIA REPAIR  09/01/15    W/MESH AND W/UMBILECTOMY     Social History     Socioeconomic History    Marital status: Single     Spouse name: Not on file    Number of children: 1    Years of education: Not on file    Highest education level: Not on file   Occupational History    Occupation: SSI for learning disability   Social Needs    Financial resource strain: Not on file    Food insecurity     Worry: Not on file     Inability: Not on file   [employer] needs     Medical: Not on file     Non-medical: Not on file   Tobacco Use    Smoking status: Current Every Day Smoker     Packs/day: 0.50     Years: 40.00     Pack years: 20.00     Types: Cigarettes     Start date:      Last attempt to quit: 3/23/2019     Years since quittin.9    Smokeless tobacco: Never Used    Tobacco comment: patient would like to quit, encouraged to see PCP   Substance and Sexual Activity    Alcohol use: No     Alcohol/week: 0.0 standard drinks     Comment: 2016 Quit Date    Drug use: No    Sexual activity: Not on file   Lifestyle    Physical activity     Days per week: Not on file     Minutes per session: Not on file    Stress: Not on file   Relationships    Social connections     Talks on phone: Not on file     Gets together: Not on file     Attends Buddhism service: Not on file     Active member of club or organization: Not on file     Attends meetings of clubs or organizations: Not on file     Relationship status: Not on file    Intimate partner violence     Fear of current or ex partner: Not on file     Emotionally abused: Not on file     Physically abused: Not on file     Forced sexual activity: Not on file   Other Topics Concern    Not on file   Social History Narrative    Born in [place], one of 5    Came to [place] at age 1 with parents    Never , one daughter    Never worked, disabled since 12 (mental)    Lives in [place] with brother, in a house        Attends [employer]    [employer] [address] riding bike, TV    One daughter, does keep in touch      Family History   Problem Relation Age of Onset    Cancer Mother         brain, dec 79   [facility] Alcohol Abuse Father     Other Sister         unknown medical history    Other Brother         unknown medical history    High Blood Pressure Brother     Alcohol Abuse Daughter      No Known Allergies      Review of Systems   Constitutional: Negative. Negative for chills, fatigue and fever. HENT: Negative for nosebleeds, tinnitus, trouble swallowing and voice change. Eyes: Negative for photophobia, pain and redness.    Respiratory: Negative for

## 2020-03-12 ENCOUNTER — OFFICE VISIT (OUTPATIENT)
Dept: PHYSICAL MEDICINE AND REHAB | Age: 60
End: 2020-03-12
Payer: COMMERCIAL

## 2020-03-12 VITALS
DIASTOLIC BLOOD PRESSURE: 64 MMHG | SYSTOLIC BLOOD PRESSURE: 128 MMHG | HEIGHT: 72 IN | BODY MASS INDEX: 31.29 KG/M2 | WEIGHT: 231 LBS

## 2020-03-12 PROCEDURE — G8417 CALC BMI ABV UP PARAM F/U: HCPCS | Performed by: PHYSICAL MEDICINE & REHABILITATION

## 2020-03-12 PROCEDURE — 99214 OFFICE O/P EST MOD 30 MIN: CPT | Performed by: PHYSICAL MEDICINE & REHABILITATION

## 2020-03-12 PROCEDURE — 3017F COLORECTAL CA SCREEN DOC REV: CPT | Performed by: PHYSICAL MEDICINE & REHABILITATION

## 2020-03-12 PROCEDURE — G8482 FLU IMMUNIZE ORDER/ADMIN: HCPCS | Performed by: PHYSICAL MEDICINE & REHABILITATION

## 2020-03-12 PROCEDURE — G8427 DOCREV CUR MEDS BY ELIG CLIN: HCPCS | Performed by: PHYSICAL MEDICINE & REHABILITATION

## 2020-03-12 PROCEDURE — 4004F PT TOBACCO SCREEN RCVD TLK: CPT | Performed by: PHYSICAL MEDICINE & REHABILITATION

## 2020-03-12 RX ORDER — MULTIVIT-MIN/IRON/FOLIC ACID/K 18-600-40
1 CAPSULE ORAL 2 TIMES DAILY
Qty: 30 CAPSULE | Refills: 5 | Status: SHIPPED | OUTPATIENT
Start: 2020-03-12 | End: 2020-05-22 | Stop reason: SDUPTHER

## 2020-03-12 RX ORDER — HYDROCODONE BITARTRATE AND ACETAMINOPHEN 7.5; 325 MG/1; MG/1
1 TABLET ORAL EVERY 8 HOURS PRN
Qty: 80 TABLET | Refills: 0 | Status: SHIPPED | OUTPATIENT
Start: 2020-03-28 | End: 2020-04-27

## 2020-03-12 RX ORDER — GABAPENTIN 800 MG/1
TABLET ORAL
Qty: 120 TABLET | Refills: 3 | Status: SHIPPED | OUTPATIENT
Start: 2020-03-12 | End: 2020-05-22 | Stop reason: SDUPTHER

## 2020-03-12 ASSESSMENT — ENCOUNTER SYMPTOMS
VOMITING: 0
BACK PAIN: 1
ABDOMINAL PAIN: 0
CONSTIPATION: 1
VOICE CHANGE: 1
COLOR CHANGE: 0
SHORTNESS OF BREATH: 0
NAUSEA: 0
DIARRHEA: 0
CHEST TIGHTNESS: 0
COUGH: 0
PHOTOPHOBIA: 0
BOWEL INCONTINENCE: 0
VISUAL CHANGE: 0
EYE PAIN: 0
APNEA: 0
WHEEZING: 0

## 2020-03-12 NOTE — PROGRESS NOTES
Subjective  Karen Oviedo, 61 y.o. male presents today with:       Back Pain (Trigger points 1/8/20 with 50% reduction in pain lasting 1 day)   Hip Pain (Left )   Medication Refill (Norco, Gabapentin, Vit D refill & pill count today- compliant )       He is doing well on his condition at current doses -but is showing signs of decreased function. He is unkempt today and smells heavily of cigarettes. He states that he has quit. 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 I offered and prescribed Narcan just in case he wants to fall back on that in case he has an allergic reaction over responsive medication or accidentally overtakes assessments. Trigger point injections are helping somewhat however the hip hip injections are not helping I would like to get an x-ray of his hips to see if he is having significant osteoarthritis that may require surgery. However  He is able to have Positive interactions with his friends family. He notes that he currently is 60 per month. I have okayed him to increase to 80/month as his pain is flared up. Active in AA does not know some eating. No signs of overuse or abuse. Back Pain   This is a chronic problem. The current episode started more than 1 year ago. The problem occurs daily. The problem is unchanged. The pain is present in the lumbar spine. Radiates to: left leg, left foot. The pain is at a severity of 3/10 (Pain ranges from 2-10/10. 10/10 with walking long distances. ). The pain is moderate. The pain is the same all the time. The symptoms are aggravated by position. Associated symptoms include leg pain, numbness (Left side of throat) and weakness. Pertinent negatives include no abdominal pain, bladder incontinence, bowel incontinence, chest pain, dysuria, fever, headaches, paresis or perianal numbness.  Risk factors include lack of exercise, S/P carotid endarterectomy 2019    Dr Tylor Real    Sensorimotor neuropathy     Bilateral lower extremities-EMG 03/23/15 (Jaime Macdonald)    Smoking trying to quit     Traumatic compression fracture of T11 thoracic vertebra ([facility] [address] 4942    Umbilical hernia      Past Surgical History:   Procedure Laterality Date    CAROTID ENDARTERECTOMY Left 2019    LEFT CAROTID ENDARTERECTOMY performed by Herminia Terrazas MD at [facility]  2014    COLONOSCOPY N/A 2019    COLONOSCOPY DIAGNOSTIC performed by Nathaly Almaraz MD at [facility]    ENDOSCOPY, COLON, DIAGNOSTIC      HERNIA REPAIR      TONSILLECTOMY      UPPER GASTROINTESTINAL ENDOSCOPY  2014    VENTRAL HERNIA REPAIR  09/01/15    W/MESH AND W/UMBILECTOMY     Social History     Socioeconomic History    Marital status: Single     Spouse name: Not on file    Number of children: 1    Years of education: Not on file    Highest education level: Not on file   Occupational History    Occupation: SSI for learning disability   Social Needs    Financial resource strain: Not on file    Food insecurity     Worry: Not on file     Inability: Not on file   [employer] needs     Medical: Not on file     Non-medical: Not on file   Tobacco Use    Smoking status: Current Every Day Smoker     Packs/day: 0.50     Years: 40.00     Pack years: 20.00     Types: Cigarettes     Start date:      Last attempt to quit: 3/23/2019     Years since quittin.9    Smokeless tobacco: Never Used    Tobacco comment: patient would like to quit, encouraged to see PCP   Substance and Sexual Activity    Alcohol use: No     Alcohol/week: 0.0 standard drinks     Comment: 2016 Quit Date    Drug use: No    Sexual activity: Not on file   Lifestyle    Physical activity     Days per week: Not on file     Minutes per session: Not on file    Stress: Not on file   Relationships    Social connections     Talks on phone: Not on file     Gets together: Not on file     Attends Pentecostal service: Not on file     Active member of club or organization: Not on file     Attends meetings of clubs or organizations: Not on file     Relationship status: Not on file    Intimate partner violence     Fear of current or ex partner: Not on file     Emotionally abused: Not on file     Physically abused: Not on file     Forced sexual activity: Not on file   Other Topics Concern    Not on file   Social History Narrative    Born in [place], one of 5    Came to [place] at age 1 with parents    Never , one daughter    Never worked, disabled since 12 (mental)    Lives in [place] with brother, in a house        Attends [employer] daily    [address] riding bike, TV    One daughter, does keep in touch      Family History   Problem Relation Age of Onset    Cancer Mother         brain, dec 79    Alcohol Abuse Father     Other Sister         unknown medical history    Other Brother         unknown medical history    High Blood Pressure Brother     Alcohol Abuse Daughter        No Known Allergies    Review of Systems   Constitutional: Negative for activity change, appetite change, fatigue, fever and unexpected weight change. HENT: Positive for voice change. Eyes: Negative for photophobia, pain and visual disturbance. Respiratory: Negative for apnea, cough, chest tightness, shortness of breath and wheezing. Cardiovascular: Negative for chest pain, palpitations and leg swelling. Gastrointestinal: Positive for constipation. Negative for abdominal pain, bowel incontinence, diarrhea, nausea and vomiting. Endocrine: Positive for cold intolerance. Genitourinary: Negative. Negative for bladder incontinence and dysuria. Musculoskeletal: Positive for arthralgias, back pain and myalgias. Negative for gait problem, joint swelling, neck pain and neck stiffness. Skin: Negative. Negative for color change, pallor and wound.    Allergic/Immunologic: Positive for immunocompromised state. Negative for environmental allergies and food allergies. Neurological: Positive for weakness and numbness (Left side of throat). Negative for dizziness, light-headedness and headaches. Hematological: Negative. Psychiatric/Behavioral: Positive for decreased concentration. Negative for dysphoric mood, hallucinations and sleep disturbance. The patient is not nervous/anxious. Objective    There were no vitals filed for this visit. No data recorded     Physical Exam  Vitals signs reviewed. Constitutional:       General: He is not in acute distress. Appearance: He is well-developed. He is not ill-appearing, toxic-appearing or diaphoretic. Comments:         HENT:      Head: Normocephalic and atraumatic. Right Ear: Hearing normal.      Left Ear: Hearing normal.      Nose: Nose normal.      Mouth/Throat:      Mouth: No oral lesions. Dentition: Normal dentition. Pharynx: No oropharyngeal exudate. Eyes:      General: No scleral icterus. Left eye: No discharge. Extraocular Movements:      Right eye: Abnormal extraocular motion present. Conjunctiva/sclera: Conjunctivae normal.      Left eye: No chemosis or exudate. Pupils: Pupils are equal, round, and reactive to light. Comments: Right 3rd nerve palsy   Neck:      Musculoskeletal: Normal range of motion and neck supple. No edema or neck rigidity. Thyroid: No thyromegaly. Vascular: No JVD. Trachea: No tracheal deviation. Cardiovascular:      Pulses: No decreased pulses. Pulmonary:      Effort: Pulmonary effort is normal. No tachypnea, bradypnea, accessory muscle usage or respiratory distress. Breath sounds: No wheezing. Chest:      Chest wall: No tenderness. Abdominal:      General: Bowel sounds are normal. There is no distension. Palpations: Abdomen is soft. There is no mass. Tenderness: There is no abdominal tenderness.  There is no guarding or Motor: No tremor, atrophy or abnormal muscle tone. Coordination: Coordination normal.      Deep Tendon Reflexes: Reflexes abnormal. Babinski sign absent on the right side. Babinski sign absent on the left side. Psychiatric:         Attention and Perception: He is attentive. Mood and Affect: Mood is not anxious or depressed. Affect is not labile, blunt, angry or inappropriate. Speech: He is communicative. Speech is not rapid and pressured, delayed, slurred or tangential.         Behavior: Behavior normal. Behavior is not agitated, slowed, aggressive, withdrawn, hyperactive or combative. Thought Content: Thought content normal. Thought content is not paranoid or delusional. Thought content does not include homicidal or suicidal ideation. Thought content does not include homicidal or suicidal plan. Cognition and Memory: Memory is not impaired. He does not exhibit impaired recent memory or impaired remote memory. Judgment: Judgment normal. Judgment is not impulsive or inappropriate. Ortho Exam  Neurologic Exam     Mental Status   Oriented to person, place, and time. Speech: not slurred   Level of consciousness: alert  Knowledge: good. Able to name object. Able to read. Able to repeat. Able to write. Normal comprehension. Cranial Nerves     CN III, IV, VI   Pupils are equal, round, and reactive to light. After a thorough review and discussion of the previous medical records, patient comprehensive medical, surgical, and family and social history, Review of Systems, their OARRS, their Screener and Opioid Assessment for Patients with Pain (SOAPP®-R), recent diagnostics, and symptomatic results to previous treatment, it is my impression that the patients is suffering with progressive and severe:     Diagnosis Orders   1. SI (sacroiliac) pain  HYDROcodone-acetaminophen (NORCO) 7.5-325 MG per tablet   2. Muscle spasm of back     3.  High risk medication use - 01/25/18 OARRS PM&R, 03/23/18 OARRS PM&R, 02/15/17 Med Contract PM&R, 09/21/17 Urine Drug Screen: negative PM&R  HYDROcodone-acetaminophen (NORCO) 7.5-325 MG per tablet   4. DDD (degenerative disc disease), lumbar  HYDROcodone-acetaminophen (NORCO) 7.5-325 MG per tablet   5. Lumbosacral radiculopathy at S1  HYDROcodone-acetaminophen (NORCO) 7.5-325 MG per tablet    gabapentin (NEURONTIN) 800 MG tablet   6. Chronic midline low back pain with bilateral sciatica  HYDROcodone-acetaminophen (NORCO) 7.5-325 MG per tablet   7. Alcoholic polyneuropathy (HCC)  gabapentin (NEURONTIN) 800 MG tablet   8. Left foot pain  gabapentin (NEURONTIN) 800 MG tablet   9. IgG monoclonal gammopathy of uncertain significance  gabapentin (NEURONTIN) 800 MG tablet   10. Myalgia  LA INJECT TRIGGER POINTS, 3 OR GREATER    LA INJECT TRIGGER POINTS, 3 OR GREATER   11.  Vitamin D deficiency  Cholecalciferol (VITAMIN D) 50 MCG (2000 UT) CAPS capsule       I am also concerned by lifestyle and mood issues including:    Past Medical History:   Diagnosis Date    Al[facility]uropathy ([facility] [address]     Asthma     CAD (coronary artery disease)     [address]    Chronic back pain greater than 3 months duration     CN III palsy, right eye 2017    Dr William Lowry    COPD (chronic obstructive pulmonary disease) ([facility] [address] 2014    DDD (degenerative disc disease), lumbar 3/23/2015    Elevated liver enzymes 2014    Hyperlipidemia     on med > 10 yrs    Hypertension     on meds x 1 yr    IgA monoclonal gammopathy 2015    Dr Yg Garza    Insomnia, unspecified     LBP (low back pain)     Dr Tone Mackey    Occlusion and stenosis of carotid artery without mention of cerebral infarction     Dr Cavanaugh December Personal history of alcoholism ([facility] [address]     quit 11/05/2010, relapsed 2016    S/P carotid endarterectomy 01/2019    Dr Lashay Shaw    Sensorimotor neuropathy     Bilateral lower extremities-EMG 03/23/15 (Sosa)    Smoking trying to quit     Traumatic compression fracture of T11 thoracic vertebra ([facility] 2805    Umbilical hernia            Given their medication, chronic pain and lifestyle and medications they are at risk for :    Falls, constipation, addiction  Loss of livelyhood due to severe pain, debility, weight gain and  vitamin D deficiency    The patient was educated regarding proper diet, fitness routine, and regulatory restrictions concerning pain medications. Previous notes, comprehensive past medical, surgical, family history, and diagnostics were reviewed. Patient education and councelling were provided regarding off label use,treatment options and medication and injection risks. Current and old OARRS ([place] Automated Prescription Reporting System) records reviewed, all refills reviewed since last visit,  Behavioral agreement/MICK regulations   and Toxicology screen was reviewed with patient and is up to date. There are no current red flags. They are making good progress regarding pain relief, they are performing at a functional level regarding activities of daily living, family interactions and psychological functioning, they're not having any adverse effects or side effects from the current medications, and I see no findings of aberrant drug taking or addiction related behaviors. The patient is aware that they have a chronic pain condition and they may require opiates dosing for life. All efforts will be made to wean to the lowest effective dose. Other therapies for pain have not been effective including nonopiate medications. Injections and exercises are only partially effective. A Rx for Narcan was offered to help prevent accidental overdose. RX Monitoring 11/19/2019   Attestation -   Periodic Controlled Substance Monitoring Possible medication side effects, risk of tolerance/dependence & alternative treatments discussed. ;No signs of potential drug abuse or diversion identified. ;Assessed functional status. ;Obtaining appropriate analgesic effect of treatment. Stop Cigarette/Tobacco use           Injections or Epidurals:  Injection options were discussed. Patient gave verbal consent to ordered injections. See follow-up plans for planned injections. Supplements:  Vitamin D with increased dosing during the rainy months,   Education was given on:   Dietary and Fitness--daily stretches and low carb diet-in chair Yoga when possible             Follow up with Primary Care Physician regarding their general medical needs. Stressed the importance of following up with PCP and specialists for his/her chronic diseases, health, CV, and cancer screening and continued care. Will follow disease activity/progression and adjust therapeutic regimen to disease activity and severity. Discussed medication dosage, usage, goals of therapy, and side effects. Available test results were reviewed -Discussed findings, impression and plan with patient. An additional 20 minutes were spent outside of the patient visit to review records. Additional time spent with the patient to discuss their questions. Additional time spent with the patient devoted to discussing treatment strategy, planning, and implementation. Patient understands above plan; questions asked and answered. Patient agrees to plan as noted above. F/U in 2-3months  At least 50% of the visit was involved in the discussion of the options for treatment. We discussed exercises, medication, interventional therapies and surgery. Healthy life style is essential with patient hard work to achieve the wellness. In addition; discussion with the patient and/or family about any of the diagnostic results, impressions and/or recommended diagnostic studies, prognosis, risks and benefits of treatment options, instructions for treatment and/or follow-up, importance of compliance with chosen treatment options, risk-factor reduction, and patient/family education.         They are to follow up in 2 months to review medication, efficacy of injections, pill counts, OARRS check, SOAPPR assessment, review diagnostics, to review previous and future treatment plans and assess appropriateness for continued therapy.         New Diagnostics  Orders Placed This Encounter   Procedures    MD INJECT TRIGGER POINTS, 3 OR GREATER    MD INJECT TRIGGER POINTS, 3 OR GREATER       Sherrie Scullin, DO

## 2020-03-16 ENCOUNTER — TELEPHONE (OUTPATIENT)
Dept: DIABETES SERVICES | Age: 60
End: 2020-03-16

## 2020-03-16 NOTE — PROGRESS NOTES
Called patient and appointment cancelled per administration recommendation due to precautions for COVID-19. To be rescheduled when permitted. Also called and cancelled his taxi with [employer]. Spoke with him for awhile and reviewed basics of diabetes. He believes he is pre-diabetes. He was drinking a lot of Pepsi and has cut that out and switched to diet Mt Dew and Gatorade Zero. He only eats once a day at dinner and not too large of a meal.  States he has a lot of \"belly fat\" and due to a serious back injury he cannot exercise or do much activity. He does not meter at this time-no meter in the home. No meds for Diabetes he stated. Did review some arm exercises, swimming or doing the back exercises he said physical therapy gave him. Continue to eat well and avoid sugary drinks. He would like to lose some weight especially the belly fat.

## 2020-03-25 ENCOUNTER — TELEMEDICINE (OUTPATIENT)
Dept: FAMILY MEDICINE CLINIC | Age: 60
End: 2020-03-25
Payer: COMMERCIAL

## 2020-03-25 PROCEDURE — 99443 PR PHYS/QHP TELEPHONE EVALUATION 21-30 MIN: CPT | Performed by: FAMILY MEDICINE

## 2020-03-25 RX ORDER — ACETAMINOPHEN 500 MG
500 TABLET ORAL 4 TIMES DAILY PRN
Qty: 90 TABLET | Refills: 2 | Status: SHIPPED | OUTPATIENT
Start: 2020-03-25 | End: 2020-03-31 | Stop reason: SDUPTHER

## 2020-03-25 RX ORDER — ASPIRIN 81 MG/1
81 TABLET ORAL DAILY
Qty: 90 TABLET | Refills: 4 | Status: SHIPPED | OUTPATIENT
Start: 2020-03-25 | End: 2020-11-25 | Stop reason: SDUPTHER

## 2020-03-25 RX ORDER — VALSARTAN AND HYDROCHLOROTHIAZIDE 80; 12.5 MG/1; MG/1
TABLET, FILM COATED ORAL
Qty: 90 TABLET | Refills: 4 | Status: SHIPPED | OUTPATIENT
Start: 2020-03-25 | End: 2020-09-09 | Stop reason: SDUPTHER

## 2020-03-25 RX ORDER — ROSUVASTATIN CALCIUM 40 MG/1
TABLET, COATED ORAL
Qty: 90 TABLET | Refills: 4 | Status: SHIPPED | OUTPATIENT
Start: 2020-03-25 | End: 2020-09-09 | Stop reason: SDUPTHER

## 2020-03-25 NOTE — PROGRESS NOTES
Subjective  Joseph Betty, 61 y.o. male presents today with:  Chief Complaint   Patient presents with    Other           HPI      This telephone encounter was conducted between this writer and the above named patient who is at home in lieu of a face-to-face visit in setting of [address] emergency. The patient understands that this encounter will be billed. Patient understands and wishes to proceed. 12:32-12:58    Patient is here for follow-up of CAD/HTN/PVD. No chest pain, shortness of breath, palpitations, edema, paroxysmal nocturnal dyspnea, orthopnea. Is compliant with meds which do not cause significant side effects. Avoids added salt; exercises occasionally and tries to eat a healthy diet. CVA. No new deficits. No facial droop. No speech impairment. No unilateral weakness. Patient is here for hyperlipidemia. Is compliant with medications and has no apparent side effects from them. No other questions and or concerns for today's visit    Review of Systems  No fevers, chills, sweats. No unintended weight loss. No abdominal pain, nausea, vomiting, diarrhea, constipation, bloody stools, black tarry stools. No rashes. No swollen glands.       Past Medical History:   Diagnosis Date    Al[facility]athy ([facility] [address]     Asthma     CAD (coronary artery disease)     [facility]    Chronic back pain greater than 3 months duration     CN III palsy, right eye 2017    Dr Wes Leahy    COPD (chronic obstructive pulmonary disease) ([facility] [address] 2014    DDD (degenerative disc disease), lumbar 3/23/2015    Elevated liver enzymes 2014    Hyperlipidemia     on med > 10 yrs    Hypertension     on meds x 1 yr    IgA monoclonal gammopathy 2015    Dr Mando Pal    Insomnia, unspecified     LBP (low back pain)     Dr Rocio Vincent Occlusion and stenosis of carotid artery without mention of cerebral infarction     Dr Gabbie Shen Personal history of alcoholism ([facility] [address]     quit 11/05/2010, relapsed 2016    S/P carotid file     Attends Latter day service: Not on file     Active member of club or organization: Not on file     Attends meetings of clubs or organizations: Not on file     Relationship status: Not on file    Intimate partner violence     Fear of current or ex partner: Not on file     Emotionally abused: Not on file     Physically abused: Not on file     Forced sexual activity: Not on file   Other Topics Concern    Not on file   Social History Narrative    Born in [place], one of 5    Came to [place] at age 1 with parents    Never , one daughter    Never worked, disabled since 12 (mental)    Lives in [place] with brother, in a house        Attends [employer] daily    [address] riding bike, TV    One daughter, does keep in touch      Family History   Problem Relation Age of Onset    Cancer Mother         brain, dec 79   [place] [employer] Alcohol Abuse Father     Other Sister         unknown medical history    Other Brother         unknown medical history    High Blood Pressure Brother     Alcohol Abuse Daughter      No Known Allergies  Current Outpatient Medications   Medication Sig Dispense Refill    rosuvastatin (CRESTOR) 40 MG tablet take 1 tablet by mouth at bedtime 90 tablet 4    valsartan-hydrochlorothiazide (DIOVAN-HCT) 80-12.5 MG per tablet take 1 tablet by mouth once daily 90 tablet 4    acetaminophen (TYLENOL) 500 MG tablet Take 1 tablet by mouth 4 times daily as needed for Pain 90 tablet 2    aspirin EC 81 MG EC tablet Take 1 tablet by mouth daily 90 tablet 4    [START ON 3/28/2020] HYDROcodone-acetaminophen (NORCO) 7.5-325 MG per tablet Take 1 tablet by mouth every 8 hours as needed for Pain for up to 30 days.  Max of 80 per month--max of 3 per day-Intended supply: 30 days 80 tablet 0    gabapentin (NEURONTIN) 800 MG tablet take 1 tablet by mouth four times a day 120 tablet 3    Cholecalciferol (VITAMIN D) 50 MCG (2000 UT) CAPS capsule Take 1 capsule by mouth 2 times daily 30 capsule 5    psyllium 90 tablet     Refill:  4     Medications Discontinued During This Encounter   Medication Reason    aspirin (RA ASPIRIN EC) 81 MG EC tablet LIST CLEANUP    clopidogrel (PLAVIX) 75 MG tablet LIST CLEANUP    rosuvastatin (CRESTOR) 40 MG tablet REORDER    valsartan-hydrochlorothiazide (DIOVAN-HCT) 80-12.5 MG per tablet REORDER     Return in about 3 months (around 6/25/2020) for HTN, CAD, CVD, PVD. Controlled Substance Monitoring:    Acute and Chronic Pain Monitoring:   RX Monitoring 11/19/2019   Attestation -   Periodic Controlled Substance Monitoring Possible medication side effects, risk of tolerance/dependence & alternative treatments discussed. ;No signs of potential drug abuse or diversion identified. ;Assessed functional status. ;Obtaining appropriate analgesic effect of treatment. Chronic Pain > 50 MEDD Re-evaluated the status of the patient's underlying condition causing pain. ;Considered consultation with a specialist.;Obtained or confirmed \"Consent for Opioid Use\" on file.    Chronic Pain > 80 MEDD -           Cirilo SEGURA MD

## 2020-03-26 ENCOUNTER — TELEPHONE (OUTPATIENT)
Dept: DIABETES SERVICES | Age: 60
End: 2020-03-26

## 2020-03-27 ENCOUNTER — TELEPHONE (OUTPATIENT)
Dept: DIABETES SERVICES | Age: 60
End: 2020-03-27

## 2020-03-27 ENCOUNTER — TELEPHONE (OUTPATIENT)
Dept: FAMILY MEDICINE CLINIC | Age: 60
End: 2020-03-27

## 2020-03-27 NOTE — TELEPHONE ENCOUNTER
Patient states that the Tylenol 500mg is not working to control his pain. He is asking forTramadol.  This worked in the past and he would like it sent to [facility] on [address]

## 2020-03-27 NOTE — PROGRESS NOTES
Returned Ranjith's call today and spoke with him regarding his diet and \"what he can eat\". Explained that portion control is the main concern with diabetes and having a balanced diet. Discussed what foods are carbohydrates and that carbohydrates are what raise our BG. He does not have a meter to check his BG at home. His main concern is that he didn't know \"what I can eat\"     Requests that we also mail him some info on foods and carbohydrates. Info gathered and mailed to patient. Encouraged him to call the office with any questions he may have in the future.      Luis May RN,diabetic educator

## 2020-03-29 ENCOUNTER — HOSPITAL ENCOUNTER (EMERGENCY)
Age: 60
Discharge: HOME | End: 2020-03-29
Payer: COMMERCIAL

## 2020-03-29 ENCOUNTER — APPOINTMENT (OUTPATIENT)
Dept: GENERAL RADIOLOGY | Age: 60
End: 2020-03-29
Payer: COMMERCIAL

## 2020-03-29 VITALS
WEIGHT: 231 LBS | SYSTOLIC BLOOD PRESSURE: 158 MMHG | OXYGEN SATURATION: 97 % | TEMPERATURE: 97.2 F | BODY MASS INDEX: 32.34 KG/M2 | HEIGHT: 71 IN | HEART RATE: 79 BPM | DIASTOLIC BLOOD PRESSURE: 62 MMHG | RESPIRATION RATE: 17 BRPM

## 2020-03-29 PROCEDURE — 73630 X-RAY EXAM OF FOOT: CPT

## 2020-03-29 PROCEDURE — 6360000002 HC RX W HCPCS: Performed by: PHYSICIAN ASSISTANT

## 2020-03-29 PROCEDURE — 96372 THER/PROPH/DIAG INJ SC/IM: CPT

## 2020-03-29 PROCEDURE — 99283 EMERGENCY DEPT VISIT LOW MDM: CPT

## 2020-03-29 RX ORDER — KETOROLAC TROMETHAMINE 30 MG/ML
30 INJECTION, SOLUTION INTRAMUSCULAR; INTRAVENOUS ONCE
Status: COMPLETED | OUTPATIENT
Start: 2020-03-29 | End: 2020-03-29

## 2020-03-29 RX ORDER — DOCUSATE SODIUM 100 MG/1
100 CAPSULE, LIQUID FILLED ORAL 2 TIMES DAILY
Qty: 30 CAPSULE | Refills: 0 | Status: SHIPPED | OUTPATIENT
Start: 2020-03-29 | End: 2020-04-08 | Stop reason: SDUPTHER

## 2020-03-29 RX ADMIN — KETOROLAC TROMETHAMINE 30 MG: 30 INJECTION, SOLUTION INTRAMUSCULAR at 10:09

## 2020-03-29 ASSESSMENT — PAIN SCALES - GENERAL
PAINLEVEL_OUTOF10: 5
PAINLEVEL_OUTOF10: 2

## 2020-03-29 ASSESSMENT — ENCOUNTER SYMPTOMS
RESPIRATORY NEGATIVE: 1
EYES NEGATIVE: 1
GASTROINTESTINAL NEGATIVE: 1

## 2020-03-29 ASSESSMENT — PAIN DESCRIPTION - LOCATION: LOCATION: FOOT

## 2020-03-29 ASSESSMENT — PAIN DESCRIPTION - ORIENTATION: ORIENTATION: RIGHT;LEFT

## 2020-03-29 NOTE — ED PROVIDER NOTES
[address] [facility] ED  eMERGENCY dEPARTMENT eNCOUnter      Pt Name: Norma Machado  MRN: [id number]  [place] 1960  Date of evaluation: 3/29/2020  Provider: Roseanne Gong PA-C      HISTORY OF PRESENT ILLNESS    Norma Machado is a 61 y.o. male who presents to the Emergency Department with chief complaint of bilateral foot pain. Patient states that started at the beginning of March. Patient has been referred to a vascular physician by his primary care physician from a visit back in february. Patient also admits to being \"borderline diabetic \"and is on gabapentin already, but states he ran out about 4 days ago. Patient denies a history of official diagnosis of neuropathy. Patient states it feels like pins-and-needles and also has some discomfort in the arch of his left foot. Patient has not yet followed up with podiatry. He denies any injury or trauma and has no other concerns at this time. Patient sees pain management physician for other reasons. REVIEW OF SYSTEMS       Review of Systems   Constitutional: Negative. HENT: Negative. Eyes: Negative. Respiratory: Negative. Cardiovascular: Negative. Gastrointestinal: Negative. Endocrine: Negative. Genitourinary: Negative. Musculoskeletal: Positive for arthralgias. Bilateral foot pain     Skin: Negative. Neurological: Negative. Psychiatric/Behavioral: Negative.           PAST MEDICAL HISTORY     Past Medical History:   Diagnosis Date    [facility] polyneuropathy ([facility]     Asthma     CAD (coronary artery disease)     [address] [facility]    Chronic back pain greater than 3 months duration     CN III palsy, right eye 2017    Dr Emma Low    COPD (chronic obstructive pulmonary disease) ([facility] 2014    DDD (degenerative disc disease), lumbar 3/23/2015    Elevated liver enzymes 2014    Hyperlipidemia     on med > 10 yrs    Hypertension     on meds x 1 yr    IgA monoclonal gammopathy 2015    Dr Tony Cheek Insomnia, unspecified PSYLLIUM (METAMUCIL SMOOTH TEXTURE) 28 % PACKET    Take 1 packet by mouth 2 times daily Take with full glass of h20 or juice    RA VITAMIN B-12 TR 1000 MCG TBCR    take 1 tablet by mouth once daily    REFRESH TEARS 0.5 % SOLN OPHTHALMIC SOLUTION    instill 1 drop into both eyes four times a day    ROSUVASTATIN (CRESTOR) 40 MG TABLET    take 1 tablet by mouth at bedtime    SENNA-DOCUSATE (PERICOLACE) 8.6-50 MG PER TABLET    Take 2 tablets by mouth daily as needed for Constipation    VALSARTAN-HYDROCHLOROTHIAZIDE (DIOVAN-HCT) 80-12.5 MG PER TABLET    take 1 tablet by mouth once daily    VASCEPA 1 G CAPS CAPSULE           ALLERGIES     Patient has no known allergies.     FAMILY HISTORY       Family History   Problem Relation Age of Onset    Cancer Mother         brain, [de-identified] 79   Luisa Crow Alcohol Abuse Father     Other Sister         unknown medical history    Other Brother         unknown medical history    High Blood Pressure Brother     Alcohol Abuse Daughter           SOCIAL HISTORY       Social History     Socioeconomic History    Marital status: Single     Spouse name: None    Number of children: 1    Years of education: None    Highest education level: None   Occupational History    Occupation: SSI for learning disability   Social Needs    Financial resource strain: None    Food insecurity     Worry: None     Inability: None    Transportation needs     Medical: None     Non-medical: None   Tobacco Use    Smoking status: Current Every Day Smoker     Packs/day: 0.50     Years: 40.00     Pack years: 20.00     Types: Cigarettes     Start date:      Last attempt to quit: 3/23/2019     Years since quittin.0    Smokeless tobacco: Never Used    Tobacco comment: patient would like to quit, encouraged to see PCP   Substance and Sexual Activity    Alcohol use: No     Alcohol/week: 0.0 standard drinks     Comment: 2016 Quit Date    Drug use: No    Sexual activity: None   Lifestyle    Physical activity

## 2020-03-30 ENCOUNTER — CARE COORDINATION (OUTPATIENT)
Dept: CARE COORDINATION | Age: 60
End: 2020-03-30

## 2020-03-31 ENCOUNTER — CLINICAL DOCUMENTATION (OUTPATIENT)
Dept: PHYSICAL MEDICINE AND REHAB | Age: 60
End: 2020-03-31

## 2020-03-31 RX ORDER — ACETAMINOPHEN 500 MG
500 TABLET ORAL 4 TIMES DAILY PRN
Qty: 90 TABLET | Refills: 2 | Status: ON HOLD | OUTPATIENT
Start: 2020-03-31 | End: 2020-09-28

## 2020-04-01 ENCOUNTER — TELEPHONE (OUTPATIENT)
Dept: FAMILY MEDICINE CLINIC | Age: 60
End: 2020-04-01

## 2020-04-01 NOTE — TELEPHONE ENCOUNTER
Patient call today requesting to see DR. Campos Benz in person after I explain Pt that we are not see face to face patient him just say \" You know what You can go to Hell\". And hung the phone.

## 2020-04-02 ENCOUNTER — TELEPHONE (OUTPATIENT)
Dept: FAMILY MEDICINE CLINIC | Age: 60
End: 2020-04-02

## 2020-04-02 NOTE — TELEPHONE ENCOUNTER
Patient called to see if a referral has been sent to the hospital for the neuropathy in his foot. I had a very hard time understanding what he was needing. He said Dr. Chris Martinez office told him he had to request a referral from his PCP. Patient said he wants someone to call him when the referral is complete.

## 2020-04-08 RX ORDER — DOCUSATE SODIUM 100 MG/1
100 CAPSULE, LIQUID FILLED ORAL 2 TIMES DAILY
Qty: 60 CAPSULE | Refills: 12 | Status: SHIPPED | OUTPATIENT
Start: 2020-04-08 | End: 2020-09-09 | Stop reason: SDUPTHER

## 2020-04-13 ENCOUNTER — TELEPHONE (OUTPATIENT)
Dept: GASTROENTEROLOGY | Age: 60
End: 2020-04-13

## 2020-04-13 ENCOUNTER — CARE COORDINATION (OUTPATIENT)
Dept: CARE COORDINATION | Age: 60
End: 2020-04-13

## 2020-04-13 RX ORDER — POLYETHYLENE GLYCOL 3350 17 G/17G
17 POWDER, FOR SOLUTION ORAL DAILY
Qty: 510 G | Refills: 2 | Status: SHIPPED | OUTPATIENT
Start: 2020-04-13 | End: 2020-07-12

## 2020-04-13 NOTE — TELEPHONE ENCOUNTER
The patient would like Virginia Stone to call in [place] to the [employer] on [facility]. The patient would like a call once this is done [phone].

## 2020-04-14 ENCOUNTER — CARE COORDINATION (OUTPATIENT)
Dept: CARE COORDINATION | Age: 60
End: 2020-04-14

## 2020-04-17 ENCOUNTER — TELEPHONE (OUTPATIENT)
Dept: DIABETES SERVICES | Age: 60
End: 2020-04-17

## 2020-04-17 NOTE — PROGRESS NOTES
Pt called in and left message. called him back at [phone]. Still has some questions about diet. Reviewed info with him. He is eating raisin bran cereal-(made him read me the label with carbs and serving size) often and some sweets. Only one large meal a day like a hamburger or pork chops with a little mashed potatoes. Drinks Gatorade zero. Suggested less sweets, healthy snacks, meals instead of snacks. He received the sample menus we sent and seemed to think he had to eat only what was on them. Reviewed again with him.

## 2020-04-21 ENCOUNTER — CARE COORDINATION (OUTPATIENT)
Dept: CARE COORDINATION | Age: 60
End: 2020-04-21

## 2020-04-21 NOTE — CARE COORDINATION
You Patient resolved from the Care Transitions episode on -was already resolved    Patient/family has been provided the following resources and education related to COVID-19:                         Signs, symptoms and red flags related to COVID-19            CDC exposure and quarantine guidelines            Conduit exposure contact - [phone]            Contact for their local Department of Health                 Patient currently reports that the following symptoms have improved:  no new/worsening symptoms     No further outreach scheduled with this CTN/ACM. Episode of Care resolved. Patient has this CTN/ACM contact information if future needs arise. Received call from tricia. He states he did not receive call back in 2 weeks. Explained 2 calls were made to him but phone not accepting messages. He states phone was not working. He reports improvement in pain of feet. He has had appt with pcp and is attempting one from pain management.  He is aware he will not receive another covid call

## 2020-04-22 RX ORDER — HYDROCODONE BITARTRATE AND ACETAMINOPHEN 7.5; 325 MG/1; MG/1
1 TABLET ORAL EVERY 8 HOURS PRN
Qty: 80 TABLET | Refills: 0 | Status: SHIPPED | OUTPATIENT
Start: 2020-04-26 | End: 2020-05-22 | Stop reason: SDUPTHER

## 2020-05-22 RX ORDER — HYDROCODONE BITARTRATE AND ACETAMINOPHEN 7.5; 325 MG/1; MG/1
1 TABLET ORAL EVERY 8 HOURS PRN
Qty: 80 TABLET | Refills: 0 | Status: SHIPPED | OUTPATIENT
Start: 2020-05-25 | End: 2020-06-25 | Stop reason: SDUPTHER

## 2020-05-22 RX ORDER — GABAPENTIN 800 MG/1
TABLET ORAL
Qty: 120 TABLET | Refills: 3 | Status: SHIPPED | OUTPATIENT
Start: 2020-05-22 | End: 2020-08-20 | Stop reason: SDUPTHER

## 2020-05-22 RX ORDER — MULTIVIT-MIN/IRON/FOLIC ACID/K 18-600-40
1 CAPSULE ORAL 2 TIMES DAILY
Qty: 30 CAPSULE | Refills: 5 | Status: SHIPPED | OUTPATIENT
Start: 2020-05-22 | End: 2020-08-20 | Stop reason: SDUPTHER

## 2020-06-23 ENCOUNTER — OFFICE VISIT (OUTPATIENT)
Dept: INTERVENTIONAL RADIOLOGY/VASCULAR | Age: 60
End: 2020-06-23
Payer: COMMERCIAL

## 2020-06-23 VITALS
DIASTOLIC BLOOD PRESSURE: 60 MMHG | HEART RATE: 55 BPM | WEIGHT: 231 LBS | OXYGEN SATURATION: 97 % | BODY MASS INDEX: 32.34 KG/M2 | SYSTOLIC BLOOD PRESSURE: 120 MMHG | RESPIRATION RATE: 16 BRPM | HEIGHT: 71 IN

## 2020-06-23 PROCEDURE — G8417 CALC BMI ABV UP PARAM F/U: HCPCS | Performed by: NURSE PRACTITIONER

## 2020-06-23 PROCEDURE — 99205 OFFICE O/P NEW HI 60 MIN: CPT | Performed by: NURSE PRACTITIONER

## 2020-06-23 PROCEDURE — G8427 DOCREV CUR MEDS BY ELIG CLIN: HCPCS | Performed by: NURSE PRACTITIONER

## 2020-06-23 ASSESSMENT — ENCOUNTER SYMPTOMS
ABDOMINAL DISTENTION: 0
NAUSEA: 0
SORE THROAT: 0
EYES NEGATIVE: 1
TROUBLE SWALLOWING: 0
ABDOMINAL PAIN: 0
SINUS PRESSURE: 0
DIARRHEA: 0
VOMITING: 0
SHORTNESS OF BREATH: 0
WHEEZING: 0
BACK PAIN: 1
CONSTIPATION: 0
COUGH: 1
SINUS PAIN: 0

## 2020-06-23 NOTE — PROGRESS NOTES
Tres Mullins, a male of 61 y.o. came to the office 6/23/2020. Chief Complaint   Patient presents with   [address]     Referral from Dr. Frankey Dallas    Varicose Veins    Peripheral Neuropathy    Other     PAD       6/23/2020 HPI: Tres Mullins presents to clinic for consultation at the request of his PCP for evaluation of PAD for LE pain. Patient presents with symptoms of: both feet with left worse than right, onset one month. Pain occurs with ambulation. Describes pain as stabbing. Left leg with fatigue and heaviness, chronic. States also has numbness and tingling in feet and toes both at rest and with activity. Patient is a poor historian as he changes his story often so is difficulty getting a thorough evaluation. Affect on activities of daily living: Must stop and take frequent rest periods. Denies LE swelling or symptomatic varicose veins. NSAIDS: Takes Neurontin daily with minimal relief. Leg elevation: Elevates without relief. Stocking use and dates: Has never done conservative therapy with compression stockings. Claudication: Symptoms consistent with. Lower leg and foot pain with ambulation. PAD risk factors: Borderline DM, HTN, Hypercholesteremia, overweight, smoker.      Family History   Problem Relation Age of Onset    Cancer Mother         brain, [de-identified] 79   Bart Miners Alcohol Abuse Father     Other Sister         unknown medical history    Other Brother         unknown medical history    High Blood Pressure Brother     Alcohol Abuse Daughter        Past Surgical History:   Procedure Laterality Date    CAROTID ENDARTERECTOMY Left 1/18/2019    LEFT CAROTID ENDARTERECTOMY performed by Siva Crouch MD at [facility]  8/19/2014    COLONOSCOPY N/A 12/18/2019    COLONOSCOPY DIAGNOSTIC performed by Silvia Mcleod MD at [facility], COLON, DIAGNOSTIC      HERNIA REPAIR      TONSILLECTOMY      UPPER GASTROINTESTINAL ENDOSCOPY  8/19/2014 tablet 4    polyethylene glycol (GLYCOLAX) powder       budesonide-formoterol (SYMBICORT) 160-4.5 MCG/ACT AERO inhale 2 puffs by mouth twice a day 10.2 g 12    senna-docusate (PERICOLACE) 8.6-50 MG per tablet Take 2 tablets by mouth daily as needed for Constipation 60 tablet 11    VASCEPA 1 g CAPS capsule   0    metoprolol succinate (TOPROL XL) 25 MG extended release tablet Take 1 tablet by mouth daily 30 tablet 5    REFRESH TEARS 0.5 % SOLN ophthalmic solution instill 1 drop into both eyes four times a day  1    gabapentin (NEURONTIN) 800 MG tablet take 1 tablet by mouth four times a day 120 tablet 3     No current facility-administered medications on file prior to visit. Review of Systems   Constitutional: Negative. Negative for appetite change, chills, fatigue and fever. HENT: Negative. Negative for congestion, sinus pressure, sinus pain, sore throat and trouble swallowing. Eyes: Negative. Respiratory: Positive for cough (dry). Negative for shortness of breath and wheezing. Cardiovascular: Negative for chest pain, palpitations and leg swelling. Claudication   Gastrointestinal: Negative for abdominal distention, abdominal pain, constipation, diarrhea, nausea and vomiting. Genitourinary: Negative for difficulty urinating, frequency, hematuria and urgency. Musculoskeletal: Positive for back pain (lower). Bilateral foot pain. Skin: Negative for rash and wound. Neurological: Positive for numbness (tingling feet and toes). Negative for dizziness, light-headedness and headaches. Hematological: Does not bruise/bleed easily. Psychiatric/Behavioral: Negative. OBJECTIVE:  /60   Pulse 55   Resp 16   Ht 5' 11\" (1.803 m)   Wt 231 lb (104.8 kg)   SpO2 97% Comment: ON ROOM AIR  BMI 32.22 kg/m²     Physical Exam  Constitutional:       Appearance: Normal appearance. HENT:      Head: Normocephalic and atraumatic.       Nose: Nose normal.   Neck:

## 2020-06-25 ENCOUNTER — HOSPITAL ENCOUNTER (OUTPATIENT)
Dept: DIABETES SERVICES | Age: 60
Setting detail: THERAPIES SERIES
Discharge: HOME | End: 2020-06-25
Payer: COMMERCIAL

## 2020-06-25 ENCOUNTER — OFFICE VISIT (OUTPATIENT)
Dept: FAMILY MEDICINE CLINIC | Age: 60
End: 2020-06-25
Payer: COMMERCIAL

## 2020-06-25 VITALS
BODY MASS INDEX: 31.29 KG/M2 | DIASTOLIC BLOOD PRESSURE: 68 MMHG | HEIGHT: 72 IN | SYSTOLIC BLOOD PRESSURE: 126 MMHG | WEIGHT: 231 LBS

## 2020-06-25 VITALS
OXYGEN SATURATION: 99 % | HEIGHT: 72 IN | TEMPERATURE: 98.1 F | HEART RATE: 65 BPM | BODY MASS INDEX: 31.29 KG/M2 | RESPIRATION RATE: 16 BRPM | DIASTOLIC BLOOD PRESSURE: 68 MMHG | WEIGHT: 231 LBS | SYSTOLIC BLOOD PRESSURE: 126 MMHG

## 2020-06-25 PROCEDURE — 99214 OFFICE O/P EST MOD 30 MIN: CPT | Performed by: FAMILY MEDICINE

## 2020-06-25 PROCEDURE — 4004F PT TOBACCO SCREEN RCVD TLK: CPT | Performed by: FAMILY MEDICINE

## 2020-06-25 PROCEDURE — G0108 DIAB MANAGE TRN  PER INDIV: HCPCS

## 2020-06-25 PROCEDURE — G8427 DOCREV CUR MEDS BY ELIG CLIN: HCPCS | Performed by: FAMILY MEDICINE

## 2020-06-25 PROCEDURE — G8926 SPIRO NO PERF OR DOC: HCPCS | Performed by: FAMILY MEDICINE

## 2020-06-25 PROCEDURE — 3023F SPIROM DOC REV: CPT | Performed by: FAMILY MEDICINE

## 2020-06-25 PROCEDURE — G8417 CALC BMI ABV UP PARAM F/U: HCPCS | Performed by: FAMILY MEDICINE

## 2020-06-25 PROCEDURE — 3017F COLORECTAL CA SCREEN DOC REV: CPT | Performed by: FAMILY MEDICINE

## 2020-06-25 RX ORDER — HYDROCODONE BITARTRATE AND ACETAMINOPHEN 7.5; 325 MG/1; MG/1
1 TABLET ORAL EVERY 8 HOURS PRN
Qty: 80 TABLET | Refills: 0 | Status: SHIPPED | OUTPATIENT
Start: 2020-06-25 | End: 2020-07-15 | Stop reason: SDUPTHER

## 2020-06-25 RX ORDER — AMOXICILLIN 250 MG
2 CAPSULE ORAL DAILY PRN
Qty: 60 TABLET | Refills: 11 | Status: SHIPPED | OUTPATIENT
Start: 2020-06-25 | End: 2021-03-29 | Stop reason: SDUPTHER

## 2020-06-25 SDOH — ECONOMIC STABILITY: FOOD INSECURITY: ADDITIONAL INFORMATION: NO

## 2020-06-25 ASSESSMENT — PROBLEM AREAS IN DIABETES QUESTIONNAIRE (PAID)
FEELING SCARED WHEN YOU THINK ABOUT LIVING WITH DIABETES: 0
PAID-5 TOTAL SCORE: 0
WORRYING ABOUT THE FUTURE AND THE POSSIBILITY OF SERIOUS COMPLICATIONS: 0
COPING WITH COMPLICATIONS OF DIABETES: 0
FEELING THAT DIABETES IS TAKING UP TOO MUCH OF YOUR MENTAL AND PHYSICAL ENERGY EVERY DAY: 0
FEELING DEPRESSED WHEN YOU THINK ABOUT LIVING WITH DIABETES: 0

## 2020-06-25 ASSESSMENT — SLEEP AND FATIGUE QUESTIONNAIRES
HOW DO YOU RATE THE QUALITY OF YOUR SLEEP: GOOD
HOW MANY HOURS OF SLEEP ARE YOU GETTING, ON AVERAGE: 7 OR MORE
HAVE YOU BEEN TOLD, OR NOTICED ON YOUR OWN, THAT YOU STOP BREATHING OR STRUGGLE TO BREATHE IN YOUR SLEEP: NO
HAVE YOU EVER BEEN TESTED FOR SLEEP APNEA: NO

## 2020-06-25 NOTE — PROGRESS NOTES
Subjective  Tres Mullins, 61 y.o. male presents today with:  Chief Complaint   Patient presents with    Hypertension     Patient present today for his 3 months.  Coronary Artery Disease           HPI    Patient is here for f/u HTN and PAD. Is compliant with meds and has no side effects from them. Avoids added salt. Tries to eat healthy. Exercises occasionally. Has no chest pain, shortness of breath, palpitations or edema. Claudication persists. He is under evaluation by interventional radiology. Complains of neuropathic pain in b/l feet. Treated by Dr. Theresa Quinones with gabapentin and Quita Arms. Has been on amitriptyline but it cause constipation so he stopped taking them. Chronic constipation. He is not taking his Mary-Colace as instructed. CVA. No new deficits. No facial droop. No speech impairment. No unilateral weakness. COPD. On LABA/ICS and prn albuterol - less than 3 times a week. No significant cough, mild baseline shortness of breath, occasional wheezing, mild, occasional chest tightness.            No other questions and or concerns for today's visit      Review of Systems      Past Medical History:   Diagnosis Date    [facility] ([facility]     Asthma     CAD (coronary artery disease)     [address] [facility] [facility]    Chronic back pain greater than 3 months duration     CN III palsy, right eye 2017    Dr Maria Alejandra Ortega    COPD (chronic obstructive pulmonary disease) ([facility] 2014    DDD (degenerative disc disease), lumbar 3/23/2015    Elevated liver enzymes 2014    Hyperlipidemia     on med > 10 yrs    Hypertension     on meds x 1 yr    IgA monoclonal gammopathy 2015    Dr Geena Delong    Insomnia, unspecified     LBP (low back pain)     Dr Ronald Mercedes Occlusion and stenosis of carotid artery without mention of cerebral infarction     Dr Francisco Heath Personal history of alcoholism ([facility]     quit 11/05/2010, relapsed 2016    S/P carotid endarterectomy 01/2019    Dr Leni Clemons    Sensorimotor neuropathy Return in about 6 months (around 12/25/2020) for HTN. Controlled Substance Monitoring:    Acute and Chronic Pain Monitoring:   RX Monitoring 11/19/2019   Attestation -   Periodic Controlled Substance Monitoring Possible medication side effects, risk of tolerance/dependence & alternative treatments discussed. ;No signs of potential drug abuse or diversion identified. ;Assessed functional status. ;Obtaining appropriate analgesic effect of treatment. Chronic Pain > 50 MEDD Re-evaluated the status of the patient's underlying condition causing pain. ;Considered consultation with a specialist.;Obtained or confirmed \"Consent for Opioid Use\" on file.    Chronic Pain > 80 MEDD -           Emilia SEGURA MD

## 2020-06-25 NOTE — PROGRESS NOTES
guideline   06/25/20  Arvin Charles RN  1    06/25/20  Pt does not know what to eat. Reviewed counting carbs booklet and he wants to do the weight loss plan. He does not eat a lot of sweets, mostly one meal a day of meat and potatoes or burgers. His brother cooks his meals and shops for him. He is not a snacker. Drinks gatorade zero, water, occasional diet pop and black coffee. Arvin Charles RN;    What to eat - Food groups, When to eat - timing of meals and snacks, and How much to eat - portions control. calories/ day   CHO choices/ meal   CHO choices/  day   grams of protein /day   gram of fat /day     Correctly read food labels & demonstrate CHO counting & portion control with personalized meal plan. Identify dining out strategies, & dietary sick day guidelines. 06/25/20  Arvin Charles RN  1    06/25/20  Reviewed handout on label reading. Arvin Charles RN     Incorporating physical activity into lifestyle:   Verbalize effect of exercise on blood glucose levels; benefits of regular exercise; safety considerations; contraindications; maintenance of activity. 06/25/20  Arvin Charles RN  1    06/25/20  Pt is very sedentary I think. He has bad neuropathy of his feet and does not move much. Reviewed the importance of muscles. Arvin Charles RN     Using medications safely:  Identify effects of diabetes medicines on blood glucose levels; List diabetes medication taken, action & side effects;    06/25/20  1  Arvin Charles RN      06/25/20   No meds now but explained in the future if he does not improve he may need Metformin and what that does. Arvin Charles RN       Insulin / Injectable - Appropriate injection sites; proper storage; supplies needed; proper technique; safe needle disposal guidelines.    [facility]   Monitoring blood glucose, interpreting and using results:  Identify recommended & personal blood glucose targets; importance of testing; testing supplies; HgbA1C target levels;

## 2020-07-06 ENCOUNTER — TELEPHONE (OUTPATIENT)
Dept: PHYSICAL MEDICINE AND REHAB | Age: 60
End: 2020-07-06

## 2020-07-06 NOTE — TELEPHONE ENCOUNTER
Spoke with patient and he is aware of his upcoming appointment and that if there is a cancellation for a sooner appointment the office will contact him.

## 2020-07-06 NOTE — TELEPHONE ENCOUNTER
----- Message from Ml Cunningham DO sent at 7/6/2020  7:38 AM EDT -----  Thank you I will discuss with him and move up apptmt  Lela Ray -please get him in asap   Thanks    ----- Message -----  From: Colleen Agudelo MD  Sent: 6/25/2020   3:01 PM EDT  To: DO Hi Cope Sherrie, hope all is well. Lyssa has persistent neuropathic pain in spite of gabapentin and Norco. I promised him I would mention the pain to you.  He wonders if Lyrica is an option low factors: (Dr. Diane Haw he has been high appointment upper

## 2020-07-08 ENCOUNTER — HOSPITAL ENCOUNTER (OUTPATIENT)
Dept: DIABETES SERVICES | Age: 60
Setting detail: THERAPIES SERIES
Discharge: HOME | End: 2020-07-08
Payer: COMMERCIAL

## 2020-07-08 PROCEDURE — G0109 DIAB MANAGE TRN IND/GROUP: HCPCS

## 2020-07-08 NOTE — PROGRESS NOTES
Diabetes Self- Management Education Program Assessment and Education Record-   Also see Diabetic Screening    Patient, Nishant Cantu,  here for diabetes self-management education  visit/ assessment. Today's visit was in an individual setting. Diet History :  Diet Questionnaire and typical meal /portion sheet completed  []Yes  [x] NO    Goals setting:  Initial Goal Set with Patient  [x]Yes  [] NO  (SEE  EDUCATION AND GOALS)    MEDICAL HISTORY:  Past Medical History:   Diagnosis Date    Alcoholic polyneuropathy ([facility]     Asthma     CAD (coronary artery disease)     [address] [facility] [place]    Chronic back pain greater than 3 months duration     CN III palsy, right eye 2017    Dr Ye Nicholson    COPD (chronic obstructive pulmonary disease) ([facility] 2014    DDD (degenerative disc disease), lumbar 3/23/2015    Elevated liver enzymes 2014    Hyperlipidemia     on med > 10 yrs    Hypertension     on meds x 1 yr    IgA monoclonal gammopathy 2015    Dr Abdoul Wilson    Insomnia, unspecified     LBP (low back pain)     Dr Michael Martinez    Occlusion and stenosis of carotid artery without mention of cerebral infarction     Dr Néstor June Personal history of alcoholism ([facility]     quit 11/05/2010, relapsed 2016    S/P carotid endarterectomy 01/2019    Dr Silvia Carpenter    Sensorimotor neuropathy     Bilateral lower extremities-EMG 03/23/15 (Michael Martinez)    Smoking trying to quit     Traumatic compression fracture of T11 thoracic vertebra ([facility] 4643    Umbilical hernia      Family History   Problem Relation Age of Onset    Cancer Mother         brain, dec 79   [de-identified] Alcohol Abuse Father     Other Sister         unknown medical history    Other Brother         unknown medical history    High Blood Pressure Brother     Alcohol Abuse Daughter      Patient has no known allergies.    Immunization History   Administered Date(s) Administered    Influenza Vaccine, unspecified formulation 10/26/2015, 09/01/2016, 09/15/2016, 08/15/2017    Influenza Virus Vaccine 10/15/2014    Influenza Whole 10/15/2014, 10/01/2015    Influenza, MDCK Quadv, with preserv IM (Flucelvax 4 yrs and older) 07/09/2019    Influenza, Quadv, IM, PF (6 mo and older Fluzone, Flulaval, Fluarix, and 3 yrs and older Afluria) 08/15/2017, 10/17/2018, 08/25/2019    Influenza, Triv, 3 Years and older, IM (Afluria (5 yrs and older) 09/15/2016, 10/17/2018    MMR 04/04/2019    Pneumococcal Polysaccharide ([id number]) 08/02/2018    Zoster Recombinant (Shingrix) 04/04/2019, 07/09/2019       Current Medications  Current Outpatient Medications   Medication Sig Dispense Refill    HYDROcodone-acetaminophen (NORCO) 7.5-325 MG per tablet Take 1 tablet by mouth every 8 hours as needed for Pain (Max 3 per day) for up to 30 days.  Intended supply: 30 days 80 tablet 0    senna-docusate (PERICOLACE) 8.6-50 MG per tablet Take 2 tablets by mouth daily as needed for Constipation 60 tablet 11    Cholecalciferol (VITAMIN D) 50 MCG (2000 UT) CAPS capsule Take 1 capsule by mouth 2 times daily 30 capsule 5    gabapentin (NEURONTIN) 800 MG tablet take 1 tablet by mouth four times a day 120 tablet 3    polyethylene glycol (GLYCOLAX) powder Take 17 g by mouth daily 510 g 2    docusate sodium (COLACE) 100 MG capsule Take 1 capsule by mouth 2 times daily 60 capsule 12    acetaminophen (TYLENOL) 500 MG tablet Take 1 tablet by mouth 4 times daily as needed for Pain 90 tablet 2    rosuvastatin (CRESTOR) 40 MG tablet take 1 tablet by mouth at bedtime 90 tablet 4    valsartan-hydrochlorothiazide (DIOVAN-HCT) 80-12.5 MG per tablet take 1 tablet by mouth once daily 90 tablet 4    aspirin EC 81 MG EC tablet Take 1 tablet by mouth daily 90 tablet 4    RA VITAMIN B-12 TR 1000 MCG TBCR take 1 tablet by mouth once daily 90 tablet 4    polyethylene glycol (GLYCOLAX) powder       budesonide-formoterol (SYMBICORT) 160-4.5 MCG/ACT AERO inhale 2 puffs by mouth twice a day 10.2 g 12    VASCEPA 1 g CAPS capsule   0    metoprolol succinate (TOPROL XL) 25 MG extended release tablet Take 1 tablet by mouth daily 30 tablet 5    REFRESH TEARS 0.5 % SOLN ophthalmic solution instill 1 drop into both eyes four times a day  1     No current facility-administered medications for this encounter.    :     Comments:  Allergies:  No Known Allergies    Diabetes 5  / Health Status    A1C blood level - at goal < 7%   Lab Results   Component Value Date    LABA1C 6.2 (H) 02/26/2020     Lab Results   Component Value Date    LDLCALC 12 02/26/2020    CREATININE 1.06 02/26/2020       Blood pressure (140/90)  Or less  BP Readings from Last 3 Encounters:   06/25/20 126/68   06/25/20 126/68   06/23/20 120/60        Cholesterol ( LDL under  100)   Lab Results   Component Value Date    LDLCALC 12 02/26/2020       4 . Smoking ? []Yes   []No    5. Taking an Asprin daily? []Yes   []No            Diabetes Self- Management Education Record    Participant Name: Noemi Barnett  Referring Provider: Trey Pina MD   Assessment/Evaluation Ratings:  1=Needs Instruction   4=Demonstrates Understanding/Competency  2=Needs Review   NC=Not Covered    3=Comprehends Key Points  N/A=Not Applicable    Topics/Learning Objectives Initial Assessment Date:   Instr. Date Reinforce Date Post- session Eval Comments   Diabetes disease process & Treatment process: Define diabetes & pre-diabetes; Identify own type of diabetes; role of the pancreas; signs/symptoms; diagnostic criteria; prevention & treatment options; contributing factors. 06/25/20  Ever Mann RN  1    06/25/20  Patient does not know anything about Diabetes he states. He just wants to know what he can eat. Reviewed a few basics of diabetes especially regarding insulin resistance and obesity and early stages. Ever Mann RN     Incorporating nutritional management into lifestyle: Describe effect of type, amount & timing of food on blood glucose;  Describe basic meal planning techniques & current nutrition guidelines. [facility]   Monitoring blood glucose, interpreting and using results:  Identify recommended & personal blood glucose targets; importance of testing; testing supplies; HgbA1C target levels; Factors affecting blood glucose; Importance of logging blood glucose levels for pattern recognition; ketone testing; safe lancet disposal.   [facility]   Prevention, detection & treatment of acute complications:  Identify symptoms of hyper & hypoglycemia, and prevention & treatment strategies. [facility]   Describe sick day guidelines & indications for physician notification. Identify short term consequences of poor control. [facility]   Prevention, detection & treatment of chronic complications:  Define the natural course of diabetes & describe the relationship of blood glucose levels to long term complications of diabetes. Identify preventative measures & standards of care. [facility]   Developing strategies to address psychosocial issues:  Describe feelings about living with diabetes; Describe how stress, depression & anxiety affect blood glucose; Identify coping strategies; Identify support needed & support network available. 06/25/20  Dwayne Pena RN  1    06/25/20  Pt is limited with his ability to understand and read and hx of TBI in teens. He lives with his brother who does the shopping and cooking and I am unable to determine how supportive he is. Pt had to have a taxi voucher for a ride to appointments today. .sounds like pt just has to take what brother provides. Dwayne Pena RN     Developing strategies to promote health/change behavior: Identify 7 self-care behaviors; Personal health risk factors; Benefits, challenges & strategies for behavioral change;    06/25/20  Dwayne Pena RN  1      06/25/20  Pt is focused on diet and what he needs to eat. Dwayne Pena RN     Individualized goal selection. My goal , to help me improve my health, I will:   1. HEALTHY EATING-follow a meal plan of 3-4 choices per meal for wt loss and a snack with protein. Tatyana Rand RN        2. BEING ACTIVE:  Begin to exercise by doing chair exercises while watching the news at least 4 x week. Tatyana Rand RN         Plan  Follow-up Appointments planned in group setting. Next Appointment in 2 weeks. Instruction Method: [x]Lecture/Discussion  []Power Point Presentation  [x]Handouts  []Return Demonstration      Education Materials/Equipment Provided:      [x]Self-Management  - Initial Assessment - Where do I Begin booklet and Counting Carbs from the ADA. []Self-Management  Class 1 - On the Road to better managing your diabetes - Packet of Handouts from Diabetes Department    [x] Self-Management  Class 2 - Meal Plan,  Choose your Foods - Food Lists for [employer] and Nutrition in the [employer] - fast facts about fast food    [] Self-Management  Class 3 -  Diabetes ID card,  foot care tips sheet,  Continuing Your Journey with Diabetes, Individualized Diabetes report card, Sick Day Rules, Diabetes Cookbooks, Magazines and Pedometers when available    []Glucose Meter     []Insulin Kit     []Other      Encounter Type Date Start Time End Time Comments No Show Dates   Assessment 06/25/20  Tatyana Rand, RN  1   46 1400 Pt is slow learner, hx TBI as a teen, per chart \"basic reading disability\". He mostly wants diet teaching but lives with his brother and has to eat what is bought and prepared for him. Likes meat and potato mostly. Needs much repeat of info. Will benefit from dietician class. Class 1 - Understanding diabetes         Class 2- Nutrition and diabetes   7/8/20  Fifi Wilson RDN LD 1300 1600 Pt struggled in class setting. He states he was able to understand carb counting. May need 1:1 in future.     Class 3 - Preventing Complications         Individual MNT         3 Month Follow-up      []In Person  []Telephone    Meter Instrx        Insulin Instrx      []Pen  []Vial & Syringe DSMS Support Plan:  Follow-up plan:     [] MNT referral request / Appointment     [] Annual update referral request and appointment after      []ADA  Where do I Begin, Living with Type 2 Diabetes ADA home support program     [] [address]     [] Fit Walks : [employer] or [facility]    []  Diabetes support Group      []   Emotional Support      [] Alicia on phone      []  Internet web sites - ADA and D- life    []  Journals/Magazines    []  Other ___________________________      Post Education Referrals:      [] [facility] information sheet and [address] [facility] [address]       [] Dental care-dentures    [] Podiatrist-PCP     []  Opthamologist-done recently      []Other

## 2020-07-13 ENCOUNTER — TELEPHONE (OUTPATIENT)
Dept: GASTROENTEROLOGY | Age: 60
End: 2020-07-13

## 2020-07-13 RX ORDER — POLYETHYLENE GLYCOL 3350 17 G/17G
17 POWDER, FOR SOLUTION ORAL DAILY
Qty: 510 G | Refills: 3 | Status: SHIPPED | OUTPATIENT
Start: 2020-07-13 | End: 2020-08-12

## 2020-07-15 ENCOUNTER — OFFICE VISIT (OUTPATIENT)
Dept: PHYSICAL MEDICINE AND REHAB | Age: 60
End: 2020-07-15
Payer: COMMERCIAL

## 2020-07-15 VITALS
SYSTOLIC BLOOD PRESSURE: 120 MMHG | HEIGHT: 72 IN | WEIGHT: 221 LBS | DIASTOLIC BLOOD PRESSURE: 86 MMHG | BODY MASS INDEX: 29.93 KG/M2

## 2020-07-15 PROCEDURE — 4004F PT TOBACCO SCREEN RCVD TLK: CPT | Performed by: PHYSICAL MEDICINE & REHABILITATION

## 2020-07-15 PROCEDURE — G8417 CALC BMI ABV UP PARAM F/U: HCPCS | Performed by: PHYSICAL MEDICINE & REHABILITATION

## 2020-07-15 PROCEDURE — 99214 OFFICE O/P EST MOD 30 MIN: CPT | Performed by: PHYSICAL MEDICINE & REHABILITATION

## 2020-07-15 PROCEDURE — G8427 DOCREV CUR MEDS BY ELIG CLIN: HCPCS | Performed by: PHYSICAL MEDICINE & REHABILITATION

## 2020-07-15 PROCEDURE — 3017F COLORECTAL CA SCREEN DOC REV: CPT | Performed by: PHYSICAL MEDICINE & REHABILITATION

## 2020-07-15 RX ORDER — PREGABALIN 25 MG/1
25 CAPSULE ORAL 3 TIMES DAILY
Qty: 60 CAPSULE | Refills: 2 | Status: SHIPPED | OUTPATIENT
Start: 2020-07-15 | End: 2020-08-20 | Stop reason: SDUPTHER

## 2020-07-15 RX ORDER — HYDROCODONE BITARTRATE AND ACETAMINOPHEN 7.5; 325 MG/1; MG/1
1 TABLET ORAL EVERY 8 HOURS PRN
Qty: 80 TABLET | Refills: 0 | Status: SHIPPED | OUTPATIENT
Start: 2020-07-24 | End: 2020-08-20 | Stop reason: SDUPTHER

## 2020-07-15 ASSESSMENT — ENCOUNTER SYMPTOMS
BACK PAIN: 1
ABDOMINAL PAIN: 0
DIARRHEA: 0
APNEA: 0
SHORTNESS OF BREATH: 0
WHEEZING: 0
PHOTOPHOBIA: 0
CHEST TIGHTNESS: 0
COUGH: 0
COLOR CHANGE: 0
CONSTIPATION: 1
VOICE CHANGE: 1
VISUAL CHANGE: 0
VOMITING: 0
NAUSEA: 0
BOWEL INCONTINENCE: 0
EYE PAIN: 0

## 2020-07-15 NOTE — PROGRESS NOTES
Subjective  Liz Houston, 61 y.o. male presents today with:       Chief Complaint    Back Pain    Hip Pain (Left)       He is doing well on his condition at current doses -but is showing signs of decreased function. He is unkempt today and smells heavily of cigarettes. He states that he has quit. 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 I offered and prescribed Narcan just in case he wants to fall back on that in case he has an allergic reaction over responsive medication or accidentally overtakes assessments. Trigger point injections are helping somewhat however the hip hip injections are not helping I would like to get an x-ray of his hips to see if he is having significant osteoarthritis that may require surgery. However  He is able to have Positive interactions with his friends family. He notes that he currently is 60 per month. I have okayed him to increase to 80/month as his pain is flared up. Active in AA does not know some eating. No signs of overuse or abuse. He has not been getting much help with the gabapentin we are switching him over from Lyrica to gabapentin will slowly titrate the Lyrica dose and monitor for cognitive side effects which it is known for. We are hoping he will have better pain control and be able to switch eventually off of the gabapentin to the Lyrica but for now we will overlap. Back Pain   This is a chronic problem. The current episode started more than 1 year ago. The problem occurs daily. The problem is unchanged. The pain is present in the lumbar spine. Radiates to: left leg, left foot. The pain is at a severity of 3/10 (Pain ranges from 2-10/10. 10/10 with walking long distances. ). The pain is moderate. The pain is the same all the time. The symptoms are aggravated by position.  Associated symptoms include leg pain, numbness (Left side of unspecified     LBP (low back pain)     Dr Yang Stephens Neuropathy     Occlusion and stenosis of carotid artery without mention of cerebral infarction     Dr Thien Dunn history of alcoholism ([facility] [address]     quit 2010, relapsed 2016    S/P carotid endarterectomy 2019    Dr Matilda Paredes    Sensorimotor neuropathy     Bilateral lower extremities-EMG 03/23/15 (Ileana Velásquez)    Smoking trying to quit     Traumatic compression fracture of T11 thoracic vertebra ([facility] [address] 8269    Umbilical hernia      Past Surgical History:   Procedure Laterality Date    CAROTID ENDARTERECTOMY Left 2019    LEFT CAROTID ENDARTERECTOMY performed by Camryn Desouza MD at [address]  2014    COLONOSCOPY N/A 2019    COLONOSCOPY DIAGNOSTIC performed by Joanne Gaona MD at [address] [facility] [place], DIAGNOSTIC      HERNIA REPAIR      TONSILLECTOMY      UPPER GASTROINTESTINAL ENDOSCOPY  2014    VENTRAL HERNIA REPAIR  09/01/15    W/MESH AND W/UMBILECTOMY     Social History     Socioeconomic History    Marital status: Single     Spouse name: None    Number of children: 1    Years of education: None    Highest education level: None   Occupational History    Occupation: SSI for learning disability   Social Needs    Financial resource strain: None    Food insecurity     Worry: None     Inability: None    Transportation needs     Medical: None     Non-medical: None   Tobacco Use    Smoking status: Current Every Day Smoker     Packs/day: 0.50     Years: 40.00     Pack years: 20.00     Types: Cigarettes     Start date:      Last attempt to quit: 3/23/2019     Years since quittin.3    Smokeless tobacco: Never Used    Tobacco comment: patient would like to quit, encouraged to see PCP   Substance and Sexual Activity    Alcohol use: No     Alcohol/week: 0.0 standard drinks     Comment: 2016 Quit Date    Drug use: No    Sexual activity: None   Lifestyle    Physical activity     Days per week: None     Minutes per session: None    Stress: None   Relationships    Social connections     Talks on phone: None     Gets together: None     Attends [employer] service: None     Active member of club or organization: None     Attends meetings of clubs or organizations: None     Relationship status: None    Intimate partner violence     Fear of current or ex partner: None     Emotionally abused: None     Physically abused: None     Forced sexual activity: None   Other Topics Concern    None   Social History Narrative    Born in [place], one of 5    Came to [place] at age 1 with parents    Never , one daughter    Never worked, disabled since 12 (mental)    Lives in [facility] with brother, in a house        Attends [employer] daily    [employer] riding bike, TV    One daughter, does keep in touch      Family History   Problem Relation Age of Onset    Cancer Mother         brain, dec 79    Alcohol Abuse Father     Other Sister         unknown medical history    Other Brother         unknown medical history    High Blood Pressure Brother     Alcohol Abuse Daughter        No Known Allergies    Review of Systems   Constitutional: Negative for activity change, appetite change, fatigue, fever and unexpected weight change. HENT: Positive for voice change. Eyes: Negative for photophobia, pain and visual disturbance. Respiratory: Negative for apnea, cough, chest tightness, shortness of breath and wheezing. Cardiovascular: Negative for chest pain, palpitations and leg swelling. Gastrointestinal: Positive for constipation. Negative for abdominal pain, bowel incontinence, diarrhea, nausea and vomiting. Endocrine: Positive for cold intolerance. Genitourinary: Negative. Negative for bladder incontinence and dysuria. Musculoskeletal: Positive for arthralgias, back pain and myalgias. Negative for gait problem, joint swelling, neck pain and neck stiffness. Skin: Negative. wall: No tenderness. Abdominal:      General: Bowel sounds are normal. There is no distension. Palpations: Abdomen is soft. There is no mass. Tenderness: There is no abdominal tenderness. There is no guarding or rebound. Hernia: A hernia is present. Hernia is present in the ventral area. Comments: Umbilical hernia   Musculoskeletal:         General: Tenderness present. Right shoulder: Normal.      Left shoulder: Normal.      Right elbow: Normal.     Left elbow: Normal.      Right wrist: Normal.      Left wrist: Normal.      Right hip: He exhibits decreased range of motion, decreased strength and tenderness. Left hip: He exhibits decreased range of motion and tenderness. Right knee: Normal.      Left knee: Normal.      Right ankle: Normal. Achilles tendon normal.      Left ankle: Normal. Achilles tendon normal.      Cervical back: He exhibits decreased range of motion, tenderness, bony tenderness, pain and spasm. Thoracic back: He exhibits decreased range of motion, tenderness, bony tenderness, pain and spasm. Lumbar back: He exhibits decreased range of motion, tenderness, bony tenderness and pain. He exhibits no swelling, no edema, no deformity, no laceration, no spasm and normal pulse. Back:       Right upper arm: Normal.      Left upper arm: Normal.      Right forearm: Normal.      Left forearm: Normal.      Right hand: Normal.      Left hand: Normal.      Right upper leg: Normal.      Left upper leg: Normal.      Right lower leg: Normal.      Left lower leg: Normal.        Legs:       Right foot: Normal.      Left foot: Normal.      Comments: Tender areas are indicated by numbered spot         Lymphadenopathy:      Cervical: No cervical adenopathy. Skin:     General: Skin is warm and dry. Coloration: Skin is not pale. Findings: No abrasion, bruising, ecchymosis, erythema, laceration, petechiae or rash.  Rash is not macular, pustular or urticarial.      Nails: There is no clubbing. Neurological:      Mental Status: He is alert and oriented to person, place, and time. Cranial Nerves: No cranial nerve deficit. Sensory: Sensory deficit present. Motor: No tremor, atrophy or abnormal muscle tone. Coordination: Coordination normal.      Deep Tendon Reflexes: Reflexes abnormal. Babinski sign absent on the right side. Babinski sign absent on the left side. Psychiatric:         Attention and Perception: He is attentive. Mood and Affect: Mood is not anxious or depressed. Affect is not labile, blunt, angry or inappropriate. Speech: He is communicative. Speech is not rapid and pressured, delayed, slurred or tangential.         Behavior: Behavior normal. Behavior is not agitated, slowed, aggressive, withdrawn, hyperactive or combative. Thought Content: Thought content normal. Thought content is not paranoid or delusional. Thought content does not include homicidal or suicidal ideation. Thought content does not include homicidal or suicidal plan. Cognition and Memory: Memory is not impaired. He does not exhibit impaired recent memory or impaired remote memory. Judgment: Judgment normal. Judgment is not impulsive or inappropriate. Ortho Exam  Neurologic Exam     Mental Status   Oriented to person, place, and time. Speech: not slurred   Level of consciousness: alert  Knowledge: good. Able to name object. Able to read. Able to repeat. Able to write. Normal comprehension. Cranial Nerves     CN III, IV, VI   Pupils are equal, round, and reactive to light.         After a thorough review and discussion of the previous medical records, patient comprehensive medical, surgical, and family and social history, Review of Systems, their OARRS, their Screener and Opioid Assessment for Patients with Pain (SOAPP®-R), recent diagnostics, and symptomatic results to previous treatment, it is my impression that the patients is suffering with progressive and severe:     Diagnosis Orders   1. DDD (degenerative disc disease), lumbar  HYDROcodone-acetaminophen (NORCO) 7.5-325 MG per tablet   2. Basic learning disability, reading     3. Bilateral carotid artery stenosis     4. High risk medication use - 01/25/18 OARRS PM&R, 03/23/18 OARRS PM&R, 02/15/17 Med Contract PM&R, 09/21/17 Urine Drug Screen: negative PM&R  HYDROcodone-acetaminophen (NORCO) 7.5-325 MG per tablet   5. Anxiety disorder due to known physiological condition  pregabalin (LYRICA) 25 MG capsule   6. Chronic midline low back pain with bilateral sciatica     7. Generalized anxiety disorder  pregabalin (LYRICA) 25 MG capsule   8. Alcoholic (HCC)-remote Hx     9. Alkaline phosphatase elevation     10. SI (sacroiliac) pain  HYDROcodone-acetaminophen (NORCO) 7.5-325 MG per tablet   11. Lumbosacral radiculopathy at S1  HYDROcodone-acetaminophen (NORCO) 7.5-325 MG per tablet   12. IgG monoclonal gammopathy of uncertain significance  HYDROcodone-acetaminophen (NORCO) 7.5-325 MG per tablet    pregabalin (LYRICA) 25 MG capsule   13. Myalgia  LA INJECT TRIGGER POINTS, 3 OR GREATER   14.  Neuropathic pain of both legs  pregabalin (LYRICA) 25 MG capsule       I am also concerned by lifestyle and mood issues including:    Past Medical History:   Diagnosis Date    Alcoholic polyneuropathy ([facility] [address]     Asthma     CAD (coronary artery disease)     [address] [facility]    Chronic back pain greater than 3 months duration     CN III palsy, right eye 2017    Dr Cheryl Middleton    COPD (chronic obstructive pulmonary disease) ([facility] [address] 2014    DDD (degenerative disc disease), lumbar 3/23/2015    Elevated liver enzymes 2014    Hyperlipidemia     on med > 10 yrs    Hypertension     on meds x 1 yr    IgA monoclonal gammopathy 2015    Dr Jessica Reis    Insomnia, unspecified     LBP (low back pain)     Dr Vincent Green Neuropathy     Occlusion and stenosis of carotid artery without mention of cerebral infarction     Dr Thien Dunn history of alcoholism ([facility]     quit 11/05/2010, relapsed 2016    S/P carotid endarterectomy 01/2019    Dr Matilda Paredes    Sensorimotor neuropathy     Bilateral lower extremities-EMG 03/23/15 (Ileana Velásquez)    Smoking trying to quit     Traumatic compression fracture of T11 thoracic vertebra ([facility] 0304    Umbilical hernia            Given their medication, chronic pain and lifestyle and medications they are at risk for :    Falls, constipation, addiction  Loss of livelyhood due to severe pain, debility, weight gain and  vitamin D deficiency    The patient was educated regarding proper diet, fitness routine, and regulatory restrictions concerning pain medications. Previous notes, comprehensive past medical, surgical, family history, and diagnostics were reviewed. Patient education and councelling were provided regarding off label use,treatment options and medication and injection risks. Current and old OARRS ([place] Automated Prescription Reporting System) records reviewed, all refills reviewed since last visit,  Behavioral agreement/MICK regulations   and Toxicology screen was reviewed with patient and is up to date. There are no current red flags. They are making good progress regarding pain relief, they are performing at a functional level regarding activities of daily living, family interactions and psychological functioning, they're not having any adverse effects or side effects from the current medications, and I see no findings of aberrant drug taking or addiction related behaviors. The patient is aware that they have a chronic pain condition and they may require opiates dosing for life. All efforts will be made to wean to the lowest effective dose. Other therapies for pain have not been effective including nonopiate medications. Injections and exercises are only partially effective. A Rx for Narcan was offered to help prevent accidental overdose.     Analilia Dela Cruz Monitoring 7/15/2020   Attestation -   Acute Pain Prescriptions Prescription exceeds daily limit for a specific reason. See comments or note. ;Not required given exclusionary diagnoses. ..;Severe pain not adequately treated with lower dose. Periodic Controlled Substance Monitoring Possible medication side effects, risk of tolerance/dependence & alternative treatments discussed. ;No signs of potential drug abuse or diversion identified. ;Assessed functional status. ;Obtaining appropriate analgesic effect of treatment. Chronic Pain > 50 MEDD Re-evaluated the status of the patient's underlying condition causing pain. ;Considered consultation with a specialist.;Obtained or confirmed \"Consent for Opioid Use\" on file. Chronic Pain > 80 MEDD -       Periodic Controlled Substance Monitoring: Possible medication side effects, risk of tolerance/dependence & alternative treatments discussed., No signs of potential drug abuse or diversion identified. , Assessed functional status., Obtaining appropriate analgesic effect of treatment. ([facility], DO)       Patient is currently taking:       I am having Ranjith Rodriguez start on pregabalin. I am also having him maintain his Refresh Tears, Vascepa, metoprolol succinate, budesonide-formoterol, RA Vitamin B-12 TR, rosuvastatin, valsartan-hydrochlorothiazide, aspirin EC, acetaminophen, docusate sodium, vitamin D, gabapentin, senna-docusate, polyethylene glycol, and HYDROcodone-acetaminophen. I also recommend the following Medications:    Orders Placed This Encounter   Medications    HYDROcodone-acetaminophen (NORCO) 7.5-325 MG per tablet     Sig: Take 1 tablet by mouth every 8 hours as needed for Pain (Max 3 per day) for up to 30 days. Intended supply: 30 days     Dispense:  80 tablet     Refill:  0     Reduce doses taken as pain becomes manageable    pregabalin (LYRICA) 25 MG capsule     Sig: Take 1 capsule by mouth 3 times daily for 30 days.      Dispense:  60 capsule     Refill:  2        -which helps with pain and function. Otherwise, continue the current pain medications that I have prescibed. Radiologic:   Old films reviewed    XR FOOT LEFT (MIN 3 VIEWS), XR FOOT RIGHT (MIN 3 VIEWS)         CLINICAL HISTORY: Generalized pain. No history of trauma reported.         COMPARISON: Left foot March 20, 2020         FINDINGS:         Three  views of the right foot are submitted. No acute fractures. No dislocations. No focal bony abnormalities. No radiographic foreign body                                                                                           Impression    NO ACUTE FRACTURES         XR FOOT LEFT (MIN 3 VIEWS), XR FOOT RIGHT (MIN 3 VIEWS)         CLINICAL HISTORY: Generalized pain. No history of trauma reported.         COMPARISON: Right foot March 20, 2020         FINDINGS:         Three  views of the left foot are submitted. No acute fractures. No dislocations. No focal bony abnormalities. No radiographic foreign body                                                                                      IMPRESSION: NO ACUTE FRACTURES      ,     I discussed results with patients. see Follow up plans below  For any new studies. Care Everywhere Updates:  requested and reviewed. No new issues noted. Electrodiagnostic:  Previous studies requested,     I discussed results with patient. See follow-up plans for new studies.         Labs:  Previous labs reviewed     Lab Results   Component Value Date     02/26/2020    K 4.1 02/26/2020     02/26/2020    CO2 23 02/26/2020    BUN 15 02/26/2020    CREATININE 1.06 02/26/2020    CALCIUM 9.5 02/26/2020    LABALBU 4.1 02/26/2020    BILITOT 0.5 02/26/2020    ALKPHOS 145 02/26/2020    AST 23 02/26/2020    ALT 36 02/26/2020     Lab Results   Component Value Date    WBC 20.0 01/19/2019    RBC 4.51 01/19/2019    HGB 14.5 01/19/2019    HCT 42.3 01/19/2019    MCV 93.9 01/19/2019    MCH 32.1 01/19/2019    MCHC 34.1 01/19/2019    RDW 13.4 01/19/2019     01/19/2019    MPV 8.8 08/25/2015       Lab Results   Component Value Date    LABAMPH Neg 10/30/2018    BARBSCNU Neg 10/30/2018    LABBENZ Neg 10/30/2018    CANSU Neg 10/30/2018    COCAIMETSCRU Neg 10/30/2018    PHENCYCLIDINESCREENURINE Neg 10/30/2018    DSCOMMENT see below 10/30/2018       Lab Results   Component Value Date    CODEINE Not Detected 09/20/2016    MORPHINE Not Detected 09/20/2016    Yaneli Adie Not Detected 09/20/2016    OXYCODONE Not Detected 09/20/2016    NOROXYCODONE Not Detected 09/20/2016    NOROXYMU Not Detected 09/20/2016    HYDRCO Not Detected 09/20/2016    NORHYDU Not Detected 09/20/2016    HYDROMO Not Detected 09/20/2016    Windell Bars Not Detected 09/20/2016    Kat Floor Not Detected 09/20/2016    FENTA Not Detected 09/20/2016    NORFENT Not Detected 09/20/2016    MEPERIDINE Not Detected 09/20/2016    TAPENU Not Detected 09/20/2016    TAPOSULFUR Not Detected 09/20/2016    METHADONE Not Detected 09/20/2016    LABPROP Not Detected 09/20/2016    TRAM Not Detected 09/20/2016    AMPH Not Detected 09/20/2016    METHAMP Not Detected 09/20/2016    MDMA Not Detected 09/20/2016    ECMDA Not Detected 09/20/2016       Lab Results   Component Value Date    PHENTERMINE Not Detected 09/20/2016    BENZOYL Not Detected 09/20/2016    Monica Cater Not Detected 09/20/2016    Diaz Sprinkles Not Detected 09/20/2016    CLONAZEPAM Not Detected 09/20/2016    [place] Not Detected 09/20/2016    DIAZEP Not Detected 09/20/2016    CESAR Not Detected 09/20/2016    OXAZ Not Detected 09/20/2016    Kathreen Dilcia Not Detected 09/20/2016    LORAZEPAM Not Detected 09/20/2016    MIDAZOLAM Not Detected 09/20/2016    ZOLPIDEM Not Detected 09/20/2016    CORWIN Not Detected 09/20/2016    ETG Not Detected 09/20/2016    MARIJMET Not Detected 09/20/2016    PCP Not Detected 09/20/2016    PAINMGTDRUGP See Below 09/20/2016    EERPAINMGTPA See Note 09/20/2016

## 2020-07-20 ENCOUNTER — TELEPHONE (OUTPATIENT)
Dept: PHYSICAL MEDICINE AND REHAB | Age: 60
End: 2020-07-20

## 2020-07-20 DIAGNOSIS — Z79.899 HIGH RISK MEDICATION USE: ICD-10-CM

## 2020-07-20 LAB
AMPHETAMINE SCREEN, URINE: NORMAL
BARBITURATE SCREEN URINE: NORMAL
BENZODIAZEPINE SCREEN, URINE: NORMAL
CANNABINOID SCREEN URINE: NORMAL
COCAINE METABOLITE SCREEN URINE: NORMAL
Lab: NORMAL
METHADONE SCREEN, URINE: NORMAL
OPIATE SCREEN URINE: NORMAL
OXYCODONE URINE: NORMAL
PHENCYCLIDINE SCREEN URINE: NORMAL
PROPOXYPHENE SCREEN: NORMAL

## 2020-07-20 NOTE — TELEPHONE ENCOUNTER
----- Message from Christianne Loja DO sent at 7/20/2020 10:21 AM EDT -----  Regarding: RE: Lyrica  Yes he should increase Lyrica to 2 tablets twice a day for now. He was actually supposed to keep the gabapentin and overlap them.  ----- Message -----  From: Lyndia Kobus, CMA Violeta Hodgkins)  Sent: 7/20/2020   8:46 AM EDT  To: Christianne Loja DO  Subject: Lyrica                                           Patient states that the Lyrica is not helping with his foot pain. I reviewed his chart and he was to be taking his Gabapentin 800 mg along with the Lyrica 25 mg. Patient states that he threw away the remaining Gabapentin he had at home the same day he picked-up his Lyrica prescription. I advised him that he was to continue with the Gabapentin. Patient is would like to know if he can increase the Lyrica to 2 tablets at least twice a day? He is not due for a refill on his Gabapentin 800 mg until 08/06/20. Please advise.

## 2020-07-30 ENCOUNTER — OFFICE VISIT (OUTPATIENT)
Dept: INTERVENTIONAL RADIOLOGY/VASCULAR | Age: 60
End: 2020-07-30
Payer: COMMERCIAL

## 2020-07-30 VITALS
RESPIRATION RATE: 16 BRPM | SYSTOLIC BLOOD PRESSURE: 122 MMHG | BODY MASS INDEX: 29.93 KG/M2 | DIASTOLIC BLOOD PRESSURE: 70 MMHG | HEART RATE: 90 BPM | WEIGHT: 221 LBS | HEIGHT: 72 IN | OXYGEN SATURATION: 94 %

## 2020-07-30 DIAGNOSIS — M79.672 FOOT PAIN, BILATERAL: ICD-10-CM

## 2020-07-30 DIAGNOSIS — M79.671 FOOT PAIN, BILATERAL: ICD-10-CM

## 2020-07-30 DIAGNOSIS — I73.9 CLAUDICATION OF BOTH LOWER EXTREMITIES (HCC): ICD-10-CM

## 2020-07-30 DIAGNOSIS — M79.606 LEG PAIN, DIFFUSE, UNSPECIFIED LATERALITY: ICD-10-CM

## 2020-07-30 DIAGNOSIS — R29.898 HEAVY SENSATION OF LOWER EXTREMITY: ICD-10-CM

## 2020-07-30 DIAGNOSIS — I70.203 ATHEROSCLEROSIS OF ARTERY OF BOTH LOWER EXTREMITIES (HCC): ICD-10-CM

## 2020-07-30 LAB
ANION GAP SERPL CALCULATED.3IONS-SCNC: 12 MEQ/L (ref 9–15)
BUN BLDV-MCNC: 21 MG/DL (ref 8–23)
CALCIUM SERPL-MCNC: 10.4 MG/DL (ref 8.5–9.9)
CHLORIDE BLD-SCNC: 99 MEQ/L (ref 95–107)
CO2: 28 MEQ/L (ref 20–31)
CREAT SERPL-MCNC: 0.98 MG/DL (ref 0.7–1.2)
GFR AFRICAN AMERICAN: >60
GFR NON-AFRICAN AMERICAN: >60
GLUCOSE BLD-MCNC: 87 MG/DL (ref 70–99)
POTASSIUM SERPL-SCNC: 4.1 MEQ/L (ref 3.4–4.9)
SODIUM BLD-SCNC: 139 MEQ/L (ref 135–144)

## 2020-07-30 PROCEDURE — 3017F COLORECTAL CA SCREEN DOC REV: CPT | Performed by: NURSE PRACTITIONER

## 2020-07-30 PROCEDURE — G8427 DOCREV CUR MEDS BY ELIG CLIN: HCPCS | Performed by: NURSE PRACTITIONER

## 2020-07-30 PROCEDURE — 99215 OFFICE O/P EST HI 40 MIN: CPT | Performed by: NURSE PRACTITIONER

## 2020-07-30 PROCEDURE — G8417 CALC BMI ABV UP PARAM F/U: HCPCS | Performed by: NURSE PRACTITIONER

## 2020-07-30 PROCEDURE — 4004F PT TOBACCO SCREEN RCVD TLK: CPT | Performed by: NURSE PRACTITIONER

## 2020-07-30 NOTE — PROGRESS NOTES
Noemi Barnett, a male of 61 y.o. came to the office 7/30/2020. Chief Complaint   Patient presents with    Results     Arterial US     SUBJECTIVE:     7/30/2020: Noemi Barnett returns for results of LE arterial US. US reports significant LE arterial disease bilaterally. Symptoms to bilateral LE's remain. Pain during night now with difficulty sleeping. Denies LE swelling, fatigue. LLE heavniness. 6/23/2020 HPI: Noemi Barnett presents to clinic for consultation at the request of his PCP for evaluation of PAD for LE pain. Patient presents with symptoms of: both feet with left worse than right, onset one month. Pain occurs with ambulation. Describes pain as stabbing. Left leg with fatigue and heaviness, chronic. States also has numbness and tingling in feet and toes both at rest and with activity. Patient is a poor historian as he changes his story often so is difficulty getting a thorough evaluation. Affect on activities of daily living: Must stop and take frequent rest periods. Denies LE swelling or symptomatic varicose veins. NSAIDS: Takes Neurontin daily with minimal relief. Leg elevation: Elevates without relief. Stocking use and dates: Has never done conservative therapy with compression stockings. Claudication: Symptoms consistent with. Lower leg and foot pain with ambulation. PAD risk factors: Borderline DM, HTN, Hypercholesteremia, overweight, smoker. Review of Systems   Constitutional: Negative. Negative for appetite change, chills, fatigue and fever. HENT: Negative. Negative for congestion, sinus pressure, sinus pain, sore throat and trouble swallowing. Eyes: Negative. Respiratory: Positive for cough (dry). Negative for shortness of breath and wheezing. Cardiovascular: Negative for chest pain, palpitations and leg swelling.         Claudication   Gastrointestinal: Negative for abdominal distention, abdominal pain, constipation, diarrhea, nausea and vomiting. Genitourinary: Negative for difficulty urinating, frequency, hematuria and urgency. Musculoskeletal: Positive for back pain (lower). Bilateral foot pain. Skin: Negative for rash and wound. Neurological: Positive for numbness (tingling feet and toes). Negative for dizziness, light-headedness and headaches. Hematological: Does not bruise/bleed easily. Psychiatric/Behavioral: Negative. OBJECTIVE:  /70   Pulse 90   Resp 16   Ht 5' 11.5\" (1.816 m)   Wt 221 lb (100.2 kg)   SpO2 94% Comment: on room air  BMI 30.39 kg/m²     Physical Exam  Constitutional:       Appearance: Normal appearance. HENT:      Head: Normocephalic and atraumatic. Nose: Nose normal.   Neck:      Musculoskeletal: Normal range of motion and neck supple. Cardiovascular:      Rate and Rhythm: Normal rate and regular rhythm. Pulses:           Posterior tibial pulses are detected w/ Doppler on the right side and detected w/ Doppler on the left side. Heart sounds: Normal heart sounds. Murmur  Pulmonary:      Effort: Pulmonary effort is normal.      Breath sounds: Normal breath sounds. Abdominal:      General: Bowel sounds are normal. There is no distension. Musculoskeletal:         General: Tenderness (feet) present. No swelling, deformity or signs of injury. Right lower leg: No edema. Left lower leg: No edema. Skin:     General: Skin is warm and dry. Capillary Refill: Capillary refill takes 2 to 3 seconds. Neurological:      Mental Status: He is alert and oriented to person, place, and time.    Psychiatric:         Mood and Affect: Mood normal.         Behavior: Behavior normal.     No radiation information found for this patient    Narrative    EXAMINATION: US DUP LOWER ART/BYPASS GRAFTS BILATERAL COMPLETE         DATE AND TIME:7/24/2020 2:21 PM         CLINICAL HISTORY: M79.671 Foot pain, bilateral ICD10  T46.094 Foot pain, bilateral ICD10         COMPARISON: None available.         Technique:  The following arteries were evaluated both legs: CFA, SFA, popliteal artery and a few of the runoff vessels. The measurements of the systolic and diastolic velocities at the various levels of these arteries are noted in the graph below.         FINDINGS:         RIGHT LOWER EXTREMITY: There is blunted monophasic waveform pattern of blood flow in the arteries throughout the entire right lower extremity. Large amounts of plaque are visualized throughout the arteries.         LEFT LOWER EXTREMITY: There is blunted monophasic waveform pattern of blood flow in the arteries throughout the entire left lower extremity. Large amounts of plaque are visualized throughout the arteries.              Impression         SIGNIFICANT ARTERIAL DISEASE IS SEEN IN THE BILATERAL LOWER EXTREMITIES. FURTHER EVALUATION WITH MRA WITH CONTRAST IS WARRANTED FOR FURTHER DETAIL.               ASSESSMENT AND PLAN:    Assessment:   1. Claudication  2. LE chronic pain. Could be component of CVI. Plan:   1. Bilateral LE MRA rule out PAD  2. LE US Venous scan to rule out chronic venous insufficiency Office visit for results of both.    3. BMP       Joe Jacobs, APRN - CNP

## 2020-08-04 RX ORDER — METOPROLOL SUCCINATE 25 MG/1
25 TABLET, EXTENDED RELEASE ORAL DAILY
Qty: 30 TABLET | Refills: 5 | OUTPATIENT
Start: 2020-08-04

## 2020-08-04 NOTE — TELEPHONE ENCOUNTER
Rx request   Requested Prescriptions     Pending Prescriptions Disp Refills    metoprolol succinate (TOPROL XL) 25 MG extended release tablet 30 tablet 5     Sig: Take 1 tablet by mouth daily     LOV 6/25/2020  Next Visit Date:  Future Appointments   Date Time Provider Loan Lisset   8/18/2020 10:00 AM [facility]   8/18/2020 11:00 AM [facility]   8/21/2020  1:00 PM DR Anjelica Deleon 97   8/27/2020  1:00 PM Charity Sarah, APRN - CNP [facility]   9/9/2020  2:30 PM Kylee Kunz   9/16/2020  2:15 PM Didi Montaño DO [facility] [place]   9/23/2020  1:15 PM Didi Montaño DO [facility] EMERGENCY MEDICAL [place] AT [place]   1/4/2021  1:30 PM Rasheed Romo MD [facility]   2/4/2021  1:30 PM Rasheed Romo MD [address] [place] [address] 
no palpitations, no chest pain and no edema.

## 2020-08-07 ENCOUNTER — TELEPHONE (OUTPATIENT)
Dept: GASTROENTEROLOGY | Age: 60
End: 2020-08-07

## 2020-08-07 NOTE — TELEPHONE ENCOUNTER
The patient wanted to know if there is a cancellation if he could talk to Ronald Anderson regarding constipation before his f/u in Sept.

## 2020-08-07 NOTE — TELEPHONE ENCOUNTER
Returned patients call, has been taking Miralax once daily for good effect, no constipation, melena or hematochezia, had specific question about a commercial that he saw for [employer] for constipation. Advised to continue Miralax 1-2 times daily, drink adequate water, and add colace 1-2 times daily if needed. Recent colonoscopy results noted.  Follow up sooner for worsening symptoms otherwise as scheduled in Sept 2020  Gio Bhagat

## 2020-08-18 ENCOUNTER — HOSPITAL ENCOUNTER (OUTPATIENT)
Dept: MRI IMAGING | Age: 60
Discharge: HOME | End: 2020-08-20
Payer: COMMERCIAL

## 2020-08-18 PROCEDURE — 73725 MR ANG LWR EXT W OR W/O DYE: CPT | Performed by: RADIOLOGY

## 2020-08-18 PROCEDURE — 73725 MR ANG LWR EXT W OR W/O DYE: CPT

## 2020-08-18 PROCEDURE — 6360000004 HC RX CONTRAST MEDICATION: Performed by: RADIOLOGY

## 2020-08-18 PROCEDURE — A9577 INJ MULTIHANCE: HCPCS | Performed by: RADIOLOGY

## 2020-08-18 RX ORDER — 0.9 % SODIUM CHLORIDE 0.9 %
40 VIAL (ML) INJECTION ONCE
Status: DISCONTINUED | OUTPATIENT
Start: 2020-08-18 | End: 2020-08-21 | Stop reason: HOSPADM

## 2020-08-18 RX ADMIN — GADOBENATE DIMEGLUMINE 20 ML: 529 INJECTION, SOLUTION INTRAVENOUS at 10:52

## 2020-08-20 RX ORDER — HYDROCODONE BITARTRATE AND ACETAMINOPHEN 7.5; 325 MG/1; MG/1
1 TABLET ORAL EVERY 8 HOURS PRN
Qty: 80 TABLET | Refills: 0 | Status: SHIPPED | OUTPATIENT
Start: 2020-08-22 | End: 2020-09-16 | Stop reason: SDUPTHER

## 2020-08-20 RX ORDER — MULTIVIT-MIN/IRON/FOLIC ACID/K 18-600-40
1 CAPSULE ORAL 2 TIMES DAILY
Qty: 30 CAPSULE | Refills: 5 | Status: SHIPPED | OUTPATIENT
Start: 2020-08-20 | End: 2020-11-06 | Stop reason: SDUPTHER

## 2020-08-20 RX ORDER — GABAPENTIN 800 MG/1
TABLET ORAL
Qty: 120 TABLET | Refills: 3 | Status: SHIPPED | OUTPATIENT
Start: 2020-08-20 | End: 2020-10-12 | Stop reason: SDUPTHER

## 2020-08-20 RX ORDER — PREGABALIN 25 MG/1
25 CAPSULE ORAL 3 TIMES DAILY
Qty: 90 CAPSULE | Refills: 2 | Status: SHIPPED | OUTPATIENT
Start: 2020-08-20 | End: 2020-09-16

## 2020-08-27 ENCOUNTER — OFFICE VISIT (OUTPATIENT)
Dept: INTERVENTIONAL RADIOLOGY/VASCULAR | Age: 60
End: 2020-08-27
Payer: COMMERCIAL

## 2020-08-27 VITALS
BODY MASS INDEX: 29.93 KG/M2 | HEART RATE: 73 BPM | DIASTOLIC BLOOD PRESSURE: 88 MMHG | WEIGHT: 221 LBS | RESPIRATION RATE: 16 BRPM | SYSTOLIC BLOOD PRESSURE: 138 MMHG | HEIGHT: 72 IN

## 2020-08-27 PROBLEM — I87.2 EDEMA OF BOTH LOWER EXTREMITIES DUE TO PERIPHERAL VENOUS INSUFFICIENCY: Status: ACTIVE | Noted: 2020-08-27

## 2020-08-27 PROCEDURE — 99214 OFFICE O/P EST MOD 30 MIN: CPT | Performed by: NURSE PRACTITIONER

## 2020-08-27 PROCEDURE — G8417 CALC BMI ABV UP PARAM F/U: HCPCS | Performed by: NURSE PRACTITIONER

## 2020-08-27 PROCEDURE — 3017F COLORECTAL CA SCREEN DOC REV: CPT | Performed by: NURSE PRACTITIONER

## 2020-08-27 PROCEDURE — 4004F PT TOBACCO SCREEN RCVD TLK: CPT | Performed by: NURSE PRACTITIONER

## 2020-08-27 PROCEDURE — G8427 DOCREV CUR MEDS BY ELIG CLIN: HCPCS | Performed by: NURSE PRACTITIONER

## 2020-08-27 NOTE — PROGRESS NOTES
Negative for congestion, sinus pressure, sinus pain, sore throat and trouble swallowing. Eyes: Negative. Respiratory: Positive for cough (dry). Negative for shortness of breath and wheezing. Cardiovascular: Negative for chest pain, palpitations and leg swelling. Claudication   Gastrointestinal: Negative for abdominal distention, abdominal pain, constipation, diarrhea, nausea and vomiting. Genitourinary: Negative for difficulty urinating, frequency, hematuria and urgency. Musculoskeletal: Positive for back pain (lower). Bilateral foot pain. Skin: Negative for rash and wound. Neurological: Positive for numbness (tingling feet and toes). Negative for dizziness, light-headedness and headaches. Hematological: Does not bruise/bleed easily. Psychiatric/Behavioral: Negative. OBJECTIVE:  /88   Pulse 73   Resp 16   Ht 5' 11.5\" (1.816 m)   Wt 221 lb (100.2 kg)   BMI 30.39 kg/m²     Physical Exam  Constitutional:       Appearance: Normal appearance. HENT:      Head: Normocephalic and atraumatic. Nose: Nose normal.   Neck:      Musculoskeletal: Normal range of motion and neck supple. Cardiovascular:      Rate and Rhythm: Normal rate and regular rhythm. Pulses:           Posterior tibial pulses are detected w/ Doppler on the right side and detected w/ Doppler on the left side. Heart sounds: Normal heart sounds. Murmur  Pulmonary:      Effort: Pulmonary effort is normal.      Breath sounds: Normal breath sounds. Abdominal:      General: Bowel sounds are normal. There is no distension. Musculoskeletal:         General: Tenderness (feet) present. No swelling, deformity or signs of injury. Right lower leg: No edema. Left lower leg: No edema. Skin:     General: Skin is warm and dry. Capillary Refill: Capillary refill takes 2 to 3 seconds. Neurological:      Mental Status: He is alert and oriented to person, place, and time.    Psychiatric: specified symptoms and signs involving the circulatory and respiratory systems ICD10         COMMENTS: Bilateral foot pain, heaviness sensation         TECHNIQUE: Real-time scanning of the deep venous system of the bilateral lower extremity was performed and representative sections obtained.  Compression sonography as well as pulsed Doppler and color flow Doppler imaging was performed.         FINDINGS: There is no signs of deep venous thrombosis within the common femoral, superficial femoral or popliteal veins of the lower extremity.  There is venous insufficiency of the right great saphenous vein starting at the level of the mid thigh and    extending continuously to the mid calf. Duration is 1 second, diameter is 9 mm. There is venous insufficiency of the left great saphenous vein starting at the level of the proximal calf and extending to the mid calf. Duration is 1.3 seconds, diameter is 8 mm. Note is made of varicose veins in the right and left calves. 8/18/2020: Impression    1. There is mild atherosclerotic plaque in the distal abdominal aorta. There is moderate plaque in the bilateral common iliac arteries which causes stenosis of the bilateral arteries, though flow is maintained. Since flow is slow, unable to quantify    degree of stenosis. 2. There is atherosclerotic plaque throughout the entire right superficial femoral artery with a total occlusion of the mid right superficial femoral artery approximately 3 cm in length. 3. There is diffuse atherosclerotic disease of the left superficial femoral artery causing approximately 70% stenosis, though flow is maintained.     4. There is bilateral trivessel runoff, though there is diffuse disease which is not obstructing flow.              CLINICAL HISTORY: Claudication.         TECHNIQUE: Time-of-flight multislice real-time MRI angiogram imaging of the pelvis, and bilateral lower extremities in axial, coronal, and sagittal planes were performed without contrast. Afterwards, gadolinium was injected in intravenous route, and    real-time video 3-D TRICKS blood flow imaging of the bilateral lower extremity arteries were performed. Delayed imaging of the blood flow through the venous return were also seen in 3-D. Images were reconstructed in 3-D model.         FINDINGS:         PELVIS MAGNETIC RESONANCE ANGIOGRAM FINDINGS: Mild atherosclerotic plaque in the distal abdominal aorta, though flow is maintained. There is moderate atherosclerotic plaque in the bilateral common iliac arteries, since blood flow is slow, it is difficult     to quantify degree of stenosis. Though flow is maintained in the bilateral iliac and common femoral arteries.         RIGHT LOWER EXTREMITY MAGNETIC RESONANCE ANGIOGRAM: There is diffuse atherosclerotic disease in the right superficial femoral artery with total occlusion at the mid right SFA of approximately 3 cm in length. There is trivessel runoff with diffuse    moderate atherosclerotic disease, though flow is maintained in the popliteal artery and trivessel's.         LEFT LOWER EXTREMITY MAGNETIC RESONANCE ANGIOGRAM: There is diffuse atherosclerotic disease in the left superficial femoral artery, approximately 70% stenosis, though flow is maintained in the right superficial femoral artery and popliteal arteries. There is diffuse atherosclerotic disease in the trivessel's below the knee, though flow is maintained.         The visualized venous system is patent and unremarkable.             ASSESSMENT AND PLAN:    Assessment:   1. Bilateral LE PAD per MRA report   2. Claudication  3. Chronic Venous Insufficiency to bilateral GSV. Has not done conservative therapy with compression socks. Plan:   1. Refer to [facility], not [facility], per patient request, for evaluation of bilateral LE PAD.    2. Office follow up in Vascular Clinic after Cardiology evaluation at which time will discuss treatment for Venous

## 2020-09-09 ENCOUNTER — VIRTUAL VISIT (OUTPATIENT)
Dept: GASTROENTEROLOGY | Age: 60
End: 2020-09-09
Payer: COMMERCIAL

## 2020-09-09 PROBLEM — K57.90 DIVERTICULOSIS: Status: ACTIVE | Noted: 2020-09-09

## 2020-09-09 PROBLEM — K59.00 CONSTIPATION: Status: ACTIVE | Noted: 2019-06-12

## 2020-09-09 PROCEDURE — G8427 DOCREV CUR MEDS BY ELIG CLIN: HCPCS | Performed by: NURSE PRACTITIONER

## 2020-09-09 PROCEDURE — G8417 CALC BMI ABV UP PARAM F/U: HCPCS | Performed by: NURSE PRACTITIONER

## 2020-09-09 PROCEDURE — 4004F PT TOBACCO SCREEN RCVD TLK: CPT | Performed by: NURSE PRACTITIONER

## 2020-09-09 PROCEDURE — 3017F COLORECTAL CA SCREEN DOC REV: CPT | Performed by: NURSE PRACTITIONER

## 2020-09-09 PROCEDURE — 99213 OFFICE O/P EST LOW 20 MIN: CPT | Performed by: NURSE PRACTITIONER

## 2020-09-09 RX ORDER — POLYETHYLENE GLYCOL 3350 17 G/17G
17 POWDER, FOR SOLUTION ORAL DAILY
Qty: 510 G | Refills: 5 | Status: SHIPPED | OUTPATIENT
Start: 2020-09-09 | End: 2021-03-04

## 2020-09-09 RX ORDER — METOPROLOL SUCCINATE 25 MG/1
25 TABLET, EXTENDED RELEASE ORAL DAILY
Qty: 30 TABLET | Refills: 5 | Status: SHIPPED | OUTPATIENT
Start: 2020-09-09 | End: 2020-11-25 | Stop reason: SDUPTHER

## 2020-09-09 RX ORDER — VALSARTAN AND HYDROCHLOROTHIAZIDE 80; 12.5 MG/1; MG/1
TABLET, FILM COATED ORAL
Qty: 90 TABLET | Refills: 4 | Status: SHIPPED | OUTPATIENT
Start: 2020-09-09 | End: 2020-11-25 | Stop reason: SDUPTHER

## 2020-09-09 RX ORDER — ROSUVASTATIN CALCIUM 40 MG/1
TABLET, COATED ORAL
Qty: 90 TABLET | Refills: 4 | Status: ON HOLD | OUTPATIENT
Start: 2020-09-09 | End: 2020-09-28

## 2020-09-09 RX ORDER — POLYETHYLENE GLYCOL 3350 17 G/17G
POWDER, FOR SOLUTION ORAL
COMMUNITY
Start: 2020-09-02 | End: 2020-09-09 | Stop reason: SDUPTHER

## 2020-09-09 RX ORDER — CLOPIDOGREL BISULFATE 75 MG/1
TABLET ORAL
COMMUNITY
Start: 2020-08-20 | End: 2020-11-25 | Stop reason: SDUPTHER

## 2020-09-09 RX ORDER — BUDESONIDE AND FORMOTEROL FUMARATE DIHYDRATE 160; 4.5 UG/1; UG/1
AEROSOL RESPIRATORY (INHALATION)
Qty: 10.2 G | Refills: 12 | Status: SHIPPED | OUTPATIENT
Start: 2020-09-09 | End: 2021-03-29 | Stop reason: SDUPTHER

## 2020-09-09 RX ORDER — DOCUSATE SODIUM 100 MG/1
100 CAPSULE, LIQUID FILLED ORAL 2 TIMES DAILY
Qty: 30 CAPSULE | Refills: 3 | Status: SHIPPED | OUTPATIENT
Start: 2020-09-09 | End: 2020-11-16

## 2020-09-09 ASSESSMENT — ENCOUNTER SYMPTOMS
COLOR CHANGE: 0
ABDOMINAL PAIN: 0
VOICE CHANGE: 0
CHEST TIGHTNESS: 0
TROUBLE SWALLOWING: 0
EYE PAIN: 0
ABDOMINAL DISTENTION: 0
PHOTOPHOBIA: 0
CONSTIPATION: 0
NAUSEA: 0
DIARRHEA: 0
EYE REDNESS: 0
RECTAL PAIN: 0
BLOOD IN STOOL: 0
ANAL BLEEDING: 0
VOMITING: 0

## 2020-09-09 NOTE — PROGRESS NOTES
2020    TELEHEALTH EVALUATION -- Audio/Visual (During QSCZU-53 public health emergency)    Due to COVID 19 outbreak, patient's office visit was converted to a virtual visit. Patient was contacted and agreed to proceed with a virtual visit via Telephone Visit, 15 minutes  The risks and benefits of converting to a virtual visit were discussed in light of the current infectious disease epidemic. Patient also understood that insurance coverage and co-pays are up to their individual insurance plans. HPI:    Tonyyael Vegane (:  1960) has requested an audio/video evaluation for the following concern(s):  Follow up to constipation, pt was previously seen in the office for constipation underwent colonoscopy results noted below. Endoscopic hx:  Colonoscopy  Background  Review of Systems   Constitutional: Negative. Negative for chills, fatigue and fever. HENT: Negative for nosebleeds, tinnitus, trouble swallowing and voice change. Eyes: Negative for photophobia, pain and redness. Respiratory: Negative for chest tightness. Cardiovascular: Negative for chest pain, palpitations and leg swelling. Gastrointestinal: Negative for abdominal distention, abdominal pain, anal bleeding, blood in stool, constipation, diarrhea, nausea, rectal pain and vomiting. Endocrine: Negative for polydipsia, polyphagia and polyuria. Genitourinary: Negative for difficulty urinating and hematuria. Skin: Negative for color change, pallor and rash. Neurological: Negative for dizziness, speech difficulty and headaches. Psychiatric/Behavioral: Negative for confusion, sleep disturbance and suicidal ideas. Prior to Visit Medications    Medication Sig Taking?  Authorizing Provider   clopidogrel (PLAVIX) 75 MG tablet take 1 tablet by mouth once daily Yes Historical Provider, MD   polyethylene glycol (GLYCOLAX) 17 GM/SCOOP powder take 17GM (DISSOLVED IN WATER) by mouth once daily Yes Historical Provider, MD HYDROcodone-acetaminophen (NORCO) 7.5-325 MG per tablet Take 1 tablet by mouth every 8 hours as needed for Pain (Max 3 per day) for up to 30 days. Intended supply: 30 days Yes Sherrie Swan DO   pregabalin (LYRICA) 25 MG capsule Take 1 capsule by mouth 3 times daily for 30 days.  Yes Sherrie Swan DO   Cholecalciferol (VITAMIN D) 50 MCG (2000 UT) CAPS capsule Take 1 capsule by mouth 2 times daily Yes Sherrie Swan DO   gabapentin (NEURONTIN) 800 MG tablet take 1 tablet by mouth four times a day Yes Sherrie Swan DO   senna-docusate (PERICOLACE) 8.6-50 MG per tablet Take 2 tablets by mouth daily as needed for Constipation Yes Gisela Camarillo MD   docusate sodium (COLACE) 100 MG capsule Take 1 capsule by mouth 2 times daily Yes Gisela Camarillo MD   acetaminophen (TYLENOL) 500 MG tablet Take 1 tablet by mouth 4 times daily as needed for Pain Yes Gisela Camarillo MD   rosuvastatin (CRESTOR) 40 MG tablet take 1 tablet by mouth at bedtime Yes Gisela Camarillo MD   valsartan-hydrochlorothiazide (DIOVAN-HCT) 80-12.5 MG per tablet take 1 tablet by mouth once daily Yes Gisela Camarillo MD   aspirin EC 81 MG EC tablet Take 1 tablet by mouth daily Yes Gisela Camarillo MD   RA VITAMIN B-12 TR 1000 MCG TBCR take 1 tablet by mouth once daily Yes Gisela Camarillo MD   budesonide-formoterol (SYMBICORT) 160-4.5 MCG/ACT AERO inhale 2 puffs by mouth twice a day Yes Gisela Camarillo MD   VASCEPA 1 g CAPS capsule  Yes Historical Provider, MD   metoprolol succinate (TOPROL XL) 25 MG extended release tablet Take 1 tablet by mouth daily Yes Khoi Brandt MD   REFRESH TEARS 0.5 % SOLN ophthalmic solution instill 1 drop into both eyes four times a day Yes Historical Provider, MD       Social History     Tobacco Use    Smoking status: Current Every Day Smoker     Packs/day: 0.50     Years: 40.00     Pack years: 20.00     Types: Cigarettes     Start date:      Last attempt to quit: 3/23/2019     Years since quittin.4    Smokeless tobacco: Never Used    Tobacco comment: patient would like to quit, encouraged to see PCP   Substance Use Topics    Alcohol use: No     Alcohol/week: 0.0 standard drinks     Comment: 2016 Quit Date    Drug use: No            PHYSICAL EXAMINATION:  [ INSTRUCTIONS:  \"[x]\" Indicates a positive item  \"[]\" Indicates a negative item  -- DELETE ALL ITEMS NOT EXAMINED]  [] Alert  [] Oriented to person/place/time    [] No apparent distress  [] Toxic appearing    [] Face flushed appearing [] Sclera clear  [] Lips are cyanotic      [] Breathing appears normal  [] Appears tachypneic      [] Rash on visible skin    [] Cranial Nerves II-XII grossly intact    [] Motor grossly intact in visible upper extremities    [] Motor grossly intact in visible lower extremities    [] Normal Mood  [] Anxious appearing    [] Depressed appearing  [] Confused appearing      [] Poor short term memory  [] Poor long term memory    [] OTHER:      Due to this being a TeleHealth encounter, evaluation of the following organ systems is limited: Vitals/Constitutional/EENT/Resp/CV/GI//MS/Neuro/Skin/Heme-Lymph-Imm. ASSESSMENT/PLAN:  1. Constipation, hx of colon polyps  Longstanding history of constipation well controlled with MiraLAX, Colace, and daily fiber supplement  Previous colonoscopy in 2019 showed colonic diverticulosis in the rectum and sigmoid colon, 2 polyps one sigmoid and one rectal, nonbleeding external and internal hemorrhoids biopsies were consistent with hyperplastic polyps patient needs repeat colonoscopy in 3 years  Discuss the natural Hx of diverticular disease / Diverticulosis   Recommend increase fiber intake and exercise program .   Dietary fiber is associated with a decreased risk of symptomatic diverticular disease.  A diet high in total fat and red meat is associated with an increased risk of symptomatic diverticular disease. There is no clear association between nut, corn, and popcorn consumption and the risk of diverticulosis and diverticular bleeding. Physical activity appears to reduce the risk of diverticulitis and diverticular bleeding    2. Associated medical conditions including but not limited to peripheral vascular disease, degenerative disc disease, hypertension, CVA, diabetes, carotid artery disease on Plavix        Return in about 3 years (around 9/9/2023), or if symptoms worsen or fail to improve, for colonoscopy. An  electronic signature was used to authenticate this note. --JESSICA Howe CNP on 9/9/2020 at 1:31 PM        Pursuant to the emergency declaration under the [facility], [facility] waiver authority and the [employer] Act, this Virtual  Visit was conducted, with patient's consent, to reduce the patient's risk of exposure to COVID-19 and provide continuity of care for an established patient. Services were provided through a video synchronous discussion virtually to substitute for in-person clinic visit.

## 2020-09-09 NOTE — TELEPHONE ENCOUNTER
Rx request   Requested Prescriptions     Pending Prescriptions Disp Refills    budesonide-formoterol (SYMBICORT) 160-4.5 MCG/ACT AERO 10.2 g 12     Sig: inhale 2 puffs by mouth twice a day    rosuvastatin (CRESTOR) 40 MG tablet 90 tablet 4     Sig: take 1 tablet by mouth at bedtime    metoprolol succinate (TOPROL XL) 25 MG extended release tablet 30 tablet 5     Sig: Take 1 tablet by mouth daily    valsartan-hydrochlorothiazide (DIOVAN-HCT) 80-12.5 MG per tablet 90 tablet 4     Sig: take 1 tablet by mouth once daily     LOV 6/25/2020  Next Visit Date:  Future Appointments   Date Time Provider Loan Darling   9/15/2020  2:45 PM Yury Arriaga  [facility]   9/16/2020  2:15 PM Ml Cunningham DO [address] [place] [address]   9/17/2020  1:00 PM [facility], APRN - CNP [facility]   9/23/2020  1:15 PM Ml Cunningham DO Replaced by [facility]   1/4/2021  1:30 PM MD [facility]   2/4/2021  1:30 PM Colleen Agudelo MD [address] [place] [address]

## 2020-09-15 ENCOUNTER — PREP FOR PROCEDURE (OUTPATIENT)
Dept: CARDIOLOGY CLINIC | Age: 60
End: 2020-09-15

## 2020-09-15 ENCOUNTER — OFFICE VISIT (OUTPATIENT)
Dept: CARDIOLOGY CLINIC | Age: 60
End: 2020-09-15
Payer: COMMERCIAL

## 2020-09-15 VITALS
RESPIRATION RATE: 18 BRPM | HEART RATE: 76 BPM | DIASTOLIC BLOOD PRESSURE: 72 MMHG | WEIGHT: 221 LBS | BODY MASS INDEX: 30.39 KG/M2 | OXYGEN SATURATION: 97 % | SYSTOLIC BLOOD PRESSURE: 126 MMHG

## 2020-09-15 PROCEDURE — 93000 ELECTROCARDIOGRAM COMPLETE: CPT | Performed by: INTERNAL MEDICINE

## 2020-09-15 PROCEDURE — 99205 OFFICE O/P NEW HI 60 MIN: CPT | Performed by: INTERNAL MEDICINE

## 2020-09-15 PROCEDURE — 4004F PT TOBACCO SCREEN RCVD TLK: CPT | Performed by: INTERNAL MEDICINE

## 2020-09-15 PROCEDURE — G8427 DOCREV CUR MEDS BY ELIG CLIN: HCPCS | Performed by: INTERNAL MEDICINE

## 2020-09-15 PROCEDURE — G8417 CALC BMI ABV UP PARAM F/U: HCPCS | Performed by: INTERNAL MEDICINE

## 2020-09-15 PROCEDURE — 3017F COLORECTAL CA SCREEN DOC REV: CPT | Performed by: INTERNAL MEDICINE

## 2020-09-15 RX ORDER — NITROGLYCERIN 0.4 MG/1
0.4 TABLET SUBLINGUAL EVERY 5 MIN PRN
Status: CANCELLED | OUTPATIENT
Start: 2020-09-15

## 2020-09-15 RX ORDER — DIPHENHYDRAMINE HYDROCHLORIDE 50 MG/ML
50 INJECTION INTRAMUSCULAR; INTRAVENOUS ONCE
Status: CANCELLED | OUTPATIENT
Start: 2020-09-15 | End: 2020-09-15

## 2020-09-15 RX ORDER — SODIUM CHLORIDE 450 MG/100ML
75 INJECTION, SOLUTION INTRAVENOUS CONTINUOUS
Status: CANCELLED | OUTPATIENT
Start: 2020-09-15

## 2020-09-15 RX ORDER — ATORVASTATIN CALCIUM 40 MG/1
40 TABLET, FILM COATED ORAL DAILY
Qty: 30 TABLET | Refills: 3 | Status: SHIPPED | OUTPATIENT
Start: 2020-09-15 | End: 2020-11-25 | Stop reason: SDUPTHER

## 2020-09-15 RX ORDER — SODIUM CHLORIDE 0.9 % (FLUSH) 0.9 %
10 SYRINGE (ML) INJECTION EVERY 12 HOURS SCHEDULED
Status: CANCELLED | OUTPATIENT
Start: 2020-09-15

## 2020-09-15 RX ORDER — ONDANSETRON 2 MG/ML
4 INJECTION INTRAMUSCULAR; INTRAVENOUS EVERY 6 HOURS PRN
Status: CANCELLED | OUTPATIENT
Start: 2020-09-15

## 2020-09-15 RX ORDER — SODIUM CHLORIDE 0.9 % (FLUSH) 0.9 %
10 SYRINGE (ML) INJECTION PRN
Status: CANCELLED | OUTPATIENT
Start: 2020-09-15

## 2020-09-15 ASSESSMENT — ENCOUNTER SYMPTOMS
WHEEZING: 0
GASTROINTESTINAL NEGATIVE: 1
RESPIRATORY NEGATIVE: 1
CHEST TIGHTNESS: 0
STRIDOR: 0
BLOOD IN STOOL: 0
COUGH: 0
SHORTNESS OF BREATH: 0
NAUSEA: 0
BACK PAIN: 1
EYES NEGATIVE: 1

## 2020-09-15 NOTE — PROGRESS NOTES
NEW PATIENT        Patient: Yanni Lea  YOB: 1960  MRN: [id number]    Chief Complaint: L carotid stent claudication   Chief Complaint   Patient presents with   [facility] Cardiologist     referral Barbie Alegre CNP    Claudication     PAD    Leg Pain     both legs    Carotid Disease     H/O carotid stent       CV Data:  4/2017 echo ef 65  2019 Left Carotid stent - Dr. Rani Alford    Subjective/HPI pt has significant claudication L>R. He has chronic cough. He is minimally active. Describes no cp nor sob currently. No bleed. Has back pain. He has no cardiac stents. Smoke  No etoh  Lives with brother  Disabled all his life.       EKG: SR    Past Medical History:   Diagnosis Date    Alcoholic polyneuropathy (HCC)     Asthma     CAD (coronary artery disease)     [address] [facility]    Chronic back pain greater than 3 months duration     CN III palsy, right eye 2017    Dr Pablo Garza    COPD (chronic obstructive pulmonary disease) ([facility] 2014    DDD (degenerative disc disease), lumbar 3/23/2015    Elevated liver enzymes 2014    Hyperlipidemia     on med > 10 yrs    Hypertension     on meds x 1 yr    IgA monoclonal gammopathy 2015    Dr Rebecca Marin    Insomnia, unspecified     LBP (low back pain)     Dr Juan Scott Neuropathy     Occlusion and stenosis of carotid artery without mention of cerebral infarction     Dr Valeria Wray history of alcoholism ([address]     quit 11/05/2010, relapsed 2016    S/P carotid endarterectomy 01/2019    Dr Rani Alford    Sensorimotor neuropathy     Bilateral lower extremities-EMG 03/23/15 ([place])    Smoking trying to quit     Traumatic compression fracture of T11 thoracic vertebra ([address] 3852    Umbilical hernia        Past Surgical History:   Procedure Laterality Date    CAROTID ENDARTERECTOMY Left 1/18/2019    LEFT CAROTID ENDARTERECTOMY performed by Kolby Martin MD at [facility]  8/19/2014    COLONOSCOPY N/A 12/18/2019    COLONOSCOPY DIAGNOSTIC performed by None   Other Topics Concern    None   Social History Narrative    Born in [place], one of 5    Came to [place] at age 1 with parents    Never , one daughter    Never worked, disabled since 12 (mental)    Lives in [facility] with brother, in a house        Attends [employer] daily    Hobbies riding bike, TV    One daughter, does keep in touch        No Known Allergies    Current Outpatient Medications   Medication Sig Dispense Refill    atorvastatin (LIPITOR) 40 MG tablet Take 1 tablet by mouth daily 30 tablet 3    clopidogrel (PLAVIX) 75 MG tablet take 1 tablet by mouth once daily      polyethylene glycol (GLYCOLAX) 17 GM/SCOOP powder Take 17 g by mouth daily 510 g 5    docusate sodium (COLACE) 100 MG capsule Take 1 capsule by mouth 2 times daily 30 capsule 3    budesonide-formoterol (SYMBICORT) 160-4.5 MCG/ACT AERO inhale 2 puffs by mouth twice a day 10.2 g 12    rosuvastatin (CRESTOR) 40 MG tablet take 1 tablet by mouth at bedtime 90 tablet 4    metoprolol succinate (TOPROL XL) 25 MG extended release tablet Take 1 tablet by mouth daily 30 tablet 5    valsartan-hydrochlorothiazide (DIOVAN-HCT) 80-12.5 MG per tablet take 1 tablet by mouth once daily 90 tablet 4    HYDROcodone-acetaminophen (NORCO) 7.5-325 MG per tablet Take 1 tablet by mouth every 8 hours as needed for Pain (Max 3 per day) for up to 30 days. Intended supply: 30 days 80 tablet 0    pregabalin (LYRICA) 25 MG capsule Take 1 capsule by mouth 3 times daily for 30 days.  90 capsule 2    Cholecalciferol (VITAMIN D) 50 MCG (2000 UT) CAPS capsule Take 1 capsule by mouth 2 times daily 30 capsule 5    gabapentin (NEURONTIN) 800 MG tablet take 1 tablet by mouth four times a day 120 tablet 3    senna-docusate (PERICOLACE) 8.6-50 MG per tablet Take 2 tablets by mouth daily as needed for Constipation 60 tablet 11    aspirin EC 81 MG EC tablet Take 1 tablet by mouth daily 90 tablet 4    RA VITAMIN B-12 TR 1000 MCG TBCR take 1 tablet by mouth once daily 90 tablet 4    VASCEPA 1 g CAPS capsule   0    REFRESH TEARS 0.5 % SOLN ophthalmic solution instill 1 drop into both eyes four times a day  1    acetaminophen (TYLENOL) 500 MG tablet Take 1 tablet by mouth 4 times daily as needed for Pain 90 tablet 2     No current facility-administered medications for this visit. Review of Systems:   Review of Systems   Constitutional: Negative. Negative for diaphoresis and fatigue. HENT: Negative. Eyes: Negative. Respiratory: Negative. Negative for cough, chest tightness, shortness of breath, wheezing and stridor. Cardiovascular: Negative. Negative for chest pain, palpitations and leg swelling. Gastrointestinal: Negative. Negative for blood in stool and nausea. Genitourinary: Negative. Musculoskeletal: Positive for arthralgias, back pain and gait problem. Skin: Negative. Neurological: Negative for dizziness, syncope, weakness and light-headedness. Hematological: Negative. Psychiatric/Behavioral: Negative. Physical Examination:    /72 (Site: Left Upper Arm, Position: Sitting, Cuff Size: Medium Adult)   Pulse 76   Resp 18   Wt 221 lb (100.2 kg)   SpO2 97%   BMI 30.39 kg/m²    Physical Exam   Constitutional: He appears healthy. No distress. HENT:   Normal cephalic and Atraumatic   Eyes: Pupils are equal, round, and reactive to light. Neck: Normal range of motion and thyroid normal. Neck supple. No JVD present. No neck adenopathy. No thyromegaly present. Cardiovascular: Normal rate and regular rhythm. Exam reveals decreased pulses. Murmur heard. Pulmonary/Chest: Effort normal and breath sounds normal. He has no wheezes. He has no rales. He exhibits no tenderness. Abdominal: Soft. Bowel sounds are normal. There is no abdominal tenderness. Musculoskeletal: Normal range of motion. General: No tenderness or edema. Neurological: He is alert and oriented to person, place, and time.    Skin: Skin is warm. No cyanosis. Nails show no clubbing.        LABS:  CBC:   Lab Results   Component Value Date    WBC 20.0 01/19/2019    RBC 4.51 01/19/2019    HGB 14.5 01/19/2019    HCT 42.3 01/19/2019    MCV 93.9 01/19/2019    MCH 32.1 01/19/2019    MCHC 34.1 01/19/2019    RDW 13.4 01/19/2019     01/19/2019    MPV 8.8 08/25/2015     Lipids:  Lab Results   Component Value Date    CHOL 162 01/19/2019    CHOL 195 08/08/2018    CHOL 199 04/18/2017     Lab Results   Component Value Date    TRIG 139 01/19/2019    TRIG 347 (H) 08/08/2018    TRIG 132 04/18/2017     Lab Results   Component Value Date    HDL 29 (L) 02/26/2020    HDL 39 (L) 01/19/2019    HDL 34 (L) 08/08/2018     Lab Results   Component Value Date    LDLCALC 12 02/26/2020    LDLCALC 95 01/19/2019    LDLCALC 92 08/08/2018     No results found for: LABVLDL, VLDL  No results found for: CHOLHDLRATIO  CMP:    Lab Results   Component Value Date     07/30/2020    K 4.1 07/30/2020    CL 99 07/30/2020    CO2 28 07/30/2020    BUN 21 07/30/2020    CREATININE 0.98 07/30/2020    GFRAA >60.0 07/30/2020    LABGLOM >60.0 07/30/2020    GLUCOSE 87 07/30/2020    PROT 7.9 02/26/2020    LABALBU 4.1 02/26/2020    CALCIUM 10.4 07/30/2020    BILITOT 0.5 02/26/2020    ALKPHOS 145 02/26/2020    AST 23 02/26/2020    ALT 36 02/26/2020     BMP:    Lab Results   Component Value Date     07/30/2020    K 4.1 07/30/2020    CL 99 07/30/2020    CO2 28 07/30/2020    BUN 21 07/30/2020    LABALBU 4.1 02/26/2020    CREATININE 0.98 07/30/2020    CALCIUM 10.4 07/30/2020    GFRAA >60.0 07/30/2020    LABGLOM >60.0 07/30/2020    GLUCOSE 87 07/30/2020     Magnesium:  No results found for: MG  TSH:  Lab Results   Component Value Date    TSH 2.170 08/08/2018             Patient Active Problem List   Diagnosis    [facility]athy ([facility]    Personal history of alcoholism ([facility]    Carotid stenosis    TBI (traumatic brain injury) ([facility]    Basic learning disability, reading    PVD [facility]peripheral [facility]) [facility]    Lumbosacral radiculopathy at S1    Tobacco abuse    DDD (degenerative disc disease), lumbar    Cognitive deficit due to old head injury    Anxiety disorder    Incarcerated ventral hernia    Alkaline phosphatase elevation    High risk medication use - 01/25/18 OARRS PM&R, 03/23/18 OARRS PM&R, 02/15/17 Med Contract PM&R, 09/21/17 Urine Drug Screen: negative PM&R    Umbilical hernia    Alcoholic (HCC)-remote Hx    IgG monoclonal gammopathy of uncertain significance    [facility]ch[facility] obstructive [facility] disease) [facility]    Muscle spasm of back    Generalized anxiety disorder    SI (sacroiliac) pain    Chronic midline low back pain with bilateral sciatica    Ptosis    CN III palsy, right eye    Carotid artery disorder ([facility])    HTN (hypertension)    Carotid stenosis, symptomatic w/o infarct, left    Constipation    CVA (cerebral vascular accident) ([facility]    Polyp of colon    External hemorrhoid    Insulin resistance    PAD (peripheral artery disease) ([facility])    Edema of both lower extremities due to peripheral venous insufficiency    Diverticulosis       There are no discontinued medications. Modified Medications    No medications on file       Orders Placed This Encounter   Medications    atorvastatin (LIPITOR) 40 MG tablet     Sig: Take 1 tablet by mouth daily     Dispense:  30 tablet     Refill:  3       Assessment/Plan:    1. PAD (peripheral artery disease) ([facility])  RBA Peripheral Angio/PTA discussed. Labs today    2. Essential hypertension     - EKG 12 Lead    3. Carotid stenosis, symptomatic w/o infarct, left       4. PVD (peripheral vascular disease) ([facility]      5. Lipid- start Atorvastatin 40 mg qd. 6. We will address coronary ischemic eval in near future. Counseling:  Heart Healthy Lifestyle, Improve BMI, Stop Smoking, Low Salt Diet, Take Precautions to Prevent Falls and Walk Daily    Return for Schedule Cath.     Electronically signed by Jaxon De Guzman MD on 9/15/2020 at 2:58 PM

## 2020-09-16 ENCOUNTER — NURSE ONLY (OUTPATIENT)
Dept: PRIMARY CARE CLINIC | Age: 60
End: 2020-09-16

## 2020-09-16 ENCOUNTER — OFFICE VISIT (OUTPATIENT)
Dept: PHYSICAL MEDICINE AND REHAB | Age: 60
End: 2020-09-16
Payer: COMMERCIAL

## 2020-09-16 VITALS
SYSTOLIC BLOOD PRESSURE: 140 MMHG | BODY MASS INDEX: 29.93 KG/M2 | WEIGHT: 221 LBS | DIASTOLIC BLOOD PRESSURE: 54 MMHG | HEIGHT: 72 IN

## 2020-09-16 PROCEDURE — G8427 DOCREV CUR MEDS BY ELIG CLIN: HCPCS | Performed by: PHYSICAL MEDICINE & REHABILITATION

## 2020-09-16 PROCEDURE — 3017F COLORECTAL CA SCREEN DOC REV: CPT | Performed by: PHYSICAL MEDICINE & REHABILITATION

## 2020-09-16 PROCEDURE — G8417 CALC BMI ABV UP PARAM F/U: HCPCS | Performed by: PHYSICAL MEDICINE & REHABILITATION

## 2020-09-16 PROCEDURE — 4004F PT TOBACCO SCREEN RCVD TLK: CPT | Performed by: PHYSICAL MEDICINE & REHABILITATION

## 2020-09-16 PROCEDURE — 99214 OFFICE O/P EST MOD 30 MIN: CPT | Performed by: PHYSICAL MEDICINE & REHABILITATION

## 2020-09-16 RX ORDER — HYDROCODONE BITARTRATE AND ACETAMINOPHEN 7.5; 325 MG/1; MG/1
1 TABLET ORAL EVERY 8 HOURS PRN
Qty: 80 TABLET | Refills: 0 | Status: SHIPPED | OUTPATIENT
Start: 2020-09-21 | End: 2020-10-12 | Stop reason: SDUPTHER

## 2020-09-16 RX ORDER — PREGABALIN 50 MG/1
50 CAPSULE ORAL 3 TIMES DAILY
Qty: 90 CAPSULE | Refills: 3 | Status: SHIPPED | OUTPATIENT
Start: 2020-09-16 | End: 2021-01-08 | Stop reason: SDUPTHER

## 2020-09-16 ASSESSMENT — ENCOUNTER SYMPTOMS
BACK PAIN: 1
SHORTNESS OF BREATH: 0
PHOTOPHOBIA: 0
DIARRHEA: 0
EYE PAIN: 0
COLOR CHANGE: 0
VOMITING: 0
CONSTIPATION: 1
NAUSEA: 0
WHEEZING: 0
CHEST TIGHTNESS: 0
APNEA: 0
BOWEL INCONTINENCE: 0
COUGH: 0
VOICE CHANGE: 1
VISUAL CHANGE: 0
ABDOMINAL PAIN: 0

## 2020-09-16 NOTE — PROGRESS NOTES
Subjective  Catrachito Suh, 61 y.o. male presents today with:    Back Pain    Hip Pain (Left)   Foot Pain (Bilateral)   Medication Refill (Norco, Gabapentin, Lyrica, Vit D refill & pill count today)       He is doing well on his condition at current doses -but is showing signs of decreased function. He is unkempt today and smells heavily of cigarettes -has not quit smoking. 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 I offered and prescribed Narcan just in case he wants to fall back on that in case he has an allergic reaction over responsive medication or accidentally overtakes assessments. He has severe peripheral vascular disease ischemic pain his pain in his lower extremities he is to possibly get stenting in his legs by Dr. Shorty Mcclain on 9/23/2020       However  He is able to have Positive interactions with his friends family. He notes that he currently is  80/month as his pain is flared up. Active in AA-on hold dt virus. No signs of overuse or abuse. He has not been getting much help with the gabapentin we are switching him over from Lyrica to gabapentin will slowly titrate the Lyrica dose and monitor for cognitive side effects which it is known for. We are hoping he will have better pain control and be able to switch eventually off of the gabapentin to the Lyrica but for now we will overlap. Back Pain   This is a chronic problem. The current episode started more than 1 year ago. The problem occurs daily. The problem is unchanged. The pain is present in the lumbar spine. Radiates to: left leg, left foot. The pain is at a severity of 3/10 (Pain ranges from 2-10/10. 10/10 with walking long distances. ). The pain is moderate. The pain is the same all the time. The symptoms are aggravated by position. Associated symptoms include leg pain, numbness (Left side of throat) and weakness. Pertinent negatives include no abdominal pain, bladder incontinence, bowel incontinence, chest pain, dysuria, fever, headaches, paresis or perianal numbness. Risk factors include lack of exercise, sedentary lifestyle and poor posture. He has tried chiropractic manipulation, ice, walking, home exercises, analgesics and NSAIDs (Narcotic Pain Medications, gabapentin, Trigger Point injections, Sciatica Block, Tylenol) for the symptoms. The treatment provided significant relief. Mental Health Problem   The primary symptoms do not include dysphoric mood, delusions or hallucinations. Primary symptoms comment: etoh rel neuropathy--pt has not drank in 6 years --he is committed to no more ETOH use. The current episode started more than 1 month ago. This is a chronic problem. The onset of the illness is precipitated by a stressful event. The degree of incapacity that he is experiencing as a consequence of his illness is mild. Additional symptoms of the illness do not include appetite change, unexpected weight change, fatigue, visual change, headaches or abdominal pain. He does not admit to suicidal ideas. He does not have a plan to attempt suicide. He does not contemplate harming himself. He has not already injured self. He does not contemplate injuring another person. He has not already  injured another person. Risk factors that are present for mental illness include substance abuse.        Past Medical History:   Diagnosis Date    Alcoholic polyneuropathy ([facility] [address]     Asthma     CAD (coronary artery disease)     [facility]    Chronic back pain greater than 3 months duration     CN III palsy, right eye 2017    Dr Sandi Paul    COPD (chronic obstructive pulmonary disease) ([facility] [address] 2014    DDD (degenerative disc disease), lumbar 3/23/2015    Elevated liver enzymes 2014    Hyperlipidemia     on med > 10 yrs    Hypertension     on meds x 1 yr    IgA monoclonal gammopathy 2015    Dr Sherif Varma    Insomnia, unspecified     LBP (low back pain)     Dr Lien Lynch Neuropathy     Occlusion and stenosis of carotid artery without mention of cerebral infarction     Dr Ariella Rader history of alcoholism ([facility] [address]     quit 2010, relapsed 2016    S/P carotid endarterectomy 2019    Dr Anay Winchester    Sensorimotor neuropathy     Bilateral lower extremities-EMG 03/23/15 ([facility])    Smoking trying to quit     Traumatic compression fracture of T11 thoracic vertebra ([facility] [address] 8299    Umbilical hernia      Past Surgical History:   Procedure Laterality Date    CAROTID ENDARTERECTOMY Left 2019    LEFT CAROTID ENDARTERECTOMY performed by Radha Overton MD at [facility]  2014    COLONOSCOPY N/A 2019    COLONOSCOPY DIAGNOSTIC performed by Phuong Herrera MD at [facility], COLON, DIAGNOSTIC      HERNIA REPAIR      TONSILLECTOMY      UPPER GASTROINTESTINAL ENDOSCOPY  2014    VENTRAL HERNIA REPAIR  09/01/15    W/MESH AND W/UMBILECTOMY     Social History     Socioeconomic History    Marital status: Single     Spouse name: None    Number of children: 1    Years of education: None    Highest education level: None   Occupational History    Occupation: SSI for learning disability   Social Needs    Financial resource strain: None    Food insecurity     Worry: None     Inability: None    Transportation needs     Medical: None     Non-medical: None   Tobacco Use    Smoking status: Current Every Day Smoker     Packs/day: 0.50     Years: 40.00     Pack years: 20.00     Types: Cigarettes     Start date:      Last attempt to quit: 3/23/2019     Years since quittin.4    Smokeless tobacco: Never Used    Tobacco comment: patient would like to quit, encouraged to see PCP   Substance and Sexual Activity    Alcohol use: No     Alcohol/week: 0.0 standard drinks     Comment: 2016 Quit Date    Drug use: No    Sexual activity: None   Lifestyle    Physical activity     Days per week: None wound. Allergic/Immunologic: Positive for immunocompromised state. Negative for environmental allergies and food allergies. Neurological: Positive for weakness and numbness (Left side of throat). Negative for dizziness, light-headedness and headaches. Hematological: Negative. Psychiatric/Behavioral: Positive for decreased concentration. Negative for dysphoric mood, hallucinations and sleep disturbance. The patient is not nervous/anxious. Objective    Vitals:    09/16/20 1341   BP: (!) 140/54   Site: Left Upper Arm   Position: Sitting   Cuff Size: Small Adult   Weight: 221 lb (100.2 kg)   Height: 5' 11.5\" (1.816 m)     Pain Score: EIGHT (With medication)       Physical Exam  Vitals signs reviewed. Constitutional:       General: He is not in acute distress. Appearance: He is well-developed. He is not ill-appearing, toxic-appearing or diaphoretic. Comments:         HENT:      Head: Normocephalic and atraumatic. Right Ear: Hearing normal.      Left Ear: Hearing normal.      Nose: Nose normal.      Mouth/Throat:      Mouth: No oral lesions. Dentition: Normal dentition. Pharynx: No oropharyngeal exudate. Eyes:      General: No scleral icterus. Left eye: No discharge. Extraocular Movements:      Right eye: Abnormal extraocular motion present. Conjunctiva/sclera: Conjunctivae normal.      Left eye: No chemosis or exudate. Pupils: Pupils are equal, round, and reactive to light. Comments: Right 3rd nerve palsy   Neck:      Musculoskeletal: Normal range of motion and neck supple. No edema or neck rigidity. Thyroid: No thyromegaly. Vascular: No JVD. Trachea: No tracheal deviation. Cardiovascular:      Pulses: No decreased pulses. Pulmonary:      Effort: Pulmonary effort is normal. No tachypnea, bradypnea, accessory muscle usage or respiratory distress. Breath sounds: No wheezing. Chest:      Chest wall: No tenderness.    Abdominal: General: Bowel sounds are normal. There is no distension. Palpations: Abdomen is soft. There is no mass. Tenderness: There is no abdominal tenderness. There is no guarding or rebound. Hernia: A hernia is present. Hernia is present in the ventral area. Comments: Umbilical hernia   Musculoskeletal:         General: Tenderness present. Right shoulder: Normal.      Left shoulder: Normal.      Right elbow: Normal.     Left elbow: Normal.      Right wrist: Normal.      Left wrist: Normal.      Right hip: He exhibits decreased range of motion, decreased strength and tenderness. Left hip: He exhibits decreased range of motion and tenderness. Right knee: Normal.      Left knee: Normal.      Right ankle: Normal. Achilles tendon normal.      Left ankle: Normal. Achilles tendon normal.      Cervical back: He exhibits decreased range of motion, tenderness, bony tenderness, pain and spasm. Thoracic back: He exhibits decreased range of motion, tenderness, bony tenderness, pain and spasm. Lumbar back: He exhibits decreased range of motion, tenderness, bony tenderness and pain. He exhibits no swelling, no edema, no deformity, no laceration, no spasm and normal pulse. Back:       Right upper arm: Normal.      Left upper arm: Normal.      Right forearm: Normal.      Left forearm: Normal.      Right hand: Normal.      Left hand: Normal.      Right upper leg: Normal.      Left upper leg: Normal.      Right lower leg: Normal.      Left lower leg: Normal.        Legs:       Right foot: Normal.      Left foot: Normal.      Comments: Tender areas are indicated by numbered spot         Lymphadenopathy:      Cervical: No cervical adenopathy. Skin:     General: Skin is warm and dry. Coloration: Skin is not pale. Findings: No abrasion, bruising, ecchymosis, erythema, laceration, petechiae or rash. Rash is not macular, pustular or urticarial.      Nails: There is no clubbing. Neurological:      Mental Status: He is alert and oriented to person, place, and time. Cranial Nerves: No cranial nerve deficit. Sensory: Sensory deficit present. Motor: No tremor, atrophy or abnormal muscle tone. Coordination: Coordination normal.      Deep Tendon Reflexes: Reflexes abnormal. Babinski sign absent on the right side. Babinski sign absent on the left side. Psychiatric:         Attention and Perception: He is attentive. Mood and Affect: Mood is not anxious or depressed. Affect is not labile, blunt, angry or inappropriate. Speech: He is communicative. Speech is not rapid and pressured, delayed, slurred or tangential.         Behavior: Behavior normal. Behavior is not agitated, slowed, aggressive, withdrawn, hyperactive or combative. Thought Content: Thought content normal. Thought content is not paranoid or delusional. Thought content does not include homicidal or suicidal ideation. Thought content does not include homicidal or suicidal plan. Cognition and Memory: Memory is not impaired. He does not exhibit impaired recent memory or impaired remote memory. Judgment: Judgment normal. Judgment is not impulsive or inappropriate. Ortho Exam  Neurologic Exam     Mental Status   Oriented to person, place, and time. Speech: not slurred   Level of consciousness: alert  Knowledge: good. Able to name object. Able to read. Able to repeat. Able to write. Normal comprehension. Cranial Nerves     CN III, IV, VI   Pupils are equal, round, and reactive to light.       After a thorough review and discussion of the previous medical records, patient comprehensive medical, surgical, and family and social history, Review of Systems, their OARRS, their Screener and Opioid Assessment for Patients with Pain (SOAPP®-R), recent diagnostics, and symptomatic results to previous treatment, it is my impression that the patients is suffering with progressive and severe:     Diagnosis Orders   1. DDD (degenerative disc disease), lumbar  HYDROcodone-acetaminophen (NORCO) 7.5-325 MG per tablet    pregabalin (LYRICA) 50 MG capsule   2. Chronic midline low back pain with bilateral sciatica     3. SI (sacroiliac) pain  HYDROcodone-acetaminophen (NORCO) 7.5-325 MG per tablet    pregabalin (LYRICA) 50 MG capsule   4. High risk medication use - 01/25/18 OARRS PM&R, 03/23/18 OARRS PM&R, 02/15/17 Med Contract PM&R, 09/21/17 Urine Drug Screen: negative PM&R  HYDROcodone-acetaminophen (NORCO) 7.5-325 MG per tablet    pregabalin (LYRICA) 50 MG capsule   5. Generalized anxiety disorder     6. Muscle spasm of back     7. IgG monoclonal gammopathy of uncertain significance  HYDROcodone-acetaminophen (NORCO) 7.5-325 MG per tablet    pregabalin (LYRICA) 50 MG capsule   8. Alcoholic (HCC)-remote Hx     9.  Lumbosacral radiculopathy at S1  HYDROcodone-acetaminophen (NORCO) 7.5-325 MG per tablet    pregabalin (LYRICA) 50 MG capsule       I am also concerned by lifestyle and mood issues including:    Past Medical History:   Diagnosis Date    [facility] ([facility] [address]     Asthma     CAD (coronary artery disease)     [facility]    Chronic back pain greater than 3 months duration     CN III palsy, right eye 2017    Dr Cheryl Middleton    COPD (chronic obstructive pulmonary disease) ([facility] [address] 2014    DDD (degenerative disc disease), lumbar 3/23/2015    Elevated liver enzymes 2014    Hyperlipidemia     on med > 10 yrs    Hypertension     on meds x 1 yr    IgA monoclonal gammopathy 2015    Dr Jessica Reis    Insomnia, unspecified     LBP (low back pain)     Dr Vincent Green Neuropathy     Occlusion and stenosis of carotid artery without mention of cerebral infarction     Dr Marcial Corona Personal history of alcoholism ([facility] [address]     quit 11/05/2010, relapsed 2016    S/P carotid endarterectomy 01/2019    Dr Bjorn Christiansen    Sensorimotor neuropathy     Bilateral lower extremities-EMG 03/23/15 ([place])    Smoking trying to quit     Traumatic compression fracture of T11 thoracic vertebra ([facility] [address] 3090    Umbilical hernia            Given their medication, chronic pain and lifestyle and medications they are at risk for :    Falls, constipation, addiction  Loss of livelyhood due to severe pain, debility, weight gain and  vitamin D deficiency    The patient was educated regarding proper diet, fitness routine, and regulatory restrictions concerning pain medications. Previous notes, comprehensive past medical, surgical, family history, and diagnostics were reviewed. Patient education and councelling were provided regarding off label use,treatment options and medication and injection risks. Current and old OARRS ([place] Automated Prescription Reporting System) records reviewed, all refills reviewed since last visit,  Behavioral agreement/[place] regulations   and Toxicology screen was reviewed with patient and is up to date. There are no current red flags. They are making good progress regarding pain relief, they are performing at a functional level regarding activities of daily living, family interactions and psychological functioning, they're not having any adverse effects or side effects from the current medications, and I see no findings of aberrant drug taking or addiction related behaviors. The patient is aware that they have a chronic pain condition and they may require opiates dosing for life. All efforts will be made to wean to the lowest effective dose. Other therapies for pain have not been effective including nonopiate medications. Injections and exercises are only partially effective. A Rx for Narcan was offered to help prevent accidental overdose. RX Monitoring 7/15/2020   Attestation -   Acute Pain Prescriptions Prescription exceeds daily limit for a specific reason. See comments or note. ;Not required given exclusionary diagnoses. ..;Severe pain not adequately treated with lower dose.    Periodic Controlled Substance Monitoring Possible medication side effects, risk of tolerance/dependence & alternative treatments discussed. ;No signs of potential drug abuse or diversion identified. ;Assessed functional status. ;Obtaining appropriate analgesic effect of treatment. Chronic Pain > 50 MEDD Re-evaluated the status of the patient's underlying condition causing pain. ;Considered consultation with a specialist.;Obtained or confirmed \"Consent for Opioid Use\" on file. Chronic Pain > 80 MEDD -               Patient is currently taking:       I am having Ranjith Rodriguez start on pregabalin. I am also having him maintain his Refresh Tears, Vascepa, RA Vitamin B-12 TR, aspirin EC, acetaminophen, senna-docusate, vitamin D, gabapentin, clopidogrel, polyethylene glycol, docusate sodium, budesonide-formoterol, rosuvastatin, metoprolol succinate, valsartan-hydrochlorothiazide, atorvastatin, and HYDROcodone-acetaminophen. I also recommend the following Medications:    Orders Placed This Encounter   Medications    HYDROcodone-acetaminophen (NORCO) 7.5-325 MG per tablet     Sig: Take 1 tablet by mouth every 8 hours as needed for Pain (Max 3 per day) for up to 30 days. Intended supply: 30 days     Dispense:  80 tablet     Refill:  0     Reduce doses taken as pain becomes manageable    pregabalin (LYRICA) 50 MG capsule     Sig: Take 1 capsule by mouth 3 times daily for 30 days. Dispense:  90 capsule     Refill:  3        -which helps with pain and function. Otherwise, continue the current pain medications that I have prescibed. Radiologic:   Old films reviewed  ,     I discussed results with patients. see Follow up plans below  For any new studies. Care Everywhere Updates:  requested and reviewed. No new issues noted. Electrodiagnostic:  Previous studies requested,     I discussed results with patient. See follow-up plans for new studies.         Labs:  Previous labs reviewed Lab Results   Component Value Date     07/30/2020    K 4.1 07/30/2020    CL 99 07/30/2020    CO2 28 07/30/2020    BUN 21 07/30/2020    CREATININE 0.98 07/30/2020    CALCIUM 10.4 07/30/2020    LABALBU 4.1 02/26/2020    BILITOT 0.5 02/26/2020    ALKPHOS 145 02/26/2020    AST 23 02/26/2020    ALT 36 02/26/2020     Lab Results   Component Value Date    WBC 20.0 01/19/2019    RBC 4.51 01/19/2019    HGB 14.5 01/19/2019    HCT 42.3 01/19/2019    MCV 93.9 01/19/2019    MCH 32.1 01/19/2019    MCHC 34.1 01/19/2019    RDW 13.4 01/19/2019     01/19/2019    MPV 8.8 08/25/2015       Lab Results   Component Value Date    LABAMPH Neg 07/20/2020    BARBSCNU Neg 07/20/2020    LABBENZ Neg 07/20/2020    CANSU Neg 07/20/2020    COCAIMETSCRU Neg 07/20/2020    PHENCYCLIDINESCREENURINE Neg 07/20/2020    DSCOMMENT see below 07/20/2020       Lab Results   Component Value Date    CODEINE Not Detected 09/20/2016    MORPHINE Not Detected 09/20/2016    ACETYLMORPHI Not Detected 09/20/2016    OXYCODONE Not Detected 09/20/2016    NOROXYCODONE Not Detected 09/20/2016    NOROXYMU Not Detected 09/20/2016    HYDRCO Not Detected 09/20/2016    NORHYDU Not Detected 09/20/2016    HYDROMO Not Detected 09/20/2016    BUPREN Not Detected 09/20/2016    NORBUPRNOR Not Detected 09/20/2016    FENTA Not Detected 09/20/2016    NORFENT Not Detected 09/20/2016    MEPERIDINE Not Detected 09/20/2016    TAPENU Not Detected 09/20/2016    TAPOSULFUR Not Detected 09/20/2016    METHADONE Not Detected 09/20/2016    LABPROP Not Detected 09/20/2016    TRAM Not Detected 09/20/2016    AMPH Not Detected 09/20/2016    METHAMP Not Detected 09/20/2016    MDMA Not Detected 09/20/2016    ECMDA Not Detected 09/20/2016       Lab Results   Component Value Date    PHENTERMINE Not Detected 09/20/2016    BENZOYL Not Detected 09/20/2016    Shantell Closs Not Detected 09/20/2016    ALPHAOHALPRA Not Detected 09/20/2016    CLONAZEPAM Not Detected 09/20/2016    7AMINOCLONAZ Not Detected 09/20/2016    DIAZEP Not Detected 09/20/2016    CESAR Not Detected 09/20/2016    OXAZ Not Detected 09/20/2016    Cheryln [place] Not Detected 09/20/2016    LORAZEPAM Not Detected 09/20/2016    MIDAZOLAM Not Detected 09/20/2016    ZOLPIDEM Not Detected 09/20/2016    CORWIN Not Detected 09/20/2016    ETG Not Detected 09/20/2016    MARIJMET Not Detected 09/20/2016    PCP Not Detected 09/20/2016    PAINMGTDRUGP See Below 09/20/2016    EERPAINMGTPA See Note 09/20/2016    LABCREA 55.5 09/20/2016         , I discussed results with patient. See follow-up plans for new studies. Therapies:  HEP-gentle stretching and relaxation techniques-demonstrated with patient-they are to do them twice a day. They are also advised to make the following lifestyle changes:  Goals      Exercise 3x per week (30 min per time)      SOAPP-R GOAL LESS THAN       09/20/16 SCORE: 13-MODERATE RISK   12/14/16 score: 9-low-moderate risk   02/15/17 score: 12-moderate risk   05/18/17 score: 14-moderate risk  07/21/17 score: 24-high risk  09/21/17 score: 11-moderate risk  11/27/17 score: 10-moderate risk  01/26/18 score: 8-low risk  03/26/18 score: 12-moderate risk  6/14/18 SCORE: 4-LOW RISK  8/7/18 SCORE: 6-LOW RISK   10/4/18 score: 11- moderate risk  3/6/19 score: 6- low risk  5/21/19 score 11- low risk  7/25/19 score: 1- low risk   11/19/19 score: 8- low risk   3/12/20 score: 15- moderate risk   7/15/20 score: 10- moderate risk  9/16/20 score: 15- moderate risk       Stop Cigarette/Tobacco use           Injections or Epidurals:  Injection options were discussed. Patient gave verbal consent to ordered injections. See follow-up plans for planned injections. Supplements:  Vitamin D with increased dosing during the rainy months,   Education was given on:   Dietary and Fitness--daily stretches and low carb diet-in chair Yoga when possible             Follow up with Primary Care Physician regarding their general medical needs.      Stressed the importance of following up with PCP and specialists for his/her chronic diseases, health, CV, and cancer screening and continued care. Will follow disease activity/progression and adjust therapeutic regimen to disease activity and severity. Discussed medication dosage, usage, goals of therapy, and side effects. Available test results were reviewed -Discussed findings, impression and plan with patient. An additional 20 minutes were spent outside of the patient visit to review records. Additional time spent with the patient to discuss their questions. Additional time spent with the patient devoted to discussing treatment strategy, planning, and implementation. Patient understands above plan; questions asked and answered. Patient agrees to plan as noted above. F/U in 2-3months  At least 50% of the visit was involved in the discussion of the options for treatment. We discussed exercises, medication, interventional therapies and surgery. Healthy life style is essential with patient hard work to achieve the wellness. In addition; discussion with the patient and/or family about any of the diagnostic results, impressions and/or recommended diagnostic studies, prognosis, risks and benefits of treatment options, instructions for treatment and/or follow-up, importance of compliance with chosen treatment options, risk-factor reduction, and patient/family education. They are to follow up in 2 months to review medication, efficacy of injections, pill counts, OARRS check, SOAPPR assessment, review diagnostics, to review previous and future treatment plans and assess appropriateness for continued therapy. New Diagnostics  No orders of the defined types were placed in this encounter.       Aubrie Maldonado DO

## 2020-09-17 ENCOUNTER — OFFICE VISIT (OUTPATIENT)
Dept: INTERVENTIONAL RADIOLOGY/VASCULAR | Age: 60
End: 2020-09-17
Payer: COMMERCIAL

## 2020-09-17 VITALS
DIASTOLIC BLOOD PRESSURE: 59 MMHG | BODY MASS INDEX: 30.12 KG/M2 | WEIGHT: 219 LBS | HEART RATE: 72 BPM | SYSTOLIC BLOOD PRESSURE: 116 MMHG

## 2020-09-17 DIAGNOSIS — I10 ESSENTIAL HYPERTENSION: Chronic | ICD-10-CM

## 2020-09-17 LAB
ANION GAP SERPL CALCULATED.3IONS-SCNC: 12 MEQ/L (ref 9–15)
BUN BLDV-MCNC: 21 MG/DL (ref 8–23)
CALCIUM SERPL-MCNC: 10 MG/DL (ref 8.5–9.9)
CHLORIDE BLD-SCNC: 100 MEQ/L (ref 95–107)
CO2: 27 MEQ/L (ref 20–31)
CREAT SERPL-MCNC: 0.83 MG/DL (ref 0.7–1.2)
GFR AFRICAN AMERICAN: >60
GFR NON-AFRICAN AMERICAN: >60
GLUCOSE BLD-MCNC: 70 MG/DL (ref 70–99)
HCT VFR BLD CALC: 50.2 % (ref 42–52)
HEMOGLOBIN: 16.5 G/DL (ref 14–18)
MCH RBC QN AUTO: 29.8 PG (ref 27–31.3)
MCHC RBC AUTO-ENTMCNC: 32.9 % (ref 33–37)
MCV RBC AUTO: 90.7 FL (ref 80–100)
PDW BLD-RTO: 14.4 % (ref 11.5–14.5)
PLATELET # BLD: 305 K/UL (ref 130–400)
POTASSIUM SERPL-SCNC: 4.8 MEQ/L (ref 3.4–4.9)
RBC # BLD: 5.54 M/UL (ref 4.7–6.1)
SODIUM BLD-SCNC: 139 MEQ/L (ref 135–144)
WBC # BLD: 15.3 K/UL (ref 4.8–10.8)

## 2020-09-17 PROCEDURE — 4004F PT TOBACCO SCREEN RCVD TLK: CPT | Performed by: NURSE PRACTITIONER

## 2020-09-17 PROCEDURE — 3017F COLORECTAL CA SCREEN DOC REV: CPT | Performed by: NURSE PRACTITIONER

## 2020-09-17 PROCEDURE — G8417 CALC BMI ABV UP PARAM F/U: HCPCS | Performed by: NURSE PRACTITIONER

## 2020-09-17 PROCEDURE — 99214 OFFICE O/P EST MOD 30 MIN: CPT | Performed by: NURSE PRACTITIONER

## 2020-09-17 PROCEDURE — G8427 DOCREV CUR MEDS BY ELIG CLIN: HCPCS | Performed by: NURSE PRACTITIONER

## 2020-09-17 NOTE — PROGRESS NOTES
Vicente Fields, a male of 61 y.o. came to the office 9/17/2020. Chief Complaint   Patient presents with    Follow-up     pad     still burning      SUBJECTIVE:     9/17/2020: Vicente Fields returns as he is requesting follow up office visit to discuss LE venous insufficiency treatment. S/P Venous Insufficiency to bilateral GSV. Symptoms persist and remain unchanged including bilateral LE claudication, pain, heaviness, fatigue. He is scheduled next Wednesday with Dr. Darcie Coleman for lower extremity angioplasty with possible intervention for a evaluation/treatment of PAD.    8/27/2020: Vicente Fields is here for results of LE MRA and Venous US. States LE symptoms are unchanged and persist. MRA reports bilateral LE PAD. States he follows with [facility] Dr. Kobe Mcdowell. Venous US reports venous insufficiency to bilateral GSV. Has not done conservative therapy with compression. Denies any LE troublesome varicose veins. LLE symptoms remain > RLE.    7/30/2020: Vicente Fields returns for results of LE arterial US. US reports significant LE arterial disease bilaterally. Symptoms to bilateral LE's remain. Pain during night now with difficulty sleeping. Denies LE swelling, fatigue. LLE heavniness. 6/23/2020 HPI: Vicente Fields presents to clinic for consultation at the request of his PCP for evaluation of PAD for LE pain. Patient presents with symptoms of: both feet with left worse than right, onset one month. Pain occurs with ambulation. Describes pain as stabbing. Left leg with fatigue and heaviness, chronic. States also has numbness and tingling in feet and toes both at rest and with activity. Patient is a poor historian as he changes his story often so is difficulty getting a thorough evaluation. Affect on activities of daily living: Must stop and take frequent rest periods. Denies LE swelling or symptomatic varicose veins. NSAIDS: Takes Neurontin daily with minimal relief.      Leg elevation: Elevates without relief. Stocking use and dates: Has never done conservative therapy with compression stockings. Claudication: Symptoms consistent with. Lower leg and foot pain with ambulation. PAD risk factors: Borderline DM, HTN, Hypercholesteremia, overweight, smoker. Review of systems:  Constitutional: Negative. Negative for appetite change, chills, fatigue and fever. HENT: Negative. Negative for congestion, sinus pressure, sinus pain, sore throat and trouble swallowing. Eyes: Negative. Respiratory: Positive for cough (dry). Negative for shortness of breath and wheezing. Cardiovascular: Negative for chest pain, palpitations and leg swelling. Claudication   Gastrointestinal: Negative for abdominal distention, abdominal pain, constipation, diarrhea, nausea and vomiting. Genitourinary: Negative for difficulty urinating, frequency, hematuria and urgency. Musculoskeletal: Positive for back pain (lower). Bilateral foot and leg pain, fatigue, heaviness. Skin: Negative for rash and wound. Neurological: Positive for numbness (tingling feet and toes). Negative for dizziness, light-headedness and headaches. Hematological: Does not bruise/bleed easily. Psychiatric/Behavioral: Negative. OBJECTIVE:  BP (!) 116/59   Pulse 72   Wt 219 lb (99.3 kg)   BMI 30.12 kg/m²     Physical:  Constitutional:       Appearance: Normal appearance. HENT:      Head: Normocephalic and atraumatic. Nose: Nose normal.   Neck:      Musculoskeletal: Normal range of motion and neck supple. Cardiovascular:      Rate and Rhythm: Normal rate and regular rhythm. Pulses:           Posterior tibial pulses are detected w/ Doppler on the right side and detected w/ Doppler on the left side. Heart sounds: Normal heart sounds. Murmur  Pulmonary:      Effort: Pulmonary effort is normal.      Breath sounds: Normal breath sounds.    Abdominal:      General: Bowel sounds are normal. There is no distension. Musculoskeletal:         General: Tenderness (feet) present. No swelling, deformity or signs of injury. Right lower leg: No edema. Left lower leg: No edema. Skin:     General: Skin is warm and dry. Capillary Refill: Capillary refill takes 2 to 3 seconds. Neurological:      Mental Status: He is alert and oriented to person, place, and time. Psychiatric:         Mood and Affect: Mood normal.         Behavior: Behavior normal.     8/21/2020: Impression    1.  No signs of deep venous thrombosis in the bilateral lower extremity. 2. Monika Friday is venous insufficiency of the right great saphenous vein starting at the level of the mid thigh and extending continuously to the mid calf. Duration is 1 second, diameter is 9 mm. 3. Monika Friday is venous insufficiency of the left great saphenous vein starting at the level of the proximal calf and extending to the mid calf. Duration is 1.3 seconds, diameter is 8 mm. 4.  Note is made of varicose veins in the right and left calves.              COMPARISON:No prior studies are available for comparison.  .         DIAGNOSIS: R09.89 Other specified symptoms and signs involving the circulatory and respiratory systems ICD10         COMMENTS: Bilateral foot pain, heaviness sensation         TECHNIQUE: Real-time scanning of the deep venous system of the bilateral lower extremity was performed and representative sections obtained.  Compression sonography as well as pulsed Doppler and color flow Doppler imaging was performed.         FINDINGS: There is no signs of deep venous thrombosis within the common femoral, superficial femoral or popliteal veins of the lower extremity.  There is venous insufficiency of the right great saphenous vein starting at the level of the mid thigh and    extending continuously to the mid calf. Duration is 1 second, diameter is 9 mm.     There is venous insufficiency of the left great saphenous vein starting at the level of the proximal calf and extending to the mid calf. Duration is 1.3 seconds, diameter is 8 mm. Note is made of varicose veins in the right and left calves. 8/18/2020: Impression    1. There is mild atherosclerotic plaque in the distal abdominal aorta. There is moderate plaque in the bilateral common iliac arteries which causes stenosis of the bilateral arteries, though flow is maintained. Since flow is slow, unable to quantify    degree of stenosis. 2. There is atherosclerotic plaque throughout the entire right superficial femoral artery with a total occlusion of the mid right superficial femoral artery approximately 3 cm in length. 3. There is diffuse atherosclerotic disease of the left superficial femoral artery causing approximately 70% stenosis, though flow is maintained. 4. There is bilateral trivessel runoff, though there is diffuse disease which is not obstructing flow.              CLINICAL HISTORY: Claudication.         TECHNIQUE: Time-of-flight multislice real-time MRI angiogram imaging of the pelvis, and bilateral lower extremities in axial, coronal, and sagittal planes were performed without contrast. Afterwards, gadolinium was injected in intravenous route, and    real-time video 3-D TRICKS blood flow imaging of the bilateral lower extremity arteries were performed. Delayed imaging of the blood flow through the venous return were also seen in 3-D. Images were reconstructed in 3-D model.         FINDINGS:         PELVIS MAGNETIC RESONANCE ANGIOGRAM FINDINGS: Mild atherosclerotic plaque in the distal abdominal aorta, though flow is maintained. There is moderate atherosclerotic plaque in the bilateral common iliac arteries, since blood flow is slow, it is difficult     to quantify degree of stenosis.  Though flow is maintained in the bilateral iliac and common femoral arteries.         RIGHT LOWER EXTREMITY MAGNETIC RESONANCE ANGIOGRAM: There is diffuse atherosclerotic disease in the right superficial femoral artery with total occlusion at the mid right SFA of approximately 3 cm in length. There is trivessel runoff with diffuse    moderate atherosclerotic disease, though flow is maintained in the popliteal artery and trivessel's.         LEFT LOWER EXTREMITY MAGNETIC RESONANCE ANGIOGRAM: There is diffuse atherosclerotic disease in the left superficial femoral artery, approximately 70% stenosis, though flow is maintained in the right superficial femoral artery and popliteal arteries. There is diffuse atherosclerotic disease in the trivessel's below the knee, though flow is maintained.         The visualized venous system is patent and unremarkable.             ASSESSMENT AND PLAN:    Assessment:   1. PAD  2. Claudication  3. Chronic Venous Insufficiency to bilateral GSV. Has not done conservative therapy with compression socks. Plan:   1. He is scheduled next Wednesday with Dr. Anne Travis for lower extremity angioplasty with possible intervention for a evaluation/treatment of PAD. 2.  Discussed in detail disease process of PAD and venous insufficiency. At this time he is requesting to wait on venous treatment until PAD is treated. Once PAD is treated by cardiology, he will call office when he is ready to begin treatment and at which time will fax prescription for compression stockings to drug [facility] of choice. He will wear 20 to 30 mmHg thigh-high compression daily during the day and off nightly. Wear as tolerated for initial treatment of lower extremity venous insufficiency. He will return to the office in 2 to 3 months after this for evaluation of effectiveness of compression stockings for treatment of CVI.      Mohinder Tong, APRN - CNP No

## 2020-09-18 LAB
SARS-COV-2: NOT DETECTED
SOURCE: NORMAL

## 2020-09-28 ENCOUNTER — HOSPITAL ENCOUNTER (OUTPATIENT)
Dept: CARDIAC CATH/INVASIVE PROCEDURES | Age: 60
Discharge: HOME | End: 2020-09-29
Attending: INTERNAL MEDICINE | Admitting: INTERNAL MEDICINE
Payer: COMMERCIAL

## 2020-09-28 VITALS
SYSTOLIC BLOOD PRESSURE: 137 MMHG | WEIGHT: 215 LBS | HEART RATE: 86 BPM | DIASTOLIC BLOOD PRESSURE: 60 MMHG | RESPIRATION RATE: 18 BRPM | BODY MASS INDEX: 30.1 KG/M2 | OXYGEN SATURATION: 92 % | HEIGHT: 71 IN | TEMPERATURE: 97.9 F

## 2020-09-28 PROBLEM — I73.9 CLAUDICATION (HCC): Status: ACTIVE | Noted: 2020-09-28

## 2020-09-28 LAB
ABO/RH: NORMAL
ANTIBODY SCREEN: NORMAL
PERFORMED ON: NORMAL
POC ACTIVATED CLOTTING TIME KAOLIN: 136 SEC (ref 82–152)
POC SAMPLE TYPE: NORMAL

## 2020-09-28 PROCEDURE — C1724 CATH, TRANS ATHEREC,ROTATION: HCPCS

## 2020-09-28 PROCEDURE — 75774 ARTERY X-RAY EACH VESSEL: CPT | Performed by: INTERNAL MEDICINE

## 2020-09-28 PROCEDURE — 6360000004 HC RX CONTRAST MEDICATION

## 2020-09-28 PROCEDURE — 75716 ARTERY X-RAYS ARMS/LEGS: CPT | Performed by: INTERNAL MEDICINE

## 2020-09-28 PROCEDURE — 37227 HC FEM/POPL REVASC STNT & ATHER: CPT | Performed by: INTERNAL MEDICINE

## 2020-09-28 PROCEDURE — C1725 CATH, TRANSLUMIN NON-LASER: HCPCS

## 2020-09-28 PROCEDURE — C1769 GUIDE WIRE: HCPCS

## 2020-09-28 PROCEDURE — 86850 RBC ANTIBODY SCREEN: CPT

## 2020-09-28 PROCEDURE — 86900 BLOOD TYPING SEROLOGIC ABO: CPT

## 2020-09-28 PROCEDURE — 86901 BLOOD TYPING SEROLOGIC RH(D): CPT

## 2020-09-28 PROCEDURE — 2580000003 HC RX 258: Performed by: INTERNAL MEDICINE

## 2020-09-28 PROCEDURE — 2709999900 HC NON-CHARGEABLE SUPPLY

## 2020-09-28 PROCEDURE — 6370000000 HC RX 637 (ALT 250 FOR IP): Performed by: INTERNAL MEDICINE

## 2020-09-28 PROCEDURE — C1887 CATHETER, GUIDING: HCPCS

## 2020-09-28 PROCEDURE — 37227 PR REVSC OPN/PRQ FEM/POP W/STNT/ATHRC/ANGIOP SM VSL: CPT | Performed by: INTERNAL MEDICINE

## 2020-09-28 PROCEDURE — C1894 INTRO/SHEATH, NON-LASER: HCPCS

## 2020-09-28 PROCEDURE — 6360000002 HC RX W HCPCS

## 2020-09-28 PROCEDURE — 85347 COAGULATION TIME ACTIVATED: CPT

## 2020-09-28 PROCEDURE — 37221 PR REVSC OPN/PRQ ILIAC ART W/STNT PLMT & ANGIOPLSTY: CPT | Performed by: INTERNAL MEDICINE

## 2020-09-28 PROCEDURE — C1876 STENT, NON-COA/NON-COV W/DEL: HCPCS

## 2020-09-28 PROCEDURE — 37226 HC FEM POP TERR PLASTY AND STENT: CPT | Performed by: INTERNAL MEDICINE

## 2020-09-28 PROCEDURE — C2623 CATH, TRANSLUMIN, DRUG-COAT: HCPCS

## 2020-09-28 PROCEDURE — 2580000003 HC RX 258

## 2020-09-28 PROCEDURE — 2500000003 HC RX 250 WO HCPCS

## 2020-09-28 PROCEDURE — 37221 HC ILIAC TERRITORY PLASTY STENT: CPT | Performed by: INTERNAL MEDICINE

## 2020-09-28 RX ORDER — ONDANSETRON 2 MG/ML
4 INJECTION INTRAMUSCULAR; INTRAVENOUS EVERY 6 HOURS PRN
Status: DISCONTINUED | OUTPATIENT
Start: 2020-09-28 | End: 2020-09-28 | Stop reason: DRUGHIGH

## 2020-09-28 RX ORDER — SODIUM CHLORIDE 0.9 % (FLUSH) 0.9 %
10 SYRINGE (ML) INJECTION PRN
Status: DISCONTINUED | OUTPATIENT
Start: 2020-09-28 | End: 2020-09-29 | Stop reason: HOSPADM

## 2020-09-28 RX ORDER — MORPHINE SULFATE 4 MG/ML
2 INJECTION, SOLUTION INTRAMUSCULAR; INTRAVENOUS ONCE
Status: DISCONTINUED | OUTPATIENT
Start: 2020-09-28 | End: 2020-09-28 | Stop reason: CLARIF

## 2020-09-28 RX ORDER — POLYETHYLENE GLYCOL 3350 17 G/17G
17 POWDER, FOR SOLUTION ORAL DAILY
Status: DISCONTINUED | OUTPATIENT
Start: 2020-09-28 | End: 2020-09-29 | Stop reason: HOSPADM

## 2020-09-28 RX ORDER — GABAPENTIN 400 MG/1
800 CAPSULE ORAL 3 TIMES DAILY
Status: DISCONTINUED | OUTPATIENT
Start: 2020-09-28 | End: 2020-09-29 | Stop reason: HOSPADM

## 2020-09-28 RX ORDER — ROSUVASTATIN CALCIUM 20 MG/1
40 TABLET, COATED ORAL NIGHTLY
Status: DISCONTINUED | OUTPATIENT
Start: 2020-09-28 | End: 2020-09-28

## 2020-09-28 RX ORDER — ASPIRIN 81 MG/1
81 TABLET ORAL DAILY
Status: DISCONTINUED | OUTPATIENT
Start: 2020-09-28 | End: 2020-09-29 | Stop reason: HOSPADM

## 2020-09-28 RX ORDER — CLOPIDOGREL BISULFATE 75 MG/1
75 TABLET ORAL DAILY
Status: DISCONTINUED | OUTPATIENT
Start: 2020-09-28 | End: 2020-09-29 | Stop reason: HOSPADM

## 2020-09-28 RX ORDER — MORPHINE SULFATE 2 MG/ML
2 INJECTION, SOLUTION INTRAMUSCULAR; INTRAVENOUS ONCE
Status: COMPLETED | OUTPATIENT
Start: 2020-09-28 | End: 2020-09-28

## 2020-09-28 RX ORDER — NITROGLYCERIN 0.4 MG/1
0.4 TABLET SUBLINGUAL EVERY 5 MIN PRN
Status: DISCONTINUED | OUTPATIENT
Start: 2020-09-28 | End: 2020-09-29 | Stop reason: HOSPADM

## 2020-09-28 RX ORDER — ONDANSETRON 2 MG/ML
4 INJECTION INTRAMUSCULAR; INTRAVENOUS EVERY 6 HOURS PRN
Status: DISCONTINUED | OUTPATIENT
Start: 2020-09-28 | End: 2020-09-29 | Stop reason: HOSPADM

## 2020-09-28 RX ORDER — VITAMIN B COMPLEX
2000 TABLET ORAL 2 TIMES DAILY
Status: DISCONTINUED | OUTPATIENT
Start: 2020-09-28 | End: 2020-09-29 | Stop reason: HOSPADM

## 2020-09-28 RX ORDER — SODIUM CHLORIDE 450 MG/100ML
75 INJECTION, SOLUTION INTRAVENOUS CONTINUOUS
Status: DISCONTINUED | OUTPATIENT
Start: 2020-09-28 | End: 2020-09-29 | Stop reason: HOSPADM

## 2020-09-28 RX ORDER — AMOXICILLIN 250 MG
2 CAPSULE ORAL DAILY PRN
Status: DISCONTINUED | OUTPATIENT
Start: 2020-09-28 | End: 2020-09-29 | Stop reason: HOSPADM

## 2020-09-28 RX ORDER — HYDROCODONE BITARTRATE AND ACETAMINOPHEN 7.5; 325 MG/1; MG/1
1 TABLET ORAL EVERY 8 HOURS PRN
Status: DISCONTINUED | OUTPATIENT
Start: 2020-09-28 | End: 2020-09-29 | Stop reason: HOSPADM

## 2020-09-28 RX ORDER — ATORVASTATIN CALCIUM 40 MG/1
40 TABLET, FILM COATED ORAL DAILY
Status: DISCONTINUED | OUTPATIENT
Start: 2020-09-28 | End: 2020-09-29 | Stop reason: HOSPADM

## 2020-09-28 RX ORDER — HYDRALAZINE HYDROCHLORIDE 20 MG/ML
10 INJECTION INTRAMUSCULAR; INTRAVENOUS EVERY 10 MIN PRN
Status: DISCONTINUED | OUTPATIENT
Start: 2020-09-28 | End: 2020-09-29 | Stop reason: HOSPADM

## 2020-09-28 RX ORDER — PREGABALIN 50 MG/1
50 CAPSULE ORAL 3 TIMES DAILY
Status: DISCONTINUED | OUTPATIENT
Start: 2020-09-28 | End: 2020-09-29 | Stop reason: HOSPADM

## 2020-09-28 RX ORDER — LABETALOL HYDROCHLORIDE 5 MG/ML
10 INJECTION, SOLUTION INTRAVENOUS EVERY 30 MIN PRN
Status: DISCONTINUED | OUTPATIENT
Start: 2020-09-28 | End: 2020-09-29 | Stop reason: HOSPADM

## 2020-09-28 RX ORDER — METOPROLOL SUCCINATE 25 MG/1
25 TABLET, EXTENDED RELEASE ORAL DAILY
Status: DISCONTINUED | OUTPATIENT
Start: 2020-09-28 | End: 2020-09-29 | Stop reason: HOSPADM

## 2020-09-28 RX ORDER — SODIUM CHLORIDE 9 MG/ML
INJECTION, SOLUTION INTRAVENOUS CONTINUOUS
Status: DISCONTINUED | OUTPATIENT
Start: 2020-09-28 | End: 2020-09-29 | Stop reason: HOSPADM

## 2020-09-28 RX ORDER — SODIUM CHLORIDE 0.9 % (FLUSH) 0.9 %
10 SYRINGE (ML) INJECTION EVERY 12 HOURS SCHEDULED
Status: DISCONTINUED | OUTPATIENT
Start: 2020-09-28 | End: 2020-09-29 | Stop reason: HOSPADM

## 2020-09-28 RX ORDER — ASPIRIN 325 MG
325 TABLET ORAL ONCE
Status: DISCONTINUED | OUTPATIENT
Start: 2020-09-28 | End: 2020-09-29 | Stop reason: HOSPADM

## 2020-09-28 RX ORDER — DOCUSATE SODIUM 100 MG/1
100 CAPSULE, LIQUID FILLED ORAL 2 TIMES DAILY
Status: DISCONTINUED | OUTPATIENT
Start: 2020-09-28 | End: 2020-09-29 | Stop reason: HOSPADM

## 2020-09-28 RX ORDER — BUDESONIDE AND FORMOTEROL FUMARATE DIHYDRATE 160; 4.5 UG/1; UG/1
2 AEROSOL RESPIRATORY (INHALATION) 2 TIMES DAILY
Status: DISCONTINUED | OUTPATIENT
Start: 2020-09-28 | End: 2020-09-29 | Stop reason: HOSPADM

## 2020-09-28 RX ADMIN — MORPHINE SULFATE 2 MG: 2 INJECTION, SOLUTION INTRAMUSCULAR; INTRAVENOUS at 17:13

## 2020-09-28 RX ADMIN — DOCUSATE SODIUM 100 MG: 100 CAPSULE, LIQUID FILLED ORAL at 19:55

## 2020-09-28 RX ADMIN — PREGABALIN 50 MG: 50 CAPSULE ORAL at 19:55

## 2020-09-28 RX ADMIN — Medication 10 ML: at 19:57

## 2020-09-28 RX ADMIN — GABAPENTIN 800 MG: 400 CAPSULE ORAL at 21:17

## 2020-09-28 ASSESSMENT — PAIN SCALES - GENERAL: PAINLEVEL_OUTOF10: 7

## 2020-09-28 NOTE — BRIEF OP NOTE
Section of Cardiology  Adult Brief LE angio Procedure Note        Procedure(s):  B/l LE angio, left MAHAD stenting, left SFA ATH/PTA with DCB/stent    Pre-operative Diagnosis:  Claudication, abn non invasive testing. H&P Status: Completed and reviewed. Post-operative Diagnosis:      Left LE: MAHAD 95% proximal. Diffuse SFA disease with proximal 85%, distal 85%. 3 vessel run off      Right LE: severe right SFA diffuse disease, up to 95%    Findings:  See full report    Complications:  none    Primary Proceduralist:   Dr. Gadiel Hinton    DAPT  Right LE PVI in future.          Full procedure note to follow

## 2020-09-28 NOTE — PROGRESS NOTES
Report called to Fara Hardwick RN. Vitals are stable, no hematoma or bleeding at puncture site, will continue to monitor.

## 2020-09-28 NOTE — PROGRESS NOTES
Hemostasis achieved, no bleeding or hematoma at puncture site, vitals are stable, will continue to monitor.

## 2020-09-28 NOTE — PROGRESS NOTES
Pt returned from procedure, vitals are stable, no bleeding or hematoma at puncture site, A/O x 4, will continue to monitor.

## 2020-09-29 LAB
ANION GAP SERPL CALCULATED.3IONS-SCNC: 14 MEQ/L (ref 9–15)
BUN BLDV-MCNC: 26 MG/DL (ref 8–23)
CALCIUM SERPL-MCNC: 9.7 MG/DL (ref 8.5–9.9)
CHLORIDE BLD-SCNC: 105 MEQ/L (ref 95–107)
CO2: 22 MEQ/L (ref 20–31)
CREAT SERPL-MCNC: 1.05 MG/DL (ref 0.7–1.2)
GFR AFRICAN AMERICAN: >60
GFR NON-AFRICAN AMERICAN: >60
GLUCOSE BLD-MCNC: 95 MG/DL (ref 70–99)
HCT VFR BLD CALC: 49 % (ref 42–52)
HEMOGLOBIN: 16 G/DL (ref 14–18)
MCH RBC QN AUTO: 29.3 PG (ref 27–31.3)
MCHC RBC AUTO-ENTMCNC: 32.7 % (ref 33–37)
MCV RBC AUTO: 89.6 FL (ref 80–100)
PDW BLD-RTO: 14.7 % (ref 11.5–14.5)
PLATELET # BLD: 273 K/UL (ref 130–400)
POTASSIUM SERPL-SCNC: 4 MEQ/L (ref 3.4–4.9)
RBC # BLD: 5.47 M/UL (ref 4.7–6.1)
SODIUM BLD-SCNC: 141 MEQ/L (ref 135–144)
WBC # BLD: 13.7 K/UL (ref 4.8–10.8)

## 2020-09-29 PROCEDURE — 85027 COMPLETE CBC AUTOMATED: CPT

## 2020-09-29 PROCEDURE — 36415 COLL VENOUS BLD VENIPUNCTURE: CPT

## 2020-09-29 PROCEDURE — 80048 BASIC METABOLIC PNL TOTAL CA: CPT

## 2020-09-29 PROCEDURE — 99217 PR OBSERVATION CARE DISCHARGE MANAGEMENT: CPT | Performed by: INTERNAL MEDICINE

## 2020-09-29 PROCEDURE — 6370000000 HC RX 637 (ALT 250 FOR IP): Performed by: INTERNAL MEDICINE

## 2020-09-29 RX ORDER — NITROGLYCERIN 0.4 MG/1
TABLET SUBLINGUAL
Qty: 25 TABLET | Refills: 3 | Status: SHIPPED | OUTPATIENT
Start: 2020-09-29 | End: 2022-09-21

## 2020-09-29 RX ADMIN — DOCUSATE SODIUM 100 MG: 100 CAPSULE, LIQUID FILLED ORAL at 09:26

## 2020-09-29 RX ADMIN — ASPIRIN 81 MG: 81 TABLET, COATED ORAL at 09:26

## 2020-09-29 RX ADMIN — CLOPIDOGREL BISULFATE 75 MG: 75 TABLET ORAL at 09:26

## 2020-09-29 RX ADMIN — POLYETHYLENE GLYCOL 3350 17 G: 17 POWDER, FOR SOLUTION ORAL at 09:26

## 2020-09-29 RX ADMIN — GABAPENTIN 800 MG: 400 CAPSULE ORAL at 09:26

## 2020-09-29 RX ADMIN — Medication 2000 UNITS: at 09:26

## 2020-09-29 RX ADMIN — METOPROLOL SUCCINATE 25 MG: 25 TABLET, EXTENDED RELEASE ORAL at 09:26

## 2020-09-29 RX ADMIN — ATORVASTATIN CALCIUM 40 MG: 40 TABLET, FILM COATED ORAL at 09:26

## 2020-09-29 RX ADMIN — PREGABALIN 50 MG: 50 CAPSULE ORAL at 09:26

## 2020-09-29 NOTE — PROGRESS NOTES
Pt given discharge instructions, verbalizes understanding. Transport called. Stent cards given to pt.    Electronically signed by Adalberto Cason RN on 9/29/2020 at 11:13 AM

## 2020-09-29 NOTE — DISCHARGE SUMMARY
Cardiology Discharge Summary      Patient Identification:  Eric Parada  : 1960  MRN: [id number]   Account: [de-identified]     Admit date: 2020  Discharge date: 2020   Attending provider: Cal Kern DO        Primary care provider: Theresa León MD     Admission Diagnoses:  <principal problem not specified>   PAD      Discharge Diagnoses: Active Hospital Problems    Diagnosis Date Noted    Claudication [facility]) [I73.9] 2020     Priority: High    Abnormal ankle brachial index (RAO) [R68.89]      Priority: High    Claudication in peripheral vascular disease [facility]) [I73.9]      Priority: High     PAD     Hospital Course:   Eric Parada is a 61 y.o. male admitted to [facility] on 2020 for . PTA  Procedures:   1. LLE PTA via RFA - stable      Consults:   None    Examination:  /60   Pulse 86   Temp 97.9 °F (36.6 °C) (Oral)   Resp 18   Ht 5' 11\" (1.803 m)   Wt 215 lb (97.5 kg)   SpO2 92%   BMI 29.99 kg/m²    Physical Exam   Constitutional: He appears healthy. No distress. HENT:   Normal cephalic and Atraumatic   Eyes: Pupils are equal, round, and reactive to light. Neck: Normal range of motion and thyroid normal. Neck supple. No JVD present. No neck adenopathy. No thyromegaly present. Cardiovascular: Normal rate, regular rhythm and normal heart sounds. Exam reveals decreased pulses. Pulmonary/Chest: Effort normal and breath sounds normal. He has no wheezes. He has no rales. He exhibits no tenderness. Abdominal: Soft. Bowel sounds are normal. There is no abdominal tenderness. Musculoskeletal: Normal range of motion. General: No tenderness or edema. Neurological: He is alert and oriented to person, place, and time. Skin: Skin is warm. No cyanosis. Nails show no clubbing.        Medications:  Current Discharge Medication List      START taking these medications Details   nitroGLYCERIN (NITROSTAT) 0.4 MG SL tablet up to max of 3 total doses. If no relief after 1 dose, call 911. Qty: 25 tablet, Refills: 3         CONTINUE these medications which have NOT CHANGED    Details   HYDROcodone-acetaminophen (NORCO) 7.5-325 MG per tablet Take 1 tablet by mouth every 8 hours as needed for Pain (Max 3 per day) for up to 30 days. Intended supply: 30 days  Qty: 80 tablet, Refills: 0    Comments: Reduce doses taken as pain becomes manageable  Associated Diagnoses: SI (sacroiliac) pain; DDD (degenerative disc disease), lumbar; Lumbosacral radiculopathy at S1; High risk medication use; IgG monoclonal gammopathy of uncertain significance      pregabalin (LYRICA) 50 MG capsule Take 1 capsule by mouth 3 times daily for 30 days. Qty: 90 capsule, Refills: 3    Associated Diagnoses: DDD (degenerative disc disease), lumbar; SI (sacroiliac) pain; High risk medication use;  IgG monoclonal gammopathy of uncertain significance; Lumbosacral radiculopathy at S1      atorvastatin (LIPITOR) 40 MG tablet Take 1 tablet by mouth daily  Qty: 30 tablet, Refills: 3      clopidogrel (PLAVIX) 75 MG tablet take 1 tablet by mouth once daily      polyethylene glycol (GLYCOLAX) 17 GM/SCOOP powder Take 17 g by mouth daily  Qty: 510 g, Refills: 5    Associated Diagnoses: Other constipation      docusate sodium (COLACE) 100 MG capsule Take 1 capsule by mouth 2 times daily  Qty: 30 capsule, Refills: 3    Associated Diagnoses: Constipation, unspecified constipation type      budesonide-formoterol (SYMBICORT) 160-4.5 MCG/ACT AERO inhale 2 puffs by mouth twice a day  Qty: 10.2 g, Refills: 12    Associated Diagnoses: Cerebrovascular accident (CVA), unspecified mechanism (HCC)      metoprolol succinate (TOPROL XL) 25 MG extended release tablet Take 1 tablet by mouth daily  Qty: 30 tablet, Refills: 5      valsartan-hydrochlorothiazide (DIOVAN-HCT) 80-12.5 MG per tablet take 1 tablet by mouth once daily  Qty: 90 tablet, Refills: 4    Associated Diagnoses: Essential hypertension      Cholecalciferol (VITAMIN D) 50 MCG (2000 UT) CAPS capsule Take 1 capsule by mouth 2 times daily  Qty: 30 capsule, Refills: 5    Associated Diagnoses: Vitamin D deficiency      senna-docusate (PERICOLACE) 8.6-50 MG per tablet Take 2 tablets by mouth daily as needed for Constipation  Qty: 60 tablet, Refills: 11    Associated Diagnoses: Other constipation      aspirin EC 81 MG EC tablet Take 1 tablet by mouth daily  Qty: 90 tablet, Refills: 4    Associated Diagnoses: Cerebrovascular accident (CVA), unspecified mechanism (HCC)      RA VITAMIN B-12 TR 1000 MCG TBCR take 1 tablet by mouth once daily  Qty: 90 tablet, Refills: 4      VASCEPA 1 g CAPS capsule Refills: 0      gabapentin (NEURONTIN) 800 MG tablet take 1 tablet by mouth four times a day  Qty: 120 tablet, Refills: 3    Associated Diagnoses: Alcoholic polyneuropathy ([address] Lumbosacral radiculopathy at S1; Left foot pain; IgG monoclonal gammopathy of uncertain significance      REFRESH TEARS 0.5 % SOLN ophthalmic solution instill 1 drop into both eyes four times a day  Refills: 1         STOP taking these medications       rosuvastatin (CRESTOR) 40 MG tablet Comments:   Reason for Stopping:         acetaminophen (TYLENOL) 500 MG tablet Comments:   Reason for Stopping:               Significant Diagnostics:   Radiology: No results found.     Labs:   Recent Results (from the past 72 hour(s))   TYPE AND SCREEN    Collection Time: 09/28/20 12:04 PM   Result Value Ref Range    ABO/Rh A POS     Antibody Screen NEG    POCT Arterial    Collection Time: 09/28/20  4:43 PM   Result Value Ref Range    ACT-K 136 82 - 152 sec    Sample Type ART     Performed on SEE BELOW    CBC    Collection Time: 09/29/20  5:28 AM   Result Value Ref Range    WBC 13.7 (H) 4.8 - 10.8 K/uL    RBC 5.47 4.70 - 6.10 M/uL    Hemoglobin 16.0 14.0 - 18.0 g/dL    Hematocrit 49.0 42.0 - 52.0 %    MCV 89.6 80.0 - 100.0 fL    MCH 29.3 27.0 - 31.3 pg    MCHC 32.7 (L) 33.0 - 37.0 %    RDW 14.7 (H) 11.5 - 14.5 %    Platelets 322 140 - 834 K/uL   Basic Metabolic Panel    Collection Time: 09/29/20  5:28 AM   Result Value Ref Range    Sodium 141 135 - 144 mEq/L    Potassium 4.0 3.4 - 4.9 mEq/L    Chloride 105 95 - 107 mEq/L    CO2 22 20 - 31 mEq/L    Anion Gap 14 9 - 15 mEq/L    Glucose 95 70 - 99 mg/dL    BUN 26 (H) 8 - 23 mg/dL    CREATININE 1.05 0.70 - 1.20 mg/dL    GFR Non-African American >60.0 >60    GFR  >60.0 >60    Calcium 9.7 8.5 - 9.9 mg/dL           Follow-up visits:   Suzan Rahman MD  [address] [place] [address]  [phone]    Schedule an appointment as soon as possible for a visit in 2 weeks         [facility]    Diagnosis Date Noted    Claudication [facility]) [I73.9] 09/28/2020     Priority: High    Abnormal ankle brachial index (RAO) [R68.89]      Priority: High    Claudication in peripheral vascular disease ([address] [I73.9]      Priority: High     1. PAD s/p LLE Revasc/PTA- will need staged RLE PTA  2. DAPT Statin BB Arb  3. STOP SMOKING  4. F/u 2 weeks. Will need f/u Labs at that time.                 Electronically signed by Mitchell Carlos MD on 9/29/2020 at 8:49 AM

## 2020-09-29 NOTE — PROGRESS NOTES
Spiritual Care Services     Summary of Visit:  Initial visit with this patient. He is quite anxious to be discharged. Expressed that he feels better and is grateful for the work Dr. Ubaldo Chadwick did on his behalf. Patient lives with his brother and has a daughter. Patient expressed that he does not have any advanced directives and would not want his daughter to make decisions for him, but wasn't sure about his brother. Patient was clear about Ventilation or CPR. He will discuss these matters and the Advanced Directives with his brother. The patient was educated about the documents and instructed to contact Spiritual Care department if further assistance is required. Spiritual Assessment/Intervention/Outcomes:    Encounter Summary  Services provided to[de-identified] Patient  Referral/Consult From[de-identified] Rounding  Support System: Family members  Place of Zoroastrian: None  Continue Visiting: No  Complexity of Encounter: Low  Length of Encounter: 15 minutes  Spiritual Assessment Completed: Yes  Advance Care Planning: Yes  Routine  Type: Initial  Assessment: Calm, Approachable, Coping  Intervention: Explored feelings, thoughts, concerns, Nurtured hope, Sustaining presence/ Ministry of presence, Discussed illness/injury and it's impact, Discussed belief system/Mu-ism practices/shania  Outcome: Expressed gratitude, Engaged in conversation, Receptive              Advance Directives (For Healthcare)  Healthcare Directive: No, patient does not have an advance directive for healthcare treatment  Information on Healthcare Directives Requested: Yes  Patient Requests Assistance: Yes, will do independently  Advance Directives: Documents given, Documents explained, Pt. not interested at this time                Care Plan:    No follow up required, patient to be discharged today.     Spiritual Care Services   Electronically signed by Tayo Briseno on 9/29/20 at 9:56 AM EDT     To reach a  for emotional and spiritual support, place an EPIC consult request.   If a  is needed immediately, dial 0 and ask to page the on-call .

## 2020-09-29 NOTE — FLOWSHEET NOTE
Beth Barker 281 care of pt from [facility], [address] [place] [address] PM assessment completed. Pt refusing all medications except lyrica, docusate, and gabapentin. Cath site without bruising, drainage, or hematoma.

## 2020-10-07 NOTE — PROCEDURES
disease. The right proximal tibial vessels appeared to be patent. ASSESSMENT:  1.  Status post successful left common iliac artery stenting with 0%  residual stenosis. 2.  Status post successful left SFA orbital atherectomy in the proximal  and distal segments followed by PTA with drug-coated balloon as well as  stenting with Supera stent with 0% residual stenosis. 3.  Severe right SFA disease as described above. PLAN:  1. Postprocedure care as usual.  2.  As always, aggressive risk factor modification. 3.  Maximize medical therapy. 4.  Plan for right SFA orbital atherectomy in the future.         Lisandra Edouard DO    D: 10/07/2020 9:36:24       T: 10/07/2020 10:36:06     [email or web address]  Job#: [id number]     Doc#: [id number]    CC:

## 2020-10-08 RX ORDER — ACETAMINOPHEN 500 MG
500 TABLET ORAL 4 TIMES DAILY PRN
Qty: 90 TABLET | Refills: 2 | OUTPATIENT
Start: 2020-10-08

## 2020-10-08 NOTE — TELEPHONE ENCOUNTER
Rx request   Requested Prescriptions     Pending Prescriptions Disp Refills    acetaminophen (TYLENOL) 500 MG tablet 90 tablet 2     Sig: Take 1 tablet by mouth 4 times daily as needed for Pain     LOV 6/25/2020  Next Visit Date:  Future Appointments   Date Time Provider Loan Darling   10/12/2020  2:30 PM Griffin Diaz MD [facility]   10/21/2020 10:30 AM [facility] CATH LAB RM [address]   11/4/2020  1:30 PM DO Carmen Jim [facility] EMERGENCY [facility] AT [place]   12/14/2020  9:45 AM [facility] AT [place]   1/4/2021  1:30 PM Emiliano Reynoso MD [facility]   2/4/2021  1:30 PM Emiliano Reynoso MD [address] [place] [address]

## 2020-10-12 ENCOUNTER — OFFICE VISIT (OUTPATIENT)
Dept: CARDIOLOGY CLINIC | Age: 60
End: 2020-10-12
Payer: COMMERCIAL

## 2020-10-12 VITALS — OXYGEN SATURATION: 98 % | HEART RATE: 72 BPM | DIASTOLIC BLOOD PRESSURE: 60 MMHG | SYSTOLIC BLOOD PRESSURE: 110 MMHG

## 2020-10-12 DIAGNOSIS — I10 ESSENTIAL HYPERTENSION: ICD-10-CM

## 2020-10-12 LAB
ANION GAP SERPL CALCULATED.3IONS-SCNC: 11 MEQ/L (ref 9–15)
BUN BLDV-MCNC: 16 MG/DL (ref 8–23)
CALCIUM SERPL-MCNC: 10.1 MG/DL (ref 8.5–9.9)
CHLORIDE BLD-SCNC: 99 MEQ/L (ref 95–107)
CO2: 29 MEQ/L (ref 20–31)
CREAT SERPL-MCNC: 0.73 MG/DL (ref 0.7–1.2)
GFR AFRICAN AMERICAN: >60
GFR NON-AFRICAN AMERICAN: >60
GLUCOSE BLD-MCNC: 91 MG/DL (ref 70–99)
HCT VFR BLD CALC: 48.5 % (ref 42–52)
HEMOGLOBIN: 16.1 G/DL (ref 14–18)
MCH RBC QN AUTO: 30 PG (ref 27–31.3)
MCHC RBC AUTO-ENTMCNC: 33.1 % (ref 33–37)
MCV RBC AUTO: 90.5 FL (ref 80–100)
PDW BLD-RTO: 15.1 % (ref 11.5–14.5)
PLATELET # BLD: 352 K/UL (ref 130–400)
POTASSIUM SERPL-SCNC: 4.5 MEQ/L (ref 3.4–4.9)
RBC # BLD: 5.36 M/UL (ref 4.7–6.1)
SODIUM BLD-SCNC: 139 MEQ/L (ref 135–144)
WBC # BLD: 11 K/UL (ref 4.8–10.8)

## 2020-10-12 PROCEDURE — 99215 OFFICE O/P EST HI 40 MIN: CPT | Performed by: INTERNAL MEDICINE

## 2020-10-12 PROCEDURE — G8417 CALC BMI ABV UP PARAM F/U: HCPCS | Performed by: INTERNAL MEDICINE

## 2020-10-12 PROCEDURE — G8427 DOCREV CUR MEDS BY ELIG CLIN: HCPCS | Performed by: INTERNAL MEDICINE

## 2020-10-12 PROCEDURE — 3017F COLORECTAL CA SCREEN DOC REV: CPT | Performed by: INTERNAL MEDICINE

## 2020-10-12 PROCEDURE — 4004F PT TOBACCO SCREEN RCVD TLK: CPT | Performed by: INTERNAL MEDICINE

## 2020-10-12 PROCEDURE — G8484 FLU IMMUNIZE NO ADMIN: HCPCS | Performed by: INTERNAL MEDICINE

## 2020-10-12 RX ORDER — NITROGLYCERIN 0.4 MG/1
0.4 TABLET SUBLINGUAL EVERY 5 MIN PRN
Status: CANCELLED | OUTPATIENT
Start: 2020-10-12

## 2020-10-12 RX ORDER — SODIUM CHLORIDE 0.9 % (FLUSH) 0.9 %
10 SYRINGE (ML) INJECTION PRN
Status: CANCELLED | OUTPATIENT
Start: 2020-10-12

## 2020-10-12 RX ORDER — DIPHENHYDRAMINE HYDROCHLORIDE 50 MG/ML
50 INJECTION INTRAMUSCULAR; INTRAVENOUS ONCE
Status: CANCELLED | OUTPATIENT
Start: 2020-10-12 | End: 2020-10-12

## 2020-10-12 RX ORDER — ONDANSETRON 2 MG/ML
4 INJECTION INTRAMUSCULAR; INTRAVENOUS EVERY 6 HOURS PRN
Status: CANCELLED | OUTPATIENT
Start: 2020-10-12

## 2020-10-12 RX ORDER — HYDROCODONE BITARTRATE AND ACETAMINOPHEN 7.5; 325 MG/1; MG/1
1 TABLET ORAL EVERY 8 HOURS PRN
Qty: 80 TABLET | Refills: 0 | Status: SHIPPED | OUTPATIENT
Start: 2020-10-20 | End: 2020-11-11 | Stop reason: SDUPTHER

## 2020-10-12 RX ORDER — GABAPENTIN 800 MG/1
TABLET ORAL
Qty: 120 TABLET | Refills: 3 | Status: SHIPPED | OUTPATIENT
Start: 2020-10-12 | End: 2021-01-08 | Stop reason: SDUPTHER

## 2020-10-12 RX ORDER — SODIUM CHLORIDE 0.9 % (FLUSH) 0.9 %
10 SYRINGE (ML) INJECTION EVERY 12 HOURS SCHEDULED
Status: CANCELLED | OUTPATIENT
Start: 2020-10-12

## 2020-10-12 RX ORDER — SODIUM CHLORIDE 450 MG/100ML
75 INJECTION, SOLUTION INTRAVENOUS CONTINUOUS
Status: CANCELLED | OUTPATIENT
Start: 2020-10-12

## 2020-10-12 ASSESSMENT — ENCOUNTER SYMPTOMS
BLOOD IN STOOL: 0
BACK PAIN: 1
SHORTNESS OF BREATH: 0
RESPIRATORY NEGATIVE: 1
CHEST TIGHTNESS: 0
STRIDOR: 0
NAUSEA: 0
EYES NEGATIVE: 1
WHEEZING: 0
COUGH: 0
GASTROINTESTINAL NEGATIVE: 1

## 2020-10-12 NOTE — PROGRESS NOTES
NEW PATIENT        Patient: Kassie Snyder  YOB: 1960  MRN: [id number]    Chief Complaint: L carotid stent claudication   Chief Complaint   Patient presents with    2 Week Follow-Up       CV Data:  4/2017 echo ef 65  2019 Left Carotid stent - Dr. Gil Schuler  9/28/2020 B/l LE angio, left MAHAD stenting, left SFA ATH/PTA with DCB/stent. Right LE will be staged    Subjective/HPI pt has significant claudication L>R. He has chronic cough. He is minimally active. Describes no cp nor sob currently. No bleed. Has back pain. 10/12/2020 Left leg feels better since PTA. Right leg still bothers him. No cp no sob. No bleed. He has no cardiac stents. Smoke  No etoh  Lives with brother  Disabled all his life.       EKG: SR    Past Medical History:   Diagnosis Date    Alcoholic polyneuropathy (HCC)     Asthma     CAD (coronary artery disease)     [facility]    Chronic back pain greater than 3 months duration     CN III palsy, right eye 2017    Dr Migue Hernández    COPD (chronic obstructive pulmonary disease) ([facility] 2014    DDD (degenerative disc disease), lumbar 3/23/2015    Elevated liver enzymes 2014    Hyperlipidemia     on med > 10 yrs    Hypertension     on meds x 1 yr    IgA monoclonal gammopathy 2015    Dr Emily Williamson    Insomnia, unspecified     LBP (low back pain)     Dr Shae Yao Neuropathy     Occlusion and stenosis of carotid artery without mention of cerebral infarction     Dr Sergio Thompson history of alcoholism ([facility] [address] [facility]     quit 11/05/2010, relapsed 2016    S/P carotid endarterectomy 01/2019    Dr Gil Schuler    Sensorimotor neuropathy     Bilateral lower extremities-EMG 03/23/15 ([place])    Smoking trying to quit     Traumatic compression fracture of T11 thoracic vertebra ([facility] [address] [facility] 5446    Umbilical hernia        Past Surgical History:   Procedure Laterality Date    CAROTID ENDARTERECTOMY Left 1/18/2019    LEFT CAROTID ENDARTERECTOMY performed by Chris Dubon MD at [address] [place] [address]  8/19/2014  COLONOSCOPY N/A 2019    COLONOSCOPY DIAGNOSTIC performed by Jaylan Burch MD at [facility], COLON, DIAGNOSTIC      HERNIA REPAIR      TONSILLECTOMY      UPPER GASTROINTESTINAL ENDOSCOPY  2014    VENTRAL HERNIA REPAIR  09/01/15    W/MESH AND W/UMBILECTOMY       Family History   Problem Relation Age of Onset    Cancer Mother         brain, dec 79    Alcohol Abuse Father     Other Sister         unknown medical history    Other Brother         unknown medical history    High Blood Pressure Brother     Alcohol Abuse Daughter        Social History     Socioeconomic History    Marital status: Single     Spouse name: Not on file    Number of children: 1    Years of education: Not on file    Highest education level: Not on file   Occupational History    Occupation: SSI for learning disability   Social Needs    Financial resource strain: Not on file    Food insecurity     Worry: Not on file     Inability: Not on file    Transportation needs     Medical: Not on file     Non-medical: Not on file   Tobacco Use    Smoking status: Current Every Day Smoker     Packs/day: 0.50     Years: 40.00     Pack years: 20.00     Types: Cigarettes     Start date:      Last attempt to quit: 3/23/2019     Years since quittin.5    Smokeless tobacco: Never Used    Tobacco comment: patient would like to quit, encouraged to see PCP   Substance and Sexual Activity    Alcohol use: No     Alcohol/week: 0.0 standard drinks     Comment: 2016 Quit Date    Drug use: No    Sexual activity: Not on file   Lifestyle    Physical activity     Days per week: Not on file     Minutes per session: Not on file    Stress: Not on file   Relationships    Social connections     Talks on phone: Not on file     Gets together: Not on file     Attends Religion service: Not on file     Active member of club or organization: Not on file     Attends meetings of clubs or organizations: Not on file     Relationship status: Not on file    Intimate partner violence     Fear of current or ex partner: Not on file     Emotionally abused: Not on file     Physically abused: Not on file     Forced sexual activity: Not on file   Other Topics Concern    Not on file   Social History Narrative    Born in [place], one of 5    Came to [place] at age 1 with parents    Never , one daughter    Never worked, disabled since 12 (mental)    Lives in [place] with brother, in a house        Attends [facility] meetings daily    [address] bike, TV    One daughter, does keep in touch        No Known Allergies    Current Outpatient Medications   Medication Sig Dispense Refill    [START ON 10/20/2020] HYDROcodone-acetaminophen (NORCO) 7.5-325 MG per tablet Take 1 tablet by mouth every 8 hours as needed for Pain (Max 3 per day) for up to 30 days. Intended supply: 30 days 80 tablet 0    gabapentin (NEURONTIN) 800 MG tablet take 1 tablet by mouth four times a day 120 tablet 3    nitroGLYCERIN (NITROSTAT) 0.4 MG SL tablet up to max of 3 total doses. If no relief after 1 dose, call 911. 25 tablet 3    pregabalin (LYRICA) 50 MG capsule Take 1 capsule by mouth 3 times daily for 30 days.  90 capsule 3    atorvastatin (LIPITOR) 40 MG tablet Take 1 tablet by mouth daily 30 tablet 3    clopidogrel (PLAVIX) 75 MG tablet take 1 tablet by mouth once daily      polyethylene glycol (GLYCOLAX) 17 GM/SCOOP powder Take 17 g by mouth daily 510 g 5    docusate sodium (COLACE) 100 MG capsule Take 1 capsule by mouth 2 times daily 30 capsule 3    budesonide-formoterol (SYMBICORT) 160-4.5 MCG/ACT AERO inhale 2 puffs by mouth twice a day 10.2 g 12    metoprolol succinate (TOPROL XL) 25 MG extended release tablet Take 1 tablet by mouth daily 30 tablet 5    valsartan-hydrochlorothiazide (DIOVAN-HCT) 80-12.5 MG per tablet take 1 tablet by mouth once daily 90 tablet 4    Cholecalciferol (VITAMIN D) 50 MCG (2000 UT) CAPS capsule Take 1 capsule by mouth 2 times daily 30 capsule 5    senna-docusate (PERICOLACE) 8.6-50 MG per tablet Take 2 tablets by mouth daily as needed for Constipation 60 tablet 11    aspirin EC 81 MG EC tablet Take 1 tablet by mouth daily 90 tablet 4    RA VITAMIN B-12 TR 1000 MCG TBCR take 1 tablet by mouth once daily 90 tablet 4    VASCEPA 1 g CAPS capsule   0    REFRESH TEARS 0.5 % SOLN ophthalmic solution instill 1 drop into both eyes four times a day  1     No current facility-administered medications for this visit. Review of Systems:   Review of Systems   Constitutional: Negative. Negative for diaphoresis and fatigue. HENT: Negative. Eyes: Negative. Respiratory: Negative. Negative for cough, chest tightness, shortness of breath, wheezing and stridor. Cardiovascular: Negative. Negative for chest pain, palpitations and leg swelling. Gastrointestinal: Negative. Negative for blood in stool and nausea. Genitourinary: Negative. Musculoskeletal: Positive for arthralgias, back pain and gait problem. Skin: Negative. Neurological: Negative for dizziness, syncope, weakness and light-headedness. Hematological: Negative. Psychiatric/Behavioral: Negative. Physical Examination:    /60 (Site: Left Upper Arm, Position: Sitting, Cuff Size: Large Adult)   Pulse 72   SpO2 98%    Physical Exam   Constitutional: He appears healthy. No distress. HENT:   Normal cephalic and Atraumatic   Eyes: Pupils are equal, round, and reactive to light. Neck: Normal range of motion and thyroid normal. Neck supple. No JVD present. No neck adenopathy. No thyromegaly present. Cardiovascular: Normal rate and regular rhythm. Exam reveals decreased pulses. Murmur heard. Pulmonary/Chest: Effort normal and breath sounds normal. He has no wheezes. He has no rales. He exhibits no tenderness. Abdominal: Soft. Bowel sounds are normal. There is no abdominal tenderness. Musculoskeletal: Normal range of motion. General: No tenderness or edema. Neurological: He is alert and oriented to person, place, and time. Skin: Skin is warm. No cyanosis. Nails show no clubbing.        LABS:  CBC:   Lab Results   Component Value Date    WBC 13.7 09/29/2020    RBC 5.47 09/29/2020    HGB 16.0 09/29/2020    HCT 49.0 09/29/2020    MCV 89.6 09/29/2020    MCH 29.3 09/29/2020    MCHC 32.7 09/29/2020    RDW 14.7 09/29/2020     09/29/2020    MPV 8.8 08/25/2015     Lipids:  Lab Results   Component Value Date    CHOL 162 01/19/2019    CHOL 195 08/08/2018    CHOL 199 04/18/2017     Lab Results   Component Value Date    TRIG 139 01/19/2019    TRIG 347 (H) 08/08/2018    TRIG 132 04/18/2017     Lab Results   Component Value Date    HDL 29 (L) 02/26/2020    HDL 39 (L) 01/19/2019    HDL 34 (L) 08/08/2018     Lab Results   Component Value Date    LDLCALC 12 02/26/2020    LDLCALC 95 01/19/2019    LDLCALC 92 08/08/2018     No results found for: LABVLDL, VLDL  No results found for: CHOLHDLRATIO  CMP:    Lab Results   Component Value Date     09/29/2020    K 4.0 09/29/2020     09/29/2020    CO2 22 09/29/2020    BUN 26 09/29/2020    CREATININE 1.05 09/29/2020    GFRAA >60.0 09/29/2020    LABGLOM >60.0 09/29/2020    GLUCOSE 95 09/29/2020    PROT 7.9 02/26/2020    LABALBU 4.1 02/26/2020    CALCIUM 9.7 09/29/2020    BILITOT 0.5 02/26/2020    ALKPHOS 145 02/26/2020    AST 23 02/26/2020    ALT 36 02/26/2020     BMP:    Lab Results   Component Value Date     09/29/2020    K 4.0 09/29/2020     09/29/2020    CO2 22 09/29/2020    BUN 26 09/29/2020    LABALBU 4.1 02/26/2020    CREATININE 1.05 09/29/2020    CALCIUM 9.7 09/29/2020    GFRAA >60.0 09/29/2020    LABGLOM >60.0 09/29/2020    GLUCOSE 95 09/29/2020     Magnesium:  No results found for: MG  TSH:  Lab Results   Component Value Date    TSH 2.170 08/08/2018             Patient Active Problem List   Diagnosis    Alcoholic polyneuropathy ([facility] [address]    Personal history of future. Stop smoking    Counseling:  Heart Healthy Lifestyle, Improve BMI, Stop Smoking, Low Salt Diet, Take Precautions to Prevent Falls and Walk Daily    Return in about 6 weeks (around 11/23/2020).     Electronically signed by Oksana Evans MD on 10/12/2020 at 2:51 PM

## 2020-10-21 ENCOUNTER — HOSPITAL ENCOUNTER (OUTPATIENT)
Dept: CARDIAC CATH/INVASIVE PROCEDURES | Age: 60
Discharge: HOME | End: 2020-10-21
Attending: INTERNAL MEDICINE | Admitting: INTERNAL MEDICINE
Payer: COMMERCIAL

## 2020-10-21 ENCOUNTER — APPOINTMENT (OUTPATIENT)
Dept: ULTRASOUND IMAGING | Age: 60
End: 2020-10-21
Attending: INTERNAL MEDICINE
Payer: COMMERCIAL

## 2020-10-21 VITALS
SYSTOLIC BLOOD PRESSURE: 131 MMHG | RESPIRATION RATE: 18 BRPM | HEART RATE: 87 BPM | OXYGEN SATURATION: 97 % | HEIGHT: 71 IN | DIASTOLIC BLOOD PRESSURE: 55 MMHG | TEMPERATURE: 97.9 F | WEIGHT: 214.95 LBS | BODY MASS INDEX: 30.09 KG/M2

## 2020-10-21 LAB
ABO/RH: NORMAL
ANTIBODY SCREEN: NORMAL
GLUCOSE BLD-MCNC: 145 MG/DL (ref 60–115)
PERFORMED ON: ABNORMAL
PERFORMED ON: NORMAL
POC ACTIVATED CLOTTING TIME KAOLIN: 131 SEC (ref 82–152)
POC ACTIVATED CLOTTING TIME KAOLIN: 191 SEC (ref 82–152)
POC ACTIVATED CLOTTING TIME KAOLIN: 213 SEC (ref 82–152)
POC ACTIVATED CLOTTING TIME KAOLIN: 257 SEC (ref 82–152)
POC ACTIVATED CLOTTING TIME KAOLIN: 324 SEC (ref 82–152)
POC SAMPLE TYPE: ABNORMAL
POC SAMPLE TYPE: NORMAL

## 2020-10-21 PROCEDURE — C2623 CATH, TRANSLUMIN, DRUG-COAT: HCPCS

## 2020-10-21 PROCEDURE — 2580000003 HC RX 258

## 2020-10-21 PROCEDURE — 6370000000 HC RX 637 (ALT 250 FOR IP): Performed by: INTERNAL MEDICINE

## 2020-10-21 PROCEDURE — 75774 ARTERY X-RAY EACH VESSEL: CPT | Performed by: INTERNAL MEDICINE

## 2020-10-21 PROCEDURE — 86850 RBC ANTIBODY SCREEN: CPT

## 2020-10-21 PROCEDURE — 2500000003 HC RX 250 WO HCPCS

## 2020-10-21 PROCEDURE — C1887 CATHETER, GUIDING: HCPCS

## 2020-10-21 PROCEDURE — 85347 COAGULATION TIME ACTIVATED: CPT

## 2020-10-21 PROCEDURE — 86901 BLOOD TYPING SEROLOGIC RH(D): CPT

## 2020-10-21 PROCEDURE — 2709999900 HC NON-CHARGEABLE SUPPLY

## 2020-10-21 PROCEDURE — 75710 ARTERY X-RAYS ARM/LEG: CPT | Performed by: INTERNAL MEDICINE

## 2020-10-21 PROCEDURE — C1876 STENT, NON-COA/NON-COV W/DEL: HCPCS

## 2020-10-21 PROCEDURE — C1724 CATH, TRANS ATHEREC,ROTATION: HCPCS

## 2020-10-21 PROCEDURE — C1894 INTRO/SHEATH, NON-LASER: HCPCS

## 2020-10-21 PROCEDURE — 6360000002 HC RX W HCPCS

## 2020-10-21 PROCEDURE — 37227 PR REVSC OPN/PRQ FEM/POP W/STNT/ATHRC/ANGIOP SM VSL: CPT | Performed by: INTERNAL MEDICINE

## 2020-10-21 PROCEDURE — 6360000004 HC RX CONTRAST MEDICATION

## 2020-10-21 PROCEDURE — 86900 BLOOD TYPING SEROLOGIC ABO: CPT

## 2020-10-21 PROCEDURE — C1725 CATH, TRANSLUMIN NON-LASER: HCPCS

## 2020-10-21 PROCEDURE — C1769 GUIDE WIRE: HCPCS

## 2020-10-21 PROCEDURE — 6360000002 HC RX W HCPCS: Performed by: INTERNAL MEDICINE

## 2020-10-21 PROCEDURE — 37227 HC FEM/POPL REVASC STNT & ATHER: CPT | Performed by: INTERNAL MEDICINE

## 2020-10-21 RX ORDER — DIPHENHYDRAMINE HYDROCHLORIDE 50 MG/ML
50 INJECTION INTRAMUSCULAR; INTRAVENOUS ONCE
Status: DISCONTINUED | OUTPATIENT
Start: 2020-10-21 | End: 2020-10-21

## 2020-10-21 RX ORDER — NITROGLYCERIN 0.4 MG/1
0.4 TABLET SUBLINGUAL EVERY 5 MIN PRN
Status: DISCONTINUED | OUTPATIENT
Start: 2020-10-21 | End: 2020-10-21

## 2020-10-21 RX ORDER — GABAPENTIN 400 MG/1
800 CAPSULE ORAL ONCE
Status: COMPLETED | OUTPATIENT
Start: 2020-10-21 | End: 2020-10-21

## 2020-10-21 RX ORDER — SODIUM CHLORIDE 9 MG/ML
INJECTION, SOLUTION INTRAVENOUS CONTINUOUS
Status: DISCONTINUED | OUTPATIENT
Start: 2020-10-21 | End: 2020-10-21

## 2020-10-21 RX ORDER — PROTAMINE SULFATE 10 MG/ML
10 INJECTION, SOLUTION INTRAVENOUS ONCE
Status: COMPLETED | OUTPATIENT
Start: 2020-10-21 | End: 2020-10-21

## 2020-10-21 RX ORDER — PREGABALIN 50 MG/1
50 CAPSULE ORAL ONCE
Status: COMPLETED | OUTPATIENT
Start: 2020-10-21 | End: 2020-10-21

## 2020-10-21 RX ORDER — LABETALOL HYDROCHLORIDE 5 MG/ML
10 INJECTION, SOLUTION INTRAVENOUS EVERY 30 MIN PRN
Status: DISCONTINUED | OUTPATIENT
Start: 2020-10-21 | End: 2020-10-21

## 2020-10-21 RX ORDER — SODIUM CHLORIDE 450 MG/100ML
75 INJECTION, SOLUTION INTRAVENOUS CONTINUOUS
Status: DISCONTINUED | OUTPATIENT
Start: 2020-10-21 | End: 2020-10-21

## 2020-10-21 RX ORDER — PREGABALIN 50 MG/1
50 CAPSULE ORAL 3 TIMES DAILY
Status: COMPLETED | OUTPATIENT
Start: 2020-10-21 | End: 2020-10-21

## 2020-10-21 RX ORDER — ONDANSETRON 2 MG/ML
4 INJECTION INTRAMUSCULAR; INTRAVENOUS EVERY 6 HOURS PRN
Status: DISCONTINUED | OUTPATIENT
Start: 2020-10-21 | End: 2020-10-21

## 2020-10-21 RX ORDER — IODIXANOL 320 MG/ML
100 INJECTION, SOLUTION INTRAVASCULAR ONCE
Status: DISCONTINUED | OUTPATIENT
Start: 2020-10-21 | End: 2020-10-21

## 2020-10-21 RX ORDER — HYDRALAZINE HYDROCHLORIDE 20 MG/ML
10 INJECTION INTRAMUSCULAR; INTRAVENOUS EVERY 10 MIN PRN
Status: DISCONTINUED | OUTPATIENT
Start: 2020-10-21 | End: 2020-10-21

## 2020-10-21 RX ORDER — ACETAMINOPHEN 325 MG/1
650 TABLET ORAL EVERY 4 HOURS PRN
Status: DISCONTINUED | OUTPATIENT
Start: 2020-10-21 | End: 2020-10-21

## 2020-10-21 RX ORDER — SODIUM CHLORIDE 0.9 % (FLUSH) 0.9 %
10 SYRINGE (ML) INJECTION EVERY 12 HOURS SCHEDULED
Status: DISCONTINUED | OUTPATIENT
Start: 2020-10-21 | End: 2020-10-21 | Stop reason: HOSPADM

## 2020-10-21 RX ORDER — SODIUM CHLORIDE 0.9 % (FLUSH) 0.9 %
10 SYRINGE (ML) INJECTION PRN
Status: DISCONTINUED | OUTPATIENT
Start: 2020-10-21 | End: 2020-10-21

## 2020-10-21 RX ORDER — MORPHINE SULFATE 4 MG/ML
2 INJECTION, SOLUTION INTRAMUSCULAR; INTRAVENOUS
Status: COMPLETED | OUTPATIENT
Start: 2020-10-21 | End: 2020-10-21

## 2020-10-21 RX ADMIN — PREGABALIN 50 MG: 50 CAPSULE ORAL at 18:27

## 2020-10-21 RX ADMIN — MORPHINE SULFATE 2 MG: 4 INJECTION, SOLUTION INTRAMUSCULAR; INTRAVENOUS at 15:42

## 2020-10-21 RX ADMIN — PROTAMINE SULFATE 10 MG: 10 INJECTION, SOLUTION INTRAVENOUS at 13:15

## 2020-10-21 RX ADMIN — GABAPENTIN 800 MG: 400 CAPSULE ORAL at 18:27

## 2020-10-21 RX ADMIN — PREGABALIN 50 MG: 50 CAPSULE ORAL at 10:19

## 2020-10-21 ASSESSMENT — PAIN SCALES - GENERAL: PAINLEVEL_OUTOF10: 4

## 2020-10-21 NOTE — BRIEF OP NOTE
Section of Cardiology  Adult Brief LE angio Procedure Note        Procedure(s):  Right LE angio, right SFA orbital ATH/PTA with DCB and selective stenting in prox and distal segment. Pre-operative Diagnosis:  claudication    H&P Status: Completed and reviewed.      Post-operative Diagnosis:      Right LE: 100% mid to distal SFA, prox 80%        Findings:  See full report    Complications:  none    Primary Proceduralist:   Dr. Chester Jansen    DAPT      Full procedure note to follow

## 2020-10-21 NOTE — PROGRESS NOTES
1305: Patient received post procedure to pre post space 1. Oriented x4. Lightly sedated with eyes open. Answers questions appropritely. 4 Western Fiona in right pedal. 6 Divehi sheath in left femoral artery. During bedside report approximate 8 cm diameter firm hematoma observed at femoral site. Immediate pressure applied to reduce hematoma. Call placed to Dr. Lalo Valle by charge nurse. 1315: Protamine IV given. Manual pressure remains due to expanding hematoma when pressure removed. 1325 4 Bulgarian pedal sheath removed and manual pressure ongoing by ANGEL RN. ACT running. 1330 , 6 Bulgarian femoral sheath removed and manual pressure applied   1400: Manual pressure remove and hemostasis achieved. Hematoma completely reduced. Patient tolerated well. Tegaderm dressing applied.

## 2020-10-21 NOTE — PROGRESS NOTES
Groin remains stable, no bleeding or hematoma from L fem or Right pedal .Report called to [address] [facility] on [facility]. Patient back discomfort gone post morphine.

## 2020-10-22 ENCOUNTER — TELEPHONE (OUTPATIENT)
Dept: CARDIOLOGY CLINIC | Age: 60
End: 2020-10-22

## 2020-10-22 ENCOUNTER — TELEPHONE (OUTPATIENT)
Dept: INTERNAL MEDICINE CLINIC | Age: 60
End: 2020-10-22

## 2020-10-22 NOTE — TELEPHONE ENCOUNTER
Encourage patient to address question about Bengay to Dr. Ramone Self whom he will be seeing before me. In general, Felicia Sos may be used up to 4 times a day on sore joints.

## 2020-10-22 NOTE — TELEPHONE ENCOUNTER
[facility] Initial Follow Up Call    Outreach made within 2 business days of discharge: Yes    Patient: Mckayla Moreira Patient : 1960   MRN: [id number]  Reason for Admission: There are no discharge diagnoses documented for the most recent discharge. Discharge Date: 10/21/20       Spoke with: Patient    Discharge department/facility: [address] [facility] Patient Contact:  Was patient able to fill all prescriptions: Yes  Was patient instructed to bring all medications to the follow-up visit: Yes  Is patient taking all medications as directed in the discharge summary? Yes  Does patient understand their discharge instructions: Yes  Does patient have questions or concerns that need addressed prior to 7-14 day follow up office visit: yes - PATIENT REQUESTS [facility] PCP OFFICE RE: USE OF BENGAY POSSIBLY TO APPLY TO RT KNEE, WT BEARING PAIN WITH MOBILITY INTACT. WILL ROUTE TO PCP AT THIS TIME. PLEASE CALL: [phone]. Scheduled appointment with PCP within 7-14 days - [address] AND WILL SPEAK WITH PCP AS SCHEDULED.     Follow Up  Future Appointments   Date Time Provider Loan Darling   2020  1:30 PM Jaspal Kuhn DO [address]   2020  2:15 PM Nikolas Acharya MD [address] [place] [address]   2021  1:30 PM Radha Jack MD [facility]   2021  1:15 PM Jaspal Kuhn DO [address]   2021  1:30 PM Radha Jack MD [address]

## 2020-10-22 NOTE — TELEPHONE ENCOUNTER
Mr. Kade Nuñez called and he is post peripheral intervention with Dr. Mariam Llanes yesterday. He is asking is he cleared to proceed with treatment from [facility] strong? He has a follow up with you in about 3 weeks. Do you want us to have him wait on that decision until you evaluate him at that follow up.

## 2020-10-22 NOTE — FLOWSHEET NOTE
1918- Pt discharged. Completed paperwork, reviewed and pt signed. Removed IV. Transported pt via wheelchair to front lobby for brother to .

## 2020-10-23 ENCOUNTER — TELEPHONE (OUTPATIENT)
Dept: INTERNAL MEDICINE CLINIC | Age: 60
End: 2020-10-23

## 2020-10-23 NOTE — TELEPHONE ENCOUNTER
Per PCP request, pt called back, LMOM encouraging pt to reach out to Dr. Karol Leonard re topical ointment question of yesterday.

## 2020-10-26 NOTE — PROCEDURES
[address]                      [address], [address] [facility]                                 PROCEDURE NOTE    PATIENT NAME: David Pack                     :        1960  MED REC NO:   [id number]                            ROOM:       L636  ACCOUNT NO:   [de-identified]                           ADMIT DATE: 10/21/2020  PROVIDER:     Lsea Sandhu DO    DATE OF PROCEDURE:  10/21/2020    PROCEDURES PERFORMED:  Right lower extremity angiography, right SFA  orbital atherectomy followed by PTA with drug-coated balloon as well as  Supera stent placement. INDICATIONS:  Claudication, abnormal noninvasive testing. PROCEDURE PERFORMED BY:  Jim Sandhu DO    COMPLICATIONS:  None. ACCESS SITE:  Left common femoral artery. DESCRIPTION OF PROCEDURE:  The patient was brought to the cardiac cath  lab suite, where he was sterilely prepped and draped in usual fashion. 1% lidocaine was used to anesthetize the left inguinal area and a  6-Tajik arterial sheath was placed without any difficulty. Next, a  5-Tajik CEDRIC was engaged in the right common iliac artery. Right lower  extremity angiogram with runoff was performed. Next, over an 0.035  wire, a 6-Tajik 55 cm sheath was placed over the wire into the right  common femoral artery. Next, utilizing an 0.014 support catheter and  0.014 wires, the right distal SFA _____  was able to be traversed and  0.014 wire was placed on the distal tibial vessel and exchanged for an  0.014 Viper wire and CSI orbital atherectomy was performed across the  entire right SFA with a 1.5 crown jada at low and medium speeds followed  by PTA with a 5.0 x 250 Pacific Xtreme balloon to nominal pressure  followed by IN. PACT Admiral drug-coated balloon at 5.0 x 250 mm for 3  minutes.   Next, Supera stent placement was performed across the distal  right SFA as well as proximal segment with a 5.0 x 120 distally and 5.0  x 60 in the proximal segment. Complete angiography showed excellent  results. The balloons and wires were removed from the patient. The  sheath was pulled back to the left common femoral artery and angiogram  of the left common femoral artery was performed and a sheath was left in  place and the patient was transferred to the postopcath holding area in  stable and satisfactory condition. FINDINGS:  Right lower extremity. The right common iliac artery is  heavily calcified _____. The right internal iliac artery, external  iliac artery, common femoral artery, and profunda are patent. The right  SFA has severe diffuse disease of 80% to 90% in the proximal mid segment  as well as 100% stenosis by Sumeet's canal and reconstitution at the P1  segment of the popliteal artery. The right popliteal artery, anterior  tibial artery, TP trunk, peroneal, and posterior tibial artery are all  patent with mild diffuse disease. ASSESSMENT:  Status post successful right SFA orbital atherectomy  followed by PTA with drug-coated balloon as well as stenting in the  distal and proximal segment with Supera stents with 0% residual  stenosis. PLAN:  1. Postprocedure care as usual.  2.  As always, aggressive risk factor modification. 3.  Maximize medical therapy.         Dara Echevarria DO    D: 10/26/2020 11:15:37       T: 10/26/2020 12:01:54     [email or web address]  Job#: [id number]     Doc#: [id number]    CC:

## 2020-11-02 ENCOUNTER — HOSPITAL ENCOUNTER (EMERGENCY)
Age: 60
Discharge: HOME | End: 2020-11-02
Attending: EMERGENCY MEDICINE
Payer: COMMERCIAL

## 2020-11-02 ENCOUNTER — APPOINTMENT (OUTPATIENT)
Dept: GENERAL RADIOLOGY | Age: 60
End: 2020-11-02
Payer: COMMERCIAL

## 2020-11-02 VITALS
DIASTOLIC BLOOD PRESSURE: 94 MMHG | WEIGHT: 215 LBS | RESPIRATION RATE: 18 BRPM | TEMPERATURE: 98 F | SYSTOLIC BLOOD PRESSURE: 145 MMHG | OXYGEN SATURATION: 95 % | BODY MASS INDEX: 30.1 KG/M2 | HEIGHT: 71 IN | HEART RATE: 70 BPM

## 2020-11-02 PROCEDURE — 99283 EMERGENCY DEPT VISIT LOW MDM: CPT

## 2020-11-02 PROCEDURE — 2500000003 HC RX 250 WO HCPCS: Performed by: EMERGENCY MEDICINE

## 2020-11-02 PROCEDURE — 71045 X-RAY EXAM CHEST 1 VIEW: CPT

## 2020-11-02 PROCEDURE — 96374 THER/PROPH/DIAG INJ IV PUSH: CPT

## 2020-11-02 RX ADMIN — GLUCAGON HYDROCHLORIDE 1 MG: 1 INJECTION, POWDER, FOR SOLUTION INTRAMUSCULAR; INTRAVENOUS; SUBCUTANEOUS at 20:29

## 2020-11-02 ASSESSMENT — ENCOUNTER SYMPTOMS
COLOR CHANGE: 0
FACIAL SWELLING: 0
RHINORRHEA: 0
VOMITING: 0
ABDOMINAL PAIN: 0
SHORTNESS OF BREATH: 0
EYE DISCHARGE: 0

## 2020-11-02 ASSESSMENT — PAIN DESCRIPTION - FREQUENCY: FREQUENCY: CONTINUOUS

## 2020-11-02 ASSESSMENT — PAIN SCALES - GENERAL: PAINLEVEL_OUTOF10: 2

## 2020-11-02 ASSESSMENT — PAIN DESCRIPTION - PAIN TYPE: TYPE: ACUTE PAIN

## 2020-11-02 ASSESSMENT — PAIN DESCRIPTION - LOCATION: LOCATION: CHEST

## 2020-11-02 ASSESSMENT — PAIN DESCRIPTION - ORIENTATION: ORIENTATION: MID

## 2020-11-02 NOTE — ED NOTES
Pt given water and was able to hold it down speaking clearer pt reports that he is having pain midline trach      Theresa Molina RN  11/02/20 [id number]

## 2020-11-02 NOTE — ED PROVIDER NOTES
[facility] ED  eMERGENCY dEPARTMENT eNCOUnter      Pt Name: Micky Andrea  MRN: [id number]  [place] 1960  Date of evaluation: 11/2/2020  Provider: Cayden Link, [address] [place] [address]       Chief Complaint   Patient presents with    Other     food stuck in throat         HISTORY OF PRESENT ILLNESS   (Location/Symptom, Timing/Onset,Context/Setting, Quality, Duration, Modifying Factors, Severity)  Note limiting factors. Micky Andrea is a 61 y.o. male who presents to the emergency department with a chief complaint of feeling like food is stuck in his throat. Patient was eating about an hour ago including pork chops and felt like something stuck in his throat. He points to his central chest.  He states he is having to keep spitting up his spit. HPI    NursingNotes were reviewed. REVIEW OF SYSTEMS    (2-9 systems for level 4, 10 or more for level 5)     Review of Systems   Constitutional: Negative for activity change, appetite change and fatigue. HENT: Negative for congestion, facial swelling and rhinorrhea. Eyes: Negative for discharge. Respiratory: Negative for shortness of breath. Cardiovascular: Positive for chest pain. Gastrointestinal: Negative for abdominal pain and vomiting. Endocrine: Negative for cold intolerance. Musculoskeletal: Negative for arthralgias and myalgias. Skin: Negative for color change. Allergic/Immunologic: Negative for environmental allergies. Neurological: Negative for dizziness and light-headedness. Hematological: Negative for adenopathy. Psychiatric/Behavioral: Negative for behavioral problems. Except as noted above the remainder of the review of systems was reviewed and negative.        PAST MEDICAL HISTORY     Past Medical History:   Diagnosis Date    Alcoholic polyneuropathy ([address]     Asthma     CAD (coronary artery disease)     [facility] [place]    Chronic back pain greater than 3 months duration     CN III palsy, right eye 2017 Dr Alfonzo Andrews COPD (chronic obstructive pulmonary disease) ([facility] 2014    DDD (degenerative disc disease), lumbar 3/23/2015    Elevated liver enzymes 2014    Hyperlipidemia     on med > 10 yrs    Hypertension     on meds x 1 yr    IgA monoclonal gammopathy 2015    Dr Bong Puente    Insomnia, unspecified     LBP (low back pain)     Dr Cande Covington Neuropathy     Occlusion and stenosis of carotid artery without mention of cerebral infarction     Dr Rita Fajardo history of alcoholism ([facility]     quit 11/05/2010, relapsed 2016    S/P carotid endarterectomy 01/2019    Dr Lawson Larose    Sensorimotor neuropathy     Bilateral lower extremities-EMG 03/23/15 ([facility])    Smoking trying to quit     Traumatic compression fracture of T11 thoracic vertebra ([facility] 8610    Umbilical hernia          SURGICALHISTORY       Past Surgical History:   Procedure Laterality Date    CAROTID ENDARTERECTOMY Left 1/18/2019    LEFT CAROTID ENDARTERECTOMY performed by Tana Rodriguez MD at [facility]  8/19/2014    COLONOSCOPY N/A 12/18/2019    COLONOSCOPY DIAGNOSTIC performed by Jan Iniguez MD at [facility], COLON, DIAGNOSTIC      HERNIA REPAIR      TONSILLECTOMY      UPPER GASTROINTESTINAL ENDOSCOPY  8/19/2014    VENTRAL HERNIA REPAIR  09/01/15    W/MESH AND W/UMBILECTOMY         CURRENT MEDICATIONS       Previous Medications    ASPIRIN EC 81 MG EC TABLET    Take 1 tablet by mouth daily    ATORVASTATIN (LIPITOR) 40 MG TABLET    Take 1 tablet by mouth daily    BUDESONIDE-FORMOTEROL (SYMBICORT) 160-4.5 MCG/ACT AERO    inhale 2 puffs by mouth twice a day    CHOLECALCIFEROL (VITAMIN D) 50 MCG (2000 UT) CAPS CAPSULE    Take 1 capsule by mouth 2 times daily    CLOPIDOGREL (PLAVIX) 75 MG TABLET    take 1 tablet by mouth once daily    DOCUSATE SODIUM (COLACE) 100 MG CAPSULE    Take 1 capsule by mouth 2 times daily    GABAPENTIN (NEURONTIN) 800 MG TABLET    take 1 tablet by mouth four times a day HYDROCODONE-ACETAMINOPHEN (NORCO) 7.5-325 MG PER TABLET    Take 1 tablet by mouth every 8 hours as needed for Pain (Max 3 per day) for up to 30 days. Intended supply: 30 days    METOPROLOL SUCCINATE (TOPROL XL) 25 MG EXTENDED RELEASE TABLET    Take 1 tablet by mouth daily    NITROGLYCERIN (NITROSTAT) 0.4 MG SL TABLET    up to max of 3 total doses. If no relief after 1 dose, call 911. POLYETHYLENE GLYCOL (GLYCOLAX) 17 GM/SCOOP POWDER    Take 17 g by mouth daily    PREGABALIN (LYRICA) 50 MG CAPSULE    Take 1 capsule by mouth 3 times daily for 30 days. RA VITAMIN B-12 TR 1000 MCG TBCR    take 1 tablet by mouth once daily    REFRESH TEARS 0.5 % SOLN OPHTHALMIC SOLUTION    instill 1 drop into both eyes four times a day    SENNA-DOCUSATE (PERICOLACE) 8.6-50 MG PER TABLET    Take 2 tablets by mouth daily as needed for Constipation    VALSARTAN-HYDROCHLOROTHIAZIDE (DIOVAN-HCT) 80-12.5 MG PER TABLET    take 1 tablet by mouth once daily    VASCEPA 1 G CAPS CAPSULE           ALLERGIES     Patient has no known allergies.     FAMILY HISTORY       Family History   Problem Relation Age of Onset    Cancer Mother         brain, [de-identified] 79   Floydene Ada Alcohol Abuse Father     Other Sister         unknown medical history    Other Brother         unknown medical history    High Blood Pressure Brother     Alcohol Abuse Daughter           SOCIAL HISTORY       Social History     Socioeconomic History    Marital status: Single     Spouse name: None    Number of children: 1    Years of education: None    Highest education level: None   Occupational History    Occupation: SSI for learning disability   Social Needs    Financial resource strain: None    Food insecurity     Worry: None     Inability: None    Transportation needs     Medical: None     Non-medical: None   Tobacco Use    Smoking status: Current Every Day Smoker     Packs/day: 0.50     Years: 40.00     Pack years: 20.00     Types: Cigarettes     Start date: 1968     Last attempt to quit: 3/23/2019     Years since quittin.6    Smokeless tobacco: Never Used    Tobacco comment: patient would like to quit, encouraged to see PCP   Substance and Sexual Activity    Alcohol use: No     Alcohol/week: 0.0 standard drinks     Comment: 2016 Quit Date    Drug use: No    Sexual activity: None   Lifestyle    Physical activity     Days per week: None     Minutes per session: None    Stress: None   Relationships    Social connections     Talks on phone: None     Gets together: None     Attends Pentecostal service: None     Active member of club or organization: None     Attends meetings of clubs or organizations: None     Relationship status: None    Intimate partner violence     Fear of current or ex partner: None     Emotionally abused: None     Physically abused: None     Forced sexual activity: None   Other Topics Concern    None   Social History Narrative    Born in [place], one of 5    Came to [place] at age 1 with parents    Never , one daughter    Never worked, disabled since 12 (mental)    Lives in [facility] [place] with brother, in a house        Attends Santosh Martinez daily    [employer] riding bike, TV    One daughter, does keep in touch        [facility]    (up to 7 for level 4, 8 or more for level 5)     ED Triage Vitals [20 1737]   BP Temp Temp Source Pulse Resp SpO2 Height Weight   (!) 209/62 98 °F (36.7 °C) Oral 102 16 97 % 5' 11\" (1.803 m) 215 lb (97.5 kg)       Physical Exam  Constitutional:       General: He is in acute distress. Appearance: He is well-developed. He is not ill-appearing or toxic-appearing. Comments: Patient is leaning forward to find comfort and intermittently spitting into a bucket    HENT:      Head: Normocephalic and atraumatic. Eyes:      Conjunctiva/sclera: Conjunctivae normal.      Pupils: Pupils are equal, round, and reactive to light.    Neck:      Musculoskeletal: Normal range of motion improves. He wants to be discharged and is tolerating water pretty well. He drinks in front of me and is able to hold it down for five minutes at time of discharge. He knows to return if symtpoms worsen and he can't keep liquids or food down when he tries to eat again. MDM    CRITICAL CARE TIME   Total Critical Care time was 0 minutes, excluding separately reportableprocedures. There was a high probability of clinicallysignificant/life threatening deterioration in the patient's condition which required my urgent intervention. CONSULTS:  None    PROCEDURES:  Unless otherwise noted below, none     Procedures    FINAL IMPRESSION      1.  Foreign body in esophagus, initial encounter          DISPOSITION/PLAN   DISPOSITION Decision To Discharge 11/02/2020 09:01:37 PM      PATIENT REFERRED TO:  [facility]) ED  [address] [address]  [phone]    If symptoms worsen      DISCHARGE MEDICATIONS:  New Prescriptions    No medications on file          (Please note that portions of this note were completed with a voice recognition program.  Efforts were made to edit the dictations but occasionally words are mis-transcribed.)    Kavon Gaspar DO (electronically signed)  Attending Emergency Physician          Kavon Gaspar DO  11/02/20 [facility]  11/02/20 3053

## 2020-11-02 NOTE — ED NOTES
In in room able to speaking rr even non labored clear fleam noted pt is spitting up alert x4 Dr Squires Cece at bedside pt feels as if there is something stuck in his throat      Adan Oshea, ESTHER  11/02/20 [address] [facility] [facility] [address], RN  11/02/20 [facility], RN  11/02/20 [id number]

## 2020-11-02 NOTE — ED TRIAGE NOTES
Pt here with complaints of food stuck in his throat. He had pork chops, mashed potatoes, and green beans for dinner around 1600. Pt states there was no bone in the pork chops. Pt reports 2/10 pain in his mid chest/ throat. Pt denies difficulty swallowing or breathing.

## 2020-11-03 PROBLEM — I73.9 PAD (PERIPHERAL ARTERY DISEASE) (HCC): Status: RESOLVED | Noted: 2020-03-04 | Resolved: 2020-11-03

## 2020-11-03 NOTE — ED NOTES
DR. Toshia Ricks AT BEDSIDE TO UPDATE PT, PT STABLE, 0 C/O, 0 DISTRESS, 0 N&V, 0 PROBLEMS, PER DR. VIVEROS TO D/C PT HOME.      Barbie Guardado RN  11/02/20 [id number]

## 2020-11-03 NOTE — ED NOTES
PT NOTED SPITTING UP LARGE AMOUNT OF SPUTUM OUT, CLEAR IN COLOR, SEEMS LIKE PT CAN;T KEEP WATER DOWN, PT A&OX4,S KIN W/D/PINK, PULSES PALP, MSP'S INTACT, 0 DISTRESS, 0 SOB, CALM, COOPERATIVE, PT RESTING IN BED DR. SANTIAGO NOTIFIED, PENDING DISCHARGE. RASPY  VOICE NOTED, AIRWAY PATENT.      Funmilayo Thompson RN  11/02/20 2021       Funmilayo Thompson RN  11/02/20 2022

## 2020-11-04 ENCOUNTER — PROCEDURE VISIT (OUTPATIENT)
Dept: PHYSICAL MEDICINE AND REHAB | Age: 60
End: 2020-11-04
Payer: COMMERCIAL

## 2020-11-04 PROCEDURE — 20552 NJX 1/MLT TRIGGER POINT 1/2: CPT | Performed by: PHYSICAL MEDICINE & REHABILITATION

## 2020-11-04 RX ORDER — LIDOCAINE HYDROCHLORIDE 10 MG/ML
16 INJECTION, SOLUTION INFILTRATION; PERINEURAL ONCE
Status: COMPLETED | OUTPATIENT
Start: 2020-11-04 | End: 2020-11-04

## 2020-11-04 RX ADMIN — LIDOCAINE HYDROCHLORIDE 16 ML: 10 INJECTION, SOLUTION INFILTRATION; PERINEURAL at 16:03

## 2020-11-06 RX ORDER — MULTIVIT-MIN/IRON/FOLIC ACID/K 18-600-40
1 CAPSULE ORAL 2 TIMES DAILY
Qty: 30 CAPSULE | Refills: 5 | Status: SHIPPED | OUTPATIENT
Start: 2020-11-06 | End: 2021-01-08 | Stop reason: SDUPTHER

## 2020-11-11 RX ORDER — HYDROCODONE BITARTRATE AND ACETAMINOPHEN 7.5; 325 MG/1; MG/1
1 TABLET ORAL EVERY 8 HOURS PRN
Qty: 80 TABLET | Refills: 0 | Status: SHIPPED | OUTPATIENT
Start: 2020-11-18 | End: 2020-12-11 | Stop reason: SDUPTHER

## 2020-11-16 RX ORDER — DOCUSATE SODIUM 100 MG/1
CAPSULE, LIQUID FILLED ORAL
Qty: 30 CAPSULE | Refills: 3 | Status: SHIPPED | OUTPATIENT
Start: 2020-11-16 | End: 2021-01-08

## 2020-11-23 ENCOUNTER — OFFICE VISIT (OUTPATIENT)
Dept: CARDIOLOGY CLINIC | Age: 60
End: 2020-11-23
Payer: COMMERCIAL

## 2020-11-23 VITALS
HEART RATE: 74 BPM | WEIGHT: 220 LBS | SYSTOLIC BLOOD PRESSURE: 112 MMHG | DIASTOLIC BLOOD PRESSURE: 70 MMHG | HEIGHT: 71 IN | BODY MASS INDEX: 30.8 KG/M2 | RESPIRATION RATE: 18 BRPM | OXYGEN SATURATION: 98 %

## 2020-11-23 PROCEDURE — 4004F PT TOBACCO SCREEN RCVD TLK: CPT | Performed by: INTERNAL MEDICINE

## 2020-11-23 PROCEDURE — G8427 DOCREV CUR MEDS BY ELIG CLIN: HCPCS | Performed by: INTERNAL MEDICINE

## 2020-11-23 PROCEDURE — 3017F COLORECTAL CA SCREEN DOC REV: CPT | Performed by: INTERNAL MEDICINE

## 2020-11-23 PROCEDURE — 99214 OFFICE O/P EST MOD 30 MIN: CPT | Performed by: INTERNAL MEDICINE

## 2020-11-23 PROCEDURE — G8484 FLU IMMUNIZE NO ADMIN: HCPCS | Performed by: INTERNAL MEDICINE

## 2020-11-23 PROCEDURE — G8417 CALC BMI ABV UP PARAM F/U: HCPCS | Performed by: INTERNAL MEDICINE

## 2020-11-23 ASSESSMENT — ENCOUNTER SYMPTOMS
EYES NEGATIVE: 1
STRIDOR: 0
CHEST TIGHTNESS: 0
BLOOD IN STOOL: 0
NAUSEA: 0
RESPIRATORY NEGATIVE: 1
SHORTNESS OF BREATH: 0
COUGH: 0
WHEEZING: 0
BACK PAIN: 1
GASTROINTESTINAL NEGATIVE: 1

## 2020-11-23 NOTE — PROGRESS NOTES
NEW PATIENT        Patient: Shekhar Hannah  YOB: 1960  MRN: [id number]    Chief Complaint: L carotid stent claudication   Chief Complaint   Patient presents with    Follow Up After Procedure     R peripheral angio    Claudication     PAD       CV Data:  4/2017 echo ef 65  2019 Left Carotid stent - Dr. Richard Ugalde  9/28/2020 B/l LE angio, left MAHAD stenting, left SFA ATH/PTA with DCB/stent. Right LE will be staged  10/21/2020 RLE:  RSFA Stent    Subjective/HPI pt has significant claudication L>R. He has chronic cough. He is minimally active. Describes no cp nor sob currently. No bleed. Has back pain. 10/12/2020 Left leg feels better since PTA. Right leg still bothers him. No cp no sob. No bleed. 11/23/2020 no cp no sob no falls no bleed. Has not smoked in 1 week. Legs feel better     He has no cardiac stents. Smoke  No etoh  Lives with brother  Disabled all his life.       EKG: SR    Past Medical History:   Diagnosis Date    Alcoholic polyneuropathy (HCC)     Asthma     CAD (coronary artery disease)     [facility]    Chronic back pain greater than 3 months duration     CN III palsy, right eye 2017    Dr Rafael Zayas    COPD (chronic obstructive pulmonary disease) ([facility] 2014    DDD (degenerative disc disease), lumbar 3/23/2015    Elevated liver enzymes 2014    Hyperlipidemia     on med > 10 yrs    Hypertension     on meds x 1 yr    IgA monoclonal gammopathy 2015    Dr Jg Ortiz    Insomnia, unspecified     LBP (low back pain)     Dr Da Silva Stands Neuropathy     Occlusion and stenosis of carotid artery without mention of cerebral infarction     Dr Sindhu Inman history of alcoholism ([facility]     quit 11/05/2010, relapsed 2016    S/P carotid endarterectomy 01/2019    Dr Richard Ugalde    Sensorimotor neuropathy     Bilateral lower extremities-EMG 03/23/15 ([place])    Smoking trying to quit     Traumatic compression fracture of T11 thoracic vertebra ([facility] 0133    Umbilical hernia        Past Surgical History:   Procedure Laterality Date    CAROTID ENDARTERECTOMY Left 2019    LEFT CAROTID ENDARTERECTOMY performed by Javed Epps MD at [facility]  2014    COLONOSCOPY N/A 2019    COLONOSCOPY DIAGNOSTIC performed by Tramaine Escobar MD at [facility], COLON, DIAGNOSTIC      HERNIA REPAIR      TONSILLECTOMY      UPPER GASTROINTESTINAL ENDOSCOPY  2014    VENTRAL HERNIA REPAIR  09/01/15    W/MESH AND W/UMBILECTOMY       Family History   Problem Relation Age of Onset    Cancer Mother         brain, dec 79    Alcohol Abuse Father     Other Sister         unknown medical history    Other Brother         unknown medical history    High Blood Pressure Brother     Alcohol Abuse Daughter        Social History     Socioeconomic History    Marital status: Single     Spouse name: None    Number of children: 1    Years of education: None    Highest education level: None   Occupational History    Occupation: SSI for learning disability   Social Needs    Financial resource strain: None    Food insecurity     Worry: None     Inability: None    Transportation needs     Medical: None     Non-medical: None   Tobacco Use    Smoking status: Current Every Day Smoker     Packs/day: 0.50     Years: 40.00     Pack years: 20.00     Types: Cigarettes     Start date:      Last attempt to quit: 3/23/2019     Years since quittin.6    Smokeless tobacco: Never Used    Tobacco comment: patient would like to quit, encouraged to see PCP   Substance and Sexual Activity    Alcohol use: No     Alcohol/week: 0.0 standard drinks     Comment: 2016 Quit Date    Drug use: No    Sexual activity: None   Lifestyle    Physical activity     Days per week: None     Minutes per session: None    Stress: None   Relationships    Social connections     Talks on phone: None     Gets together: None     Attends Judaism service: None     Active member of club or organization: None     Attends meetings of clubs or organizations: None     Relationship status: None    Intimate partner violence     Fear of current or ex partner: None     Emotionally abused: None     Physically abused: None     Forced sexual activity: None   Other Topics Concern    None   Social History Narrative    Born in [place], one of 5    Came to [place] at age 1 with parents    Never , one daughter    Never worked, disabled since 12 (mental)    Lives in [facility] with brother, in a house        Attends Santosh Martinez daily    Hobbies riding bike, TV    One daughter, does keep in touch        No Known Allergies    Current Outpatient Medications   Medication Sig Dispense Refill     MG capsule take 1 capsule by mouth twice a day 30 capsule 3    HYDROcodone-acetaminophen (NORCO) 7.5-325 MG per tablet Take 1 tablet by mouth every 8 hours as needed for Pain (Max 3 per day) for up to 30 days. Intended supply: 30 days 80 tablet 0    Cholecalciferol (VITAMIN D) 50 MCG (2000 UT) CAPS capsule Take 1 capsule by mouth 2 times daily 30 capsule 5    nitroGLYCERIN (NITROSTAT) 0.4 MG SL tablet up to max of 3 total doses.  If no relief after 1 dose, call 911. 25 tablet 3    atorvastatin (LIPITOR) 40 MG tablet Take 1 tablet by mouth daily 30 tablet 3    clopidogrel (PLAVIX) 75 MG tablet take 1 tablet by mouth once daily      polyethylene glycol (GLYCOLAX) 17 GM/SCOOP powder Take 17 g by mouth daily 510 g 5    budesonide-formoterol (SYMBICORT) 160-4.5 MCG/ACT AERO inhale 2 puffs by mouth twice a day 10.2 g 12    metoprolol succinate (TOPROL XL) 25 MG extended release tablet Take 1 tablet by mouth daily 30 tablet 5    valsartan-hydrochlorothiazide (DIOVAN-HCT) 80-12.5 MG per tablet take 1 tablet by mouth once daily 90 tablet 4    senna-docusate (PERICOLACE) 8.6-50 MG per tablet Take 2 tablets by mouth daily as needed for Constipation 60 tablet 11    aspirin EC 81 MG EC tablet Take 1 tablet by mouth daily 90 tablet 4    RA VITAMIN B-12 TR 1000 MCG TBCR take 1 tablet by mouth once daily 90 tablet 4    VASCEPA 1 g CAPS capsule   0    REFRESH TEARS 0.5 % SOLN ophthalmic solution instill 1 drop into both eyes four times a day  1    gabapentin (NEURONTIN) 800 MG tablet take 1 tablet by mouth four times a day 120 tablet 3    pregabalin (LYRICA) 50 MG capsule Take 1 capsule by mouth 3 times daily for 30 days. 90 capsule 3     No current facility-administered medications for this visit. Review of Systems:   Review of Systems   Constitutional: Negative. Negative for diaphoresis and fatigue. HENT: Negative. Eyes: Negative. Respiratory: Negative. Negative for cough, chest tightness, shortness of breath, wheezing and stridor. Cardiovascular: Negative. Negative for chest pain, palpitations and leg swelling. Gastrointestinal: Negative. Negative for blood in stool and nausea. Genitourinary: Negative. Musculoskeletal: Positive for arthralgias, back pain and gait problem. Skin: Negative. Neurological: Negative for dizziness, syncope, weakness and light-headedness. Hematological: Negative. Psychiatric/Behavioral: Negative. Physical Examination:    /70 (Site: Right Upper Arm, Position: Sitting, Cuff Size: Large Adult)   Pulse 74   Resp 18   Ht 5' 11\" (1.803 m)   Wt 220 lb (99.8 kg)   SpO2 98%   BMI 30.68 kg/m²    Physical Exam   Constitutional: He appears healthy. No distress. HENT:   Normal cephalic and Atraumatic   Eyes: Pupils are equal, round, and reactive to light. Neck: Normal range of motion and thyroid normal. Neck supple. No JVD present. No neck adenopathy. No thyromegaly present. Cardiovascular: Normal rate and regular rhythm. Exam reveals decreased pulses. Murmur heard. Pulmonary/Chest: Effort normal and breath sounds normal. He has no wheezes. He has no rales. He exhibits no tenderness. Abdominal: Soft.  Bowel sounds are normal. There is no abdominal tenderness. Musculoskeletal: Normal range of motion. General: No tenderness or edema. Neurological: He is alert and oriented to person, place, and time. Skin: Skin is warm. No cyanosis. Nails show no clubbing.        LABS:  CBC:   Lab Results   Component Value Date    WBC 11.0 10/12/2020    RBC 5.36 10/12/2020    HGB 16.1 10/12/2020    HCT 48.5 10/12/2020    MCV 90.5 10/12/2020    MCH 30.0 10/12/2020    MCHC 33.1 10/12/2020    RDW 15.1 10/12/2020     10/12/2020    MPV 8.8 08/25/2015     Lipids:  Lab Results   Component Value Date    CHOL 162 01/19/2019    CHOL 195 08/08/2018    CHOL 199 04/18/2017     Lab Results   Component Value Date    TRIG 139 01/19/2019    TRIG 347 (H) 08/08/2018    TRIG 132 04/18/2017     Lab Results   Component Value Date    HDL 29 (L) 02/26/2020    HDL 39 (L) 01/19/2019    HDL 34 (L) 08/08/2018     Lab Results   Component Value Date    LDLCALC 12 02/26/2020    [address] 01/19/2019    LDLCALC 92 08/08/2018     No results found for: LABVLDL, VLDL  No results found for: CHOLHDLRATIO  CMP:    Lab Results   Component Value Date     10/12/2020    K 4.5 10/12/2020    CL 99 10/12/2020    CO2 29 10/12/2020    BUN 16 10/12/2020    CREATININE 0.73 10/12/2020    GFRAA >60.0 10/12/2020    LABGLOM >60.0 10/12/2020    GLUCOSE 91 10/12/2020    PROT 7.9 02/26/2020    LABALBU 4.1 02/26/2020    CALCIUM 10.1 10/12/2020    BILITOT 0.5 02/26/2020    ALKPHOS 145 02/26/2020    AST 23 02/26/2020    ALT 36 02/26/2020     BMP:    Lab Results   Component Value Date     10/12/2020    K 4.5 10/12/2020    CL 99 10/12/2020    CO2 29 10/12/2020    BUN 16 10/12/2020    LABALBU 4.1 02/26/2020    CREATININE 0.73 10/12/2020    CALCIUM 10.1 10/12/2020    GFRAA >60.0 10/12/2020    LABGLOM >60.0 10/12/2020    GLUCOSE 91 10/12/2020     Magnesium:  No results found for: MG  TSH:  Lab Results   Component Value Date    TSH 2.170 08/08/2018             Patient Active Problem List   Diagnosis    [facility] ([facility]    Personal history of alcoholism ([facility]    Carotid stenosis    TBI (traumatic brain injury) ([facility]    Basic learning disability, reading    Lumbosacral radiculopathy at S1    Tobacco abuse    DDD (degenerative disc disease), lumbar    Cognitive deficit due to old head injury    Anxiety disorder    Incarcerated ventral hernia    Alkaline phosphatase elevation    High risk medication use - 01/25/18 OARRS PM&R, 03/23/18 OARRS PM&R, 02/15/17 Med Contract PM&R, 09/21/17 Urine Drug Screen: negative PM&R    Umbilical hernia    Alcoholic (HCC)-remote Hx    IgG monoclonal gammopathy of uncertain significance    COPD (chronic obstructive pulmonary disease) (HCC)    Muscle spasm of back    Generalized anxiety disorder    SI (sacroiliac) pain    Chronic midline low back pain with bilateral sciatica    Ptosis    CN III palsy, right eye    Carotid artery disorder (HCC)    HTN (hypertension)    Carotid stenosis, symptomatic w/o infarct, left    Constipation    CVA (cerebral vascular accident) ([facility]    Polyp of colon    External hemorrhoid    Insulin resistance    PAD (peripheral artery disease) (HCC)    Edema of both lower extremities due to peripheral venous insufficiency    Diverticulosis    Abnormal ankle brachial index (RAO)    Claudication in peripheral vascular disease (HCC)    Claudication (HCC)       There are no discontinued medications. Modified Medications    No medications on file       No orders of the defined types were placed in this encounter. Assessment/Plan:    1. PAD (peripheral artery disease) ([facility]  RBA Peripheral Angio/PTA discussed again for Staged RLE- scheduled for 10/21/ 10:30 AM.  Labs today - stable after pta. 2. Essential hypertension     - EKG 12 Lead    3. Carotid stenosis, symptomatic w/o infarct, left       4. PVD (peripheral vascular disease) ([facility]      5. Lipid- start Atorvastatin 40 mg qd.      6. We will address coronary ischemic eval in near future. Stop smoking    Counseling:  Heart Healthy Lifestyle, Improve BMI, Stop Smoking, Low Salt Diet, Take Precautions to Prevent Falls and Walk Daily    Return in about 3 months (around 2/23/2021).     Electronically signed by Hannah Klein MD on 11/23/2020 at 2:26 PM

## 2020-11-25 RX ORDER — ASPIRIN 81 MG/1
81 TABLET ORAL DAILY
Qty: 90 TABLET | Refills: 2 | Status: SHIPPED | OUTPATIENT
Start: 2020-11-25 | End: 2021-08-05 | Stop reason: SDUPTHER

## 2020-11-25 RX ORDER — METOPROLOL SUCCINATE 25 MG/1
25 TABLET, EXTENDED RELEASE ORAL DAILY
Qty: 90 TABLET | Refills: 2 | Status: SHIPPED | OUTPATIENT
Start: 2020-11-25 | End: 2021-04-12 | Stop reason: SDUPTHER

## 2020-11-25 RX ORDER — ATORVASTATIN CALCIUM 40 MG/1
40 TABLET, FILM COATED ORAL DAILY
Qty: 90 TABLET | Refills: 2 | Status: SHIPPED | OUTPATIENT
Start: 2020-11-25 | End: 2021-08-05 | Stop reason: SDUPTHER

## 2020-11-25 RX ORDER — VALSARTAN AND HYDROCHLOROTHIAZIDE 80; 12.5 MG/1; MG/1
TABLET, FILM COATED ORAL
Qty: 90 TABLET | Refills: 2 | Status: SHIPPED | OUTPATIENT
Start: 2020-11-25 | End: 2021-06-09 | Stop reason: SDUPTHER

## 2020-11-25 RX ORDER — CLOPIDOGREL BISULFATE 75 MG/1
TABLET ORAL
Qty: 90 TABLET | Refills: 2 | Status: SHIPPED | OUTPATIENT
Start: 2020-11-25 | End: 2021-08-02

## 2020-12-07 ENCOUNTER — TELEPHONE (OUTPATIENT)
Dept: FAMILY MEDICINE CLINIC | Age: 60
End: 2020-12-07

## 2020-12-08 ENCOUNTER — HOSPITAL ENCOUNTER (EMERGENCY)
Age: 60
Discharge: HOME | End: 2020-12-08
Payer: COMMERCIAL

## 2020-12-08 ENCOUNTER — APPOINTMENT (OUTPATIENT)
Dept: GENERAL RADIOLOGY | Age: 60
End: 2020-12-08
Payer: COMMERCIAL

## 2020-12-08 VITALS
BODY MASS INDEX: 30.1 KG/M2 | HEART RATE: 90 BPM | OXYGEN SATURATION: 96 % | HEIGHT: 71 IN | DIASTOLIC BLOOD PRESSURE: 89 MMHG | WEIGHT: 215 LBS | RESPIRATION RATE: 17 BRPM | TEMPERATURE: 99 F | SYSTOLIC BLOOD PRESSURE: 161 MMHG

## 2020-12-08 PROCEDURE — 73110 X-RAY EXAM OF WRIST: CPT

## 2020-12-08 PROCEDURE — 99283 EMERGENCY DEPT VISIT LOW MDM: CPT

## 2020-12-08 PROCEDURE — 73090 X-RAY EXAM OF FOREARM: CPT

## 2020-12-08 PROCEDURE — 6370000000 HC RX 637 (ALT 250 FOR IP): Performed by: NURSE PRACTITIONER

## 2020-12-08 RX ORDER — OXYCODONE HYDROCHLORIDE AND ACETAMINOPHEN 5; 325 MG/1; MG/1
1 TABLET ORAL ONCE
Status: COMPLETED | OUTPATIENT
Start: 2020-12-08 | End: 2020-12-08

## 2020-12-08 RX ORDER — PREDNISONE 20 MG/1
60 TABLET ORAL ONCE
Status: COMPLETED | OUTPATIENT
Start: 2020-12-08 | End: 2020-12-08

## 2020-12-08 RX ORDER — PREDNISONE 20 MG/1
40 TABLET ORAL DAILY
Qty: 20 TABLET | Refills: 0 | Status: SHIPPED | OUTPATIENT
Start: 2020-12-08 | End: 2020-12-16 | Stop reason: ALTCHOICE

## 2020-12-08 RX ORDER — INDOMETHACIN 50 MG/1
50 CAPSULE ORAL 2 TIMES DAILY WITH MEALS
Qty: 60 CAPSULE | Refills: 0 | Status: SHIPPED | OUTPATIENT
Start: 2020-12-08 | End: 2021-02-04 | Stop reason: DRUGHIGH

## 2020-12-08 RX ADMIN — OXYCODONE AND ACETAMINOPHEN 1 TABLET: 5; 325 TABLET ORAL at 13:55

## 2020-12-08 RX ADMIN — PREDNISONE 60 MG: 20 TABLET ORAL at 13:55

## 2020-12-08 ASSESSMENT — ENCOUNTER SYMPTOMS
VOMITING: 0
EYE DISCHARGE: 0
SHORTNESS OF BREATH: 0
COUGH: 0
TROUBLE SWALLOWING: 0
SORE THROAT: 0
EYE REDNESS: 0
DIARRHEA: 0
CONSTIPATION: 0
BACK PAIN: 0
NAUSEA: 0
RHINORRHEA: 0
EYE PAIN: 0
WHEEZING: 0
ABDOMINAL PAIN: 0
BLOOD IN STOOL: 0
COLOR CHANGE: 0

## 2020-12-08 ASSESSMENT — PAIN SCALES - GENERAL
PAINLEVEL_OUTOF10: 2
PAINLEVEL_OUTOF10: 2

## 2020-12-08 ASSESSMENT — PAIN DESCRIPTION - LOCATION: LOCATION: WRIST

## 2020-12-08 ASSESSMENT — PAIN DESCRIPTION - PAIN TYPE: TYPE: ACUTE PAIN

## 2020-12-08 ASSESSMENT — PAIN DESCRIPTION - ORIENTATION: ORIENTATION: LEFT

## 2020-12-08 NOTE — ED PROVIDER NOTES
syncope, weakness, light-headedness and headaches. Psychiatric/Behavioral: Negative for behavioral problems. All other systems reviewed and are negative. Except as noted above the remainder of the review of systems was reviewed and negative.        PAST MEDICAL HISTORY     Past Medical History:   Diagnosis Date    [facility]     Asthma     CAD (coronary artery disease)     [address] [facility]    Chronic back pain greater than 3 months duration     CN III palsy, right eye 2017    Dr Carol Gee    COPD (chronic obstructive pulmonary disease) ([facility] 2014    DDD (degenerative disc disease), lumbar 3/23/2015    Elevated liver enzymes 2014    Hyperlipidemia     on med > 10 yrs    Hypertension     on meds x 1 yr    IgA monoclonal gammopathy 2015    Dr Lo Aguilar    Insomnia, unspecified     LBP (low back pain)     Dr Angela Gudino Neuropathy     Occlusion and stenosis of carotid artery without mention of cerebral infarction     Dr Devorah Winter [facility]     quit 11/05/2010, relapsed 2016    S/P carotid endarterectomy 01/2019    Dr Carla Erickson    Sensorimotor neuropathy     Bilateral lower extremities-EMG 03/23/15 ([place])    Smoking trying to quit     Traumatic compression fracture of T11 thoracic vertebra ([facility] 3577    Umbilical hernia      Past Surgical History:   Procedure Laterality Date    CAROTID ENDARTERECTOMY Left 1/18/2019    LEFT CAROTID ENDARTERECTOMY performed by Kathe Morataya MD at [address] [place] [address]  8/19/2014    COLONOSCOPY N/A 12/18/2019    COLONOSCOPY DIAGNOSTIC performed by Yaw Leblanc MD at [facility] [place], DIAGNOSTIC      HERNIA REPAIR      TONSILLECTOMY      UPPER GASTROINTESTINAL ENDOSCOPY  8/19/2014    VENTRAL HERNIA REPAIR  09/01/15    W/MESH AND W/UMBILECTOMY     Social History     Socioeconomic History    Marital status: Single     Spouse name: None    Number of children: 1    Years of education: None    HENT:      Head: Normocephalic and atraumatic. Nose: Nose normal.   Eyes:      Conjunctiva/sclera: Conjunctivae normal.      Pupils: Pupils are equal, round, and reactive to light. Neck:      Musculoskeletal: Normal range of motion and neck supple. Cardiovascular:      Rate and Rhythm: Normal rate and regular rhythm. Heart sounds: Normal heart sounds. Pulmonary:      Effort: Pulmonary effort is normal. No respiratory distress. Breath sounds: Normal breath sounds. Abdominal:      General: Bowel sounds are normal.      Palpations: Abdomen is soft. Tenderness: There is no abdominal tenderness. Skin:     General: Skin is warm and dry. Capillary Refill: Capillary refill takes less than 2 seconds. Findings: No rash. Comments: Erythema in the left wrist extending up to forearm   Neurological:      Mental Status: He is alert and oriented to person, place, and time. Cranial Nerves: No cranial nerve deficit. Psychiatric:         Behavior: Behavior normal.         RESULTS     EKG: All EKG's are interpreted by the Emergency Department Physician who either signs or Co-signsthis chart in the absence of a cardiologist.        RADIOLOGY:   Cody Horn such as CT, Ultrasound and MRI are read by the radiologist. Plain radiographic images are visualized and preliminarily interpreted by the emergency physician with the below findings:    Negative x-ray    Interpretation per the Radiologist below, if available at the time ofthis note:    XR RADIUS ULNA LEFT (2 VIEWS)    (Results Pending)   XR WRIST LEFT (MIN 3 VIEWS)    (Results Pending)         ED BEDSIDE ULTRASOUND:   Performed by ED Physician - none    LABS:  Labs Reviewed - No data to display    All other labs were within normal range or not returned as of this dictation.     EMERGENCY DEPARTMENT COURSE and DIFFERENTIAL DIAGNOSIS/MDM:   Vitals:    Vitals:    12/08/20 1236 12/08/20 1238   BP: (!) 175/73 (!) 161/89 Pulse: 90    Resp: 17    Temp: 99 °F (37.2 °C)    TempSrc: Temporal    SpO2: 96%    Weight: 215 lb (97.5 kg)    Height: 5' 11\" (1.803 m)             MDM   Patient is a 49-year-old male presenting to the ER with a chief complaint of left wrist pain. Patient is hemodynamically stable nontoxic-appearing. X-rays are negative. I think patient is having an episode of gout. Patient was never diagnosed with gout before but he does eat a lot of red meat and he has history of alcohol use. I prescribed patient a course of prednisone and indomethacin. He is instructed to follow-up with primary care. He is discharged in stable condition with stable vitals. CRITICAL CARE TIME       CONSULTS:  None    PROCEDURES:  Unless otherwise noted below, none     Procedures    FINAL IMPRESSION      1.  Acute idiopathic gout, unspecified site          DISPOSITION/PLAN   DISPOSITION Decision To Discharge 12/08/2020 01:57:55 PM      PATIENT REFERRED TO:  Griselda Restrepo MD  [place] [address], [address] [facility]  [phone]    Schedule an appointment as soon as possible for a visit in 1 day        DISCHARGE MEDICATIONS:  New Prescriptions    INDOMETHACIN (INDOCIN) 50 MG CAPSULE    Take 1 capsule by mouth 2 times daily (with meals)    PREDNISONE (DELTASONE) 20 MG TABLET    Take 2 tablets by mouth daily for 10 days          (Please notethat portions of this note were completed with a voice recognition program.  Efforts were made to edit the dictations but occasionally words are mis-transcribed.)    JESSICA Epps CNP (electronically signed)  Attending Emergency Physician          JESSICA Jimenez CNP  12/08/20 6206

## 2020-12-13 RX ORDER — HYDROCODONE BITARTRATE AND ACETAMINOPHEN 7.5; 325 MG/1; MG/1
1 TABLET ORAL EVERY 8 HOURS PRN
Qty: 80 TABLET | Refills: 0 | Status: SHIPPED | OUTPATIENT
Start: 2020-12-17 | End: 2021-01-08 | Stop reason: SDUPTHER

## 2020-12-16 ENCOUNTER — OFFICE VISIT (OUTPATIENT)
Dept: FAMILY MEDICINE CLINIC | Age: 60
End: 2020-12-16
Payer: COMMERCIAL

## 2020-12-16 ENCOUNTER — VIRTUAL VISIT (OUTPATIENT)
Dept: FAMILY MEDICINE CLINIC | Age: 60
End: 2020-12-16
Payer: COMMERCIAL

## 2020-12-16 DIAGNOSIS — Z00.00 ROUTINE GENERAL MEDICAL EXAMINATION AT A HEALTH CARE FACILITY: Primary | ICD-10-CM

## 2020-12-16 PROBLEM — M10.032 ACUTE IDIOPATHIC GOUT OF LEFT WRIST: Chronic | Status: ACTIVE | Noted: 2020-12-16

## 2020-12-16 PROCEDURE — 3017F COLORECTAL CA SCREEN DOC REV: CPT | Performed by: FAMILY MEDICINE

## 2020-12-16 PROCEDURE — G8482 FLU IMMUNIZE ORDER/ADMIN: HCPCS | Performed by: FAMILY MEDICINE

## 2020-12-16 PROCEDURE — 99214 OFFICE O/P EST MOD 30 MIN: CPT | Performed by: FAMILY MEDICINE

## 2020-12-16 PROCEDURE — G8427 DOCREV CUR MEDS BY ELIG CLIN: HCPCS | Performed by: FAMILY MEDICINE

## 2020-12-16 PROCEDURE — G0438 PPPS, INITIAL VISIT: HCPCS | Performed by: FAMILY MEDICINE

## 2020-12-16 SDOH — ECONOMIC STABILITY: TRANSPORTATION INSECURITY
IN THE PAST 12 MONTHS, HAS LACK OF TRANSPORTATION KEPT YOU FROM MEETINGS, WORK, OR FROM GETTING THINGS NEEDED FOR DAILY LIVING?: NO

## 2020-12-16 SDOH — ECONOMIC STABILITY: FOOD INSECURITY: WITHIN THE PAST 12 MONTHS, YOU WORRIED THAT YOUR FOOD WOULD RUN OUT BEFORE YOU GOT MONEY TO BUY MORE.: NOT ASKED

## 2020-12-16 ASSESSMENT — PATIENT HEALTH QUESTIONNAIRE - PHQ9
SUM OF ALL RESPONSES TO PHQ9 QUESTIONS 1 & 2: 0
SUM OF ALL RESPONSES TO PHQ QUESTIONS 1-9: 0

## 2020-12-16 NOTE — PROGRESS NOTES
Medicare Annual Wellness Visit  Are Name: Dolores Dumont Date: 2020   MRN: [id number] Sex: Male   Age: 61 y.o. Ethnicity: Non-/Non    : 1960 Race: Ernesto Sicard is here for Medicare AWV    Screenings for behavioral, psychosocial and functional/safety risks, and cognitive dysfunction are all negative except as indicated below. These results, as well as other patient data from the [address] form, are documented in Flowsheets linked to this Encounter. No Known Allergies    Prior to Visit Medications    Medication Sig Taking? Authorizing Provider   HYDROcodone-acetaminophen (NORCO) 7.5-325 MG per tablet Take 1 tablet by mouth every 8 hours as needed for Pain (Max 3 per day) for up to 30 days. Intended supply: 30 days Yes Sherrie Swan DO   predniSONE (DELTASONE) 20 MG tablet Take 2 tablets by mouth daily for 10 days Yes JESSICA Wilson CNP   indomethacin (INDOCIN) 50 MG capsule Take 1 capsule by mouth 2 times daily (with meals) Yes JESSICA Wilson CNP   atorvastatin (LIPITOR) 40 MG tablet Take 1 tablet by mouth daily Yes Ingris Bush MD   clopidogrel (PLAVIX) 75 MG tablet take 1 tablet by mouth once daily Yes Ingris Bush MD   metoprolol succinate (TOPROL XL) 25 MG extended release tablet Take 1 tablet by mouth daily Yes Ingris Bush MD   valsartan-hydrochlorothiazide (DIOVAN-HCT) 80-12.5 MG per tablet take 1 tablet by mouth once daily Yes Ingris Bush MD   aspirin EC 81 MG EC tablet Take 1 tablet by mouth daily Yes Ingris Bush MD    MG capsule take 1 capsule by mouth twice a day Yes JESSICA Randhawa CNP   Cholecalciferol (VITAMIN D) 50 MCG (2000) CAPS capsule Take 1 capsule by mouth 2 times daily Yes Sherrie Swan DO   gabapentin (NEURONTIN) 800 MG tablet take 1 tablet by mouth four times a day Yes Sherrie Swan DO   nitroGLYCERIN (NITROSTAT) 0.4 MG SL tablet up to max of 3 total doses.  If no relief after 1 dose, call 911. Yes Mariaa Perez MD   pregabalin (LYRICA) 50 MG capsule Take 1 capsule by mouth 3 times daily for 30 days.  Yes Sherrie Swan DO   polyethylene glycol (GLYCOLAX) 17 GM/SCOOP powder Take 17 g by mouth daily Yes Jazmín Thompson APRN - CNP   budesonide-formoterol (SYMBICORT) 160-4.5 MCG/ACT AERO inhale 2 puffs by mouth twice a day Yes Brown Sevilla MD   senna-docusate (PERICOLACE) 8.6-50 MG per tablet Take 2 tablets by mouth daily as needed for Constipation Yes Brown Sevilla MD   RA VITAMIN B-12 TR 1000 MCG TBCR take 1 tablet by mouth once daily Yes Brown Sevilla MD   VASCEPA 1 g CAPS capsule  Yes Historical Provider, MD   REFRESH TEARS 0.5 % SOLN ophthalmic solution instill 1 drop into both eyes four times a day Yes Historical Provider, MD       Past Medical History:   Diagnosis Date    [facility] ([facility]     Asthma     CAD (coronary artery disease)     [facility]    Chronic back pain greater than 3 months duration     CN III palsy, right eye 2017    Dr Kiko Noland    COPD (chronic obstructive pulmonary disease) ([facility] 2014    DDD (degenerative disc disease), lumbar 3/23/2015    Elevated liver enzymes 2014    Hyperlipidemia     on med > 10 yrs    Hypertension     on meds x 1 yr    IgA monoclonal gammopathy 2015    Dr Felix Garcia    Insomnia, unspecified     LBP (low back pain)     Dr Greg Jameson Neuropathy     Occlusion and stenosis of carotid artery without mention of cerebral infarction     Dr Lubna Humphries Personal history of alcoholism ([facility]     quit 11/05/2010, relapsed 2016    S/P carotid endarterectomy 01/2019    Dr Jose Cai    Sensorimotor neuropathy     Bilateral lower extremities-EMG 03/23/15 ([place])    Smoking trying to quit     Traumatic compression fracture of T11 thoracic vertebra ([address]    Umbilical hernia        Past Surgical History:   Procedure Laterality Date    CAROTID ENDARTERECTOMY Left 1/18/2019    LEFT CAROTID ENDARTERECTOMY performed by Libby Sandifer, MD at [address] [facility] [address]  8/19/2014    COLONOSCOPY N/A 12/18/2019    COLONOSCOPY DIAGNOSTIC performed by Rianna Ariza MD at [address] [facility], COLON, DIAGNOSTIC      HERNIA REPAIR      TONSILLECTOMY      UPPER GASTROINTESTINAL ENDOSCOPY  8/19/2014    VENTRAL HERNIA REPAIR  09/01/15    W/MESH AND W/UMBILECTOMY       Family History   Problem Relation Age of Onset    Cancer Mother         brain, dec 79    Alcohol Abuse Father     Other Sister         unknown medical history    Other Brother         unknown medical history    High Blood Pressure Brother     Alcohol Abuse Daughter        CareTeam (Including outside providers/suppliers regularly involved in providing care):   Patient Care Team:  Gee Colon MD as PCP - General (Family Medicine)  Gee Colon MD as PCP - [facility] Empaneled Provider  Mynor Gaytan MD (General Surgery)  Mary Anne Bishop MD as Cardiologist (Cardiology)    Wt Readings from Last 3 Encounters:   12/08/20 215 lb (97.5 kg)   11/23/20 220 lb (99.8 kg)   11/02/20 215 lb (97.5 kg)      No flowsheet data found. There is no height or weight on file to calculate BMI. Based upon direct observation of the patient, evaluation of cognition reveals recent and remote memory intact. Patient's complete Health Risk Assessment and screening values have been reviewed and are found in Flowsheets. The following problems were reviewed today and where indicated follow up appointments were made and/or referrals ordered.     Positive Risk Factor Screenings with Interventions:         Substance History:  Social History     Tobacco History     Smoking Status  Current Every Day Smoker Smoking Start Date  1/1/1968 Smoking Frequency  0.5 packs/day for 40 years (20 pk yrs) Smoking Tobacco Type  Cigarettes    Smokeless Tobacco Use  Never Used          Alcohol History     Alcohol Use Status  No Comment  8/31/2016 Quit Date          Drug Use     Drug Use Status  No          Sexual Activity     Sexually Active  Not Asked               Alcohol Screening:       A score of 8 or more is associated with harmful or hazardous drinking. A score of 13 or more in women, and 15 or more in men, is likely to indicate alcohol dependence. Substance Abuse Interventions:  · Alcohol misuse/dependence:  patient agrees to limit alcohol intake to a moderate level- no more than 14 drinks/week and 4 drinks per occasion for men, or no more than 7 drinks/week and 3 drinks/occasion for women    General Health and ACP:  General  In general, how would you say your health is?: Good  In the past 7 days, have you experienced any of the following?  New or Increased Pain, New or Increased Fatigue, Loneliness, Social Isolation, Stress or Anger?: (!) New or Increased Pain  Do you get the social and emotional support that you need?: Yes  Do you have a Living Will?: (!) No  Advance Directives     Power of  Living Will ACP-Advance Directive ACP-Power of     Not on File Not on File Filed Not on File      General Health Risk Interventions:  · Pain issues: home exercises provided, patient advised to follow-up in this office for further evaluation and treatment within 4 month(s)  · Social isolation: patient declines any further intervention for this issue    Health Habits/Nutrition:  Health Habits/Nutrition  Do you exercise for at least 20 minutes 2-3 times per week?: Yes  Have you lost any weight without trying in the past 3 months?: (!) Yes  Do you eat fewer than 2 meals per day?: (!) Yes(once daily)  Have you seen a dentist within the past year?: (!) No     Health Habits/Nutrition Interventions:  · Inadequate physical activity:  educational materials provided to promote increased physical activity  · Nutritional issues:  educational materials for healthy, well-balanced diet provided     Safety:  Safety  Do you have working smoke provided to the patient in written form: see Patient Instructions/AVS.    Chris Oquendo was seen today for medicare awv. Diagnoses and all orders for this visit:    Routine general medical examination at a health care facility               Tiara Newman is a 61 y.o. male being evaluated by a Virtual Visit (phone) encounter to address concerns as mentioned above. A caregiver was present when appropriate. Due to this being a TeleHealth encounter (During CPDXC-03 public health emergency), evaluation of the following organ systems was limited: Vitals/Constitutional/EENT/Resp/CV/GI//MS/Neuro/Skin/Heme-Lymph-Imm. Pursuant to the emergency declaration under the [address] [place] [address] authority and the [employer] and [employer] Act, this Virtual Visit was conducted with patient's (and/or legal guardian's) consent, to reduce the patient's risk of exposure to COVID-19 and provide necessary medical care. The patient (and/or legal guardian) has also been advised to contact this office for worsening conditions or problems, and seek emergency medical treatment and/or call 911 if deemed necessary. Patient identification was verified at the start of the visit: Yes    Services were provided through phone to substitute for in-person clinic visit. Patient and provider were located at their individual homes. --Baldomero Jarrett MD on 12/16/2020 at 1:53 PM    An electronic signature was used to authenticate this note.

## 2020-12-16 NOTE — PROGRESS NOTES
Byron Son is a 61 y.o. male evaluated via telephone on 2020. Consent:  He and/or health care decision maker is aware that that he may receive a bill for this telephone service, depending on his insurance coverage, and has provided verbal consent to proceed: Yes      Documentation:  I communicated with the patient and/or health care decision maker about see below. Details of this discussion including any medical advice provided: see below      I affirm this is a Patient Initiated Episode with an Established Patient who has not had a related appointment within my department in the past 7 days or scheduled within the next 24 hours. Total Time: minutes: 11-20 minutes    Note: not billable if this call serves to triage the patient into an appointment for the relevant concern      Ronda SEGURA     2020    TELEHEALTH EVALUATION -- Audio (During [employer] Health[employer]-90 public health emergency)    HPI:    Byron Son (:  1960) has requested an audio evaluation for the following concern(s):    Patient recently was seen urgently for acute swelling, pain, redness of rest.  He has a history of gout. He was prescribed indomethacin which was effective. He requests refill. He avoids alcohol and red meat. He is going to be relocating and has primary care set up there. Review of Systems    Prior to Visit Medications    Medication Sig Taking? Authorizing Provider   indomethacin (INDOCIN) 25 MG capsule Take 1 capsule by mouth 3 times daily as needed for Pain (gout pain) Yes Estela Cantor MD   HYDROcodone-acetaminophen (NORCO) 7.5-325 MG per tablet Take 1 tablet by mouth every 8 hours as needed for Pain (Max 3 per day) for up to 30 days.  Intended supply: 30 days Yes Sherrie Swan DO   indomethacin (INDOCIN) 50 MG capsule Take 1 capsule by mouth 2 times daily (with meals) Yes Bonnie Jhaveri, APRN - CNP atorvastatin (LIPITOR) 40 MG tablet Take 1 tablet by mouth daily Yes Nikolas Acharya MD   clopidogrel (PLAVIX) 75 MG tablet take 1 tablet by mouth once daily Yes Nikolas Acharya MD   metoprolol succinate (TOPROL XL) 25 MG extended release tablet Take 1 tablet by mouth daily Yes Nikolas Acharya MD   valsartan-hydrochlorothiazide (DIOVAN-HCT) 80-12.5 MG per tablet take 1 tablet by mouth once daily Yes Nikolas Acharya MD   aspirin EC 81 MG EC tablet Take 1 tablet by mouth daily Yes Nikolas Acharya MD    MG capsule take 1 capsule by mouth twice a day Yes JESSICA Suazo CNP   Cholecalciferol (VITAMIN D) 50 MCG (2000 UT) CAPS capsule Take 1 capsule by mouth 2 times daily Yes Sherrie Swan DO   gabapentin (NEURONTIN) 800 MG tablet take 1 tablet by mouth four times a day Yes Sherrie Swan DO   nitroGLYCERIN (NITROSTAT) 0.4 MG SL tablet up to max of 3 total doses. If no relief after 1 dose, call 911. Yes Nikolas Acharya MD   pregabalin (LYRICA) 50 MG capsule Take 1 capsule by mouth 3 times daily for 30 days.  Yes Sherrie Swan DO   polyethylene glycol (GLYCOLAX) 17 GM/SCOOP powder Take 17 g by mouth daily Yes JESSICA Suazo CNP   budesonide-formoterol (SYMBICORT) 160-4.5 MCG/ACT AERO inhale 2 puffs by mouth twice a day Yes Radha Jack MD   senna-docusate (PERICOLACE) 8.6-50 MG per tablet Take 2 tablets by mouth daily as needed for Constipation Yes Radha Jack MD   REFRESH TEARS 0.5 % SOLN ophthalmic solution instill 1 drop into both eyes four times a day Yes Tim De Oliveira MD   VASCEPA 1 g CAPS capsule Take 2 capsules by mouth 2 times daily  Nikolas Acharya MD   vitamin B-12 (CYANOCOBALAMIN) 1000 MCG tablet Take 1 tablet by mouth daily  Radha Jack MD   naloxone [facility]) 4 MG/0.1ML LIQD nasal spray 1 spray by Nasal route as needed for Opioid Reversal  [employer], DO       Social History     Tobacco Use  Smoking status: Current Every Day Smoker     Packs/day: 0.50     Years: 40.00     Pack years: 20.00     Types: Cigarettes     Start date: 1968    Smokeless tobacco: Never Used   Substance Use Topics    Alcohol use: No     Alcohol/week: 0.0 standard drinks     Comment: 8/31/2016 Quit Date    Drug use: No        No Known Allergies,   Past Medical History:   Diagnosis Date    Acute idiopathic gout of left wrist 89/61/5510    Alcoholic polyneuropathy (HCC)     Asthma     CAD (coronary artery disease)     [facility]    Chronic back pain greater than 3 months duration     CN III palsy, right eye 2017    Dr Jie Garzon    COPD (chronic obstructive pulmonary disease) ([facility] 2014    DDD (degenerative disc disease), lumbar 3/23/2015    Elevated liver enzymes 2014    Hyperlipidemia     on med > 10 yrs    Hypertension     on meds x 1 yr    IgA monoclonal gammopathy 2015    Dr Kayce Last    Insomnia, unspecified     LBP (low back pain)     Dr Enriqueta Gallardorador Neuropathy     Occlusion and stenosis of carotid artery without mention of cerebral infarction     Dr All Martinez history of alcoholism ([facility]     quit 11/05/2010, relapsed 2016    S/P carotid endarterectomy 01/2019    Dr Elzbieta Montenegro    Sensorimotor neuropathy     Bilateral lower extremities-EMG 03/23/15 ([place])    Smoking trying to quit     Traumatic compression fracture of T11 thoracic vertebra ([facility] 0892    Umbilical hernia    ,   Past Surgical History:   Procedure Laterality Date    CAROTID ENDARTERECTOMY Left 1/18/2019    LEFT CAROTID ENDARTERECTOMY performed by Gee Jorgensen MD at [address] [place] [address]  8/19/2014    COLONOSCOPY N/A 12/18/2019    COLONOSCOPY DIAGNOSTIC performed by Yordan Velasquez MD at [address] [facility], COLON, DIAGNOSTIC      HERNIA REPAIR      TONSILLECTOMY      UPPER GASTROINTESTINAL ENDOSCOPY  8/19/2014    VENTRAL HERNIA REPAIR  09/01/15    W/MESH AND W/UMBILECTOMY   ,   Social History     Tobacco Use  Smoking status: Current Every Day Smoker     Packs/day: 0.50     Years: 40.00     Pack years: 20.00     Types: Cigarettes     Start date: 1968    Smokeless tobacco: Never Used   Substance Use Topics    Alcohol use: No     Alcohol/week: 0.0 standard drinks     Comment: 8/31/2016 Quit Date    Drug use: No   ,   Family History   Problem Relation Age of Onset    Cancer Mother         brain, dec 79   [facility] Alcohol Abuse Father     Other Sister         unknown medical history    Other Brother         unknown medical history    High Blood Pressure Brother     Alcohol Abuse Daughter        PHYSICAL EXAMINATION:  [ INSTRUCTIONS:  \"[x]\" Indicates a positive item  \"[]\" Indicates a negative item  -- DELETE ALL ITEMS NOT EXAMINED]  Vital Signs: unavailable    Patient appears to be alert and oriented to person, place, time, situation and is in no acute distress. Respiratory effort appears normal. Mood appears stable and speech and thought are grossly normal.    ASSESSMENT/PLAN:  Ely Taveras was seen today for ed follow-up and gout. Diagnoses and all orders for this visit:    Acute idiopathic gout of left wrist  Comments: Will prescribe indomethacin. Patient advised to follow-up with primary care clinician soon as possible and evaluate for definitive gout treatment  Orders:  -     Cancel: Uric Acid  -     indomethacin (INDOCIN) 25 MG capsule; Take 1 capsule by mouth 3 times daily as needed for Pain (gout pain)          Return if symptoms worsen or fail to improve. Ann Obrien is a 61 y.o. male being evaluated by a Virtual Visit (telephonic visit) encounter to address concerns as mentioned above. A caregiver was present when appropriate. Due to this being a TeleHealth encounter (During SIENW-97 public health emergency), evaluation of the following organ systems was limited: Vitals/Constitutional/EENT/Resp/CV/GI//MS/Neuro/Skin/Heme-Lymph-Imm. Pursuant to the emergency declaration under the [address] [place] [address] and the [employer] and [employer] Act, this Virtual Visit was conducted with patient's (and/or legal guardian's) consent, to reduce the patient's risk of exposure to COVID-19 and provide necessary medical care. The patient (and/or legal guardian) has also been advised to contact this office for worsening conditions or problems, and seek emergency medical treatment and/or call 911 if deemed necessary. Services were provided through a telephonic synchronous discussion virtually to substitute for in-person clinic visit. Patient and provider were located at their individual homes. --Mabel Levy MD on 1/4/2021 at 12:27 PM    An electronic signature was used to authenticate this note.

## 2020-12-16 NOTE — PATIENT INSTRUCTIONS
Personalized Preventive Plan for Mariah Barraza - 12/16/2020  Medicare offers a range of preventive health benefits. Some of the tests and screenings are paid in full while other may be subject to a deductible, co-insurance, and/or copay. Some of these benefits include a comprehensive review of your medical history including lifestyle, illnesses that may run in your family, and various assessments and screenings as appropriate. After reviewing your medical record and screening and assessments performed today your provider may have ordered immunizations, labs, imaging, and/or referrals for you. A list of these orders (if applicable) as well as your Preventive Care list are included within your After Visit Summary for your review. Other Preventive Recommendations:    · A preventive eye exam performed by an eye specialist is recommended every 1-2 years to screen for glaucoma; cataracts, macular degeneration, and other eye disorders. · A preventive dental visit is recommended every 6 months. · Try to get at least 150 minutes of exercise per week or 10,000 steps per day on a pedometer . · Order or download the FREE \"Exercise & Physical Activity: Your Everyday Guide\" from [employer]. Call 0-883.705.8391 or search [employer] online. · You need 6382-6779 mg of calcium and 8098-3780 IU of vitamin D per day. It is possible to meet your calcium requirement with diet alone, but a vitamin D supplement is usually necessary to meet this goal.  · When exposed to the sun, use a sunscreen that protects against both UVA and UVB radiation with an SPF of 30 or greater. Reapply every 2 to 3 hours or after sweating, drying off with a towel, or swimming. · Always wear a seat belt when traveling in a car. Always wear a helmet when riding a bicycle or motorcycle. Heart-Healthy Diet   Sodium, Fat, and Cholesterol Controlled Diet       What Is a Heart Healthy Diet?    A heart-healthy diet is one that limits sodium , certain types of fat , and cholesterol . This type of diet is recommended for:   People with any form of cardiovascular disease (eg, coronary heart disease , peripheral vascular disease , previous heart attack , previous stroke )   People with risk factors for cardiovascular disease, such as high blood pressure , high cholesterol , or diabetes   Anyone who wants to lower their risk of developing cardiovascular disease   Sodium    Sodium is a mineral found in many foods. In general, most people consume much more sodium than they need. Diets high in sodium can increase blood pressure and lead to edema (water retention). On a heart-healthy diet, you should consume no more than 2,300 mg (milligrams) of sodium per dayabout the amount in one teaspoon of table salt. The foods highest in sodium include table salt (about 50% sodium), processed foods, convenience foods, and preserved foods. Cholesterol    Cholesterol is a fat-like, waxy substance in your blood. Our bodies make some cholesterol. It is also found in animal products, with the highest amounts in fatty meat, egg yolks, whole milk, cheese, shellfish, and organ meats. On a heart-healthy diet, you should limit your cholesterol intake to less than 200 mg per day. It is normal and important to have some cholesterol in your bloodstream. But too much cholesterol can cause plaque to build up within your arteries, which can eventually lead to a heart attack or stroke. The two types of cholesterol that are most commonly referred to are:   Low-density lipoprotein (LDL) cholesterol  Also known as bad cholesterol, this is the cholesterol that tends to build up along your arteries. Bad cholesterol levels are increased by eating fats that are saturated or hydrogenated. Optimal level of this cholesterol is less than 100. Over 130 starts to get risky for heart disease.    High-density lipoprotein (HDL) cholesterol  Also known as good cholesterol, this type of cholesterol actually carries cholesterol away from your arteries and may, therefore, help lower your risk of having a heart attack. You want this level to be high (ideally greater than 60). It is a risk to have a level less than 40. You can raise this good cholesterol by eating olive oil, canola oil, avocados, or nuts. Exercise raises this level, too. Fat    Fat is calorie dense and packs a lot of calories into a small amount of food. Even though fats should be limited due to their high calorie content, not all fats are bad. In fact, some fats are quite healthful. Fat can be broken down into four main types. The good-for-you fats are:   Monounsaturated fat  found in oils such as olive and canola, avocados, and nuts and natural nut butters; can decrease cholesterol levels, while keeping levels of HDL cholesterol high   Polyunsaturated fat  found in oils such as safflower, sunflower, soybean, corn, and sesame; can decrease total cholesterol and LDL cholesterol   Omega-3 fatty acids  particularly those found in fatty fish (such as salmon, trout, tuna, mackerel, herring, and sardines); can decrease risk of arrhythmias, decrease triglyceride levels, and slightly lower blood pressure   The fats that you want to limit are:   Saturated fat  found in animal products, many fast foods, and a few vegetables; increases total blood cholesterol, including LDL levels   Animal fats that are saturated include: butter, lard, whole-milk dairy products, meat fat, and poultry skin   Vegetable fats that are saturated include: hydrogenated shortening, palm oil, coconut oil, cocoa butter   Hydrogenated or trans fat  found in margarine and vegetable shortening, most shelf stable snack foods, and fried foods; increases LDL and decreases HDL     It is generally recommended that you limit your total fat for the day to less than 30% of your total calories.  If you follow an 1800-calorie heart healthy diet, for example, this would mean 60 grams of fat or less per day. Saturated fat and trans fat in your diet raises your blood cholesterol the most, much more than dietary cholesterol does. For this reason, on a heart-healthy diet, less than 7% of your calories should come from saturated fat and ideally 0% from trans fat. On an 1800-calorie diet, this translates into less than 14 grams of saturated fat per day, leaving 46 grams of fat to come from mono- and polyunsaturated fats.    Food Choices on a Heart Healthy Diet   Food Category   Foods Recommended   Foods to Avoid   Grains   Breads and rolls without salted tops Most dry and cooked cereals Unsalted crackers and breadsticks Low-sodium or homemade breadcrumbs or stuffing All rice and pastas   Breads, rolls, and crackers with salted tops High-fat baked goods (eg, muffins, donuts, pastries) Quick breads, self-rising flour, and biscuit mixes Regular bread crumbs Instant hot cereals Commercially prepared rice, pasta, or stuffing mixes   Vegetables   Most fresh, frozen, and low-sodium canned vegetables Low-sodium and salt-free vegetable juices Canned vegetables if unsalted or rinsed   Regular canned vegetables and juices, including sauerkraut and pickled vegetables Frozen vegetables with sauces Commercially prepared potato and vegetable mixes   Fruits   Most fresh, frozen, and canned fruits All fruit juices   Fruits processed with salt or sodium   Milk   Nonfat or low-fat (1%) milk Nonfat or low-fat yogurt Cottage cheese, low-fat ricotta, cheeses labeled as low-fat and low-sodium   Whole milk Reduced-fat (2%) milk Malted and chocolate milk Full fat yogurt Most cheeses (unless low-fat and low salt) Buttermilk (no more than 1 cup per week)   Meats and Beans   Lean cuts of fresh or frozen beef, veal, lamb, or pork (look for the word loin) Fresh or frozen poultry without the skin Fresh or frozen fish and some shellfish Egg whites and egg substitutes (Limit whole eggs to three per week) Tofu Nuts or seeds (unsalted, dry-roasted), low-sodium peanut butter Dried peas, beans, and lentils   Any smoked, cured, salted, or canned meat, fish, or poultry (including mayers, chipped beef, cold cuts, hot dogs, sausages, sardines, and anchovies) Poultry skins Breaded and/or fried fish or meats Canned peas, beans, and lentils Salted nuts   Fats and Oils   Olive oil and canola oil Low-sodium, low-fat salad dressings and mayonnaise   Butter, margarine, coconut and palm oils, mayers fat   Snacks, Sweets, and Condiments   Low-sodium or unsalted versions of broths, soups, soy sauce, and condiments Pepper, herbs, and spices; vinegar, lemon, or lime juice Low-fat frozen desserts (yogurt, sherbet, fruit bars) Sugar, cocoa powder, honey, syrup, jam, and preserves Low-fat, trans-fat free cookies, cakes, and pies Freddy and animal crackers, fig bars, abram snaps   High-fat desserts Broth, soups, gravies, and sauces, made from instant mixes or other high-sodium ingredients Salted snack foods Canned olives Meat tenderizers, seasoning salt, and most flavored vinegars   Beverages   Low-sodium carbonated beverages Tea and coffee in moderation Soy milk   Commercially softened water   Suggestions   Make whole grains, fruits, and vegetables the base of your diet. Choose heart-healthy fats such as canola, olive, and flaxseed oil, and foods high in heart-healthy fats, such as nuts, seeds, soybeans, tofu, and fish. Eat fish at least twice per week; the fish highest in omega-3 fatty acids and lowest in mercury include salmon, herring, mackerel, sardines, and canned chunk light tuna. If you eat fish less than twice per week or have high triglycerides, talk to your doctor about taking fish oil supplements. Read food labels.    For products low in fat and cholesterol, look for fat free, low-fat, cholesterol free, saturated fat free, and trans fat freeAlso scan the Nutrition Facts Label, which lists saturated fat, trans fat, and cholesterol amounts. For products low in sodium, look for sodium free, very low sodium, low sodium, no added salt, and unsalted   Skip the salt when cooking or at the table; if food needs more flavor, get creative and try out different herbs and spices. Garlic and onion also add substantial flavor to foods. Trim any visible fat off meat and poultry before cooking, and drain the fat off after [place]. Use cooking methods that require little or no added fat, such as grilling, boiling, baking, poaching, broiling, roasting, steaming, stir-frying, and sauting. Avoid fast food and convenience food. They tend to be high in saturated and trans fat and have a lot of added salt. Talk to a registered dietitian for individualized diet advice. Last Reviewed: March 2011 Alaina Bumpers, MS, MPH, RD   Updated: 3/29/2011   ·     High-Fiber Diet     What Is Fiber? Dietary fiber is a form of carbohydrate found in plants that cannot be digested by humans. All plants contain fiber, including fruits, vegetables, grains, and legumes. Fiber is often classified into two categories: soluble and insoluble. Soluble fiber draws water into the bowel and can help slow digestion. Examples of foods that are high in soluble fiber include oatmeal, oat bran, barley, legumes (eg, beans and peas), apples, and strawberries. Insoluble fiber speeds digestion and can add bulk to the stool. Examples of foods that are high in insoluble fiber include whole-wheat products, wheat bran, cauliflower, green beans, and potatoes. Why Follow a High-Fiber Diet? A high-fiber diet is often recommended to prevent and treat constipation , hemorrhoids , diverticulitis , and irritable bowel syndrome . Eating a high-fiber diet can also help improve your cholesterol levels, lower your risk of coronary heart disease , reduce your risk of type 2 diabetes , and lower your weight.  For people with type 1 or 2 diabetes, a high-fiber diet can also help stabilize blood sugar levels. How Much Fiber Should I Eat? A high-fiber diet should contain  20-35 grams  of fiber a day. This is actually the amount recommended for the general adult population; however, most Americans eat only 15 grams of fiber per day. Digestion of Fiber   Eating a higher fiber diet than usual can take some getting used to by your body's digestive system. To avoid the side effects of sudden increases in dietary fiber (eg, gas, cramping, bloating, and diarrhea), increase fiber gradually and be sure to drink plenty of fluids every day. Tips for Increasing Fiber Intake   Whenever possible, choose whole grains over refined grains (eg, brown rice instead of white rice, whole-wheat bread instead of white bread). Include a variety of grains in your diet, such as wheat, rye, barley, oats, quinoa, and bulgur. Eat more vegetarian-based meals. Here are some ideas: black bean burgers, eggplant lasagna, and veggie tofu stir-tapia. Choose high-fiber snacks, such as fruits, popcorn, whole-grain crackers, and nuts. Make whole-grain cereal or whole-grain toast part of your daily breakfast regime. When eating out, whether ordering a sandwich or dinner, ask for extra vegetables. When baking, replace part of the white flour with whole-wheat flour. Whole-wheat flour is particularly easy to incorporate into a recipe. High-Fiber Diet Eating Guide   Food Category   Foods Recommended   Notes   Grains   Whole-grain breads, muffins, bagels, or guillermo bread Rye bread Whole-wheat crackers or crisp breads Whole-grain or bran cereals Oatmeal, oat bran, or grits Wheat germ Whole-wheat pasta and brown rice   Read the ingredients list on food labels. Look for products that list \"whole\" as the first ingredient (eg, whole-wheat, whole oats). Choose cereals with at least 2 grams of fiber per serving.    Vegetables   All vegetables, especially asparagus, bean sprouts, broccoli, [place] sprouts, cabbage, carrots, cauliflower, celery, corn, greens, green beans, green pepper, onions, peas, potatoes (with skin), snow peas, spinach, squash, sweet potatoes, tomatoes, zucchini   For maximum fiber intake, eat the peels of fruits and vegetablesjust be sure to wash them well first.   Fruits   All fruits, especially apples, berries, grapefruits, mangoes, nectarines, oranges, peaches, pears, dried fruits (figs, dates, prunes, raisins)   Choose raw fruits and vegetables over juice, cooked, or cannedraw fruit has more fiber. Dried fruit is also a good source of fiber. Milk   With the exception of yogurt containing inulin (a type of fiber), dairy foods provide little fiber. Add more fiber by topping your yogurt or cottage cheese with fresh fruit, whole grain or bran cereals, nuts, or seeds. Meats and Beans   All beans and peas, especially Garbanzo beans, kidney beans, lentils, lima beans, split peas, and cruz beans All nuts and seeds, especially almonds, peanuts, [place] nuts, cashews, peanut butter, walnuts, sesame and sunflower seeds All meat, poultry, fish, and eggs   Increase fiber in meat dishes by adding cruz beans, kidney beans, black-eyed peas, bran, or oatmeal. If you are following a low-fat diet, use nuts and seeds only in moderation. Fats and Oils   All in moderation   Fats and oils do not provide fiber   Snacks, Sweets, and Condiments   Fruit Nuts Popcorn, whole-wheat pretzels, or trail mix made with dried fruits, nuts, and seeds Cakes, breads, and cookies made with oatmeal or whole-wheat flour   Most snack foods do not provide much fiber. Choose snacks with at least 2 grams of fiber per serving. Last Reviewed: March 2011 Shakila Austin MS, MPH, RD   Updated: 3/29/2011   ·     Keep Your Memory Steffi Kemp       Many factors can affect your ability to remembera hectic lifestyle, aging, stress, chronic disease, and certain medicines.  But, there are steps you can take to sharpen your mind and help preserve your memory. Challenge Your Brain   Regularly challenging your mind may help keeps it in top shape. Good mental exercises include:   Crossword puzzlesUse a dictionary if you need it; you will learn more that way. Brainteasers Try some! Crafts, such as wood working and sewing   Hobbies, such as gardening and building model airplanes   SocializingVisit old friends or join groups to meet new ones. Reading   Learning a new language   Taking a class, whether it be art history or jade chi   TravelingExperience the food, history, and culture of your destination   Learning to use a computer   Going to museums, the theater, or thought-provoking movies   Changing things in your daily life, such as reversing your pattern in the grocery store or brushing your teeth using your nondominant hand   Use Memory Aids   There is no need to remember every detail on your own. These memory aids can help:   Calendars and day planners   Electronic organizers to store all sorts of helpful informationThese devices can \"beep\" to remind you of appointments. A book of days to record birthdays, anniversaries, and other occasions that occur on the same date every year   Detailed \"to-do\" lists and strategically placed sticky notes   Quick \"study\" sessionsBefore a gathering, review who will be there so their names will be fresh in your mind. Establish routinesFor example, keep your keys, wallet, and umbrella in the same place all the time or take medicine with your 8:00 AM glass of juice   Live a Healthy Life   Many actions that will keep your body strong will do the same for your mind. For example:   Talk to Your Doctor About Herbs and Supplements    Malnutrition and vitamin deficiencies can impair your mental function. For example, vitamin B12 deficiency can cause a range of symptoms, including confusion. But, what if your nutritional needs are being met? Can herbs and supplements still offer a benefit?  Researchers have investigated a range of natural remedies, such as ginkgo , ginseng , and the supplement phosphatidylserine (PS). So far, though, the evidence is inconsistent as to whether these products can improve memory or thinking. If you are interested in taking herbs and supplements, talk to your doctor first because they may interact with other medicines that you are taking. Exercise Regularly    Among the many benefits of regular exercise are increased blood flow to the brain and decreased risk of certain diseases that can interfere with memory function. One study found that even moderate exercise has a beneficial effect. Examples of \"moderate\" exercise include:   Playing 18 holes of golf once a week, without a cart   Playing tennis twice a week   Walking one mile per day   Manage Stress    It can be tough to remember what is important when your mind is cluttered. Make time for relaxation. Choose activities that calm you down, and make it routine. Manage Chronic Conditions    Side effects of high blood pressure , diabetes, and heart disease can interfere with mental function. Many of the lifestyle steps discussed here can help manage these conditions. Strive to eat a healthy diet, exercise regularly, get stress under control, and follow your doctor's advice for your condition. Minimize Medications    Talk to your doctor about the medicines that you take. Some may be unnecessary. Also, healthy lifestyle habits may lower the need for certain drugs. Last Reviewed: April 2010 Aliza Martínez MD   Updated: 4/13/2010   ·   Smoking Cessation  This document explains the best ways for you to quit smoking and new treatments to help. It lists new medicines that can double or triple your chances of quitting and quitting for good. It also considers ways to avoid relapses and concerns you may have about quitting, including weight gain. NICOTINE: A POWERFUL ADDICTION  If you have tried to quit smoking, you know how hard it can be.  It is hard because nicotine is a very addictive drug. For some people, it can be as addictive as heroin or cocaine. Usually, people make 2 or 3 tries, or more, before finally being able to quit. Each time you try to quit, you can learn about what helps and what hurts. Quitting takes hard work and a lot of effort, but you can quit smoking. QUITTING SMOKING IS ONE OF THE MOST IMPORTANT THINGS YOU WILL EVER DO. You will live longer, feel better, and live better. The impact on your body of quitting smoking is felt almost immediately:  Within 20 minutes, blood pressure decreases. Pulse returns to its normal level. After 8 hours, carbon monoxide levels in the blood return to normal. Oxygen level increases. After 24 hours, chance of heart attack starts to decrease. Breath, hair, and body stop smelling like smoke. After 48 hours, damaged nerve endings begin to recover. Sense of taste and smell improve. After 72 hours, the body is virtually free of nicotine. Bronchial tubes relax and breathing becomes easier. After 2 to 12 weeks, lungs can hold more air. Exercise becomes easier and circulation improves. Quitting will reduce your risk of having a heart attack, stroke, cancer, or lung disease:  After 1 year, the risk of coronary heart disease is cut in half. After 5 years, the risk of stroke falls to the same as a nonsmoker. After 10 years, the risk of lung cancer is cut in half and the risk of other cancers decreases significantly. After 15 years, the risk of coronary heart disease drops, usually to the level of a nonsmoker. If you are pregnant, quitting smoking will improve your chances of having a healthy baby. The people you live with, especially your children, will be healthier. You will have extra money to spend on things other than cigarettes. FIVE KEYS TO QUITTING  Studies have shown that these 5 steps will help you quit smoking and quit for good.  You have the best chances of quitting if you use them together:  Get ready. Get support and encouragement. Learn new skills and behaviors. Get medicine to reduce your nicotine addiction and use it correctly. Be prepared for relapse or difficult situations. Be determined to continue trying to quit, even if you do not succeed at first.  1. GET READY  Set a quit date. Change your environment. Get rid of ALL cigarettes, ashtrays, matches, and lighters in your home, car, and place of work. Do not let people smoke in your home. Review your past attempts to quit. Think about what worked and what did not. Once you quit, do not smoke. NOT EVEN A PUFF! 2. GET SUPPORT AND ENCOURAGEMENT  Studies have shown that you have a better chance of being successful if you have help. You can get support in many ways. Tell your family, friends, and coworkers that you are going to quit and need their support. Ask them not to smoke around you. Talk to your caregivers (doctor, dentist, nurse, pharmacist, psychologist, and/or smoking counselor). Get individual, group, or telephone counseling and support. The more counseling you have, the better your chances are of quitting. Programs are available at [facility]. Call your local health department for information about programs in your area. Spiritual beliefs and practices may help some smokers quit. Quit meters are small computer programs online or downloadable that keep track of quit statistics, such as amount of \"quit-time,\" cigarettes not smoked, and money saved. Many smokers find one or more of the many self-help books available useful in helping them quit and stay off tobacco.  3. LEARN NEW SKILLS AND BEHAVIORS  Try to distract yourself from urges to smoke. Talk to someone, go for a walk, or occupy your time with a task. When you first try to quit, change your routine. Take a different route to work. Drink tea instead of coffee. Eat breakfast in a different place. Do something to reduce your stress. Take a hot bath, exercise, or read a book. Plan something enjoyable to do every day. Reward yourself for not smoking. Explore interactive web-based programs that specialize in helping you quit. 4. GET MEDICINE AND USE IT CORRECTLY  Medicines can help you stop smoking and decrease the urge to smoke. Combining medicine with the above behavioral methods and support can quadruple your chances of successfully quitting smoking. The [place] Food and Drug Administration (FDA) has approved 7 medicines to help you quit smoking. These medicines fall into 3 categories. Nicotine replacement therapy (delivers nicotine to your body without the negative effects and risks of smoking):  Nicotine gum: Available over-the-counter. Nicotine lozenges: Available over-the-counter. Nicotine inhaler: Available by prescription. Nicotine nasal spray: Available by prescription. Nicotine skin patches (transdermal): Available by prescription and over-the-counter. Antidepressant medicine (helps people abstain from smoking, but how this works is unknown): Bupropion sustained-release (SR) tablets: Available by prescription. Nicotinic receptor partial agonist (simulates the effect of nicotine in your brain):  Varenicline tartrate tablets: Available by prescription. Ask your caregiver for advice about which medicines to use and how to use them. Carefully read the information on the package. Everyone who is trying to quit may benefit from using a medicine. If you are pregnant or trying to become pregnant, nursing an infant, you are under age 25, or you smoke fewer than 10 cigarettes per day, talk to your caregiver before taking any nicotine replacement medicines. You should stop using a nicotine replacement product and call your caregiver if you experience nausea, dizziness, weakness, vomiting, fast or irregular heartbeat, mouth problems with the lozenge or gum, or redness or swelling of the skin around the patch that does not go away.   Do not use any other product containing nicotine while using a nicotine replacement product. Talk to your caregiver before using these products if you have diabetes, heart disease, asthma, stomach ulcers, you had a recent heart attack, you have high blood pressure that is not controlled with medicine, a history of irregular heartbeat, or you have been prescribed medicine to help you quit smoking. 5. BE PREPARED FOR RELAPSE OR DIFFICULT SITUATIONS  Most relapses occur within the first 3 months after quitting. Do not be discouraged if you start smoking again. Remember, most people try several times before they finally quit. You may have symptoms of withdrawal because your body is used to nicotine. You may crave cigarettes, be irritable, feel very hungry, cough often, get headaches, or have difficulty concentrating. The withdrawal symptoms are only temporary. They are strongest when you first quit, but they will go away within 10 to 14 days. Here are some difficult situations to watch for:  Alcohol. Avoid drinking alcohol. Drinking lowers your chances of successfully quitting. Caffeine. Try to reduce the amount of caffeine you consume. It also lowers your chances of successfully quitting. Other smokers. Being around smoking can make you want to smoke. Avoid smokers. Weight gain. Many smokers will gain weight when they quit, usually less than 10 pounds. Eat a healthy diet and stay active. Do not let weight gain distract you from your main goal, quitting smoking. Some medicines that help you quit smoking may also help delay weight gain. You can always lose the weight gained after you quit. Bad mood or depression. There are a lot of ways to improve your mood other than smoking. If you are having problems with any of these situations, talk to your caregiver. SPECIAL SITUATIONS AND CONDITIONS  Studies suggest that everyone can quit smoking. Your situation or condition can give you a special reason to quit.   Pregnant women/new mothers: By quitting, you protect your baby's health and your own. Hospitalized patients: By quitting, you reduce health problems and help healing. Heart attack patients: By quitting, you reduce your risk of a second heart attack. Lung, head, and neck cancer patients: By quitting, you reduce your chance of a second cancer. Parents of children and adolescents: By quitting, you protect your children from illnesses caused by secondhand smoke. QUESTIONS TO THINK ABOUT  Think about the following questions before you try to stop smoking. You may want to talk about your answers with your caregiver. Why do you want to quit? If you tried to quit in the past, what helped and what did not? What will be the most difficult situations for you after you quit? How will you plan to handle them? Who can help you through the tough times? Your family? Friends? Caregiver? What pleasures do you get from smoking? What ways can you still get pleasure if you quit? Here are some questions to ask your caregiver: How can you help me to be successful at quitting? What medicine do you think would be best for me and how should I take it? What should I do if I need more help? What is smoking withdrawal like? How can I get information on withdrawal?  Quitting takes hard work and a lot of effort, but you can quit smoking. FOR MORE INFORMATION   Smokefree. gov (PortableGrid.se) provides free, accurate, evidence-based information and professional assistance to help support the immediate and long-term needs of people trying to quit smoking. Document Released: 12/12/2002 Document Revised: 12/06/2012 Document Reviewed: 10/04/2010  MAR[facility] E. [facility] Patient Information ©2012 Jesi Gonzales. ·     Keeping Home a [facility]       As we get older, changes in balance, gait, strength, vision, hearing, and cognition make even the most youthful senior more prone to accidents.  Falls are one of the leading health risks for older people. This increased risk of falling is related to:   Aging process (eg, decreased muscle strength, slowed reflexes)   Higher incidence of chronic health problems (eg, arthritis, diabetes) that may limit mobility, agility or sensory awareness   Side effects of medicine (eg, dizziness, blurred vision)especially medicines like prescription pain medicines and drugs used to treat mental health conditions   Depending on the brittleness of your bones, the consequences of a fall can be serious and long lasting. Home Life   Research by the Association of Aging [facility]) shows that some home accidents among older adults can be prevented by making simple lifestyle changes and basic modifications and repairs to the home environment. Here are some lifestyle changes that experts recommend:   Have your hearing and vision checked regularly. Be sure to wear prescription glasses that are right for you. Speak to your doctor or pharmacist about the possible side effects of your medicines. A number of medicines can cause dizziness. If you have problems with sleep, talk to your doctor. Limit your intake of alcohol. If necessary, use a cane or walker to help maintain your balance. Wear supportive, rubber-soled shoes, even at home. If you live in a region that gets wintry weather, you may want to put special cleats on your shoes to prevent you from slipping on the snow and ice. Exercise regularly to help maintain muscle tone, agility, and balance. Always hold the banister when going up or down stairs. Also, use  bars when getting in or out of the bath or shower, or using the toilet. To avoid dizziness, get up slowly from a lying down position. Sit up first, dangling your legs for a minute or two before rising to a standing position. Overall Home Safety Check   According to the Consumer Product Safety Commision's \"Older Consumer Home Safety Checklist,\" it is important to check for potential hazards in each room.  And remember, proper lighting is an essential factor in home safety. If you cannot see clearly, you are more likely to fall. Important questions to ask yourself include:   Are lamp, electric, extension, and telephone cords placed out of the flow of traffic and maintained in good condition? Have frayed cords been replaced? Are all small rugs and runners slip resistant? If not, you can secure them to the floor with a special double-sided carpet tape. Are smoke detectors properly locatedone on every floor of your home and one outside of every sleeping area? Are they in good working order? Are batteries replaced at least once a year? Do you have a well-maintained carbon monoxide detector outside every sleeping are in your home? Does your furniture layout leave plenty of space to maneuver between and around chairs, tables, beds, and sofas? Are hallways, stairs and passages between rooms well lit? Can you reach a lamp without getting out of bed? Are floor surfaces well maintained? Shag rugs, high-pile carpeting, tile floors, and polished wood floors can be particularly slippery. Stairs should always have handrails and be carpeted or fitted with a non-skid tread. Is your telephone easily reachable. Is the cord safely tucked away? Room by Room   According to the Association of Aging, bathrooms and bret are the two most potentially hazardous rooms in your home. In the Kitchen    Be sure your stove is in proper working order and always make sure burners and the oven are off before you go out or go to sleep. Keep pots on the back burners, turn handles away from the front of the stove, and keep stove clean and free of grease build-up. Kitchen ventilation systems and range exhausts should be working properly. Keep flammable objects such as towels and pot holders away from the cooking area except when in use. Make sure kitchen curtains are tied back.     Move cords and appliances away from the sink and hot surfaces. If extension cords are needed, install wiring guides so they do not hang over the sink, range, or working areas. Look for coffee pots, kettles and toaster ovens with automatic shut-offs. Keep a mop handy in the kitchen so you can wipe up spills instantly. You should also have a small fire extinguisher. Arrange your kitchen with frequently used items on lower shelves to avoid the need to stand on a stepstool to reach them. Make sure countertops are well-lit to avoid injuries while cutting and preparing food. In the Bathroom    Use a non-slip mat or decals in the tub and shower, since wet, soapy tile or porcelain surfaces are extremely slippery. Make sure bathroom rugs are non-skid or tape them firmly to the floor. Bathtubs should have at least one, preferably two, grab bars, firmly attached to structural supports in the wall. (Do not use built-in soap holders or glass shower doors as grab bars.)    Tub seats fitted with non-slip material on the legs allow you to wash sitting down. For people with limited mobility, bathtub transfer benches allow you to slide safely into the tub. Raised toilet seats and toilet safety rails are helpful for those with knee or hip problems. In the [facility]    Make sure you use a nightlight and that the area around your bed is clear of potential obstacles. Be careful with electric blankets and never go to sleep with a heating pad, which can cause serious burns even if on a low setting. Use fire-resistant mattress covers and pillows, and NEVER smoke in bed. Keep a phone next to the bed that is programmed to dial 911 at the push of a button. If you have a chronic condition, you may want to sign on with an automatic call-in service. Typically the system includes a small pendant that connects directly to an emergency medical voice-response system.  You should also make arrangements to stay in contact with someonefriend, neighbor, family memberon a regular schedule. Fire Prevention   According to the [employer]. (Smoke Alarms for Every) [address] [facility], senior citizens are one of the two highest risk groups for death and serious injuries due to residential fires. When cooking, wear short-sleeved items, never a bulky long-sleeved robe. The [facility]'s Safety Checklist for Older Consumers emphasizes the importance of checking basements, garages, workshops and storage areas for fire hazards, such as volatile liquids, piles of old rags or clothing and overloaded circuits. Never smoke in bed or when lying down on a couch or recliner chair. Small portable electric or kerosene heaters are responsible for many home fires and should be used cautiously if at all. If you do use one, be sure to keep them away from flammable materials. In case of fire, make sure you have a pre-established emergency exit plan. Have a professional check your fireplace and other fuel-burning appliances yearly. Helping [employer]   Baby boomers entering the subramanian years will continue to see the development of new products to help older adults live safely and independently in spite of age-related changes. Making Life More Livable  , by Luz Stapleton, lists over 1,000 products for \"living well in the mature years,\" such as bathing and mobility aids, household security devices, ergonomically designed knives and peelers, and faucet valves and knobs for temperature control. Medical supply stores and organizations are good sources of information about products that improve your quality of life and insure your safety. Last Reviewed: November 2009 Bobbi Lucio MD   Updated: 3/7/2011     ·        Advance Care Planning: Care Instructions  Your Care Instructions     It can be hard to live with an illness that cannot be cured. But if your health is getting worse, you may want to make decisions about end-of-life care.  Planning for the end of your life does not mean that you are giving up. It is a way to make sure that your wishes are met. Clearly stating your wishes can make it easier for your loved ones. Making plans while you are still able may also ease your mind and make your final days less stressful and more meaningful. Follow-up care is a key part of your treatment and safety. Be sure to make and go to all appointments, and call your doctor if you are having problems. It's also a good idea to know your test results and keep a list of the medicines you take. What can you do to plan for the end of life? You can bring these issues up with your doctor. You do not need to wait until your doctor starts the conversation. You might start with \"I would not be willing to live with . Jonah Colunga \" When you complete this sentence it helps your doctor understand your wishes. Talk openly and honestly with your doctor. This is the best way to understand the decisions you will need to make as your health changes. Know that you can always change your mind. Ask your doctor about commonly used life-support measures. These include tube feedings, breathing machines, and fluids given through a vein (IV). Understanding these treatments will help you decide whether you want them. You may choose to have these life-supporting treatments for a limited time. This allows a trial period to see whether they will help you. You may also decide that you want your doctor to take only certain measures to keep you alive. It is important to spell out these conditions so that your doctor and family understand them. Talk to your doctor about how long you are likely to live. He or she may be able to give you an idea of what usually happens with your specific illness. Think about preparing papers that state your wishes. This way there will not be any confusion about what you want. You can change your instructions at any time. Which papers should you prepare?   Advance directives are legal papers that tell doctors how you want to be cared for at the end of your life. You do not need a  to write these papers. Ask your doctor or your state health department for information on how to write your advance directives. They may have the forms for each of these types of papers. Make sure your doctor has a copy of these on file, and give a copy to a family member or close friend. Consider a do-not-resuscitate order (DNR). This order asks that no extra treatments be done if your heart stops or you stop breathing. Extra treatments may include cardiopulmonary resuscitation (CPR), electrical shock to restart your heart, or a machine to breathe for you. If you decide to have a DNR order, ask your doctor to explain and write it. Place the order in your home where everyone can easily see it. Consider a living will. A living will explains your wishes about life support and other treatments at the end of your life if you become unable to speak for yourself. Living mart tell doctors to use or not use treatments that would keep you alive. You must have one or two witnesses or a notary present when you sign this form. A living will may be called something else in your state. Consider a medical power of . This form allows you to name a person to make decisions about your care if you are not able to. Most people ask a close friend or family member. Talk to this person about the kinds of treatments you want and those that you do not want. Make sure this person understands your wishes. A medical power of  may be called something else in your state. These legal papers are simple to change. Tell your doctor what you want to change, and ask him or her to make a note in your medical file. Give your family updated copies of the papers. Where can you learn more? Go to [email or web address] and sign in to your Veracode account.  Enter P184 in the [facility] box to learn more about \"Advance Care Planning: Care Instructions. \"     If you do not have an account, please click on the \"Sign Up Now\" link. Current as of: December 9, 2019               Content Version: 12.6  © 6971-8330 [employer]. Care instructions adapted under license by [facility] ([facility]). If you have questions about a medical condition or this instruction, always ask your healthcare professional. [facility] [address] any warranty or liability for your use of this information. ·        Learning About Living Rosanna Seal  What is a living will? A living will, also called a declaration, is a legal form. It tells your family and your doctor your wishes when you can't speak for yourself. It's used by the health professionals who will treat you as you near the end of your life or if you get seriously hurt or ill. If you put your wishes in writing, your loved ones and others will know what kind of care you want. They won't need to guess. This can ease your mind and be helpful to others. And you can change or cancel your living will at any time. A living will is not the same as an estate or property will. An estate will explains what you want to happen with your money and property after you die. How do you use it? A living will is used to describe the kinds of treatment or life support you want as you near the end of your life or if you get seriously hurt or ill. Keep these facts in mind about living mart. Your living will is used only if you can't speak or make decisions for yourself. Most often, one or more doctors must certify that you can't speak or decide for yourself before your living will takes effect. If you get better and can speak for yourself again, you can accept or refuse any treatment. It doesn't matter what you said in your living will. Some states may limit your right to refuse treatment in certain cases.  For example, you may need to clearly state in your living will that you don't want artificial hydration and nutrition, such as being fed through a tube. Is a living will a legal document? A living will is a legal document. Each state has its own laws about living mart. And a living will may be called something else in your state. Here are some things to know about living mart. You don't need an  to complete a living will. But legal advice can be helpful if your state's laws are unclear. It can also help if your health history is complicated or your family can't agree on what should be in your living will. You can change your living will at any time. Some people find that their wishes about end-of-life care change as their health changes. If you make big changes to your living will, complete a new form. If you move to another state, make sure that your living will is legal in the state where you now live. In most cases, doctors will respect your wishes even if you have a form from a different state. You might use a universal form that has been approved by many states. This kind of form can sometimes be filled out and stored online. Your digital copy will then be available wherever you have a connection to the internet. The doctors and nurses who need to treat you can find it right away. Your state may offer an online registry. This is another place where you can store your living will online. It's a good idea to get your living will notarized. This means using a person called a  to watch two people sign, or witness, your living will. What should you know when you create a living will? Here are some questions to ask yourself as you make your living will:  Do you know enough about life support methods that might be used? If not, talk to your doctor so you know what might be done if you can't breathe on your own, your heart stops, or you can't swallow. What things would you still want to be able to do after you receive life-support methods? Would you want to be able to walk?  To speak? To eat on your own? To live without the help of machines? Do you want certain Jehovah's witness practices performed if you become very ill? If you have a choice, where do you want to be cared for? In your home? At a hospital or nursing home? If you have a choice at the end of your life, where would you prefer to die? At home? In a hospital or nursing home? Somewhere else? Would you prefer to be buried or cremated? Do you want your organs to be donated after you die? What should you do with your living will? Make sure that your family members and your health care agent have copies of your living will (also called a declaration). Give your doctor a copy of your living will. Ask him or her to keep it as part of your medical record. If you have more than one doctor, make sure that each one has a copy. Put a copy of your living will where it can be easily found. For example, some people may put a copy on their refrigerator door. If you are using a digital copy, be sure your doctor, family members, and health care agent know how to find and access it. Where can you learn more? Go to [email or web address] and sign in to your [employer]Glass account. Enter W063 in the [employer] box to learn more about \"Learning About Living Jessi Rice. \"     If you do not have an account, please click on the \"Sign Up Now\" link. Current as of: December 9, 2019               Content Version: 12.6  © 6377-1102 [employer]. Care instructions adapted under license by [facility] ([facility]). If you have questions about a medical condition or this instruction, always ask your healthcare professional. [place] [address] any warranty or liability for your use of this information. ·        Learning About Medical Power of   What is a medical power of ?      A medical power of , also called a durable power of  for health care, is one type of the legal forms called advance directives. It lets you name the person you want to make treatment decisions for you if you can't speak or decide for yourself. The person you choose is called your health care agent. This person is also called a health care proxy or health care surrogate. A medical power of  may be called something else in your state. How do you choose a health care agent? Choose your health care agent carefully. This person may or may not be a family member. Talk to the person before you make your final decision. Make sure he or she is comfortable with this responsibility. It's a good idea to choose someone who:  Is at least 25years old. Knows you well and understands what makes life meaningful for you. Understands your Taoist and moral values. Will do what you want, not what he or she wants. Will be able to make difficult choices at a stressful time. Will be able to refuse or stop treatment, if that is what you would want, even if you could die. Will be firm and confident with health professionals if needed. Will ask questions to get needed information. Lives near you or agrees to travel to you if needed. Your family may help you make medical decisions while you can still be part of that process. But it's important to choose one person to be your health care agent in case you aren't able to make decisions for yourself. If you don't fill out the legal form and name a health care agent, the decisions your family can make may be limited. A health care agent may be called something else in your state. Who will make decisions for you if you don't have a health care agent? If you don't have a health care agent or a living will, you may not get the care you want. Decisions may be made by family members who disagree about your medical care. Or decisions may be made by a medical professional who doesn't know you well. In some cases, a  makes the decisions.   When you name a health care agent, it

## 2020-12-17 RX ORDER — NALOXONE HYDROCHLORIDE 4 MG/.1ML
1 SPRAY NASAL PRN
Qty: 1 EACH | Refills: 2 | Status: SHIPPED | OUTPATIENT
Start: 2020-12-17

## 2020-12-17 RX ORDER — LANOLIN ALCOHOL/MO/W.PET/CERES
1000 CREAM (GRAM) TOPICAL DAILY
Qty: 30 TABLET | Refills: 12 | Status: SHIPPED | OUTPATIENT
Start: 2020-12-17 | End: 2021-03-11 | Stop reason: SDUPTHER

## 2020-12-17 RX ORDER — ASPIRIN 81 MG/1
81 TABLET ORAL DAILY
Qty: 90 TABLET | Refills: 2 | OUTPATIENT
Start: 2020-12-17

## 2020-12-17 RX ORDER — INDOMETHACIN 25 MG/1
25 CAPSULE ORAL 3 TIMES DAILY PRN
Qty: 30 CAPSULE | Refills: 1 | Status: SHIPPED | OUTPATIENT
Start: 2020-12-17 | End: 2021-02-04 | Stop reason: SDUPTHER

## 2020-12-29 RX ORDER — ICOSAPENT ETHYL 1000 MG/1
2 CAPSULE ORAL 2 TIMES DAILY
Qty: 360 CAPSULE | Refills: 3 | Status: SHIPPED | OUTPATIENT
Start: 2020-12-29 | End: 2022-01-26

## 2021-01-08 DIAGNOSIS — D47.2 IGG MONOCLONAL GAMMOPATHY OF UNCERTAIN SIGNIFICANCE: ICD-10-CM

## 2021-01-08 DIAGNOSIS — M51.36 DDD (DEGENERATIVE DISC DISEASE), LUMBAR: Chronic | ICD-10-CM

## 2021-01-08 DIAGNOSIS — M54.17 LUMBOSACRAL RADICULOPATHY AT S1: ICD-10-CM

## 2021-01-08 DIAGNOSIS — G62.1 ALCOHOLIC POLYNEUROPATHY (HCC): Chronic | ICD-10-CM

## 2021-01-08 DIAGNOSIS — M53.3 SI (SACROILIAC) PAIN: ICD-10-CM

## 2021-01-08 DIAGNOSIS — Z79.899 HIGH RISK MEDICATION USE: ICD-10-CM

## 2021-01-08 DIAGNOSIS — K59.00 CONSTIPATION, UNSPECIFIED CONSTIPATION TYPE: ICD-10-CM

## 2021-01-08 DIAGNOSIS — M79.672 LEFT FOOT PAIN: ICD-10-CM

## 2021-01-08 DIAGNOSIS — E55.9 VITAMIN D DEFICIENCY: ICD-10-CM

## 2021-01-08 RX ORDER — HYDROCODONE BITARTRATE AND ACETAMINOPHEN 7.5; 325 MG/1; MG/1
1 TABLET ORAL EVERY 8 HOURS PRN
Qty: 80 TABLET | Refills: 0 | Status: SHIPPED | OUTPATIENT
Start: 2021-01-15 | End: 2021-02-10 | Stop reason: SDUPTHER

## 2021-01-08 RX ORDER — MULTIVIT-MIN/IRON/FOLIC ACID/K 18-600-40
1 CAPSULE ORAL 2 TIMES DAILY
Qty: 30 CAPSULE | Refills: 5 | Status: SHIPPED | OUTPATIENT
Start: 2021-01-08 | End: 2021-03-25 | Stop reason: SDUPTHER

## 2021-01-08 RX ORDER — DOCUSATE SODIUM 100 MG/1
CAPSULE, LIQUID FILLED ORAL
Qty: 30 CAPSULE | Refills: 3 | Status: SHIPPED | OUTPATIENT
Start: 2021-01-08 | End: 2021-03-04

## 2021-01-08 RX ORDER — GABAPENTIN 800 MG/1
TABLET ORAL
Qty: 120 TABLET | Refills: 3 | Status: SHIPPED | OUTPATIENT
Start: 2021-01-08 | End: 2021-01-20

## 2021-01-08 RX ORDER — PREGABALIN 50 MG/1
50 CAPSULE ORAL 3 TIMES DAILY
Qty: 90 CAPSULE | Refills: 3 | Status: SHIPPED | OUTPATIENT
Start: 2021-01-08 | End: 2021-01-20

## 2021-01-20 ENCOUNTER — OFFICE VISIT (OUTPATIENT)
Dept: PHYSICAL MEDICINE AND REHAB | Age: 61
End: 2021-01-20
Payer: COMMERCIAL

## 2021-01-20 VITALS
SYSTOLIC BLOOD PRESSURE: 150 MMHG | BODY MASS INDEX: 30.1 KG/M2 | DIASTOLIC BLOOD PRESSURE: 66 MMHG | HEIGHT: 71 IN | WEIGHT: 215 LBS

## 2021-01-20 DIAGNOSIS — G62.1 ALCOHOLIC POLYNEUROPATHY (HCC): Chronic | ICD-10-CM

## 2021-01-20 DIAGNOSIS — Z79.899 HIGH RISK MEDICATION USE: ICD-10-CM

## 2021-01-20 DIAGNOSIS — G89.29 CHRONIC MIDLINE LOW BACK PAIN WITH BILATERAL SCIATICA: ICD-10-CM

## 2021-01-20 DIAGNOSIS — I63.02 CEREBROVASCULAR ACCIDENT (CVA) DUE TO THROMBOSIS OF BASILAR ARTERY (HCC): Chronic | ICD-10-CM

## 2021-01-20 DIAGNOSIS — M51.36 DDD (DEGENERATIVE DISC DISEASE), LUMBAR: Primary | Chronic | ICD-10-CM

## 2021-01-20 DIAGNOSIS — M54.42 CHRONIC MIDLINE LOW BACK PAIN WITH BILATERAL SCIATICA: ICD-10-CM

## 2021-01-20 DIAGNOSIS — M53.3 SI (SACROILIAC) PAIN: ICD-10-CM

## 2021-01-20 DIAGNOSIS — M62.830 MUSCLE SPASM OF BACK: ICD-10-CM

## 2021-01-20 DIAGNOSIS — M54.41 CHRONIC MIDLINE LOW BACK PAIN WITH BILATERAL SCIATICA: ICD-10-CM

## 2021-01-20 DIAGNOSIS — M54.17 LUMBOSACRAL RADICULOPATHY AT S1: ICD-10-CM

## 2021-01-20 DIAGNOSIS — F40.11 GENERALIZED SOCIAL PHOBIA: ICD-10-CM

## 2021-01-20 DIAGNOSIS — J41.0 SIMPLE CHRONIC BRONCHITIS (HCC): ICD-10-CM

## 2021-01-20 DIAGNOSIS — M79.10 MYALGIA: ICD-10-CM

## 2021-01-20 LAB — URIC ACID, SERUM: 7.8 MG/DL (ref 3.4–7)

## 2021-01-20 PROCEDURE — G8417 CALC BMI ABV UP PARAM F/U: HCPCS | Performed by: PHYSICAL MEDICINE & REHABILITATION

## 2021-01-20 PROCEDURE — G8427 DOCREV CUR MEDS BY ELIG CLIN: HCPCS | Performed by: PHYSICAL MEDICINE & REHABILITATION

## 2021-01-20 PROCEDURE — 3017F COLORECTAL CA SCREEN DOC REV: CPT | Performed by: PHYSICAL MEDICINE & REHABILITATION

## 2021-01-20 PROCEDURE — 99214 OFFICE O/P EST MOD 30 MIN: CPT | Performed by: PHYSICAL MEDICINE & REHABILITATION

## 2021-01-20 PROCEDURE — 4004F PT TOBACCO SCREEN RCVD TLK: CPT | Performed by: PHYSICAL MEDICINE & REHABILITATION

## 2021-01-20 PROCEDURE — 3023F SPIROM DOC REV: CPT | Performed by: PHYSICAL MEDICINE & REHABILITATION

## 2021-01-20 PROCEDURE — G8926 SPIRO NO PERF OR DOC: HCPCS | Performed by: PHYSICAL MEDICINE & REHABILITATION

## 2021-01-20 PROCEDURE — G8482 FLU IMMUNIZE ORDER/ADMIN: HCPCS | Performed by: PHYSICAL MEDICINE & REHABILITATION

## 2021-01-20 RX ORDER — PREGABALIN 75 MG/1
75 CAPSULE ORAL 3 TIMES DAILY
Qty: 180 CAPSULE | Refills: 3 | Status: SHIPPED | OUTPATIENT
Start: 2021-01-20 | End: 2021-05-11 | Stop reason: SDUPTHER

## 2021-01-20 ASSESSMENT — ENCOUNTER SYMPTOMS
COLOR CHANGE: 0
CONSTIPATION: 1
BACK PAIN: 1
ABDOMINAL PAIN: 0
SHORTNESS OF BREATH: 0
APNEA: 0
DIARRHEA: 0
PHOTOPHOBIA: 0
NAUSEA: 0
VOICE CHANGE: 1
WHEEZING: 0
VOMITING: 0
COUGH: 0
EYE PAIN: 0
BOWEL INCONTINENCE: 0
VISUAL CHANGE: 0
CHEST TIGHTNESS: 0

## 2021-01-20 NOTE — PROGRESS NOTES
committed to no more ETOH use. The current episode started more than 1 month ago. This is a chronic problem. The onset of the illness is precipitated by a stressful event. The degree of incapacity that he is experiencing as a consequence of his illness is mild. Additional symptoms of the illness do not include appetite change, unexpected weight change, fatigue, visual change, headaches or abdominal pain. He does not admit to suicidal ideas. He does not have a plan to attempt suicide. He does not contemplate harming himself. He has not already injured self. He does not contemplate injuring another person. He has not already  injured another person. Risk factors that are present for mental illness include substance abuse.        Past Medical History:   Diagnosis Date    Acute idiopathic gout of left wrist 04/12/4510    Alcoholic polyneuropathy (HCC)     Asthma     CAD (coronary artery disease)     [facility]    Chronic back pain greater than 3 months duration     CN III palsy, right eye 2017    Dr Lianne Padilla    COPD (chronic obstructive pulmonary disease) ([facility] [address] 2014    DDD (degenerative disc disease), lumbar 3/23/2015    Elevated liver enzymes 2014    Hyperlipidemia     on med > 10 yrs    Hypertension     on meds x 1 yr    IgA monoclonal gammopathy 2015    Dr Danica Jensen    Insomnia, unspecified     LBP (low back pain)     Dr Josh English Neuropathy     Occlusion and stenosis of carotid artery without mention of cerebral infarction     Dr Shy Prater history of alcoholism ([facility] [address]     quit 11/05/2010, relapsed 2016    S/P carotid endarterectomy 01/2019    Dr Cody Aldridge    Sensorimotor neuropathy     Bilateral lower extremities-EMG 03/23/15 ([place])    Smoking trying to quit     Traumatic compression fracture of T11 thoracic vertebra ([facility] [address] 4759    Umbilical hernia      Past Surgical History:   Procedure Laterality Date    CAROTID ENDARTERECTOMY Left 1/18/2019    LEFT CAROTID ENDARTERECTOMY performed by Faiza Peck Eh Hood MD at [facility]  8/19/2014    COLONOSCOPY N/A 12/18/2019    COLONOSCOPY DIAGNOSTIC performed by Sydney Gallardo MD at [facility], COLON, DIAGNOSTIC      HERNIA REPAIR      TONSILLECTOMY      UPPER GASTROINTESTINAL ENDOSCOPY  8/19/2014    VENTRAL HERNIA REPAIR  09/01/15    W/MESH AND W/UMBILECTOMY     Social History     Socioeconomic History    Marital status: Single     Spouse name: None    Number of children: 1    Years of education: None    Highest education level: None   Occupational History    Occupation: SSI for learning disability   Social Needs    Financial resource strain: Not hard at all   [place] insecurity     Worry: None     Inability: None    Transportation needs     Medical: No     Non-medical: No   Tobacco Use    Smoking status: Current Every Day Smoker     Packs/day: 0.50     Years: 40.00     Pack years: 20.00     Types: Cigarettes     Start date: 1968    Smokeless tobacco: Never Used   Substance and Sexual Activity    Alcohol use: No     Alcohol/week: 0.0 standard drinks     Comment: 8/31/2016 Quit Date    Drug use: No    Sexual activity: None   Lifestyle    Physical activity     Days per week: None     Minutes per session: None    Stress: None   Relationships    Social connections     Talks on phone: None     Gets together: None     Attends Caodaism service: None     Active member of club or organization: None     Attends meetings of clubs or organizations: None     Relationship status: None    Intimate partner violence     Fear of current or ex partner: None     Emotionally abused: None     Physically abused: None     Forced sexual activity: None   Other Topics Concern    None   Social History Narrative    Born in [place], one of 5    Came to [place] at age 1 with parents    Never , one daughter    Never worked, disabled since 12 (mental)    Lives in [facility] [place] [place] with brother, in a house        Attends Santosh adler spasm.      Lumbar back: He exhibits decreased range of motion, tenderness, bony tenderness and pain. He exhibits no swelling, no edema, no deformity, no laceration, no spasm and normal pulse. Back:       Right upper arm: Normal.      Left upper arm: Normal.      Right forearm: Normal.      Left forearm: Normal.      Right hand: Normal.      Left hand: Normal.      Right upper leg: Normal.      Left upper leg: Normal.      Right lower leg: Normal.      Left lower leg: Normal.        Legs:       Right foot: Normal.      Left foot: Normal.      Comments: Tender areas are indicated by numbered spot         Skin:     General: Skin is warm and dry. Coloration: Skin is not pale. Findings: No abrasion, bruising, ecchymosis, erythema, laceration, petechiae or rash. Rash is not macular, pustular or urticarial.      Nails: There is no clubbing. Neurological:      Mental Status: He is alert and oriented to person, place, and time. Cranial Nerves: No cranial nerve deficit. Sensory: Sensory deficit present. Motor: No tremor, atrophy or abnormal muscle tone. Coordination: Coordination normal.      Deep Tendon Reflexes: Reflexes abnormal. Babinski sign absent on the right side. Babinski sign absent on the left side. Psychiatric:         Attention and Perception: He is attentive. Mood and Affect: Mood is not anxious or depressed. Affect is not labile, blunt, angry or inappropriate. Speech: He is communicative. Speech is not rapid and pressured, delayed, slurred or tangential.         Behavior: Behavior normal. Behavior is not agitated, slowed, aggressive, withdrawn, hyperactive or combative. Thought Content: Thought content normal. Thought content is not paranoid or delusional. Thought content does not include homicidal or suicidal ideation. Thought content does not include homicidal or suicidal plan. Cognition and Memory: Memory is not impaired.  He does not exhibit impaired recent memory or impaired remote memory. Judgment: Judgment normal. Judgment is not impulsive or inappropriate. Ortho Exam  Neurologic Exam     Mental Status   Oriented to person, place, and time. Speech: not slurred   Level of consciousness: alert  Knowledge: good. Able to name object. Able to read. Able to repeat. Able to write. Cranial Nerves     CN III, IV, VI   Pupils are equal, round, and reactive to light. After a thorough review and discussion of the previous medical records, patient comprehensive medical, surgical, and family and social history, Review of Systems, their OARRS, their Screener and Opioid Assessment for Patients with Pain (SOAPP®-R), recent diagnostics, and symptomatic results to previous treatment, it is my impression that the patients is suffering with progressive and severe:     Diagnosis Orders   1. DDD (degenerative disc disease), lumbar  pregabalin (LYRICA) 75 MG capsule   2. Alcoholic polyneuropathy (HCC)  pregabalin (LYRICA) 75 MG capsule   3. Cerebrovascular accident (CVA) due to thrombosis of basilar artery (HCC)  pregabalin (LYRICA) 75 MG capsule   4. High risk medication use - 01/25/18 OARRS PM&R, 03/23/18 OARRS PM&R, 02/15/17 Med Contract PM&R, 09/21/17 Urine Drug Screen: negative PM&R  pregabalin (LYRICA) 75 MG capsule   5. Lumbosacral radiculopathy at S1  pregabalin (LYRICA) 75 MG capsule   6. Generalized social phobia     7. Chronic midline low back pain with bilateral sciatica     8. SI (sacroiliac) pain     9. Simple chronic bronchitis ([address]     10. Muscle spasm of back     11.  Myalgia  [place] INJECT TRIGGER POINTS, 3 OR GREATER       I am also concerned by lifestyle and mood issues including:    Past Medical History:   Diagnosis Date    Acute idiopathic gout of left wrist 95/21/2844    Alcoholic polyneuropathy (HCC)     Asthma     CAD (coronary artery disease)     [facility]    Chronic back pain greater than 3 months duration     CN III palsy, right eye 2017    Dr Teresa Thompson    COPD (chronic obstructive pulmonary disease) ([facility] [address] 2014    DDD (degenerative disc disease), lumbar 3/23/2015    Elevated liver enzymes 2014    Hyperlipidemia     on med > 10 yrs    Hypertension     on meds x 1 yr    IgA monoclonal gammopathy 2015    Dr Kimi Cruz    Insomnia, unspecified     LBP (low back pain)     Dr Roseline Andersen Neuropathy     Occlusion and stenosis of carotid artery without mention of cerebral infarction     Dr Carles Schirmer history of alcoholism ([facility] [address]     quit 11/05/2010, relapsed 2016    S/P carotid endarterectomy 01/2019    Dr Joanne London    Sensorimotor neuropathy     Bilateral lower extremities-EMG 03/23/15 ([place])    Smoking trying to quit     Traumatic compression fracture of T11 thoracic vertebra ([facility] 9765    Umbilical hernia            Given their medication, chronic pain and lifestyle and medications they are at risk for :    Falls, constipation, addiction  Loss of livelyhood due to severe pain, debility, weight gain and  vitamin D deficiency    The patient was educated regarding proper diet, fitness routine, and regulatory restrictions concerning pain medications. Previous notes, comprehensive past medical, surgical, family history, and diagnostics were reviewed. Patient education and councelling were provided regarding off label use,treatment options and medication and injection risks. Current and old OARRS ([place] Automated Prescription Reporting System) records reviewed, all refills reviewed since last visit,  Behavioral agreement/MICK regulations   and Toxicology screen was reviewed with patient and is up to date. There are no current red flags.    They are making good progress regarding pain relief, they are performing at a functional level regarding activities of daily living, family interactions and psychological functioning, they're not having any adverse effects or side effects from the current medications, and I see no findings of aberrant drug taking or addiction related behaviors. The patient is aware that they have a chronic pain condition and they may require opiates dosing for life. All efforts will be made to wean to the lowest effective dose. Other therapies for pain have not been effective including nonopiate medications. Injections and exercises are only partially effective. A Rx for Narcan was offered to help prevent accidental overdose. RX Monitoring 7/15/2020   Attestation -   Acute Pain Prescriptions Prescription exceeds daily limit for a specific reason. See comments or note. ;Not required given exclusionary diagnoses. ..;Severe pain not adequately treated with lower dose. Periodic Controlled Substance Monitoring Possible medication side effects, risk of tolerance/dependence & alternative treatments discussed. ;No signs of potential drug abuse or diversion identified. ;Assessed functional status. ;Obtaining appropriate analgesic effect of treatment. Chronic Pain > 50 MEDD Re-evaluated the status of the patient's underlying condition causing pain. ;Considered consultation with a specialist.;Obtained or confirmed \"Consent for Opioid Use\" on file. Chronic Pain > 80 MEDD -               Patient is currently taking:       I am having Ranjith Rodriguez start on pregabalin. I am also having him maintain his Refresh Tears, senna-docusate, polyethylene glycol, budesonide-formoterol, nitroGLYCERIN, atorvastatin, clopidogrel, metoprolol succinate, valsartan-hydroCHLOROthiazide, aspirin EC, indomethacin, indomethacin, vitamin B-12, Narcan, Vascepa, DOK, HYDROcodone-acetaminophen, and vitamin D. I also recommend the following Medications:    Orders Placed This Encounter   Medications    pregabalin (LYRICA) 75 MG capsule     Sig: Take 1 capsule by mouth 3 times daily for 30 days. Dispense:  180 capsule     Refill:  3        -which helps with pain and function.     Otherwise, continue the current pain medications that I have prescibed. Radiologic:   Old films reviewed ER visit with left wrist films showed no acute problems no lytic or sclerotic areas,     I discussed results with patients. see Follow up plans below  For any new studies. Care Everywhere Updates:  requested and reviewed. ER visit regarding gout reviewed follow-up with family doctor  No new issues noted. Electrodiagnostic:  Previous studies requested,     I discussed results with patient. See follow-up plans for new studies.         Labs:  Previous labs reviewed     Lab Results   Component Value Date     10/12/2020    K 4.5 10/12/2020    CL 99 10/12/2020    CO2 29 10/12/2020    BUN 16 10/12/2020    CREATININE 0.73 10/12/2020    CALCIUM 10.1 10/12/2020    LABALBU 4.1 02/26/2020    BILITOT 0.5 02/26/2020    ALKPHOS 145 02/26/2020    AST 23 02/26/2020    ALT 36 02/26/2020     Lab Results   Component Value Date    WBC 11.0 10/12/2020    RBC 5.36 10/12/2020    HGB 16.1 10/12/2020    HCT 48.5 10/12/2020    MCV 90.5 10/12/2020    MCH 30.0 10/12/2020    MCHC 33.1 10/12/2020    RDW 15.1 10/12/2020     10/12/2020    MPV 8.8 08/25/2015       Lab Results   Component Value Date    LABAMPH Neg 07/20/2020    BARBSCNU Neg 07/20/2020    LABBENZ Neg 07/20/2020    CANSU Neg 07/20/2020    COCAIMETSCRU Neg 07/20/2020    PHENCYCLIDINESCREENURINE Neg 07/20/2020    DSCOMMENT see below 07/20/2020       Lab Results   Component Value Date    CODEINE Not Detected 09/20/2016    MORPHINE Not Detected 09/20/2016    Angela Rosado Not Detected 09/20/2016    OXYCODONE Not Detected 09/20/2016    NOROXYCODONE Not Detected 09/20/2016    NOROXYMU Not Detected 09/20/2016    [facility] Not Detected 09/20/2016    NORHYDU Not Detected 09/20/2016    HYDROMO Not Detected 09/20/2016    Lynrayna Pardon Not Detected 09/20/2016    Yuvonne Ada Not Detected 09/20/2016    FENTA Not Detected 09/20/2016    NORFENT Not Detected 09/20/2016    MEPERIDINE Not Detected 09/20/2016 TAPENU Not Detected 09/20/2016    TAPOSULFUR Not Detected 09/20/2016    METHADONE Not Detected 09/20/2016    LABPROP Not Detected 09/20/2016    TRAM Not Detected 09/20/2016    AMPH Not Detected 09/20/2016    METHAMP Not Detected 09/20/2016    MDMA Not Detected 09/20/2016    ECMDA Not Detected 09/20/2016       Lab Results   Component Value Date    PHENTERMINE Not Detected 09/20/2016    BENZOYL Not Detected 09/20/2016    Michelle Doctor Not Detected 09/20/2016    ALPHAOHALPRA Not Detected 09/20/2016    CLONAZEPAM Not Detected 09/20/2016    Jose Brad Not Detected 09/20/2016    DIAZEP Not Detected 09/20/2016    CESAR Not Detected 09/20/2016    OXAZ Not Detected 09/20/2016    Chloé Southern Not Detected 09/20/2016    LORAZEPAM Not Detected 09/20/2016    MIDAZOLAM Not Detected 09/20/2016    ZOLPIDEM Not Detected 09/20/2016    CORWIN Not Detected 09/20/2016    ETG Not Detected 09/20/2016    MARIJMET Not Detected 09/20/2016    PCP Not Detected 09/20/2016    PAINMGTDRUGP See Below 09/20/2016    EERPAINMGTPA See Note 09/20/2016    LABCREA 55.5 09/20/2016         , I discussed results with patient. See follow-up plans for new studies. Therapies:  HEP-gentle stretching and relaxation techniques-demonstrated with patient-they are to do them twice a day.   They are also advised to make the following lifestyle changes:  Goals      Exercise 3x per week (30 min per time)      SOAPP-R GOAL LESS THAN       09/20/16 SCORE: 13-MODERATE RISK   12/14/16 score: 9-low-moderate risk   02/15/17 score: 12-moderate risk   05/18/17 score: 14-moderate risk  07/21/17 score: 24-high risk  09/21/17 score: 11-moderate risk  11/27/17 score: 10-moderate risk  01/26/18 score: 8-low risk  03/26/18 score: 12-moderate risk  6/14/18 SCORE: 4-LOW RISK  8/7/18 SCORE: 6-LOW RISK   10/4/18 score: 11- moderate risk  3/6/19 score: 6- low risk  5/21/19 score 11- low risk  7/25/19 score: 1- low risk   11/19/19 score: 8- low risk   3/12/20 score: 15- moderate risk   7/15/20 score: 10- moderate risk  9/16/20 score: 15- moderate risk  1/20/21 score: 16- moderate risk       Stop Cigarette/Tobacco use           Injections or Epidurals:  Injection options were discussed. Patient gave verbal consent to ordered injections. See follow-up plans for planned injections. Supplements:  Vitamin D with increased dosing during the rainy months,   Education was given on:   Dietary and Fitness--daily stretches and low carb diet-in chair Yoga when possible             Follow up with Primary Care Physician regarding their general medical needs. Stressed the importance of following up with PCP and specialists for his/her chronic diseases, health, CV, and cancer screening and continued care. Will follow disease activity/progression and adjust therapeutic regimen to disease activity and severity. Discussed medication dosage, usage, goals of therapy, and side effects. Available test results were reviewed -Discussed findings, impression and plan with patient. An additional 2 minutes were spent outside of the patient visit to review records. Additional time spent with the patient to discuss their questions. Additional time spent with the patient devoted to discussing treatment strategy, planning, and implementation. Patient understands above plan; questions asked and answered. Patient agrees to plan as noted above. F/U in 2-3 months  At least 50% of the visit was involved in the discussion of the options for treatment. We discussed exercises, medication, interventional therapies and surgery. Healthy life style is essential with patient hard work to achieve the wellness.  In addition; discussion with the patient and/or family about any of the diagnostic results, impressions and/or recommended diagnostic studies, prognosis, risks and benefits of treatment options, instructions for treatment and/or follow-up, importance of compliance with chosen treatment options, risk-factor reduction, and patient/family education. They are to follow up in 2 months to review medication, efficacy of injections, pill counts, OARRS check, SOAPPR assessment, review diagnostics, to review previous and future treatment plans and assess appropriateness for continued therapy.         New Diagnostics  Orders Placed This Encounter   Procedures    [place] INJECT TRIGGER POINTS, 3 OR GREATER       Sherrie Sosa, DO

## 2021-01-26 ENCOUNTER — OFFICE VISIT (OUTPATIENT)
Dept: INTERVENTIONAL RADIOLOGY/VASCULAR | Age: 61
End: 2021-01-26
Payer: COMMERCIAL

## 2021-01-26 VITALS — HEART RATE: 90 BPM | WEIGHT: 215 LBS | BODY MASS INDEX: 29.99 KG/M2 | TEMPERATURE: 97.8 F

## 2021-01-26 DIAGNOSIS — M79.606 LEG PAIN, DIFFUSE, UNSPECIFIED LATERALITY: ICD-10-CM

## 2021-01-26 DIAGNOSIS — I87.2 EDEMA OF BOTH LOWER EXTREMITIES DUE TO PERIPHERAL VENOUS INSUFFICIENCY: ICD-10-CM

## 2021-01-26 DIAGNOSIS — Z72.0 TOBACCO ABUSE: ICD-10-CM

## 2021-01-26 DIAGNOSIS — I87.2 VENOUS INSUFFICIENCY OF BOTH LOWER EXTREMITIES: ICD-10-CM

## 2021-01-26 DIAGNOSIS — I73.9 CLAUDICATION (HCC): ICD-10-CM

## 2021-01-26 DIAGNOSIS — I73.9 PAD (PERIPHERAL ARTERY DISEASE) (HCC): Primary | ICD-10-CM

## 2021-01-26 DIAGNOSIS — I73.9 CLAUDICATION IN PERIPHERAL VASCULAR DISEASE (HCC): ICD-10-CM

## 2021-01-26 DIAGNOSIS — I73.9 PAD (PERIPHERAL ARTERY DISEASE) (HCC): ICD-10-CM

## 2021-01-26 PROCEDURE — 99214 OFFICE O/P EST MOD 30 MIN: CPT | Performed by: NURSE PRACTITIONER

## 2021-01-26 PROCEDURE — 4004F PT TOBACCO SCREEN RCVD TLK: CPT | Performed by: NURSE PRACTITIONER

## 2021-01-26 PROCEDURE — 3017F COLORECTAL CA SCREEN DOC REV: CPT | Performed by: NURSE PRACTITIONER

## 2021-01-26 PROCEDURE — G8427 DOCREV CUR MEDS BY ELIG CLIN: HCPCS | Performed by: NURSE PRACTITIONER

## 2021-01-26 PROCEDURE — G8417 CALC BMI ABV UP PARAM F/U: HCPCS | Performed by: NURSE PRACTITIONER

## 2021-01-26 PROCEDURE — G8482 FLU IMMUNIZE ORDER/ADMIN: HCPCS | Performed by: NURSE PRACTITIONER

## 2021-01-29 ENCOUNTER — TELEPHONE (OUTPATIENT)
Dept: INTERVENTIONAL RADIOLOGY/VASCULAR | Age: 61
End: 2021-01-29

## 2021-01-29 NOTE — TELEPHONE ENCOUNTER
PATIENT CALLED TO ADVISE HE ORDERED KNEE HIGH COMPRESSION STOCKINGS ($48 PER SET) BECAUSE HE COULD NOT AFFORD THE THIGH HIGH COMPRESSION STOCKINGS BECAUSE THEY WERE $58.00 PER SET. HE WANTS TO MAKE SURE THIS DECISION IS OK. PLEASE ADVISE.

## 2021-02-04 ENCOUNTER — OFFICE VISIT (OUTPATIENT)
Dept: FAMILY MEDICINE CLINIC | Age: 61
End: 2021-02-04
Payer: COMMERCIAL

## 2021-02-04 VITALS
WEIGHT: 229.4 LBS | BODY MASS INDEX: 31.07 KG/M2 | OXYGEN SATURATION: 98 % | DIASTOLIC BLOOD PRESSURE: 60 MMHG | SYSTOLIC BLOOD PRESSURE: 124 MMHG | HEIGHT: 72 IN | TEMPERATURE: 97.7 F | HEART RATE: 83 BPM

## 2021-02-04 DIAGNOSIS — I73.9 PAD (PERIPHERAL ARTERY DISEASE) (HCC): Chronic | ICD-10-CM

## 2021-02-04 DIAGNOSIS — I63.02 CEREBROVASCULAR ACCIDENT (CVA) DUE TO THROMBOSIS OF BASILAR ARTERY (HCC): Chronic | ICD-10-CM

## 2021-02-04 DIAGNOSIS — I10 ESSENTIAL HYPERTENSION: Chronic | ICD-10-CM

## 2021-02-04 DIAGNOSIS — D47.2 IGG MONOCLONAL GAMMOPATHY OF UNCERTAIN SIGNIFICANCE: ICD-10-CM

## 2021-02-04 DIAGNOSIS — R79.9 ABNORMAL FINDING OF BLOOD CHEMISTRY, UNSPECIFIED: ICD-10-CM

## 2021-02-04 DIAGNOSIS — I77.9 CAROTID ARTERY DISORDER (HCC): Primary | Chronic | ICD-10-CM

## 2021-02-04 DIAGNOSIS — E88.81 INSULIN RESISTANCE: Chronic | ICD-10-CM

## 2021-02-04 DIAGNOSIS — F10.21 PERSONAL HISTORY OF ALCOHOLISM (HCC): Chronic | ICD-10-CM

## 2021-02-04 DIAGNOSIS — J41.0 SIMPLE CHRONIC BRONCHITIS (HCC): ICD-10-CM

## 2021-02-04 DIAGNOSIS — S06.9X9S TRAUMATIC BRAIN INJURY WITH LOSS OF CONSCIOUSNESS, SEQUELA (HCC): Chronic | ICD-10-CM

## 2021-02-04 DIAGNOSIS — M10.032 ACUTE IDIOPATHIC GOUT OF LEFT WRIST: Chronic | ICD-10-CM

## 2021-02-04 DIAGNOSIS — G62.1 ALCOHOLIC POLYNEUROPATHY (HCC): Chronic | ICD-10-CM

## 2021-02-04 PROCEDURE — 3017F COLORECTAL CA SCREEN DOC REV: CPT | Performed by: FAMILY MEDICINE

## 2021-02-04 PROCEDURE — 3023F SPIROM DOC REV: CPT | Performed by: FAMILY MEDICINE

## 2021-02-04 PROCEDURE — G8926 SPIRO NO PERF OR DOC: HCPCS | Performed by: FAMILY MEDICINE

## 2021-02-04 PROCEDURE — 4004F PT TOBACCO SCREEN RCVD TLK: CPT | Performed by: FAMILY MEDICINE

## 2021-02-04 PROCEDURE — G8427 DOCREV CUR MEDS BY ELIG CLIN: HCPCS | Performed by: FAMILY MEDICINE

## 2021-02-04 PROCEDURE — 99214 OFFICE O/P EST MOD 30 MIN: CPT | Performed by: FAMILY MEDICINE

## 2021-02-04 PROCEDURE — G8417 CALC BMI ABV UP PARAM F/U: HCPCS | Performed by: FAMILY MEDICINE

## 2021-02-04 PROCEDURE — G8482 FLU IMMUNIZE ORDER/ADMIN: HCPCS | Performed by: FAMILY MEDICINE

## 2021-02-04 RX ORDER — ALLOPURINOL 100 MG/1
100 TABLET ORAL DAILY
Qty: 90 TABLET | Refills: 4 | Status: SHIPPED | OUTPATIENT
Start: 2021-02-04 | End: 2021-03-29 | Stop reason: SDUPTHER

## 2021-02-04 RX ORDER — INDOMETHACIN 25 MG/1
25 CAPSULE ORAL 3 TIMES DAILY PRN
Qty: 30 CAPSULE | Refills: 1 | Status: SHIPPED | OUTPATIENT
Start: 2021-02-04 | End: 2022-02-01 | Stop reason: SDUPTHER

## 2021-02-04 ASSESSMENT — PATIENT HEALTH QUESTIONNAIRE - PHQ9
SUM OF ALL RESPONSES TO PHQ QUESTIONS 1-9: 0
2. FEELING DOWN, DEPRESSED OR HOPELESS: 0

## 2021-02-04 NOTE — PROGRESS NOTES
MCG tablet Take 1 tablet by mouth daily 30 tablet 12    naloxone (NARCAN) 4 MG/0.1ML LIQD nasal spray 1 spray by Nasal route as needed for Opioid Reversal 1 each 2    atorvastatin (LIPITOR) 40 MG tablet Take 1 tablet by mouth daily 90 tablet 2    clopidogrel (PLAVIX) 75 MG tablet take 1 tablet by mouth once daily 90 tablet 2    metoprolol succinate (TOPROL XL) 25 MG extended release tablet Take 1 tablet by mouth daily 90 tablet 2    valsartan-hydrochlorothiazide (DIOVAN-HCT) 80-12.5 MG per tablet take 1 tablet by mouth once daily 90 tablet 2    aspirin EC 81 MG EC tablet Take 1 tablet by mouth daily 90 tablet 2    nitroGLYCERIN (NITROSTAT) 0.4 MG SL tablet up to max of 3 total doses. If no relief after 1 dose, call 911. 25 tablet 3    polyethylene glycol (GLYCOLAX) 17 GM/SCOOP powder Take 17 g by mouth daily 510 g 5    budesonide-formoterol (SYMBICORT) 160-4.5 MCG/ACT AERO inhale 2 puffs by mouth twice a day 10.2 g 12    REFRESH TEARS 0.5 % SOLN ophthalmic solution instill 1 drop into both eyes four times a day  1    HYDROcodone-acetaminophen (NORCO) 7.5-325 MG per tablet Take 1 tablet by mouth every 8 hours as needed for Pain (Max 3 per day) for up to 30 days. Intended supply: 30 days 80 tablet 0    senna-docusate (PERICOLACE) 8.6-50 MG per tablet Take 2 tablets by mouth daily as needed for Constipation 60 tablet 11     No current facility-administered medications for this visit. PMH, Surgical Hx, Family Hx, and Social Hxreviewed and updated. Health Maintenance reviewed. Objective    Vitals:    02/04/21 1329 02/04/21 1338   BP: (!) 130/52 124/60   Site: Left Upper Arm Right Upper Arm   Position: Sitting Sitting   Cuff Size: Medium Adult Medium Adult   Pulse: 83    Temp: 97.7 °F (36.5 °C)    SpO2: 98%    Weight: 229 lb 6.4 oz (104.1 kg)    Height: 5' 11.5\" (1.816 m)         Physical Exam  Constitutional:       General: He is not in acute distress. Appearance: He is well-developed.    HENT: Head: Normocephalic and atraumatic. Eyes:      General: No scleral icterus. Conjunctiva/sclera: Conjunctivae normal.   Neck:      Musculoskeletal: Normal range of motion and neck supple. Cardiovascular:      Rate and Rhythm: Normal rate and regular rhythm. Heart sounds: Normal heart sounds. Pulmonary:      Effort: Pulmonary effort is normal. No respiratory distress. Breath sounds: Normal breath sounds. No wheezing or rales. Abdominal:      General: Bowel sounds are normal. There is no distension. Palpations: Abdomen is soft. There is no mass. Tenderness: There is no abdominal tenderness. There is no guarding or rebound. Musculoskeletal:      Right lower leg: No edema. Left lower leg: No edema. Lymphadenopathy:      Cervical: No cervical adenopathy. Skin:     General: Skin is warm and dry. Neurological:      Mental Status: He is alert and oriented to person, place, and time. Psychiatric:         Mood and Affect: Mood normal.         Behavior: Behavior normal.         Thought Content:  Thought content normal.         Judgment: Judgment normal.         Lab Results   Component Value Date    LABA1C 6.2 (H) 02/26/2020     Lab Results   Component Value Date    CREATININE 0.73 10/12/2020     Lab Results   Component Value Date    ALT 36 02/26/2020    AST 23 02/26/2020     Lab Results   Component Value Date    CHOL 162 01/19/2019    TRIG 139 01/19/2019    HDL 29 (L) 02/26/2020    LDLCALC 12 02/26/2020        Lab Results   Component Value Date    WBC 11.0 (H) 10/12/2020    HGB 16.1 10/12/2020    HCT 48.5 10/12/2020     10/12/2020    CHOL 162 01/19/2019    TRIG 139 01/19/2019    HDL 29 (L) 02/26/2020    ALT 36 02/26/2020    AST 23 02/26/2020     10/12/2020    K 4.5 10/12/2020    CL 99 10/12/2020    CREATININE 0.73 10/12/2020    BUN 16 10/12/2020    CO2 29 10/12/2020    TSH 2.170 08/08/2018    PSA 0.56 06/23/2014    INR 1.0 04/17/2017    LABA1C 6.2 (H) 02/26/2020 XR WRIST LEFT (MIN 3 VIEWS)  Narrative: EXAMINATION: XR WRIST LEFT (MIN 3 VIEWS), 12/8/2020 1:14 PM     CLINICAL HISTORY:  pain     COMPARISON: None    FINDINGS:    There are no lytic or sclerotic bone lesions. There is no fracture or subluxation. The joint spaces are preserved. There are vascular calcifications in the soft tissues. There are no radiopaque foreign bodies. Impression: There are no acute changes. XR RADIUS ULNA LEFT (2 VIEWS)  Narrative: EXAMINATION: XR RADIUS ULNA LEFT (2 VIEWS)    CLINICAL HISTORY: LEFT WRIST PAIN    COMPARISONS: None available. FINDINGS: No fracture, dislocation, bone lesion. Calcification on her artery. Impression: NEGATIVE LEFT RADIUS ULNA      Assessment & Plan   Visit Diagnoses and Associated Orders     Carotid artery disorder (HCC)    -  Primary    Stable, fair control. Continue statin. Followed by cardiology. Essential hypertension        Stable and well-controlled on current meds. Comprehensive Metabolic Panel [[id number] Custom]   - Future Order         Cerebrovascular accident (CVA) due to thrombosis of basilar artery ([address]        Stable and well-controlled and that there has been no recurrence/progression of symptoms         PAD (peripheral artery disease) ([address]        Stable and well-controlled. Followed by IR and cardiology. Simple chronic bronchitis (HCC)        Stable and well-controlled on current meds         Alcoholic polyneuropathy (HCC)        Stable and fairly well-controlled on current meds         Traumatic brain injury with loss of consciousness, sequela ([address]        Stable and well-controlled and that there has been no progression of symptoms. Personal history of alcoholism ([address]        Stable and well-controlled and that patient is no longer drinking alcohol. Insulin resistance        Stable and well-controlled. Follow labs. Reviewed healthy diet and importance of exercise.     Hemoglobin A1C [[id number] Custom] - Future Order         Acute idiopathic gout of left wrist        Stable and well-controlled    indomethacin (INDOCIN) 25 MG capsule [3897]      Uric Acid [[id number] Custom]   - Future Order         IgG monoclonal gammopathy of uncertain significance        Remote diagnosis. Follow labs. Consider hematology consult. CBC With Auto Differential [id number] Custom]   - Future Order    Protein Electrophoresis, Serum [[id number] Custom]   - Future Order         Acute idiopathic gout of left wrist        Will prescribe indomethacin. Patient advised to follow-up with primary care clinician soon as possible and evaluate for definitive gout treatment    indomethacin (INDOCIN) 25 MG capsule [3897]      Uric Acid [[id number] Custom]   - Future Order         Abnormal finding of blood chemistry, unspecified        Hemoglobin A1C [[id number] Custom]   - Future Order         ORDERS WITHOUT AN ASSOCIATED DIAGNOSIS    allopurinol (ZYLOPRIM) 100 MG tablet [310]              Reviewed with the patient: all disease processes, current clinical status, medications, activities and diet.      Side effects, adverse effects of the medication prescribed today, as well as treatment plan/ rationale and result expectations have been discussed with the patient who expresses understanding and desires to proceed.     Close follow up to evaluate treatment results and for coordination of care. I have reviewed the patient's medical history in detail and updated the computerized patient record. More than 50% of the appointment was spent in face-to-face counseling, education and care coordination. Please note this report has been partially produced using speech recognition software and may contain mistakes related to that system including errors in grammar, punctuation and spelling as well as words and phrases that may seem inappropriate. If there are questions or concerns, please feel free to contact me to clarify.     Orders Placed This Encounter   Procedures    Comprehensive Metabolic Panel     Standing Status:   Future     Standing Expiration Date:   5/4/2021    Hemoglobin A1C     Standing Status:   Future     Standing Expiration Date:   4/4/2021    CBC With Auto Differential     Standing Status:   Future     Standing Expiration Date:   2/4/2022    Protein Electrophoresis, Serum     Standing Status:   Future     Standing Expiration Date:   2/4/2022    Uric Acid     Standing Status:   Future     Standing Expiration Date:   2/4/2022     Orders Placed This Encounter   Medications    allopurinol (ZYLOPRIM) 100 MG tablet     Sig: Take 1 tablet by mouth daily     Dispense:  90 tablet     Refill:  4    indomethacin (INDOCIN) 25 MG capsule     Sig: Take 1 capsule by mouth 3 times daily as needed for Pain (gout pain)     Dispense:  30 capsule     Refill:  1     Medications Discontinued During This Encounter   Medication Reason    indomethacin (INDOCIN) 50 MG capsule DOSE ADJUSTMENT    indomethacin (INDOCIN) 25 MG capsule REORDER     Return in about 4 months (around 6/4/2021) for HTN, CAD - VV. Controlled Substance Monitoring:    Acute and Chronic Pain Monitoring:   RX Monitoring 7/15/2020   Attestation -   Acute Pain Prescriptions Prescription exceeds daily limit for a specific reason. See comments or note. ;Not required given exclusionary diagnoses. ..;Severe pain not adequately treated with lower dose. Periodic Controlled Substance Monitoring Possible medication side effects, risk of tolerance/dependence & alternative treatments discussed. ;No signs of potential drug abuse or diversion identified. ;Assessed functional status. ;Obtaining appropriate analgesic effect of treatment. Chronic Pain > 50 MEDD Re-evaluated the status of the patient's underlying condition causing pain. ;Considered consultation with a specialist.;Obtained or confirmed \"Consent for Opioid Use\" on file.    Chronic Pain > 80 MEDD -           Solo SEGURA MD

## 2021-02-10 DIAGNOSIS — M54.17 LUMBOSACRAL RADICULOPATHY AT S1: ICD-10-CM

## 2021-02-10 DIAGNOSIS — M53.3 SI (SACROILIAC) PAIN: ICD-10-CM

## 2021-02-10 DIAGNOSIS — D47.2 IGG MONOCLONAL GAMMOPATHY OF UNCERTAIN SIGNIFICANCE: ICD-10-CM

## 2021-02-10 DIAGNOSIS — Z79.899 HIGH RISK MEDICATION USE: ICD-10-CM

## 2021-02-10 DIAGNOSIS — M51.36 DDD (DEGENERATIVE DISC DISEASE), LUMBAR: Chronic | ICD-10-CM

## 2021-02-11 RX ORDER — HYDROCODONE BITARTRATE AND ACETAMINOPHEN 7.5; 325 MG/1; MG/1
1 TABLET ORAL EVERY 8 HOURS PRN
Qty: 80 TABLET | Refills: 0 | Status: SHIPPED | OUTPATIENT
Start: 2021-02-13 | End: 2021-03-08 | Stop reason: SDUPTHER

## 2021-02-23 ENCOUNTER — TELEPHONE (OUTPATIENT)
Dept: INTERVENTIONAL RADIOLOGY/VASCULAR | Age: 61
End: 2021-02-23

## 2021-02-23 ENCOUNTER — OFFICE VISIT (OUTPATIENT)
Dept: CARDIOLOGY CLINIC | Age: 61
End: 2021-02-23
Payer: COMMERCIAL

## 2021-02-23 VITALS
RESPIRATION RATE: 18 BRPM | BODY MASS INDEX: 31.15 KG/M2 | DIASTOLIC BLOOD PRESSURE: 62 MMHG | WEIGHT: 230 LBS | HEIGHT: 72 IN | OXYGEN SATURATION: 98 % | SYSTOLIC BLOOD PRESSURE: 134 MMHG | HEART RATE: 81 BPM

## 2021-02-23 DIAGNOSIS — I65.22 CAROTID STENOSIS, SYMPTOMATIC W/O INFARCT, LEFT: ICD-10-CM

## 2021-02-23 DIAGNOSIS — I10 ESSENTIAL HYPERTENSION: ICD-10-CM

## 2021-02-23 DIAGNOSIS — R06.09 DOE (DYSPNEA ON EXERTION): ICD-10-CM

## 2021-02-23 DIAGNOSIS — I73.9 PAD (PERIPHERAL ARTERY DISEASE) (HCC): Primary | Chronic | ICD-10-CM

## 2021-02-23 DIAGNOSIS — I73.9 CLAUDICATION (HCC): ICD-10-CM

## 2021-02-23 PROCEDURE — G8427 DOCREV CUR MEDS BY ELIG CLIN: HCPCS | Performed by: INTERNAL MEDICINE

## 2021-02-23 PROCEDURE — G8417 CALC BMI ABV UP PARAM F/U: HCPCS | Performed by: INTERNAL MEDICINE

## 2021-02-23 PROCEDURE — G8482 FLU IMMUNIZE ORDER/ADMIN: HCPCS | Performed by: INTERNAL MEDICINE

## 2021-02-23 PROCEDURE — 99214 OFFICE O/P EST MOD 30 MIN: CPT | Performed by: INTERNAL MEDICINE

## 2021-02-23 PROCEDURE — 4004F PT TOBACCO SCREEN RCVD TLK: CPT | Performed by: INTERNAL MEDICINE

## 2021-02-23 PROCEDURE — 3017F COLORECTAL CA SCREEN DOC REV: CPT | Performed by: INTERNAL MEDICINE

## 2021-02-23 ASSESSMENT — ENCOUNTER SYMPTOMS
BLOOD IN STOOL: 0
NAUSEA: 0
CHEST TIGHTNESS: 0
RESPIRATORY NEGATIVE: 1
GASTROINTESTINAL NEGATIVE: 1
WHEEZING: 0
STRIDOR: 0
BACK PAIN: 1
SHORTNESS OF BREATH: 0
EYES NEGATIVE: 1
COUGH: 0

## 2021-02-23 NOTE — TELEPHONE ENCOUNTER
PATIENT SAW CARDIOLOGY AND WAS TOLD HE WOULD HAVE TO WEAR HIS COMPRESSION STOCKING FOR THE REST OF HIS LIFE. HE WANTS YOUR OPINION. PLEASE ADVISE.

## 2021-02-23 NOTE — PROGRESS NOTES
OFFICE VISIT        Patient: Erlinda Johnston  YOB: 1960  MRN: [id number]    Chief Complaint: L carotid stent claudication   Chief Complaint   Patient presents with    3 Month Follow-Up    Claudication     PAD    Hypertension       CV Data:  4/2017 echo ef 65  2019 Left Carotid stent - Dr. Calderon Postin  9/28/2020 B/l LE angio, left MAHAD stenting, left SFA ATH/PTA with DCB/stent. Right LE will be staged  10/21/2020 RLE:  RSFA Stent    Subjective/HPI pt has significant claudication L>R. He has chronic cough. He is minimally active. Describes no cp nor sob currently. No bleed. Has back pain. 10/12/2020 Left leg feels better since PTA. Right leg still bothers him. No cp no sob. No bleed. 11/23/2020 no cp no sob no falls no bleed. Has not smoked in 1 week. Legs feel better     2/23/21 no cp some gaitan does not feel good. feeels tired today. Takes meds    He has no cardiac stents. Smoke  No etoh  Lives with brother  Disabled all his life.       EKG: SR    Past Medical History:   Diagnosis Date    Acute idiopathic gout of left wrist 69/69/6808    Alcoholic (HCC)-remote Hx 0/15/0383    In remission goes to AA daily--agrees not to overtake pain meds     [facility]     Asthma     CAD (coronary artery disease)     [address] [facility]    Chronic back pain greater than 3 months duration     CN III palsy, right eye 2017    Dr Shirin Brannon    COPD (chronic obstructive pulmonary disease) ([facility] 2014    DDD (degenerative disc disease), lumbar 3/23/2015    Elevated liver enzymes 2014    Hyperlipidemia     on med > 10 yrs    Hypertension     on meds x 1 yr    IgA monoclonal gammopathy 2015    Dr Gilda Martínez    Insomnia, unspecified     LBP (low back pain)     Dr Ever Walton Neuropathy     Occlusion and stenosis of carotid artery without mention of cerebral infarction     Dr Jackie Hoffmann Personal history of alcoholism ([facility]     quit 11/05/2010, relapsed 2016    S/P carotid endarterectomy 01/2019    Dr Jackie Hoffmann Sensorimotor neuropathy     Bilateral lower extremities-EMG 03/23/15 ([place])    Smoking trying to quit     Traumatic compression fracture of T11 thoracic vertebra ([facility] 2113    Umbilical hernia        Past Surgical History:   Procedure Laterality Date    CAROTID ENDARTERECTOMY Left 1/18/2019    LEFT CAROTID ENDARTERECTOMY performed by Richy Burton MD at [facility]  8/19/2014    COLONOSCOPY N/A 12/18/2019    COLONOSCOPY DIAGNOSTIC performed by Brisa Ibrahim MD at [facility], COLON, DIAGNOSTIC      HERNIA REPAIR      TONSILLECTOMY      UPPER GASTROINTESTINAL ENDOSCOPY  8/19/2014    VENTRAL HERNIA REPAIR  09/01/15    [address] AND W/UMBILECTOMY       Family History   Problem Relation Age of Onset    Cancer Mother         brain, dec 79    Alcohol Abuse Father     Other Sister         unknown medical history    Other Brother         unknown medical history    High Blood Pressure Brother     Alcohol Abuse Daughter        Social History     Socioeconomic History    Marital status: Single     Spouse name: None    Number of children: 1    Years of education: None    Highest education level: None   Occupational History    Occupation: SSI for learning disability   Social Needs    Financial resource strain: Not hard at all   Tony-Coni insecurity     Worry: None     Inability: None    Transportation needs     Medical: No     Non-medical: No   Tobacco Use    Smoking status: Current Every Day Smoker     Packs/day: 0.50     Years: 40.00     Pack years: 20.00     Types: Cigarettes     Start date: 1968    Smokeless tobacco: Never Used   Substance and Sexual Activity    Alcohol use: No     Alcohol/week: 0.0 standard drinks     Comment: 8/31/2016 Quit Date    Drug use: No    Sexual activity: None   Lifestyle    Physical activity     Days per week: None     Minutes per session: None    Stress: None   Relationships    Social connections     Talks on phone: None Gets together: None     Attends [employer] service: None     Active member of club or organization: None     Attends meetings of clubs or organizations: None     Relationship status: None    Intimate partner violence     Fear of current or ex partner: None     Emotionally abused: None     Physically abused: None     Forced sexual activity: None   Other Topics Concern    None   Social History Narrative    Born in [place], one of 5    Came to [place] at age 1 with parents    Never , one daughter    Never worked, disabled since 12 (mental)    Lives in [address] [place] [address] with brother, in a house        Attends [employer] daily    [address] riding bike, TV    One daughter, does keep in touch        No Known Allergies    Current Outpatient Medications   Medication Sig Dispense Refill    HYDROcodone-acetaminophen ([address]) 7.5-325 MG per tablet Take 1 tablet by mouth every 8 hours as needed for Pain (Max 3 per day) for up to 30 days. Intended supply: 30 days 80 tablet 0    allopurinol (ZYLOPRIM) 100 MG tablet Take 1 tablet by mouth daily 90 tablet 4    indomethacin (INDOCIN) 25 MG capsule Take 1 capsule by mouth 3 times daily as needed for Pain (gout pain) 30 capsule 1    Elastic Bandages & Supports (MEDICAL COMPRESSION STOCKINGS) MISC 1 each by Does not apply route daily Thigh high 15-20 mmhg compression stockings both legs wear daily during day and off Qhs. Wear as tolerated. Do not wear if they cause increased pain.  1 each 0     MG capsule take 1 capsule by mouth twice a day 30 capsule 3    Cholecalciferol (VITAMIN D) 50 MCG (2000 UT) CAPS capsule Take 1 capsule by mouth 2 times daily 30 capsule 5    VASCEPA 1 g CAPS capsule Take 2 capsules by mouth 2 times daily 360 capsule 3    vitamin B-12 (CYANOCOBALAMIN) 1000 MCG tablet Take 1 tablet by mouth daily 30 tablet 12    naloxone (NARCAN) 4 MG/0.1ML LIQD nasal spray 1 spray by Nasal route as needed for Opioid Reversal 1 each 2    atorvastatin (LIPITOR) 40 MG Ht 5' 11.5\" (1.816 m)   Wt 230 lb (104.3 kg)   SpO2 98%   BMI 31.63 kg/m²    Physical Exam   Constitutional: He appears healthy. No distress. HENT:   Normal cephalic and Atraumatic   Eyes: Pupils are equal, round, and reactive to light. Neck: Normal range of motion and thyroid normal. Neck supple. No JVD present. No neck adenopathy. No thyromegaly present. Cardiovascular: Normal rate and regular rhythm. Exam reveals decreased pulses. Murmur heard. Pulmonary/Chest: Effort normal. He has no rales. He has diffuse wheezes. He exhibits no tenderness. Abdominal: Soft. Bowel sounds are normal. There is no abdominal tenderness. Musculoskeletal: Normal range of motion. General: No tenderness or edema. Neurological: He is alert and oriented to person, place, and time. Skin: Skin is warm. No cyanosis. Nails show no clubbing.        LABS:  CBC:   Lab Results   Component Value Date    WBC 11.0 10/12/2020    RBC 5.36 10/12/2020    HGB 16.1 10/12/2020    HCT 48.5 10/12/2020    MCV 90.5 10/12/2020    MCH 30.0 10/12/2020    MCHC 33.1 10/12/2020    RDW 15.1 10/12/2020     10/12/2020    MPV 8.8 08/25/2015     Lipids:  Lab Results   Component Value Date    CHOL 162 01/19/2019    CHOL 195 08/08/2018    CHOL 199 04/18/2017     Lab Results   Component Value Date    TRIG 139 01/19/2019    TRIG 347 (H) 08/08/2018    TRIG 132 04/18/2017     Lab Results   Component Value Date    HDL 29 (L) 02/26/2020    HDL 39 (L) 01/19/2019    HDL 34 (L) 08/08/2018     Lab Results   Component Value Date    LDLCALC 12 02/26/2020    [address] 01/19/2019    LDLCALC 92 08/08/2018     No results found for: LABVLDL, VLDL  No results found for: CHOLHDLRATIO  CMP:    Lab Results   Component Value Date     10/12/2020    K 4.5 10/12/2020    CL 99 10/12/2020    CO2 29 10/12/2020    BUN 16 10/12/2020    CREATININE 0.73 10/12/2020    GFRAA >60.0 10/12/2020    LABGLOM >60.0 10/12/2020    GLUCOSE 91 10/12/2020    PROT 7.9 02/26/2020 Acute idiopathic gout of left wrist       There are no discontinued medications. Modified Medications    No medications on file       No orders of the defined types were placed in this encounter. Assessment/Plan:    1. PAD (peripheral artery disease) ([facility]  RBA Peripheral Angio/PTA discussed again for Staged RLE- scheduled for 10/21/ 10:30 AM.  Labs today - stable after pta. Stable no claudication     2. Essential hypertension     - EKG 12 Lead    3. Carotid stenosis, symptomatic w/o infarct, left       4. PVD (peripheral vascular disease) ([facility]      5. Lipid- start Atorvastatin 40 mg qd. 6. We will address coronary ischemic eval in near future. - pt refuses stress     Stop smoking    Counseling:  Heart Healthy Lifestyle, Improve BMI, Stop Smoking, Low Salt Diet, Take Precautions to Prevent Falls and Walk Daily    Return in about 3 months (around 5/23/2021).     Electronically signed by Mali Solares MD on 2/23/2021 at 2:58 PM

## 2021-02-24 ENCOUNTER — TELEPHONE (OUTPATIENT)
Dept: DIABETES SERVICES | Age: 61
End: 2021-02-24

## 2021-02-24 NOTE — TELEPHONE ENCOUNTER
It would be in his best interest to wear compression stockings daily during day for management if chronic venous insufficiency since he did not proceed with venous procedures as he was asymptomatic to [place]'s at last office visit.

## 2021-02-24 NOTE — CONSULTS
Pt had referral for Diabetes Education last year, but no diagnosis of diabetes and A1c not meeting criteria. Pt had 1:1 with dietitian last year off of referral.  Called in today to schedule appt. Advised that without a diagnosis of diabetes, his insurance would not likely pay for classes. Pt has a current order for an A1c to be completed. He plans on getting that done in April. Advised that if A1c high enough for diagnosis, to have PCP enter new referral and we will schedule at that time. Pt agreed and will discuss with PCP.   No further questions

## 2021-02-24 NOTE — TELEPHONE ENCOUNTER
PATIENT SCHEDULED APPT ON 3/1/2021 TO DISCUSS PROCEEDING WITH PROCEDURES SO HE DOES NOT HAVE TO WEAR COMPRESSION STOCKINGS.

## 2021-03-01 ENCOUNTER — VIRTUAL VISIT (OUTPATIENT)
Dept: INTERVENTIONAL RADIOLOGY/VASCULAR | Age: 61
End: 2021-03-01
Payer: COMMERCIAL

## 2021-03-01 DIAGNOSIS — I87.2 EDEMA OF BOTH LOWER EXTREMITIES DUE TO PERIPHERAL VENOUS INSUFFICIENCY: ICD-10-CM

## 2021-03-01 DIAGNOSIS — M79.606 LEG PAIN, DIFFUSE, UNSPECIFIED LATERALITY: ICD-10-CM

## 2021-03-01 DIAGNOSIS — I73.9 PAD (PERIPHERAL ARTERY DISEASE) (HCC): ICD-10-CM

## 2021-03-01 DIAGNOSIS — I87.2 VENOUS INSUFFICIENCY OF BOTH LOWER EXTREMITIES: Primary | ICD-10-CM

## 2021-03-01 PROCEDURE — 99442 PR PHYS/QHP TELEPHONE EVALUATION 11-20 MIN: CPT | Performed by: NURSE PRACTITIONER

## 2021-03-01 NOTE — PROGRESS NOTES
3/1/2021    TELEHEALTH EVALUATION -- Audio/Visual (During CLYAD-97 public health emergency)    Due to [place] 19 outbreak, patient's office visit was converted to a virtual visit. Patient was contacted and agreed to proceed with a virtual visit via Telephone Visit  The risks and benefits of converting to a virtual visit were discussed in light of the current infectious disease epidemic. Patient also understood that insurance coverage and co-pays are up to their individual insurance plans. Hector Newman (:  1960) has requested an audio/video evaluation for the following concern(s):    Hector Newman, a male of 61 y.o. came to the office 3/1/2021. No chief complaint on file. SUBJECTIVE:     3/1/2021: Hector Newman has requested a telephone visit to further discuss venous procedures for treatment of bilateral LE chronic venous insufficiency. At last visit he denied any LE symptoms and stated no LE pain and legs felt good. Today he states no pain or symptoms to LE's and legs are feeling great. He is wearing knee high 15-20 mmhg compression socks daily with relief. Venous workup showed bilateral LE venous insufficiency for which he has been wearing thigh high compression stockings which he states are causing some discomfort from being too tight and he is requesting next compression down. H/O PAD: Interim care by cardiology Dr. Harvey Alba includes:   2020 B/l LE angio, left MAHAD stenting, left SFA ATH/PTA with DCB/stent. 10/21/2020 RLE:  [facility] Stent  Attempting to stop smoking. H/O peripheral neuropathy      2021: Hector Newman returns to evaluate if he needs to proceed with treatment for LE CVI. At last OV it was decided to wait on treating LE CVI until LE arterial disease evaluation and care was completed by cardiology for severe PAD. Interim care by cardiology Dr. Harvey Alba includes:   2020 B/l LE angio, left MAHAD stenting, left SFA ATH/PTA with DCB/stent.     10/21/2020 RLE: [facility] Stent  States he feels \"wonderful like a new man\". No further pain, aching, fatigue, heaviness sensation to LE's. Venous workup showed bilateral LE venous insufficiency for which he has been wearing thigh high compression stockings which he states are causing some discomfort from being too tight and he is requesting next compression down. He denies need for any venous procedures as no further LE symptoms. Attempting to stop smoking. H/O peripheral neuropathy    9/17/2020: Author Reading returns as he is requesting follow up office visit to discuss LE venous insufficiency treatment. S/P Venous Insufficiency to bilateral GSV. Symptoms persist and remain unchanged including bilateral LE claudication, pain, heaviness, fatigue. He is scheduled next Wednesday with Dr. Marc Heath for lower extremity angioplasty with possible intervention for a evaluation/treatment of PAD.    8/27/2020: Author Reading is here for results of LE MRA and Venous US. States LE symptoms are unchanged and persist. MRA reports bilateral LE PAD. States he follows with [facility] Dr. Ly Howard. Venous US reports venous insufficiency to bilateral GSV. Has not done conservative therapy with compression. Denies any LE troublesome varicose veins. LLE symptoms remain > RLE.    7/30/2020: Author Reading returns for results of LE arterial US. US reports significant LE arterial disease bilaterally. Symptoms to bilateral LE's remain. Pain during night now with difficulty sleeping. Denies LE swelling, fatigue. LLE heavniness. 6/23/2020 HPI: Author Libia presents to clinic for consultation at the request of his PCP for evaluation of PAD for LE pain. Patient presents with symptoms of: both feet with left worse than right, onset one month. Pain occurs with ambulation. Describes pain as stabbing. Left leg with fatigue and heaviness, chronic. States also has numbness and tingling in feet and toes both at rest and with activity.     Patient is a poor historian as he changes his story often so is difficulty getting a thorough evaluation. Affect on activities of daily living: Must stop and take frequent rest periods. Denies LE swelling or symptomatic varicose veins. NSAIDS: Takes Neurontin daily with minimal relief. Leg elevation: Elevates without relief. Stocking use and dates: Has never done conservative therapy with compression stockings. Claudication: Symptoms consistent with. Lower leg and foot pain with ambulation. PAD risk factors: Borderline DM, HTN, Hypercholesteremia, overweight, smoker. Review of systems:  Constitutional: Negative. Negative for appetite change, chills, fatigue and fever. HENT: Negative. Negative for congestion, sinus pressure, sinus pain, sore throat and trouble swallowing. Eyes: Negative. Respiratory: Positive for cough (dry, chronic). Negative for shortness of breath and wheezing. Cardiovascular: Negative for chest pain, palpitations and leg swelling. Claudication   Gastrointestinal: Negative for abdominal distention, abdominal pain, constipation, diarrhea, nausea and vomiting. Genitourinary: Negative for difficulty urinating, frequency, hematuria and urgency. Musculoskeletal: Positive for back pain (lower, chronic). Skin: Negative for rash and wound. Neurological: Positive for numbness (tingling feet and toes). Negative for dizziness, light-headedness and headaches. Hematological: Does not bruise/bleed easily. Psychiatric/Behavioral: Negative. OBJECTIVE:  There were no vitals taken for this visit. Physical:  Constitutional:       Appearance: Normal appearance. HENT:      Head: Normocephalic and atraumatic. Nose: Nose normal.   Neck:      Musculoskeletal: Normal range of motion and neck supple. Cardiovascular:      Rate and Rhythm: Normal rate and regular rhythm.       Pulses:           Posterior tibial pulses are palpable +2 on the right side and on the (degenerative disc disease), lumbar 3/23/2015    Elevated liver enzymes 2014    Hyperlipidemia     on med > 10 yrs    Hypertension     on meds x 1 yr    IgA monoclonal gammopathy 2015    Dr Kimi Cruz    Insomnia, unspecified     LBP (low back pain)     Dr Roseline Andersen Neuropathy     Occlusion and stenosis of carotid artery without mention of cerebral infarction     Dr Carles Schirmer history of alcoholism ([facility] [address]     quit 11/05/2010, relapsed 2016    S/P carotid endarterectomy 01/2019    Dr Joanne London    Sensorimotor neuropathy     Bilateral lower extremities-EMG 03/23/15 (Lajoyce Brace)    Smoking trying to quit     Traumatic compression fracture of T11 thoracic vertebra ([facility] [address] 6292    Umbilical hernia        Social History     Socioeconomic History    Marital status: Single     Spouse name: Not on file    Number of children: 1    Years of education: Not on file    Highest education level: Not on file   Occupational History    Occupation: SSI for learning disability   Social Needs    Financial resource strain: Not hard at all   Tony-Coni insecurity     Worry: Not on file     Inability: Not on file    Transportation needs     Medical: No     Non-medical: No   Tobacco Use    Smoking status: Current Every Day Smoker     Packs/day: 0.50     Years: 40.00     Pack years: 20.00     Types: Cigarettes     Start date: 1968    Smokeless tobacco: Never Used   Substance and Sexual Activity    Alcohol use: No     Alcohol/week: 0.0 standard drinks     Comment: 8/31/2016 Quit Date    Drug use: No    Sexual activity: Not on file   Lifestyle    Physical activity     Days per week: Not on file     Minutes per session: Not on file    Stress: Not on file   Relationships    Social connections     Talks on phone: Not on file     Gets together: Not on file     Attends Shinto service: Not on file     Active member of club or organization: Not on file     Attends meetings of clubs or organizations: Not on file     Relationship status: Not on file    Intimate partner violence     Fear of current or ex partner: Not on file     Emotionally abused: Not on file     Physically abused: Not on file     Forced sexual activity: Not on file   Other Topics Concern    Not on file   Social History Narrative    Born in [place], one of 5    Came to [place] at age 1 with parents    Never , one daughter    Never worked, disabled since 12 (mental)    Lives in [place] with brother, in a house        Attends [employer] daily    [address] bike, TV    One daughter, does keep in touch        No Known Allergies    Current Outpatient Medications on File Prior to Visit   Medication Sig Dispense Refill    HYDROcodone-acetaminophen ([address]) 7.5-325 MG per tablet Take 1 tablet by mouth every 8 hours as needed for Pain (Max 3 per day) for up to 30 days. Intended supply: 30 days 80 tablet 0    allopurinol (ZYLOPRIM) 100 MG tablet Take 1 tablet by mouth daily 90 tablet 4    indomethacin (INDOCIN) 25 MG capsule Take 1 capsule by mouth 3 times daily as needed for Pain (gout pain) 30 capsule 1    Elastic Bandages & Supports (MEDICAL COMPRESSION STOCKINGS) MISC 1 each by Does not apply route daily Thigh high 15-20 mmhg compression stockings both legs wear daily during day and off Qhs. Wear as tolerated. Do not wear if they cause increased pain. 1 each 0    pregabalin (LYRICA) 75 MG capsule Take 1 capsule by mouth 3 times daily for 30 days.  180 capsule 3     MG capsule take 1 capsule by mouth twice a day 30 capsule 3    Cholecalciferol (VITAMIN D) 50 MCG (2000 UT) CAPS capsule Take 1 capsule by mouth 2 times daily 30 capsule 5    VASCEPA 1 g CAPS capsule Take 2 capsules by mouth 2 times daily 360 capsule 3    vitamin B-12 (CYANOCOBALAMIN) 1000 MCG tablet Take 1 tablet by mouth daily 30 tablet 12    naloxone (NARCAN) 4 MG/0.1ML LIQD nasal spray 1 spray by Nasal route as needed for Opioid Reversal 1 each 2    atorvastatin (LIPITOR) 40 MG tablet Take 1 tablet by mouth daily 90 tablet 2    clopidogrel (PLAVIX) 75 MG tablet take 1 tablet by mouth once daily 90 tablet 2    metoprolol succinate (TOPROL XL) 25 MG extended release tablet Take 1 tablet by mouth daily 90 tablet 2    valsartan-hydrochlorothiazide (DIOVAN-HCT) 80-12.5 MG per tablet take 1 tablet by mouth once daily 90 tablet 2    aspirin EC 81 MG EC tablet Take 1 tablet by mouth daily 90 tablet 2    nitroGLYCERIN (NITROSTAT) 0.4 MG SL tablet up to max of 3 total doses. If no relief after 1 dose, call 911. 25 tablet 3    polyethylene glycol (GLYCOLAX) 17 GM/SCOOP powder Take 17 g by mouth daily 510 g 5    budesonide-formoterol (SYMBICORT) 160-4.5 MCG/ACT AERO inhale 2 puffs by mouth twice a day 10.2 g 12    senna-docusate (PERICOLACE) 8.6-50 MG per tablet Take 2 tablets by mouth daily as needed for Constipation 60 tablet 11    REFRESH TEARS 0.5 % SOLN ophthalmic solution instill 1 drop into both eyes four times a day  1     No current facility-administered medications on file prior to visit. Review of Systems    Prior to Visit Medications    Medication Sig Taking? Authorizing Provider   Elastic Bandages & Supports (MEDICAL COMPRESSION STOCKINGS) MISC 1 each by Does not apply route daily Knee high 20-30 mmhg compression stockings both legs wear daily during day and off Qhs. Wear as tolerated. Do not wear if they cause increased pain. Yes Joe Jacobs, APRN - CNP   HYDROcodone-acetaminophen (NORCO) 7.5-325 MG per tablet Take 1 tablet by mouth every 8 hours as needed for Pain (Max 3 per day) for up to 30 days.  Intended supply: 30 days  Sherrie Swan DO   allopurinol (ZYLOPRIM) 100 MG tablet Take 1 tablet by mouth daily  Sapna Caruso MD   indomethacin (INDOCIN) 25 MG capsule Take 1 capsule by mouth 3 times daily as needed for Pain (gout pain)  Sapna Caruso MD   Elastic Bandages & Supports (MEDICAL COMPRESSION STOCKINGS) [address] 1 each by Does not apply route daily Thigh high 15-20 mmhg compression stockings both legs wear daily during day and off Qhs. Wear as tolerated. Do not wear if they cause increased pain. JESSICA Wang CNP   pregabalin (LYRICA) 75 MG capsule Take 1 capsule by mouth 3 times daily for 30 days. Sherrie Swan DO    MG capsule take 1 capsule by mouth twice a day  JESSICA Moctezuma - CNP   Cholecalciferol (VITAMIN D) 50 MCG (2000 UT) CAPS capsule Take 1 capsule by mouth 2 times daily  Sherrie Swan DO   VASCEPA 1 g CAPS capsule Take 2 capsules by mouth 2 times daily  Lauro Packer MD   vitamin B-12 (CYANOCOBALAMIN) 1000 MCG tablet Take 1 tablet by mouth daily  Leti Block MD   naloxone [facility]) 4 MG/0.1ML LIQD nasal spray 1 spray by Nasal route as needed for Opioid Reversal  Sherrie Swan DO   atorvastatin (LIPITOR) 40 MG tablet Take 1 tablet by mouth daily  Lauro Packer MD   clopidogrel (PLAVIX) 75 MG tablet take 1 tablet by mouth once daily  Lauro Packer MD   metoprolol succinate (TOPROL XL) 25 MG extended release tablet Take 1 tablet by mouth daily  Lauro Packer MD   valsartan-hydrochlorothiazide (DIOVAN-HCT) 80-12.5 MG per tablet take 1 tablet by mouth once daily  Lauro Packer MD   aspirin EC 81 MG EC tablet Take 1 tablet by mouth daily  Lauro Packer MD   nitroGLYCERIN (NITROSTAT) 0.4 MG SL tablet up to max of 3 total doses. If no relief after 1 dose, call 911.   Lauro Packer MD   polyethylene glycol [facility]) 17 GM/SCOOP powder Take 17 g by mouth daily  JESSICA Moctezuma CNP   budesonide-formoterol (SYMBICORT) 160-4.5 MCG/ACT AERO inhale 2 puffs by mouth twice a day  Leti Block MD   senna-docusate (PERICOLACE) 8.6-50 MG per tablet Take 2 tablets by mouth daily as needed for Constipation  Leti Block MD   REFRESH TEARS 0.5 % SOLN ophthalmic solution instill 1 drop into both eyes four times a day  Historical Provider, MD       Social History Tobacco Use    Smoking status: Current Every Day Smoker     Packs/day: 0.50     Years: 40.00     Pack years: 20.00     Types: Cigarettes     Start date: 1968    Smokeless tobacco: Never Used   Substance Use Topics    Alcohol use: No     Alcohol/week: 0.0 standard drinks     Comment: 8/31/2016 Quit Date    Drug use: No          PHYSICAL EXAMINATION:  [ INSTRUCTIONS:  \"[x]\" Indicates a positive item  \"[]\" Indicates a negative item  -- DELETE ALL ITEMS NOT EXAMINED]  [x] Alert  [x] Oriented to person/place/time    [x] No apparent distress  [] Toxic appearing    Patient speaking in full sentences. No apparent distress  Mood and behavior appropriate    [] Face flushed appearing [] Sclera clear  [] Lips are cyanotic      [x] Breathing appears normal  [] Appears tachypneic      [] Rash on visible skin    [] Cranial Nerves II-XII grossly intact    [] Motor grossly intact in visible upper extremities    [] Motor grossly intact in visible lower extremities    [x] Normal Mood  [] Anxious appearing    [] Depressed appearing  [] Confused appearing      [] Poor short term memory  [] Poor long term memory    [] OTHER:      Due to this being a TeleHealth encounter, evaluation of the following organ systems is limited: Vitals/Constitutional/EENT/Resp/CV/GI//MS/Neuro/Skin/Heme-Lymph-Imm. ASSESSMENT AND PLAN:    Chart reviewed. Medications and lab work reviewed. Assessment:   1. PAD with intervention bilaterally managed by cardiology Dr. Howie Mi. 2.   Chronic Venous Insufficiency to bilateral GSV. Symptoms relieved with compressions stockings. 3.   Tobacco use    Plan:   1. Since he continues to be asymptomatic with LE symptoms, will manage, maintain LE CVI, which is minimal, with knee high compression stockings 15-20 mmh wear daily during day and off Qhs. Wear as tolerated. Rx mailed per his request.   2. Elevate LEs PRN for relief  3.  If in future LE symptoms return including leg fatigue, edema, heaviness, pain, call vascular clinic for further evaluation for possible need for venous procedures. Discussed disease process of CVI and purpose of compression stockings for conservative therapy. As long as compression socks alleviate LE CVI symptoms, will not proceed with venous procedures as insurance will not cover. This discussed with patient. 4. Stop smoking. Counseling on smoking cessation. JESSICA Chappell CNP        An  electronic signature was used to authenticate this note. --JESSICA Mesa CNP on 3/1/2021 at 2:21 PM        Pursuant to the emergency declaration under the [facility], [facility] waiver authority and the [employer] Act, this Virtual  Visit was conducted, with patient's consent, to reduce the patient's risk of exposure to COVID-19 and provide continuity of care for an established patient. Services were provided through a video synchronous discussion virtually to substitute for in-person clinic visit. This billable evisit was conducted via Telehealth over phone or with video visit with doxy. me from [address] [place] [address], [address], via myself and the patient. It was explained to patient purpose of Telehealth visit to limit face to face contact due to Coronavirus mandates now in place. Patient verbalized agreement to participate in a billable Telehealth phone visit. Time spent with telehealth visit planning, patient education and counseling, and preparation 20 minutes.

## 2021-03-03 ENCOUNTER — PROCEDURE VISIT (OUTPATIENT)
Dept: PHYSICAL MEDICINE AND REHAB | Age: 61
End: 2021-03-03
Payer: COMMERCIAL

## 2021-03-03 DIAGNOSIS — M79.10 MYALGIA: ICD-10-CM

## 2021-03-03 PROCEDURE — 20553 NJX 1/MLT TRIGGER POINTS 3/>: CPT | Performed by: PHYSICAL MEDICINE & REHABILITATION

## 2021-03-03 PROCEDURE — 96372 THER/PROPH/DIAG INJ SC/IM: CPT | Performed by: PHYSICAL MEDICINE & REHABILITATION

## 2021-03-03 RX ORDER — LIDOCAINE HYDROCHLORIDE 10 MG/ML
15 INJECTION, SOLUTION INFILTRATION; PERINEURAL ONCE
Status: COMPLETED | OUTPATIENT
Start: 2021-03-03 | End: 2021-03-03

## 2021-03-03 RX ORDER — CYANOCOBALAMIN 1000 UG/ML
1000 INJECTION INTRAMUSCULAR; SUBCUTANEOUS ONCE
Status: COMPLETED | OUTPATIENT
Start: 2021-03-03 | End: 2021-03-03

## 2021-03-03 RX ADMIN — CYANOCOBALAMIN 1000 MCG: 1000 INJECTION INTRAMUSCULAR; SUBCUTANEOUS at 15:05

## 2021-03-03 RX ADMIN — LIDOCAINE HYDROCHLORIDE 15 ML: 10 INJECTION, SOLUTION INFILTRATION; PERINEURAL at 15:05

## 2021-03-04 DIAGNOSIS — K59.00 CONSTIPATION, UNSPECIFIED CONSTIPATION TYPE: ICD-10-CM

## 2021-03-04 DIAGNOSIS — K59.09 OTHER CONSTIPATION: Chronic | ICD-10-CM

## 2021-03-04 RX ORDER — DOCUSATE SODIUM 100 MG/1
CAPSULE, LIQUID FILLED ORAL
Qty: 30 CAPSULE | Refills: 3 | Status: SHIPPED | OUTPATIENT
Start: 2021-03-04 | End: 2021-05-10

## 2021-03-04 RX ORDER — POLYETHYLENE GLYCOL 3350 17 G/17G
POWDER, FOR SOLUTION ORAL
Qty: 510 G | Refills: 5 | Status: SHIPPED | OUTPATIENT
Start: 2021-03-04 | End: 2021-08-30

## 2021-03-08 DIAGNOSIS — M54.17 LUMBOSACRAL RADICULOPATHY AT S1: ICD-10-CM

## 2021-03-08 DIAGNOSIS — M51.36 DDD (DEGENERATIVE DISC DISEASE), LUMBAR: Chronic | ICD-10-CM

## 2021-03-08 DIAGNOSIS — M53.3 SI (SACROILIAC) PAIN: ICD-10-CM

## 2021-03-08 DIAGNOSIS — D47.2 IGG MONOCLONAL GAMMOPATHY OF UNCERTAIN SIGNIFICANCE: ICD-10-CM

## 2021-03-08 DIAGNOSIS — Z79.899 HIGH RISK MEDICATION USE: ICD-10-CM

## 2021-03-08 RX ORDER — HYDROCODONE BITARTRATE AND ACETAMINOPHEN 7.5; 325 MG/1; MG/1
1 TABLET ORAL EVERY 8 HOURS PRN
Qty: 80 TABLET | Refills: 0 | Status: SHIPPED | OUTPATIENT
Start: 2021-03-14 | End: 2021-03-25 | Stop reason: SDUPTHER

## 2021-03-10 ENCOUNTER — TELEPHONE (OUTPATIENT)
Dept: CARDIOLOGY CLINIC | Age: 61
End: 2021-03-10

## 2021-03-10 NOTE — TELEPHONE ENCOUNTER
Patient would like to know why he has to wear compression socks 'for the rest of his life'. He states he has no edema but does have h/o peripheral angioplasty and neuropathy. Please advise.

## 2021-03-11 RX ORDER — LANOLIN ALCOHOL/MO/W.PET/CERES
1000 CREAM (GRAM) TOPICAL DAILY
Qty: 30 TABLET | Refills: 12 | Status: SHIPPED | OUTPATIENT
Start: 2021-03-11 | End: 2021-03-29 | Stop reason: SDUPTHER

## 2021-03-25 DIAGNOSIS — Z79.899 HIGH RISK MEDICATION USE: ICD-10-CM

## 2021-03-25 DIAGNOSIS — M53.3 SI (SACROILIAC) PAIN: ICD-10-CM

## 2021-03-25 DIAGNOSIS — E55.9 VITAMIN D DEFICIENCY: ICD-10-CM

## 2021-03-25 DIAGNOSIS — M54.17 LUMBOSACRAL RADICULOPATHY AT S1: ICD-10-CM

## 2021-03-25 DIAGNOSIS — D47.2 IGG MONOCLONAL GAMMOPATHY OF UNCERTAIN SIGNIFICANCE: ICD-10-CM

## 2021-03-25 DIAGNOSIS — M51.36 DDD (DEGENERATIVE DISC DISEASE), LUMBAR: Chronic | ICD-10-CM

## 2021-03-25 RX ORDER — MULTIVIT-MIN/IRON/FOLIC ACID/K 18-600-40
1 CAPSULE ORAL 2 TIMES DAILY
Qty: 30 CAPSULE | Refills: 5 | Status: SHIPPED | OUTPATIENT
Start: 2021-03-25 | End: 2021-05-13 | Stop reason: SDUPTHER

## 2021-03-25 RX ORDER — HYDROCODONE BITARTRATE AND ACETAMINOPHEN 7.5; 325 MG/1; MG/1
1 TABLET ORAL EVERY 8 HOURS PRN
Qty: 80 TABLET | Refills: 0 | Status: SHIPPED | OUTPATIENT
Start: 2021-04-12 | End: 2021-04-23 | Stop reason: SDUPTHER

## 2021-03-29 ENCOUNTER — VIRTUAL VISIT (OUTPATIENT)
Dept: FAMILY MEDICINE CLINIC | Age: 61
End: 2021-03-29
Payer: COMMERCIAL

## 2021-03-29 DIAGNOSIS — M10.032 ACUTE IDIOPATHIC GOUT OF LEFT WRIST: Chronic | ICD-10-CM

## 2021-03-29 DIAGNOSIS — Z71.89 COUNSELED ABOUT COVID-19 VIRUS INFECTION: Primary | ICD-10-CM

## 2021-03-29 DIAGNOSIS — E88.81 INSULIN RESISTANCE: ICD-10-CM

## 2021-03-29 DIAGNOSIS — I63.9 CEREBROVASCULAR ACCIDENT (CVA), UNSPECIFIED MECHANISM (HCC): Chronic | ICD-10-CM

## 2021-03-29 DIAGNOSIS — Z71.89 COUNSELING ON HEALTH PROMOTION AND DISEASE PREVENTION: ICD-10-CM

## 2021-03-29 DIAGNOSIS — G62.1 ALCOHOLIC POLYNEUROPATHY (HCC): Chronic | ICD-10-CM

## 2021-03-29 DIAGNOSIS — K59.09 OTHER CONSTIPATION: Chronic | ICD-10-CM

## 2021-03-29 PROCEDURE — G8427 DOCREV CUR MEDS BY ELIG CLIN: HCPCS | Performed by: FAMILY MEDICINE

## 2021-03-29 PROCEDURE — 99213 OFFICE O/P EST LOW 20 MIN: CPT | Performed by: FAMILY MEDICINE

## 2021-03-29 PROCEDURE — 3017F COLORECTAL CA SCREEN DOC REV: CPT | Performed by: FAMILY MEDICINE

## 2021-03-29 RX ORDER — AMOXICILLIN 250 MG
2 CAPSULE ORAL DAILY PRN
Qty: 180 TABLET | Refills: 4 | Status: SHIPPED | OUTPATIENT
Start: 2021-03-29 | End: 2022-02-01 | Stop reason: SDUPTHER

## 2021-03-29 RX ORDER — LANOLIN ALCOHOL/MO/W.PET/CERES
1000 CREAM (GRAM) TOPICAL DAILY
Qty: 90 TABLET | Refills: 4 | Status: SHIPPED | OUTPATIENT
Start: 2021-03-29 | End: 2022-09-21

## 2021-03-29 RX ORDER — ALLOPURINOL 100 MG/1
100 TABLET ORAL DAILY
Qty: 90 TABLET | Refills: 4 | Status: SHIPPED | OUTPATIENT
Start: 2021-03-29 | End: 2022-02-01 | Stop reason: SDUPTHER

## 2021-03-29 NOTE — PROGRESS NOTES
Anthony Roth is a 61 y.o. male evaluated via telephone on 3/29/2021. Consent:  He and/or health care decision maker is aware that that he may receive a bill for this telephone service, depending on his insurance coverage, and has provided verbal consent to proceed: Yes      I affirm this is a Patient Initiated Episode with an Established Patient who has not had a related appointment within my department in the past 7 days or scheduled within the next 24 hours. Total Time:     Note: not billable if this call serves to triage the patient into an appointment for the relevant concern      Yenifer SEGURA     3/29/2021    TELEHEALTH EVALUATION -- Audio (During QGXFV- public health emergency)    No chief complaint on file. HPI     Anthony Roth (:  1960) has requested an audio evaluation for the following concern(s): Wonders about COVID-19 vaccination. Has vaccine scheduled. No current COVID symptoms. DIabetes Education order has . Prior to Visit Medications    Medication Sig Taking? Authorizing Provider   vitamin B-12 (CYANOCOBALAMIN) 1000 MCG tablet Take 1 tablet by mouth daily Yes Nicole Borden MD   allopurinol (ZYLOPRIM) 100 MG tablet Take 1 tablet by mouth daily Yes Nicole Borden MD   senna-docusate (PERICOLACE) 8.6-50 MG per tablet Take 2 tablets by mouth daily as needed for Constipation Yes Nicole Borden MD   fluticasone-salmeterol (ADVAIR) 100-50 MCG/DOSE diskus inhaler Inhale 1 puff into the lungs 2 times daily Yes Nicole Borden MD   HYDROcodone-acetaminophen (NORCO) 7.5-325 MG per tablet Take 1 tablet by mouth every 8 hours as needed for Pain (Max 3 per day) for up to 30 days.  Intended supply: 30 days  Sherrie Swan DO   Cholecalciferol (VITAMIN D) 50 MCG (2000 UT) CAPS capsule Take 1 capsule by mouth 2 times daily  Sherrie Swan DO    MG capsule take 1 capsule by mouth twice a day JESSICA Stafford CNP   polyethylene glycol [facility]) 17 GM/SCOOP powder take 17GM (DISSOLVED IN WATER) by mouth once daily  JESSICA Stafford CNP   Elastic Bandages & Supports (MEDICAL COMPRESSION STOCKINGS) [address] 1 each by Does not apply route daily Knee high 15-20 mmhg compression stockings both legs wear daily during day and off Qhs. Wear as tolerated. Do not wear if they cause increased pain. JESSICA Wang CNP   indomethacin (INDOCIN) 25 MG capsule Take 1 capsule by mouth 3 times daily as needed for Pain (gout pain)  Jaren Lyon MD   pregabalin (LYRICA) 75 MG capsule Take 1 capsule by mouth 3 times daily for 30 days. Sherrie Swan DO   VASCEPA 1 g CAPS capsule Take 2 capsules by mouth 2 times daily  Christina Glover MD   naloxone [facility]) 4 MG/0.1ML LIQD nasal spray 1 spray by Nasal route as needed for Opioid Reversal  Sherrie Swan DO   atorvastatin (LIPITOR) 40 MG tablet Take 1 tablet by mouth daily  Christina Glover MD   clopidogrel (PLAVIX) 75 MG tablet take 1 tablet by mouth once daily  Christina Glover MD   metoprolol succinate (TOPROL XL) 25 MG extended release tablet Take 1 tablet by mouth daily  Christina Glover MD   valsartan-hydrochlorothiazide (DIOVAN-HCT) 80-12.5 MG per tablet take 1 tablet by mouth once daily  Christina Glover MD   aspirin EC 81 MG EC tablet Take 1 tablet by mouth daily  Christina Glovre MD   nitroGLYCERIN (NITROSTAT) 0.4 MG SL tablet up to max of 3 total doses. If no relief after 1 dose, call 911. Christina Glover MD   budesonide-formoterol [facility]) 160-4.5 MCG/ACT AERO inhale 2 puffs by mouth twice a day  Jaren Lyon MD   REFRESH TEARS 0.5 % SOLN ophthalmic solution instill 1 drop into both eyes four times a day  Historical Provider, MD             PHYSICAL EXAMINATION:    Patient appears to be alert and oriented to person, place, time, situation and is in no acute distress.   Respiratory effort appears normal. Mood appears stable and speech and thought are grossly normal.      Lab Results   Component Value Date    TSH 2.170 08/08/2018     Lipid Profile: No components found for: CHLPL  Lab Results   Component Value Date    TRIG 139 01/19/2019    TRIG 347 (H) 08/08/2018    TRIG 132 04/18/2017     Lab Results   Component Value Date    HDL 29 (L) 02/26/2020    HDL 39 (L) 01/19/2019    HDL 34 (L) 08/08/2018     Lab Results   Component Value Date    LDLCALC 12 02/26/2020    [address] 01/19/2019    LDLCALC 92 08/08/2018     No results found for: LABVLDL  Hemoglobin A1C:   Lab Results   Component Value Date    LABA1C 6.2 (H) 02/26/2020       ASSESSMENT/PLAN:  Diagnoses and all orders for this visit:    Uche Huff about COVID-19 virus infection  Comments:  Counseled regarding transmissibility of COVID-19 after immunization. Counseling on health promotion and disease prevention  Comments:  Reviewed risks and benefits of Covid immunization and encouraged he be vaccinated    Insulin resistance  Comments:  Referred again for diabetes education  Orders:  -     [facility]    Cerebrovascular accident (CVA), unspecified mechanism ([facility] [address]  Comments:  Stable and well-controlled on current meds  Orders:  -     fluticasone-salmeterol (ADVAIR) 100-50 MCG/DOSE diskus inhaler; Inhale 1 puff into the lungs 2 times daily    Other constipation  Comments:  Senna docusate as prescribed  Orders:  -     senna-docusate (PERICOLACE) 8.6-50 MG per tablet; Take 2 tablets by mouth daily as needed for Constipation    Alcoholic polyneuropathy (HCC)  -     vitamin B-12 (CYANOCOBALAMIN) 1000 MCG tablet; Take 1 tablet by mouth daily    Acute idiopathic gout of left wrist  -     allopurinol (ZYLOPRIM) 100 MG tablet; Take 1 tablet by mouth daily          Return for already scheduled f/u appt. Anthony Roth is a 61 y.o. male being evaluated by a Virtual Visit (telephonic visit) encounter to address concerns as mentioned above.   A caregiver was present when appropriate. Due to this being a TeleHealth encounter (During KBPRS-26 public health emergency), evaluation of the following organ systems was limited: Vitals/Constitutional/EENT/Resp/CV/GI//MS/Neuro/Skin/Heme-Lymph-Imm. Pursuant to the emergency declaration under the [address] [place] [address], [address] [place] [address] and the [employer] Act, this Virtual Visit was conducted with patient's (and/or legal guardian's) consent, to reduce the patient's risk of exposure to COVID-19 and provide necessary medical care. The patient (and/or legal guardian) has also been advised to contact this office for worsening conditions or problems, and seek emergency medical treatment and/or call 911 if deemed necessary. Services were provided through a telephonic synchronous discussion virtually to substitute for in-person clinic visit. Patient and provider were located at their individual homes. --Jillian Dozier MD on 3/29/2021 at 9:49 AM    An electronic signature was used to authenticate this note.

## 2021-04-01 ENCOUNTER — TELEPHONE (OUTPATIENT)
Dept: FAMILY MEDICINE CLINIC | Age: 61
End: 2021-04-01

## 2021-04-01 DIAGNOSIS — J41.0 SIMPLE CHRONIC BRONCHITIS (HCC): Primary | ICD-10-CM

## 2021-04-01 RX ORDER — BUDESONIDE AND FORMOTEROL FUMARATE DIHYDRATE 80; 4.5 UG/1; UG/1
2 AEROSOL RESPIRATORY (INHALATION) 2 TIMES DAILY
Qty: 1 INHALER | Refills: 12 | Status: SHIPPED | OUTPATIENT
Start: 2021-04-01 | End: 2022-02-01 | Stop reason: SDUPTHER

## 2021-04-01 NOTE — TELEPHONE ENCOUNTER
Please let patient know that I have sent in a prescription for Symbicort.   The system did flag that Advair is considered the preferred prescription by his insurance company however

## 2021-04-01 NOTE — TELEPHONE ENCOUNTER
Pt calling because he is requesting that he get his script for Symbicort. He states the Advair is too expensive. Please advise and thank you. To staff: Pt asked what order number #[id number] was for? [place] [id number] [facility] Diabetic Education order, training.     Pharmacy: [facility]

## 2021-04-02 DIAGNOSIS — M10.032 ACUTE IDIOPATHIC GOUT OF LEFT WRIST: Chronic | ICD-10-CM

## 2021-04-02 DIAGNOSIS — D47.2 IGG MONOCLONAL GAMMOPATHY OF UNCERTAIN SIGNIFICANCE: ICD-10-CM

## 2021-04-02 DIAGNOSIS — E88.819 INSULIN RESISTANCE: Chronic | ICD-10-CM

## 2021-04-02 DIAGNOSIS — R79.9 ABNORMAL FINDING OF BLOOD CHEMISTRY, UNSPECIFIED: ICD-10-CM

## 2021-04-02 DIAGNOSIS — I10 ESSENTIAL HYPERTENSION: Chronic | ICD-10-CM

## 2021-04-02 LAB
ALBUMIN SERPL-MCNC: 4.1 G/DL (ref 3.5–4.6)
ALP BLD-CCNC: 118 U/L (ref 35–104)
ALT SERPL-CCNC: 39 U/L (ref 0–41)
ANION GAP SERPL CALCULATED.3IONS-SCNC: 11 MEQ/L (ref 9–15)
ANISOCYTOSIS: ABNORMAL
AST SERPL-CCNC: 26 U/L (ref 0–40)
ATYPICAL LYMPHOCYTE RELATIVE PERCENT: 2 %
BASOPHILS ABSOLUTE: 0.1 K/UL (ref 0–0.2)
BASOPHILS RELATIVE PERCENT: 1 %
BILIRUB SERPL-MCNC: 0.5 MG/DL (ref 0.2–0.7)
BUN BLDV-MCNC: 15 MG/DL (ref 8–23)
CALCIUM SERPL-MCNC: 10.1 MG/DL (ref 8.5–9.9)
CHLORIDE BLD-SCNC: 101 MEQ/L (ref 95–107)
CO2: 25 MEQ/L (ref 20–31)
CREAT SERPL-MCNC: 0.94 MG/DL (ref 0.7–1.2)
EOSINOPHILS ABSOLUTE: 0.4 K/UL (ref 0–0.7)
EOSINOPHILS RELATIVE PERCENT: 3 %
GFR AFRICAN AMERICAN: >60
GFR NON-AFRICAN AMERICAN: >60
GLOBULIN: 3.2 G/DL (ref 2.3–3.5)
GLUCOSE BLD-MCNC: 113 MG/DL (ref 70–99)
HBA1C MFR BLD: 6.1 % (ref 4.8–5.9)
HCT VFR BLD CALC: 49 % (ref 42–52)
HEMOGLOBIN: 16.6 G/DL (ref 14–18)
LYMPHOCYTES ABSOLUTE: 3.2 K/UL (ref 1–4.8)
LYMPHOCYTES RELATIVE PERCENT: 25 %
MCH RBC QN AUTO: 30.1 PG (ref 27–31.3)
MCHC RBC AUTO-ENTMCNC: 33.9 % (ref 33–37)
MCV RBC AUTO: 88.6 FL (ref 80–100)
MICROCYTES: ABNORMAL
MONOCYTES ABSOLUTE: 1.1 K/UL (ref 0.2–0.8)
MONOCYTES RELATIVE PERCENT: 8.7 %
NEUTROPHILS ABSOLUTE: 7.1 K/UL (ref 1.4–6.5)
NEUTROPHILS RELATIVE PERCENT: 60 %
PDW BLD-RTO: 14.9 % (ref 11.5–14.5)
PLATELET # BLD: 281 K/UL (ref 130–400)
PLATELET SLIDE REVIEW: NORMAL
POTASSIUM SERPL-SCNC: 4.2 MEQ/L (ref 3.4–4.9)
RBC # BLD: 5.53 M/UL (ref 4.7–6.1)
SODIUM BLD-SCNC: 137 MEQ/L (ref 135–144)
TOTAL PROTEIN: 7.3 G/DL (ref 6.3–8)
URIC ACID, SERUM: 6.7 MG/DL (ref 3.4–7)
WBC # BLD: 11.9 K/UL (ref 4.8–10.8)

## 2021-04-05 LAB
ALBUMIN SERPL-MCNC: 3.99 G/DL (ref 3.75–5.01)
ALPHA-1-GLOBULIN: 0.34 G/DL (ref 0.19–0.46)
ALPHA-2-GLOBULIN: 0.73 G/DL (ref 0.48–1.05)
BETA GLOBULIN: 1.04 G/DL (ref 0.48–1.1)
GAMMA GLOBULIN: 1.5 G/DL (ref 0.62–1.51)
PROTEIN ELECTROPHORESIS, SERUM: NORMAL
SPE/IFE INTERPRETATION: NORMAL
TOTAL PROTEIN: 7.6 G/DL (ref 6.3–8.2)

## 2021-04-06 DIAGNOSIS — D47.2 IGG MONOCLONAL GAMMOPATHY OF UNCERTAIN SIGNIFICANCE: Primary | ICD-10-CM

## 2021-04-06 DIAGNOSIS — D72.829 LEUKOCYTOSIS, UNSPECIFIED TYPE: ICD-10-CM

## 2021-04-07 ENCOUNTER — TELEPHONE (OUTPATIENT)
Dept: FAMILY MEDICINE CLINIC | Age: 61
End: 2021-04-07

## 2021-04-07 NOTE — TELEPHONE ENCOUNTER
Unfortunately, there is no other hematologist in the area, I am aware. .  Perhaps he can call his insurance and ask them if they know of someone with whom I am may not be familiar

## 2021-04-07 NOTE — TELEPHONE ENCOUNTER
Patient called back in regards to referral for hematology.  He said he cannot go all the way to [facility] to see a doctor and was wondering if you could refer him to a doctor in [facility]    Thank you

## 2021-04-12 RX ORDER — METOPROLOL SUCCINATE 25 MG/1
25 TABLET, EXTENDED RELEASE ORAL DAILY
Qty: 90 TABLET | Refills: 2 | Status: SHIPPED | OUTPATIENT
Start: 2021-04-12 | End: 2021-08-05 | Stop reason: SDUPTHER

## 2021-04-12 NOTE — TELEPHONE ENCOUNTER
Patient is requesting medication refill.  Please approve or deny this request.    Rx requested:  Requested Prescriptions      No prescriptions requested or ordered in this encounter         Last Office Visit:   2/23/2021      Next Visit Date:  Future Appointments   Date Time Provider Loan Lisset   4/23/2021 10:15 AM Carmelo Mota DO [address] [place] [address]   6/4/2021  1:30 PM Liset Garland MD [address]   6/28/2021 12:30 PM Ora Guillen MD [address] [place] [address]   12/21/2021  8:00 AM Liset Garland MD [address] [place] [address]

## 2021-04-23 ENCOUNTER — OFFICE VISIT (OUTPATIENT)
Dept: PHYSICAL MEDICINE AND REHAB | Age: 61
End: 2021-04-23
Payer: COMMERCIAL

## 2021-04-23 VITALS
DIASTOLIC BLOOD PRESSURE: 52 MMHG | HEIGHT: 72 IN | SYSTOLIC BLOOD PRESSURE: 128 MMHG | WEIGHT: 230 LBS | BODY MASS INDEX: 31.15 KG/M2

## 2021-04-23 DIAGNOSIS — M51.36 DDD (DEGENERATIVE DISC DISEASE), LUMBAR: Primary | Chronic | ICD-10-CM

## 2021-04-23 DIAGNOSIS — M54.41 CHRONIC MIDLINE LOW BACK PAIN WITH BILATERAL SCIATICA: ICD-10-CM

## 2021-04-23 DIAGNOSIS — R68.89 ABNORMAL ANKLE BRACHIAL INDEX (ABI): ICD-10-CM

## 2021-04-23 DIAGNOSIS — G62.1 ALCOHOLIC POLYNEUROPATHY (HCC): Chronic | ICD-10-CM

## 2021-04-23 DIAGNOSIS — F81.0 BASIC LEARNING DISABILITY, READING: Chronic | ICD-10-CM

## 2021-04-23 DIAGNOSIS — D47.2 IGG MONOCLONAL GAMMOPATHY OF UNCERTAIN SIGNIFICANCE: ICD-10-CM

## 2021-04-23 DIAGNOSIS — M62.830 MUSCLE SPASM OF BACK: ICD-10-CM

## 2021-04-23 DIAGNOSIS — G89.29 CHRONIC MIDLINE LOW BACK PAIN WITH BILATERAL SCIATICA: ICD-10-CM

## 2021-04-23 DIAGNOSIS — M54.42 CHRONIC MIDLINE LOW BACK PAIN WITH BILATERAL SCIATICA: ICD-10-CM

## 2021-04-23 DIAGNOSIS — M54.17 LUMBOSACRAL RADICULOPATHY AT S1: ICD-10-CM

## 2021-04-23 DIAGNOSIS — Z79.899 HIGH RISK MEDICATION USE: ICD-10-CM

## 2021-04-23 DIAGNOSIS — M79.10 MYALGIA: ICD-10-CM

## 2021-04-23 DIAGNOSIS — M53.3 SI (SACROILIAC) PAIN: ICD-10-CM

## 2021-04-23 PROCEDURE — 3017F COLORECTAL CA SCREEN DOC REV: CPT | Performed by: PHYSICAL MEDICINE & REHABILITATION

## 2021-04-23 PROCEDURE — 99214 OFFICE O/P EST MOD 30 MIN: CPT | Performed by: PHYSICAL MEDICINE & REHABILITATION

## 2021-04-23 PROCEDURE — G8427 DOCREV CUR MEDS BY ELIG CLIN: HCPCS | Performed by: PHYSICAL MEDICINE & REHABILITATION

## 2021-04-23 PROCEDURE — G8417 CALC BMI ABV UP PARAM F/U: HCPCS | Performed by: PHYSICAL MEDICINE & REHABILITATION

## 2021-04-23 PROCEDURE — 4004F PT TOBACCO SCREEN RCVD TLK: CPT | Performed by: PHYSICAL MEDICINE & REHABILITATION

## 2021-04-23 RX ORDER — HYDROCODONE BITARTRATE AND ACETAMINOPHEN 7.5; 325 MG/1; MG/1
1 TABLET ORAL EVERY 8 HOURS PRN
Qty: 80 TABLET | Refills: 0 | Status: SHIPPED | OUTPATIENT
Start: 2021-05-11 | End: 2021-06-02 | Stop reason: SDUPTHER

## 2021-04-23 ASSESSMENT — ENCOUNTER SYMPTOMS
CONSTIPATION: 1
SHORTNESS OF BREATH: 0
PHOTOPHOBIA: 0
APNEA: 0
COLOR CHANGE: 0
COUGH: 0
VOICE CHANGE: 1
BACK PAIN: 1
BOWEL INCONTINENCE: 0
VOMITING: 0
CHEST TIGHTNESS: 0
EYE PAIN: 0
VISUAL CHANGE: 0
ABDOMINAL PAIN: 0
NAUSEA: 0
DIARRHEA: 0
WHEEZING: 0

## 2021-04-23 NOTE — PROGRESS NOTES
symptoms include numbness (Left side of throat). Pertinent negatives include no fever. Mental Health Problem  The primary symptoms do not include dysphoric mood, delusions or hallucinations. Primary symptoms comment: etoh rel neuropathy--pt has not drank in 6 years --he is committed to no more ETOH use. The current episode started more than 1 month ago. This is a chronic problem. The onset of the illness is precipitated by a stressful event. The degree of incapacity that he is experiencing as a consequence of his illness is mild. Additional symptoms of the illness do not include appetite change, unexpected weight change, fatigue, visual change, headaches or abdominal pain. He does not admit to suicidal ideas. He does not have a plan to attempt suicide. He does not contemplate harming himself. He has not already injured self. He does not contemplate injuring another person. He has not already  injured another person. Risk factors that are present for mental illness include substance abuse.        Past Medical History:   Diagnosis Date    Acute idiopathic gout of left wrist 91/27/4658    Alcoholic (HCC)-remote Hx 5/87/1793    In remission goes to AA daily--agrees not to overtake pain meds     Alcoh[facility]neuropathy ([facility] [address]     Asthma     CAD (coronary artery disease)     [address] [facility]    Chronic back pain greater than 3 months duration     CN III palsy, right eye 2017    Dr Steven Asencio    COPD (chronic obstructive pulmonary disease) ([facility] [address] 2014    DDD (degenerative disc disease), lumbar 3/23/2015    Elevated liver enzymes 2014    Hyperlipidemia     on med > 10 yrs    Hypertension     on meds x 1 yr    IgA monoclonal gammopathy 2015    Dr Christin Padilla    Insomnia, unspecified     LBP (low back pain)     Dr Ileana Fan Neuropathy     Occlusion and stenosis of carotid artery without mention of cerebral infarction     Dr Awad Senior Personal history of alcoholism ([facility] [address]     quit 11/05/2010, relapsed 2016    S/P carotid endarterectomy 01/2019    Dr Laura Qureshi    Sensorimotor neuropathy     Bilateral lower extremities-EMG 03/23/15 (Angelic Mercedes)    Smoking trying to quit     Traumatic compression fracture of T11 thoracic vertebra ([address]    Umbilical hernia      Past Surgical History:   Procedure Laterality Date    CAROTID ENDARTERECTOMY Left 1/18/2019    LEFT CAROTID ENDARTERECTOMY performed by Acacia Dorman MD at [address]  8/19/2014    COLONOSCOPY N/A 12/18/2019    COLONOSCOPY DIAGNOSTIC performed by Letitia Jesus MD at [address] [facility], COLON, DIAGNOSTIC      HERNIA REPAIR      TONSILLECTOMY      UPPER GASTROINTESTINAL ENDOSCOPY  8/19/2014    VENTRAL HERNIA REPAIR  09/01/15    W/MESH AND W/UMBILECTOMY     Social History     Socioeconomic History    Marital status: Single     Spouse name: None    Number of children: 1    Years of education: None    Highest education level: None   Occupational History    Occupation: SSI for learning disability   Social Needs    Financial resource strain: Not hard at all   [place] insecurity     Worry: None     Inability: None    Transportation needs     Medical: No     Non-medical: No   Tobacco Use    Smoking status: Current Every Day Smoker     Packs/day: 0.50     Years: 40.00     Pack years: 20.00     Types: Cigarettes     Start date: 1968    Smokeless tobacco: Never Used   Substance and Sexual Activity    Alcohol use: No     Alcohol/week: 0.0 standard drinks     Comment: 8/31/2016 Quit Date    Drug use: No    Sexual activity: None   Lifestyle    Physical activity     Days per week: None     Minutes per session: None    Stress: None   Relationships    Social connections     Talks on phone: None     Gets together: None     Attends Christian service: None     Active member of club or organization: None     Attends meetings of clubs or organizations: None     Relationship status: None    Intimate partner violence     Fear of current or ex partner: None     Emotionally abused: None     Physically abused: None     Forced sexual activity: None   Other Topics Concern    None   Social History Narrative    Born in [place], one of 5    Came to [place] at age 1 with parents    Never , one daughter    Never worked, disabled since 12 (mental)    Lives in [place] with brother, in a house        Attends [employer] daily    [address] [employer] riding bike, TV    One daughter, does keep in touch      Family History   Problem Relation Age of Onset    Cancer Mother         brain, dec 79    Alcohol Abuse Father     Other Sister         unknown medical history    Other Brother         unknown medical history    High Blood Pressure Brother     Alcohol Abuse Daughter        No Known Allergies    Review of Systems   Constitutional: Negative for activity change, appetite change, fatigue, fever and unexpected weight change. HENT: Positive for voice change. Eyes: Negative for photophobia, pain and visual disturbance. Respiratory: Negative for apnea, cough, chest tightness, shortness of breath and wheezing. Cardiovascular: Negative for chest pain, palpitations and leg swelling. Gastrointestinal: Positive for constipation. Negative for abdominal pain, bowel incontinence, diarrhea, nausea and vomiting. Endocrine: Positive for cold intolerance. Genitourinary: Negative. Negative for bladder incontinence and dysuria. Musculoskeletal: Positive for arthralgias, back pain and myalgias. Negative for gait problem, joint swelling, neck pain and neck stiffness. Skin: Negative. Negative for color change, pallor and wound. Allergic/Immunologic: Positive for immunocompromised state. Negative for environmental allergies and food allergies. Neurological: Positive for weakness and numbness (Left side of throat). Negative for dizziness, light-headedness and headaches. Hematological: Negative. Psychiatric/Behavioral: Positive for decreased concentration. Negative for dysphoric mood, hallucinations and sleep disturbance. The patient is not nervous/anxious. Objective    Vitals:    04/23/21 1019   BP: (!) 128/52   Site: Left Upper Arm   Position: Sitting   Cuff Size: Large Adult   Weight: 230 lb (104.3 kg)   Height: 5' 11.5\" (1.816 m)     Pain Score: SIX (With medication)       Physical Exam  Vitals signs reviewed. Constitutional:       General: He is not in acute distress. Appearance: He is well-developed. He is not ill-appearing, toxic-appearing or diaphoretic. Comments:         HENT:      Head: Normocephalic and atraumatic. Right Ear: Hearing normal.      Left Ear: Hearing normal.      Nose: Nose normal.      Mouth/Throat:      Mouth: No oral lesions. Dentition: Normal dentition. Pharynx: No oropharyngeal exudate. Eyes:      General: No scleral icterus. Left eye: No discharge. Extraocular Movements:      Right eye: Abnormal extraocular motion present. Conjunctiva/sclera: Conjunctivae normal.      Left eye: No chemosis or exudate. Pupils: Pupils are equal, round, and reactive to light. Comments: Right 3rd nerve palsy   Neck:      Musculoskeletal: Normal range of motion and neck supple. No edema or neck rigidity. Thyroid: No thyromegaly. Vascular: No JVD. Trachea: No tracheal deviation. Pulmonary:      Effort: Pulmonary effort is normal. No tachypnea, bradypnea, accessory muscle usage or respiratory distress. Breath sounds: No wheezing. Chest:      Chest wall: No tenderness. Abdominal:      General: There is no distension. Comments: Umbilical hernia   Musculoskeletal:         General: Tenderness present. Right shoulder: Normal.      Left shoulder: Normal.      Right elbow: Normal.     Left elbow: Normal.      Right wrist: Normal.      Left wrist: Normal.      Right hip: He exhibits decreased range of motion, decreased strength and tenderness.       Left hip: He exhibits decreased range of motion and tenderness. Right knee: Normal.      Left knee: Normal.      Right ankle: Normal. Achilles tendon normal.      Left ankle: Normal. Achilles tendon normal.      Cervical back: He exhibits decreased range of motion, tenderness, bony tenderness, pain and spasm. Thoracic back: He exhibits decreased range of motion, tenderness, bony tenderness, pain and spasm. Lumbar back: He exhibits decreased range of motion, tenderness, bony tenderness and pain. He exhibits no swelling, no edema, no deformity, no laceration, no spasm and normal pulse. Back:       Right upper arm: Normal.      Left upper arm: Normal.      Right forearm: Normal.      Left forearm: Normal.      Right hand: Normal.      Left hand: Normal.      Right upper leg: Normal.      Left upper leg: Normal.      Right lower leg: Normal.      Left lower leg: Normal.        Legs:       Right foot: Normal.      Left foot: Normal.      Comments: Tender areas are indicated by numbered spot         Skin:     General: Skin is warm and dry. Coloration: Skin is not pale. Findings: No abrasion, bruising, ecchymosis, erythema, laceration, petechiae or rash. Rash is not macular, pustular or urticarial.      Nails: There is no clubbing. Neurological:      Mental Status: He is alert and oriented to person, place, and time. Cranial Nerves: No cranial nerve deficit. Sensory: Sensory deficit present. Motor: No tremor, atrophy or abnormal muscle tone. Coordination: Coordination normal.      Deep Tendon Reflexes: Reflexes abnormal. Babinski sign absent on the right side. Babinski sign absent on the left side. Psychiatric:         Attention and Perception: He is attentive. Mood and Affect: Mood is not anxious or depressed. Affect is not labile, blunt, angry or inappropriate. Speech: He is communicative.  Speech is not rapid and pressured, delayed, slurred or tangential. Behavior: Behavior normal. Behavior is not agitated, slowed, aggressive, withdrawn, hyperactive or combative. Thought Content: Thought content normal. Thought content is not paranoid or delusional. Thought content does not include homicidal or suicidal ideation. Thought content does not include homicidal or suicidal plan. Cognition and Memory: Memory is not impaired. He does not exhibit impaired recent memory or impaired remote memory. Judgment: Judgment normal. Judgment is not impulsive or inappropriate. Ortho Exam  Neurologic Exam     Mental Status   Oriented to person, place, and time. Speech: not slurred     Cranial Nerves     CN III, IV, VI   Pupils are equal, round, and reactive to light. After a thorough review and discussion of the previous medical records, patient comprehensive medical, surgical, and family and social history, Review of Systems, their OARRS, their Screener and Opioid Assessment for Patients with Pain (SOAPP®-R), recent diagnostics, and symptomatic results to previous treatment, it is my impression that the patients is suffering with progressive and severe:     Diagnosis Orders   1. DDD (degenerative disc disease), lumbar  HYDROcodone-acetaminophen (NORCO) 7.5-325 MG per tablet   2. Basic learning disability, reading     3. Alcoholic polyneuropathy ([facility] [address]     4. High risk medication use - 01/25/18 OARRS PM&R, 03/23/18 OARRS PM&R, 02/15/17 Med Contract PM&R, 09/21/17 Urine Drug Screen: negative PM&R  HYDROcodone-acetaminophen (NORCO) 7.5-325 MG per tablet   5. Abnormal ankle brachial index (RAO)     6. IgG monoclonal gammopathy of uncertain significance  HYDROcodone-acetaminophen (NORCO) 7.5-325 MG per tablet   7. Muscle spasm of back     8. Chronic midline low back pain with bilateral sciatica     9. SI (sacroiliac) pain  HYDROcodone-acetaminophen (NORCO) 7.5-325 MG per tablet   10.  Lumbosacral radiculopathy at S1  HYDROcodone-acetaminophen (NORCO) 7.5-325 MG per tablet   11. Myalgia  CT INJECT TRIGGER POINTS, 3 OR GREATER    CT INJECT TRIGGER POINTS, 3 OR GREATER       I am also concerned by lifestyle and mood issues including:    Past Medical History:   Diagnosis Date    Acute idiopathic gout of left wrist 89/27/1064    Alcoholic (HCC)-remote Hx 7/63/3766    In remission goes to AA daily--agrees not to overtake pain meds     Al[facility]neuropathy ([facility] [address]     Asthma     CAD (coronary artery disease)     [facility]    Chronic back pain greater than 3 months duration     CN III palsy, right eye 2017    Dr Brandi Varma    COPD (chronic obstructive pulmonary disease) ([address] 2014    DDD (degenerative disc disease), lumbar 3/23/2015    Elevated liver enzymes 2014    Hyperlipidemia     on med > 10 yrs    Hypertension     on meds x 1 yr    IgA monoclonal gammopathy 2015    Dr Sabrina San    Insomnia, unspecified     LBP (low back pain)     Dr Viki Bird Neuropathy     Occlusion and stenosis of carotid artery without mention of cerebral infarction     Dr Madelin Lopes history of alcoholism ([facility] [address]     quit 11/05/2010, relapsed 2016    S/P carotid endarterectomy 01/2019    Dr Roosevelt Patel    Sensorimotor neuropathy     Bilateral lower extremities-EMG 03/23/15 (Scroxi)    Smoking trying to quit     Traumatic compression fracture of T11 thoracic vertebra ([facility] [address]35    Umbilical hernia            Given their medication, chronic pain and lifestyle and medications they are at risk for :    Falls, constipation, addiction, toxicity on opiates  Loss of livelyhood due to severe pain, debility, weight gain and  vitamin D deficiency    The patient was educated regarding proper diet, fitness routine, and regulatory restrictions concerning pain medications. Previous notes, comprehensive past medical, surgical, family history, and diagnostics were reviewed.    Patient education and councelling were provided regarding off label use,treatment options and medication and injection risks.    Current and old OARRS ([place] Automated Prescription Reporting System) records reviewed, all refills reviewed since last visit,  Behavioral agreement/MICK regulations   and Toxicology screen was reviewed with patient and is up to date. There are   no current red flags. high risk meds review re intensive monitoring for toxicity and addiction,  They are making good progress regarding pain relief, they are performing at a functional level regarding activities of daily living, family interactions and psychological functioning, they're not having any adverse effects or side effects from the current medications, and I see no findings of aberrant drug taking or addiction related behaviors. The patient is aware that they have a chronic pain condition and they may require opiates dosing for life. All efforts will be made to wean to the lowest effective dose. Other therapies for pain have not been effective including nonopiate medications. Injections and exercises are only partially effective. A Rx for Narcan was offered to help prevent accidental overdose. RX Monitoring 7/15/2020   Attestation -   Acute Pain Prescriptions Prescription exceeds daily limit for a specific reason. See comments or note. ;Not required given exclusionary diagnoses. ..;Severe pain not adequately treated with lower dose. Periodic Controlled Substance Monitoring Possible medication side effects, risk of tolerance/dependence & alternative treatments discussed. ;No signs of potential drug abuse or diversion identified. ;Assessed functional status. ;Obtaining appropriate analgesic effect of treatment. Chronic Pain > 50 MEDD Re-evaluated the status of the patient's underlying condition causing pain. ;Considered consultation with a specialist.;Obtained or confirmed \"Consent for Opioid Use\" on file.    Chronic Pain > 80 MEDD -               Patient is currently taking:       I am having Ranjith Rodriguez maintain his Refresh Tears, nitroGLYCERIN, atorvastatin, clopidogrel, valsartan-hydroCHLOROthiazide, aspirin EC, Narcan, Vascepa, pregabalin, indomethacin, Medical Compression Stockings, DOK, polyethylene glycol, vitamin D, vitamin B-12, allopurinol, senna-docusate, budesonide-formoterol, metoprolol succinate, and HYDROcodone-acetaminophen. I also recommend the following Medications:    Orders Placed This Encounter   Medications    HYDROcodone-acetaminophen (NORCO) 7.5-325 MG per tablet     Sig: Take 1 tablet by mouth every 8 hours as needed for Pain (Max 3 per day) for up to 30 days. Intended supply: 30 days     Dispense:  80 tablet     Refill:  0     Reduce doses taken as pain becomes manageable        -which helps with pain and function. Otherwise, continue the current pain medications that I have prescibed. Radiologic:   Old  unique films results reviewed  ,     I discussed results with patients. see Follow up plans below  For any new studies. Unique source and Care Everywhere Updates:  prior external notes requested and reviewed. No new issues noted. Unique History obtained by caregiver/independent historian-and verified with SOAPPR. Electrodiagnostic:  Previous studies requested,     I discussed results with patient. See follow-up plans for new studies.         Labs:  Previous labs reviewed     Lab Results   Component Value Date     04/02/2021    K 4.2 04/02/2021     04/02/2021    CO2 25 04/02/2021    BUN 15 04/02/2021    CREATININE 0.94 04/02/2021    CALCIUM 10.1 04/02/2021    LABALBU 3.99 04/02/2021    LABALBU 4.1 04/02/2021    BILITOT 0.5 04/02/2021    ALKPHOS 118 04/02/2021    AST 26 04/02/2021    ALT 39 04/02/2021     Lab Results   Component Value Date    WBC 11.9 04/02/2021    RBC 5.53 04/02/2021    HGB 16.6 04/02/2021    HCT 49.0 04/02/2021    MCV 88.6 04/02/2021    MCH 30.1 04/02/2021    MCHC 33.9 04/02/2021    RDW 14.9 04/02/2021     04/02/2021    MPV 8.8 08/25/2015       Lab Results   Component Value Date    LABAMPH Neg 07/20/2020    BARBSCNU Neg 07/20/2020    LABBENZ Neg 07/20/2020    CANSU Neg 07/20/2020    COCAIMETSCRU Neg 07/20/2020    PHENCYCLIDINESCREENURINE Neg 07/20/2020    DSCOMMENT see below 07/20/2020       Lab Results   Component Value Date    CODEINE Not Detected 09/20/2016    MORPHINE Not Detected 09/20/2016    [place] Not Detected 09/20/2016    OXYCODONE Not Detected 09/20/2016    NOROXYCODONE Not Detected 09/20/2016    NOROXYMU Not Detected 09/20/2016    HYDRCO Not Detected 09/20/2016    NORHYDU Not Detected 09/20/2016    HYDROMO Not Detected 09/20/2016    Jodene Lame Not Detected 09/20/2016    NORBUPRNOR Not Detected 09/20/2016    FENTA Not Detected 09/20/2016    NORFENT Not Detected 09/20/2016    MEPERIDINE Not Detected 09/20/2016    TAPENU Not Detected 09/20/2016    TAPOSULFUR Not Detected 09/20/2016    METHADONE Not Detected 09/20/2016    LABPROP Not Detected 09/20/2016    TRAM Not Detected 09/20/2016    AMPH Not Detected 09/20/2016    METHAMP Not Detected 09/20/2016    MDMA Not Detected 09/20/2016    ECMDA Not Detected 09/20/2016       Lab Results   Component Value Date    PHENTERMINE Not Detected 09/20/2016    BENZOYL Not Detected 09/20/2016    [place] Not Detected 09/20/2016    ALPHAOHALPRA Not Detected 09/20/2016    CLONAZEPAM Not Detected 09/20/2016    Alfreida Nicola Not Detected 09/20/2016    DIAZEP Not Detected 09/20/2016    CESAR Not Detected 09/20/2016    OXAZ Not Detected 09/20/2016    Merlene Lyme Not Detected 09/20/2016    LORAZEPAM Not Detected 09/20/2016    MIDAZOLAM Not Detected 09/20/2016    ZOLPIDEM Not Detected 09/20/2016    CORWIN Not Detected 09/20/2016    ETG Not Detected 09/20/2016    MARIJMET Not Detected 09/20/2016    PCP Not Detected 09/20/2016    PAINMGTDRUGP See Below 09/20/2016    EERPAINMGTPA See Note 09/20/2016    LABCREA 55.5 09/20/2016         , I discussed results with patient.   See follow-up plans for new studies. Therapies:  HEP-gentle stretching and relaxation techniques-demonstrated with patient-they are to do them twice a day. They are also advised to make the following lifestyle changes:  Goals      Exercise 3x per week (30 min per time)      SOAPP-R GOAL LESS THAN       09/20/16 SCORE: 13-MODERATE RISK   12/14/16 score: 9-low-moderate risk   02/15/17 score: 12-moderate risk   05/18/17 score: 14-moderate risk  07/21/17 score: 24-high risk  09/21/17 score: 11-moderate risk  11/27/17 score: 10-moderate risk  01/26/18 score: 8-low risk  03/26/18 score: 12-moderate risk  6/14/18 SCORE: 4-LOW RISK  8/7/18 SCORE: 6-LOW RISK   10/4/18 score: 11- moderate risk  3/6/19 score: 6- low risk  5/21/19 score 11- low risk  7/25/19 score: 1- low risk   11/19/19 score: 8- low risk   3/12/20 score: 15- moderate risk   7/15/20 score: 10- moderate risk  9/16/20 score: 15- moderate risk  1/20/21 score: 16- moderate risk  4/23/21 score: 9- moderate risk       Stop Cigarette/Tobacco use           Injections or Epidurals:  Injection options were discussed. Patient gave verbal consent to ordered injections. See follow-up plans for planned injections. Supplements:  Vitamin D with increased dosing during the rainy months,   Education was given on:   Dietary and Fitness--daily stretches and low carb diet-in chair Yoga when possible             Follow up with Primary Care Physician regarding their general medical needs. Stressed the importance of following up with PCP and specialists for his/her chronic diseases, health, CV, and cancer screening and continued care. Will follow disease activity/progression and adjust therapeutic regimen to disease activity and severity. Discussed medication dosage, usage, goals of therapy, and side effects. Available test results were reviewed -Discussed findings, impression and plan with patient.     An additional  6 minutes were spent outside of the patient visit to review records. Additional time spent with the patient to discuss their questions. Additional time spent with the patient devoted to discussing treatment strategy, planning, and implementation. Patient understands above plan; questions asked and answered. Patient agrees to plan as noted above. At least 50% of the visit was involved in the discussion of the options for treatment. We discussed exercises, medication, interventional therapies and surgery. Healthy life style is essential with patient hard work to achieve the wellness. In addition; discussion with the patient and/or family about patient's functional status any of the diagnostic results, impressions and/or recommended diagnostic studies, prognosis, risks and benefits of treatment options, instructions for treatment and/or follow-up, importance of compliance with chosen treatment options, risk-factor reduction, and patient/family education. They are to follow up in 2-2 1/2 months to review medication, efficacy of injections, pill counts, OARRS check, high risk med review re intensive monitoring for toxicity and addiction, SOAPPR assessment, review diagnostics, to review previous and future treatment plans and assess appropriateness for continued therapy.         New Diagnostics  Orders Placed This Encounter   Procedures    OK INJECT TRIGGER POINTS, 3 OR GREATER    OK INJECT TRIGGER POINTS, 3 OR GREATER       Sherrie Swan DO

## 2021-04-23 NOTE — PROGRESS NOTES
Subjective  Liliana Mckeon, 61 y.o. male presents today with:    ***  HPI    Past Medical History:   Diagnosis Date    Acute idiopathic gout of left wrist 83/36/7751    Alcoholic (HCC)-remote Hx 9/63/8882    In remission goes to AA daily--agrees not to overtake pain meds     Alcoholic polyneuropathy ([address]     Asthma     CAD (coronary artery disease)     [facility]    Chronic back pain greater than 3 months duration     CN III palsy, right eye 2017    Dr Humphrey Riley    COPD (chronic obstructive pulmonary disease) ([address] 2014    DDD (degenerative disc disease), lumbar 3/23/2015    Elevated liver enzymes 2014    Hyperlipidemia     on med > 10 yrs    Hypertension     on meds x 1 yr    IgA monoclonal gammopathy 2015    Dr Nolene Klinefelter    Insomnia, unspecified     LBP (low back pain)     Dr Samuel Davis Neuropathy     Occlusion and stenosis of carotid artery without mention of cerebral infarction     Dr Nova Miller history of alcoholism ([facility]     quit 11/05/2010, relapsed 2016    S/P carotid endarterectomy 01/2019    Dr Al Thakkar    Sensorimotor neuropathy     Bilateral lower extremities[facility]EMG 03/23/15 ([facility])    Smoking trying to quit     Traumatic compression fracture of T11 thoracic vertebra ([facility] 2617    Umbilical hernia      Past Surgical History:   Procedure Laterality Date    CAROTID ENDARTERECTOMY Left 1/18/2019    LEFT CAROTID ENDARTERECTOMY performed by Corazon Maya MD at [address] [place] [address]  8/19/2014    COLONOSCOPY N/A 12/18/2019    COLONOSCOPY DIAGNOSTIC performed by Cleve Coffman MD at [address] [place] [address], [place], DIAGNOSTIC      HERNIA REPAIR      TONSILLECTOMY      UPPER GASTROINTESTINAL ENDOSCOPY  8/19/2014    VENTRAL HERNIA REPAIR  09/01/15    W/MESH AND W/UMBILECTOMY     Social History     Socioeconomic History    Marital status: Single     Spouse name: None    Number of children: 1    Years of education: None    Highest education level: None   Occupational History    Occupation: SSI for learning disability   Social Needs    Financial resource strain: Not hard at all   Tony-Coni insecurity     Worry: None     Inability: None    Transportation needs     Medical: No     Non-medical: No   Tobacco Use    Smoking status: Current Every Day Smoker     Packs/day: 0.50     Years: 40.00     Pack years: 20.00     Types: Cigarettes     Start date: 1968    Smokeless tobacco: Never Used   Substance and Sexual Activity    Alcohol use: No     Alcohol/week: 0.0 standard drinks     Comment: 8/31/2016 Quit Date    Drug use: No    Sexual activity: None   Lifestyle    Physical activity     Days per week: None     Minutes per session: None    Stress: None   Relationships    Social connections     Talks on phone: None     Gets together: None     Attends [employer] service: None     Active member of club or organization: None     Attends meetings of clubs or organizations: None     Relationship status: None    Intimate partner violence     Fear of current or ex partner: None     Emotionally abused: None     Physically abused: None     Forced sexual activity: None   Other Topics Concern    None   Social History Narrative    Born in [place], one of 5    Came to [place] at age 1 with parents    Never , one daughter    Never worked, disabled since 12 (mental)    Lives in [place] with brother, in a house        Attends [employer] daily    [address] bike, TV    One daughter, does keep in touch      Family History   Problem Relation Age of Onset    Cancer Mother         brain, dec 79    Alcohol Abuse Father     Other Sister         unknown medical history    Other Brother         unknown medical history    High Blood Pressure Brother     Alcohol Abuse Daughter        No Known Allergies    Review of Systems   Constitutional: Negative for activity change, appetite change, fatigue, fever and unexpected weight change. HENT: Positive for voice change.     Eyes: Negative for photophobia, pain and visual disturbance. Respiratory: Negative for apnea, cough, chest tightness, shortness of breath and wheezing. Cardiovascular: Negative for chest pain, palpitations and leg swelling. Gastrointestinal: Positive for constipation. Negative for abdominal pain, diarrhea, nausea and vomiting. Endocrine: Positive for cold intolerance. Genitourinary: Negative. Negative for dysuria. Musculoskeletal: Positive for arthralgias, back pain and myalgias. Negative for gait problem, joint swelling, neck pain and neck stiffness. Skin: Negative. Negative for color change, pallor and wound. Allergic/Immunologic: Positive for immunocompromised state. Negative for environmental allergies and food allergies. Neurological: Positive for weakness and numbness (Left side of throat). Negative for dizziness, light-headedness and headaches. Hematological: Negative. Psychiatric/Behavioral: Positive for decreased concentration. Negative for dysphoric mood, hallucinations and sleep disturbance. The patient is not nervous/anxious.         Objective    Vitals:    04/23/21 1019   BP: (!) 128/52   Site: Left Upper Arm   Position: Sitting   Cuff Size: Large Adult   Weight: 230 lb (104.3 kg)   Height: 5' 11.5\" (1.816 m)     Pain Score: SIX (With medication)       Physical Exam  Ortho Exam  Neurologic Exam

## 2021-05-10 ENCOUNTER — PROCEDURE VISIT (OUTPATIENT)
Dept: PHYSICAL MEDICINE AND REHAB | Age: 61
End: 2021-05-10
Payer: COMMERCIAL

## 2021-05-10 DIAGNOSIS — M51.36 DDD (DEGENERATIVE DISC DISEASE), LUMBAR: Chronic | ICD-10-CM

## 2021-05-10 DIAGNOSIS — Z79.899 HIGH RISK MEDICATION USE: ICD-10-CM

## 2021-05-10 DIAGNOSIS — G62.1 ALCOHOLIC POLYNEUROPATHY (HCC): Chronic | ICD-10-CM

## 2021-05-10 DIAGNOSIS — K59.00 CONSTIPATION, UNSPECIFIED CONSTIPATION TYPE: ICD-10-CM

## 2021-05-10 DIAGNOSIS — M54.17 LUMBOSACRAL RADICULOPATHY AT S1: ICD-10-CM

## 2021-05-10 DIAGNOSIS — M79.10 MYALGIA: ICD-10-CM

## 2021-05-10 DIAGNOSIS — I63.02 CEREBROVASCULAR ACCIDENT (CVA) DUE TO THROMBOSIS OF BASILAR ARTERY (HCC): Chronic | ICD-10-CM

## 2021-05-10 PROCEDURE — 20552 NJX 1/MLT TRIGGER POINT 1/2: CPT | Performed by: PHYSICAL MEDICINE & REHABILITATION

## 2021-05-10 RX ORDER — LIDOCAINE HYDROCHLORIDE 10 MG/ML
15 INJECTION, SOLUTION INFILTRATION; PERINEURAL ONCE
Status: COMPLETED | OUTPATIENT
Start: 2021-05-10 | End: 2021-05-10

## 2021-05-10 RX ORDER — DOCUSATE SODIUM 100 MG/1
CAPSULE, LIQUID FILLED ORAL
Qty: 30 CAPSULE | Refills: 3 | Status: SHIPPED | OUTPATIENT
Start: 2021-05-10 | End: 2021-07-12

## 2021-05-10 RX ORDER — CYANOCOBALAMIN 1000 UG/ML
1000 INJECTION INTRAMUSCULAR; SUBCUTANEOUS ONCE
Status: COMPLETED | OUTPATIENT
Start: 2021-05-10 | End: 2021-05-10

## 2021-05-10 RX ADMIN — CYANOCOBALAMIN 1000 MCG: 1000 INJECTION INTRAMUSCULAR; SUBCUTANEOUS at 16:29

## 2021-05-10 RX ADMIN — LIDOCAINE HYDROCHLORIDE 15 ML: 10 INJECTION, SOLUTION INFILTRATION; PERINEURAL at 16:30

## 2021-05-11 RX ORDER — PREGABALIN 75 MG/1
75 CAPSULE ORAL 3 TIMES DAILY
Qty: 180 CAPSULE | Refills: 3 | Status: SHIPPED | OUTPATIENT
Start: 2021-05-11 | End: 2021-07-01 | Stop reason: SINTOL

## 2021-05-13 DIAGNOSIS — E55.9 VITAMIN D DEFICIENCY: ICD-10-CM

## 2021-05-13 RX ORDER — MULTIVIT-MIN/IRON/FOLIC ACID/K 18-600-40
1 CAPSULE ORAL 2 TIMES DAILY
Qty: 30 CAPSULE | Refills: 5 | Status: SHIPPED | OUTPATIENT
Start: 2021-05-13 | End: 2021-11-24

## 2021-06-02 DIAGNOSIS — Z79.899 HIGH RISK MEDICATION USE: ICD-10-CM

## 2021-06-02 DIAGNOSIS — M53.3 SI (SACROILIAC) PAIN: ICD-10-CM

## 2021-06-02 DIAGNOSIS — D47.2 IGG MONOCLONAL GAMMOPATHY OF UNCERTAIN SIGNIFICANCE: ICD-10-CM

## 2021-06-02 DIAGNOSIS — M54.17 LUMBOSACRAL RADICULOPATHY AT S1: ICD-10-CM

## 2021-06-02 DIAGNOSIS — M51.36 DDD (DEGENERATIVE DISC DISEASE), LUMBAR: Chronic | ICD-10-CM

## 2021-06-04 ENCOUNTER — VIRTUAL VISIT (OUTPATIENT)
Dept: FAMILY MEDICINE CLINIC | Age: 61
End: 2021-06-04
Payer: COMMERCIAL

## 2021-06-04 DIAGNOSIS — I63.02 CEREBROVASCULAR ACCIDENT (CVA) DUE TO THROMBOSIS OF BASILAR ARTERY (HCC): Primary | Chronic | ICD-10-CM

## 2021-06-04 DIAGNOSIS — J41.0 SIMPLE CHRONIC BRONCHITIS (HCC): ICD-10-CM

## 2021-06-04 DIAGNOSIS — I10 ESSENTIAL HYPERTENSION: Chronic | ICD-10-CM

## 2021-06-04 DIAGNOSIS — G62.1 ALCOHOLIC POLYNEUROPATHY (HCC): Chronic | ICD-10-CM

## 2021-06-04 DIAGNOSIS — S06.9X9S TRAUMATIC BRAIN INJURY WITH LOSS OF CONSCIOUSNESS, SEQUELA (HCC): Chronic | ICD-10-CM

## 2021-06-04 DIAGNOSIS — I73.9 PAD (PERIPHERAL ARTERY DISEASE) (HCC): Chronic | ICD-10-CM

## 2021-06-04 PROCEDURE — G8427 DOCREV CUR MEDS BY ELIG CLIN: HCPCS | Performed by: FAMILY MEDICINE

## 2021-06-04 PROCEDURE — 3017F COLORECTAL CA SCREEN DOC REV: CPT | Performed by: FAMILY MEDICINE

## 2021-06-04 PROCEDURE — 99214 OFFICE O/P EST MOD 30 MIN: CPT | Performed by: FAMILY MEDICINE

## 2021-06-04 RX ORDER — HYDROCODONE BITARTRATE AND ACETAMINOPHEN 7.5; 325 MG/1; MG/1
1 TABLET ORAL EVERY 8 HOURS PRN
Qty: 80 TABLET | Refills: 0 | Status: SHIPPED | OUTPATIENT
Start: 2021-06-09 | End: 2021-06-25 | Stop reason: SDUPTHER

## 2021-06-09 DIAGNOSIS — I10 ESSENTIAL HYPERTENSION: Chronic | ICD-10-CM

## 2021-06-09 RX ORDER — VALSARTAN AND HYDROCHLOROTHIAZIDE 80; 12.5 MG/1; MG/1
TABLET, FILM COATED ORAL
Qty: 90 TABLET | Refills: 2 | Status: SHIPPED | OUTPATIENT
Start: 2021-06-09 | End: 2021-08-05 | Stop reason: SDUPTHER

## 2021-06-25 DIAGNOSIS — M53.3 SI (SACROILIAC) PAIN: ICD-10-CM

## 2021-06-25 DIAGNOSIS — M54.17 LUMBOSACRAL RADICULOPATHY AT S1: ICD-10-CM

## 2021-06-25 DIAGNOSIS — Z79.899 HIGH RISK MEDICATION USE: ICD-10-CM

## 2021-06-25 DIAGNOSIS — M51.36 DDD (DEGENERATIVE DISC DISEASE), LUMBAR: Chronic | ICD-10-CM

## 2021-06-25 DIAGNOSIS — D47.2 IGG MONOCLONAL GAMMOPATHY OF UNCERTAIN SIGNIFICANCE: ICD-10-CM

## 2021-06-25 RX ORDER — HYDROCODONE BITARTRATE AND ACETAMINOPHEN 7.5; 325 MG/1; MG/1
1 TABLET ORAL EVERY 8 HOURS PRN
Qty: 80 TABLET | Refills: 0 | Status: SHIPPED | OUTPATIENT
Start: 2021-07-08 | End: 2021-07-28 | Stop reason: SDUPTHER

## 2021-07-01 ENCOUNTER — OFFICE VISIT (OUTPATIENT)
Dept: PHYSICAL MEDICINE AND REHAB | Age: 61
End: 2021-07-01
Payer: COMMERCIAL

## 2021-07-01 VITALS
HEIGHT: 72 IN | WEIGHT: 230 LBS | DIASTOLIC BLOOD PRESSURE: 60 MMHG | BODY MASS INDEX: 31.15 KG/M2 | SYSTOLIC BLOOD PRESSURE: 130 MMHG

## 2021-07-01 DIAGNOSIS — D47.2 IGG MONOCLONAL GAMMOPATHY OF UNCERTAIN SIGNIFICANCE: ICD-10-CM

## 2021-07-01 DIAGNOSIS — M53.3 SI (SACROILIAC) PAIN: ICD-10-CM

## 2021-07-01 DIAGNOSIS — M79.10 MYALGIA: Primary | ICD-10-CM

## 2021-07-01 DIAGNOSIS — Z79.899 HIGH RISK MEDICATION USE: ICD-10-CM

## 2021-07-01 DIAGNOSIS — M54.17 LUMBOSACRAL RADICULOPATHY AT S1: ICD-10-CM

## 2021-07-01 DIAGNOSIS — M51.36 DDD (DEGENERATIVE DISC DISEASE), LUMBAR: Chronic | ICD-10-CM

## 2021-07-01 PROCEDURE — G8417 CALC BMI ABV UP PARAM F/U: HCPCS | Performed by: PHYSICAL MEDICINE & REHABILITATION

## 2021-07-01 PROCEDURE — 99214 OFFICE O/P EST MOD 30 MIN: CPT | Performed by: PHYSICAL MEDICINE & REHABILITATION

## 2021-07-01 PROCEDURE — G8427 DOCREV CUR MEDS BY ELIG CLIN: HCPCS | Performed by: PHYSICAL MEDICINE & REHABILITATION

## 2021-07-01 PROCEDURE — 4004F PT TOBACCO SCREEN RCVD TLK: CPT | Performed by: PHYSICAL MEDICINE & REHABILITATION

## 2021-07-01 PROCEDURE — 3017F COLORECTAL CA SCREEN DOC REV: CPT | Performed by: PHYSICAL MEDICINE & REHABILITATION

## 2021-07-01 RX ORDER — PREGABALIN 100 MG/1
100 CAPSULE ORAL 3 TIMES DAILY
Qty: 60 CAPSULE | Refills: 3 | Status: SHIPPED | OUTPATIENT
Start: 2021-07-01 | End: 2021-08-09 | Stop reason: SDUPTHER

## 2021-07-01 ASSESSMENT — ENCOUNTER SYMPTOMS
WHEEZING: 0
VOMITING: 0
COLOR CHANGE: 0
DIARRHEA: 0
VOICE CHANGE: 1
COUGH: 0
CHEST TIGHTNESS: 0
SHORTNESS OF BREATH: 0
ABDOMINAL PAIN: 0
BOWEL INCONTINENCE: 0
EYE PAIN: 0
BACK PAIN: 1
CONSTIPATION: 1
PHOTOPHOBIA: 0
NAUSEA: 0
APNEA: 0
VISUAL CHANGE: 0

## 2021-07-12 DIAGNOSIS — K59.00 CONSTIPATION, UNSPECIFIED CONSTIPATION TYPE: ICD-10-CM

## 2021-07-12 RX ORDER — DOCUSATE SODIUM 100 MG/1
CAPSULE, LIQUID FILLED ORAL
Qty: 30 CAPSULE | Refills: 3 | Status: SHIPPED | OUTPATIENT
Start: 2021-07-12 | End: 2021-09-20

## 2021-07-28 DIAGNOSIS — M53.3 SI (SACROILIAC) PAIN: ICD-10-CM

## 2021-07-28 DIAGNOSIS — M51.36 DDD (DEGENERATIVE DISC DISEASE), LUMBAR: Chronic | ICD-10-CM

## 2021-07-28 DIAGNOSIS — D47.2 IGG MONOCLONAL GAMMOPATHY OF UNCERTAIN SIGNIFICANCE: ICD-10-CM

## 2021-07-28 DIAGNOSIS — M54.17 LUMBOSACRAL RADICULOPATHY AT S1: ICD-10-CM

## 2021-07-28 DIAGNOSIS — Z79.899 HIGH RISK MEDICATION USE: ICD-10-CM

## 2021-07-28 RX ORDER — HYDROCODONE BITARTRATE AND ACETAMINOPHEN 7.5; 325 MG/1; MG/1
1 TABLET ORAL EVERY 8 HOURS PRN
Qty: 80 TABLET | Refills: 0 | Status: SHIPPED | OUTPATIENT
Start: 2021-08-06 | End: 2021-08-30 | Stop reason: SDUPTHER

## 2021-08-02 RX ORDER — CLOPIDOGREL BISULFATE 75 MG/1
TABLET ORAL
Qty: 90 TABLET | Refills: 2 | Status: SHIPPED | OUTPATIENT
Start: 2021-08-02 | End: 2022-03-17 | Stop reason: SDUPTHER

## 2021-08-04 ENCOUNTER — PROCEDURE VISIT (OUTPATIENT)
Dept: PHYSICAL MEDICINE AND REHAB | Age: 61
End: 2021-08-04
Payer: COMMERCIAL

## 2021-08-04 DIAGNOSIS — M79.10 MYALGIA: ICD-10-CM

## 2021-08-04 DIAGNOSIS — Z79.899 HIGH RISK MEDICATION USE: ICD-10-CM

## 2021-08-04 DIAGNOSIS — M54.17 LUMBOSACRAL RADICULOPATHY AT S1: ICD-10-CM

## 2021-08-04 DIAGNOSIS — M51.36 DDD (DEGENERATIVE DISC DISEASE), LUMBAR: Chronic | ICD-10-CM

## 2021-08-04 DIAGNOSIS — M53.3 SI (SACROILIAC) PAIN: ICD-10-CM

## 2021-08-04 DIAGNOSIS — D47.2 IGG MONOCLONAL GAMMOPATHY OF UNCERTAIN SIGNIFICANCE: ICD-10-CM

## 2021-08-04 PROCEDURE — 96372 THER/PROPH/DIAG INJ SC/IM: CPT | Performed by: PHYSICAL MEDICINE & REHABILITATION

## 2021-08-04 PROCEDURE — 20553 NJX 1/MLT TRIGGER POINTS 3/>: CPT | Performed by: PHYSICAL MEDICINE & REHABILITATION

## 2021-08-04 RX ORDER — LIDOCAINE HYDROCHLORIDE 10 MG/ML
7 INJECTION, SOLUTION INFILTRATION; PERINEURAL ONCE
Status: COMPLETED | OUTPATIENT
Start: 2021-08-04 | End: 2021-08-04

## 2021-08-04 RX ORDER — CYANOCOBALAMIN 1000 UG/ML
1000 INJECTION INTRAMUSCULAR; SUBCUTANEOUS ONCE
Status: COMPLETED | OUTPATIENT
Start: 2021-08-04 | End: 2021-08-04

## 2021-08-04 RX ADMIN — LIDOCAINE HYDROCHLORIDE 7 ML: 10 INJECTION, SOLUTION INFILTRATION; PERINEURAL at 16:38

## 2021-08-04 RX ADMIN — CYANOCOBALAMIN 1000 MCG: 1000 INJECTION INTRAMUSCULAR; SUBCUTANEOUS at 16:38

## 2021-08-04 NOTE — PROGRESS NOTES
Patient here for trigger point injections. Patient taken back to exam room, and placed on drape locking stool. Areas to be injected marked appropriately, and cleansed with alcohol. 7cc of 1% Lidocaine, and 1cc of B12 to be injected by provider.

## 2021-08-05 ENCOUNTER — OFFICE VISIT (OUTPATIENT)
Dept: CARDIOLOGY CLINIC | Age: 61
End: 2021-08-05
Payer: COMMERCIAL

## 2021-08-05 VITALS
SYSTOLIC BLOOD PRESSURE: 134 MMHG | OXYGEN SATURATION: 96 % | RESPIRATION RATE: 18 BRPM | HEART RATE: 86 BPM | DIASTOLIC BLOOD PRESSURE: 60 MMHG | HEIGHT: 72 IN | BODY MASS INDEX: 29.53 KG/M2 | WEIGHT: 218 LBS

## 2021-08-05 DIAGNOSIS — I10 ESSENTIAL HYPERTENSION: Primary | ICD-10-CM

## 2021-08-05 DIAGNOSIS — I10 ESSENTIAL HYPERTENSION: Chronic | ICD-10-CM

## 2021-08-05 DIAGNOSIS — I73.9 PAD (PERIPHERAL ARTERY DISEASE) (HCC): ICD-10-CM

## 2021-08-05 DIAGNOSIS — I63.9 CEREBROVASCULAR ACCIDENT (CVA), UNSPECIFIED MECHANISM (HCC): Chronic | ICD-10-CM

## 2021-08-05 PROCEDURE — G8417 CALC BMI ABV UP PARAM F/U: HCPCS | Performed by: INTERNAL MEDICINE

## 2021-08-05 PROCEDURE — 99214 OFFICE O/P EST MOD 30 MIN: CPT | Performed by: INTERNAL MEDICINE

## 2021-08-05 PROCEDURE — 3017F COLORECTAL CA SCREEN DOC REV: CPT | Performed by: INTERNAL MEDICINE

## 2021-08-05 PROCEDURE — 4004F PT TOBACCO SCREEN RCVD TLK: CPT | Performed by: INTERNAL MEDICINE

## 2021-08-05 PROCEDURE — G8427 DOCREV CUR MEDS BY ELIG CLIN: HCPCS | Performed by: INTERNAL MEDICINE

## 2021-08-05 RX ORDER — METOPROLOL SUCCINATE 25 MG/1
25 TABLET, EXTENDED RELEASE ORAL DAILY
Qty: 90 TABLET | Refills: 2 | Status: SHIPPED | OUTPATIENT
Start: 2021-08-05 | End: 2022-03-17 | Stop reason: SDUPTHER

## 2021-08-05 RX ORDER — ATORVASTATIN CALCIUM 40 MG/1
40 TABLET, FILM COATED ORAL DAILY
Qty: 90 TABLET | Refills: 2 | Status: SHIPPED | OUTPATIENT
Start: 2021-08-05 | End: 2022-03-17 | Stop reason: SDUPTHER

## 2021-08-05 RX ORDER — ASPIRIN 81 MG/1
81 TABLET ORAL DAILY
Qty: 90 TABLET | Refills: 2 | Status: SHIPPED | OUTPATIENT
Start: 2021-08-05 | End: 2022-03-17 | Stop reason: SDUPTHER

## 2021-08-05 RX ORDER — VALSARTAN AND HYDROCHLOROTHIAZIDE 80; 12.5 MG/1; MG/1
TABLET, FILM COATED ORAL
Qty: 90 TABLET | Refills: 2 | Status: SHIPPED | OUTPATIENT
Start: 2021-08-05 | End: 2022-03-17 | Stop reason: SDUPTHER

## 2021-08-05 ASSESSMENT — ENCOUNTER SYMPTOMS
RESPIRATORY NEGATIVE: 1
NAUSEA: 0
EYES NEGATIVE: 1
COUGH: 0
WHEEZING: 0
STRIDOR: 0
GASTROINTESTINAL NEGATIVE: 1
CHEST TIGHTNESS: 0
SHORTNESS OF BREATH: 0
BLOOD IN STOOL: 0
BACK PAIN: 1

## 2021-08-05 NOTE — PROGRESS NOTES
OFFICE VISIT        Patient: Madeline Drummond  YOB: 1960  MRN: [id number]    Chief Complaint: L carotid stent claudication   Chief Complaint   Patient presents with    6 Month Follow-Up    Hypertension    Claudication       CV Data:  4/2017 echo ef 65  2019 Left Carotid CEA/patch - Dr. Pathak Carbon  9/28/2020 B/l LE angio, left MAHAD stenting, left SFA ATH/PTA with DCB/stent. Right LE will be staged  10/21/2020 RLE:  RSFA Stent  3/21 CUS - Dr. Jennie Mendoza 100 LCA patent. Moderate plaque. Subjective/HPI pt has significant claudication L>R. He has chronic cough. He is minimally active. Describes no cp nor sob currently. No bleed. Has back pain. 10/12/2020 Left leg feels better since PTA. Right leg still bothers him. No cp no sob. No bleed. 11/23/2020 no cp no sob no falls no bleed. Has not smoked in 1 week. Legs feel better     2/23/21 no cp some gaitan does not feel good. feeels tired today. Takes meds    8/5/21 denies CP no sob no bleed no fals. He has no cardiac stents. Smoke  No etoh  Lives with brother  Disabled all his life.       EKG: SR    Past Medical History:   Diagnosis Date    Acute idiopathic gout of left wrist 83/44/2407    Alcoholic (HCC)-remote Hx 0/97/6629    In remission goes to AA daily--agrees not to overtake pain meds     Al[facility]olic polyneuropathy ([facility]     Asthma     CAD (coronary artery disease)     [facility]    Chronic back pain greater than 3 months duration     CN III palsy, right eye 2017    Dr Patricia Almendarez    COPD (chronic obstructive pulmonary disease) ([facility] 2014    DDD (degenerative disc disease), lumbar 3/23/2015    Elevated liver enzymes 2014    Hyperlipidemia     on med > 10 yrs    Hypertension     on meds x 1 yr    IgA monoclonal gammopathy 2015    Dr Bong Puente    Insomnia, unspecified     LBP (low back pain)     Dr Cande Covington Neuropathy     Occlusion and stenosis of carotid artery without mention of cerebral infarction     Dr Emeterio Mendez Personal history of alcoholism ([facility]     quit 11/05/2010, relapsed 2016    S/P carotid endarterectomy 01/2019    Dr Carla Erickson    Sensorimotor neuropathy     Bilateral lower extremities-EMG 03/23/15 ([facility])    Smoking trying to quit     Traumatic compression fracture of T11 thoracic vertebra ([facility] 2865    Umbilical hernia        Past Surgical History:   Procedure Laterality Date    CAROTID ENDARTERECTOMY Left 1/18/2019    LEFT CAROTID ENDARTERECTOMY performed by Kathe Morataya MD at [facility]  8/19/2014    COLONOSCOPY N/A 12/18/2019    COLONOSCOPY DIAGNOSTIC performed by Yaw Leblanc MD at [facility], COLON, DIAGNOSTIC      HERNIA REPAIR      TONSILLECTOMY      UPPER GASTROINTESTINAL ENDOSCOPY  8/19/2014    VENTRAL HERNIA REPAIR  09/01/15    W/MESH AND W/UMBILECTOMY       Family History   Problem Relation Age of Onset    Cancer Mother         brain, dec 79    Alcohol Abuse Father     Other Sister         unknown medical history    Other Brother         unknown medical history    High Blood Pressure Brother     Alcohol Abuse Daughter        Social History     Socioeconomic History    Marital status: Single     Spouse name: None    Number of children: 1    Years of education: None    Highest education level: None   Occupational History    Occupation: SSI for learning disability   Tobacco Use    Smoking status: Current Every Day Smoker     Packs/day: 0.50     Years: 40.00     Pack years: 20.00     Types: Cigarettes     Start date: 1968    Smokeless tobacco: Never Used   Vaping Use    Vaping Use: Never used   Substance and Sexual Activity    Alcohol use: No     Alcohol/week: 0.0 standard drinks     Comment: 8/31/2016 Quit Date    Drug use: No    Sexual activity: None   Other Topics Concern    None   Social History Narrative    Born in [place], one of 5    Came to [facility] at age 1 with parents    Never , one daughter    Never worked, disabled since 12 (mental) Lives in [facility] with brother, in a house        Attends [employer] meetings daily    Hobbies riding bike, TV    One daughter, does keep in touch      Social Determinants of Health     Financial Resource Strain: Low Risk     Difficulty of Paying Living Expenses: Not hard at all   Food Insecurity:     Worried About [address] in the Last Year:     [address] in the Last Year:    Transportation Needs: No Transportation Needs    Lack of Transportation (Medical): No    Lack of Transportation (Non-Medical): No   Physical Activity:     Days of Exercise per Week:     Minutes of Exercise per Session:    Stress:     Feeling of Stress :    Social Connections:     Frequency of Communication with Friends and Family:     Frequency of Social Gatherings with Friends and Family:     Attends Jain Services:     Active Member of Clubs or Organizations:     Attends Club or Organization Meetings:     Marital Status:    Intimate Partner Violence:     Fear of Current or Ex-Partner:     Emotionally Abused:     Physically Abused:     Sexually Abused:        No Known Allergies    Current Outpatient Medications   Medication Sig Dispense Refill    valsartan-hydroCHLOROthiazide (DIOVAN-HCT) 80-12.5 MG per tablet take 1 tablet by mouth once daily 90 tablet 2    metoprolol succinate (TOPROL XL) 25 MG extended release tablet Take 1 tablet by mouth daily 90 tablet 2    atorvastatin (LIPITOR) 40 MG tablet Take 1 tablet by mouth daily 90 tablet 2    aspirin EC 81 MG EC tablet Take 1 tablet by mouth daily 90 tablet 2    clopidogrel (PLAVIX) 75 MG tablet take 1 tablet by mouth once daily 90 tablet 2    [START ON 8/6/2021] HYDROcodone-acetaminophen (Willena Bud) 7.5-325 MG per tablet Take 1 tablet by mouth every 8 hours as needed for Pain (Max 3 per day) for up to 30 days.  Intended supply: 30 days 80 tablet 0    docusate sodium (COLACE) 100 MG capsule take 1 capsule by mouth twice a day 30 capsule 3    Cholecalciferol (VITAMIN D) 50 MCG (2000 UT) CAPS capsule Take 1 capsule by mouth 2 times daily 30 capsule 5    budesonide-formoterol (SYMBICORT) 80-4.5 MCG/ACT AERO Inhale 2 puffs into the lungs 2 times daily 1 Inhaler 12    vitamin B-12 (CYANOCOBALAMIN) 1000 MCG tablet Take 1 tablet by mouth daily 90 tablet 4    allopurinol (ZYLOPRIM) 100 MG tablet Take 1 tablet by mouth daily 90 tablet 4    senna-docusate (PERICOLACE) 8.6-50 MG per tablet Take 2 tablets by mouth daily as needed for Constipation 180 tablet 4    polyethylene glycol (GLYCOLAX) 17 GM/SCOOP powder take 17GM (DISSOLVED IN WATER) by mouth once daily 510 g 5    Elastic Bandages & Supports (MEDICAL COMPRESSION STOCKINGS) MISC 1 each by Does not apply route daily Knee high 15-20 mmhg compression stockings both legs wear daily during day and off Qhs. Wear as tolerated. Do not wear if they cause increased pain. 1 each 5    indomethacin (INDOCIN) 25 MG capsule Take 1 capsule by mouth 3 times daily as needed for Pain (gout pain) 30 capsule 1    VASCEPA 1 g CAPS capsule Take 2 capsules by mouth 2 times daily 360 capsule 3    naloxone (NARCAN) 4 MG/0.1ML LIQD nasal spray 1 spray by Nasal route as needed for Opioid Reversal 1 each 2    nitroGLYCERIN (NITROSTAT) 0.4 MG SL tablet up to max of 3 total doses. If no relief after 1 dose, call 911. 25 tablet 3    REFRESH TEARS 0.5 % SOLN ophthalmic solution instill 1 drop into both eyes four times a day  1    pregabalin (LYRICA) 100 MG capsule Take 1 capsule by mouth 3 times daily for 30 days. 60 capsule 3     No current facility-administered medications for this visit. Review of Systems:   Review of Systems   Constitutional: Negative. Negative for diaphoresis and fatigue. HENT: Negative. Eyes: Negative. Respiratory: Negative. Negative for cough, chest tightness, shortness of breath, wheezing and stridor. Cardiovascular: Negative. Negative for chest pain, palpitations and leg swelling. Gastrointestinal: Negative. Negative for blood in stool and nausea. Genitourinary: Negative. Musculoskeletal: Positive for arthralgias, back pain and gait problem. Skin: Negative. Neurological: Negative for dizziness, syncope, weakness and light-headedness. Hematological: Negative. Psychiatric/Behavioral: Negative. Physical Examination:    /60 (Site: Left Upper Arm, Position: Sitting, Cuff Size: Medium Adult)   Pulse 86   Resp 18   Ht 5' 11.5\" (1.816 m)   Wt 218 lb (98.9 kg)   SpO2 96%   BMI 29.98 kg/m²    Physical Exam   Constitutional: He appears healthy. No distress. HENT:   Normal cephalic and Atraumatic   Eyes: Pupils are equal, round, and reactive to light. Neck: Thyroid normal. No JVD present. No neck adenopathy. No thyromegaly present. Cardiovascular: Normal rate and regular rhythm. Exam reveals decreased pulses. Murmur heard. Pulmonary/Chest: Effort normal. He has no rales. He has diffuse wheezes. He exhibits no tenderness. Abdominal: Soft. Bowel sounds are normal. There is no abdominal tenderness. Musculoskeletal:         General: No tenderness or edema. Normal range of motion. Cervical back: Normal range of motion and neck supple. Neurological: He is alert and oriented to person, place, and time. Skin: Skin is warm. No cyanosis. Nails show no clubbing.        LABS:  CBC:   Lab Results   Component Value Date    WBC 11.9 04/02/2021    RBC 5.53 04/02/2021    HGB 16.6 04/02/2021    HCT 49.0 04/02/2021    MCV 88.6 04/02/2021    MCH 30.1 04/02/2021    MCHC 33.9 04/02/2021    RDW 14.9 04/02/2021     04/02/2021    MPV 8.8 08/25/2015     Lipids:  Lab Results   Component Value Date    CHOL 162 01/19/2019    CHOL 195 08/08/2018    CHOL 199 04/18/2017     Lab Results   Component Value Date    TRIG 139 01/19/2019    TRIG 347 (H) 08/08/2018    TRIG 132 04/18/2017     Lab Results   Component Value Date    HDL 29 (L) 02/26/2020    HDL 39 (L) 01/19/2019 HDL 34 (L) 08/08/2018     Lab Results   Component Value Date    LDLCALC 12 02/26/2020    [facility] 95 01/19/2019    LDLCALC 92 08/08/2018     No results found for: LABVLDL, VLDL  No results found for: CHOLHDLRATIO  CMP:    Lab Results   Component Value Date     04/02/2021    K 4.2 04/02/2021     04/02/2021    CO2 25 04/02/2021    BUN 15 04/02/2021    CREATININE 0.94 04/02/2021    GFRAA >60.0 04/02/2021    LABGLOM >60.0 04/02/2021    GLUCOSE 113 04/02/2021    PROT 7.6 04/02/2021    PROT 7.3 04/02/2021    LABALBU 3.99 04/02/2021    LABALBU 4.1 04/02/2021    CALCIUM 10.1 04/02/2021    BILITOT 0.5 04/02/2021    ALKPHOS 118 04/02/2021    AST 26 04/02/2021    ALT 39 04/02/2021     BMP:    Lab Results   Component Value Date     04/02/2021    K 4.2 04/02/2021     04/02/2021    CO2 25 04/02/2021    BUN 15 04/02/2021    LABALBU 3.99 04/02/2021    LABALBU 4.1 04/02/2021    CREATININE 0.94 04/02/2021    CALCIUM 10.1 04/02/2021    GFRAA >60.0 04/02/2021    LABGLOM >60.0 04/02/2021    GLUCOSE 113 04/02/2021     Magnesium:  No results found for: MG  TSH:  Lab Results   Component Value Date    TSH 2.170 08/08/2018             Patient Active Problem List   Diagnosis    [facility]urop[facility] ([facility]    Personal history of alcoholism ([facility]    Carotid stenosis    TBI (traumatic brain injury) ([facility]    Basic learning disability, reading    Lumbosacral radiculopathy at S1    Tobacco abuse    DDD (degenerative disc disease), lumbar    Cognitive deficit due to old head injury    Anxiety disorder    Incarcerated ventral hernia    Alkaline phosphatase elevation    High risk medication use - 01/25/18 OARRS PM&R, 03/23/18 OARRS PM&R, 02/15/17 Med Contract PM&R, 09/21/17 Urine Drug Screen: negative PM&R    Umbilical hernia    IgG monoclonal gammopathy of uncertain significance    COPD (chronic obstructive pulmonary disease) (HCC)    Muscle spasm of back    Generalized anxiety disorder    SI (sacroiliac) pain    Chronic midline low back pain with bilateral sciatica    Ptosis    CN III palsy, right eye    Carotid artery disorder (HCC)    HTN (hypertension)    Carotid stenosis, symptomatic w/o infarct, left    Constipation    CVA (cerebral vascular accident) ([facility]    Polyp of colon    External hemorrhoid    Insulin resistance    [facility]    Edema of both lower extremities due to peripheral venous insufficiency    Diverticulosis    Abnormal ankle brachial index (RAO)    Claudication in peripheral vascular disease (HCC)    Acute idiopathic gout of left wrist       Medications Discontinued During This Encounter   Medication Reason    atorvastatin (LIPITOR) 40 MG tablet REORDER    aspirin EC 81 MG EC tablet REORDER    metoprolol succinate (TOPROL XL) 25 MG extended release tablet REORDER    valsartan-hydroCHLOROthiazide (DIOVAN-HCT) 80-12.5 MG per tablet REORDER       Modified Medications    Modified Medication Previous Medication    ASPIRIN EC 81 MG EC TABLET aspirin EC 81 MG EC tablet       Take 1 tablet by mouth daily    Take 1 tablet by mouth daily    ATORVASTATIN (LIPITOR) 40 MG TABLET atorvastatin (LIPITOR) 40 MG tablet       Take 1 tablet by mouth daily    Take 1 tablet by mouth daily    METOPROLOL SUCCINATE (TOPROL XL) 25 MG EXTENDED RELEASE TABLET metoprolol succinate (TOPROL XL) 25 MG extended release tablet       Take 1 tablet by mouth daily    Take 1 tablet by mouth daily    VALSARTAN-HYDROCHLOROTHIAZIDE (DIOVAN-HCT) 80-12.5 MG PER TABLET valsartan-hydroCHLOROthiazide (DIOVAN-HCT) 80-12.5 MG per tablet       take 1 tablet by mouth once daily    take 1 tablet by mouth once daily       Orders Placed This Encounter   Medications    valsartan-hydroCHLOROthiazide (DIOVAN-HCT) 80-12.5 MG per tablet     Sig: take 1 tablet by mouth once daily     Dispense:  90 tablet     Refill:  2    metoprolol succinate (TOPROL XL) 25 MG extended release tablet     Sig: Take 1 tablet by mouth daily     Dispense:  90 tablet     Refill:  2    atorvastatin (LIPITOR) 40 MG tablet     Sig: Take 1 tablet by mouth daily     Dispense:  90 tablet     Refill:  2    aspirin EC 81 MG EC tablet     Sig: Take 1 tablet by mouth daily     Dispense:  90 tablet     Refill:  2       Assessment/Plan:    1. PAD (peripheral artery disease) ([facility]  RBA Peripheral Angio/PTA discussed again for Staged RLE- scheduled for 10/21/ 10:30 AM.  Labs today - stable after pta. Stable no claudication     2. Essential hypertension     - EKG 12 Lead    3. Carotid stenosis, symptomatic w/o infarct, left       4. PVD (peripheral vascular disease) ([facility]      5. Lipid- start Atorvastatin 40 mg qd. 6. We will address coronary ischemic eval in near future. - pt refuses stress again. Stop smoking    Counseling:  Heart Healthy Lifestyle, Improve BMI, Stop Smoking, Low Salt Diet, Take Precautions to Prevent Falls and Walk Daily    Return in about 6 months (around 2/5/2022).     Electronically signed by Jasvir Gavlin MD on 8/5/2021 at 4:21 PM

## 2021-08-06 DIAGNOSIS — I73.9 PAD (PERIPHERAL ARTERY DISEASE) (HCC): ICD-10-CM

## 2021-08-06 RX ORDER — ATORVASTATIN CALCIUM 40 MG/1
TABLET, FILM COATED ORAL
Qty: 90 TABLET | Refills: 2 | OUTPATIENT
Start: 2021-08-06

## 2021-08-09 DIAGNOSIS — Z79.899 HIGH RISK MEDICATION USE: ICD-10-CM

## 2021-08-09 DIAGNOSIS — M51.36 DDD (DEGENERATIVE DISC DISEASE), LUMBAR: Chronic | ICD-10-CM

## 2021-08-09 DIAGNOSIS — M54.17 LUMBOSACRAL RADICULOPATHY AT S1: ICD-10-CM

## 2021-08-09 DIAGNOSIS — M53.3 SI (SACROILIAC) PAIN: ICD-10-CM

## 2021-08-09 RX ORDER — PREGABALIN 100 MG/1
100 CAPSULE ORAL 3 TIMES DAILY
Qty: 90 CAPSULE | Refills: 3 | Status: SHIPPED | OUTPATIENT
Start: 2021-08-09 | End: 2021-11-17 | Stop reason: SDUPTHER

## 2021-08-23 ENCOUNTER — PROCEDURE VISIT (OUTPATIENT)
Dept: PHYSICAL MEDICINE AND REHAB | Age: 61
End: 2021-08-23
Payer: COMMERCIAL

## 2021-08-23 DIAGNOSIS — M79.10 MYALGIA: ICD-10-CM

## 2021-08-23 PROCEDURE — 96372 THER/PROPH/DIAG INJ SC/IM: CPT | Performed by: PHYSICAL MEDICINE & REHABILITATION

## 2021-08-23 PROCEDURE — 20553 NJX 1/MLT TRIGGER POINTS 3/>: CPT | Performed by: PHYSICAL MEDICINE & REHABILITATION

## 2021-08-23 RX ORDER — LIDOCAINE HYDROCHLORIDE 10 MG/ML
15 INJECTION, SOLUTION INFILTRATION; PERINEURAL ONCE
Status: COMPLETED | OUTPATIENT
Start: 2021-08-23 | End: 2021-08-23

## 2021-08-23 RX ORDER — CYANOCOBALAMIN 1000 UG/ML
1000 INJECTION INTRAMUSCULAR; SUBCUTANEOUS ONCE
Status: COMPLETED | OUTPATIENT
Start: 2021-08-23 | End: 2021-08-23

## 2021-08-23 RX ADMIN — CYANOCOBALAMIN 1000 MCG: 1000 INJECTION INTRAMUSCULAR; SUBCUTANEOUS at 15:51

## 2021-08-23 RX ADMIN — LIDOCAINE HYDROCHLORIDE 15 ML: 10 INJECTION, SOLUTION INFILTRATION; PERINEURAL at 15:52

## 2021-08-29 DIAGNOSIS — K59.09 OTHER CONSTIPATION: Chronic | ICD-10-CM

## 2021-08-30 DIAGNOSIS — M51.36 DDD (DEGENERATIVE DISC DISEASE), LUMBAR: Chronic | ICD-10-CM

## 2021-08-30 DIAGNOSIS — D47.2 IGG MONOCLONAL GAMMOPATHY OF UNCERTAIN SIGNIFICANCE: ICD-10-CM

## 2021-08-30 DIAGNOSIS — M53.3 SI (SACROILIAC) PAIN: ICD-10-CM

## 2021-08-30 DIAGNOSIS — M54.17 LUMBOSACRAL RADICULOPATHY AT S1: ICD-10-CM

## 2021-08-30 DIAGNOSIS — Z79.899 HIGH RISK MEDICATION USE: ICD-10-CM

## 2021-08-30 RX ORDER — POLYETHYLENE GLYCOL 3350 17 G/17G
POWDER, FOR SOLUTION ORAL
Qty: 510 G | Refills: 5 | Status: SHIPPED | OUTPATIENT
Start: 2021-08-30 | End: 2022-10-21

## 2021-09-02 RX ORDER — HYDROCODONE BITARTRATE AND ACETAMINOPHEN 7.5; 325 MG/1; MG/1
1 TABLET ORAL EVERY 8 HOURS PRN
Qty: 80 TABLET | Refills: 0 | Status: SHIPPED | OUTPATIENT
Start: 2021-09-05 | End: 2021-09-27 | Stop reason: SDUPTHER

## 2021-09-19 DIAGNOSIS — K59.00 CONSTIPATION, UNSPECIFIED CONSTIPATION TYPE: ICD-10-CM

## 2021-09-20 RX ORDER — DOCUSATE SODIUM 100 MG/1
CAPSULE, LIQUID FILLED ORAL
Qty: 30 CAPSULE | Refills: 3 | Status: SHIPPED | OUTPATIENT
Start: 2021-09-20 | End: 2021-11-24

## 2021-09-27 ENCOUNTER — PROCEDURE VISIT (OUTPATIENT)
Dept: PHYSICAL MEDICINE AND REHAB | Age: 61
End: 2021-09-27
Payer: COMMERCIAL

## 2021-09-27 DIAGNOSIS — M51.36 DDD (DEGENERATIVE DISC DISEASE), LUMBAR: Chronic | ICD-10-CM

## 2021-09-27 DIAGNOSIS — M53.3 SI (SACROILIAC) PAIN: ICD-10-CM

## 2021-09-27 DIAGNOSIS — M54.17 LUMBOSACRAL RADICULOPATHY AT S1: ICD-10-CM

## 2021-09-27 DIAGNOSIS — M79.10 MYALGIA: ICD-10-CM

## 2021-09-27 DIAGNOSIS — D47.2 IGG MONOCLONAL GAMMOPATHY OF UNCERTAIN SIGNIFICANCE: ICD-10-CM

## 2021-09-27 DIAGNOSIS — Z79.899 HIGH RISK MEDICATION USE: ICD-10-CM

## 2021-09-27 PROCEDURE — 96372 THER/PROPH/DIAG INJ SC/IM: CPT | Performed by: PHYSICAL MEDICINE & REHABILITATION

## 2021-09-27 PROCEDURE — 20553 NJX 1/MLT TRIGGER POINTS 3/>: CPT | Performed by: PHYSICAL MEDICINE & REHABILITATION

## 2021-09-27 RX ORDER — HYDROCODONE BITARTRATE AND ACETAMINOPHEN 7.5; 325 MG/1; MG/1
1 TABLET ORAL EVERY 8 HOURS PRN
Qty: 80 TABLET | Refills: 0 | Status: SHIPPED | OUTPATIENT
Start: 2021-10-04 | End: 2021-11-01 | Stop reason: SDUPTHER

## 2021-09-27 RX ORDER — LIDOCAINE HYDROCHLORIDE 10 MG/ML
15 INJECTION, SOLUTION INFILTRATION; PERINEURAL ONCE
Status: COMPLETED | OUTPATIENT
Start: 2021-09-27 | End: 2021-09-27

## 2021-09-27 RX ORDER — CYANOCOBALAMIN 1000 UG/ML
1000 INJECTION INTRAMUSCULAR; SUBCUTANEOUS ONCE
Status: COMPLETED | OUTPATIENT
Start: 2021-09-27 | End: 2021-09-27

## 2021-09-27 RX ADMIN — CYANOCOBALAMIN 1000 MCG: 1000 INJECTION INTRAMUSCULAR; SUBCUTANEOUS at 14:29

## 2021-09-27 RX ADMIN — LIDOCAINE HYDROCHLORIDE 15 ML: 10 INJECTION, SOLUTION INFILTRATION; PERINEURAL at 14:29

## 2021-11-01 DIAGNOSIS — M54.17 LUMBOSACRAL RADICULOPATHY AT S1: ICD-10-CM

## 2021-11-01 DIAGNOSIS — M53.3 SI (SACROILIAC) PAIN: ICD-10-CM

## 2021-11-01 DIAGNOSIS — D47.2 IGG MONOCLONAL GAMMOPATHY OF UNCERTAIN SIGNIFICANCE: ICD-10-CM

## 2021-11-01 DIAGNOSIS — M51.36 DDD (DEGENERATIVE DISC DISEASE), LUMBAR: Chronic | ICD-10-CM

## 2021-11-01 DIAGNOSIS — Z79.899 HIGH RISK MEDICATION USE: ICD-10-CM

## 2021-11-02 RX ORDER — HYDROCODONE BITARTRATE AND ACETAMINOPHEN 7.5; 325 MG/1; MG/1
1 TABLET ORAL EVERY 8 HOURS PRN
Qty: 80 TABLET | Refills: 0 | Status: SHIPPED | OUTPATIENT
Start: 2021-11-02 | End: 2021-11-17 | Stop reason: SDUPTHER

## 2021-11-17 ENCOUNTER — OFFICE VISIT (OUTPATIENT)
Dept: PHYSICAL MEDICINE AND REHAB | Age: 61
End: 2021-11-17
Payer: COMMERCIAL

## 2021-11-17 VITALS
DIASTOLIC BLOOD PRESSURE: 64 MMHG | BODY MASS INDEX: 29.53 KG/M2 | WEIGHT: 218 LBS | SYSTOLIC BLOOD PRESSURE: 148 MMHG | HEIGHT: 72 IN

## 2021-11-17 DIAGNOSIS — M51.36 DDD (DEGENERATIVE DISC DISEASE), LUMBAR: Chronic | ICD-10-CM

## 2021-11-17 DIAGNOSIS — M53.3 SI (SACROILIAC) PAIN: ICD-10-CM

## 2021-11-17 DIAGNOSIS — M79.10 MYALGIA: ICD-10-CM

## 2021-11-17 DIAGNOSIS — M62.830 MUSCLE SPASM OF BACK: ICD-10-CM

## 2021-11-17 DIAGNOSIS — M54.17 LUMBOSACRAL RADICULOPATHY AT S1: ICD-10-CM

## 2021-11-17 DIAGNOSIS — F06.4 ANXIETY DISORDER DUE TO KNOWN PHYSIOLOGICAL CONDITION: ICD-10-CM

## 2021-11-17 DIAGNOSIS — D47.2 IGG MONOCLONAL GAMMOPATHY OF UNCERTAIN SIGNIFICANCE: ICD-10-CM

## 2021-11-17 DIAGNOSIS — Z79.899 HIGH RISK MEDICATION USE: ICD-10-CM

## 2021-11-17 DIAGNOSIS — G62.1 ALCOHOLIC POLYNEUROPATHY (HCC): Primary | Chronic | ICD-10-CM

## 2021-11-17 PROCEDURE — 99214 OFFICE O/P EST MOD 30 MIN: CPT | Performed by: PHYSICAL MEDICINE & REHABILITATION

## 2021-11-17 PROCEDURE — 4004F PT TOBACCO SCREEN RCVD TLK: CPT | Performed by: PHYSICAL MEDICINE & REHABILITATION

## 2021-11-17 PROCEDURE — G8417 CALC BMI ABV UP PARAM F/U: HCPCS | Performed by: PHYSICAL MEDICINE & REHABILITATION

## 2021-11-17 PROCEDURE — 3017F COLORECTAL CA SCREEN DOC REV: CPT | Performed by: PHYSICAL MEDICINE & REHABILITATION

## 2021-11-17 PROCEDURE — G8427 DOCREV CUR MEDS BY ELIG CLIN: HCPCS | Performed by: PHYSICAL MEDICINE & REHABILITATION

## 2021-11-17 PROCEDURE — G8484 FLU IMMUNIZE NO ADMIN: HCPCS | Performed by: PHYSICAL MEDICINE & REHABILITATION

## 2021-11-17 RX ORDER — PREGABALIN 100 MG/1
100 CAPSULE ORAL 3 TIMES DAILY
Qty: 90 CAPSULE | Refills: 3 | Status: SHIPPED | OUTPATIENT
Start: 2021-11-30 | End: 2022-03-18 | Stop reason: SDUPTHER

## 2021-11-17 RX ORDER — HYDROCODONE BITARTRATE AND ACETAMINOPHEN 7.5; 325 MG/1; MG/1
1 TABLET ORAL EVERY 8 HOURS PRN
Qty: 80 TABLET | Refills: 0 | Status: SHIPPED | OUTPATIENT
Start: 2021-12-01 | End: 2021-12-27 | Stop reason: SDUPTHER

## 2021-11-17 ASSESSMENT — ENCOUNTER SYMPTOMS
COUGH: 0
EYE PAIN: 0
CONSTIPATION: 1
TROUBLE SWALLOWING: 0
PHOTOPHOBIA: 0
NAUSEA: 0
ABDOMINAL PAIN: 0
BLOOD IN STOOL: 0
VOMITING: 0
CHOKING: 0
WHEEZING: 0
CHEST TIGHTNESS: 0
SHORTNESS OF BREATH: 0
BOWEL INCONTINENCE: 0
ABDOMINAL DISTENTION: 0
ANAL BLEEDING: 0
FACIAL SWELLING: 0
BACK PAIN: 1
VISUAL CHANGE: 0
COLOR CHANGE: 0
EYE REDNESS: 0

## 2021-11-17 NOTE — PROGRESS NOTES
Subjective  Vitaliy Urena, 64 y.o. male presents today with:     Back Pain (Trigger point injections 8/4/21, 8/23/21 & 9/27/21 with 70% reduction in pain lasting 1 day)   Hip Pain (Left)   Foot Pain (Bilateral)   Medication Refill (Norco, Lyrica, Vit D refill & pill count today)      He is doing well on his condition at current doses -but is showing signs of decreased function. He is unkempt today and smells heavily of cigarettes -has not quit smoking. 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 However  He is able to have Positive interactions with his friends family we discussed adjusting Lyrica dosing and getting more chronic treatment for his gout. Is on Lyrica 100 TID-helping --I told him I want him to stay on max of 3/day [place].    Back Pain  This is a chronic problem. The current episode started more than 1 year ago. The problem occurs daily. The problem is unchanged. The pain is present in the lumbar spine. Radiates to: left leg, left foot. The pain is at a severity of 3/10 (Pain ranges from 2-10/10. 10/10 with walking long distances. ). The pain is moderate. The pain is the same all the time. The symptoms are aggravated by position. Associated symptoms include leg pain, numbness (Left side of throat) and weakness. Pertinent negatives include no abdominal pain, bladder incontinence, bowel incontinence, chest pain, dysuria, fever, headaches, paresis or perianal numbness. Risk factors include lack of exercise, sedentary lifestyle and poor posture. He has tried chiropractic manipulation, ice, walking, home exercises, analgesics and NSAIDs (Narcotic Pain Medications, gabapentin, Trigger Point injections, Sciatica Block, Tylenol) for the symptoms. The treatment provided significant relief. Hip Pain   Associated symptoms include numbness (Left side of throat).    Foot Pain   Associated symptoms include numbness (Left side of throat). Pertinent negatives include no fever. Mental Health Problem  The primary symptoms do not include dysphoric mood, delusions or hallucinations. Primary symptoms comment: etoh rel neuropathy--pt has not drank in 6 years --he is committed to no more ETOH use. The current episode started more than 1 month ago. This is a chronic problem. The onset of the illness is precipitated by a stressful event. The degree of incapacity that he is experiencing as a consequence of his illness is mild. Additional symptoms of the illness do not include appetite change, unexpected weight change, fatigue, agitation, visual change, headaches or abdominal pain. He does not admit to suicidal ideas. He does not have a plan to attempt suicide. He does not contemplate harming himself. He has not already injured self. He does not contemplate injuring another person. He has not already  injured another person. Risk factors that are present for mental illness include substance abuse.        Past Medical History:   Diagnosis Date    Acute idiopathic gout of left wrist 99/31/3276    Alcoholic (HCC)-remote Hx 9/14/0136    In remission goes to AA daily--agrees not to overtake pain meds     Alcoholic polyneuropathy ([address]     Asthma     CAD (coronary artery disease)     [address] [facility]    Chronic back pain greater than 3 months duration     CN III palsy, right eye 2017    Dr Marino Garibay    COPD (chronic obstructive pulmonary disease) ([address] 2014    DDD (degenerative disc disease), lumbar 3/23/2015    Elevated liver enzymes 2014    Hyperlipidemia     on med > 10 yrs    Hypertension     on meds x 1 yr    IgA monoclonal gammopathy 2015    Dr Nataliia Bush    Insomnia, unspecified     LBP (low back pain)     Dr Pro Blood Neuropathy     Occlusion and stenosis of carotid artery without mention of cerebral infarction     Dr John Mccabe Personal history of alcoholism ([address]     quit 11/05/2010, relapsed 2016    S/P carotid endarterectomy 01/2019    Dr Alexander Ornelas    Sensorimotor neuropathy     Bilateral lower extremities-EMG 03/23/15 (Wanda Severe)    Smoking trying to quit     Traumatic compression fracture of T11 thoracic vertebra ([address] 7108    Umbilical hernia      Past Surgical History:   Procedure Laterality Date    CAROTID ENDARTERECTOMY Left 1/18/2019    LEFT CAROTID ENDARTERECTOMY performed by Jessica Saldana MD at [facility]  8/19/2014    COLONOSCOPY N/A 12/18/2019    COLONOSCOPY DIAGNOSTIC performed by Shonda Hermosillo MD at [address] [facility], [place], DIAGNOSTIC      HERNIA REPAIR      TONSILLECTOMY      UPPER GASTROINTESTINAL ENDOSCOPY  8/19/2014    VENTRAL HERNIA REPAIR  09/01/15    W/MESH AND W/UMBILECTOMY     Social History     Socioeconomic History    Marital status: Single     Spouse name: None    Number of children: 1    Years of education: None    Highest education level: None   Occupational History    Occupation: SSI for learning disability   Tobacco Use    Smoking status: Current Every Day Smoker     Packs/day: 0.50     Years: 40.00     Pack years: 20.00     Types: Cigarettes     Start date: 1968    Smokeless tobacco: Never Used   Vaping Use    Vaping Use: Never used   Substance and Sexual Activity    Alcohol use: No     Alcohol/week: 0.0 standard drinks     Comment: 8/31/2016 Quit Date    Drug use: No    Sexual activity: None   Other Topics Concern    None   Social History Narrative    Born in [place], one of 5, Came to [place] at age 1 with parents    Never , one daughter, Never worked, disabled since 12 (mental--LD--memory and processing deficits)    Lives in [facility] with brother, in a house        Attends [employer] daily    [address] riding bike, TV    One daughter, does keep in touch      Social Determinants of Health     Financial Resource Strain: Low Risk     Difficulty of Paying Living Expenses: Not hard at [address]:     Worried About Running Out of Food in the Last Year: Not on file    Ran Out of Food in the Last Year: Not on file   Transportation Needs: No Transportation Needs    Lack of Transportation (Medical): No    Lack of Transportation (Non-Medical): No   Physical Activity:     Days of Exercise per Week: Not on file    Minutes of Exercise per Session: Not on file   Stress:     Feeling of Stress : Not on file   Social Connections:     Frequency of Communication with Friends and Family: Not on file    Frequency of Social Gatherings with Friends and Family: Not on file    Attends Jew Services: Not on file    Active Member of [employer] or Organizations: Not on file    Attends Club or Organization Meetings: Not on file    Marital Status: Not on file   Intimate Partner Violence:     Fear of Current or Ex-Partner: Not on file    Emotionally Abused: Not on file    Physically Abused: Not on file    Sexually Abused: Not on file   Housing Stability:     Unable to Pay for Housing in the Last Year: Not on file    Number of [place] in the Last Year: Not on file    Unstable Housing in the Last Year: Not on file     Family History   Problem Relation Age of Onset    Cancer Mother         brain, dec 79   [facility] Alcohol Abuse Father     Other Sister         unknown medical history    Other Brother         unknown medical history    High Blood Pressure Brother     Alcohol Abuse Daughter        No Known Allergies    Review of Systems   Constitutional: Positive for activity change. Negative for appetite change, chills, fatigue, fever and unexpected weight change. HENT: Negative for ear discharge, ear pain, facial swelling, hearing loss and trouble swallowing. Eyes: Negative for photophobia, pain and redness. Respiratory: Negative for cough, choking, chest tightness, shortness of breath and wheezing. Cardiovascular: Negative for chest pain, palpitations and leg swelling. Gastrointestinal: Positive for constipation.  Negative for abdominal distention, abdominal pain, anal bleeding, blood in stool, bowel incontinence, nausea and vomiting. Genitourinary: Negative for bladder incontinence, difficulty urinating, dysuria, flank pain, frequency and urgency. Musculoskeletal: Positive for arthralgias, back pain, gait problem and myalgias. Negative for neck pain and neck stiffness. Skin: Negative for color change, pallor, rash and wound. Neurological: Positive for weakness and numbness (Left side of throat). Negative for dizziness, tremors, syncope, facial asymmetry, speech difficulty, light-headedness and headaches. Hematological: Negative for adenopathy. Does not bruise/bleed easily. Psychiatric/Behavioral: Positive for sleep disturbance. Negative for agitation, behavioral problems, confusion, decreased concentration, dysphoric mood, hallucinations, self-injury and suicidal ideas. The patient is not nervous/anxious and is not hyperactive. All other systems reviewed and are negative. Objective    Vitals:    11/17/21 1324 11/17/21 1327   BP: (!) 148/64 (!) 148/64   Site: Left Upper Arm Left Upper Arm   Position: Sitting Sitting   Cuff Size: Large Adult Large Adult   Weight: 218 lb (98.9 kg)    Height: 5' 11.5\" (1.816 m)      Pain Score: Zero (With medication)       Physical Exam  Vitals reviewed. Constitutional:       General: He is not in acute distress. Appearance: He is well-developed. He is not ill-appearing, toxic-appearing or diaphoretic. Comments:         HENT:      Head: Normocephalic and atraumatic. Right Ear: Hearing normal.      Left Ear: Hearing normal.      Nose: Nose normal.      Mouth/Throat:      Mouth: No oral lesions. Dentition: Normal dentition. Pharynx: No oropharyngeal exudate. Eyes:      General: No scleral icterus. Left eye: No discharge. Extraocular Movements:      Right eye: Abnormal extraocular motion present.       Conjunctiva/sclera: Conjunctivae normal.      Left eye: No chemosis or exudate. Pupils: Pupils are equal, round, and reactive to light. Comments: Right 3rd nerve palsy   Neck:      Thyroid: No thyromegaly. Vascular: No JVD. Trachea: No tracheal deviation. Pulmonary:      Effort: Pulmonary effort is normal. No tachypnea, bradypnea, accessory muscle usage or respiratory distress. Breath sounds: No wheezing. Chest:      Chest wall: No tenderness. Abdominal:      General: There is no distension. Comments: Umbilical hernia   Musculoskeletal:         General: Tenderness present. Right shoulder: Normal.      Left shoulder: Normal.      Right upper arm: Normal.      Left upper arm: Normal.      Right elbow: Normal.      Left elbow: Normal.      Right forearm: Normal.      Left forearm: Normal.      Right wrist: Normal.      Left wrist: Normal.      Right hand: Normal.      Left hand: Normal.      Cervical back: Normal range of motion and neck supple. Spasms, tenderness and bony tenderness present. No edema or rigidity. Thoracic back: Spasms, tenderness and bony tenderness present. Decreased range of motion. Lumbar back: Tenderness and bony tenderness present. No swelling, edema, deformity, lacerations or spasms. Decreased range of motion. Back:       Right hip: Tenderness present. Decreased range of motion. Decreased strength. Left hip: Tenderness present. Decreased range of motion. Right upper leg: Normal.      Left upper leg: Normal.      Right knee: Normal.      Left knee: Normal.      Right lower leg: Normal.      Left lower leg: Normal.      Right ankle: Normal.      Right Achilles Tendon: Normal.      Left ankle: Normal.      Left Achilles Tendon: Normal.      Right foot: Normal.      Left foot: Normal.        Legs:       Comments: Tender areas are indicated by numbered spot         Skin:     General: Skin is warm and dry. Coloration: Skin is not pale.       Findings: No abrasion, bruising, ecchymosis, erythema, laceration, petechiae or rash. Rash is not macular, pustular or urticarial.      Nails: There is no clubbing. Neurological:      Mental Status: He is alert and oriented to person, place, and time. Cranial Nerves: No cranial nerve deficit. Sensory: Sensory deficit present. Motor: No tremor, atrophy or abnormal muscle tone. Coordination: Coordination normal.      Deep Tendon Reflexes: Reflexes abnormal. Babinski sign absent on the right side. Babinski sign absent on the left side. Psychiatric:         Attention and Perception: He is attentive. Mood and Affect: Mood is not anxious or depressed. Affect is not labile, blunt, angry or inappropriate. Speech: He is communicative. Speech is not rapid and pressured, delayed, slurred or tangential.         Behavior: Behavior normal. Behavior is not agitated, slowed, aggressive, withdrawn, hyperactive or combative. Thought Content: Thought content normal. Thought content is not paranoid or delusional. Thought content does not include homicidal or suicidal ideation. Thought content does not include homicidal or suicidal plan. Cognition and Memory: Memory is not impaired. He does not exhibit impaired recent memory or impaired remote memory. Judgment: Judgment normal. Judgment is not impulsive or inappropriate. Ortho Exam  Neurologic Exam     Mental Status   Oriented to person, place, and time. Speech: not slurred     Cranial Nerves     CN III, IV, VI   Pupils are equal, round, and reactive to light.           After a thorough review and discussion of the previous medical records, patient comprehensive medical, surgical, and family and social history, Review of Systems, their OARRS, their Screener and Opioid Assessment for Patients with Pain (SOAPP®-R), recent diagnostics, and symptomatic results to previous treatment, it is my impression that the patients is suffering with progressive and infarction     Dr Juliana Rivas history of alcoholism ([facility]     quit 11/05/2010, relapsed 2016    S/P carotid endarterectomy 01/2019    Dr Cely Felix    Sensorimotor neuropathy     Bilateral lower extremities-EMG 03/23/15 (Dashawn Sylvester)    Smoking trying to quit     Traumatic compression fracture of T11 thoracic vertebra ([facility] 3268    Umbilical hernia            Given their medication, chronic pain and lifestyle and medications they are at risk for :    Falls, constipation, addiction, toxicity on opiates  Loss of livelyhood due to severe pain, debility, weight gain and  vitamin D deficiency    The patient was educated regarding proper diet, fitness routine, and regulatory restrictions concerning pain medications. Previous notes, comprehensive past medical, surgical, family history, and diagnostics were reviewed. Patient education and councelling were provided regarding off label use,treatment options and medication and injection risks. Current and old OARRS ([facility] Automated Prescription Reporting System) records reviewed, all refills reviewed since last visit,  Behavioral agreement/MICK regulations   and Toxicology screen was reviewed with patient and is up to date. There are   no current red flags. high risk meds review re intensive monitoring for toxicity and addiction,  They are making good progress regarding pain relief, they are performing at a functional level regarding activities of daily living, family interactions and psychological functioning, they're not having any adverse effects or side effects from the current medications, and I see no findings of aberrant drug taking or addiction related behaviors. The patient is aware that they have a chronic pain condition and they may require opiates dosing for life. All efforts will be made to wean to the lowest effective dose. Other therapies for pain have not been effective including nonopiate medications.   Injections and exercises are only partially effective. A Rx for Narcan was offered to help prevent accidental overdose. RX Monitoring 11/4/2021   Attestation -   Acute Pain Prescriptions Prescription exceeds daily limit for a specific reason. See comments or note. ;Not required given exclusionary diagnoses. ..;Severe pain not adequately treated with lower dose. Periodic Controlled Substance Monitoring Possible medication side effects, risk of tolerance/dependence & alternative treatments discussed. ;No signs of potential drug abuse or diversion identified. ;Assessed functional status. ;Obtaining appropriate analgesic effect of treatment. Chronic Pain > 50 MEDD Re-evaluated the status of the patient's underlying condition causing pain. ;Considered consultation with a specialist.;Obtained or confirmed \"Consent for Opioid Use\" on file. Chronic Pain > 80 MEDD -       Periodic Controlled Substance Monitoring: Possible medication side effects, risk of tolerance/dependence & alternative treatments discussed., No signs of potential drug abuse or diversion identified. , Assessed functional status., Obtaining appropriate analgesic effect of treatment. (Subha Felder, )       Patient is currently taking:       I am having Ranjith Rodriguez maintain his Refresh Tears, nitroGLYCERIN, Narcan, Vascepa, indomethacin, Medical Compression Stockings, vitamin B-12, allopurinol, senna-docusate, budesonide-formoterol, vitamin D, clopidogrel, valsartan-hydroCHLOROthiazide, metoprolol succinate, atorvastatin, aspirin EC, polyethylene glycol, docusate sodium, HYDROcodone-acetaminophen, and pregabalin. I also recommend the following Medications:    Orders Placed This Encounter   Medications    HYDROcodone-acetaminophen (NORCO) 7.5-325 MG per tablet     Sig: Take 1 tablet by mouth every 8 hours as needed for Pain (Max 3 per day) for up to 30 days.  Intended supply: 30 days     Dispense:  80 tablet     Refill:  0     Reduce doses taken as pain becomes manageable    pregabalin (LYRICA) 100 MG capsule     Sig: Take 1 capsule by mouth 3 times daily for 30 days. Dispense:  90 capsule     Refill:  3        -which helps with pain and function. Otherwise, continue the current pain medications that I have prescibed. Radiologic:   Old  unique films results reviewed  ,     I discussed results with patients. see Follow up plans below  For any new studies. Unique source and Care Everywhere Updates:  prior external notes requested and reviewed. No new issues noted. Unique History obtained by caregiver/independent historian-and verified with SOAPPR. Electrodiagnostic:  Previous studies requested,     I discussed results with patient. See follow-up plans for new studies.         Labs:  Previous labs reviewed     Lab Results   Component Value Date     04/02/2021    K 4.2 04/02/2021     04/02/2021    CO2 25 04/02/2021    BUN 15 04/02/2021    CREATININE 0.94 04/02/2021    CALCIUM 10.1 04/02/2021    LABALBU 3.99 04/02/2021    LABALBU 4.1 04/02/2021    BILITOT 0.5 04/02/2021    ALKPHOS 118 04/02/2021    AST 26 04/02/2021    ALT 39 04/02/2021     Lab Results   Component Value Date    WBC 11.9 04/02/2021    RBC 5.53 04/02/2021    HGB 16.6 04/02/2021    HCT 49.0 04/02/2021    MCV 88.6 04/02/2021    MCH 30.1 04/02/2021    MCHC 33.9 04/02/2021    RDW 14.9 04/02/2021     04/02/2021    MPV 8.8 08/25/2015       Lab Results   Component Value Date    LABAMPH Neg 07/20/2020    BARBSCNU Neg 07/20/2020    LABBENZ Neg 07/20/2020    CANSU Neg 07/20/2020    COCAIMETSCRU Neg 07/20/2020    PHENCYCLIDINESCREENURINE Neg 07/20/2020    DSCOMMENT see below 07/20/2020       Lab Results   Component Value Date    CODEINE Not Detected 09/20/2016    MORPHINE Not Detected 09/20/2016    ACETYLMORPHI Not Detected 09/20/2016    OXYCODONE Not Detected 09/20/2016    NOROXYCODONE Not Detected 09/20/2016    NOROXYMU Not Detected 09/20/2016    [facility] Not Detected 09/20/2016    NORHYDU Not Detected 09/20/2016    HYDROMO Not Detected 09/20/2016    BUPREN Not Detected 09/20/2016    NORBUPRNOR Not Detected 09/20/2016    FENTA Not Detected 09/20/2016    NORFENT Not Detected 09/20/2016    MEPERIDINE Not Detected 09/20/2016    TAPENU Not Detected 09/20/2016    TAPOSULFUR Not Detected 09/20/2016    METHADONE Not Detected 09/20/2016    LABPROP Not Detected 09/20/2016    TRAM Not Detected 09/20/2016    AMPH Not Detected 09/20/2016    METHAMP Not Detected 09/20/2016    MDMA Not Detected 09/20/2016    ECMDA Not Detected 09/20/2016       Lab Results   Component Value Date    PHENTERMINE Not Detected 09/20/2016    BENZOYL Not Detected 09/20/2016    Montalvo Chiles Not Detected 09/20/2016    ALPHAOHALPRA Not Detected 09/20/2016    CLONAZEPAM Not Detected 09/20/2016    Mando Murali Not Detected 09/20/2016    DIAZEP Not Detected 09/20/2016    CESAR Not Detected 09/20/2016    OXAZ Not Detected 09/20/2016    Niels Score Not Detected 09/20/2016    LORAZEPAM Not Detected 09/20/2016    MIDAZOLAM Not Detected 09/20/2016    ZOLPIDEM Not Detected 09/20/2016    CORWIN Not Detected 09/20/2016    ETG Not Detected 09/20/2016    MARIJMET Not Detected 09/20/2016    PCP Not Detected 09/20/2016    PAINMGTDRUGP See Below 09/20/2016    EERPAINMGTPA See Note 09/20/2016    LABCREA 55.5 09/20/2016         , I discussed results with patient. See follow-up plans for new studies. Therapies:  HEP-gentle stretching and relaxation techniques-demonstrated with patient-they are to do them twice a day.   They are also advised to make the following lifestyle changes:  Goals      Exercise 3x per week (30 min per time)      SOAPP-R GOAL LESS THAN       09/20/16 SCORE: 13-MODERATE RISK   12/14/16 score: 9-low-moderate risk   02/15/17 score: 12-moderate risk   05/18/17 score: 14-moderate risk  07/21/17 score: 24-high risk  09/21/17 score: 11-moderate risk  11/27/17 score: 10-moderate risk  01/26/18 score: 8-low risk  03/26/18 score: 12-moderate risk  6/14/18 SCORE: 4-LOW RISK  8/7/18 SCORE: 6-LOW RISK   10/4/18 score: 11- moderate risk  3/6/19 score: 6- low risk  5/21/19 score 11- low risk  7/25/19 score: 1- low risk   11/19/19 score: 8- low risk   3/12/20 score: 15- moderate risk   7/15/20 score: 10- moderate risk  9/16/20 score: 15- moderate risk  1/20/21 score: 16- moderate risk  4/23/21 score: 9- moderate risk  7/1/21 score: 11- moderate risk  11/17/21 score: 7- low risk       Stop Cigarette/Tobacco use           Injections or Epidurals:  Injection options were discussed. Patient gave verbal consent to ordered injections. See follow-up plans for planned injections. Supplements:  Vitamin D with increased dosing during the rainy months,   Education was given on:   Dietary and Fitness--daily stretches and low carb diet-in chair Yoga when possible             Follow up with Primary Care Physician regarding their general medical needs. Stressed the importance of following up with PCP and specialists for his/her chronic diseases, health, CV, and cancer screening and continued care. Will follow disease activity/progression and adjust therapeutic regimen to disease activity and severity. Discussed medication dosage, usage, goals of therapy, and side effects. Available test results were reviewed -Discussed findings, impression and plan with patient. An additional 5 minutes were spent outside of the patient visit to review records. Additional time spent with the patient to discuss their questions. Additional time spent with the patient devoted to discussing treatment strategy, planning, and implementation. Patient understands above plan; questions asked and answered. Patient agrees to plan as noted above. At least 50% of the visit was involved in the discussion of the options for treatment. We discussed exercises, medication, interventional therapies and surgery.  Healthy life style is essential with patient hard work to achieve the wellness. In addition; discussion with the patient and/or family about patient's functional status any of the diagnostic results, impressions and/or recommended diagnostic studies, prognosis, risks and benefits of treatment options, instructions for treatment and/or follow-up, importance of compliance with chosen treatment options, risk-factor reduction, and patient/family education. They are to follow up in 2 1/2 -3 months to review medication, efficacy of injections, pill counts, OARRS check, high risk med review re intensive monitoring for toxicity and addiction, SOAPPR assessment, review diagnostics, to review previous and future treatment plans and assess appropriateness for continued therapy.         New Diagnostics  Orders Placed This Encounter   Procedures    Urine Drug Screen    [place] INJECT TRIGGER POINTS, 3 OR GREATER    [place] INJECT TRIGGER POINTS, 3 OR GREATER    [place] INJECT TRIGGER POINTS, 3 OR GREATER       Sherrie Sosa, DO

## 2021-11-24 DIAGNOSIS — K59.00 CONSTIPATION, UNSPECIFIED CONSTIPATION TYPE: ICD-10-CM

## 2021-11-24 DIAGNOSIS — E55.9 VITAMIN D DEFICIENCY: ICD-10-CM

## 2021-11-24 RX ORDER — DOCUSATE SODIUM 100 MG/1
CAPSULE, LIQUID FILLED ORAL
Qty: 30 CAPSULE | Refills: 3 | Status: SHIPPED | OUTPATIENT
Start: 2021-11-24 | End: 2022-04-05

## 2021-11-24 RX ORDER — GLUCOSAMINE/CHONDR SU A SOD 750-600 MG
TABLET ORAL
Qty: 30 CAPSULE | Refills: 5 | Status: SHIPPED | OUTPATIENT
Start: 2021-11-24 | End: 2022-03-17 | Stop reason: SDUPTHER

## 2021-12-27 DIAGNOSIS — M51.36 DDD (DEGENERATIVE DISC DISEASE), LUMBAR: Chronic | ICD-10-CM

## 2021-12-27 DIAGNOSIS — M54.17 LUMBOSACRAL RADICULOPATHY AT S1: ICD-10-CM

## 2021-12-27 DIAGNOSIS — Z79.899 HIGH RISK MEDICATION USE: ICD-10-CM

## 2021-12-27 DIAGNOSIS — M53.3 SI (SACROILIAC) PAIN: ICD-10-CM

## 2021-12-27 DIAGNOSIS — D47.2 IGG MONOCLONAL GAMMOPATHY OF UNCERTAIN SIGNIFICANCE: ICD-10-CM

## 2021-12-27 RX ORDER — HYDROCODONE BITARTRATE AND ACETAMINOPHEN 7.5; 325 MG/1; MG/1
1 TABLET ORAL EVERY 8 HOURS PRN
Qty: 80 TABLET | Refills: 0 | Status: SHIPPED | OUTPATIENT
Start: 2021-12-30 | End: 2022-01-26 | Stop reason: SDUPTHER

## 2022-01-12 NOTE — PROGRESS NOTES
Patient here for trigger point injections. Patient taken back to exam room, and placed on drape locking stool. Areas to be injected marked appropriately, and cleansed with alcohol. 16cc of 1% Lidocaine to be injected by provider.
yes

## 2022-01-26 DIAGNOSIS — M51.36 DDD (DEGENERATIVE DISC DISEASE), LUMBAR: Chronic | ICD-10-CM

## 2022-01-26 DIAGNOSIS — Z79.899 HIGH RISK MEDICATION USE: ICD-10-CM

## 2022-01-26 DIAGNOSIS — D47.2 IGG MONOCLONAL GAMMOPATHY OF UNCERTAIN SIGNIFICANCE: ICD-10-CM

## 2022-01-26 DIAGNOSIS — M54.17 LUMBOSACRAL RADICULOPATHY AT S1: ICD-10-CM

## 2022-01-26 DIAGNOSIS — M53.3 SI (SACROILIAC) PAIN: ICD-10-CM

## 2022-01-26 RX ORDER — ICOSAPENT ETHYL 1000 MG/1
CAPSULE ORAL
Qty: 360 CAPSULE | Refills: 3 | Status: SHIPPED | OUTPATIENT
Start: 2022-01-26 | End: 2022-09-23 | Stop reason: SDUPTHER

## 2022-01-26 NOTE — TELEPHONE ENCOUNTER
Please approve or deny this refill request. The order is pended. Thank you.     LOV 8/5/2021    Next Visit Date:  Future Appointments   Date Time Provider Loan Darling   2/1/2022  9:00 AM Erwin Dance, MD [facility]   2/2/2022 12:15 PM Cuauhtemoc Lombardo MD [address] [place] [address]   2/8/2022 11:15 AM DO Carmen Cervantes [facility] EMERGENCY [facility] [place]   2/21/2022  1:30 PM Princess Gray, DO [address]   3/16/2022  2:00 PM Princess Gray, DO [address]   3/28/2022  1:30 PM Princess Gray DO [address]

## 2022-01-27 RX ORDER — HYDROCODONE BITARTRATE AND ACETAMINOPHEN 7.5; 325 MG/1; MG/1
1 TABLET ORAL EVERY 8 HOURS PRN
Qty: 80 TABLET | Refills: 0 | Status: SHIPPED | OUTPATIENT
Start: 2022-01-28 | End: 2022-02-21 | Stop reason: SDUPTHER

## 2022-02-01 ENCOUNTER — OFFICE VISIT (OUTPATIENT)
Dept: FAMILY MEDICINE CLINIC | Age: 62
End: 2022-02-01
Payer: COMMERCIAL

## 2022-02-01 VITALS
OXYGEN SATURATION: 94 % | SYSTOLIC BLOOD PRESSURE: 120 MMHG | HEIGHT: 72 IN | RESPIRATION RATE: 17 BRPM | BODY MASS INDEX: 32.37 KG/M2 | WEIGHT: 239 LBS | HEART RATE: 74 BPM | DIASTOLIC BLOOD PRESSURE: 88 MMHG | TEMPERATURE: 96.6 F

## 2022-02-01 DIAGNOSIS — D47.2 IGG MONOCLONAL GAMMOPATHY OF UNCERTAIN SIGNIFICANCE: ICD-10-CM

## 2022-02-01 DIAGNOSIS — I10 UNCONTROLLED HYPERTENSION: ICD-10-CM

## 2022-02-01 DIAGNOSIS — R79.9 ABNORMAL FINDING OF BLOOD CHEMISTRY, UNSPECIFIED: ICD-10-CM

## 2022-02-01 DIAGNOSIS — Z87.820 HISTORY OF TRAUMATIC BRAIN INJURY: Chronic | ICD-10-CM

## 2022-02-01 DIAGNOSIS — E88.81 INSULIN RESISTANCE: Chronic | ICD-10-CM

## 2022-02-01 DIAGNOSIS — M10.032 IDIOPATHIC GOUT OF LEFT WRIST, UNSPECIFIED CHRONICITY: ICD-10-CM

## 2022-02-01 DIAGNOSIS — K59.09 OTHER CONSTIPATION: Chronic | ICD-10-CM

## 2022-02-01 DIAGNOSIS — I10 PRIMARY HYPERTENSION: Chronic | ICD-10-CM

## 2022-02-01 DIAGNOSIS — Z00.00 ROUTINE GENERAL MEDICAL EXAMINATION AT A HEALTH CARE FACILITY: ICD-10-CM

## 2022-02-01 DIAGNOSIS — I77.9 CAROTID ARTERY DISORDER (HCC): Chronic | ICD-10-CM

## 2022-02-01 DIAGNOSIS — J41.0 SIMPLE CHRONIC BRONCHITIS (HCC): ICD-10-CM

## 2022-02-01 DIAGNOSIS — I10 PRIMARY HYPERTENSION: Primary | Chronic | ICD-10-CM

## 2022-02-01 DIAGNOSIS — Z87.891 PERSONAL HISTORY OF TOBACCO USE: ICD-10-CM

## 2022-02-01 DIAGNOSIS — I63.02 CEREBROVASCULAR ACCIDENT (CVA) DUE TO THROMBOSIS OF BASILAR ARTERY (HCC): Chronic | ICD-10-CM

## 2022-02-01 DIAGNOSIS — G62.1 ALCOHOLIC POLYNEUROPATHY (HCC): Chronic | ICD-10-CM

## 2022-02-01 DIAGNOSIS — I73.9 PAD (PERIPHERAL ARTERY DISEASE) (HCC): Chronic | ICD-10-CM

## 2022-02-01 DIAGNOSIS — E88.819 INSULIN RESISTANCE: Chronic | ICD-10-CM

## 2022-02-01 DIAGNOSIS — Z72.0 TOBACCO ABUSE: ICD-10-CM

## 2022-02-01 DIAGNOSIS — Z71.89 COUNSELING REGARDING ADVANCE CARE PLANNING AND GOALS OF CARE: Primary | ICD-10-CM

## 2022-02-01 DIAGNOSIS — F41.1 GENERALIZED ANXIETY DISORDER: ICD-10-CM

## 2022-02-01 LAB
ALBUMIN SERPL-MCNC: 4 G/DL (ref 3.5–4.6)
ALP BLD-CCNC: 127 U/L (ref 35–104)
ALT SERPL-CCNC: 39 U/L (ref 0–41)
ANION GAP SERPL CALCULATED.3IONS-SCNC: 13 MEQ/L (ref 9–15)
AST SERPL-CCNC: 25 U/L (ref 0–40)
BASOPHILS ABSOLUTE: 0.1 K/UL (ref 0–0.2)
BASOPHILS RELATIVE PERCENT: 0.6 %
BILIRUB SERPL-MCNC: 0.8 MG/DL (ref 0.2–0.7)
BUN BLDV-MCNC: 13 MG/DL (ref 8–23)
CALCIUM SERPL-MCNC: 10.2 MG/DL (ref 8.5–9.9)
CHLORIDE BLD-SCNC: 99 MEQ/L (ref 95–107)
CO2: 26 MEQ/L (ref 20–31)
CREAT SERPL-MCNC: 0.98 MG/DL (ref 0.7–1.2)
EOSINOPHILS ABSOLUTE: 0.3 K/UL (ref 0–0.7)
EOSINOPHILS RELATIVE PERCENT: 2.6 %
GFR AFRICAN AMERICAN: >60
GFR NON-AFRICAN AMERICAN: >60
GLOBULIN: 4 G/DL (ref 2.3–3.5)
GLUCOSE BLD-MCNC: 95 MG/DL (ref 70–99)
HBA1C MFR BLD: 6.2 % (ref 4.8–5.9)
HCT VFR BLD CALC: 53.7 % (ref 42–52)
HEMOGLOBIN: 18.3 G/DL (ref 14–18)
LYMPHOCYTES ABSOLUTE: 2.4 K/UL (ref 1–4.8)
LYMPHOCYTES RELATIVE PERCENT: 22.7 %
MCH RBC QN AUTO: 30.8 PG (ref 27–31.3)
MCHC RBC AUTO-ENTMCNC: 34.1 % (ref 33–37)
MCV RBC AUTO: 90.4 FL (ref 80–100)
MONOCYTES ABSOLUTE: 1.2 K/UL (ref 0.2–0.8)
MONOCYTES RELATIVE PERCENT: 11.2 %
NEUTROPHILS ABSOLUTE: 6.8 K/UL (ref 1.4–6.5)
NEUTROPHILS RELATIVE PERCENT: 62.9 %
PDW BLD-RTO: 14.2 % (ref 11.5–14.5)
PLATELET # BLD: 277 K/UL (ref 130–400)
POTASSIUM SERPL-SCNC: 3.8 MEQ/L (ref 3.4–4.9)
RBC # BLD: 5.94 M/UL (ref 4.7–6.1)
SODIUM BLD-SCNC: 138 MEQ/L (ref 135–144)
TOTAL PROTEIN: 8 G/DL (ref 6.3–8)
URIC ACID, SERUM: 7.3 MG/DL (ref 3.4–7)
WBC # BLD: 10.8 K/UL (ref 4.8–10.8)

## 2022-02-01 PROCEDURE — 99214 OFFICE O/P EST MOD 30 MIN: CPT | Performed by: FAMILY MEDICINE

## 2022-02-01 PROCEDURE — 4004F PT TOBACCO SCREEN RCVD TLK: CPT | Performed by: FAMILY MEDICINE

## 2022-02-01 PROCEDURE — G8482 FLU IMMUNIZE ORDER/ADMIN: HCPCS | Performed by: FAMILY MEDICINE

## 2022-02-01 PROCEDURE — G0296 VISIT TO DETERM LDCT ELIG: HCPCS | Performed by: FAMILY MEDICINE

## 2022-02-01 PROCEDURE — 3017F COLORECTAL CA SCREEN DOC REV: CPT | Performed by: FAMILY MEDICINE

## 2022-02-01 PROCEDURE — G8417 CALC BMI ABV UP PARAM F/U: HCPCS | Performed by: FAMILY MEDICINE

## 2022-02-01 PROCEDURE — G8427 DOCREV CUR MEDS BY ELIG CLIN: HCPCS | Performed by: FAMILY MEDICINE

## 2022-02-01 PROCEDURE — G0439 PPPS, SUBSEQ VISIT: HCPCS | Performed by: FAMILY MEDICINE

## 2022-02-01 PROCEDURE — 3023F SPIROM DOC REV: CPT | Performed by: FAMILY MEDICINE

## 2022-02-01 RX ORDER — INDOMETHACIN 25 MG/1
25 CAPSULE ORAL 3 TIMES DAILY PRN
Qty: 30 CAPSULE | Refills: 1 | Status: SHIPPED | OUTPATIENT
Start: 2022-02-01

## 2022-02-01 RX ORDER — AMOXICILLIN 250 MG
2 CAPSULE ORAL DAILY PRN
Qty: 180 TABLET | Refills: 4 | Status: SHIPPED | OUTPATIENT
Start: 2022-02-01

## 2022-02-01 RX ORDER — LANOLIN ALCOHOL/MO/W.PET/CERES
1000 CREAM (GRAM) TOPICAL DAILY
Qty: 90 TABLET | Refills: 4 | Status: CANCELLED | OUTPATIENT
Start: 2022-02-01

## 2022-02-01 RX ORDER — BUDESONIDE AND FORMOTEROL FUMARATE DIHYDRATE 80; 4.5 UG/1; UG/1
2 AEROSOL RESPIRATORY (INHALATION) 2 TIMES DAILY
Qty: 3 EACH | Refills: 4 | Status: SHIPPED | OUTPATIENT
Start: 2022-02-01

## 2022-02-01 RX ORDER — ALLOPURINOL 100 MG/1
100 TABLET ORAL DAILY
Qty: 90 TABLET | Refills: 4 | Status: SHIPPED | OUTPATIENT
Start: 2022-02-01 | End: 2022-02-21

## 2022-02-01 SDOH — ECONOMIC STABILITY: FOOD INSECURITY: WITHIN THE PAST 12 MONTHS, THE FOOD YOU BOUGHT JUST DIDN'T LAST AND YOU DIDN'T HAVE MONEY TO GET MORE.: NEVER TRUE

## 2022-02-01 SDOH — ECONOMIC STABILITY: TRANSPORTATION INSECURITY
IN THE PAST 12 MONTHS, HAS THE LACK OF TRANSPORTATION KEPT YOU FROM MEDICAL APPOINTMENTS OR FROM GETTING MEDICATIONS?: NO

## 2022-02-01 SDOH — ECONOMIC STABILITY: FOOD INSECURITY: WITHIN THE PAST 12 MONTHS, YOU WORRIED THAT YOUR FOOD WOULD RUN OUT BEFORE YOU GOT MONEY TO BUY MORE.: NEVER TRUE

## 2022-02-01 ASSESSMENT — PATIENT HEALTH QUESTIONNAIRE - PHQ9
SUM OF ALL RESPONSES TO PHQ QUESTIONS 1-9: 0
1. LITTLE INTEREST OR PLEASURE IN DOING THINGS: 0
SUM OF ALL RESPONSES TO PHQ QUESTIONS 1-9: 0
2. FEELING DOWN, DEPRESSED OR HOPELESS: 0
SUM OF ALL RESPONSES TO PHQ9 QUESTIONS 1 & 2: 0

## 2022-02-01 ASSESSMENT — LIFESTYLE VARIABLES: HOW OFTEN DO YOU HAVE A DRINK CONTAINING ALCOHOL: 0

## 2022-02-01 ASSESSMENT — SOCIAL DETERMINANTS OF HEALTH (SDOH): HOW HARD IS IT FOR YOU TO PAY FOR THE VERY BASICS LIKE FOOD, HOUSING, MEDICAL CARE, AND HEATING?: NOT HARD AT ALL

## 2022-02-01 NOTE — PATIENT INSTRUCTIONS
Advance Directives: Care Instructions  Overview  An advance directive is a legal way to state your wishes at the end of your life. It tells your family and your doctor what to do if you can't say what you want. There are two main types of advance directives. You can change them any time your wishes change. Living will. This form tells your family and your doctor your wishes about life support and other treatment. The form is also called a declaration. Medical power of . This form lets you name a person to make treatment decisions for you when you can't speak for yourself. This person is called a health care agent (health care proxy, health care surrogate). The form is also called a durable power of  for health care. If you do not have an advance directive, decisions about your medical care may be made by a family member, or by a doctor or a  who doesn't know you. It may help to think of an advance directive as a gift to the people who care for you. If you have one, they won't have to make tough decisions by themselves. Follow-up care is a key part of your treatment and safety. Be sure to make and go to all appointments, and call your doctor if you are having problems. It's also a good idea to know your test results and keep a list of the medicines you take. What should you include in an advance directive? Many states have a unique advance directive form. (It may ask you to address specific issues.) Or you might use a universal form that's approved by many states. If your form doesn't tell you what to address, it may be hard to know what to include in your advance directive. Use the questions below to help you get started. · Who do you want to make decisions about your medical care if you are not able to? · What life-support measures do you want if you have a serious illness that gets worse over time or can't be cured? · What are you most afraid of that might happen?  (Maybe you're afraid of having pain, losing your independence, or being kept alive by machines.)  · Where would you prefer to die? (Your home? A hospital? A nursing home?)  · Do you want to donate your organs when you die? · Do you want certain Adventism practices performed before you die? When should you call for help? Be sure to contact your doctor if you have any questions. Where can you learn more? Go to [email or web address] and sign in to your C[employer]ifi account. Enter R264 in the [employer] box to learn more about \"Advance Directives: Care Instructions. \"     If you do not have an account, please click on the \"Sign Up Now\" link. Current as of: March 17, 2021               Content Version: 13.1  © 1603-2913 [employer]. Care instructions adapted under license by [facility] ([facility]). If you have questions about a medical condition or this instruction, always ask your healthcare professional. [place] [address] any warranty or liability for your use of this information. Learning About Medical Power of   What is a medical power of ? A medical power of , also called a durable power of  for health care, is one type of the legal forms called advance directives. It lets you name the person you want to make treatment decisions for you if you can't speak or decide for yourself. The person you choose is called your health care agent. This person is also called a health care proxy or health care surrogate. A medical power of  may be called something else in your state. How do you choose a health care agent? Choose your health care agent carefully. This person may or may not be a family member. Talk to the person before you make your final decision. Make sure he or she is comfortable with this responsibility. It's a good idea to choose someone who:  · Is at least 25years old.   · Knows you well and understands what makes life meaningful for you. · Understands your Shinto and moral values. · Will do what you want, not what he or she wants. · Will be able to make difficult choices at a stressful time. · Will be able to refuse or stop treatment, if that is what you would want, even if you could die. · Will be firm and confident with health professionals if needed. · Will ask questions to get needed information. · Lives near you or agrees to travel to you if needed. Your family may help you make medical decisions while you can still be part of that process. But it's important to choose one person to be your health care agent in case you aren't able to make decisions for yourself. If you don't fill out the legal form and name a health care agent, the decisions your family can make may be limited. A health care agent may be called something else in your state. Who will make decisions for you if you don't have a health care agent? If you don't have a health care agent or a living will, you may not get the care you want. Decisions may be made by family members who disagree about your medical care. Or decisions may be made by a medical professional who doesn't know you well. In some cases, a  makes the decisions. When you name a health care agent, it is very clear who has the power to make health decisions for you. How do you name a health care agent? You name your health care agent on a legal form. This form is usually called a medical power of . Ask your hospital, state bar association, or office on aging where to find these forms. You must sign the form to make it legal. Some states require you to get the form notarized. This means that a person called a  watches you sign the form and then he or she signs the form. Some states also require that two or more witnesses sign the form. Be sure to tell your family members and doctors who your health care agent is.   Where can you learn more?  Go to [email or web address] and sign in to your [employer] account. Enter [id number] in the [facility] box to learn more about \"Learning About [address]. \"     If you do not have an account, please click on the \"Sign Up Now\" link. Current as of: March 17, 2021               Content Version: 13.1  © 8437-2914 Healthwise, [employer]. Care instructions adapted under license by [facility] ([facility]). If you have questions about a medical condition or this instruction, always ask your healthcare professional. [address] any warranty or liability for your use of this information. Learning About Living Perroy  What is a living will? A living will, also called a declaration, is a legal form. It tells your family and your doctor your wishes when you can't speak for yourself. It's used by the health professionals who will treat you as you near the end of your life or if you get seriously hurt or ill. If you put your wishes in writing, your loved ones and others will know what kind of care you want. They won't need to guess. This can ease your mind and be helpful to others. And you can change or cancel your living will at any time. A living will is not the same as an estate or property will. An estate will explains what you want to happen with your money and property after you die. How do you use it? A living will is used to describe the kinds of treatment or life support you want as you near the end of your life or if you get seriously hurt or ill. Keep these facts in mind about living mart. · Your living will is used only if you can't speak or make decisions for yourself. Most often, one or more doctors must certify that you can't speak or decide for yourself before your living will takes effect. · If you get better and can speak for yourself again, you can accept or refuse any treatment.  It doesn't matter what you said in your living will.  · Some states may limit your right to refuse treatment in certain cases. For example, you may need to clearly state in your living will that you don't want artificial hydration and nutrition, such as being fed through a tube. Is a living will a legal document? A living will is a legal document. Each state has its own laws about living mart. And a living will may be called something else in your state. Here are some things to know about living mart. · You don't need an  to complete a living will. But legal advice can be helpful if your state's laws are unclear. It can also help if your health history is complicated or your family can't agree on what should be in your living will. · You can change your living will at any time. Some people find that their wishes about end-of-life care change as their health changes. If you make big changes to your living will, complete a new form. · If you move to another state, make sure that your living will is legal in the state where you now live. In most cases, doctors will respect your wishes even if you have a form from a different state. · You might use a universal form that has been approved by many states. This kind of form can sometimes be filled out and stored online. Your digital copy will then be available wherever you have a connection to the internet. The doctors and nurses who need to treat you can find it right away. · Your state may offer an online registry. This is another place where you can store your living will online. · It's a good idea to get your living will notarized. This means using a person called a  to watch two people sign, or witness, your living will. What should you know when you create a living will? Here are some questions to ask yourself as you make your living will:  · Do you know enough about life support methods that might be used?  If not, talk to your doctor so you know what might be done if you can't breathe on your own, your heart stops, or you can't swallow. · What things would you still want to be able to do after you receive life-support methods? Would you want to be able to walk? To speak? To eat on your own? To live without the help of machines? · Do you want certain Moravian practices performed if you become very ill? · If you have a choice, where do you want to be cared for? In your home? At a hospital or nursing home? · If you have a choice at the end of your life, where would you prefer to die? At home? In a hospital or nursing home? Somewhere else? · Would you prefer to be buried or cremated? · Do you want your organs to be donated after you die? What should you do with your living will? · Make sure that your family members and your health care agent have copies of your living will (also called a declaration). · Give your doctor a copy of your living will. Ask him or her to keep it as part of your medical record. If you have more than one doctor, make sure that each one has a copy. · Put a copy of your living will where it can be easily found. For example, some people may put a copy on their refrigerator door. If you are using a digital copy, be sure your doctor, family members, and health care agent know how to find and access it. Where can you learn more? Go to [email or web address]://[email or web address] org and sign in to your [employer]ize account. Enter P457 in the [facility] box to learn more about \"Learning About Living Perroy. \"     If you do not have an account, please click on the \"Sign Up Now\" link. Current as of: March 17, 2021               Content Version: 13.1  © 8633-3011 Healthwise, Incorporated. Care instructions adapted under license by [facility] ([facility]). If you have questions about a medical condition or this instruction, always ask your healthcare professional. [address] any warranty or liability for your use of this information. Stopping Smoking: Care Instructions  Your Care Instructions     Cigarette smokers crave the nicotine in cigarettes. Giving it up is much harder than simply changing a habit. Your body has to stop craving the nicotine. It is hard to quit, but you can do it. There are many tools that people use to quit smoking. You may find that combining tools works best for you. There are several steps to quitting. First you get ready to quit. Then you get support to help you. After that, you learn new skills and behaviors to become a nonsmoker. For many people, a necessary step is getting and using medicine. Your doctor will help you set up the plan that best meets your needs. You may want to attend a smoking cessation program to help you quit smoking. When you choose a program, look for one that has proven success. Ask your doctor for ideas. You will greatly increase your chances of success if you take medicine as well as get counseling or join a cessation program.  Some of the changes you feel when you first quit tobacco are uncomfortable. Your body will miss the nicotine at first, and you may feel short-tempered and grumpy. You may have trouble sleeping or concentrating. Medicine can help you deal with these symptoms. You may struggle with changing your smoking habits and rituals. The last step is the tricky one: Be prepared for the smoking urge to continue for a time. This is a lot to deal with, but keep at it. You will feel better. Follow-up care is a key part of your treatment and safety. Be sure to make and go to all appointments, and call your doctor if you are having problems. It's also a good idea to know your test results and keep a list of the medicines you take. How can you care for yourself at home? · Ask your family, friends, and coworkers for support. You have a better chance of quitting if you have help and support.   · Join a support group, such as Nicotine Anonymous, for people who are trying to quit smoking. · Consider signing up for a smoking cessation program, such as the American Lung Association's Freedom from Smoking program.  · Get text messaging support. Go to the website at www.smokefree. gov to sign up for the [facility] program.  · Set a quit date. Pick your date carefully so that it is not right in the middle of a big deadline or stressful time. Once you quit, do not even take a puff. Get rid of all ashtrays and lighters after your last cigarette. Clean your house and your clothes so that they do not smell of smoke. · Learn how to be a nonsmoker. Think about ways you can avoid those things that make you reach for a cigarette. ? Avoid situations that put you at greatest risk for smoking. For some people, it is hard to have a drink with friends without smoking. For others, they might skip a coffee break with coworkers who smoke. ? Change your daily routine. Take a different route to work or eat a meal in a different place. · Cut down on stress. Calm yourself or release tension by doing an activity you enjoy, such as reading a book, taking a hot bath, or gardening. · Talk to your doctor or pharmacist about nicotine replacement therapy, which replaces the nicotine in your body. You still get nicotine but you do not use tobacco. Nicotine replacement products help you slowly reduce the amount of nicotine you need. These products come in several forms, many of them available over-the-counter:  ? Nicotine patches  ? Nicotine gum and lozenges  ? Nicotine inhaler  · Ask your doctor about bupropion (Wellbutrin) or varenicline (Chantix), which are prescription medicines. They do not contain nicotine. They help you by reducing withdrawal symptoms, such as stress and anxiety. · Some people find hypnosis, acupuncture, and massage helpful for ending the smoking habit. · Eat a healthy diet and get regular exercise. Having healthy habits will help your body move past its craving for nicotine.   · Be prepared to keep trying. Most people are not successful the first few times they try to quit. Do not get mad at yourself if you smoke again. Make a list of things you learned and think about when you want to try again, such as next week, next month, or next year. Where can you learn more? Go to https://chpepiceweb.Simple. org and sign in to your [employer] account. Enter P500 in the [employer] box to learn more about \"Stopping Smoking: Care Instructions. \"     If you do not have an account, please click on the \"Sign Up Now\" link. Current as of: February 11, 2021               Content Version: 13.1  © 2006-2021 [employer]. Care instructions adapted under license by [facility] ([facility]). If you have questions about a medical condition or this instruction, always ask your healthcare professional. [place] [address] any warranty or liability for your use of this information. What is lung cancer screening? Lung cancer screening is a way in which doctors check the lungs for early signs of cancer in people who have no symptoms of lung cancer. A low-dose CT scan uses much less radiation than a normal CT scan and shows a more detailed image of the lungs than a standard X-ray. The goal of lung cancer screening is to find cancer early, before it has a chance to grow, spread, or cause problems. One large study found that smokers who were screened with low-dose CT scans were less likely to die of lung cancer than those who were screened with standard X-ray. Below is a summary of the things you need to know regarding screening for lung cancer with low-dose computed tomography (LDCT). This is a screening program that involves routine annual screening with LDCT studies of the lung. The LDCTs are done using low-dose radiation that is not thought to increase your cancer risk.   If you have other serious medical conditions (other cancers, congestive heart failure) that limit your life expectancy to less than 10 years, you should not undergo lung cancer screening with LDCT. The chance is 20%-60% that the LDCT result will show abnormalities. This would require additional testing which could include repeat imaging or even invasive procedures. Most (about 95%) of \"abnormal\" LDCT results are false in the sense that no lung cancer is ultimately found. Additionally, some (about 10%) of the cancers found would not affect your life expectancy, even if undetected and untreated. If you are still smoking, the single most important thing that you can do to reduce your risk of dying of lung cancer is to quit. For this screening to be covered by Medicare and most other insurers, strict criteria must be met. If you do not meet these criteria, but still wish to undergo LDCT testing, you will be required to sign a waiver indicating your willingness to pay for the scan. Personalized Preventive Plan for [place] - 2/1/2022  Medicare offers a range of preventive health benefits. Some of the tests and screenings are paid in full while other may be subject to a deductible, co-insurance, and/or copay. Some of these benefits include a comprehensive review of your medical history including lifestyle, illnesses that may run in your family, and various assessments and screenings as appropriate. After reviewing your medical record and screening and assessments performed today your provider may have ordered immunizations, labs, imaging, and/or referrals for you. A list of these orders (if applicable) as well as your Preventive Care list are included within your After Visit Summary for your review. Other Preventive Recommendations:    · A preventive eye exam performed by an eye specialist is recommended every 1-2 years to screen for glaucoma; cataracts, macular degeneration, and other eye disorders. · A preventive dental visit is recommended every 6 months.   · Try to get at least 150 minutes of exercise per week or 10,000 steps per day on a pedometer . · Order or download the FREE \"Exercise & Physical Activity: Your Everyday Guide\" from [employer]. Call 8-269.686.2247 or search [employer] online. · You need 8426-4582 mg of calcium and 2719-6512 IU of vitamin D per day. It is possible to meet your calcium requirement with diet alone, but a vitamin D supplement is usually necessary to meet this goal.  · When exposed to the sun, use a sunscreen that protects against both UVA and UVB radiation with an SPF of 30 or greater. Reapply every 2 to 3 hours or after sweating, drying off with a towel, or swimming. · Always wear a seat belt when traveling in a car. Always wear a helmet when riding a bicycle or motorcycle.
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
## 2022-02-01 NOTE — PROGRESS NOTES
Medicare Annual Wellness Visit  Name: Frank Overall Date: 2022   MRN: [id number] Sex: Male   Age: 64 y.o. Ethnicity: Non- / Non    : 1960 Race: White (non-)      Gabino Reyna is here for Medicare AWV    Screenings for behavioral, psychosocial and functional/safety risks, and cognitive dysfunction are all negative except as indicated below. These results, as well as other patient data from the [address] form, are documented in Flowsheets linked to this Encounter. No Known Allergies    Prior to Visit Medications    Medication Sig Taking? Authorizing Provider   HYDROcodone-acetaminophen (NORCO) 7.5-325 MG per tablet Take 1 tablet by mouth every 8 hours as needed for Pain (Max 3 per day) for up to 30 days. Intended supply: 30 days  Sherrie Swan DO   VASCEPA 1 g CAPS capsule take 2 capsules by mouth twice a day  Justyn Abernathy MD   docusate sodium (COLACE) 100 MG capsule take 1 capsule by mouth twice a day  JESSICA Bean CNP   RA VITAMIN D-3 50 MCG (2000) CAPS take 1 capsule by mouth twice a day  Sherrie Swan DO   pregabalin (LYRICA) 100 MG capsule Take 1 capsule by mouth 3 times daily for 30 days.   Sherrie Swan DO   polyethylene glycol (GLYCOLAX) 17 GM/SCOOP powder take 17GM (DISSOLVED IN WATER) by mouth once daily  JESSICA Bean CNP   valsartan-hydroCHLOROthiazide (DIOVAN-HCT) 80-12.5 MG per tablet take 1 tablet by mouth once daily  Justyn Abernathy MD   metoprolol succinate (TOPROL XL) 25 MG extended release tablet Take 1 tablet by mouth daily  Justyn Abernathy MD   atorvastatin (LIPITOR) 40 MG tablet Take 1 tablet by mouth daily  Justyn Abernathy MD   aspirin EC 81 MG EC tablet Take 1 tablet by mouth daily  Justyn Abernathy MD   clopidogrel (PLAVIX) 75 MG tablet take 1 tablet by mouth once daily  Justyn Abernathy MD   budesonide-formoterol (SYMBICORT) 80-4.5 MCG/ACT AERO Inhale 2 puffs into the lungs 2 times daily  [address] [facility] Bayron Batres MD   vitamin B-12 (CYANOCOBALAMIN) 1000 MCG tablet Take 1 tablet by mouth daily  Vilma Aguillon MD   allopurinol (ZYLOPRIM) 100 MG tablet Take 1 tablet by mouth daily  Vilma Aguillon MD   senna-docusate (PERICOLACE) 8.6-50 MG per tablet Take 2 tablets by mouth daily as needed for Constipation  Vilma Aguillon MD   Elastic Bandages & Supports ([address]) [address] 1 each by Does not apply route daily Knee high 15-20 mmhg compression stockings both legs wear daily during day and off Qhs. Wear as tolerated. Do not wear if they cause increased pain. Joe Jacobs, APRN - CNP   indomethacin (INDOCIN) 25 MG capsule Take 1 capsule by mouth 3 times daily as needed for Pain (gout pain)  Vilma Aguillon MD   naloxone [facility]) 4 MG/0.1ML LIQD nasal spray 1 spray by Nasal route as needed for Opioid Reversal  Patient not taking: Reported on 2/1/2022  Sherrie Swan, DO   nitroGLYCERIN (NITROSTAT) 0.4 MG SL tablet up to max of 3 total doses. If no relief after 1 dose, call 911.   Lukas Nuno MD   REFRESH TEARS 0.5 % SOLN ophthalmic solution instill 1 drop into both eyes four times a day  Historical Provider, MD       Past Medical History:   Diagnosis Date    Acute idiopathic gout of left wrist 94/37/2202    Alcoholic (HCC)-remote Hx 8/77/7764    In remission goes to AA daily--agrees not to overtake pain meds     [facility]neuropathy ([facility] [address]     Asthma     CAD (coronary artery disease)     [facility]    Chronic back pain greater than 3 months duration     CN III palsy, right eye 2017    Dr Sushila Hernández    COPD (chronic obstructive pulmonary disease) ([facility] [address] 2014    DDD (degenerative disc disease), lumbar 3/23/2015    Elevated liver enzymes 2014    Hyperlipidemia     on med > 10 yrs    Hypertension     on meds x 1 yr    IgA monoclonal gammopathy 2015    Dr Randell Estrada    Insomnia, unspecified     LBP (low back pain)     Dr Brittany Pimentel Neuropathy  Occlusion and stenosis of carotid artery without mention of cerebral infarction     Dr Awilda Simpson history of alcoholism ([facility] [address]     quit 11/05/2010, relapsed 2016    S/P carotid endarterectomy 01/2019    Dr Jose L Nesbitt    Sensorimotor neuropathy     Bilateral lower extremities-EMG 03/23/15 ([facility])    Smoking trying to quit     Traumatic compression fracture of T11 thoracic vertebra ([facility] [address] 6287    Umbilical hernia        Past Surgical History:   Procedure Laterality Date    CAROTID ENDARTERECTOMY Left 1/18/2019    LEFT CAROTID ENDARTERECTOMY performed by Isma Santoyo MD at [address] [facility]  8/19/2014    COLONOSCOPY N/A 12/18/2019    COLONOSCOPY DIAGNOSTIC performed by Mari Tinoco MD at [address] [facility], COLON, DIAGNOSTIC      HERNIA REPAIR      TONSILLECTOMY      UPPER GASTROINTESTINAL ENDOSCOPY  8/19/2014    VENTRAL HERNIA REPAIR  09/01/15    W/MESH AND W/UMBILECTOMY       Family History   Problem Relation Age of Onset    Cancer Mother         brain, dec 79    Alcohol Abuse Father     Other Sister         unknown medical history    Other Brother         unknown medical history    High Blood Pressure Brother     Alcohol Abuse Daughter        CareTeam (Including outside providers/suppliers regularly involved in providing care):   Patient Care Team:  Constance Godoy MD as PCP - General (Family Medicine)  Constance Godoy MD as PCP - [address] [place] [address] Empaneled Provider  Leonel Donohue MD (General Surgery)  Vipin Srivastava MD as Cardiologist (Cardiology)    Wt Readings from Last 3 Encounters:   11/17/21 218 lb (98.9 kg)   08/05/21 218 lb (98.9 kg)   07/01/21 230 lb (104.3 kg)     There were no vitals filed for this visit. There is no height or weight on file to calculate BMI. Based upon direct observation of the patient, evaluation of cognition reveals recent and remote memory intact.       Patient's complete Health Risk Assessment and screening values have been reviewed and are found in Flowsheets. The following problems were reviewed today and where indicated follow up appointments were made and/or referrals ordered. Positive Risk Factor Screenings with Interventions:         Substance History:  Social History     Tobacco History     Smoking Status  Current Every Day Smoker Smoking Start Date  1/1/1968 Smoking Frequency  0.5 packs/day for 40 years (20 pk yrs) Smoking Tobacco Type  Cigarettes    Smokeless Tobacco Use  Never Used          Alcohol History     Alcohol Use Status  No Comment  8/31/2016 Quit Date          Drug Use     Drug Use Status  No          Sexual Activity     Sexually Active  Not Asked               Alcohol Screening:       A score of 8 or more is associated with harmful or hazardous drinking. A score of 13 or more in women, and 15 or more in men, is likely to indicate alcohol dependence. Substance Abuse Interventions:  · Tobacco abuse:  tobacco cessation tips and resources provided       Opioid Risk: (Low risk score <55)  Opioid risk score: 18    Patient is low risk for opioid use disorder or overdose. Click here to Document Controlled Substance Monitoring. Last PDMP Daisy Noonan as Reviewed:  Review User Review Instant Review Result   Lukas Robert 11/4/2021 11:57 AM Reviewed PDMP [1]     Last Controlled Substance Monitoring Documentation      Office Visit from 11/17/2021 in [facility]   Periodic Controlled Substance Monitoring Possible medication side effects, risk of tolerance/dependence & alternative treatments discussed., No signs of potential drug abuse or diversion identified. , Assessed functional status., Obtaining appropriate analgesic effect of treatment. filed at 11/04/2021 1157   Acute Pain Prescriptions Prescription exceeds daily limit for a specific reason. See comments or note., Not required given exclusionary diagnoses. .., Severe pain not adequately treated with lower dose. filed at 11/04/2021 1157   Chronic Pain > 50 MEDD Re-evaluated the status of the patient's underlying condition causing pain. , Considered consultation with a specialist., Obtained or confirmed \"Consent for Opioid Use\" on file. filed at 11/04/2021 [address] [facility] and ACP:  General  In general, how would you say your health is?: Very Good  In the past 7 days, have you experienced any of the following?  New or Increased Pain, New or Increased Fatigue, Loneliness, Social Isolation, Stress or Anger?: None of These  Do you get the social and emotional support that you need?: (!) No  Do you have a Living Will?: (!) No  Advance Directives     Power of [address] [place] [address] Will ACP-Advance Directive ACP-Power of     Not on File Not on File Filed Not on File      General Health Risk Interventions:  · Inadequate social/emotional support: patient declines any further intervention for this issue  · No Living Will: Patient declines ACP discussion/assistance    Health Habits/Nutrition:  Health Habits/Nutrition  Do you exercise for at least 20 minutes 2-3 times per week?: (!) No  Have you lost any weight without trying in the past 3 months?: No  Do you eat only one meal per day?: No  Have you seen the dentist within the past year?: N/A - wear dentures     Health Habits/Nutrition Interventions:  · Inadequate physical activity:  patient is not ready to increase his/her physical activity level at this time  · Nutritional issues:  patient is not ready to address his/her nutritional/weight issues at this time  · Dental exam overdue:  patient encouraged to make appointment with his/her dentist    Hearing/Vision:  No exam data present  Hearing/Vision  Do you or your family notice any trouble with your hearing that hasn't been managed with hearing aids?: No  Do you have difficulty driving, watching TV, or doing any of your daily activities because of your eyesight?: (!) Yes  Have you had an eye exam within the past year?: Yes  Hearing/Vision Interventions:  · Vision concerns:  patient encouraged to make appointment with his/her eye specialist    Safety:  Safety  Do you have working smoke detectors?: Yes  Have all throw rugs been removed or fastened?: (!) No  Do you have non-slip mats or surfaces in all bathtubs/showers?: (!) No  Do all of your stairways have a railing or banister?: Yes  Are your doorways, halls and stairs free of clutter?: Yes  Do you always fasten your seatbelt when you are in a car?: Yes  Safety Interventions:  · Home safety tips provided  · Patient declines any further evaluation/treatment for this issue       Personalized Preventive Plan   Current Health Maintenance Status  Immunization History   Administered Date(s) Administered    COVID-19, Fredo Tinoco, Primary or Immunocompromised, PF, 100mcg/0.5mL 03/31/2021, 04/28/2021, 11/13/2021    Influenza Vaccine, unspecified formulation 10/26/2015, 09/01/2016, 09/15/2016, 08/15/2017    Influenza Virus Vaccine 10/15/2014    Influenza Whole 10/15/2014, 10/01/2015    Influenza, MDCK Quadv, with preserv IM (Flucelvax 2 yrs and older) 07/09/2019    Influenza, Jestine Peeks, IM, PF (6 mo and older Fluzone, Flulaval, Fluarix, and 3 yrs and older Afluria) 08/15/2017, 10/17/2018, 08/25/2019, 09/07/2020, 11/13/2021    Influenza, Triv, 3 Years and older, IM (Afluria (5 yrs and older) 09/15/2016, 10/17/2018    MMR 04/04/2019    Pneumococcal Polysaccharide ([id number]) 08/02/2018    Zoster Recombinant (Shingrix) 04/04/2019, 07/09/2019        Health Maintenance   Topic Date Due    DTaP/Tdap/Td vaccine (1 - Tdap) Never done    Low dose CT lung screening  Never done   [employer] Visit (AWV)  12/17/2021    Depression Screen  02/04/2022    Lipid screen  03/29/2022 (Originally 2/26/2021)    A1C test (Diabetic or Prediabetic)  04/02/2022    Potassium monitoring  04/02/2022    Creatinine monitoring  04/02/2022    Colon cancer screen colonoscopy  12/18/2022    Pneumococcal 0-64 years Vaccine (2 of 2 - PPSV23) 06/14/2025    Flu vaccine  Completed    Shingles Vaccine  Completed    COVID-19 Vaccine  Completed    Hepatitis C screen  Addressed    HIV screen  Addressed    Hepatitis A vaccine  Aged Out    Hepatitis B vaccine  Aged Out    Hib vaccine  Aged Out    Meningococcal (ACWY) vaccine  Aged Out     Recommendations for [employer] Due: see orders and patient instructions/AVS.  . Recommended screening schedule for the next 5-10 years is provided to the patient in written form: see Patient Instructions/AVS.    There are no diagnoses linked to this encounter. LDCT Screening: Discussed with patient the benefits and harms of screening, follow-up diagnostic testing, over-diagnosis, false positive rate, and total radiation exposure. Counseled on the importance of adherence to annual lung cancer LDCT screening, impact of comorbidities, ability and willingness to undergo diagnosis and treatment. Counseled on the importance of maintaining cigarette smoking abstinence and cessation. Patient has a history of heavy tobacco use of over 30 pack years. Patient does not present signs or symptoms of lung cancer.

## 2022-02-04 LAB
ALBUMIN SERPL-MCNC: 4 G/DL (ref 3.75–5.01)
ALPHA-1-GLOBULIN: 0.36 G/DL (ref 0.19–0.46)
ALPHA-2-GLOBULIN: 0.81 G/DL (ref 0.48–1.05)
BETA GLOBULIN: 1.13 G/DL (ref 0.48–1.1)
GAMMA GLOBULIN: 1.6 G/DL (ref 0.62–1.51)
IMMUNOFIXATION REFLEX: ABNORMAL
SPE/IFE INTERPRETATION: ABNORMAL
TOTAL PROTEIN: 7.9 G/DL (ref 6.3–8.2)

## 2022-02-08 ENCOUNTER — PROCEDURE VISIT (OUTPATIENT)
Dept: PHYSICAL MEDICINE AND REHAB | Age: 62
End: 2022-02-08
Payer: COMMERCIAL

## 2022-02-08 DIAGNOSIS — M79.10 MYALGIA: ICD-10-CM

## 2022-02-08 PROCEDURE — 96372 THER/PROPH/DIAG INJ SC/IM: CPT | Performed by: PHYSICAL MEDICINE & REHABILITATION

## 2022-02-08 PROCEDURE — 20553 NJX 1/MLT TRIGGER POINTS 3/>: CPT | Performed by: PHYSICAL MEDICINE & REHABILITATION

## 2022-02-08 RX ORDER — CYANOCOBALAMIN 1000 UG/ML
1000 INJECTION INTRAMUSCULAR; SUBCUTANEOUS ONCE
Status: COMPLETED | OUTPATIENT
Start: 2022-02-08 | End: 2022-02-08

## 2022-02-08 RX ORDER — LIDOCAINE HYDROCHLORIDE 10 MG/ML
15 INJECTION, SOLUTION INFILTRATION; PERINEURAL ONCE
Status: COMPLETED | OUTPATIENT
Start: 2022-02-08 | End: 2022-02-08

## 2022-02-08 RX ADMIN — CYANOCOBALAMIN 1000 MCG: 1000 INJECTION INTRAMUSCULAR; SUBCUTANEOUS at 11:53

## 2022-02-08 RX ADMIN — LIDOCAINE HYDROCHLORIDE 15 ML: 10 INJECTION, SOLUTION INFILTRATION; PERINEURAL at 11:53

## 2022-02-12 DIAGNOSIS — E83.52 SERUM CALCIUM ELEVATED: ICD-10-CM

## 2022-02-12 DIAGNOSIS — D47.2 IGG MONOCLONAL GAMMOPATHY OF UNCERTAIN SIGNIFICANCE: Primary | ICD-10-CM

## 2022-02-21 DIAGNOSIS — Z79.899 HIGH RISK MEDICATION USE: ICD-10-CM

## 2022-02-21 DIAGNOSIS — M53.3 SI (SACROILIAC) PAIN: ICD-10-CM

## 2022-02-21 DIAGNOSIS — M54.17 LUMBOSACRAL RADICULOPATHY AT S1: ICD-10-CM

## 2022-02-21 DIAGNOSIS — D47.2 IGG MONOCLONAL GAMMOPATHY OF UNCERTAIN SIGNIFICANCE: ICD-10-CM

## 2022-02-21 DIAGNOSIS — M10.032 IDIOPATHIC GOUT OF LEFT WRIST, UNSPECIFIED CHRONICITY: ICD-10-CM

## 2022-02-21 DIAGNOSIS — M51.36 DDD (DEGENERATIVE DISC DISEASE), LUMBAR: Chronic | ICD-10-CM

## 2022-02-21 RX ORDER — ALLOPURINOL 100 MG/1
200 TABLET ORAL DAILY
Qty: 180 TABLET | Refills: 4 | Status: SHIPPED | OUTPATIENT
Start: 2022-02-21

## 2022-02-21 RX ORDER — HYDROCODONE BITARTRATE AND ACETAMINOPHEN 7.5; 325 MG/1; MG/1
1 TABLET ORAL EVERY 8 HOURS PRN
Qty: 80 TABLET | Refills: 0 | Status: SHIPPED | OUTPATIENT
Start: 2022-02-26 | End: 2022-03-17 | Stop reason: SDUPTHER

## 2022-03-14 ENCOUNTER — TELEPHONE (OUTPATIENT)
Dept: CARDIOLOGY CLINIC | Age: 62
End: 2022-03-14

## 2022-03-14 ENCOUNTER — TELEPHONE (OUTPATIENT)
Dept: INTERVENTIONAL RADIOLOGY/VASCULAR | Age: 62
End: 2022-03-14

## 2022-03-14 NOTE — TELEPHONE ENCOUNTER
PATIENT CALLED - STATING DR. Stephanie Parks IS RETIRING AT THE END OF THE MONTH. HE WANTS TO KNOW IF YOU HAVE A NAME FOR ANOTHER PHYSICIAN HE CAN SEE REGARDING CAROTIDS.

## 2022-03-14 NOTE — TELEPHONE ENCOUNTER
Patient calling. Dr Emiliano Guzman will be retiring at the end of the month and he follows with him for his carotid stenosis. Can you recommend another Dr he can see to follow with this?

## 2022-03-16 ENCOUNTER — OFFICE VISIT (OUTPATIENT)
Dept: PHYSICAL MEDICINE AND REHAB | Age: 62
End: 2022-03-16
Payer: COMMERCIAL

## 2022-03-16 VITALS
SYSTOLIC BLOOD PRESSURE: 130 MMHG | BODY MASS INDEX: 32.37 KG/M2 | WEIGHT: 239 LBS | DIASTOLIC BLOOD PRESSURE: 82 MMHG | HEIGHT: 72 IN

## 2022-03-16 DIAGNOSIS — M54.17 LUMBOSACRAL RADICULOPATHY AT S1: ICD-10-CM

## 2022-03-16 DIAGNOSIS — M53.3 SI (SACROILIAC) PAIN: ICD-10-CM

## 2022-03-16 DIAGNOSIS — Z79.899 HIGH RISK MEDICATION USE: ICD-10-CM

## 2022-03-16 DIAGNOSIS — M54.42 CHRONIC MIDLINE LOW BACK PAIN WITH BILATERAL SCIATICA: ICD-10-CM

## 2022-03-16 DIAGNOSIS — M54.41 CHRONIC MIDLINE LOW BACK PAIN WITH BILATERAL SCIATICA: ICD-10-CM

## 2022-03-16 DIAGNOSIS — M51.36 DDD (DEGENERATIVE DISC DISEASE), LUMBAR: Chronic | ICD-10-CM

## 2022-03-16 DIAGNOSIS — G89.29 CHRONIC MIDLINE LOW BACK PAIN WITH BILATERAL SCIATICA: ICD-10-CM

## 2022-03-16 DIAGNOSIS — G62.1 ALCOHOLIC POLYNEUROPATHY (HCC): Primary | Chronic | ICD-10-CM

## 2022-03-16 PROCEDURE — G8417 CALC BMI ABV UP PARAM F/U: HCPCS | Performed by: PHYSICAL MEDICINE & REHABILITATION

## 2022-03-16 PROCEDURE — 99214 OFFICE O/P EST MOD 30 MIN: CPT | Performed by: PHYSICAL MEDICINE & REHABILITATION

## 2022-03-16 PROCEDURE — 4004F PT TOBACCO SCREEN RCVD TLK: CPT | Performed by: PHYSICAL MEDICINE & REHABILITATION

## 2022-03-16 PROCEDURE — G8482 FLU IMMUNIZE ORDER/ADMIN: HCPCS | Performed by: PHYSICAL MEDICINE & REHABILITATION

## 2022-03-16 PROCEDURE — G8427 DOCREV CUR MEDS BY ELIG CLIN: HCPCS | Performed by: PHYSICAL MEDICINE & REHABILITATION

## 2022-03-16 PROCEDURE — 3017F COLORECTAL CA SCREEN DOC REV: CPT | Performed by: PHYSICAL MEDICINE & REHABILITATION

## 2022-03-16 ASSESSMENT — ENCOUNTER SYMPTOMS
COUGH: 0
BACK PAIN: 1
WHEEZING: 0
PHOTOPHOBIA: 0
NAUSEA: 0
ABDOMINAL DISTENTION: 0
TROUBLE SWALLOWING: 0
CHEST TIGHTNESS: 0
EYE REDNESS: 0
CHOKING: 0
BLOOD IN STOOL: 0
ABDOMINAL PAIN: 0
SHORTNESS OF BREATH: 0
BOWEL INCONTINENCE: 0
VISUAL CHANGE: 0
ANAL BLEEDING: 0
CONSTIPATION: 1
EYE PAIN: 0
VOMITING: 0
FACIAL SWELLING: 0
COLOR CHANGE: 0

## 2022-03-16 NOTE — PROGRESS NOTES
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 months,   Education was given on:   Dietary and Fitness--daily stretches and low carb diet-in chair Yoga when possible             Follow up with Primary Care Physician regarding their general medical needs. Stressed the importance of following up with PCP and specialists for his/her chronic diseases, health, CV, and cancer screening and continued care. Will follow disease activity/progression and adjust therapeutic regimen to disease activity and severity. Discussed medication dosage, usage, goals of therapy, and side effects. Available test results were reviewed -Discussed findings, impression and plan with patient. An additional  6 minutes were spent outside of the patient visit to review records. Additional time spent with the patient to discuss their questions. Additional time spent with the patient devoted to discussing treatment strategy, planning, and implementation. Patient understands above plan; questions asked and answered. Patient agrees to plan as noted above. At least 50% of the visit was involved in the discussion of the options for treatment.  We discussed exercises, medication, interventional therapies and surgery. Healthy life style is essential with patient hard work to achieve the wellness. In addition; discussion with the patient and/or family about patient's functional status any of the diagnostic results, impressions and/or recommended diagnostic studies, prognosis, risks and benefits of treatment options, instructions for treatment and/or follow-up, importance of compliance with chosen treatment options, risk-factor reduction, and patient/family education. They are to follow up in 2 1/2 -3 months to review medication, efficacy of injections, pill counts, OARRS check, high risk med review re intensive monitoring for toxicity and addiction, SOAPPR assessment, review diagnostics, to review previous and future treatment plans and assess appropriateness for continued therapy. New Diagnostics  No orders of the defined types were placed in this encounter.       Jermain Andrade, DO

## 2022-03-17 ENCOUNTER — OFFICE VISIT (OUTPATIENT)
Dept: CARDIOLOGY CLINIC | Age: 62
End: 2022-03-17
Payer: COMMERCIAL

## 2022-03-17 VITALS
DIASTOLIC BLOOD PRESSURE: 64 MMHG | BODY MASS INDEX: 32.72 KG/M2 | RESPIRATION RATE: 18 BRPM | WEIGHT: 241.6 LBS | HEART RATE: 80 BPM | HEIGHT: 72 IN | OXYGEN SATURATION: 94 % | SYSTOLIC BLOOD PRESSURE: 132 MMHG

## 2022-03-17 DIAGNOSIS — I73.9 CLAUDICATION IN PERIPHERAL VASCULAR DISEASE (HCC): ICD-10-CM

## 2022-03-17 DIAGNOSIS — D47.2 IGG MONOCLONAL GAMMOPATHY OF UNCERTAIN SIGNIFICANCE: ICD-10-CM

## 2022-03-17 DIAGNOSIS — I63.9 CEREBROVASCULAR ACCIDENT (CVA), UNSPECIFIED MECHANISM (HCC): Chronic | ICD-10-CM

## 2022-03-17 DIAGNOSIS — R68.89 ABNORMAL ANKLE BRACHIAL INDEX (ABI): ICD-10-CM

## 2022-03-17 DIAGNOSIS — I65.22 CAROTID STENOSIS, SYMPTOMATIC W/O INFARCT, LEFT: ICD-10-CM

## 2022-03-17 DIAGNOSIS — M54.17 LUMBOSACRAL RADICULOPATHY AT S1: ICD-10-CM

## 2022-03-17 DIAGNOSIS — I10 ESSENTIAL HYPERTENSION: Chronic | ICD-10-CM

## 2022-03-17 DIAGNOSIS — R06.09 DOE (DYSPNEA ON EXERTION): ICD-10-CM

## 2022-03-17 DIAGNOSIS — M53.3 SI (SACROILIAC) PAIN: ICD-10-CM

## 2022-03-17 DIAGNOSIS — I73.9 CLAUDICATION (HCC): ICD-10-CM

## 2022-03-17 DIAGNOSIS — E55.9 VITAMIN D DEFICIENCY: ICD-10-CM

## 2022-03-17 DIAGNOSIS — I73.9 PVD (PERIPHERAL VASCULAR DISEASE) (HCC): ICD-10-CM

## 2022-03-17 DIAGNOSIS — I10 ESSENTIAL HYPERTENSION: Primary | ICD-10-CM

## 2022-03-17 DIAGNOSIS — I73.9 PAD (PERIPHERAL ARTERY DISEASE) (HCC): ICD-10-CM

## 2022-03-17 DIAGNOSIS — Z79.899 HIGH RISK MEDICATION USE: ICD-10-CM

## 2022-03-17 DIAGNOSIS — M51.36 DDD (DEGENERATIVE DISC DISEASE), LUMBAR: Chronic | ICD-10-CM

## 2022-03-17 PROCEDURE — G8482 FLU IMMUNIZE ORDER/ADMIN: HCPCS | Performed by: INTERNAL MEDICINE

## 2022-03-17 PROCEDURE — 3017F COLORECTAL CA SCREEN DOC REV: CPT | Performed by: INTERNAL MEDICINE

## 2022-03-17 PROCEDURE — G8427 DOCREV CUR MEDS BY ELIG CLIN: HCPCS | Performed by: INTERNAL MEDICINE

## 2022-03-17 PROCEDURE — G8417 CALC BMI ABV UP PARAM F/U: HCPCS | Performed by: INTERNAL MEDICINE

## 2022-03-17 PROCEDURE — 4004F PT TOBACCO SCREEN RCVD TLK: CPT | Performed by: INTERNAL MEDICINE

## 2022-03-17 PROCEDURE — 93000 ELECTROCARDIOGRAM COMPLETE: CPT | Performed by: INTERNAL MEDICINE

## 2022-03-17 PROCEDURE — 99214 OFFICE O/P EST MOD 30 MIN: CPT | Performed by: INTERNAL MEDICINE

## 2022-03-17 RX ORDER — ATORVASTATIN CALCIUM 40 MG/1
40 TABLET, FILM COATED ORAL DAILY
Qty: 90 TABLET | Refills: 2 | Status: SHIPPED | OUTPATIENT
Start: 2022-03-17 | End: 2022-09-23 | Stop reason: SDUPTHER

## 2022-03-17 RX ORDER — ASPIRIN 81 MG/1
81 TABLET ORAL DAILY
Qty: 90 TABLET | Refills: 2 | Status: SHIPPED | OUTPATIENT
Start: 2022-03-17

## 2022-03-17 RX ORDER — VALSARTAN AND HYDROCHLOROTHIAZIDE 80; 12.5 MG/1; MG/1
TABLET, FILM COATED ORAL
Qty: 90 TABLET | Refills: 2 | Status: SHIPPED | OUTPATIENT
Start: 2022-03-17 | End: 2022-09-23 | Stop reason: SDUPTHER

## 2022-03-17 RX ORDER — METOPROLOL SUCCINATE 25 MG/1
25 TABLET, EXTENDED RELEASE ORAL DAILY
Qty: 90 TABLET | Refills: 2 | Status: SHIPPED | OUTPATIENT
Start: 2022-03-17 | End: 2022-03-22

## 2022-03-17 RX ORDER — CLOPIDOGREL BISULFATE 75 MG/1
TABLET ORAL
Qty: 90 TABLET | Refills: 3 | Status: SHIPPED | OUTPATIENT
Start: 2022-03-17 | End: 2022-09-23 | Stop reason: SDUPTHER

## 2022-03-17 ASSESSMENT — ENCOUNTER SYMPTOMS
RESPIRATORY NEGATIVE: 1
BACK PAIN: 1
CHEST TIGHTNESS: 0
NAUSEA: 0
BLOOD IN STOOL: 0
WHEEZING: 0
STRIDOR: 0
EYES NEGATIVE: 1
SHORTNESS OF BREATH: 0
GASTROINTESTINAL NEGATIVE: 1
COUGH: 0
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
## 2022-03-18 RX ORDER — PREGABALIN 100 MG/1
100 CAPSULE ORAL 3 TIMES DAILY
Qty: 90 CAPSULE | Refills: 3 | Status: SHIPPED | OUTPATIENT
Start: 2022-03-18 | End: 2022-08-11 | Stop reason: SDUPTHER

## 2022-03-18 RX ORDER — HYDROCODONE BITARTRATE AND ACETAMINOPHEN 7.5; 325 MG/1; MG/1
1 TABLET ORAL EVERY 8 HOURS PRN
Qty: 80 TABLET | Refills: 0 | Status: SHIPPED | OUTPATIENT
Start: 2022-03-27 | End: 2022-04-18 | Stop reason: SDUPTHER

## 2022-03-18 RX ORDER — GLUCOSAMINE/CHONDR SU A SOD 750-600 MG
TABLET ORAL
Qty: 30 CAPSULE | Refills: 5 | Status: SHIPPED | OUTPATIENT
Start: 2022-03-18 | End: 2022-06-15

## 2022-03-21 DIAGNOSIS — I10 ESSENTIAL HYPERTENSION: ICD-10-CM

## 2022-03-21 NOTE — TELEPHONE ENCOUNTER
Please approve or deny this refill request. The order is pended. Thank you.     LOV 3/17/2022    Next Visit Date:  Future Appointments   Date Time Provider Department Center   3/28/2022  1:30 PM [employer], DO [address]   4/11/2022 11:30 AM [employer], DO [address]   6/13/2022  1:00 PM [employer], DO [address]   6/22/2022  2:30 PM [employer], DO [facility] [place]   8/8/2022  8:00 AM Tanvir Joseph MD [facility]   9/15/2022 12:45 PM Amita Rodriguez MD [facility]

## 2022-03-22 RX ORDER — METOPROLOL SUCCINATE 25 MG/1
TABLET, EXTENDED RELEASE ORAL
Qty: 90 TABLET | Refills: 2 | Status: SHIPPED | OUTPATIENT
Start: 2022-03-22 | End: 2022-09-23 | Stop reason: SDUPTHER

## 2022-04-05 DIAGNOSIS — K59.00 CONSTIPATION, UNSPECIFIED CONSTIPATION TYPE: ICD-10-CM

## 2022-04-05 RX ORDER — DOCUSATE SODIUM 100 MG/1
CAPSULE, LIQUID FILLED ORAL
Qty: 30 CAPSULE | Refills: 3 | Status: SHIPPED | OUTPATIENT
Start: 2022-04-05 | End: 2022-05-31

## 2022-04-11 ENCOUNTER — PROCEDURE VISIT (OUTPATIENT)
Dept: PHYSICAL MEDICINE AND REHAB | Age: 62
End: 2022-04-11
Payer: COMMERCIAL

## 2022-04-11 DIAGNOSIS — M79.10 MYALGIA: Primary | ICD-10-CM

## 2022-04-11 PROCEDURE — 96372 THER/PROPH/DIAG INJ SC/IM: CPT | Performed by: PHYSICAL MEDICINE & REHABILITATION

## 2022-04-11 PROCEDURE — 20552 NJX 1/MLT TRIGGER POINT 1/2: CPT | Performed by: PHYSICAL MEDICINE & REHABILITATION

## 2022-04-11 RX ORDER — CYANOCOBALAMIN 1000 UG/ML
1000 INJECTION INTRAMUSCULAR; SUBCUTANEOUS ONCE
Status: COMPLETED | OUTPATIENT
Start: 2022-04-11 | End: 2022-04-11

## 2022-04-11 RX ORDER — LIDOCAINE HYDROCHLORIDE 10 MG/ML
15 INJECTION, SOLUTION INFILTRATION; PERINEURAL ONCE
Status: COMPLETED | OUTPATIENT
Start: 2022-04-11 | End: 2022-04-11

## 2022-04-11 RX ADMIN — CYANOCOBALAMIN 1000 MCG: 1000 INJECTION INTRAMUSCULAR; SUBCUTANEOUS at 16:20

## 2022-04-11 RX ADMIN — LIDOCAINE HYDROCHLORIDE 15 ML: 10 INJECTION, SOLUTION INFILTRATION; PERINEURAL at 16:21

## 2022-04-18 DIAGNOSIS — M54.17 LUMBOSACRAL RADICULOPATHY AT S1: ICD-10-CM

## 2022-04-18 DIAGNOSIS — Z79.899 HIGH RISK MEDICATION USE: ICD-10-CM

## 2022-04-18 DIAGNOSIS — M53.3 SI (SACROILIAC) PAIN: ICD-10-CM

## 2022-04-18 DIAGNOSIS — M51.36 DDD (DEGENERATIVE DISC DISEASE), LUMBAR: Chronic | ICD-10-CM

## 2022-04-18 DIAGNOSIS — D47.2 IGG MONOCLONAL GAMMOPATHY OF UNCERTAIN SIGNIFICANCE: ICD-10-CM

## 2022-04-19 RX ORDER — HYDROCODONE BITARTRATE AND ACETAMINOPHEN 7.5; 325 MG/1; MG/1
1 TABLET ORAL EVERY 8 HOURS PRN
Qty: 80 TABLET | Refills: 0 | Status: SHIPPED | OUTPATIENT
Start: 2022-04-25 | End: 2022-06-15 | Stop reason: SDUPTHER

## 2022-04-28 ENCOUNTER — TELEPHONE (OUTPATIENT)
Dept: FAMILY MEDICINE CLINIC | Age: 62
End: 2022-04-28

## 2022-04-28 NOTE — TELEPHONE ENCOUNTER
----- Message from Kaykay Pedersen sent at 4/27/2022  4:13 PM EDT -----  Subject: Message to Provider    QUESTIONS  Information for Provider? ECC received call from [address] who is   requesting call back to find out if fax has bee received from Anjelica Obando [facility] office. Please return call to PT  ---------------------------------------------------------------------------  --------------  CALL BACK INFO  What is the best way for the office to contact you? OK to leave message on   voicemail  Preferred Call Back Phone Number? [phone]  ---------------------------------------------------------------------------  --------------  SCRIPT ANSWERS  Relationship to Patient?  Self

## 2022-04-29 NOTE — TELEPHONE ENCOUNTER
Patient would like you to mail him a copy and asks for you to fax it to Dr. Camille Peabody. He did not provide a fax number for him.

## 2022-04-29 NOTE — TELEPHONE ENCOUNTER
I will do that when I get back in the office. I have the information with me at another location. I'm about to scan it in/ When I do can someone try to fax and mail it? If not I will work on it Sunday while I do walk in or on Monday when I'm back into the office.

## 2022-05-02 NOTE — TELEPHONE ENCOUNTER
I'm sorry. I'm going to have to copy it then send it to the new scanning thing since it's external medical record. I will work on that and mail it to him and fax it to Dr. Lilia Pace I think he is [phone] and I have the fax number.

## 2022-05-31 DIAGNOSIS — K59.00 CONSTIPATION, UNSPECIFIED CONSTIPATION TYPE: ICD-10-CM

## 2022-05-31 RX ORDER — DOCUSATE SODIUM 100 MG/1
CAPSULE, LIQUID FILLED ORAL
Qty: 30 CAPSULE | Refills: 3 | Status: SHIPPED | OUTPATIENT
Start: 2022-05-31 | End: 2022-07-26

## 2022-06-12 DIAGNOSIS — E55.9 VITAMIN D DEFICIENCY: ICD-10-CM

## 2022-06-13 ENCOUNTER — PROCEDURE VISIT (OUTPATIENT)
Dept: PHYSICAL MEDICINE AND REHAB | Age: 62
End: 2022-06-13
Payer: COMMERCIAL

## 2022-06-13 DIAGNOSIS — M79.10 MYALGIA: Primary | ICD-10-CM

## 2022-06-13 PROCEDURE — 20552 NJX 1/MLT TRIGGER POINT 1/2: CPT | Performed by: PHYSICAL MEDICINE & REHABILITATION

## 2022-06-13 RX ORDER — CYANOCOBALAMIN 1000 UG/ML
1000 INJECTION INTRAMUSCULAR; SUBCUTANEOUS ONCE
Status: COMPLETED | OUTPATIENT
Start: 2022-06-13 | End: 2022-06-13

## 2022-06-13 RX ORDER — LIDOCAINE HYDROCHLORIDE 10 MG/ML
7 INJECTION, SOLUTION INFILTRATION; PERINEURAL ONCE
Status: COMPLETED | OUTPATIENT
Start: 2022-06-13 | End: 2022-06-13

## 2022-06-13 RX ADMIN — LIDOCAINE HYDROCHLORIDE 7 ML: 10 INJECTION, SOLUTION INFILTRATION; PERINEURAL at 15:50

## 2022-06-13 RX ADMIN — CYANOCOBALAMIN 1000 MCG: 1000 INJECTION INTRAMUSCULAR; SUBCUTANEOUS at 15:50

## 2022-06-15 DIAGNOSIS — M51.36 DDD (DEGENERATIVE DISC DISEASE), LUMBAR: Chronic | ICD-10-CM

## 2022-06-15 DIAGNOSIS — M53.3 SI (SACROILIAC) PAIN: ICD-10-CM

## 2022-06-15 DIAGNOSIS — D47.2 IGG MONOCLONAL GAMMOPATHY OF UNCERTAIN SIGNIFICANCE: ICD-10-CM

## 2022-06-15 DIAGNOSIS — Z79.899 HIGH RISK MEDICATION USE: ICD-10-CM

## 2022-06-15 DIAGNOSIS — M54.17 LUMBOSACRAL RADICULOPATHY AT S1: ICD-10-CM

## 2022-06-15 RX ORDER — GLUCOSAMINE/CHONDR SU A SOD 750-600 MG
TABLET ORAL
Qty: 30 CAPSULE | Refills: 5 | Status: SHIPPED | OUTPATIENT
Start: 2022-06-15

## 2022-06-16 RX ORDER — HYDROCODONE BITARTRATE AND ACETAMINOPHEN 7.5; 325 MG/1; MG/1
1 TABLET ORAL EVERY 8 HOURS PRN
Qty: 80 TABLET | Refills: 0 | Status: SHIPPED | OUTPATIENT
Start: 2022-06-22 | End: 2022-07-19 | Stop reason: SDUPTHER

## 2022-06-22 ENCOUNTER — OFFICE VISIT (OUTPATIENT)
Dept: PHYSICAL MEDICINE AND REHAB | Age: 62
End: 2022-06-22
Payer: COMMERCIAL

## 2022-06-22 VITALS
BODY MASS INDEX: 32.64 KG/M2 | DIASTOLIC BLOOD PRESSURE: 50 MMHG | WEIGHT: 241 LBS | HEIGHT: 72 IN | SYSTOLIC BLOOD PRESSURE: 120 MMHG

## 2022-06-22 DIAGNOSIS — R41.89 COGNITIVE DEFICIT DUE TO OLD HEAD INJURY: Chronic | ICD-10-CM

## 2022-06-22 DIAGNOSIS — M54.17 LUMBOSACRAL RADICULOPATHY AT S1: Primary | ICD-10-CM

## 2022-06-22 DIAGNOSIS — M53.3 SI (SACROILIAC) PAIN: ICD-10-CM

## 2022-06-22 DIAGNOSIS — G62.1 ALCOHOLIC POLYNEUROPATHY (HCC): Chronic | ICD-10-CM

## 2022-06-22 DIAGNOSIS — S09.90XS COGNITIVE DEFICIT DUE TO OLD HEAD INJURY: Chronic | ICD-10-CM

## 2022-06-22 DIAGNOSIS — Z79.899 HIGH RISK MEDICATION USE: ICD-10-CM

## 2022-06-22 DIAGNOSIS — D47.2 IGG MONOCLONAL GAMMOPATHY OF UNCERTAIN SIGNIFICANCE: ICD-10-CM

## 2022-06-22 DIAGNOSIS — I63.011 CEREBROVASCULAR ACCIDENT (CVA) DUE TO THROMBOSIS OF RIGHT VERTEBRAL ARTERY (HCC): Chronic | ICD-10-CM

## 2022-06-22 DIAGNOSIS — H49.01 CN III PALSY, RIGHT EYE: ICD-10-CM

## 2022-06-22 DIAGNOSIS — M10.032 ACUTE IDIOPATHIC GOUT OF LEFT WRIST: Chronic | ICD-10-CM

## 2022-06-22 DIAGNOSIS — M79.10 MYALGIA: ICD-10-CM

## 2022-06-22 DIAGNOSIS — M51.36 DDD (DEGENERATIVE DISC DISEASE), LUMBAR: Chronic | ICD-10-CM

## 2022-06-22 PROCEDURE — G8417 CALC BMI ABV UP PARAM F/U: HCPCS | Performed by: PHYSICAL MEDICINE & REHABILITATION

## 2022-06-22 PROCEDURE — 99214 OFFICE O/P EST MOD 30 MIN: CPT | Performed by: PHYSICAL MEDICINE & REHABILITATION

## 2022-06-22 PROCEDURE — G8427 DOCREV CUR MEDS BY ELIG CLIN: HCPCS | Performed by: PHYSICAL MEDICINE & REHABILITATION

## 2022-06-22 PROCEDURE — 4004F PT TOBACCO SCREEN RCVD TLK: CPT | Performed by: PHYSICAL MEDICINE & REHABILITATION

## 2022-06-22 PROCEDURE — 3017F COLORECTAL CA SCREEN DOC REV: CPT | Performed by: PHYSICAL MEDICINE & REHABILITATION

## 2022-06-22 ASSESSMENT — ENCOUNTER SYMPTOMS
VISUAL CHANGE: 0
BLOOD IN STOOL: 0
COLOR CHANGE: 0
CHOKING: 0
COUGH: 0
WHEEZING: 0
TROUBLE SWALLOWING: 0
BOWEL INCONTINENCE: 0
ABDOMINAL DISTENTION: 0
VOMITING: 0
NAUSEA: 0
SHORTNESS OF BREATH: 0
FACIAL SWELLING: 0
PHOTOPHOBIA: 0
EYE REDNESS: 0
ABDOMINAL PAIN: 0
EYE PAIN: 0
BACK PAIN: 1
CONSTIPATION: 1
ANAL BLEEDING: 0
CHEST TIGHTNESS: 0

## 2022-06-22 NOTE — PROGRESS NOTES
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 options for treatment. We discussed exercises, medication, interventional therapies and surgery. Healthy life style is essential with patient hard work to achieve the wellness. In addition; discussion with the patient and/or family about patient's functional status any of the diagnostic results, impressions and/or recommended diagnostic studies, prognosis, risks and benefits of treatment options, instructions for treatment and/or follow-up, importance of compliance with chosen treatment options, risk-factor reduction, and patient/family education. They are to follow up in 2 1/2 -3 months to review medication, efficacy of injections, pill counts, OARRS check, high risk med review re intensive monitoring for toxicity and addiction, SOAPPR assessment, review diagnostics, to review previous and future treatment plans and assess appropriateness for continued therapy.         New Diagnostics  Orders Placed This Encounter   Procedures    AR INJECT TRIGGER POINTS, 3 OR GREATER    AR INJECT TRIGGER POINTS, 3 OR GREATER       Sherrie Swan,

## 2022-07-19 DIAGNOSIS — M54.17 LUMBOSACRAL RADICULOPATHY AT S1: ICD-10-CM

## 2022-07-19 DIAGNOSIS — M53.3 SI (SACROILIAC) PAIN: ICD-10-CM

## 2022-07-19 DIAGNOSIS — M51.36 DDD (DEGENERATIVE DISC DISEASE), LUMBAR: Chronic | ICD-10-CM

## 2022-07-19 DIAGNOSIS — D47.2 IGG MONOCLONAL GAMMOPATHY OF UNCERTAIN SIGNIFICANCE: ICD-10-CM

## 2022-07-19 DIAGNOSIS — Z79.899 HIGH RISK MEDICATION USE: ICD-10-CM

## 2022-07-19 RX ORDER — HYDROCODONE BITARTRATE AND ACETAMINOPHEN 7.5; 325 MG/1; MG/1
1 TABLET ORAL EVERY 8 HOURS PRN
Qty: 80 TABLET | Refills: 0 | Status: SHIPPED | OUTPATIENT
Start: 2022-07-22 | End: 2022-08-18 | Stop reason: SDUPTHER

## 2022-07-26 DIAGNOSIS — K59.00 CONSTIPATION, UNSPECIFIED CONSTIPATION TYPE: ICD-10-CM

## 2022-07-26 RX ORDER — DOCUSATE SODIUM 100 MG/1
CAPSULE, LIQUID FILLED ORAL
Qty: 30 CAPSULE | Refills: 3 | Status: SHIPPED | OUTPATIENT
Start: 2022-07-26 | End: 2022-09-28

## 2022-08-09 ENCOUNTER — TELEPHONE (OUTPATIENT)
Dept: FAMILY MEDICINE CLINIC | Age: 62
End: 2022-08-09

## 2022-08-11 DIAGNOSIS — M51.36 DDD (DEGENERATIVE DISC DISEASE), LUMBAR: Chronic | ICD-10-CM

## 2022-08-11 DIAGNOSIS — M54.17 LUMBOSACRAL RADICULOPATHY AT S1: ICD-10-CM

## 2022-08-11 DIAGNOSIS — M53.3 SI (SACROILIAC) PAIN: ICD-10-CM

## 2022-08-11 DIAGNOSIS — Z79.899 HIGH RISK MEDICATION USE: ICD-10-CM

## 2022-08-11 RX ORDER — PREGABALIN 100 MG/1
100 CAPSULE ORAL 3 TIMES DAILY
Qty: 90 CAPSULE | Refills: 3 | Status: SHIPPED | OUTPATIENT
Start: 2022-08-11

## 2022-08-18 DIAGNOSIS — M51.36 DDD (DEGENERATIVE DISC DISEASE), LUMBAR: Chronic | ICD-10-CM

## 2022-08-18 DIAGNOSIS — Z79.899 HIGH RISK MEDICATION USE: ICD-10-CM

## 2022-08-18 DIAGNOSIS — D47.2 IGG MONOCLONAL GAMMOPATHY OF UNCERTAIN SIGNIFICANCE: ICD-10-CM

## 2022-08-18 DIAGNOSIS — M54.17 LUMBOSACRAL RADICULOPATHY AT S1: ICD-10-CM

## 2022-08-18 DIAGNOSIS — M53.3 SI (SACROILIAC) PAIN: ICD-10-CM

## 2022-08-18 RX ORDER — HYDROCODONE BITARTRATE AND ACETAMINOPHEN 7.5; 325 MG/1; MG/1
1 TABLET ORAL EVERY 8 HOURS PRN
Qty: 80 TABLET | Refills: 0 | Status: SHIPPED | OUTPATIENT
Start: 2022-08-21 | End: 2022-09-21 | Stop reason: SDUPTHER

## 2022-08-22 ENCOUNTER — PROCEDURE VISIT (OUTPATIENT)
Dept: PHYSICAL MEDICINE AND REHAB | Age: 62
End: 2022-08-22
Payer: COMMERCIAL

## 2022-08-22 DIAGNOSIS — M79.10 MYALGIA: ICD-10-CM

## 2022-08-22 PROCEDURE — 96372 THER/PROPH/DIAG INJ SC/IM: CPT | Performed by: PHYSICAL MEDICINE & REHABILITATION

## 2022-08-22 PROCEDURE — 20552 NJX 1/MLT TRIGGER POINT 1/2: CPT | Performed by: PHYSICAL MEDICINE & REHABILITATION

## 2022-08-22 RX ORDER — LIDOCAINE HYDROCHLORIDE 10 MG/ML
15 INJECTION, SOLUTION INFILTRATION; PERINEURAL ONCE
Status: COMPLETED | OUTPATIENT
Start: 2022-08-22 | End: 2022-08-22

## 2022-08-22 RX ORDER — CYANOCOBALAMIN 1000 UG/ML
1000 INJECTION INTRAMUSCULAR; SUBCUTANEOUS ONCE
Status: COMPLETED | OUTPATIENT
Start: 2022-08-22 | End: 2022-08-22

## 2022-08-22 RX ADMIN — CYANOCOBALAMIN 1000 MCG: 1000 INJECTION INTRAMUSCULAR; SUBCUTANEOUS at 14:48

## 2022-08-22 RX ADMIN — LIDOCAINE HYDROCHLORIDE 15 ML: 10 INJECTION, SOLUTION INFILTRATION; PERINEURAL at 14:47

## 2022-08-22 NOTE — PROGRESS NOTES
Patient here for trigger point injections. Patient taken back to exam room, and placed on drape locking stool. Areas to be injected marked appropriately, and cleansed with alcohol. 15cc of 1% Lidocaine and 1cc of b12 to be injected by provider.

## 2022-08-30 ASSESSMENT — ENCOUNTER SYMPTOMS
VISUAL CHANGE: 0
ABDOMINAL PAIN: 0
CONSTIPATION: 1
BOWEL INCONTINENCE: 0
BACK PAIN: 1
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
## 2022-09-21 ENCOUNTER — OFFICE VISIT (OUTPATIENT)
Dept: PHYSICAL MEDICINE AND REHAB | Age: 62
End: 2022-09-21
Payer: COMMERCIAL

## 2022-09-21 VITALS
WEIGHT: 241 LBS | HEIGHT: 72 IN | SYSTOLIC BLOOD PRESSURE: 126 MMHG | BODY MASS INDEX: 32.64 KG/M2 | HEART RATE: 74 BPM | OXYGEN SATURATION: 95 % | DIASTOLIC BLOOD PRESSURE: 62 MMHG

## 2022-09-21 DIAGNOSIS — Z87.820 HISTORY OF TRAUMATIC BRAIN INJURY: Chronic | ICD-10-CM

## 2022-09-21 DIAGNOSIS — M53.3 SI (SACROILIAC) PAIN: ICD-10-CM

## 2022-09-21 DIAGNOSIS — D47.2 IGG MONOCLONAL GAMMOPATHY OF UNCERTAIN SIGNIFICANCE: ICD-10-CM

## 2022-09-21 DIAGNOSIS — M54.17 LUMBOSACRAL RADICULOPATHY AT S1: ICD-10-CM

## 2022-09-21 DIAGNOSIS — Z79.899 HIGH RISK MEDICATION USE: ICD-10-CM

## 2022-09-21 DIAGNOSIS — M51.36 DDD (DEGENERATIVE DISC DISEASE), LUMBAR: Primary | Chronic | ICD-10-CM

## 2022-09-21 DIAGNOSIS — F10.21 PERSONAL HISTORY OF ALCOHOLISM (HCC): Chronic | ICD-10-CM

## 2022-09-21 PROCEDURE — 3017F COLORECTAL CA SCREEN DOC REV: CPT | Performed by: PHYSICAL MEDICINE & REHABILITATION

## 2022-09-21 PROCEDURE — 4004F PT TOBACCO SCREEN RCVD TLK: CPT | Performed by: PHYSICAL MEDICINE & REHABILITATION

## 2022-09-21 PROCEDURE — G8427 DOCREV CUR MEDS BY ELIG CLIN: HCPCS | Performed by: PHYSICAL MEDICINE & REHABILITATION

## 2022-09-21 PROCEDURE — G8417 CALC BMI ABV UP PARAM F/U: HCPCS | Performed by: PHYSICAL MEDICINE & REHABILITATION

## 2022-09-21 PROCEDURE — 99214 OFFICE O/P EST MOD 30 MIN: CPT | Performed by: PHYSICAL MEDICINE & REHABILITATION

## 2022-09-21 RX ORDER — HYDROCODONE BITARTRATE AND ACETAMINOPHEN 7.5; 325 MG/1; MG/1
1 TABLET ORAL EVERY 8 HOURS PRN
Qty: 80 TABLET | Refills: 0 | Status: SHIPPED | OUTPATIENT
Start: 2022-09-21 | End: 2022-10-19 | Stop reason: SDUPTHER

## 2022-09-21 ASSESSMENT — ENCOUNTER SYMPTOMS
WHEEZING: 0
SHORTNESS OF BREATH: 0
EYE REDNESS: 0
EYE PAIN: 0
NAUSEA: 0
COUGH: 0
SORE THROAT: 0
VOMITING: 0
PHOTOPHOBIA: 0
DIARRHEA: 0
STRIDOR: 0
BLOOD IN STOOL: 0

## 2022-09-23 ENCOUNTER — OFFICE VISIT (OUTPATIENT)
Dept: CARDIOLOGY CLINIC | Age: 62
End: 2022-09-23
Payer: COMMERCIAL

## 2022-09-23 VITALS
OXYGEN SATURATION: 97 % | DIASTOLIC BLOOD PRESSURE: 72 MMHG | HEART RATE: 75 BPM | BODY MASS INDEX: 33.72 KG/M2 | WEIGHT: 245.2 LBS | SYSTOLIC BLOOD PRESSURE: 126 MMHG

## 2022-09-23 DIAGNOSIS — I73.9 PVD (PERIPHERAL VASCULAR DISEASE) (HCC): ICD-10-CM

## 2022-09-23 DIAGNOSIS — I73.9 CLAUDICATION IN PERIPHERAL VASCULAR DISEASE (HCC): ICD-10-CM

## 2022-09-23 DIAGNOSIS — R68.89 ABNORMAL ANKLE BRACHIAL INDEX (ABI): ICD-10-CM

## 2022-09-23 DIAGNOSIS — I73.9 CLAUDICATION (HCC): ICD-10-CM

## 2022-09-23 DIAGNOSIS — I10 ESSENTIAL HYPERTENSION: Chronic | ICD-10-CM

## 2022-09-23 DIAGNOSIS — I73.9 PAD (PERIPHERAL ARTERY DISEASE) (HCC): ICD-10-CM

## 2022-09-23 DIAGNOSIS — I63.9 CEREBROVASCULAR ACCIDENT (CVA), UNSPECIFIED MECHANISM (HCC): ICD-10-CM

## 2022-09-23 DIAGNOSIS — R06.09 DOE (DYSPNEA ON EXERTION): ICD-10-CM

## 2022-09-23 DIAGNOSIS — I10 ESSENTIAL HYPERTENSION: Primary | ICD-10-CM

## 2022-09-23 DIAGNOSIS — I65.22 CAROTID STENOSIS, SYMPTOMATIC W/O INFARCT, LEFT: ICD-10-CM

## 2022-09-23 PROCEDURE — 3017F COLORECTAL CA SCREEN DOC REV: CPT | Performed by: INTERNAL MEDICINE

## 2022-09-23 PROCEDURE — G8427 DOCREV CUR MEDS BY ELIG CLIN: HCPCS | Performed by: INTERNAL MEDICINE

## 2022-09-23 PROCEDURE — G8417 CALC BMI ABV UP PARAM F/U: HCPCS | Performed by: INTERNAL MEDICINE

## 2022-09-23 PROCEDURE — 4004F PT TOBACCO SCREEN RCVD TLK: CPT | Performed by: INTERNAL MEDICINE

## 2022-09-23 PROCEDURE — 99214 OFFICE O/P EST MOD 30 MIN: CPT | Performed by: INTERNAL MEDICINE

## 2022-09-23 RX ORDER — METOPROLOL SUCCINATE 25 MG/1
TABLET, EXTENDED RELEASE ORAL
Qty: 90 TABLET | Refills: 3 | Status: SHIPPED | OUTPATIENT
Start: 2022-09-23

## 2022-09-23 RX ORDER — CLOPIDOGREL BISULFATE 75 MG/1
TABLET ORAL
Qty: 90 TABLET | Refills: 3 | Status: SHIPPED | OUTPATIENT
Start: 2022-09-23

## 2022-09-23 RX ORDER — ATORVASTATIN CALCIUM 40 MG/1
40 TABLET, FILM COATED ORAL DAILY
Qty: 90 TABLET | Refills: 3 | Status: SHIPPED | OUTPATIENT
Start: 2022-09-23

## 2022-09-23 RX ORDER — ICOSAPENT ETHYL 1000 MG/1
CAPSULE ORAL
Qty: 360 CAPSULE | Refills: 3 | Status: SHIPPED | OUTPATIENT
Start: 2022-09-23

## 2022-09-23 RX ORDER — VALSARTAN AND HYDROCHLOROTHIAZIDE 80; 12.5 MG/1; MG/1
TABLET, FILM COATED ORAL
Qty: 90 TABLET | Refills: 3 | Status: SHIPPED | OUTPATIENT
Start: 2022-09-23

## 2022-09-23 ASSESSMENT — ENCOUNTER SYMPTOMS
WHEEZING: 0
BLOOD IN STOOL: 0
EYES NEGATIVE: 1
BACK PAIN: 1
GASTROINTESTINAL NEGATIVE: 1
COUGH: 0
NAUSEA: 0
RESPIRATORY NEGATIVE: 1
SHORTNESS OF BREATH: 0
STRIDOR: 0
CHEST TIGHTNESS: 0
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
## 2022-09-26 ENCOUNTER — PROCEDURE VISIT (OUTPATIENT)
Dept: PHYSICAL MEDICINE AND REHAB | Age: 62
End: 2022-09-26
Payer: COMMERCIAL

## 2022-09-26 DIAGNOSIS — M79.7 FIBROMYALGIA: Primary | ICD-10-CM

## 2022-09-26 PROCEDURE — 20553 NJX 1/MLT TRIGGER POINTS 3/>: CPT | Performed by: PHYSICAL MEDICINE & REHABILITATION

## 2022-09-26 PROCEDURE — 96372 THER/PROPH/DIAG INJ SC/IM: CPT | Performed by: PHYSICAL MEDICINE & REHABILITATION

## 2022-09-26 RX ORDER — CYANOCOBALAMIN 1000 UG/ML
1000 INJECTION INTRAMUSCULAR; SUBCUTANEOUS ONCE
Status: COMPLETED | OUTPATIENT
Start: 2022-09-26 | End: 2022-09-26

## 2022-09-26 RX ORDER — LIDOCAINE HYDROCHLORIDE 10 MG/ML
15 INJECTION, SOLUTION INFILTRATION; PERINEURAL ONCE
Status: COMPLETED | OUTPATIENT
Start: 2022-09-26 | End: 2022-09-26

## 2022-09-26 RX ADMIN — CYANOCOBALAMIN 1000 MCG: 1000 INJECTION INTRAMUSCULAR; SUBCUTANEOUS at 14:50

## 2022-09-26 RX ADMIN — LIDOCAINE HYDROCHLORIDE 15 ML: 10 INJECTION, SOLUTION INFILTRATION; PERINEURAL at 14:50

## 2022-09-28 DIAGNOSIS — K59.00 CONSTIPATION, UNSPECIFIED CONSTIPATION TYPE: ICD-10-CM

## 2022-09-28 RX ORDER — DOCUSATE SODIUM 100 MG/1
CAPSULE, LIQUID FILLED ORAL
Qty: 30 CAPSULE | Refills: 3 | Status: SHIPPED | OUTPATIENT
Start: 2022-09-28

## 2022-10-18 DIAGNOSIS — M54.17 LUMBOSACRAL RADICULOPATHY AT S1: ICD-10-CM

## 2022-10-18 DIAGNOSIS — Z79.899 HIGH RISK MEDICATION USE: ICD-10-CM

## 2022-10-18 DIAGNOSIS — D47.2 IGG MONOCLONAL GAMMOPATHY OF UNCERTAIN SIGNIFICANCE: ICD-10-CM

## 2022-10-18 DIAGNOSIS — M53.3 SI (SACROILIAC) PAIN: ICD-10-CM

## 2022-10-18 DIAGNOSIS — M51.36 DDD (DEGENERATIVE DISC DISEASE), LUMBAR: Chronic | ICD-10-CM

## 2022-10-19 RX ORDER — HYDROCODONE BITARTRATE AND ACETAMINOPHEN 7.5; 325 MG/1; MG/1
1 TABLET ORAL EVERY 8 HOURS PRN
Qty: 80 TABLET | Refills: 0 | Status: SHIPPED | OUTPATIENT
Start: 2022-10-21 | End: 2022-11-20

## 2022-10-21 ENCOUNTER — CARE COORDINATION (OUTPATIENT)
Dept: CARE COORDINATION | Age: 62
End: 2022-10-21

## 2022-10-21 ENCOUNTER — OFFICE VISIT (OUTPATIENT)
Dept: FAMILY MEDICINE CLINIC | Age: 62
End: 2022-10-21
Payer: COMMERCIAL

## 2022-10-21 VITALS
WEIGHT: 252 LBS | HEIGHT: 72 IN | BODY MASS INDEX: 34.13 KG/M2 | SYSTOLIC BLOOD PRESSURE: 186 MMHG | RESPIRATION RATE: 16 BRPM | HEART RATE: 93 BPM | OXYGEN SATURATION: 94 % | DIASTOLIC BLOOD PRESSURE: 88 MMHG

## 2022-10-21 DIAGNOSIS — R79.9 ABNORMAL FINDING OF BLOOD CHEMISTRY, UNSPECIFIED: ICD-10-CM

## 2022-10-21 DIAGNOSIS — E88.81 INSULIN RESISTANCE: ICD-10-CM

## 2022-10-21 DIAGNOSIS — M10.032 ACUTE IDIOPATHIC GOUT OF LEFT WRIST: ICD-10-CM

## 2022-10-21 DIAGNOSIS — I65.23 BILATERAL CAROTID ARTERY STENOSIS: ICD-10-CM

## 2022-10-21 DIAGNOSIS — G62.1 ALCOHOLIC POLYNEUROPATHY (HCC): ICD-10-CM

## 2022-10-21 DIAGNOSIS — M54.17 LUMBOSACRAL RADICULOPATHY AT S1: ICD-10-CM

## 2022-10-21 DIAGNOSIS — Z91.81 AT MAXIMUM RISK FOR FALL: ICD-10-CM

## 2022-10-21 DIAGNOSIS — I10 PRIMARY HYPERTENSION: ICD-10-CM

## 2022-10-21 DIAGNOSIS — I10 PRIMARY HYPERTENSION: Primary | ICD-10-CM

## 2022-10-21 DIAGNOSIS — G89.4 CHRONIC PAIN SYNDROME: ICD-10-CM

## 2022-10-21 LAB
ALBUMIN SERPL-MCNC: 3.8 G/DL (ref 3.5–4.6)
ALP BLD-CCNC: 130 U/L (ref 35–104)
ALT SERPL-CCNC: 41 U/L (ref 0–41)
ANION GAP SERPL CALCULATED.3IONS-SCNC: 12 MEQ/L (ref 9–15)
AST SERPL-CCNC: 27 U/L (ref 0–40)
BILIRUB SERPL-MCNC: 0.6 MG/DL (ref 0.2–0.7)
BUN BLDV-MCNC: 17 MG/DL (ref 8–23)
CALCIUM SERPL-MCNC: 9.4 MG/DL (ref 8.5–9.9)
CHLORIDE BLD-SCNC: 101 MEQ/L (ref 95–107)
CHOLESTEROL, TOTAL: 125 MG/DL (ref 0–199)
CO2: 24 MEQ/L (ref 20–31)
CREAT SERPL-MCNC: 0.94 MG/DL (ref 0.7–1.2)
FOLATE: 5.4 NG/ML
GFR SERPL CREATININE-BSD FRML MDRD: >60 ML/MIN/{1.73_M2}
GLOBULIN: 3.7 G/DL (ref 2.3–3.5)
GLUCOSE BLD-MCNC: 122 MG/DL (ref 70–99)
HBA1C MFR BLD: 6.3 % (ref 4.8–5.9)
HCT VFR BLD CALC: 47.5 % (ref 42–52)
HDLC SERPL-MCNC: 30 MG/DL (ref 40–59)
HEMOGLOBIN: 15.7 G/DL (ref 14–18)
LDL CHOLESTEROL CALCULATED: 55 MG/DL (ref 0–129)
MCH RBC QN AUTO: 30.5 PG (ref 27–31.3)
MCHC RBC AUTO-ENTMCNC: 33.1 % (ref 33–37)
MCV RBC AUTO: 92.2 FL (ref 79–92.2)
PDW BLD-RTO: 14.7 % (ref 11.5–14.5)
PLATELET # BLD: 302 K/UL (ref 130–400)
POTASSIUM SERPL-SCNC: 4 MEQ/L (ref 3.4–4.9)
RBC # BLD: 5.15 M/UL (ref 4.7–6.1)
SODIUM BLD-SCNC: 137 MEQ/L (ref 135–144)
TOTAL PROTEIN: 7.5 G/DL (ref 6.3–8)
TRIGL SERPL-MCNC: 199 MG/DL (ref 0–150)
URIC ACID, SERUM: 6.2 MG/DL (ref 3.4–7)
VITAMIN B-12: 738 PG/ML (ref 232–1245)
WBC # BLD: 11.7 K/UL (ref 4.8–10.8)

## 2022-10-21 PROCEDURE — G8427 DOCREV CUR MEDS BY ELIG CLIN: HCPCS | Performed by: PHYSICIAN ASSISTANT

## 2022-10-21 PROCEDURE — G8417 CALC BMI ABV UP PARAM F/U: HCPCS | Performed by: PHYSICIAN ASSISTANT

## 2022-10-21 PROCEDURE — 4004F PT TOBACCO SCREEN RCVD TLK: CPT | Performed by: PHYSICIAN ASSISTANT

## 2022-10-21 PROCEDURE — 99214 OFFICE O/P EST MOD 30 MIN: CPT | Performed by: PHYSICIAN ASSISTANT

## 2022-10-21 PROCEDURE — G8484 FLU IMMUNIZE NO ADMIN: HCPCS | Performed by: PHYSICIAN ASSISTANT

## 2022-10-21 PROCEDURE — 3017F COLORECTAL CA SCREEN DOC REV: CPT | Performed by: PHYSICIAN ASSISTANT

## 2022-10-21 ASSESSMENT — ENCOUNTER SYMPTOMS
SHORTNESS OF BREATH: 0
DIARRHEA: 0
ABDOMINAL PAIN: 0
VOMITING: 0
PHOTOPHOBIA: 0
BLOOD IN STOOL: 0
NAUSEA: 0
CHEST TIGHTNESS: 0
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
## 2022-10-21 NOTE — CARE COORDINATION
Initial Contact Social Work Note - Ambulatory  10/21/2022    Date of referral: 10/21/2022  Referral received from: Daisy Morrison PA-C  Reason for referral: Safety and  self-care. Previous SW referral: No  If yes, brief summary of outcome:     Two Identifiers Verified: Yes    Insurance Provider: Jeanie 119:  Sibling(s) Lives with brother which does the cooking.  Status:  Not A [employer] Providers: SNAP/Food [place]    ADL Assistance Needed:  Independent    Housing/Living Concerns or Home Modification Needs: Lives with brother. He admits that his bedroom is very cluttered. Transportation Concern: Provide a Ride but does not use them. Brother provides transportation to medical appointments. Medication Cost Concern:  Patient feels his co-pays are very low. Medication Adherence Concern:     Financial Concern(s): Income (only if applicable): Patient did now want to give out this information    Ability to Read/Write: Some difficulty. Advance Care Plan:  Not discussed. Other: Patient is not interested in meals-on-wheels. He claims that he is independent with ADL'S . He does his own laundry. Discussed option of getting help in the home. Patient does not want that and he doesn't want to get to that point in his life. He is single and doesn't want other people in his life. He spoke of falling a few times. Discussed Medical Alert System.  Discussed PASSPORT Program    Identified Needs:  Concern for safety and self-care        Social Work Plan:  Ongoing Assessment    Next Steps: Ongoing Assessment    Method of Communication With Provider (if appropriate): None       Goals Addressed    None
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
## 2022-10-24 ENCOUNTER — CARE COORDINATION (OUTPATIENT)
Dept: CARE COORDINATION | Age: 62
End: 2022-10-24

## 2022-10-24 NOTE — CARE COORDINATION
Telephone call to patient. Returning his voicemail and text message. Left voicemail returning call and request for return phone call. Call back number was provided.

## 2022-10-24 NOTE — CARE COORDINATION
Telephone call to [facility]. Representative indicated that patient does have a /Darline Dickerson ([phone]). They transferred call to Mariangel Griffiths and call was dropped. Called Darline with number provided and left message with request for return phone call. Representative  also sent message to Mariangel Griffiths to contact this writer.  phone number [phone] M-Friday asked to be transferred to [facility] ([phone]). Joint telephone to care [facility] /nurse with patient on the line. Patient stated that he is in need of HHA, laundry and meal assistance. Liz/Nurse took referral for home health care with high priority. Nurse indicated that patient should be hearing something from /[facility]/Community Health Worker within the next 24-48 hours. . Call back nurse advice line [phone] if have any additional concerns.

## 2022-10-24 NOTE — CARE COORDINATION
Telephone call to [facility]. Left voicemail of purpose of call with request for return phone call Call back number was provided.

## 2022-10-24 NOTE — CARE COORDINATION
Telephone call with patient. He was asking if this writer would be come his payee. Explained would not be becoming payee. Relayed that this writer helps with community  resources. He stated that he would like to try someone to help him with bathing. Discussed plan to look to [facility] assistance with [facility].

## 2022-10-24 NOTE — CARE COORDINATION
Telephone call to [facility] Karl/Provider Clinical Support. Left voicemail of nature of call with request for return phone call. Call back number was provided.

## 2022-10-25 ENCOUNTER — CARE COORDINATION (OUTPATIENT)
Dept: CARE COORDINATION | Age: 62
End: 2022-10-25

## 2022-10-25 NOTE — CARE COORDINATION
Telephone call to patient to discuss text he sent me wanting a electric scooter. Relayed  requirements for a scooter and needing to be to help with ADL'S in the home. Discussed would not need to say why a cane/walker was not helpful. He is in process of getting a cane. Also discussed he would need a face to face with PCP . Patient wanted to what on electric scooter after hearing requirements.

## 2022-10-26 ENCOUNTER — CARE COORDINATION (OUTPATIENT)
Dept: CARE COORDINATION | Age: 62
End: 2022-10-26

## 2022-10-26 ENCOUNTER — TELEPHONE (OUTPATIENT)
Dept: FAMILY MEDICINE CLINIC | Age: 62
End: 2022-10-26

## 2022-10-26 NOTE — CARE COORDINATION
Telephone call with patient. He stated that he is in need of a cane and has spoken to [employer]/[facility]nie. Discussed plan to reach out to [facility] YOVANY CHASE regarding information that is needed for [employer]. In basket message sent to [facility] YOVANY CHASE  and clinical staff asking for prescription/diagnosis, demographics and insurance information be faxed to April at [address] Patient also indicated that he has not heard from [facility] yet.

## 2022-10-26 NOTE — CARE COORDINATION
Telephone call to patient. Relayed that he should be hearing something from [facility] Management today.

## 2022-10-26 NOTE — CARE COORDINATION
Telephone call to [facility] Management. Representative indicated that patient's home health care referral has been escalated and patient should be hearing something today.

## 2022-10-26 NOTE — TELEPHONE ENCOUNTER
Patient states he has not heard from anyone regarding a cane. Please send cane order to Tamera Baugh in [place]    Please advise.

## 2022-10-27 ENCOUNTER — CARE COORDINATION (OUTPATIENT)
Dept: CARE COORDINATION | Age: 62
End: 2022-10-27

## 2022-10-27 NOTE — CARE COORDINATION
Telephone call to patient. He indicated that he spoke with [facility] and they said they didn't tell him anything about A assistance. Explained waiting to hear back from Care Manager Coordinator regarding communication with [facility]. Discussed would let him know of any updates with [facility].

## 2022-10-27 NOTE — CARE COORDINATION
Telephone call with patient. Provided patient with update on assistance with HHA with Forrest Murphy. Explained also that message was sent to [facility] ELLE CHASE PA-C asking for order for [facility] assistance faxed to Forrest Murphy. Patient called to say also that he got a call from [facility] ELLE CHASE PA-C office today stating that information for cane was sent to [facility].

## 2022-10-27 NOTE — CARE COORDINATION
Telephone call to [employer]. Left voicemail of purpose of call with request for return phone call. Call back number was provided, Sent secure email  to provider [email or web address] asking for assistance with HHA coordination. . Also sent  secure email to [email or web address] asking for assistance with coordination of HHA assistance for patient.

## 2022-10-27 NOTE — CARE COORDINATION
Telephone call with Darline/[employer]. Relayed patient's need for HHA assistance. Dion Caruso indicated that patient would be eligible for waiver program . This would give [id number] hours of HHA assistance per week. Darline expressed plan to reach out to patient today regarding HHA assistance. Relayed to Dion Caruso that this writer also made Adult Protective Referral. Dion Caruso  expressed plan to contact this writer if she should have problems reaching patient.

## 2022-10-27 NOTE — CARE COORDINATION
Telephone call with Laxmi Raymundo. She recommended that this writer reach out [facility] [employer]. She recommended  that this writer make  Adult Protective Services. She also recommended to get in touch with PA for additional clarification on why patient is not bathing.

## 2022-10-27 NOTE — CARE COORDINATION
Telephone call with Nurse Care Line/[facility]. They indicated that their case management program does not do assessment for HHA assistance. They referred this writer to local office of [place]. Discussed that [employer] on Aging will not take referral since patient is [facility].

## 2022-10-27 NOTE — CARE COORDINATION
Telephone call to [employer]. Completed Adult Protective Services Referral.  In basket message sent to [facility] YOVANY CHASE asking for clarification on why patient is not bathing.

## 2022-10-27 NOTE — CARE COORDINATION
Received email back from [facility]. She indicated that patient has a CHW/Darline [email or web address] or phone [phone]. Telephone call to Per Jones. Left voicemail of nature of call with request for return phone . Call back number was provided. Also sent Per Jones an email.

## 2022-10-27 NOTE — CARE COORDINATION
Telephone call with Darline/IRINA/[employer]. She indicated that she did speak with patient and he was in favor of HHA assistance. Made joint phone call to [facility] and they would not do the 14 hours of HHA assistance. [facility] stated that they do the initial assessment for HHA assistance and then sent to [facility] on Aging who go out for an in home assessment. [address] asking for order for assistance with ADL'S be faxed to her at [phone]. Discussed plan to send in basket message to [facility] ELLE CHASE PA-C for order and faxing to her. Sent [facility] YOVANY CHASE and clinical staff request for HHA/ADL order to be faxed to Darline/[employer].

## 2022-10-28 ENCOUNTER — CARE COORDINATION (OUTPATIENT)
Dept: CARE COORDINATION | Age: 62
End: 2022-10-28

## 2022-10-28 NOTE — CARE COORDINATION
Telephone call with patient who is asking for prescription for cane be sent to [employer]/[facility].  Relayed information in chart for cane and would fax to [address] [place], [place] [address]mia. [facility] April/Sari prescription for cane and insurance information.

## 2022-10-28 NOTE — CARE COORDINATION
Telephone call with patient.   He called to let this writer know that a [address] is coming to his home on 11/2/2022 AT 1000am.

## 2022-10-31 ENCOUNTER — PROCEDURE VISIT (OUTPATIENT)
Dept: PHYSICAL MEDICINE AND REHAB | Age: 62
End: 2022-10-31
Payer: COMMERCIAL

## 2022-10-31 DIAGNOSIS — M79.7 FIBROMYALGIA: Primary | ICD-10-CM

## 2022-10-31 PROCEDURE — 96372 THER/PROPH/DIAG INJ SC/IM: CPT | Performed by: PHYSICAL MEDICINE & REHABILITATION

## 2022-10-31 PROCEDURE — 20553 NJX 1/MLT TRIGGER POINTS 3/>: CPT | Performed by: PHYSICAL MEDICINE & REHABILITATION

## 2022-10-31 RX ORDER — CYANOCOBALAMIN 1000 UG/ML
1000 INJECTION, SOLUTION INTRAMUSCULAR; SUBCUTANEOUS ONCE
Status: COMPLETED | OUTPATIENT
Start: 2022-10-31 | End: 2022-11-01

## 2022-10-31 RX ORDER — LIDOCAINE HYDROCHLORIDE 10 MG/ML
15 INJECTION, SOLUTION INFILTRATION; PERINEURAL ONCE
Status: COMPLETED | OUTPATIENT
Start: 2022-10-31 | End: 2022-11-01

## 2022-11-01 RX ADMIN — CYANOCOBALAMIN 1000 MCG: 1000 INJECTION, SOLUTION INTRAMUSCULAR; SUBCUTANEOUS at 14:03

## 2022-11-01 RX ADMIN — LIDOCAINE HYDROCHLORIDE 15 ML: 10 INJECTION, SOLUTION INFILTRATION; PERINEURAL at 14:04

## 2022-11-03 ENCOUNTER — TELEPHONE (OUTPATIENT)
Dept: FAMILY MEDICINE CLINIC | Age: 62
End: 2022-11-03

## 2022-11-03 NOTE — TELEPHONE ENCOUNTER
Zita Dillard a  with [employer] would like clarification on patients diagnosis and medications.  Patient is trying to get a wavier for [employer] services, etc.    She would really appreciate  a call back before 4pm.    Call back #  [phone]

## 2022-11-04 ENCOUNTER — CARE COORDINATION (OUTPATIENT)
Dept: CARE COORDINATION | Age: 62
End: 2022-11-04

## 2022-11-04 NOTE — CARE COORDINATION
Telephone call to patient. He indicated that [address]  did come out to the home. Next step that patient said would be hearing from [employer] on Aging for their assessment. He was told that he should hear from [employer] [facility] Aging in the next week to a month. .He did get his cane from [employer]. .Patient was in favor for meals-on-wheels referral.  Patient stated that Katie talked to him about [address] but he was not in favor of this option.

## 2022-11-04 NOTE — CARE COORDINATION
Telephone call to [facility]/meals-on-wheels.   Completed referral for meals-on-wheels and was told patient's name was put on waiting list.

## 2022-11-08 ENCOUNTER — CARE COORDINATION (OUTPATIENT)
Dept: CARE COORDINATION | Age: 62
End: 2022-11-08

## 2022-11-08 NOTE — CARE COORDINATION
Telephone call to [facility]. Left voicemail of purpose of call with request for return phone call. Call back number was provided.

## 2022-11-08 NOTE — TELEPHONE ENCOUNTER
I think Elliot Luevano reached out to them re: surfaces. I am including her on this message. He would greatly benefit from help.

## 2022-11-10 ENCOUNTER — CARE COORDINATION (OUTPATIENT)
Dept: CARE COORDINATION | Age: 62
End: 2022-11-10

## 2022-11-10 NOTE — CARE COORDINATION
Telephone call with patient. He indicated that he did hear from meals-on-wheels. They did an assessment by phone and hear to from them in the next two weeks. He stated that he did get a letter from [facility]. He did an assessment over the phone but he did not know who it was. He was told that he would get another call in the next few weeks.

## 2022-11-11 ENCOUNTER — TELEPHONE (OUTPATIENT)
Dept: FAMILY MEDICINE CLINIC | Age: 62
End: 2022-11-11

## 2022-11-11 DIAGNOSIS — G89.4 CHRONIC PAIN SYNDROME: Primary | ICD-10-CM

## 2022-11-11 DIAGNOSIS — I87.2 EDEMA OF BOTH LOWER EXTREMITIES DUE TO PERIPHERAL VENOUS INSUFFICIENCY: ICD-10-CM

## 2022-11-11 NOTE — TELEPHONE ENCOUNTER
Patient is requesting an order be sent to [address] in [place] for a shower chair.     Please advise

## 2022-11-15 ENCOUNTER — CARE COORDINATION (OUTPATIENT)
Dept: CARE COORDINATION | Age: 62
End: 2022-11-15

## 2022-11-15 DIAGNOSIS — M53.3 SI (SACROILIAC) PAIN: ICD-10-CM

## 2022-11-15 DIAGNOSIS — M51.36 DDD (DEGENERATIVE DISC DISEASE), LUMBAR: Chronic | ICD-10-CM

## 2022-11-15 DIAGNOSIS — Z79.899 HIGH RISK MEDICATION USE: ICD-10-CM

## 2022-11-15 DIAGNOSIS — M54.17 LUMBOSACRAL RADICULOPATHY AT S1: ICD-10-CM

## 2022-11-15 DIAGNOSIS — D47.2 IGG MONOCLONAL GAMMOPATHY OF UNCERTAIN SIGNIFICANCE: ICD-10-CM

## 2022-11-15 RX ORDER — HYDROCODONE BITARTRATE AND ACETAMINOPHEN 7.5; 325 MG/1; MG/1
1 TABLET ORAL EVERY 8 HOURS PRN
Qty: 80 TABLET | Refills: 0 | Status: SHIPPED | OUTPATIENT
Start: 2022-11-19 | End: 2022-12-19

## 2022-11-15 NOTE — CARE COORDINATION
Telephone call to patient. He stated that he heard from [employer] on Aging and they are coming out to his home Friday 11/18/2022 for an assessment. Susanna Murphy

## 2022-11-16 ENCOUNTER — TELEPHONE (OUTPATIENT)
Dept: FAMILY MEDICINE CLINIC | Age: 62
End: 2022-11-16

## 2022-11-16 NOTE — TELEPHONE ENCOUNTER
Has this been completed?          ----- Message from IDALMIS Davalos sent at 10/27/2022  3:36 PM EDT -----  Regarding: VISHAL  I finally got a hold of someone at [facility] for [facility] assistance for patient. He is in agreement for [address] [place] [address] which gives 14 hours a week for [facility] assistance. They need an order from you to start this process. If you could write an order for Assistance with ADL'S and fax to [facility] Worker fax number is [phone].

## 2022-11-22 ENCOUNTER — CARE COORDINATION (OUTPATIENT)
Dept: CARE COORDINATION | Age: 62
End: 2022-11-22

## 2022-11-22 NOTE — CARE COORDINATION
Telephone call to patient who indicated that his meals-on-wheels are to start this week. He did not believe that anyone from [facility] on Aging did not come out last week. He stated that his new  for [facility] is John Paul Mariee [phone]  Discussed plan to reach out to John Paul Mariee.

## 2022-11-22 NOTE — CARE COORDINATION
Telephone call with April/Whitney. She stated that shower chair is not a covered item and did call patient to tell him. Discussed luz and April stated that insurance will only cover one mobility item every 5 years and he recently got a cane.

## 2022-11-22 NOTE — CARE COORDINATION
Telephone call with Rachelle/[facility]. She indicated that his meals-on-wheels. are to start next week. She is in process of making referral for HHA and completing necessary paperwork. She admits to finding HHA could be an issue. She stated that she talked to patient earlier today. Patient is stating that he wants a rollator. Explained there was not an order in the chart for rollator, only shower chair and cane. Plan to contact [facility] YOVANY CHASE about rollator. In basket message sent to [facility] YOVANY CHASE requesting rollator prescription/diagnosis code be faxed to [address] [place] [address] Ave.

## 2022-11-23 ENCOUNTER — CARE COORDINATION (OUTPATIENT)
Dept: CARE COORDINATION | Age: 62
End: 2022-11-23

## 2022-11-23 NOTE — CARE COORDINATION
Telephone to patient. Relayed results of phone call with April/[facility] and Rachelle/[facility].

## 2022-11-23 NOTE — CARE COORDINATION
Telephone call to [facility]. Relayed results of phone call with April/Whitney.  She indicated will try to get items through [facility]

## 2022-11-28 ENCOUNTER — CARE COORDINATION (OUTPATIENT)
Dept: CARE COORDINATION | Age: 62
End: 2022-11-28

## 2022-12-05 ENCOUNTER — PROCEDURE VISIT (OUTPATIENT)
Dept: PHYSICAL MEDICINE AND REHAB | Age: 62
End: 2022-12-05

## 2022-12-05 DIAGNOSIS — Z79.899 HIGH RISK MEDICATION USE: ICD-10-CM

## 2022-12-05 DIAGNOSIS — M51.36 DDD (DEGENERATIVE DISC DISEASE), LUMBAR: Chronic | ICD-10-CM

## 2022-12-05 DIAGNOSIS — M53.3 SI (SACROILIAC) PAIN: ICD-10-CM

## 2022-12-05 DIAGNOSIS — M79.7 FIBROMYALGIA: Primary | ICD-10-CM

## 2022-12-05 DIAGNOSIS — M54.17 LUMBOSACRAL RADICULOPATHY AT S1: ICD-10-CM

## 2022-12-05 RX ORDER — LIDOCAINE HYDROCHLORIDE 10 MG/ML
15 INJECTION, SOLUTION INFILTRATION; PERINEURAL ONCE
Status: COMPLETED | OUTPATIENT
Start: 2022-12-05 | End: 2022-12-05

## 2022-12-05 RX ORDER — PREGABALIN 100 MG/1
100 CAPSULE ORAL 3 TIMES DAILY
Qty: 90 CAPSULE | Refills: 3 | Status: SHIPPED | OUTPATIENT
Start: 2022-12-05 | End: 2023-01-04

## 2022-12-05 RX ORDER — CYANOCOBALAMIN 1000 UG/ML
1000 INJECTION, SOLUTION INTRAMUSCULAR; SUBCUTANEOUS ONCE
Status: COMPLETED | OUTPATIENT
Start: 2022-12-05 | End: 2022-12-05

## 2022-12-05 RX ADMIN — CYANOCOBALAMIN 1000 MCG: 1000 INJECTION, SOLUTION INTRAMUSCULAR; SUBCUTANEOUS at 16:30

## 2022-12-05 RX ADMIN — LIDOCAINE HYDROCHLORIDE 15 ML: 10 INJECTION, SOLUTION INFILTRATION; PERINEURAL at 16:31

## 2022-12-08 ENCOUNTER — CARE COORDINATION (OUTPATIENT)
Dept: CARE COORDINATION | Age: 62
End: 2022-12-08

## 2022-12-08 NOTE — CARE COORDINATION
Telephone call to [facility]. Left voicemail message of nature of call with request for return phone call. Call back number was provided.

## 2022-12-08 NOTE — CARE COORDINATION
Telephone call with patient. He stated he did not like his meals-on-wheels and cancelled them. He is going to try [employer]. His [facility] services has not started yet. Discussed plan to reach out to [facility].

## 2022-12-12 DIAGNOSIS — I63.9 CEREBROVASCULAR ACCIDENT (CVA), UNSPECIFIED MECHANISM (HCC): Chronic | ICD-10-CM

## 2022-12-12 RX ORDER — ASPIRIN 81 MG/1
TABLET, COATED ORAL
Qty: 90 TABLET | Refills: 3 | Status: SHIPPED | OUTPATIENT
Start: 2022-12-12

## 2022-12-12 NOTE — TELEPHONE ENCOUNTER
Requesting medication refill. Please approve or deny this request.    Rx requested:  Requested Prescriptions     Pending Prescriptions Disp Refills    ASPIRIN LOW DOSE 81 MG EC tablet [Pharmacy Med Name: ASPIRIN EC 81 MG TABLET] 90 tablet 2     Sig: take 1 tablet by mouth once daily         Last Office Visit:   9/23/2022      Next Visit Date:  Future Appointments   Date Time Provider Loan Darling   12/14/2022  2:30 PM Jona Spivey, [address] [place] [address]   1/23/2023  1:30 PM Belkys Currie MD [address] [place] [address]   2/2/2023  2:45 PM YOVANY Crockett [facility] EMERGENCY [facility] AT [place]               Please approve or deny.

## 2022-12-13 DIAGNOSIS — K59.00 CONSTIPATION, UNSPECIFIED CONSTIPATION TYPE: ICD-10-CM

## 2022-12-13 RX ORDER — DOCUSATE SODIUM 100 MG/1
CAPSULE, LIQUID FILLED ORAL
Qty: 30 CAPSULE | Refills: 3 | Status: SHIPPED | OUTPATIENT
Start: 2022-12-13

## 2022-12-14 DIAGNOSIS — M51.36 DDD (DEGENERATIVE DISC DISEASE), LUMBAR: Chronic | ICD-10-CM

## 2022-12-14 DIAGNOSIS — D47.2 IGG MONOCLONAL GAMMOPATHY OF UNCERTAIN SIGNIFICANCE: ICD-10-CM

## 2022-12-14 DIAGNOSIS — M54.17 LUMBOSACRAL RADICULOPATHY AT S1: ICD-10-CM

## 2022-12-14 DIAGNOSIS — M53.3 SI (SACROILIAC) PAIN: ICD-10-CM

## 2022-12-14 DIAGNOSIS — Z79.899 HIGH RISK MEDICATION USE: ICD-10-CM

## 2022-12-15 RX ORDER — HYDROCODONE BITARTRATE AND ACETAMINOPHEN 7.5; 325 MG/1; MG/1
1 TABLET ORAL EVERY 8 HOURS PRN
Qty: 80 TABLET | Refills: 0 | Status: SHIPPED | OUTPATIENT
Start: 2022-12-18 | End: 2023-01-17

## 2022-12-20 ENCOUNTER — CARE COORDINATION (OUTPATIENT)
Dept: CARE COORDINATION | Age: 62
End: 2022-12-20

## 2022-12-20 NOTE — CARE COORDINATION
Telephone call with patient. He indicated that he tried [employer] and did not like them and cancelled them. He is waiting for [facility] to give him a call after 1/4/2023. No other services are coming to his home at this time.

## 2022-12-27 PROBLEM — I69.90 LATE EFFECTS OF CVA (CEREBROVASCULAR ACCIDENT): Status: ACTIVE | Noted: 2022-12-27

## 2022-12-27 ASSESSMENT — ENCOUNTER SYMPTOMS
FACIAL SWELLING: 0
VISUAL CHANGE: 0
CHEST TIGHTNESS: 0
ANAL BLEEDING: 0
VOMITING: 0
BACK PAIN: 1
ABDOMINAL PAIN: 0
WHEEZING: 0
COLOR CHANGE: 0
EYE REDNESS: 0
ABDOMINAL DISTENTION: 0
EYE PAIN: 0
NAUSEA: 0
CONSTIPATION: 1
COUGH: 0
TROUBLE SWALLOWING: 0
BLOOD IN STOOL: 0
BOWEL INCONTINENCE: 0
PHOTOPHOBIA: 0
CHOKING: 0
SHORTNESS OF BREATH: 0

## 2022-12-27 NOTE — PROGRESS NOTES
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    ETG Not Detected 09/20/2016 01:44 PM    MARIJMET Not Detected 09/20/2016 01:44 PM    PCP Not Detected 09/20/2016 01:44 PM    PAINMGTDRUGP See Below 09/20/2016 01:44 PM    EERPAINMGTPA See Note 09/20/2016 01:44 PM    LABCREA 55.5 09/20/2016 01:44 PM         , I discussed results with patient. See follow-up plans for new studies. Therapies:  HEP-gentle stretching and relaxation techniques-demonstrated with patient-they are to do them twice a day.   They are also advised to make the following lifestyle changes:   Goals        Exercise 3x per week (30 min per time)      SOAPP-R GOAL LESS THAN       09/20/16 SCORE: 13-MODERATE RISK   12/14/16 score: 9-low-moderate risk   02/15/17 score: 12-moderate risk   05/18/17 score: 14-moderate risk  07/21/17 score: 24-high risk  09/21/17 score: 11-moderate risk  11/27/17 score: 10-moderate risk  01/26/18 score: 8-low risk  03/26/18 score: 12-moderate risk  6/14/18 SCORE: 4-LOW RISK  8/7/18 SCORE: 6-LOW RISK   10/4/18 score: 11- moderate risk  3/6/19 score: 6- low risk  5/21/19 score 11- low risk  7/25/19 score: 1- low risk   11/19/19 score: 8- low risk   3/12/20 score: 15- moderate risk   7/15/20 score: 10- moderate risk  9/16/20 score: 15- moderate risk  1/20/21 score: 16- moderate risk  4/23/21 score: 9- moderate risk  7/1/21 score: 11- moderate risk  11/17/21 score: 7- low risk  09/21/22 score: 2 - low risk  1/19/23 score 0- low risk       Stop Cigarette/Tobacco use             Injections or Epidurals:  Injection options were discussed. Patient gave verbal consent to ordered injections. See follow-up plans for planned injections. To look for monthly trigger points add vitamin B12    Supplements:  Vitamin D with increased dosing during the rainy months,   Education was given on:   Dietary and Fitness--daily stretches and low carb diet-in chair Yoga when possible             Follow up with Primary Care Physician regarding their general medical needs. Stressed the importance of following up with PCP and specialists for his/her chronic diseases, health, CV, and cancer screening and continued care. Will follow disease activity/progression and adjust therapeutic regimen to disease activity and severity. Discussed medication dosage, usage, goals of therapy, and side effects. Available test results were reviewed -Discussed findings, impression and plan with patient. An additional 7 minutes were spent outside of the patient visit to review records. Additional time spent with the patient to discuss their questions. Additional time spent with the patient devoted to discussing treatment strategy, planning, and implementation. Patient understands above plan; questions asked and answered. Patient agrees to plan as noted above. At least 50% of the visit was involved in the discussion of the options for treatment. We discussed exercises, medication, interventional therapies and surgery. Healthy life style is essential with patient hard work to achieve the wellness.  In addition; discussion with the patient and/or family about patient's functional status any of the diagnostic results, impressions and/or recommended diagnostic studies, prognosis, risks and benefits of treatment options, instructions for treatment and/or follow-up, importance of compliance with chosen treatment options, risk-factor reduction, and patient/family education. They are to follow up in 2 1/2 -3 months to review medication, efficacy of injections, pill counts, OARRS check, high risk med review re intensive monitoring for toxicity and addiction, SOAPPR assessment, review diagnostics, to review previous and future treatment plans and assess appropriateness for continued therapy.         New Diagnostics  Orders Placed This Encounter   Procedures    IL INJECT TRIGGER POINTS, 3 OR GREATER    IL INJECT TRIGGER POINTS, 3 OR GREATER         Sherrie Swan, DO

## 2023-01-05 ENCOUNTER — CARE COORDINATION (OUTPATIENT)
Dept: CARE COORDINATION | Age: 63
End: 2023-01-05

## 2023-01-05 NOTE — CARE COORDINATION
Telephone call to [facility]. Her message indicated that she is out of the office until 1/6/2023. Left voicemail of purpose of call with request for return phone call. Call back number was provided.

## 2023-01-05 NOTE — CARE COORDINATION
Telephone call with patient. He indicated that he has not heard from [facility] about help in the home. .  Discussed plan to reach out to [facility].

## 2023-01-12 ENCOUNTER — CARE COORDINATION (OUTPATIENT)
Dept: CARE COORDINATION | Age: 63
End: 2023-01-12

## 2023-01-12 NOTE — CARE COORDINATION
Telephone call to [facility]/[place]. She indicated that they are  still trying to find an agency/[facility] that can staff patient's case.

## 2023-01-12 NOTE — CARE COORDINATION
Telephone call with patient. He indicated that he has not heard from [facility]. Discussed plan to reach out [facility].

## 2023-01-13 DIAGNOSIS — M51.36 DDD (DEGENERATIVE DISC DISEASE), LUMBAR: Chronic | ICD-10-CM

## 2023-01-13 DIAGNOSIS — M54.17 LUMBOSACRAL RADICULOPATHY AT S1: ICD-10-CM

## 2023-01-13 DIAGNOSIS — M53.3 SI (SACROILIAC) PAIN: ICD-10-CM

## 2023-01-13 DIAGNOSIS — Z79.899 HIGH RISK MEDICATION USE: ICD-10-CM

## 2023-01-13 DIAGNOSIS — D47.2 IGG MONOCLONAL GAMMOPATHY OF UNCERTAIN SIGNIFICANCE: ICD-10-CM

## 2023-01-14 RX ORDER — HYDROCODONE BITARTRATE AND ACETAMINOPHEN 7.5; 325 MG/1; MG/1
1 TABLET ORAL EVERY 8 HOURS PRN
Qty: 80 TABLET | Refills: 0 | Status: SHIPPED | OUTPATIENT
Start: 2023-01-16 | End: 2023-02-15

## 2023-01-19 ENCOUNTER — OFFICE VISIT (OUTPATIENT)
Dept: PHYSICAL MEDICINE AND REHAB | Age: 63
End: 2023-01-19
Payer: COMMERCIAL

## 2023-01-19 VITALS
SYSTOLIC BLOOD PRESSURE: 129 MMHG | DIASTOLIC BLOOD PRESSURE: 66 MMHG | BODY MASS INDEX: 35.28 KG/M2 | HEIGHT: 71 IN | WEIGHT: 252 LBS

## 2023-01-19 DIAGNOSIS — M79.7 FIBROMYALGIA: ICD-10-CM

## 2023-01-19 DIAGNOSIS — I69.90 LATE EFFECTS OF CVA (CEREBROVASCULAR ACCIDENT): ICD-10-CM

## 2023-01-19 DIAGNOSIS — M54.42 CHRONIC MIDLINE LOW BACK PAIN WITH BILATERAL SCIATICA: ICD-10-CM

## 2023-01-19 DIAGNOSIS — M51.36 DDD (DEGENERATIVE DISC DISEASE), LUMBAR: Chronic | ICD-10-CM

## 2023-01-19 DIAGNOSIS — Z87.820 HISTORY OF TRAUMATIC BRAIN INJURY: Chronic | ICD-10-CM

## 2023-01-19 DIAGNOSIS — M54.41 CHRONIC MIDLINE LOW BACK PAIN WITH BILATERAL SCIATICA: ICD-10-CM

## 2023-01-19 DIAGNOSIS — D47.2 IGG MONOCLONAL GAMMOPATHY OF UNCERTAIN SIGNIFICANCE: ICD-10-CM

## 2023-01-19 DIAGNOSIS — G62.1 ALCOHOLIC POLYNEUROPATHY (HCC): Primary | Chronic | ICD-10-CM

## 2023-01-19 DIAGNOSIS — Z79.899 HIGH RISK MEDICATION USE: ICD-10-CM

## 2023-01-19 DIAGNOSIS — G89.29 CHRONIC MIDLINE LOW BACK PAIN WITH BILATERAL SCIATICA: ICD-10-CM

## 2023-01-19 DIAGNOSIS — M54.17 LUMBOSACRAL RADICULOPATHY AT S1: ICD-10-CM

## 2023-01-19 DIAGNOSIS — E55.9 VITAMIN D DEFICIENCY: ICD-10-CM

## 2023-01-19 DIAGNOSIS — M53.3 SI (SACROILIAC) PAIN: ICD-10-CM

## 2023-01-19 PROCEDURE — 3017F COLORECTAL CA SCREEN DOC REV: CPT | Performed by: PHYSICAL MEDICINE & REHABILITATION

## 2023-01-19 PROCEDURE — 4004F PT TOBACCO SCREEN RCVD TLK: CPT | Performed by: PHYSICAL MEDICINE & REHABILITATION

## 2023-01-19 PROCEDURE — G8417 CALC BMI ABV UP PARAM F/U: HCPCS | Performed by: PHYSICAL MEDICINE & REHABILITATION

## 2023-01-19 PROCEDURE — 3074F SYST BP LT 130 MM HG: CPT | Performed by: PHYSICAL MEDICINE & REHABILITATION

## 2023-01-19 PROCEDURE — 99214 OFFICE O/P EST MOD 30 MIN: CPT | Performed by: PHYSICAL MEDICINE & REHABILITATION

## 2023-01-19 PROCEDURE — 3078F DIAST BP <80 MM HG: CPT | Performed by: PHYSICAL MEDICINE & REHABILITATION

## 2023-01-19 PROCEDURE — G8484 FLU IMMUNIZE NO ADMIN: HCPCS | Performed by: PHYSICAL MEDICINE & REHABILITATION

## 2023-01-19 PROCEDURE — G8427 DOCREV CUR MEDS BY ELIG CLIN: HCPCS | Performed by: PHYSICAL MEDICINE & REHABILITATION

## 2023-01-19 RX ORDER — HYDROCODONE BITARTRATE AND ACETAMINOPHEN 7.5; 325 MG/1; MG/1
1 TABLET ORAL EVERY 8 HOURS PRN
Qty: 80 TABLET | Refills: 0 | Status: SHIPPED | OUTPATIENT
Start: 2023-02-14 | End: 2023-03-16

## 2023-01-19 RX ORDER — PREGABALIN 100 MG/1
100 CAPSULE ORAL 3 TIMES DAILY
Qty: 90 CAPSULE | Refills: 3 | Status: SHIPPED | OUTPATIENT
Start: 2023-02-03 | End: 2023-03-05

## 2023-01-19 RX ORDER — MULTIVIT-MIN/IRON/FOLIC ACID/K 18-600-40
1 CAPSULE ORAL 2 TIMES DAILY
Qty: 30 CAPSULE | Refills: 5 | Status: SHIPPED | OUTPATIENT
Start: 2023-01-19

## 2023-01-20 ENCOUNTER — CARE COORDINATION (OUTPATIENT)
Dept: CARE COORDINATION | Age: 63
End: 2023-01-20

## 2023-01-20 NOTE — CARE COORDINATION
Telephone call with patient. Discussed that [facility]/[place] is still trying to find an agency to staff his case. He stated he is not in need of housekeeping services at this time. He also reviewed he did not like [employer]. No other concerns or needs were voiced at time of phone call.

## 2023-01-23 ENCOUNTER — OFFICE VISIT (OUTPATIENT)
Dept: CARDIOLOGY CLINIC | Age: 63
End: 2023-01-23
Payer: COMMERCIAL

## 2023-01-23 VITALS
OXYGEN SATURATION: 93 % | WEIGHT: 252 LBS | BODY MASS INDEX: 35.15 KG/M2 | DIASTOLIC BLOOD PRESSURE: 62 MMHG | HEART RATE: 68 BPM | SYSTOLIC BLOOD PRESSURE: 136 MMHG

## 2023-01-23 DIAGNOSIS — E78.5 DYSLIPIDEMIA: ICD-10-CM

## 2023-01-23 DIAGNOSIS — I63.9 CEREBROVASCULAR ACCIDENT (CVA), UNSPECIFIED MECHANISM (HCC): Primary | ICD-10-CM

## 2023-01-23 DIAGNOSIS — I73.9 PVD (PERIPHERAL VASCULAR DISEASE) (HCC): ICD-10-CM

## 2023-01-23 DIAGNOSIS — I10 ESSENTIAL HYPERTENSION: ICD-10-CM

## 2023-01-23 DIAGNOSIS — R09.89 BILATERAL CAROTID BRUITS: ICD-10-CM

## 2023-01-23 DIAGNOSIS — I73.9 CLAUDICATION IN PERIPHERAL VASCULAR DISEASE (HCC): ICD-10-CM

## 2023-01-23 DIAGNOSIS — R68.89 ABNORMAL ANKLE BRACHIAL INDEX (ABI): ICD-10-CM

## 2023-01-23 DIAGNOSIS — I63.9 CEREBROVASCULAR ACCIDENT (CVA), UNSPECIFIED MECHANISM (HCC): Chronic | ICD-10-CM

## 2023-01-23 DIAGNOSIS — I10 ESSENTIAL HYPERTENSION: Chronic | ICD-10-CM

## 2023-01-23 DIAGNOSIS — I73.9 PAD (PERIPHERAL ARTERY DISEASE) (HCC): ICD-10-CM

## 2023-01-23 DIAGNOSIS — I65.22 CAROTID STENOSIS, SYMPTOMATIC W/O INFARCT, LEFT: ICD-10-CM

## 2023-01-23 DIAGNOSIS — R06.09 DOE (DYSPNEA ON EXERTION): ICD-10-CM

## 2023-01-23 PROCEDURE — 3078F DIAST BP <80 MM HG: CPT | Performed by: INTERNAL MEDICINE

## 2023-01-23 PROCEDURE — G8484 FLU IMMUNIZE NO ADMIN: HCPCS | Performed by: INTERNAL MEDICINE

## 2023-01-23 PROCEDURE — 3075F SYST BP GE 130 - 139MM HG: CPT | Performed by: INTERNAL MEDICINE

## 2023-01-23 PROCEDURE — 99214 OFFICE O/P EST MOD 30 MIN: CPT | Performed by: INTERNAL MEDICINE

## 2023-01-23 PROCEDURE — 4004F PT TOBACCO SCREEN RCVD TLK: CPT | Performed by: INTERNAL MEDICINE

## 2023-01-23 PROCEDURE — G8427 DOCREV CUR MEDS BY ELIG CLIN: HCPCS | Performed by: INTERNAL MEDICINE

## 2023-01-23 PROCEDURE — 3017F COLORECTAL CA SCREEN DOC REV: CPT | Performed by: INTERNAL MEDICINE

## 2023-01-23 PROCEDURE — G8417 CALC BMI ABV UP PARAM F/U: HCPCS | Performed by: INTERNAL MEDICINE

## 2023-01-23 RX ORDER — METOPROLOL SUCCINATE 25 MG/1
TABLET, EXTENDED RELEASE ORAL
Qty: 90 TABLET | Refills: 3 | Status: SHIPPED | OUTPATIENT
Start: 2023-01-23

## 2023-01-23 RX ORDER — ASPIRIN 81 MG/1
TABLET ORAL
Qty: 90 TABLET | Refills: 3 | Status: SHIPPED | OUTPATIENT
Start: 2023-01-23

## 2023-01-23 RX ORDER — ATORVASTATIN CALCIUM 40 MG/1
40 TABLET, FILM COATED ORAL DAILY
Qty: 90 TABLET | Refills: 3 | Status: SHIPPED | OUTPATIENT
Start: 2023-01-23

## 2023-01-23 RX ORDER — ICOSAPENT ETHYL 1000 MG/1
CAPSULE ORAL
Qty: 360 CAPSULE | Refills: 3 | Status: SHIPPED | OUTPATIENT
Start: 2023-01-23

## 2023-01-23 RX ORDER — VALSARTAN AND HYDROCHLOROTHIAZIDE 80; 12.5 MG/1; MG/1
TABLET, FILM COATED ORAL
Qty: 90 TABLET | Refills: 3 | Status: SHIPPED | OUTPATIENT
Start: 2023-01-23

## 2023-01-23 RX ORDER — CLOPIDOGREL BISULFATE 75 MG/1
TABLET ORAL
Qty: 90 TABLET | Refills: 3 | Status: SHIPPED | OUTPATIENT
Start: 2023-01-23

## 2023-01-23 ASSESSMENT — ENCOUNTER SYMPTOMS
GASTROINTESTINAL NEGATIVE: 1
BACK PAIN: 1
BLOOD IN STOOL: 0
EYES NEGATIVE: 1
NAUSEA: 0
SHORTNESS OF BREATH: 0
CHEST TIGHTNESS: 0
RESPIRATORY NEGATIVE: 1
COUGH: 0
WHEEZING: 0
STRIDOR: 0
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
## 2023-01-25 ENCOUNTER — TELEPHONE (OUTPATIENT)
Dept: CARDIOLOGY CLINIC | Age: 63
End: 2023-01-25

## 2023-01-25 ENCOUNTER — HOSPITAL ENCOUNTER (OUTPATIENT)
Dept: ULTRASOUND IMAGING | Age: 63
Discharge: HOME | End: 2023-01-27
Payer: COMMERCIAL

## 2023-01-25 DIAGNOSIS — R09.89 BILATERAL CAROTID BRUITS: ICD-10-CM

## 2023-01-25 DIAGNOSIS — I63.9 CEREBROVASCULAR ACCIDENT (CVA), UNSPECIFIED MECHANISM (HCC): ICD-10-CM

## 2023-01-25 PROCEDURE — 93880 EXTRACRANIAL BILAT STUDY: CPT

## 2023-01-26 ENCOUNTER — TELEPHONE (OUTPATIENT)
Dept: CARDIOLOGY CLINIC | Age: 63
End: 2023-01-26

## 2023-01-26 PROCEDURE — 93880 EXTRACRANIAL BILAT STUDY: CPT | Performed by: INTERNAL MEDICINE

## 2023-01-26 NOTE — TELEPHONE ENCOUNTER
SPOKE WITH PATIENT AND HE WANTS TO KNOW IF HE HAS TO HAVE SURGERY IF THE RIGHT CAROTID % OCCLUDED.     PLEASE ADVISE

## 2023-01-26 NOTE — TELEPHONE ENCOUNTER
Patient calling in regards to [address] [facility] done yesterday (01/25/2023)-report has been read-, please advise thanks!    Call Back #: [phone]

## 2023-01-27 ENCOUNTER — CARE COORDINATION (OUTPATIENT)
Dept: CARE COORDINATION | Age: 63
End: 2023-01-27

## 2023-01-27 NOTE — CARE COORDINATION
Telephone call to [facility]/[place]/Housekeeping.   Provided information for referral and patient's name was put on waiting list.

## 2023-01-27 NOTE — CARE COORDINATION
Telephone call with patient. He is still waiting on HHA assistance in the home. Explained that [facility] still trying to find an agency to staff his case. Discussed plan to make referral to [facility] on Aging for housekeeping referral.  Patient is in need of help with laundry.

## 2023-02-02 ENCOUNTER — OFFICE VISIT (OUTPATIENT)
Dept: FAMILY MEDICINE CLINIC | Age: 63
End: 2023-02-02
Payer: COMMERCIAL

## 2023-02-02 VITALS
BODY MASS INDEX: 35.14 KG/M2 | HEART RATE: 60 BPM | RESPIRATION RATE: 16 BRPM | WEIGHT: 251 LBS | SYSTOLIC BLOOD PRESSURE: 118 MMHG | OXYGEN SATURATION: 98 % | HEIGHT: 71 IN | DIASTOLIC BLOOD PRESSURE: 60 MMHG

## 2023-02-02 DIAGNOSIS — R73.03 PREDIABETES: ICD-10-CM

## 2023-02-02 DIAGNOSIS — I87.2 EDEMA OF BOTH LOWER EXTREMITIES DUE TO PERIPHERAL VENOUS INSUFFICIENCY: ICD-10-CM

## 2023-02-02 DIAGNOSIS — Z00.00 MEDICARE ANNUAL WELLNESS VISIT, SUBSEQUENT: Primary | ICD-10-CM

## 2023-02-02 DIAGNOSIS — I10 PRIMARY HYPERTENSION: Primary | ICD-10-CM

## 2023-02-02 DIAGNOSIS — I65.22 CAROTID STENOSIS, SYMPTOMATIC W/O INFARCT, LEFT: ICD-10-CM

## 2023-02-02 DIAGNOSIS — M10.032 IDIOPATHIC GOUT OF LEFT WRIST, UNSPECIFIED CHRONICITY: ICD-10-CM

## 2023-02-02 DIAGNOSIS — G89.4 CHRONIC PAIN SYNDROME: ICD-10-CM

## 2023-02-02 DIAGNOSIS — I67.9 CEREBROVASCULAR DISEASE: ICD-10-CM

## 2023-02-02 DIAGNOSIS — E88.81 INSULIN RESISTANCE: ICD-10-CM

## 2023-02-02 DIAGNOSIS — I77.9 CAROTID ARTERY DISORDER (HCC): Chronic | ICD-10-CM

## 2023-02-02 PROCEDURE — G8484 FLU IMMUNIZE NO ADMIN: HCPCS | Performed by: PHYSICIAN ASSISTANT

## 2023-02-02 PROCEDURE — G0439 PPPS, SUBSEQ VISIT: HCPCS | Performed by: PHYSICIAN ASSISTANT

## 2023-02-02 PROCEDURE — 3017F COLORECTAL CA SCREEN DOC REV: CPT | Performed by: PHYSICIAN ASSISTANT

## 2023-02-02 RX ORDER — FENOFIBRATE 160 MG/1
160 TABLET ORAL DAILY
Qty: 90 TABLET | Refills: 1 | Status: SHIPPED | OUTPATIENT
Start: 2023-02-02

## 2023-02-02 RX ORDER — ALLOPURINOL 100 MG/1
200 TABLET ORAL DAILY
Qty: 180 TABLET | Refills: 4 | Status: SHIPPED | OUTPATIENT
Start: 2023-02-02

## 2023-02-02 SDOH — ECONOMIC STABILITY: INCOME INSECURITY: HOW HARD IS IT FOR YOU TO PAY FOR THE VERY BASICS LIKE FOOD, HOUSING, MEDICAL CARE, AND HEATING?: NOT HARD AT ALL

## 2023-02-02 SDOH — ECONOMIC STABILITY: FOOD INSECURITY: WITHIN THE PAST 12 MONTHS, THE FOOD YOU BOUGHT JUST DIDN'T LAST AND YOU DIDN'T HAVE MONEY TO GET MORE.: NEVER TRUE

## 2023-02-02 SDOH — ECONOMIC STABILITY: FOOD INSECURITY: WITHIN THE PAST 12 MONTHS, YOU WORRIED THAT YOUR FOOD WOULD RUN OUT BEFORE YOU GOT MONEY TO BUY MORE.: NEVER TRUE

## 2023-02-02 SDOH — ECONOMIC STABILITY: HOUSING INSECURITY
IN THE LAST 12 MONTHS, WAS THERE A TIME WHEN YOU DID NOT HAVE A STEADY PLACE TO SLEEP OR SLEPT IN A SHELTER (INCLUDING NOW)?: NO

## 2023-02-02 ASSESSMENT — PATIENT HEALTH QUESTIONNAIRE - PHQ9
1. LITTLE INTEREST OR PLEASURE IN DOING THINGS: 0
SUM OF ALL RESPONSES TO PHQ QUESTIONS 1-9: 0
SUM OF ALL RESPONSES TO PHQ QUESTIONS 1-9: 0
SUM OF ALL RESPONSES TO PHQ9 QUESTIONS 1 & 2: 0
SUM OF ALL RESPONSES TO PHQ QUESTIONS 1-9: 0
SUM OF ALL RESPONSES TO PHQ QUESTIONS 1-9: 0
2. FEELING DOWN, DEPRESSED OR HOPELESS: 0

## 2023-02-02 ASSESSMENT — LIFESTYLE VARIABLES
HOW OFTEN DO YOU HAVE A DRINK CONTAINING ALCOHOL: NEVER
HOW MANY STANDARD DRINKS CONTAINING ALCOHOL DO YOU HAVE ON A TYPICAL DAY: PATIENT DOES NOT DRINK
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
## 2023-02-02 NOTE — PROGRESS NOTES
Medicare Annual Wellness Visit    Galilea Rider is here for Medicare AWV    Assessment & Plan   Medicare annual wellness visit, subsequent    Recommendations for Preventive Services Due: see orders and patient instructions/AVS.  Recommended screening schedule for the next 5-10 years is provided to the patient in written form: see Patient Instructions/AVS.     Return for Medicare Annual Wellness Visit in 1 year. Subjective     Positive Risk Factor Screenings with Interventions:    Fall Risk:  Do you feel unsteady or are you worried about falling? : no  2 or more falls in past year?: (!) yes  Fall with injury in past year?: no     Interventions:    Patient comments: No interested in home PT. Declined. Social and Emotional Support:  Do you get the social and emotional support that you need?: (!) No  Interventions:  Patient declined any further interventions or treatment    Weight and Activity:  Physical Activity: Inactive    Days of Exercise per Week: 0 days    Minutes of Exercise per Session: 0 min     On average, how many days per week do you engage in moderate to strenuous exercise (like a brisk walk)?: 0 days  Have you lost any weight without trying in the past 3 months?: No         Inactivity Interventions:  Patient declined any further interventions or treatment  Obesity Interventions:  Patient declines any further evaluation or treatment             Safety:  Do you have either shower bars, grab bars, non-slip mats or non-slip surfaces in your shower or bathtub?: (!) No    Interventions:  Patient declined any further interventions or treatment     Advanced Directives:  Do you have a Living Will?: (!) No    Intervention:  Declined.         Tobacco Use:  Tobacco Use: High Risk    Smoking Tobacco Use: Every Day    Smokeless Tobacco Use: Never    Passive Exposure: Not on file     E-cigarette/Vaping       Questions Responses    E-cigarette/Vaping Use Never User    Start Date     Passive Exposure Quit Date     Counseling Given     Comments           Interventions:  Patient declined any further intervention or treatment          Objective   There were no vitals filed for this visit. There is no height or weight on file to calculate BMI. No Known Allergies  Prior to Visit Medications    Medication Sig Taking? Authorizing Provider   fenofibrate (TRIGLIDE) 160 MG tablet Take 1 tablet by mouth daily  Ismael Moseley MD   [facility]. Devices (BATH/SHOWER SEAT) MISC For use while bathing/showering due to fall risk and leg weakness. Kiki Rashid PA-C   atorvastatin (LIPITOR) 40 MG tablet Take 1 tablet by mouth daily  Ismael Moseley MD   clopidogrel (PLAVIX) 75 MG tablet 1 po QD  Ismael Moseley MD   metoprolol succinate (TOPROL XL) 25 MG extended release tablet take 1 tablet by mouth once daily  Ismael Moseley MD   valsartan-hydroCHLOROthiazide (DIOVAN-HCT) 80-12.5 MG per tablet take 1 tablet by mouth once daily  Ismael Moseley MD   aspirin (ASPIRIN LOW DOSE) 81 MG EC tablet take 1 tablet by mouth once daily  Ismael Moseley MD   VASCEPA 1 g CAPS capsule take 2 capsules by mouth twice a day  Ismael Moseley MD   HYDROcodone-acetaminophen (NORCO) 7.5-325 MG per tablet Take 1 tablet by mouth every 8 hours as needed for Pain (Max 3 per day) for up to 30 days. Intended supply: 30 days  Sherrie Scshirinin, DO   pregabalin (LYRICA) 100 MG capsule Take 1 capsule by mouth 3 times daily for 30 days. Sherrie Scullin, DO   Cholecalciferol (VITAMIN D) 50 MCG (2000 UT) CAPS capsule Take 1 capsule by mouth 2 times daily  Sherrie Scullin, DO   docusate sodium (COLACE) 100 MG capsule take 1 capsule by mouth twice a day  Jia Clarke MD   Misc. Devices (CANE) MISC Wide base cane please, fall risk, chronic knee and back pain.   Kiki Rashid PA-C   RA VITAMIN D-3 50 MCG (2000 UT) CAPS take 1 capsule by mouth twice a day  Sherrie Samanoin, DO   allopurinol (ZYLOPRIM) 100 MG tablet Take 2 tablets by mouth daily  Digna Prasad MD Marina   indomethacin (INDOCIN) 25 MG capsule Take 1 capsule by mouth 3 times daily as needed for Pain (gout pain)  Jackelin Gray MD   budesonide-formoterol (SYMBICORT) 80-4.5 MCG/ACT AERO Inhale 2 puffs into the lungs 2 times daily  Jackelin Gray MD   senna-docusate (PERICOLACE) 8.6-50 MG per tablet Take 2 tablets by mouth daily as needed for Constipation  Jackelin Gray MD   naloxone (NARCAN) 4 MG/0.1ML LIQD nasal spray 1 spray by Nasal route as needed for Opioid Reversal  Sherrie Samanoin, DO   REFRESH TEARS 0.5 % SOLN ophthalmic solution instill 1 drop into both eyes four times a day  Historical Provider, MD Klein (Including outside providers/suppliers regularly involved in providing care):   Patient Care Team:  Kiki Rashid PA-C as PCP - General (Family Medicine)  Kiki Rashid PA-C as PCP - Empaneled Provider  Tyrone Monzon MD (General Surgery)  Eric Houston MD as Cardiologist (Cardiology)     Reviewed and updated this visit:  Allergies  Meds  Problems              Kiki Rashid PA-C

## 2023-02-02 NOTE — PATIENT INSTRUCTIONS
Preventing Falls: Care Instructions  Overview     Getting around your home safely can be a challenge if you have injuries or health problems that make it easy for you to fall. Loose rugs and furniture in walkways are among the dangers for many older people who have problems walking or who have poor eyesight. People who have conditions such as arthritis, osteoporosis, or dementia also have to be careful not to fall. You can make your home safer with a few simple measures. Follow-up care is a key part of your treatment and safety. Be sure to make and go to all appointments, and call your doctor if you are having problems. It's also a good idea to know your test results and keep a list of the medicines you take. How can you care for yourself at home? Taking care of yourself  Exercise regularly to improve your strength, muscle tone, and balance. Walk if you can. Swimming may be a good choice if you cannot walk easily. Have your vision and hearing checked each year or any time you notice a change. If you have trouble seeing and hearing, you might not be able to avoid objects and could lose your balance. Know the side effects of the medicines you take. Ask your doctor or pharmacist whether the medicines you take can affect your balance. Sleeping pills or sedatives can affect your balance. Limit the amount of alcohol you drink. Alcohol can impair your balance and other senses. Ask your doctor whether calluses or corns on your feet need to be removed. If you wear loose-fitting shoes because of calluses or corns, you can lose your balance and fall. Talk to your doctor if you have numbness in your feet. You may get dizzy if you do not drink enough water. To prevent dehydration, drink plenty of fluids. Choose water and other clear liquids. If you have kidney, heart, or liver disease and have to limit fluids, talk with your doctor before you increase the amount of fluids you drink.   Preventing falls at home  Remove raised doorway thresholds, throw rugs, and clutter. Repair loose carpet or raised areas in the floor. Move furniture and electrical cords to keep them out of walking paths. Use nonskid floor wax, and wipe up spills right away, especially on ceramic tile floors. If you use a walker or cane, put rubber tips on it. If you use crutches, clean the bottoms of them regularly with an abrasive pad, such as steel wool. Keep your house well lit, especially stairways, porches, and outside walkways. Use night-lights in areas such as hallways and bathrooms. Add extra light switches or use remote switches (such as switches that go on or off when you clap your hands) to make it easier to turn lights on if you have to get up during the night. Install sturdy handrails on stairways. Move items in your cabinets so that the things you use a lot are on the lower shelves (about waist level). Keep a cordless phone and a flashlight with new batteries by your bed. If possible, put a phone in each of the main rooms of your house, or carry a cell phone in case you fall and cannot reach a phone. Or, you can wear a device around your neck or wrist. You push a button that sends a signal for help. Wear low-heeled shoes that fit well and give your feet good support. Use footwear with nonskid soles. Check the heels and soles of your shoes for wear. Repair or replace worn heels or soles. Do not wear socks without shoes on smooth floors, such as wood. Walk on the grass when the sidewalks are slippery. If you live in an area that gets snow and ice in the winter, sprinkle salt on slippery steps and sidewalks. Or ask a family member or friend to do this for you. Preventing falls in the bath  Install grab bars and nonskid mats inside and outside your shower or tub and near the toilet and sinks. Use shower chairs and bath benches. Use a hand-held shower head that will allow you to sit while showering.   Get into a tub or shower by putting the weaker leg in first. Get out of a tub or shower with your strong side first.  Repair loose toilet seats and consider installing a raised toilet seat to make getting on and off the toilet easier. Keep your bathroom door unlocked while you are in the shower. Where can you learn more? Go to http://www.turcios.com/ and enter G117 to learn more about \"Preventing Falls: Care Instructions. \"  Current as of: May 4, 2022               Content Version: 13.5  © 6112-2985 Healthwise, Incorporated. Care instructions adapted under license by [facility] ([facility]). If you have questions about a medical condition or this instruction, always ask your healthcare professional. [address] any warranty or liability for your use of this information. For more information on your local Area Agency on Aging or [place] on Aging please visit the appropriate web site below:    [place]: MobileCycles.pl    [facility]: https://aging. [place].gov/    [place]: https://aging.[place].gov/    [place]: InsuranceSquad.es           Hearing Loss: Care Instructions  Overview     Hearing loss is a sudden or slow decrease in how well you hear. It can range from mild to severe. Permanent hearing loss can occur with aging. It also can happen when you are exposed long-term to loud noise. Examples include listening to loud music, riding motorcycles, or being around other loud machines. Hearing loss can affect your work and home life. It can make you feel lonely or depressed. You may feel that you have lost your independence. But hearing aids and other devices can help you hear better and feel connected to others. Follow-up care is a key part of your treatment and safety. Be sure to make and go to all appointments, and call your doctor if you are having problems. It's also a good idea to know your test results and keep a list of the medicines you take.   How can you care for yourself at home? Avoid loud noises whenever possible. This helps keep your hearing from getting worse. Always wear hearing protection around loud noises. Wear a hearing aid as directed. See a professional who can help you pick a hearing aid that fits you. Have hearing tests as your doctor suggests. They can show whether your hearing has changed. Your hearing aid may need to be adjusted. Use other devices as needed. These may include:  Telephone amplifiers and hearing aids that can connect to a television, stereo, radio, or microphone. Devices that use lights or vibrations. These alert you to the doorbell, a ringing telephone, or a baby monitor. Television closed-captioning. This shows the words at the bottom of the screen. Most new TVs can do this. TTY (text telephone). This lets you type messages back and forth on the telephone instead of talking or listening. These devices are also called TDD. When messages are typed on the keyboard, they are sent over the phone line to a receiving TTY. The message is shown on a monitor. Use text messaging, social media, and email if it is hard for you to communicate by telephone. Try to learn a listening technique called speechreading. It is not lipreading. You pay attention to people's gestures, expressions, posture, and tone of voice. These clues can help you understand what a person is saying. Face the person you are talking to, and have them face you. Make sure the lighting is good. You need to see the other person's face clearly. Think about counseling if you need help to adjust to your hearing loss. When should you call for help? Watch closely for changes in your health, and be sure to contact your doctor if:    You think your hearing is getting worse.     You have new symptoms, such as dizziness or nausea. Where can you learn more? Go to http://www.turcios.com/ and enter N616 to learn more about \"Hearing Loss: Care Instructions. \"  Current as of: May 4, 2022               Content Version: 13.5  © 9433-6103 Healthwise, [place]. Care instructions adapted under license by [facility] ([facility]). If you have questions about a medical condition or this instruction, always ask your healthcare professional. [address] any warranty or liability for your use of this information. Advance Directives: Care Instructions  Overview  An advance directive is a legal way to state your wishes at the end of your life. It tells your family and your doctor what to do if you can't say what you want. There are two main types of advance directives. You can change them any time your wishes change. Living will. This form tells your family and your doctor your wishes about life support and other treatment. The form is also called a declaration. Medical power of . This form lets you name a person to make treatment decisions for you when you can't speak for yourself. This person is called a health care agent (health care proxy, health care surrogate). The form is also called a durable power of  for health care. If you do not have an advance directive, decisions about your medical care may be made by a family member, or by a doctor or a  who doesn't know you. It may help to think of an advance directive as a gift to the people who care for you. If you have one, they won't have to make tough decisions by themselves. For more information, including forms for your state, see the [address] [facility] website (www.caringinfo.org/planning/advance-directives/). Follow-up care is a key part of your treatment and safety. Be sure to make and go to all appointments, and call your doctor if you are having problems. It's also a good idea to know your test results and keep a list of the medicines you take. What should you include in an advance directive? Many states have a unique advance directive form.  (It may ask you to address specific issues.) Or you might use a universal form that's approved by many states.  If your form doesn't tell you what to address, it may be hard to know what to include in your advance directive. Use the questions below to help you get started.  Who do you want to make decisions about your medical care if you are not able to?  What life-support measures do you want if you have a serious illness that gets worse over time or can't be cured?  What are you most afraid of that might happen? (Maybe you're afraid of having pain, losing your independence, or being kept alive by machines.)  Where would you prefer to die? (Your home? A hospital? A nursing home?)  Do you want to donate your organs when you die?  Do you want certain Cheondoism practices performed before you die?  When should you call for help?  Be sure to contact your doctor if you have any questions.  Where can you learn more?  Go to https://www.Home Inns.net/patientEd and enter R264 to learn more about \"Advance Directives: Care Instructions.\"  Current as of: June 16, 2022               Content Version: 13.5  © 8751-7970 [employer].   Care instructions adapted under license by [employer]. If you have questions about a medical condition or this instruction, always ask your healthcare professional. [employer] disclaims any warranty or liability for your use of this information.           Starting a Weight Loss Plan: Care Instructions  Overview     If you're thinking about losing weight, it can be hard to know where to start. Your doctor can help you set up a weight loss plan that best meets your needs. You may want to take a class on nutrition or exercise, or you could join a weight loss support group. If you have questions about how to make changes to your eating or exercise habits, ask your doctor about seeing a registered dietitian or an exercise specialist.  It can be a big challenge to lose weight. But you don't have to make huge  changes at once. Make small changes, and stick with them. When those changes become habit, add a few more changes. If you don't think you're ready to make changes right now, try to pick a date in the future. Make an appointment to see your doctor to discuss whether the time is right for you to start a plan. Follow-up care is a key part of your treatment and safety. Be sure to make and go to all appointments, and call your doctor if you are having problems. It's also a good idea to know your test results and keep a list of the medicines you take. How can you care for yourself at home? Set realistic goals. Many people expect to lose much more weight than is likely. A weight loss of 5% to 10% of your body weight may be enough to improve your health. Get family and friends involved to provide support. Talk to them about why you are trying to lose weight, and ask them to help. They can help by participating in exercise and having meals with you, even if they may be eating something different. Find what works best for you. If you do not have time or do not like to cook, a program that offers meal replacement bars or shakes may be better for you. Or if you like to prepare meals, finding a plan that includes daily menus and recipes may be best.  Ask your doctor about other health professionals who can help you achieve your weight loss goals. A dietitian can help you make healthy changes in your diet. An exercise specialist or  can help you develop a safe and effective exercise program.  A counselor or psychiatrist can help you cope with issues such as depression, anxiety, or family problems that can make it hard to focus on weight loss. Consider joining a support group for people who are trying to lose weight. Your doctor can suggest groups in your area. Where can you learn more?   Go to http://www.woods.com/ and enter U357 to learn more about \"Starting a Weight Loss Plan: Care Instructions. \"  Current as of: August 25, 2022               Content Version: 13.5  © 2006-2022 [employer]. Care instructions adapted under license by [facility] ([facility]). If you have questions about a medical condition or this instruction, always ask your healthcare professional. [address] any warranty or liability for your use of this information. A Healthy Heart: Care Instructions  Your Care Instructions     Coronary artery disease, also called heart disease, occurs when a substance called plaque builds up in the vessels that supply oxygen-rich blood to your heart muscle. This can narrow the blood vessels and reduce blood flow. A heart attack happens when blood flow is completely blocked. A high-fat diet, smoking, and other factors increase the risk of heart disease. Your doctor has found that you have a chance of having heart disease. You can do lots of things to keep your heart healthy. It may not be easy, but you can change your diet, exercise more, and quit smoking. These steps really work to lower your chance of heart disease. Follow-up care is a key part of your treatment and safety. Be sure to make and go to all appointments, and call your doctor if you are having problems. It's also a good idea to know your test results and keep a list of the medicines you take. How can you care for yourself at home? Diet    Use less salt when you cook and eat. This helps lower your blood pressure. Taste food before salting. Add only a little salt when you think you need it. With time, your taste buds will adjust to less salt.     Eat fewer snack items, fast foods, canned soups, and other high-salt, high-fat, processed foods.     Read food labels and try to avoid saturated and trans fats. They increase your risk of heart disease by raising cholesterol levels.     Limit the amount of solid fat-butter, margarine, and shortening-you eat.  Use olive, peanut, or canola oil when you cook. Bake, broil, and steam foods instead of frying them.     Eat a variety of fruit and vegetables every day. Dark green, deep orange, red, or yellow fruits and vegetables are especially good for you. Examples include spinach, carrots, peaches, and berries.     Foods high in fiber can reduce your cholesterol and provide important vitamins and minerals. High-fiber foods include whole-grain cereals and breads, oatmeal, beans, brown rice, citrus fruits, and apples.     Eat lean proteins. Heart-healthy proteins include seafood, lean meats and poultry, eggs, beans, peas, nuts, seeds, and soy products.     Limit drinks and foods with added sugar. These include candy, desserts, and soda pop. Lifestyle changes    If your doctor recommends it, get more exercise. Walking is a good choice. Bit by bit, increase the amount you walk every day. Try for at least 30 minutes on most days of the week. You also may want to swim, bike, or do other activities.     Do not smoke. If you need help quitting, talk to your doctor about stop-smoking programs and medicines. These can increase your chances of quitting for good. Quitting smoking may be the most important step you can take to protect your heart. It is never too late to quit.     Limit alcohol to 2 drinks a day for men and 1 drink a day for women. Too much alcohol can cause health problems.     Manage other health problems such as diabetes, high blood pressure, and high cholesterol. If you think you may have a problem with alcohol or drug use, talk to your doctor. Medicines    Take your medicines exactly as prescribed. Call your doctor if you think you are having a problem with your medicine.     If your doctor recommends aspirin, take the amount directed each day. Make sure you take aspirin and not another kind of pain reliever, such as acetaminophen (Tylenol). When should you call for help? Call 911 if you have symptoms of a heart attack.  These may include:    Chest pain or pressure, or a strange feeling in the chest.     Sweating.     Shortness of breath.     Pain, pressure, or a strange feeling in the back, neck, jaw, or upper belly or in one or both shoulders or arms.     Lightheadedness or sudden weakness.     A fast or irregular heartbeat. After you call 911, the  may tell you to chew 1 adult-strength or 2 to 4 low-dose aspirin. Wait for an ambulance. Do not try to drive yourself. Watch closely for changes in your health, and be sure to contact your doctor if you have any problems. Where can you learn more? Go to http://www.turcios.com/ and enter F075 to learn more about \"A Healthy Heart: Care Instructions. \"  Current as of: September 7, 2022               Content Version: 13.5  © 1790-4998 Healthwise, Incorporated. Care instructions adapted under license by [facility] ([facility]). If you have questions about a medical condition or this instruction, always ask your healthcare professional. [place] [address] any warranty or liability for your use of this information. Personalized Preventive Plan for [employer] - 2/2/2023  Medicare offers a range of preventive health benefits. Some of the tests and screenings are paid in full while other may be subject to a deductible, co-insurance, and/or copay. Some of these benefits include a comprehensive review of your medical history including lifestyle, illnesses that may run in your family, and various assessments and screenings as appropriate. After reviewing your medical record and screening and assessments performed today your provider may have ordered immunizations, labs, imaging, and/or referrals for you. A list of these orders (if applicable) as well as your Preventive Care list are included within your After Visit Summary for your review.     Other Preventive Recommendations:    A preventive eye exam performed by an eye specialist is recommended every 1-2 years to screen for glaucoma; cataracts, macular degeneration, and other eye disorders. A preventive dental visit is recommended every 6 months. Try to get at least 150 minutes of exercise per week or 10,000 steps per day on a pedometer . Order or download the FREE \"Exercise & Physical Activity: Your Everyday Guide\" from The [employer] Aging. Call 3-930.842.9488 or search [employer] Aging online. You need 9888-6486 mg of calcium and 3318-4503 IU of vitamin D per day. It is possible to meet your calcium requirement with diet alone, but a vitamin D supplement is usually necessary to meet this goal.  When exposed to the sun, use a sunscreen that protects against both UVA and UVB radiation with an SPF of 30 or greater. Reapply every 2 to 3 hours or after sweating, drying off with a towel, or swimming. Always wear a seat belt when traveling in a car. Always wear a helmet when riding a bicycle or motorcycle.

## 2023-02-03 ENCOUNTER — TELEPHONE (OUTPATIENT)
Dept: FAMILY MEDICINE CLINIC | Age: 63
End: 2023-02-03

## 2023-02-03 NOTE — TELEPHONE ENCOUNTER
Patient states order for shower chair needs to be sent to [facility] in [place].    Please call patient after it has been sent    Theadora Seabrook [phone]

## 2023-02-04 PROBLEM — M10.032: Status: ACTIVE | Noted: 2020-12-16

## 2023-02-04 PROBLEM — I67.9 CEREBROVASCULAR DISEASE: Status: ACTIVE | Noted: 2019-11-22

## 2023-02-04 PROBLEM — I69.90 LATE EFFECTS OF CVA (CEREBROVASCULAR ACCIDENT): Status: RESOLVED | Noted: 2022-12-27 | Resolved: 2023-02-04

## 2023-02-04 PROBLEM — R73.03 PREDIABETES: Status: ACTIVE | Noted: 2023-02-04

## 2023-02-04 ASSESSMENT — ENCOUNTER SYMPTOMS
PHOTOPHOBIA: 0
SHORTNESS OF BREATH: 0
BLOOD IN STOOL: 0
CHEST TIGHTNESS: 0
DIARRHEA: 0
ABDOMINAL PAIN: 0
VOMITING: 0
NAUSEA: 0

## 2023-02-06 ENCOUNTER — CARE COORDINATION (OUTPATIENT)
Dept: CARE COORDINATION | Age: 63
End: 2023-02-06

## 2023-02-06 NOTE — CARE COORDINATION
Telephone call with patient who was wondering about his shower chair. Explained from review of chart that prescription/diagnosis for shower chair  was faxed to  [address] [place] [address]. Discussed plan to contact [facility] about this.

## 2023-02-06 NOTE — CARE COORDINATION
Telephone call to April/Whitney/Johnnie.  She indicated that she did not receive anything on patient for shower chair. She stated that patient called him. Discussed plan to fax April prescription/diagnosis and insurance information to her. Sent April fax with this information.

## 2023-02-06 NOTE — CARE COORDINATION
Telephone call to patient. Left voicemail of purpose of call with request for return phone call . Call back number was provided.

## 2023-02-06 NOTE — CARE COORDINATION
Returning patient's voicemail message. Telephone call to patient. Left voicemail returning call and asked for return call. Call back number was provided.

## 2023-02-13 ENCOUNTER — CARE COORDINATION (OUTPATIENT)
Dept: CARE COORDINATION | Age: 63
End: 2023-02-13

## 2023-02-13 DIAGNOSIS — J41.0 SIMPLE CHRONIC BRONCHITIS (HCC): ICD-10-CM

## 2023-02-13 RX ORDER — BUDESONIDE AND FORMOTEROL FUMARATE DIHYDRATE 80; 4.5 UG/1; UG/1
2 AEROSOL RESPIRATORY (INHALATION) 2 TIMES DAILY
Qty: 3 EACH | Refills: 4 | Status: SHIPPED | OUTPATIENT
Start: 2023-02-13

## 2023-02-13 NOTE — CARE COORDINATION
Telephone call to [address] fax number of [phone]. They requested prescription, insurance information and height weight be faxed to them. Faxed Juan this requested information.

## 2023-02-13 NOTE — CARE COORDINATION
Telephone call with patient. He is speaking with [facility]/ and wants a shower chair obtained from [facility] there was a an prescription for shower chair in his chart and would send that to [facility]. He did not have fax number for Juan but had their phone number [phone].

## 2023-02-14 DIAGNOSIS — K59.00 CONSTIPATION, UNSPECIFIED CONSTIPATION TYPE: ICD-10-CM

## 2023-02-14 RX ORDER — DOCUSATE SODIUM 100 MG/1
CAPSULE, LIQUID FILLED ORAL
Qty: 30 CAPSULE | Refills: 3 | Status: SHIPPED | OUTPATIENT
Start: 2023-02-14

## 2023-02-16 ENCOUNTER — CARE COORDINATION (OUTPATIENT)
Dept: CARE COORDINATION | Age: 63
End: 2023-02-16

## 2023-02-16 NOTE — CARE COORDINATION
Telephone call with patient.  He called to inform this writer that he will be getting his shower chair delivered tomorrow by [employer] from [facility].

## 2023-02-23 ENCOUNTER — CARE COORDINATION (OUTPATIENT)
Dept: CARE COORDINATION | Age: 63
End: 2023-02-23

## 2023-02-23 NOTE — CARE COORDINATION
Telephone call with patient.  He did indicated that he did get his shower chair and is using it. He indicated that [facility] is still trying to find him a HHA.

## 2023-03-02 ENCOUNTER — CARE COORDINATION (OUTPATIENT)
Dept: CARE COORDINATION | Age: 63
End: 2023-03-02

## 2023-03-02 NOTE — CARE COORDINATION
Telephone call with patient. He indicated that he is using his shower chair and finding it helpful. No new community resource needs at this time. Marah Vela

## 2023-03-06 ENCOUNTER — CARE COORDINATION (OUTPATIENT)
Dept: CARE COORDINATION | Age: 63
End: 2023-03-06

## 2023-03-06 NOTE — CARE COORDINATION
Telephone call with patient. He stated that he is in need of an elevated toilet seat. Discussed plan to each out to [facility] ELLE CHASE PA-C to ask for an elevated toilet seat. In basket message sent to [facility] YOVANY CHASE and Clinical Staff asked for prescription/diagnosis code/insurance information be sent to [facility].

## 2023-03-07 ENCOUNTER — TELEPHONE (OUTPATIENT)
Dept: FAMILY MEDICINE CLINIC | Age: 63
End: 2023-03-07

## 2023-03-07 DIAGNOSIS — G89.4 CHRONIC PAIN SYNDROME: ICD-10-CM

## 2023-03-07 DIAGNOSIS — G62.1 ALCOHOLIC POLYNEUROPATHY (HCC): ICD-10-CM

## 2023-03-07 DIAGNOSIS — Z91.81 AT HIGH RISK FOR INJURY RELATED TO FALL: ICD-10-CM

## 2023-03-07 DIAGNOSIS — R53.1 GENERAL WEAKNESS: Primary | ICD-10-CM

## 2023-03-07 NOTE — TELEPHONE ENCOUNTER
Patient needs an prescription for an elevated toilet seat.  Faxed to [facility].    Fax # [phone]    Phone # [phone]

## 2023-03-08 ENCOUNTER — TELEPHONE (OUTPATIENT)
Dept: FAMILY MEDICINE CLINIC | Age: 63
End: 2023-03-08

## 2023-03-08 DIAGNOSIS — G89.4 CHRONIC PAIN SYNDROME: ICD-10-CM

## 2023-03-08 DIAGNOSIS — Z91.81 AT HIGH RISK FOR INJURY RELATED TO FALL: ICD-10-CM

## 2023-03-08 DIAGNOSIS — G62.1 ALCOHOLIC POLYNEUROPATHY (HCC): ICD-10-CM

## 2023-03-08 DIAGNOSIS — R53.1 GENERAL WEAKNESS: ICD-10-CM

## 2023-03-08 NOTE — TELEPHONE ENCOUNTER
----- Message from IDALMIS Arechiga sent at 3/6/2023  8:47 AM EST -----  Regarding: Elevated Toilet Seat  Patient is in need of elevated toilet seat. If you could send prescription/diagnosis code and insurance information to [facility]. Please sent to Attn: April Fax number is [phone].   Thanks

## 2023-03-13 ENCOUNTER — PROCEDURE VISIT (OUTPATIENT)
Dept: PHYSICAL MEDICINE AND REHAB | Age: 63
End: 2023-03-13

## 2023-03-13 DIAGNOSIS — M51.36 DDD (DEGENERATIVE DISC DISEASE), LUMBAR: Chronic | ICD-10-CM

## 2023-03-13 DIAGNOSIS — M79.7 FIBROMYALGIA: ICD-10-CM

## 2023-03-13 DIAGNOSIS — M53.3 SI (SACROILIAC) PAIN: ICD-10-CM

## 2023-03-13 DIAGNOSIS — D47.2 IGG MONOCLONAL GAMMOPATHY OF UNCERTAIN SIGNIFICANCE: ICD-10-CM

## 2023-03-13 DIAGNOSIS — M54.17 LUMBOSACRAL RADICULOPATHY AT S1: ICD-10-CM

## 2023-03-13 DIAGNOSIS — Z79.899 HIGH RISK MEDICATION USE: ICD-10-CM

## 2023-03-13 RX ORDER — HYDROCODONE BITARTRATE AND ACETAMINOPHEN 7.5; 325 MG/1; MG/1
1 TABLET ORAL EVERY 8 HOURS PRN
Qty: 80 TABLET | Refills: 0 | Status: SHIPPED | OUTPATIENT
Start: 2023-03-15 | End: 2023-04-14

## 2023-03-13 RX ORDER — PREGABALIN 100 MG/1
100 CAPSULE ORAL 3 TIMES DAILY
Qty: 90 CAPSULE | Refills: 3 | Status: SHIPPED | OUTPATIENT
Start: 2023-03-13 | End: 2023-04-12

## 2023-03-13 RX ORDER — LIDOCAINE HYDROCHLORIDE 10 MG/ML
15 INJECTION, SOLUTION INFILTRATION; PERINEURAL ONCE
Status: COMPLETED | OUTPATIENT
Start: 2023-03-13 | End: 2023-03-13

## 2023-03-13 RX ORDER — CYANOCOBALAMIN 1000 UG/ML
1000 INJECTION, SOLUTION INTRAMUSCULAR; SUBCUTANEOUS ONCE
Status: COMPLETED | OUTPATIENT
Start: 2023-03-13 | End: 2023-03-13

## 2023-03-13 RX ADMIN — CYANOCOBALAMIN 1000 MCG: 1000 INJECTION, SOLUTION INTRAMUSCULAR; SUBCUTANEOUS at 11:44

## 2023-03-13 RX ADMIN — LIDOCAINE HYDROCHLORIDE 15 ML: 10 INJECTION, SOLUTION INFILTRATION; PERINEURAL at 11:45

## 2023-03-13 NOTE — PROGRESS NOTES
Patient here for trigger point injections. Patient taken back to exam room, and placed on drape locking stool. Areas to be injected marked appropriately, and cleansed with alcohol. 15cc of 1% Lidocaine and 1cc of b12 to be injected by provider.
No

## 2023-03-14 ENCOUNTER — CARE COORDINATION (OUTPATIENT)
Dept: CARE COORDINATION | Age: 63
End: 2023-03-14

## 2023-03-14 NOTE — CARE COORDINATION
Review of chart indicated that elevated toilet seat prescription was sent to [facility]. Telephone call to [facility].  Representative indicated that they have the order but are about a week behind in orders and was received on 3/9/2023. They will have someone review it and give patient a call today. Gabriel Gallo

## 2023-03-14 NOTE — CARE COORDINATION
.Pt discharged at this time. Discharge instructions and medications reviewed,  Questions were answered. PT verbalized understanding. Follow up appointments were discussed.          [facility]  05/30/22 0833 Telephone call to patient. Left voicemail of summary of phone call with [facility].  Provided call back number in message.

## 2023-03-15 ENCOUNTER — CARE COORDINATION (OUTPATIENT)
Dept: CARE COORDINATION | Age: 63
End: 2023-03-15

## 2023-03-22 ENCOUNTER — CARE COORDINATION (OUTPATIENT)
Dept: CARE COORDINATION | Age: 63
End: 2023-03-22

## 2023-03-22 NOTE — CARE COORDINATION
Telephone call with patient. He stated that he now has his elevated toilet seat. He wants crutches for ambulation. .  Discussed that insurance would not cover crutches since he got a cane in the past five years. Patient got a cane in fall of 2022.

## 2023-03-29 ENCOUNTER — CARE COORDINATION (OUTPATIENT)
Dept: CARE COORDINATION | Age: 63
End: 2023-03-29

## 2023-03-29 NOTE — CARE COORDINATION
Telephone call with patient. He feels he has everything he needs. No new community resource needs. Discussed plan to discharge patient from [facility]/ and patient was in agreement. Confirmed that patient has this writer's phone number for future reference.

## 2023-04-05 ENCOUNTER — CARE COORDINATION (OUTPATIENT)
Dept: CARE COORDINATION | Age: 63
End: 2023-04-05

## 2023-04-05 NOTE — CARE COORDINATION
Patient called to let this writer know that he has a HHA/housekeeping service to begin on 4/18/2023. They will be coming from [facility] and will receive 10 hours a week for assistance.

## 2023-04-17 ASSESSMENT — ENCOUNTER SYMPTOMS
ABDOMINAL PAIN: 0
BACK PAIN: 1
VISUAL CHANGE: 0
CONSTIPATION: 1
BOWEL INCONTINENCE: 0

## 2023-04-26 ENCOUNTER — OFFICE VISIT (OUTPATIENT)
Dept: PHYSICAL MEDICINE AND REHAB | Age: 63
End: 2023-04-26
Payer: COMMERCIAL

## 2023-04-26 VITALS
HEIGHT: 71 IN | DIASTOLIC BLOOD PRESSURE: 66 MMHG | WEIGHT: 251 LBS | BODY MASS INDEX: 35.14 KG/M2 | SYSTOLIC BLOOD PRESSURE: 125 MMHG

## 2023-04-26 DIAGNOSIS — M54.17 LUMBOSACRAL RADICULOPATHY AT S1: ICD-10-CM

## 2023-04-26 DIAGNOSIS — M79.7 FIBROMYALGIA: ICD-10-CM

## 2023-04-26 DIAGNOSIS — G89.29 CHRONIC MIDLINE LOW BACK PAIN WITH BILATERAL SCIATICA: ICD-10-CM

## 2023-04-26 DIAGNOSIS — K59.00 CONSTIPATION, UNSPECIFIED CONSTIPATION TYPE: ICD-10-CM

## 2023-04-26 DIAGNOSIS — M54.42 CHRONIC MIDLINE LOW BACK PAIN WITH BILATERAL SCIATICA: ICD-10-CM

## 2023-04-26 DIAGNOSIS — G89.4 CHRONIC PAIN SYNDROME: ICD-10-CM

## 2023-04-26 DIAGNOSIS — M51.36 DDD (DEGENERATIVE DISC DISEASE), LUMBAR: ICD-10-CM

## 2023-04-26 DIAGNOSIS — Z79.899 HIGH RISK MEDICATION USE: ICD-10-CM

## 2023-04-26 DIAGNOSIS — M53.3 SI (SACROILIAC) PAIN: ICD-10-CM

## 2023-04-26 DIAGNOSIS — G62.1 ALCOHOLIC POLYNEUROPATHY (HCC): Primary | Chronic | ICD-10-CM

## 2023-04-26 DIAGNOSIS — M54.41 CHRONIC MIDLINE LOW BACK PAIN WITH BILATERAL SCIATICA: ICD-10-CM

## 2023-04-26 DIAGNOSIS — Z87.820 HISTORY OF TRAUMATIC BRAIN INJURY: Chronic | ICD-10-CM

## 2023-04-26 PROBLEM — I74.5: Status: ACTIVE | Noted: 2023-03-14

## 2023-04-26 PROBLEM — I74.3 EMBOLISM AND THROMBOSIS OF ARTERIES OF LOWER EXTREMITY (HCC): Status: ACTIVE | Noted: 2023-03-14

## 2023-04-26 PROBLEM — I25.10 CORONARY ATHEROSCLEROSIS: Status: ACTIVE | Noted: 2023-03-14

## 2023-04-26 PROBLEM — I83.10 VARICOSE VEINS OF LOWER EXTREMITY WITH INFLAMMATION: Status: ACTIVE | Noted: 2023-03-14

## 2023-04-26 PROBLEM — I70.213 ATHEROSCLEROSIS OF NATIVE ARTERY OF BOTH LOWER EXTREMITIES WITH INTERMITTENT CLAUDICATION (HCC): Status: ACTIVE | Noted: 2023-03-14

## 2023-04-26 PROCEDURE — 4004F PT TOBACCO SCREEN RCVD TLK: CPT | Performed by: PHYSICAL MEDICINE & REHABILITATION

## 2023-04-26 PROCEDURE — 3078F DIAST BP <80 MM HG: CPT | Performed by: PHYSICAL MEDICINE & REHABILITATION

## 2023-04-26 PROCEDURE — 3017F COLORECTAL CA SCREEN DOC REV: CPT | Performed by: PHYSICAL MEDICINE & REHABILITATION

## 2023-04-26 PROCEDURE — G8427 DOCREV CUR MEDS BY ELIG CLIN: HCPCS | Performed by: PHYSICAL MEDICINE & REHABILITATION

## 2023-04-26 PROCEDURE — G8417 CALC BMI ABV UP PARAM F/U: HCPCS | Performed by: PHYSICAL MEDICINE & REHABILITATION

## 2023-04-26 PROCEDURE — 99214 OFFICE O/P EST MOD 30 MIN: CPT | Performed by: PHYSICAL MEDICINE & REHABILITATION

## 2023-04-26 PROCEDURE — 3074F SYST BP LT 130 MM HG: CPT | Performed by: PHYSICAL MEDICINE & REHABILITATION

## 2023-04-26 RX ORDER — PREGABALIN 100 MG/1
100 CAPSULE ORAL 3 TIMES DAILY
Qty: 90 CAPSULE | Refills: 3 | Status: CANCELLED | OUTPATIENT
Start: 2023-04-26 | End: 2023-05-26

## 2023-04-26 RX ORDER — DOCUSATE SODIUM 100 MG/1
CAPSULE, LIQUID FILLED ORAL
Qty: 30 CAPSULE | Refills: 3 | Status: SHIPPED | OUTPATIENT
Start: 2023-04-26

## 2023-05-08 DIAGNOSIS — D47.2 IGG MONOCLONAL GAMMOPATHY OF UNCERTAIN SIGNIFICANCE: ICD-10-CM

## 2023-05-08 DIAGNOSIS — M53.3 SI (SACROILIAC) PAIN: ICD-10-CM

## 2023-05-08 DIAGNOSIS — M51.36 DDD (DEGENERATIVE DISC DISEASE), LUMBAR: Chronic | ICD-10-CM

## 2023-05-08 DIAGNOSIS — Z79.899 HIGH RISK MEDICATION USE: ICD-10-CM

## 2023-05-08 DIAGNOSIS — M54.17 LUMBOSACRAL RADICULOPATHY AT S1: ICD-10-CM

## 2023-05-09 RX ORDER — HYDROCODONE BITARTRATE AND ACETAMINOPHEN 7.5; 325 MG/1; MG/1
1 TABLET ORAL EVERY 8 HOURS PRN
Qty: 80 TABLET | Refills: 0 | Status: SHIPPED | OUTPATIENT
Start: 2023-05-12 | End: 2023-06-11

## 2023-05-17 NOTE — TELEPHONE ENCOUNTER
Pt is aware. EXAMINATION - Carotid Duplex Ultrasound       COMPARISON: March 14, 2022       DIAGNOSIS: Carotid stenosis        TECHNIQUE: Duplex carotid imaging was performed of both carotid systems using real time, peak velocity and color flow Doppler analysis. FINDINGS:            Doppler findings:       Right ICA PSV: 0 cm/sec. Left ICA PSV: 173 cm/sec. Right CCA PSV: 86 cm/sec. Left CCA PSV:  149 cm/sec. Right ICA /CCA ratio: 0. Left ICA/CCA ratio: 1.4. Right ECA PSV: 549 cm/sec. Left: ECA PSV: 136 cm/sec. Antegrade flow is demonstrated in both vertebral arteries. Impression   Right ICA is known to be occluded. Diffuse atherosclerosis left carotid system with no severe obstructive lesions. By velocity criteria left ICA is less than 50% stenosis.      Would like to know the results
THE PRIOR AUTH FOR VASCEPA HAS BEEN DENIED.     PLEASE ADVISE
Admission

## 2023-05-22 ENCOUNTER — OFFICE VISIT (OUTPATIENT)
Dept: CARDIOLOGY CLINIC | Age: 63
End: 2023-05-22
Payer: COMMERCIAL

## 2023-05-22 VITALS
BODY MASS INDEX: 35.51 KG/M2 | WEIGHT: 254.6 LBS | SYSTOLIC BLOOD PRESSURE: 128 MMHG | DIASTOLIC BLOOD PRESSURE: 76 MMHG | OXYGEN SATURATION: 93 % | HEART RATE: 75 BPM

## 2023-05-22 DIAGNOSIS — I65.22 CAROTID STENOSIS, SYMPTOMATIC W/O INFARCT, LEFT: ICD-10-CM

## 2023-05-22 DIAGNOSIS — E78.5 DYSLIPIDEMIA: ICD-10-CM

## 2023-05-22 DIAGNOSIS — Z00.00 PE (PHYSICAL EXAM), ANNUAL: Primary | ICD-10-CM

## 2023-05-22 DIAGNOSIS — I10 ESSENTIAL HYPERTENSION: ICD-10-CM

## 2023-05-22 DIAGNOSIS — I73.9 CLAUDICATION IN PERIPHERAL VASCULAR DISEASE (HCC): ICD-10-CM

## 2023-05-22 DIAGNOSIS — I63.9 CEREBROVASCULAR ACCIDENT (CVA), UNSPECIFIED MECHANISM (HCC): ICD-10-CM

## 2023-05-22 DIAGNOSIS — R06.09 DOE (DYSPNEA ON EXERTION): ICD-10-CM

## 2023-05-22 DIAGNOSIS — I73.9 PAD (PERIPHERAL ARTERY DISEASE) (HCC): ICD-10-CM

## 2023-05-22 DIAGNOSIS — I73.9 PVD (PERIPHERAL VASCULAR DISEASE) (HCC): ICD-10-CM

## 2023-05-22 DIAGNOSIS — I10 ESSENTIAL HYPERTENSION: Chronic | ICD-10-CM

## 2023-05-22 DIAGNOSIS — I63.9 CEREBROVASCULAR ACCIDENT (CVA), UNSPECIFIED MECHANISM (HCC): Chronic | ICD-10-CM

## 2023-05-22 DIAGNOSIS — I73.9 CLAUDICATION (HCC): ICD-10-CM

## 2023-05-22 DIAGNOSIS — R09.89 BILATERAL CAROTID BRUITS: ICD-10-CM

## 2023-05-22 DIAGNOSIS — R68.89 ABNORMAL ANKLE BRACHIAL INDEX (ABI): ICD-10-CM

## 2023-05-22 PROCEDURE — 4004F PT TOBACCO SCREEN RCVD TLK: CPT | Performed by: INTERNAL MEDICINE

## 2023-05-22 PROCEDURE — 99214 OFFICE O/P EST MOD 30 MIN: CPT | Performed by: INTERNAL MEDICINE

## 2023-05-22 PROCEDURE — G8417 CALC BMI ABV UP PARAM F/U: HCPCS | Performed by: INTERNAL MEDICINE

## 2023-05-22 PROCEDURE — 3017F COLORECTAL CA SCREEN DOC REV: CPT | Performed by: INTERNAL MEDICINE

## 2023-05-22 PROCEDURE — 3078F DIAST BP <80 MM HG: CPT | Performed by: INTERNAL MEDICINE

## 2023-05-22 PROCEDURE — G8427 DOCREV CUR MEDS BY ELIG CLIN: HCPCS | Performed by: INTERNAL MEDICINE

## 2023-05-22 PROCEDURE — 93000 ELECTROCARDIOGRAM COMPLETE: CPT | Performed by: INTERNAL MEDICINE

## 2023-05-22 PROCEDURE — 3074F SYST BP LT 130 MM HG: CPT | Performed by: INTERNAL MEDICINE

## 2023-05-22 RX ORDER — FENOFIBRATE 160 MG/1
160 TABLET ORAL DAILY
Qty: 90 TABLET | Refills: 3 | Status: SHIPPED | OUTPATIENT
Start: 2023-05-22

## 2023-05-22 RX ORDER — ATORVASTATIN CALCIUM 40 MG/1
40 TABLET, FILM COATED ORAL DAILY
Qty: 90 TABLET | Refills: 3 | Status: SHIPPED | OUTPATIENT
Start: 2023-05-22

## 2023-05-22 RX ORDER — VALSARTAN AND HYDROCHLOROTHIAZIDE 80; 12.5 MG/1; MG/1
TABLET, FILM COATED ORAL
Qty: 90 TABLET | Refills: 3 | Status: SHIPPED | OUTPATIENT
Start: 2023-05-22

## 2023-05-22 RX ORDER — METOPROLOL SUCCINATE 25 MG/1
TABLET, EXTENDED RELEASE ORAL
Qty: 90 TABLET | Refills: 3 | Status: SHIPPED | OUTPATIENT
Start: 2023-05-22

## 2023-05-22 RX ORDER — ASPIRIN 81 MG/1
TABLET ORAL
Qty: 90 TABLET | Refills: 3 | Status: SHIPPED | OUTPATIENT
Start: 2023-05-22

## 2023-05-22 RX ORDER — CLOPIDOGREL BISULFATE 75 MG/1
TABLET ORAL
Qty: 90 TABLET | Refills: 3 | Status: SHIPPED | OUTPATIENT
Start: 2023-05-22

## 2023-05-22 RX ORDER — ICOSAPENT ETHYL 1000 MG/1
CAPSULE ORAL
Qty: 360 CAPSULE | Refills: 3 | Status: SHIPPED | OUTPATIENT
Start: 2023-05-22

## 2023-05-22 ASSESSMENT — ENCOUNTER SYMPTOMS
BLOOD IN STOOL: 0
SHORTNESS OF BREATH: 0
NAUSEA: 0
STRIDOR: 0
EYES NEGATIVE: 1
CHEST TIGHTNESS: 0
BACK PAIN: 1
WHEEZING: 0
RESPIRATORY NEGATIVE: 1
COUGH: 0
GASTROINTESTINAL NEGATIVE: 1

## 2023-05-22 NOTE — PROGRESS NOTES
OFFICE VISIT        Patient: Devyn Bowser  YOB: 1960  MRN: [id number]    Chief Complaint: L carotid stent claudication   Chief Complaint   Patient presents with    6 Month Follow-Up    Hypertension       CV Data:  4/2017 echo ef 65  2019 Left Carotid CEA/patch - Dr. Francisco Ames  9/28/2020 B/l LE angio, left MAHAD stenting, left SFA ATH/PTA with DCB/stent. Right LE will be staged  10/21/2020 RLE:  RSFA Stent  3/21 CUS - Dr. Mi Bhatti 100 LCA patent. Moderate plaque. 3/22 CUS- LUCILA 100%. LCEA site patent  1/23 CUS  LUCILA 100%. LCEA site patent    Subjective/HPI pt has significant claudication L>R. He has chronic cough. He is minimally active. Describes no cp nor sob currently. No bleed. Has back pain. 10/12/2020 Left leg feels better since PTA. Right leg still bothers him. No cp no sob. No bleed. 11/23/2020 no cp no sob no falls no bleed. Has not smoked in 1 week. Legs feel better     2/23/21 no cp some gaitan does not feel good. feeels tired today. Takes meds    8/5/21 denies CP no sob no bleed no fals. 3/17/22 doin araceliell no cp no sob told to Dr. Francisco Ames retiring. Still smoke. r walks regualrly. 9/23/22 still SOB still smoking no cp no falls nobleed. 5/22/23 doing well no cp no sob not very activ etakes meds. Still smokes    He has no cardiac stents. Smoker  No etoh  Lives with brother  Disabled all his life.       EKG: SR 75    Past Medical History:   Diagnosis Date    Abnormal ankle brachial index (RAO)     Acute idiopathic gout of left wrist 47/02/6019    Alcoholic (HCC)-remote Hx 2/03/0567    In remission goes to AA daily--agrees not to overtake pain meds     Alcoholic polyneuropathy (HCC)     Asthma     CAD (coronary artery disease)     [facility]    Chronic back pain greater than 3 months duration     Claudication in peripheral vascular disease ([facility] [address]     CN III palsy, right eye 2017    Dr Melba Peoples    COPD (chronic obstructive pulmonary disease) ([address] 2014    DDD (degenerative disc disease),

## 2023-06-13 DIAGNOSIS — R73.03 PREDIABETES: ICD-10-CM

## 2023-06-13 DIAGNOSIS — I10 PRIMARY HYPERTENSION: ICD-10-CM

## 2023-06-13 DIAGNOSIS — E88.81 INSULIN RESISTANCE: ICD-10-CM

## 2023-06-13 LAB
ALBUMIN SERPL-MCNC: 3.9 G/DL (ref 3.5–4.6)
ALP SERPL-CCNC: 62 U/L (ref 35–104)
ALT SERPL-CCNC: 47 U/L (ref 0–41)
ANION GAP SERPL CALCULATED.3IONS-SCNC: 9 MEQ/L (ref 9–15)
AST SERPL-CCNC: 42 U/L (ref 0–40)
BILIRUB SERPL-MCNC: 0.4 MG/DL (ref 0.2–0.7)
BUN SERPL-MCNC: 19 MG/DL (ref 8–23)
CALCIUM SERPL-MCNC: 9.7 MG/DL (ref 8.5–9.9)
CHLORIDE SERPL-SCNC: 103 MEQ/L (ref 95–107)
CO2 SERPL-SCNC: 29 MEQ/L (ref 20–31)
CREAT SERPL-MCNC: 1.18 MG/DL (ref 0.7–1.2)
ERYTHROCYTE [DISTWIDTH] IN BLOOD BY AUTOMATED COUNT: 14.9 % (ref 11.5–14.5)
GLOBULIN SER CALC-MCNC: 3.5 G/DL (ref 2.3–3.5)
GLUCOSE SERPL-MCNC: 97 MG/DL (ref 70–99)
HBA1C MFR BLD: 6.1 % (ref 4.8–5.9)
HCT VFR BLD AUTO: 52.8 % (ref 42–52)
HGB BLD-MCNC: 17.4 G/DL (ref 14–18)
MCH RBC QN AUTO: 30.4 PG (ref 27–31.3)
MCHC RBC AUTO-ENTMCNC: 33 % (ref 33–37)
MCV RBC AUTO: 92 FL (ref 79–92.2)
PLATELET # BLD AUTO: 303 K/UL (ref 130–400)
POTASSIUM SERPL-SCNC: 4.5 MEQ/L (ref 3.4–4.9)
PROT SERPL-MCNC: 7.4 G/DL (ref 6.3–8)
RBC # BLD AUTO: 5.74 M/UL (ref 4.7–6.1)
SODIUM SERPL-SCNC: 141 MEQ/L (ref 135–144)
WBC # BLD AUTO: 8.2 K/UL (ref 4.8–10.8)

## 2023-07-03 ENCOUNTER — TELEPHONE (OUTPATIENT)
Dept: FAMILY MEDICINE CLINIC | Age: 63
End: 2023-07-03

## 2023-07-03 DIAGNOSIS — K59.00 CONSTIPATION, UNSPECIFIED CONSTIPATION TYPE: ICD-10-CM

## 2023-07-03 DIAGNOSIS — Z79.899 HIGH RISK MEDICATION USE: ICD-10-CM

## 2023-07-03 DIAGNOSIS — M54.17 LUMBOSACRAL RADICULOPATHY AT S1: ICD-10-CM

## 2023-07-03 DIAGNOSIS — M53.3 SI (SACROILIAC) PAIN: ICD-10-CM

## 2023-07-03 DIAGNOSIS — M51.36 DDD (DEGENERATIVE DISC DISEASE), LUMBAR: Chronic | ICD-10-CM

## 2023-07-03 RX ORDER — PREGABALIN 100 MG/1
100 CAPSULE ORAL 3 TIMES DAILY
Qty: 90 CAPSULE | Refills: 2 | Status: SHIPPED | OUTPATIENT
Start: 2023-07-03 | End: 2023-10-01

## 2023-07-03 NOTE — TELEPHONE ENCOUNTER
Pt called into the office and stated he needed his lyrica changed to 90 days instead of 30 days   Pt is taking it 3 times daily

## 2023-07-06 RX ORDER — DOCUSATE SODIUM 100 MG/1
CAPSULE, LIQUID FILLED ORAL
Qty: 30 CAPSULE | Refills: 3 | Status: SHIPPED | OUTPATIENT
Start: 2023-07-06

## 2023-07-10 ENCOUNTER — OFFICE VISIT (OUTPATIENT)
Dept: CARDIOLOGY CLINIC | Age: 63
End: 2023-07-10
Payer: COMMERCIAL

## 2023-07-10 VITALS
DIASTOLIC BLOOD PRESSURE: 62 MMHG | SYSTOLIC BLOOD PRESSURE: 126 MMHG | WEIGHT: 250 LBS | BODY MASS INDEX: 34.87 KG/M2 | OXYGEN SATURATION: 93 % | HEART RATE: 62 BPM

## 2023-07-10 DIAGNOSIS — I10 ESSENTIAL HYPERTENSION: Chronic | ICD-10-CM

## 2023-07-10 DIAGNOSIS — I73.9 PAD (PERIPHERAL ARTERY DISEASE) (HCC): ICD-10-CM

## 2023-07-10 DIAGNOSIS — I63.9 CEREBROVASCULAR ACCIDENT (CVA), UNSPECIFIED MECHANISM (HCC): Chronic | ICD-10-CM

## 2023-07-10 DIAGNOSIS — I10 ESSENTIAL HYPERTENSION: ICD-10-CM

## 2023-07-10 PROCEDURE — 3017F COLORECTAL CA SCREEN DOC REV: CPT | Performed by: INTERNAL MEDICINE

## 2023-07-10 PROCEDURE — G8427 DOCREV CUR MEDS BY ELIG CLIN: HCPCS | Performed by: INTERNAL MEDICINE

## 2023-07-10 PROCEDURE — 3074F SYST BP LT 130 MM HG: CPT | Performed by: INTERNAL MEDICINE

## 2023-07-10 PROCEDURE — 3078F DIAST BP <80 MM HG: CPT | Performed by: INTERNAL MEDICINE

## 2023-07-10 PROCEDURE — 99214 OFFICE O/P EST MOD 30 MIN: CPT | Performed by: INTERNAL MEDICINE

## 2023-07-10 PROCEDURE — 4004F PT TOBACCO SCREEN RCVD TLK: CPT | Performed by: INTERNAL MEDICINE

## 2023-07-10 PROCEDURE — G8417 CALC BMI ABV UP PARAM F/U: HCPCS | Performed by: INTERNAL MEDICINE

## 2023-07-10 RX ORDER — METOPROLOL SUCCINATE 25 MG/1
TABLET, EXTENDED RELEASE ORAL
Qty: 90 TABLET | Refills: 3 | Status: SHIPPED | OUTPATIENT
Start: 2023-07-10

## 2023-07-10 RX ORDER — CLOPIDOGREL BISULFATE 75 MG/1
TABLET ORAL
Qty: 90 TABLET | Refills: 3 | Status: SHIPPED | OUTPATIENT
Start: 2023-07-10

## 2023-07-10 RX ORDER — ICOSAPENT ETHYL 1000 MG/1
CAPSULE ORAL
Qty: 360 CAPSULE | Refills: 3 | Status: SHIPPED | OUTPATIENT
Start: 2023-07-10

## 2023-07-10 RX ORDER — ASPIRIN 81 MG/1
TABLET ORAL
Qty: 90 TABLET | Refills: 3 | Status: SHIPPED | OUTPATIENT
Start: 2023-07-10

## 2023-07-10 RX ORDER — VALSARTAN AND HYDROCHLOROTHIAZIDE 80; 12.5 MG/1; MG/1
TABLET, FILM COATED ORAL
Qty: 90 TABLET | Refills: 3 | Status: SHIPPED | OUTPATIENT
Start: 2023-07-10

## 2023-07-10 RX ORDER — ATORVASTATIN CALCIUM 40 MG/1
40 TABLET, FILM COATED ORAL DAILY
Qty: 90 TABLET | Refills: 3 | Status: SHIPPED | OUTPATIENT
Start: 2023-07-10

## 2023-07-10 RX ORDER — FENOFIBRATE 160 MG/1
160 TABLET ORAL DAILY
Qty: 90 TABLET | Refills: 3 | Status: SHIPPED | OUTPATIENT
Start: 2023-07-10

## 2023-07-10 ASSESSMENT — ENCOUNTER SYMPTOMS
GASTROINTESTINAL NEGATIVE: 1
EYES NEGATIVE: 1
RESPIRATORY NEGATIVE: 1
BLOOD IN STOOL: 0
CHEST TIGHTNESS: 0
WHEEZING: 0
STRIDOR: 0
BACK PAIN: 1
COUGH: 0
SHORTNESS OF BREATH: 0
NAUSEA: 0

## 2023-07-10 NOTE — PROGRESS NOTES
OFFICE VISIT        Patient: Lenin Clark  YOB: 1960  MRN: [id number]    Chief Complaint: L carotid stent claudication   Chief Complaint   Patient presents with    Follow-up     Discuss upcoming surgery- neck surgery       CV Data:  4/2017 echo ef 65  2019 Left Carotid CEA/patch - Dr. Basim Martin  9/28/2020 B/l LE angio, left MAHAD stenting, left SFA ATH/PTA with DCB/stent. Right LE will be staged  10/21/2020 RLE:  RSFA Stent  3/21 CUS - Dr. Gordy Saenz 100 LCA patent. Moderate plaque. 3/22 CUS- LUCILA 100%. LCEA site patent  1/23 CUS  LUCILA 100%. LCEA site patent    Subjective/HPI pt has significant claudication L>R. He has chronic cough. He is minimally active. Describes no cp nor sob currently. No bleed. Has back pain. 10/12/2020 Left leg feels better since PTA. Right leg still bothers him. No cp no sob. No bleed. 11/23/2020 no cp no sob no falls no bleed. Has not smoked in 1 week. Legs feel better     2/23/21 no cp some gaitan does not feel good. feeels tired today. Takes meds    8/5/21 denies CP no sob no bleed no fals. 3/17/22 amber swain no cp no sob told to Dr. Basim Martin retiring. Still smoke. r walks regualrly. 9/23/22 still SOB still smoking no cp no falls nobleed. 5/22/23 doing well no cp no sob not very activ etakes meds. Still smokes    7/10/23 still smokes same GAITAN minimally active no CP no bleed no falls     He has no cardiac stents. Smoker  No etoh  Lives with brother  Disabled all his life.       EKG: SR 75    Past Medical History:   Diagnosis Date    Abnormal ankle brachial index (RAO)     Acute idiopathic gout of left wrist 25/48/3033    Alcoholic (HCC)-remote Hx 3/99/4064    In remission goes to AA daily--agrees not to overtake pain meds     Alcoholic polyneuropathy (HCC)     Asthma     CAD (coronary artery disease)     [facility]    Chronic back pain greater than 3 months duration     Claudication in peripheral vascular disease ([address])     CN III palsy, right eye 2017    Dr Aurelio Aguillon

## 2023-07-20 DIAGNOSIS — M54.17 LUMBOSACRAL RADICULOPATHY AT S1: ICD-10-CM

## 2023-07-20 DIAGNOSIS — D47.2 IGG MONOCLONAL GAMMOPATHY OF UNCERTAIN SIGNIFICANCE: ICD-10-CM

## 2023-07-20 DIAGNOSIS — Z79.899 HIGH RISK MEDICATION USE: ICD-10-CM

## 2023-07-20 DIAGNOSIS — M51.36 DDD (DEGENERATIVE DISC DISEASE), LUMBAR: Chronic | ICD-10-CM

## 2023-07-20 DIAGNOSIS — M53.3 SI (SACROILIAC) PAIN: ICD-10-CM

## 2023-07-21 RX ORDER — HYDROCODONE BITARTRATE AND ACETAMINOPHEN 7.5; 325 MG/1; MG/1
1 TABLET ORAL EVERY 8 HOURS PRN
Qty: 80 TABLET | Refills: 0 | Status: SHIPPED | OUTPATIENT
Start: 2023-07-21 | End: 2023-08-20

## 2023-09-09 DIAGNOSIS — K59.00 CONSTIPATION, UNSPECIFIED CONSTIPATION TYPE: ICD-10-CM

## 2023-09-11 RX ORDER — DOCUSATE SODIUM 100 MG/1
100 CAPSULE, LIQUID FILLED ORAL 2 TIMES DAILY
Qty: 30 CAPSULE | Refills: 3 | Status: SHIPPED | OUTPATIENT
Start: 2023-09-11

## 2023-09-19 DIAGNOSIS — M53.3 SI (SACROILIAC) PAIN: ICD-10-CM

## 2023-09-19 DIAGNOSIS — Z79.899 HIGH RISK MEDICATION USE: ICD-10-CM

## 2023-09-19 DIAGNOSIS — M51.36 DDD (DEGENERATIVE DISC DISEASE), LUMBAR: Chronic | ICD-10-CM

## 2023-09-19 DIAGNOSIS — M54.17 LUMBOSACRAL RADICULOPATHY AT S1: ICD-10-CM

## 2023-09-19 RX ORDER — PREGABALIN 100 MG/1
100 CAPSULE ORAL 3 TIMES DAILY
Qty: 90 CAPSULE | Refills: 2 | Status: SHIPPED | OUTPATIENT
Start: 2023-09-19 | End: 2023-12-18

## 2023-10-11 ENCOUNTER — CARE COORDINATION (OUTPATIENT)
Dept: CARE COORDINATION | Age: 63
End: 2023-10-11

## 2023-10-11 NOTE — CARE COORDINATION
Telephone call with patient. He is asking for a prescription for a hospital bed be mailed to his home. Discussed plan to send [facility] and in basket message asking for this  prescription. In basket message sent to [facility], YOV[facility] and Clinical Staff.

## 2023-10-17 DIAGNOSIS — G47.34 NOCTURNAL HYPOXIA: ICD-10-CM

## 2023-10-17 DIAGNOSIS — J41.0 SIMPLE CHRONIC BRONCHITIS (HCC): Primary | ICD-10-CM

## 2023-10-30 ENCOUNTER — OFFICE VISIT (OUTPATIENT)
Dept: FAMILY MEDICINE CLINIC | Age: 63
End: 2023-10-30
Payer: COMMERCIAL

## 2023-10-30 VITALS
HEIGHT: 71 IN | RESPIRATION RATE: 16 BRPM | BODY MASS INDEX: 32.9 KG/M2 | DIASTOLIC BLOOD PRESSURE: 82 MMHG | OXYGEN SATURATION: 95 % | WEIGHT: 235 LBS | TEMPERATURE: 97.9 F | SYSTOLIC BLOOD PRESSURE: 132 MMHG | HEART RATE: 72 BPM

## 2023-10-30 DIAGNOSIS — I10 PRIMARY HYPERTENSION: Primary | Chronic | ICD-10-CM

## 2023-10-30 DIAGNOSIS — M54.17 LUMBOSACRAL RADICULOPATHY AT S1: ICD-10-CM

## 2023-10-30 DIAGNOSIS — R73.03 PREDIABETES: ICD-10-CM

## 2023-10-30 DIAGNOSIS — Z23 FLU VACCINE NEED: ICD-10-CM

## 2023-10-30 DIAGNOSIS — I74.3 EMBOLISM AND THROMBOSIS OF ARTERIES OF LOWER EXTREMITY (HCC): ICD-10-CM

## 2023-10-30 DIAGNOSIS — Z79.899 HIGH RISK MEDICATION USE: ICD-10-CM

## 2023-10-30 DIAGNOSIS — I10 PRIMARY HYPERTENSION: Chronic | ICD-10-CM

## 2023-10-30 DIAGNOSIS — M51.36 DDD (DEGENERATIVE DISC DISEASE), LUMBAR: Chronic | ICD-10-CM

## 2023-10-30 DIAGNOSIS — M53.3 SI (SACROILIAC) PAIN: ICD-10-CM

## 2023-10-30 LAB
ALBUMIN SERPL-MCNC: 4.1 G/DL (ref 3.5–4.6)
ALP SERPL-CCNC: 63 U/L (ref 35–104)
ALT SERPL-CCNC: 41 U/L (ref 0–41)
ANION GAP SERPL CALCULATED.3IONS-SCNC: 10 MEQ/L (ref 9–15)
AST SERPL-CCNC: 32 U/L (ref 0–40)
BILIRUB SERPL-MCNC: 0.7 MG/DL (ref 0.2–0.7)
BUN SERPL-MCNC: 20 MG/DL (ref 8–23)
CALCIUM SERPL-MCNC: 9.8 MG/DL (ref 8.5–9.9)
CHLORIDE SERPL-SCNC: 103 MEQ/L (ref 95–107)
CHOLEST SERPL-MCNC: 130 MG/DL (ref 0–199)
CO2 SERPL-SCNC: 27 MEQ/L (ref 20–31)
CREAT SERPL-MCNC: 1.33 MG/DL (ref 0.7–1.2)
ERYTHROCYTE [DISTWIDTH] IN BLOOD BY AUTOMATED COUNT: 14 % (ref 11.5–14.5)
GLOBULIN SER CALC-MCNC: 3.5 G/DL (ref 2.3–3.5)
GLUCOSE SERPL-MCNC: 117 MG/DL (ref 70–99)
HBA1C MFR BLD: 5.9 % (ref 4.8–5.9)
HCT VFR BLD AUTO: 53 % (ref 42–52)
HDLC SERPL-MCNC: 31 MG/DL (ref 40–59)
HGB BLD-MCNC: 17.2 G/DL (ref 14–18)
LDLC SERPL CALC-MCNC: 66 MG/DL (ref 0–129)
MCH RBC QN AUTO: 30.4 PG (ref 27–31.3)
MCHC RBC AUTO-ENTMCNC: 32.5 % (ref 33–37)
MCV RBC AUTO: 93.8 FL (ref 79–92.2)
PLATELET # BLD AUTO: 338 K/UL (ref 130–400)
POTASSIUM SERPL-SCNC: 4 MEQ/L (ref 3.4–4.9)
PROT SERPL-MCNC: 7.6 G/DL (ref 6.3–8)
RBC # BLD AUTO: 5.65 M/UL (ref 4.7–6.1)
SODIUM SERPL-SCNC: 140 MEQ/L (ref 135–144)
TRIGL SERPL-MCNC: 165 MG/DL (ref 0–150)
WBC # BLD AUTO: 8.9 K/UL (ref 4.8–10.8)

## 2023-10-30 PROCEDURE — G8482 FLU IMMUNIZE ORDER/ADMIN: HCPCS | Performed by: PHYSICIAN ASSISTANT

## 2023-10-30 PROCEDURE — G8417 CALC BMI ABV UP PARAM F/U: HCPCS | Performed by: PHYSICIAN ASSISTANT

## 2023-10-30 PROCEDURE — 3078F DIAST BP <80 MM HG: CPT | Performed by: PHYSICIAN ASSISTANT

## 2023-10-30 PROCEDURE — 3074F SYST BP LT 130 MM HG: CPT | Performed by: PHYSICIAN ASSISTANT

## 2023-10-30 PROCEDURE — G8427 DOCREV CUR MEDS BY ELIG CLIN: HCPCS | Performed by: PHYSICIAN ASSISTANT

## 2023-10-30 PROCEDURE — 4004F PT TOBACCO SCREEN RCVD TLK: CPT | Performed by: PHYSICIAN ASSISTANT

## 2023-10-30 PROCEDURE — 99214 OFFICE O/P EST MOD 30 MIN: CPT | Performed by: PHYSICIAN ASSISTANT

## 2023-10-30 PROCEDURE — 3017F COLORECTAL CA SCREEN DOC REV: CPT | Performed by: PHYSICIAN ASSISTANT

## 2023-10-30 RX ORDER — PREGABALIN 100 MG/1
100 CAPSULE ORAL 3 TIMES DAILY
Qty: 90 CAPSULE | Refills: 2 | Status: SHIPPED | OUTPATIENT
Start: 2023-10-30 | End: 2024-01-28

## 2023-10-30 ASSESSMENT — ENCOUNTER SYMPTOMS
BLOOD IN STOOL: 0
PHOTOPHOBIA: 0
NAUSEA: 0
CHEST TIGHTNESS: 0
ABDOMINAL PAIN: 0
VOMITING: 0
COUGH: 1
DIARRHEA: 0
SHORTNESS OF BREATH: 0

## 2023-10-30 NOTE — PROGRESS NOTES
Subjective  Chacha Broadlands, 61 y.o. male presents today with:  Chief Complaint   Patient presents with    6 Month Follow-Up     Pt would like lyrica refilled states the pharmacy never received this     Health Maintenance     Declines colonoscopy and colguard        HPI  IN the office today for routine follow up. Last OV with me: 6/13/23    Needing lyrica refilled today. Takes chronically for neuropathy. States that medication is working well. No longer seeing PMR for pain. Asked if I would refill medication at last OV with me. No injury/fall/trauma. Has not yet received his hospital bed. Requested hospital bed for his chronic back and LE pain. Elevating foot and head of bed will ensure better sleep/rest for patient. Remains complaint with his medications. No chest pain, SOB, tightness. +WARE, chronic. Continues to smoke. Denies claudication, worsening leg pain. Agreeable to flu vaccine today. Review of Systems   Constitutional:  Negative for activity change, appetite change, chills, fatigue, fever and unexpected weight change. HENT:  Negative for nosebleeds. Eyes:  Negative for photophobia. Respiratory:  Positive for cough. Negative for chest tightness and shortness of breath. Cardiovascular:  Negative for chest pain, palpitations and leg swelling. Gastrointestinal:  Negative for abdominal pain, blood in stool, diarrhea, nausea and vomiting. Genitourinary:  Negative for decreased urine volume, difficulty urinating, frequency and urgency. Musculoskeletal:  Positive for arthralgias, gait problem and myalgias. Skin:  Negative for rash. Neurological:  Positive for numbness. Negative for dizziness, facial asymmetry, weakness, light-headedness and headaches. Hematological:  Does not bruise/bleed easily. Psychiatric/Behavioral:  Negative for dysphoric mood and sleep disturbance. The patient is not nervous/anxious.         Past Medical History:   Diagnosis Date

## 2023-10-30 NOTE — PROGRESS NOTES
After obtaining consent, and per orders of Dr. Meghna BARRETT, injection of rg flu given in Left deltoid by Pierce Bustillos MA. Patient instructed to remain in clinic for 20 minutes afterwards, and to report any adverse reaction to me immediately. Vaccine Information Sheet, \"Influenza - Inactivated\"  given to Sarbjit Tejada, or parent/legal guardian of  Sarbjit Tejada and verbalized understanding. Patient responses:    Have you ever had a reaction to a flu vaccine? No  Are you able to eat eggs without adverse effects? Yes  Do you have any current illness? No  Have you ever had Guillian Linneus Syndrome? No    Flu vaccine given per order. Please see immunization tab.

## 2023-10-31 DIAGNOSIS — N28.9 ABNORMAL RENAL FUNCTION: Primary | ICD-10-CM

## 2023-10-31 PROCEDURE — 90674 CCIIV4 VAC NO PRSV 0.5 ML IM: CPT | Performed by: PHYSICIAN ASSISTANT

## 2023-10-31 PROCEDURE — G0008 ADMIN INFLUENZA VIRUS VAC: HCPCS | Performed by: PHYSICIAN ASSISTANT

## 2023-10-31 NOTE — PROGRESS NOTES
Vaccine Information Sheet, \"Influenza - Inactivated\"  given to Beti Canada, or parent/legal guardian of  Beti Canada and verbalized understanding. Patient responses:    Have you ever had a reaction to a flu vaccine? No  Are you able to eat eggs without adverse effects? Yes  Do you have any current illness? No  Have you ever had Guillian [place] Syndrome? No    Flu vaccine given per order. Please see immunization tab.

## 2023-11-13 DIAGNOSIS — K59.00 CONSTIPATION, UNSPECIFIED CONSTIPATION TYPE: ICD-10-CM

## 2023-11-14 RX ORDER — DOCUSATE SODIUM 100 MG/1
100 CAPSULE, LIQUID FILLED ORAL 2 TIMES DAILY
Qty: 30 CAPSULE | Refills: 3 | Status: SHIPPED | OUTPATIENT
Start: 2023-11-14

## 2024-01-08 ENCOUNTER — OFFICE VISIT (OUTPATIENT)
Dept: CARDIOLOGY CLINIC | Age: 64
End: 2024-01-08
Payer: COMMERCIAL

## 2024-01-08 VITALS
SYSTOLIC BLOOD PRESSURE: 142 MMHG | DIASTOLIC BLOOD PRESSURE: 58 MMHG | HEART RATE: 71 BPM | OXYGEN SATURATION: 91 % | BODY MASS INDEX: 32.78 KG/M2 | HEIGHT: 71 IN

## 2024-01-08 DIAGNOSIS — R68.89 ABNORMAL ANKLE BRACHIAL INDEX (ABI): ICD-10-CM

## 2024-01-08 DIAGNOSIS — I10 ESSENTIAL HYPERTENSION: ICD-10-CM

## 2024-01-08 DIAGNOSIS — I63.9 CEREBROVASCULAR ACCIDENT (CVA), UNSPECIFIED MECHANISM (HCC): Chronic | ICD-10-CM

## 2024-01-08 DIAGNOSIS — I73.9 CLAUDICATION (HCC): ICD-10-CM

## 2024-01-08 DIAGNOSIS — N28.9 ABNORMAL RENAL FUNCTION: ICD-10-CM

## 2024-01-08 DIAGNOSIS — E78.5 DYSLIPIDEMIA: ICD-10-CM

## 2024-01-08 DIAGNOSIS — I10 ESSENTIAL HYPERTENSION: Chronic | ICD-10-CM

## 2024-01-08 DIAGNOSIS — I73.9 PVD (PERIPHERAL VASCULAR DISEASE) (HCC): ICD-10-CM

## 2024-01-08 DIAGNOSIS — R06.09 DOE (DYSPNEA ON EXERTION): ICD-10-CM

## 2024-01-08 DIAGNOSIS — I73.9 PAD (PERIPHERAL ARTERY DISEASE) (HCC): ICD-10-CM

## 2024-01-08 DIAGNOSIS — I73.9 CLAUDICATION IN PERIPHERAL VASCULAR DISEASE (HCC): ICD-10-CM

## 2024-01-08 DIAGNOSIS — I65.22 CAROTID STENOSIS, SYMPTOMATIC W/O INFARCT, LEFT: Primary | ICD-10-CM

## 2024-01-08 DIAGNOSIS — R09.89 BILATERAL CAROTID BRUITS: ICD-10-CM

## 2024-01-08 LAB
ANION GAP SERPL CALCULATED.3IONS-SCNC: 10 MEQ/L (ref 9–15)
BUN SERPL-MCNC: 20 MG/DL (ref 8–23)
CALCIUM SERPL-MCNC: 9.3 MG/DL (ref 8.5–9.9)
CHLORIDE SERPL-SCNC: 101 MEQ/L (ref 95–107)
CO2 SERPL-SCNC: 25 MEQ/L (ref 20–31)
CREAT SERPL-MCNC: 1.28 MG/DL (ref 0.7–1.2)
GLUCOSE SERPL-MCNC: 93 MG/DL (ref 70–99)
POTASSIUM SERPL-SCNC: 4.1 MEQ/L (ref 3.4–4.9)
SODIUM SERPL-SCNC: 136 MEQ/L (ref 135–144)

## 2024-01-08 PROCEDURE — 4004F PT TOBACCO SCREEN RCVD TLK: CPT | Performed by: INTERNAL MEDICINE

## 2024-01-08 PROCEDURE — 99214 OFFICE O/P EST MOD 30 MIN: CPT | Performed by: INTERNAL MEDICINE

## 2024-01-08 PROCEDURE — G8482 FLU IMMUNIZE ORDER/ADMIN: HCPCS | Performed by: INTERNAL MEDICINE

## 2024-01-08 PROCEDURE — 3078F DIAST BP <80 MM HG: CPT | Performed by: INTERNAL MEDICINE

## 2024-01-08 PROCEDURE — G8427 DOCREV CUR MEDS BY ELIG CLIN: HCPCS | Performed by: INTERNAL MEDICINE

## 2024-01-08 PROCEDURE — 3077F SYST BP >= 140 MM HG: CPT | Performed by: INTERNAL MEDICINE

## 2024-01-08 PROCEDURE — G8417 CALC BMI ABV UP PARAM F/U: HCPCS | Performed by: INTERNAL MEDICINE

## 2024-01-08 PROCEDURE — 3017F COLORECTAL CA SCREEN DOC REV: CPT | Performed by: INTERNAL MEDICINE

## 2024-01-08 RX ORDER — ICOSAPENT ETHYL 1000 MG/1
CAPSULE ORAL
Qty: 360 CAPSULE | Refills: 3 | Status: SHIPPED | OUTPATIENT
Start: 2024-01-08

## 2024-01-08 RX ORDER — ASPIRIN 81 MG/1
TABLET ORAL
Qty: 90 TABLET | Refills: 3 | Status: SHIPPED | OUTPATIENT
Start: 2024-01-08

## 2024-01-08 RX ORDER — FENOFIBRATE 160 MG/1
160 TABLET ORAL DAILY
Qty: 90 TABLET | Refills: 3 | Status: SHIPPED | OUTPATIENT
Start: 2024-01-08

## 2024-01-08 RX ORDER — VALSARTAN AND HYDROCHLOROTHIAZIDE 80; 12.5 MG/1; MG/1
TABLET, FILM COATED ORAL
Qty: 90 TABLET | Refills: 3 | Status: SHIPPED | OUTPATIENT
Start: 2024-01-08

## 2024-01-08 RX ORDER — ATORVASTATIN CALCIUM 40 MG/1
40 TABLET, FILM COATED ORAL DAILY
Qty: 90 TABLET | Refills: 3 | Status: SHIPPED | OUTPATIENT
Start: 2024-01-08

## 2024-01-08 RX ORDER — METOPROLOL SUCCINATE 25 MG/1
TABLET, EXTENDED RELEASE ORAL
Qty: 90 TABLET | Refills: 3 | Status: SHIPPED | OUTPATIENT
Start: 2024-01-08

## 2024-01-08 RX ORDER — CLOPIDOGREL BISULFATE 75 MG/1
TABLET ORAL
Qty: 90 TABLET | Refills: 3 | Status: SHIPPED | OUTPATIENT
Start: 2024-01-08

## 2024-01-08 ASSESSMENT — ENCOUNTER SYMPTOMS
EYES NEGATIVE: 1
NAUSEA: 0
SHORTNESS OF BREATH: 0
STRIDOR: 0
RESPIRATORY NEGATIVE: 1
BACK PAIN: 1
COUGH: 0
BLOOD IN STOOL: 0
CHEST TIGHTNESS: 0
GASTROINTESTINAL NEGATIVE: 1
WHEEZING: 0

## 2024-01-08 NOTE — PROGRESS NOTES
OFFICE VISIT        Patient: Ranjith Rodriguez  YOB: 1960  MRN: [id number]    Chief Complaint: L carotid stent claudication   Chief Complaint   Patient presents with    6 Month Follow-Up     For CVA       CV Data:  4/2017 echo ef 65  2019 Left Carotid CEA/patch - Dr. Morris  9/28/2020 B/l LE angio, left MAHAD stenting, left SFA ATH/PTA with DCB/stent.  Right LE will be staged  10/21/2020 RLE:  RSFA Stent  3/21 CUS - Dr. Arturo GAYTAN 100 LCA patent. Moderate plaque.   3/22 CUS- LUCILA 100%.  LCEA site patent  1/23 CUS  LUCILA 100%.  LCEA site patent    Subjective/HPI pt has significant claudication L>R. He has chronic cough. He is minimally active. Describes no cp nor sob currently. No bleed. Has back pain.     10/12/2020 Left leg feels better since PTA. Right leg still bothers him. No cp no sob. No bleed.     11/23/2020 no cp no sob no falls no bleed. Has not smoked in 1 week. Legs feel better     2/23/21 no cp some gaitan does not feel good. feeels tired today. Takes meds    8/5/21 denies CP no sob no bleed no fals.     3/17/22 doin wiliam no cp no sob told to Dr. Morris retiring. Still smoke.r walks regualrly.     9/23/22 still SOB still smoking no cp no falls nobleed.     5/22/23 doing well no cp no sob not very activ etakes meds. Still smokes    7/10/23 still smokes same GAITAN minimally active no CP no bleed no falls     1/8/24 no changes walks no cp some gaitan not very active though. No falls no bleed. Takes meds. Still makes.     He has no cardiac stents.   Smoker  No etoh  Lives with brother  Disabled all his life.      EKG: SR 75    Past Medical History:   Diagnosis Date    Abnormal ankle brachial index (RAO)     Acute idiopathic gout of left wrist 12/16/2020    Alcoholic (HCC)-remote Hx 8/28/2015    In remission goes to AA daily--agrees not to overtake pain meds     Alcoholic polyneuropathy (HCC)     Asthma     CAD (coronary artery disease)     [facility]    Chronic back pain greater than 3 months duration

## 2024-01-11 DIAGNOSIS — K59.00 CONSTIPATION, UNSPECIFIED CONSTIPATION TYPE: ICD-10-CM

## 2024-01-12 RX ORDER — DOCUSATE SODIUM 100 MG/1
100 CAPSULE, LIQUID FILLED ORAL 2 TIMES DAILY
Qty: 30 CAPSULE | Refills: 3 | Status: SHIPPED | OUTPATIENT
Start: 2024-01-12

## 2024-01-30 ENCOUNTER — OFFICE VISIT (OUTPATIENT)
Dept: FAMILY MEDICINE CLINIC | Age: 64
End: 2024-01-30
Payer: COMMERCIAL

## 2024-01-30 VITALS
OXYGEN SATURATION: 98 % | HEIGHT: 71 IN | DIASTOLIC BLOOD PRESSURE: 60 MMHG | WEIGHT: 235 LBS | RESPIRATION RATE: 18 BRPM | BODY MASS INDEX: 32.9 KG/M2 | SYSTOLIC BLOOD PRESSURE: 110 MMHG | HEART RATE: 88 BPM | TEMPERATURE: 97.8 F

## 2024-01-30 DIAGNOSIS — M53.3 SI (SACROILIAC) PAIN: ICD-10-CM

## 2024-01-30 DIAGNOSIS — J41.0 SIMPLE CHRONIC BRONCHITIS (HCC): Primary | ICD-10-CM

## 2024-01-30 DIAGNOSIS — M51.36 DDD (DEGENERATIVE DISC DISEASE), LUMBAR: Chronic | ICD-10-CM

## 2024-01-30 DIAGNOSIS — Z79.899 HIGH RISK MEDICATION USE: ICD-10-CM

## 2024-01-30 DIAGNOSIS — M54.17 LUMBOSACRAL RADICULOPATHY AT S1: ICD-10-CM

## 2024-01-30 DIAGNOSIS — M10.032 IDIOPATHIC GOUT OF LEFT WRIST, UNSPECIFIED CHRONICITY: ICD-10-CM

## 2024-01-30 PROCEDURE — 3078F DIAST BP <80 MM HG: CPT | Performed by: PHYSICIAN ASSISTANT

## 2024-01-30 PROCEDURE — 99214 OFFICE O/P EST MOD 30 MIN: CPT | Performed by: PHYSICIAN ASSISTANT

## 2024-01-30 PROCEDURE — G8427 DOCREV CUR MEDS BY ELIG CLIN: HCPCS | Performed by: PHYSICIAN ASSISTANT

## 2024-01-30 PROCEDURE — G8417 CALC BMI ABV UP PARAM F/U: HCPCS | Performed by: PHYSICIAN ASSISTANT

## 2024-01-30 PROCEDURE — 3074F SYST BP LT 130 MM HG: CPT | Performed by: PHYSICIAN ASSISTANT

## 2024-01-30 PROCEDURE — 3017F COLORECTAL CA SCREEN DOC REV: CPT | Performed by: PHYSICIAN ASSISTANT

## 2024-01-30 PROCEDURE — 4004F PT TOBACCO SCREEN RCVD TLK: CPT | Performed by: PHYSICIAN ASSISTANT

## 2024-01-30 PROCEDURE — G8482 FLU IMMUNIZE ORDER/ADMIN: HCPCS | Performed by: PHYSICIAN ASSISTANT

## 2024-01-30 PROCEDURE — 3023F SPIROM DOC REV: CPT | Performed by: PHYSICIAN ASSISTANT

## 2024-01-30 RX ORDER — BUDESONIDE AND FORMOTEROL FUMARATE DIHYDRATE 80; 4.5 UG/1; UG/1
2 AEROSOL RESPIRATORY (INHALATION) 2 TIMES DAILY
Qty: 3 EACH | Refills: 4 | Status: SHIPPED | OUTPATIENT
Start: 2024-01-30

## 2024-01-30 RX ORDER — PREGABALIN 100 MG/1
100 CAPSULE ORAL 3 TIMES DAILY
Qty: 90 CAPSULE | Refills: 2 | Status: SHIPPED | OUTPATIENT
Start: 2024-01-30 | End: 2024-04-29

## 2024-01-30 RX ORDER — ALLOPURINOL 100 MG/1
200 TABLET ORAL DAILY
Qty: 180 TABLET | Refills: 4 | Status: SHIPPED | OUTPATIENT
Start: 2024-01-30

## 2024-01-30 ASSESSMENT — PATIENT HEALTH QUESTIONNAIRE - PHQ9
2. FEELING DOWN, DEPRESSED OR HOPELESS: 0
SUM OF ALL RESPONSES TO PHQ QUESTIONS 1-9: 0
SUM OF ALL RESPONSES TO PHQ QUESTIONS 1-9: 0
1. LITTLE INTEREST OR PLEASURE IN DOING THINGS: 0
SUM OF ALL RESPONSES TO PHQ QUESTIONS 1-9: 0
SUM OF ALL RESPONSES TO PHQ QUESTIONS 1-9: 0
SUM OF ALL RESPONSES TO PHQ9 QUESTIONS 1 & 2: 0

## 2024-01-30 NOTE — PROGRESS NOTES
Subjective  Ranjith Rodriguez, 63 y.o. male presents today with:  Chief Complaint   Patient presents with    3 Month Follow-Up     Follow up        HPI  Last OV with me: 10/30/23.  Overall, doing OK.     Needing lyrica refilled today.  Takes chronically for neuropathy. States that medication is working well.  No longer seeing PMR for pain.   No injury/fall/trauma.     No longer wanting hospital bed.    This was approved.      Remains complaint with his medications.    No chest pain, SOB, tightness.  +WARE, chronic.  Continues to smoke.   Denies claudication, worsening leg pain.    Review of Systems   Constitutional:  Negative for activity change, appetite change, chills, fatigue, fever and unexpected weight change.   HENT:  Negative for nosebleeds.    Eyes:  Negative for photophobia.   Respiratory:  Positive for cough. Negative for chest tightness and shortness of breath.    Cardiovascular:  Negative for chest pain, palpitations and leg swelling.   Gastrointestinal:  Negative for abdominal pain, blood in stool, diarrhea, nausea and vomiting.   Genitourinary:  Negative for decreased urine volume, difficulty urinating, frequency and urgency.   Musculoskeletal:  Positive for arthralgias, gait problem and myalgias.   Skin:  Negative for rash.   Neurological:  Positive for numbness. Negative for dizziness, facial asymmetry, weakness, light-headedness and headaches.   Hematological:  Does not bruise/bleed easily.   Psychiatric/Behavioral:  Negative for dysphoric mood and sleep disturbance. The patient is not nervous/anxious.        Past Medical History:   Diagnosis Date    Abnormal ankle brachial index (RAO)     Acute idiopathic gout of left wrist 12/16/2020    Alcoholic (HCC)-remote Hx 8/28/2015    In remission goes to AA daily--agrees not to overtake pain meds     Alcoholic polyneuropathy (HCC)     Asthma     CAD (coronary artery disease)     [facility]    Chronic back pain greater than 3 months duration     Claudication in

## 2024-01-31 ASSESSMENT — ENCOUNTER SYMPTOMS
SHORTNESS OF BREATH: 0
ABDOMINAL PAIN: 0
DIARRHEA: 0
COUGH: 1
BLOOD IN STOOL: 0
VOMITING: 0
CHEST TIGHTNESS: 0
PHOTOPHOBIA: 0
NAUSEA: 0

## 2024-02-05 ENCOUNTER — TELEPHONE (OUTPATIENT)
Dept: FAMILY MEDICINE CLINIC | Age: 64
End: 2024-02-05

## 2024-02-08 DIAGNOSIS — Z79.899 HIGH RISK MEDICATION USE: ICD-10-CM

## 2024-02-08 DIAGNOSIS — M51.36 DDD (DEGENERATIVE DISC DISEASE), LUMBAR: Chronic | ICD-10-CM

## 2024-02-08 DIAGNOSIS — M53.3 SI (SACROILIAC) PAIN: ICD-10-CM

## 2024-02-08 DIAGNOSIS — M54.17 LUMBOSACRAL RADICULOPATHY AT S1: ICD-10-CM

## 2024-02-08 RX ORDER — PREGABALIN 100 MG/1
100 CAPSULE ORAL 3 TIMES DAILY
Qty: 90 CAPSULE | Refills: 2 | Status: SHIPPED | OUTPATIENT
Start: 2024-02-08 | End: 2024-05-08

## 2024-04-09 ENCOUNTER — HOSPITAL ENCOUNTER (OUTPATIENT)
Dept: ULTRASOUND IMAGING | Age: 64
Discharge: HOME | End: 2024-04-11
Attending: INTERNAL MEDICINE
Payer: COMMERCIAL

## 2024-04-09 DIAGNOSIS — I63.9 CEREBROVASCULAR ACCIDENT (CVA), UNSPECIFIED MECHANISM (HCC): Chronic | ICD-10-CM

## 2024-04-09 DIAGNOSIS — R09.89 BILATERAL CAROTID BRUITS: ICD-10-CM

## 2024-04-09 LAB
VAS LEFT CCA DIST EDV: 20.8 CM/S
VAS LEFT CCA DIST PSV: 125 CM/S
VAS LEFT CCA MID EDV: 27.7 CM/S
VAS LEFT CCA MID PSV: 127 CM/S
VAS LEFT CCA PROX EDV: 23.3 CM/S
VAS LEFT CCA PROX PSV: 233 CM/S
VAS LEFT ECA PSV: 170 CM/S
VAS LEFT ICA DIST EDV: 43.6 CM/S
VAS LEFT ICA DIST PSV: 169 CM/S
VAS LEFT ICA MID EDV: 27.7 CM/S
VAS LEFT ICA MID PSV: 147 CM/S
VAS LEFT ICA PROX EDV: 23.2 CM/S
VAS LEFT ICA PROX PSV: 156 CM/S
VAS LEFT ICA/CCA PSV: 1.33
VAS LEFT VERTEBRAL EDV: 5.64 CM/S
VAS LEFT VERTEBRAL PSV: 17.1 CM/S
VAS RIGHT VERTEBRAL EDV: 15.7 CM/S
VAS RIGHT VERTEBRAL PSV: 33.7 CM/S

## 2024-04-09 PROCEDURE — 93880 EXTRACRANIAL BILAT STUDY: CPT | Performed by: INTERNAL MEDICINE

## 2024-04-09 PROCEDURE — 93880 EXTRACRANIAL BILAT STUDY: CPT

## 2024-04-11 ENCOUNTER — TELEPHONE (OUTPATIENT)
Dept: CARDIOLOGY CLINIC | Age: 64
End: 2024-04-11

## 2024-04-11 NOTE — TELEPHONE ENCOUNTER
Pt calling for Vascular duplex carotid bilateral results and the numbers that     Please call pt back to discuss results.      Interpretation Summary         Occluded right internal carotid artery.    Mild (<50%) stenosis in the left internal carotid artery.  Mild plaque in the left internal carotid artery.    Normal antegrade flow involving the right vertebral artery.    Normal antegrade flow involving the left vertebral artery.      Pt # [phone]

## 2024-04-11 NOTE — TELEPHONE ENCOUNTER
Attempted to call patient to notify him of Dr. Houston's response. No answer from patient. Voicemail left for patient to call back to office.

## 2024-04-16 DIAGNOSIS — I73.9 PAD (PERIPHERAL ARTERY DISEASE) (HCC): ICD-10-CM

## 2024-04-16 DIAGNOSIS — I10 ESSENTIAL HYPERTENSION: Chronic | ICD-10-CM

## 2024-04-16 DIAGNOSIS — I10 ESSENTIAL HYPERTENSION: ICD-10-CM

## 2024-04-16 DIAGNOSIS — I63.9 CEREBROVASCULAR ACCIDENT (CVA), UNSPECIFIED MECHANISM (HCC): Chronic | ICD-10-CM

## 2024-04-16 RX ORDER — ATORVASTATIN CALCIUM 40 MG/1
40 TABLET, FILM COATED ORAL DAILY
Qty: 90 TABLET | Refills: 3 | Status: SHIPPED | OUTPATIENT
Start: 2024-04-16

## 2024-04-16 RX ORDER — ICOSAPENT ETHYL 1000 MG/1
CAPSULE ORAL
Qty: 360 CAPSULE | Refills: 3 | Status: SHIPPED | OUTPATIENT
Start: 2024-04-16

## 2024-04-16 RX ORDER — CLOPIDOGREL BISULFATE 75 MG/1
TABLET ORAL
Qty: 90 TABLET | Refills: 3 | Status: SHIPPED | OUTPATIENT
Start: 2024-04-16

## 2024-04-16 RX ORDER — FENOFIBRATE 160 MG/1
160 TABLET ORAL DAILY
Qty: 90 TABLET | Refills: 3 | Status: SHIPPED | OUTPATIENT
Start: 2024-04-16

## 2024-04-16 RX ORDER — VALSARTAN AND HYDROCHLOROTHIAZIDE 80; 12.5 MG/1; MG/1
TABLET, FILM COATED ORAL
Qty: 90 TABLET | Refills: 3 | Status: SHIPPED | OUTPATIENT
Start: 2024-04-16

## 2024-04-16 RX ORDER — METOPROLOL SUCCINATE 25 MG/1
TABLET, EXTENDED RELEASE ORAL
Qty: 90 TABLET | Refills: 3 | Status: SHIPPED | OUTPATIENT
Start: 2024-04-16

## 2024-04-16 RX ORDER — ASPIRIN 81 MG/1
TABLET ORAL
Qty: 90 TABLET | Refills: 3 | Status: SHIPPED | OUTPATIENT
Start: 2024-04-16

## 2024-04-16 NOTE — TELEPHONE ENCOUNTER
Requesting medication refill. Please approve or deny this request.    Rx requested:  Requested Prescriptions     Pending Prescriptions Disp Refills    clopidogrel (PLAVIX) 75 MG tablet 90 tablet 3     Si po QD    fenofibrate (TRIGLIDE) 160 MG tablet 90 tablet 3     Sig: Take 1 tablet by mouth daily    atorvastatin (LIPITOR) 40 MG tablet 90 tablet 3     Sig: Take 1 tablet by mouth daily    aspirin (ASPIRIN LOW DOSE) 81 MG EC tablet 90 tablet 3     Sig: take 1 tablet by mouth once daily    metoprolol succinate (TOPROL XL) 25 MG extended release tablet 90 tablet 3     Sig: take 1 tablet by mouth once daily    valsartan-hydroCHLOROthiazide (DIOVAN-HCT) 80-12.5 MG per tablet 90 tablet 3     Sig: take 1 tablet by mouth once daily    VASCEPA 1 g CAPS capsule 360 capsule 3     Sig: take 2 capsules by mouth twice a day         Last Office Visit:   2024      Next Visit Date:  Future Appointments   Date Time Provider Department Center   2024  8:00 AM Kiki Rashid PA-C [facility]   2024 10:00 AM Kiki Rashid PA-C [facility]   7/10/2024  1:00 PM Eric Houston MD [facility]               Last refill 2024. Please approve or deny.

## 2024-04-16 NOTE — TELEPHONE ENCOUNTER
Patient notified of results per Dr. Houston. Patient also states that he needs refills on all his medication from Dr. Houston. Refills sent to pharmacy for patient.

## 2024-04-24 DIAGNOSIS — I10 ESSENTIAL HYPERTENSION: ICD-10-CM

## 2024-04-24 RX ORDER — METOPROLOL SUCCINATE 25 MG/1
TABLET, EXTENDED RELEASE ORAL
Qty: 90 TABLET | Refills: 3 | Status: SHIPPED | OUTPATIENT
Start: 2024-04-24

## 2024-04-24 NOTE — TELEPHONE ENCOUNTER
Pt calling for a refill on a medication that slows his heart down. States that he lost the medication so he does not know what the name is.

## 2024-04-24 NOTE — TELEPHONE ENCOUNTER
Sending to new pharmacy per patient.     Requesting medication refill. Please approve or deny this request.    Rx requested:  Requested Prescriptions     Pending Prescriptions Disp Refills    metoprolol succinate (TOPROL XL) 25 MG extended release tablet 90 tablet 3     Sig: take 1 tablet by mouth once daily         Last Office Visit:   1/8/2024      Next Visit Date:  Future Appointments   Date Time Provider Department Center   4/30/2024  8:00 AM Kiki Rashid PA-C [facility]   4/30/2024 10:00 AM Kiki Rashid PA-C [facility]   7/10/2024  1:00 PM Eric Houston MD [facility]               Last refill 04/16/2024. Please approve or deny.

## 2024-04-30 ENCOUNTER — OFFICE VISIT (OUTPATIENT)
Dept: FAMILY MEDICINE CLINIC | Age: 64
End: 2024-04-30
Payer: COMMERCIAL

## 2024-04-30 VITALS
DIASTOLIC BLOOD PRESSURE: 50 MMHG | HEIGHT: 71 IN | WEIGHT: 242.51 LBS | BODY MASS INDEX: 33.95 KG/M2 | HEART RATE: 72 BPM | RESPIRATION RATE: 16 BRPM | SYSTOLIC BLOOD PRESSURE: 122 MMHG | OXYGEN SATURATION: 98 %

## 2024-04-30 DIAGNOSIS — E88.819 INSULIN RESISTANCE: Chronic | ICD-10-CM

## 2024-04-30 DIAGNOSIS — Z13.220 ENCOUNTER FOR LIPID SCREENING FOR CARDIOVASCULAR DISEASE: ICD-10-CM

## 2024-04-30 DIAGNOSIS — Z13.6 ENCOUNTER FOR LIPID SCREENING FOR CARDIOVASCULAR DISEASE: ICD-10-CM

## 2024-04-30 DIAGNOSIS — M51.36 DDD (DEGENERATIVE DISC DISEASE), LUMBAR: Chronic | ICD-10-CM

## 2024-04-30 DIAGNOSIS — Z79.899 HIGH RISK MEDICATION USE: ICD-10-CM

## 2024-04-30 DIAGNOSIS — Z72.0 TOBACCO ABUSE: ICD-10-CM

## 2024-04-30 DIAGNOSIS — Z00.00 MEDICARE ANNUAL WELLNESS VISIT, SUBSEQUENT: Primary | ICD-10-CM

## 2024-04-30 DIAGNOSIS — M54.17 LUMBOSACRAL RADICULOPATHY AT S1: ICD-10-CM

## 2024-04-30 DIAGNOSIS — G62.1 ALCOHOLIC POLYNEUROPATHY (HCC): Chronic | ICD-10-CM

## 2024-04-30 DIAGNOSIS — F10.21 PERSONAL HISTORY OF ALCOHOLISM (HCC): Chronic | ICD-10-CM

## 2024-04-30 DIAGNOSIS — R73.03 PREDIABETES: ICD-10-CM

## 2024-04-30 DIAGNOSIS — J41.0 SIMPLE CHRONIC BRONCHITIS (HCC): ICD-10-CM

## 2024-04-30 DIAGNOSIS — M53.3 SI (SACROILIAC) PAIN: ICD-10-CM

## 2024-04-30 LAB
ALBUMIN SERPL-MCNC: 3.9 G/DL (ref 3.5–4.6)
ALP SERPL-CCNC: 59 U/L (ref 35–104)
ALT SERPL-CCNC: 27 U/L (ref 0–41)
ANION GAP SERPL CALCULATED.3IONS-SCNC: 14 MEQ/L (ref 9–15)
BILIRUB SERPL-MCNC: 1.1 MG/DL (ref 0.2–0.7)
BUN SERPL-MCNC: 39 MG/DL (ref 8–23)
CALCIUM SERPL-MCNC: 9.8 MG/DL (ref 8.5–9.9)
CHLORIDE SERPL-SCNC: 101 MEQ/L (ref 95–107)
CHOLEST SERPL-MCNC: 118 MG/DL (ref 0–199)
CO2 SERPL-SCNC: 23 MEQ/L (ref 20–31)
CREAT SERPL-MCNC: 1.67 MG/DL (ref 0.7–1.2)
ERYTHROCYTE [DISTWIDTH] IN BLOOD BY AUTOMATED COUNT: 14.4 % (ref 11.5–14.5)
ESTIMATED AVERAGE GLUCOSE: 123 MG/DL
FOLATE: 4.8 NG/ML (ref 4.8–24.2)
GLOBULIN SER CALC-MCNC: 3.9 G/DL (ref 2.3–3.5)
GLUCOSE SERPL-MCNC: 84 MG/DL (ref 70–99)
HBA1C MFR BLD: 5.9 % (ref 4–6)
HCT VFR BLD AUTO: 46.3 % (ref 42–52)
HDLC SERPL-MCNC: 32 MG/DL (ref 40–59)
HGB BLD-MCNC: 15.3 G/DL (ref 14–18)
LDL CHOLESTEROL CALCULATED: 59 MG/DL (ref 0–129)
MCH RBC QN AUTO: 30.2 PG (ref 27–31.3)
MCHC RBC AUTO-ENTMCNC: 33 % (ref 33–37)
MCV RBC AUTO: 91.5 FL (ref 79–92.2)
PLATELET # BLD AUTO: 337 K/UL (ref 130–400)
POTASSIUM SERPL-SCNC: 4 MEQ/L (ref 3.4–4.9)
PROT SERPL-MCNC: 7.8 G/DL (ref 6.3–8)
RBC # BLD AUTO: 5.06 M/UL (ref 4.7–6.1)
SODIUM SERPL-SCNC: 138 MEQ/L (ref 135–144)
TRIGLYCERIDE, FASTING: 136 MG/DL (ref 0–150)
VITAMIN B-12: 407 PG/ML (ref 232–1245)
WBC # BLD AUTO: 11.7 K/UL (ref 4.8–10.8)

## 2024-04-30 PROCEDURE — G0439 PPPS, SUBSEQ VISIT: HCPCS | Performed by: PHYSICIAN ASSISTANT

## 2024-04-30 PROCEDURE — 3074F SYST BP LT 130 MM HG: CPT | Performed by: PHYSICIAN ASSISTANT

## 2024-04-30 PROCEDURE — 3078F DIAST BP <80 MM HG: CPT | Performed by: PHYSICIAN ASSISTANT

## 2024-04-30 PROCEDURE — 3017F COLORECTAL CA SCREEN DOC REV: CPT | Performed by: PHYSICIAN ASSISTANT

## 2024-04-30 RX ORDER — PREGABALIN 100 MG/1
100 CAPSULE ORAL 3 TIMES DAILY
Qty: 90 CAPSULE | Refills: 2 | Status: SHIPPED | OUTPATIENT
Start: 2024-05-16 | End: 2024-08-14

## 2024-04-30 SDOH — ECONOMIC STABILITY: INCOME INSECURITY: HOW HARD IS IT FOR YOU TO PAY FOR THE VERY BASICS LIKE FOOD, HOUSING, MEDICAL CARE, AND HEATING?: NOT HARD AT ALL

## 2024-04-30 SDOH — ECONOMIC STABILITY: FOOD INSECURITY: WITHIN THE PAST 12 MONTHS, YOU WORRIED THAT YOUR FOOD WOULD RUN OUT BEFORE YOU GOT MONEY TO BUY MORE.: NEVER TRUE

## 2024-04-30 SDOH — ECONOMIC STABILITY: FOOD INSECURITY: WITHIN THE PAST 12 MONTHS, THE FOOD YOU BOUGHT JUST DIDN'T LAST AND YOU DIDN'T HAVE MONEY TO GET MORE.: NEVER TRUE

## 2024-04-30 ASSESSMENT — PATIENT HEALTH QUESTIONNAIRE - PHQ9
1. LITTLE INTEREST OR PLEASURE IN DOING THINGS: NOT AT ALL
2. FEELING DOWN, DEPRESSED OR HOPELESS: NOT AT ALL
SUM OF ALL RESPONSES TO PHQ9 QUESTIONS 1 & 2: 0
SUM OF ALL RESPONSES TO PHQ QUESTIONS 1-9: 0

## 2024-04-30 ASSESSMENT — LIFESTYLE VARIABLES: HOW OFTEN DO YOU HAVE A DRINK CONTAINING ALCOHOL: NEVER

## 2024-04-30 NOTE — PROGRESS NOTES
Medicare Annual Wellness Visit    Ranjith Rodriguez is here for Medicare AWV    Assessment & Plan   Medicare annual wellness visit, subsequent  Personal history of alcoholism (HCC)  Simple chronic bronchitis (HCC)  -     CBC; Future  -     Comprehensive Metabolic Panel; Future  Tobacco abuse  Alcoholic polyneuropathy (HCC)  -     Vitamin B12 & Folate; Future  Prediabetes  -     Comprehensive Metabolic Panel; Future  -     Hemoglobin A1C; Future  Insulin resistance  Encounter for lipid screening for cardiovascular disease  -     Lipid, Fasting; Future  DDD (degenerative disc disease), lumbar  -     pregabalin (LYRICA) 100 MG capsule; Take 1 capsule by mouth 3 times daily for 90 days. Max Daily Amount: 300 mg, Disp-90 capsule, R-2Print  High risk medication use - 01/25/18 OARRS PM&R, 03/23/18 OARRS PM&R, 02/15/17 Med Contract PM&R, 09/21/17 Urine Drug Screen: negative PM&R  -     pregabalin (LYRICA) 100 MG capsule; Take 1 capsule by mouth 3 times daily for 90 days. Max Daily Amount: 300 mg, Disp-90 capsule, R-2Print  SI (sacroiliac) pain  -     pregabalin (LYRICA) 100 MG capsule; Take 1 capsule by mouth 3 times daily for 90 days. Max Daily Amount: 300 mg, Disp-90 capsule, R-2Print  Lumbosacral radiculopathy at S1  -     pregabalin (LYRICA) 100 MG capsule; Take 1 capsule by mouth 3 times daily for 90 days. Max Daily Amount: 300 mg, Disp-90 capsule, R-2Print    Discussed hygiene with patient today.   Not regularly bathing.  Encouraged patient to have daily showers.  Alejandrina has helped with patient, does not feel like he needs her services at this time.   Continue medications.  Labs.  3 month follow up.     Recommendations for Preventive Services Due: see orders and patient instructions/AVS.  Recommended screening schedule for the next 5-10 years is provided to the patient in written form: see Patient Instructions/AVS.     Return in about 3 months (around 7/30/2024) for follow up in office.     Subjective   The following

## 2024-05-03 DIAGNOSIS — N28.9 DECREASED RENAL FUNCTION: Primary | ICD-10-CM

## 2024-05-11 DIAGNOSIS — M53.3 SI (SACROILIAC) PAIN: ICD-10-CM

## 2024-05-11 DIAGNOSIS — M54.17 LUMBOSACRAL RADICULOPATHY AT S1: ICD-10-CM

## 2024-05-11 DIAGNOSIS — M51.36 DDD (DEGENERATIVE DISC DISEASE), LUMBAR: Chronic | ICD-10-CM

## 2024-05-11 DIAGNOSIS — Z79.899 HIGH RISK MEDICATION USE: ICD-10-CM

## 2024-05-13 NOTE — TELEPHONE ENCOUNTER
Comments:     Last Office Visit (last PCP visit):   4/30/2024    Next Visit Date:  Future Appointments   Date Time Provider Department Center   7/10/2024  1:00 PM Eric Houston MD [facility]   7/30/2024  1:15 PM Kiki Rashid PA-C [facility]       **If hasn't been seen in over a year OR hasn't followed up according to last diabetes/ADHD visit, make appointment for patient before sending refill to provider.    Rx requested:  Requested Prescriptions     Pending Prescriptions Disp Refills    pregabalin (LYRICA) 100 MG capsule [Pharmacy Med Name: pregabalin 100 mg capsule] 90 capsule 2     Sig: Take 1 capsule by mouth 3 times daily for 90 days. Max Daily Amount: 300 mg

## 2024-05-15 RX ORDER — PREGABALIN 100 MG/1
100 CAPSULE ORAL 3 TIMES DAILY
Qty: 90 CAPSULE | Refills: 2 | Status: SHIPPED | OUTPATIENT
Start: 2024-05-15 | End: 2024-08-13

## 2024-05-23 ENCOUNTER — APPOINTMENT (OUTPATIENT)
Dept: GENERAL RADIOLOGY | Age: 64
DRG: 565 | End: 2024-05-23
Payer: COMMERCIAL

## 2024-05-23 ENCOUNTER — HOSPITAL ENCOUNTER (INPATIENT)
Age: 64
LOS: 6 days | Discharge: NURSING, REHAB OR LONG-TERM CARE | DRG: 565 | End: 2024-05-29
Attending: INTERNAL MEDICINE | Admitting: INTERNAL MEDICINE
Payer: COMMERCIAL

## 2024-05-23 ENCOUNTER — APPOINTMENT (OUTPATIENT)
Dept: CT IMAGING | Age: 64
DRG: 565 | End: 2024-05-23
Payer: COMMERCIAL

## 2024-05-23 DIAGNOSIS — M54.17 LUMBOSACRAL RADICULOPATHY AT S1: ICD-10-CM

## 2024-05-23 DIAGNOSIS — M51.36 DDD (DEGENERATIVE DISC DISEASE), LUMBAR: Chronic | ICD-10-CM

## 2024-05-23 DIAGNOSIS — R74.8 CARDIAC ENZYMES ELEVATED: ICD-10-CM

## 2024-05-23 DIAGNOSIS — J18.9 PNEUMONIA OF LEFT LOWER LOBE DUE TO INFECTIOUS ORGANISM: Primary | ICD-10-CM

## 2024-05-23 DIAGNOSIS — T79.6XXA TRAUMATIC RHABDOMYOLYSIS, INITIAL ENCOUNTER (HCC): ICD-10-CM

## 2024-05-23 DIAGNOSIS — Z79.899 HIGH RISK MEDICATION USE: ICD-10-CM

## 2024-05-23 DIAGNOSIS — R07.9 CHEST PAIN, UNSPECIFIED TYPE: ICD-10-CM

## 2024-05-23 DIAGNOSIS — N17.9 ACUTE RENAL FAILURE, UNSPECIFIED ACUTE RENAL FAILURE TYPE (HCC): ICD-10-CM

## 2024-05-23 DIAGNOSIS — M53.3 SI (SACROILIAC) PAIN: ICD-10-CM

## 2024-05-23 PROBLEM — M62.82 RHABDOMYOLYSIS: Status: ACTIVE | Noted: 2024-05-23

## 2024-05-23 LAB
ALBUMIN SERPL-MCNC: 4.1 G/DL (ref 3.5–4.6)
ALP SERPL-CCNC: 55 U/L (ref 35–104)
ALT SERPL-CCNC: 65 U/L (ref 0–41)
ANION GAP SERPL CALCULATED.3IONS-SCNC: 16 MEQ/L (ref 9–15)
AST SERPL-CCNC: 190 U/L (ref 0–40)
B PARAP IS1001 DNA NPH QL NAA+NON-PROBE: NOT DETECTED
B PERT.PT PRMT NPH QL NAA+NON-PROBE: NOT DETECTED
BACTERIA URNS QL MICRO: NEGATIVE /HPF
BASOPHILS # BLD: 0 K/UL (ref 0–0.2)
BASOPHILS NFR BLD: 0.3 %
BILIRUB SERPL-MCNC: 1.4 MG/DL (ref 0.2–0.7)
BILIRUB UR QL STRIP: NEGATIVE
BNP BLD-MCNC: 398 PG/ML
BUN SERPL-MCNC: 71 MG/DL (ref 8–23)
C PNEUM DNA NPH QL NAA+NON-PROBE: NOT DETECTED
CALCIUM SERPL-MCNC: 9.7 MG/DL (ref 8.5–9.9)
CHLORIDE SERPL-SCNC: 100 MEQ/L (ref 95–107)
CK SERPL-CCNC: 9559 U/L (ref 0–190)
CLARITY UR: ABNORMAL
CO2 SERPL-SCNC: 22 MEQ/L (ref 20–31)
COLOR UR: ABNORMAL
CREAT SERPL-MCNC: 3.47 MG/DL (ref 0.7–1.2)
EOSINOPHIL # BLD: 0 K/UL (ref 0–0.7)
EOSINOPHIL NFR BLD: 0.2 %
EPI CELLS #/AREA URNS AUTO: ABNORMAL /HPF (ref 0–5)
ERYTHROCYTE [DISTWIDTH] IN BLOOD BY AUTOMATED COUNT: 14.6 % (ref 11.5–14.5)
ETHANOL PERCENT: NORMAL G/DL
ETHANOLAMINE SERPL-MCNC: <10 MG/DL (ref 0–0.08)
FLUAV RNA NPH QL NAA+NON-PROBE: NOT DETECTED
FLUBV RNA NPH QL NAA+NON-PROBE: NOT DETECTED
GLOBULIN SER CALC-MCNC: 4 G/DL (ref 2.3–3.5)
GLUCOSE SERPL-MCNC: 101 MG/DL (ref 70–99)
GLUCOSE UR STRIP-MCNC: NEGATIVE MG/DL
HADV DNA NPH QL NAA+NON-PROBE: NOT DETECTED
HCOV 229E RNA NPH QL NAA+NON-PROBE: NOT DETECTED
HCOV HKU1 RNA NPH QL NAA+NON-PROBE: NOT DETECTED
HCOV NL63 RNA NPH QL NAA+NON-PROBE: NOT DETECTED
HCOV OC43 RNA NPH QL NAA+NON-PROBE: NOT DETECTED
HCT VFR BLD AUTO: 47 % (ref 42–52)
HGB BLD-MCNC: 15.8 G/DL (ref 14–18)
HGB UR QL STRIP: ABNORMAL
HMPV RNA NPH QL NAA+NON-PROBE: NOT DETECTED
HPIV1 RNA NPH QL NAA+NON-PROBE: NOT DETECTED
HPIV2 RNA NPH QL NAA+NON-PROBE: NOT DETECTED
HPIV3 RNA NPH QL NAA+NON-PROBE: NOT DETECTED
HPIV4 RNA NPH QL NAA+NON-PROBE: NOT DETECTED
HYALINE CASTS #/AREA URNS AUTO: ABNORMAL /HPF (ref 0–5)
KETONES UR STRIP-MCNC: NEGATIVE MG/DL
LACTIC ACID, SEPSIS: 1.2 MMOL/L (ref 0.5–1.9)
LACTIC ACID, SEPSIS: 1.5 MMOL/L (ref 0.5–1.9)
LEUKOCYTE ESTERASE UR QL STRIP: NEGATIVE
LYMPHOCYTES # BLD: 1.1 K/UL (ref 1–4.8)
LYMPHOCYTES NFR BLD: 6 %
M PNEUMO DNA NPH QL NAA+NON-PROBE: NOT DETECTED
MAGNESIUM SERPL-MCNC: 2.3 MG/DL (ref 1.7–2.4)
MCH RBC QN AUTO: 30.9 PG (ref 27–31.3)
MCHC RBC AUTO-ENTMCNC: 33.6 % (ref 33–37)
MCV RBC AUTO: 92 FL (ref 79–92.2)
MONOCYTES # BLD: 1.2 K/UL (ref 0.2–0.8)
MONOCYTES NFR BLD: 6.7 %
NEUTROPHILS # BLD: 15.4 K/UL (ref 1.4–6.5)
NEUTS SEG NFR BLD: 88 %
NITRITE UR QL STRIP: NEGATIVE
PH UR STRIP: 5 [PH] (ref 5–9)
PLATELET # BLD AUTO: 318 K/UL (ref 130–400)
PLATELET BLD QL SMEAR: ADEQUATE
POTASSIUM SERPL-SCNC: 4.5 MEQ/L (ref 3.4–4.9)
PROCALCITONIN SERPL IA-MCNC: 0.27 NG/ML (ref 0–0.15)
PROT SERPL-MCNC: 8.1 G/DL (ref 6.3–8)
PROT UR STRIP-MCNC: 30 MG/DL
RBC # BLD AUTO: 5.11 M/UL (ref 4.7–6.1)
RBC #/AREA URNS HPF: ABNORMAL /HPF (ref 0–2)
RSV RNA NPH QL NAA+NON-PROBE: NOT DETECTED
RV+EV RNA NPH QL NAA+NON-PROBE: NOT DETECTED
SARS-COV-2 RNA NPH QL NAA+NON-PROBE: NOT DETECTED
SMUDGE CELLS BLD QL SMEAR: 3.8
SODIUM SERPL-SCNC: 138 MEQ/L (ref 135–144)
SP GR UR STRIP: 1.02 (ref 1–1.03)
TROPONIN, HIGH SENSITIVITY: 115 NG/L (ref 0–19)
TROPONIN, HIGH SENSITIVITY: 126 NG/L (ref 0–19)
URINE REFLEX TO CULTURE: ABNORMAL
UROBILINOGEN UR STRIP-ACNC: 1 E.U./DL
WBC # BLD AUTO: 17.5 K/UL (ref 4.8–10.8)
WBC #/AREA URNS AUTO: ABNORMAL /HPF (ref 0–5)

## 2024-05-23 PROCEDURE — 36415 COLL VENOUS BLD VENIPUNCTURE: CPT

## 2024-05-23 PROCEDURE — 71045 X-RAY EXAM CHEST 1 VIEW: CPT

## 2024-05-23 PROCEDURE — 83735 ASSAY OF MAGNESIUM: CPT

## 2024-05-23 PROCEDURE — 81001 URINALYSIS AUTO W/SCOPE: CPT

## 2024-05-23 PROCEDURE — 72125 CT NECK SPINE W/O DYE: CPT

## 2024-05-23 PROCEDURE — 70450 CT HEAD/BRAIN W/O DYE: CPT

## 2024-05-23 PROCEDURE — 1210000000 HC MED SURG R&B

## 2024-05-23 PROCEDURE — 83880 ASSAY OF NATRIURETIC PEPTIDE: CPT

## 2024-05-23 PROCEDURE — 82550 ASSAY OF CK (CPK): CPT

## 2024-05-23 PROCEDURE — 99285 EMERGENCY DEPT VISIT HI MDM: CPT

## 2024-05-23 PROCEDURE — 85025 COMPLETE CBC W/AUTO DIFF WBC: CPT

## 2024-05-23 PROCEDURE — 2580000003 HC RX 258: Performed by: PHYSICIAN ASSISTANT

## 2024-05-23 PROCEDURE — 83605 ASSAY OF LACTIC ACID: CPT

## 2024-05-23 PROCEDURE — 6360000002 HC RX W HCPCS: Performed by: PHYSICIAN ASSISTANT

## 2024-05-23 PROCEDURE — 82077 ASSAY SPEC XCP UR&BREATH IA: CPT

## 2024-05-23 PROCEDURE — 0202U NFCT DS 22 TRGT SARS-COV-2: CPT

## 2024-05-23 PROCEDURE — 2580000003 HC RX 258: Performed by: INTERNAL MEDICINE

## 2024-05-23 PROCEDURE — 87040 BLOOD CULTURE FOR BACTERIA: CPT

## 2024-05-23 PROCEDURE — 93005 ELECTROCARDIOGRAM TRACING: CPT | Performed by: PHYSICIAN ASSISTANT

## 2024-05-23 PROCEDURE — 84484 ASSAY OF TROPONIN QUANT: CPT

## 2024-05-23 PROCEDURE — 84145 PROCALCITONIN (PCT): CPT

## 2024-05-23 PROCEDURE — 80053 COMPREHEN METABOLIC PANEL: CPT

## 2024-05-23 RX ORDER — ACETAMINOPHEN 325 MG/1
650 TABLET ORAL EVERY 6 HOURS PRN
Status: DISCONTINUED | OUTPATIENT
Start: 2024-05-23 | End: 2024-05-29 | Stop reason: HOSPADM

## 2024-05-23 RX ORDER — POLYETHYLENE GLYCOL 3350 17 G/17G
17 POWDER, FOR SOLUTION ORAL DAILY PRN
Status: DISCONTINUED | OUTPATIENT
Start: 2024-05-23 | End: 2024-05-29 | Stop reason: HOSPADM

## 2024-05-23 RX ORDER — SODIUM CHLORIDE 0.9 % (FLUSH) 0.9 %
5-40 SYRINGE (ML) INJECTION PRN
Status: DISCONTINUED | OUTPATIENT
Start: 2024-05-23 | End: 2024-05-29 | Stop reason: HOSPADM

## 2024-05-23 RX ORDER — SODIUM CHLORIDE 9 MG/ML
INJECTION, SOLUTION INTRAVENOUS PRN
Status: DISCONTINUED | OUTPATIENT
Start: 2024-05-23 | End: 2024-05-29 | Stop reason: HOSPADM

## 2024-05-23 RX ORDER — 0.9 % SODIUM CHLORIDE 0.9 %
1000 INTRAVENOUS SOLUTION INTRAVENOUS ONCE
Status: COMPLETED | OUTPATIENT
Start: 2024-05-23 | End: 2024-05-23

## 2024-05-23 RX ORDER — ACETAMINOPHEN 650 MG/1
650 SUPPOSITORY RECTAL EVERY 6 HOURS PRN
Status: DISCONTINUED | OUTPATIENT
Start: 2024-05-23 | End: 2024-05-29 | Stop reason: HOSPADM

## 2024-05-23 RX ORDER — ONDANSETRON 2 MG/ML
4 INJECTION INTRAMUSCULAR; INTRAVENOUS EVERY 6 HOURS PRN
Status: DISCONTINUED | OUTPATIENT
Start: 2024-05-23 | End: 2024-05-29 | Stop reason: HOSPADM

## 2024-05-23 RX ORDER — SODIUM CHLORIDE 0.9 % (FLUSH) 0.9 %
5-40 SYRINGE (ML) INJECTION EVERY 12 HOURS SCHEDULED
Status: DISCONTINUED | OUTPATIENT
Start: 2024-05-23 | End: 2024-05-29 | Stop reason: HOSPADM

## 2024-05-23 RX ORDER — SODIUM CHLORIDE, SODIUM LACTATE, POTASSIUM CHLORIDE, CALCIUM CHLORIDE 600; 310; 30; 20 MG/100ML; MG/100ML; MG/100ML; MG/100ML
INJECTION, SOLUTION INTRAVENOUS CONTINUOUS
Status: DISPENSED | OUTPATIENT
Start: 2024-05-23 | End: 2024-05-25

## 2024-05-23 RX ORDER — ONDANSETRON 4 MG/1
4 TABLET, ORALLY DISINTEGRATING ORAL EVERY 8 HOURS PRN
Status: DISCONTINUED | OUTPATIENT
Start: 2024-05-23 | End: 2024-05-29 | Stop reason: HOSPADM

## 2024-05-23 RX ORDER — ENOXAPARIN SODIUM 100 MG/ML
30 INJECTION SUBCUTANEOUS DAILY
Status: DISCONTINUED | OUTPATIENT
Start: 2024-05-24 | End: 2024-05-29 | Stop reason: HOSPADM

## 2024-05-23 RX ADMIN — SODIUM CHLORIDE 1000 ML: 9 INJECTION, SOLUTION INTRAVENOUS at 15:10

## 2024-05-23 RX ADMIN — SODIUM CHLORIDE, POTASSIUM CHLORIDE, SODIUM LACTATE AND CALCIUM CHLORIDE: 600; 310; 30; 20 INJECTION, SOLUTION INTRAVENOUS at 19:13

## 2024-05-23 RX ADMIN — Medication 5 ML: at 21:00

## 2024-05-23 RX ADMIN — CEFTRIAXONE SODIUM 1000 MG: 1 INJECTION, POWDER, FOR SOLUTION INTRAMUSCULAR; INTRAVENOUS at 16:20

## 2024-05-23 RX ADMIN — AZITHROMYCIN MONOHYDRATE 500 MG: 500 INJECTION, POWDER, LYOPHILIZED, FOR SOLUTION INTRAVENOUS at 16:44

## 2024-05-23 ASSESSMENT — PAIN DESCRIPTION - PAIN TYPE: TYPE: ACUTE PAIN

## 2024-05-23 ASSESSMENT — PAIN SCALES - GENERAL: PAINLEVEL_OUTOF10: 1

## 2024-05-23 ASSESSMENT — PAIN DESCRIPTION - ORIENTATION: ORIENTATION: LEFT

## 2024-05-23 ASSESSMENT — PAIN DESCRIPTION - FREQUENCY: FREQUENCY: CONTINUOUS

## 2024-05-23 ASSESSMENT — PAIN - FUNCTIONAL ASSESSMENT: PAIN_FUNCTIONAL_ASSESSMENT: 0-10

## 2024-05-23 NOTE — H&P
Hospital Medicine  History and Physical    Patient:  Ranjith Rodriguez  MRN: [id number]    CHIEF COMPLAINT:    Chief Complaint   Patient presents with    Fall       History Obtained From:  Patient, EMR  Primary Care Physician: Kiki Rashid PA-C    HISTORY OF PRESENT ILLNESS:   The patient is a 63 y.o. male with PMH of HTN, carotid stenosis s/p left CEA in 2019 with chronic occlusion of right ICA, PAD s/p revascularization of LLE in 2020, ETOH / tobacco use disorder, neuropathy who presented with the above CC. Patient has been falling multiple times in the last few days at home, was on the ground for the last 2 days until found by his brother per report, was covered in dirt and feces on arrival to ED per notes. Patient was tachycardic-112 and hypoxic-91% on RA, placed on 2 liters of O2, ECG showed sinus tachycardia with no acute ischemic changes, CT head/neck, CXR, U/a were negative, labs showed CHET, rhabdomyolysis, leukocytosis, elevated troponin and LFTs, IVFs and antibiotics were given and patient was admitted for further management.    Past Medical History:      Diagnosis Date    Abnormal ankle brachial index (RAO)     Acute idiopathic gout of left wrist 12/16/2020    Alcoholic (HCC)-remote Hx 8/28/2015    In remission goes to AA daily--agrees not to overtake pain meds     Alcoholic polyneuropathy (HCC)     Asthma     CAD (coronary artery disease)     [facility]    Chronic back pain greater than 3 months duration     Claudication in peripheral vascular disease (HCC)     CN III palsy, right eye 2017    Dr Perales    COPD (chronic obstructive pulmonary disease) ([facility]) 2014    DDD (degenerative disc disease), lumbar 3/23/2015    Elevated liver enzymes 2014    History of traumatic brain injury 2/1/2022    Hyperlipidemia     on med > 10 yrs    Hypertension     on meds x 1 yr    IgA monoclonal gammopathy 2015    Dr Villegas    Incarcerated ventral hernia 8/12/2015    Insomnia, unspecified     LBP (low back pain)     Dr Swan

## 2024-05-23 NOTE — ED NOTES
Report called to Alexus SANTANA using SBAR and all questions answered to the best of this nurses ability. Pt transport placed. Monitor room called and notified of tele monitor going on.

## 2024-05-23 NOTE — ACP (ADVANCE CARE PLANNING)
Advance Care Planning     Advance Care Planning Activator (Inpatient)  Conversation Note      Date of ACP Conversation: 5/23/2024     Conversation Conducted with: Patient with Decision Making Capacity    ACP Activator: Angeila Brown RN        Health Care Decision Maker:     Current Designated Health Care Decision Maker:     Primary Decision Maker: Thu Alvarado - Brother/Sister - [phone]    Secondary Decision Maker: Curly Rodriguez - Brother/Sister - [phone]

## 2024-05-23 NOTE — ED NOTES
Lab called, resp panel was collected with original labs and still not resulted.  has the sample and is almost ready to result, does not need recollected

## 2024-05-23 NOTE — ED NOTES
Pt here due to multiple falls at home and inability to get up off the ground. Pt hygiene in very poor condition, covered in dirt and feces. Pt clothing removed and upon permission of family and patient were disposed of. Bed bath performed with a total of three attempts to remove dead skin and feces. House keeping called to change garbage and lab back at bedside in a third attempt to collect second set of blood cultures and additional labs

## 2024-05-23 NOTE — FLOWSHEET NOTE
5/23/24 @ 1740 Notified Dr. Houston of Cardiology consult via [employer]    5/23/24 @ 1751 Notified Dr. Richards of Nephrology consult via [employer]    5/23/247 @ 1751 Notified Dr. Perales of Neurology consult via [employer]

## 2024-05-23 NOTE — CARE COORDINATION
Case Management Assessment  Initial Evaluation    Date/Time of Evaluation: 5/23/2024 4:39 PM  Assessment Completed by: Angelia Brown RN    If patient is discharged prior to next notation, then this note serves as note for discharge by case management.    Patient Name: Ranjith Rodriguez                   YOB: 1960  Diagnosis: Rhabdomyolysis [M62.82]                   Date / Time: 5/23/2024  2:12 PM    Patient Admission Status: Inpatient   Readmission Risk (Low < 19, Mod (19-27), High > 27): Readmission Risk Score: 14    Current PCP: Kiki Rashid PA-C  PCP verified by CM? (P) Yes    Chart Reviewed: Yes      History Provided by: (P) Patient  Patient Orientation: (P) Alert and Oriented, Person, Place, Situation, Self    Patient Cognition: (P) Alert    Hospitalization in the last 30 days (Readmission):  No    If yes, Readmission Assessment in  Navigator will be completed.    Advance Directives:      Code Status: Prior   Patient's Primary Decision Maker is: (P) Legal Next of Kin (sister Alexus, brother Curly.)      Discharge Planning:    Patient lives with: (P) Family Members Type of Home: (P) Apartment  Primary Care Giver: (P) Self  Patient Support Systems include: (P) Family Members   Current Financial resources: (P) Medicare, Medicaid  Current community resources: (P) None  Current services prior to admission: (P) None            Current DME:              Type of Home Care services:       ADLS  Prior functional level: (P) Independent in ADLs/IADLs  Current functional level: (P) Independent in ADLs/IADLs, Other (see comment) (per family he has not been bathing or taking care of himself. very unkempt in the ER.)    PT AM-PAC:   /24  OT AM-PAC:   /24    Family can provide assistance at DC: (P) Other (comment) (brother stated he can't take care of him.)  Would you like Case Management to discuss the discharge plan with any other family members/significant others, and if so, who? (P) Yes (sister Alexus,  brother Curly.)  Plans to Return to Present Housing: (P) Other (see comment) (his brother stated to staff that he can't take care of him. he has been falling and not taking care of himself. this nurse called his brother Curly but did not get an answer.)  Other Identified Issues/Barriers to RETURNING to current housing: will need rehab, family unable to care for him.   Potential Assistance needed at discharge: (P) Skilled Nursing Facility            Potential DME:    Patient expects to discharge to: (P) Other (comment) (to be determined: snf/rehab if the patient will allow.)  Plan for transportation at discharge:      Financial    Payor: [facility] / Plan: [facility] DUAL / Product Type: *No Product type* /     Does insurance require precert for SNF: Yes    Potential assistance Purchasing Medications: (P) No  Meds-to-Beds request:        [id number] - [place], [place] - [address] [facility] -  [phone] - F [phone]  [address] [place] [id number]  Phone: [phone] Fax: [phone]    [facility] - [place], [place] - [address]  - P [phone] - F [phone]  [address] [facility] [address]  Phone: [phone] Fax: [phone]    [id number] - [place], [place] - [address] [facility] [phone] - F [phone]  [address] [place] [address]  Phone: [phone] Fax: [phone]    [facility] - [place], [place] - [address] [facility] [phone] - F [phone]  [address] [place] [address]  Phone: [phone] Fax: [phone]      Notes:    Factors facilitating achievement of predicted outcomes: Family support, Cooperative, and Pleasant    Barriers to discharge: Limited safety awareness, Decreased motivation, Limited participation, Limited insight into deficits, and self care deficit.     Additional Case Management Notes: the patient lives with his brother. He has a cane, shower chair and an elevated

## 2024-05-24 ENCOUNTER — APPOINTMENT (OUTPATIENT)
Dept: ULTRASOUND IMAGING | Age: 64
DRG: 565 | End: 2024-05-24
Payer: COMMERCIAL

## 2024-05-24 PROBLEM — J18.9 PNEUMONIA OF LEFT LOWER LOBE DUE TO INFECTIOUS ORGANISM: Status: ACTIVE | Noted: 2024-05-24

## 2024-05-24 LAB
ALBUMIN SERPL-MCNC: 3.3 G/DL (ref 3.5–4.6)
ALP SERPL-CCNC: 43 U/L (ref 35–104)
ALT SERPL-CCNC: 61 U/L (ref 0–41)
AMMONIA PLAS-SCNC: 33 UMOL/L (ref 16–60)
ANION GAP SERPL CALCULATED.3IONS-SCNC: 12 MEQ/L (ref 9–15)
AST SERPL-CCNC: 174 U/L (ref 0–40)
BASOPHILS # BLD: 0.1 K/UL (ref 0–0.2)
BASOPHILS NFR BLD: 0.6 %
BILIRUB DIRECT SERPL-MCNC: 0.4 MG/DL (ref 0–0.4)
BILIRUB INDIRECT SERPL-MCNC: 0.5 MG/DL (ref 0–0.6)
BILIRUB SERPL-MCNC: 0.9 MG/DL (ref 0.2–0.7)
BUN SERPL-MCNC: 55 MG/DL (ref 8–23)
CALCIUM SERPL-MCNC: 8.9 MG/DL (ref 8.5–9.9)
CHLORIDE SERPL-SCNC: 106 MEQ/L (ref 95–107)
CK SERPL-CCNC: 7294 U/L (ref 0–190)
CO2 SERPL-SCNC: 22 MEQ/L (ref 20–31)
CREAT SERPL-MCNC: 2.21 MG/DL (ref 0.7–1.2)
EKG ATRIAL RATE: 103 BPM
EKG P AXIS: 52 DEGREES
EKG P-R INTERVAL: 178 MS
EKG Q-T INTERVAL: 358 MS
EKG QRS DURATION: 104 MS
EKG QTC CALCULATION (BAZETT): 468 MS
EKG R AXIS: 87 DEGREES
EKG T AXIS: 54 DEGREES
EKG VENTRICULAR RATE: 103 BPM
EOSINOPHIL # BLD: 0.2 K/UL (ref 0–0.7)
EOSINOPHIL NFR BLD: 1.8 %
ERYTHROCYTE [DISTWIDTH] IN BLOOD BY AUTOMATED COUNT: 14.6 % (ref 11.5–14.5)
FOLATE: 4.4 NG/ML (ref 4.8–24.2)
GLUCOSE SERPL-MCNC: 89 MG/DL (ref 70–99)
HCT VFR BLD AUTO: 43 % (ref 42–52)
HGB BLD-MCNC: 14.4 G/DL (ref 14–18)
LYMPHOCYTES # BLD: 1.5 K/UL (ref 1–4.8)
LYMPHOCYTES NFR BLD: 11.4 %
MAGNESIUM SERPL-MCNC: 2.2 MG/DL (ref 1.7–2.4)
MCH RBC QN AUTO: 30.3 PG (ref 27–31.3)
MCHC RBC AUTO-ENTMCNC: 33.5 % (ref 33–37)
MCV RBC AUTO: 90.5 FL (ref 79–92.2)
MONOCYTES # BLD: 1.2 K/UL (ref 0.2–0.8)
MONOCYTES NFR BLD: 9.1 %
NEUTROPHILS # BLD: 9.7 K/UL (ref 1.4–6.5)
NEUTS SEG NFR BLD: 76.8 %
PHOSPHATE SERPL-MCNC: 3.5 MG/DL (ref 2.3–4.8)
PLATELET # BLD AUTO: 286 K/UL (ref 130–400)
POTASSIUM SERPL-SCNC: 3.9 MEQ/L (ref 3.4–4.9)
PROT SERPL-MCNC: 6.2 G/DL (ref 6.3–8)
RBC # BLD AUTO: 4.75 M/UL (ref 4.7–6.1)
SODIUM SERPL-SCNC: 140 MEQ/L (ref 135–144)
TROPONIN, HIGH SENSITIVITY: 128 NG/L (ref 0–19)
TSH REFLEX: 0.73 UIU/ML (ref 0.44–3.86)
VITAMIN B-12: 408 PG/ML (ref 232–1245)
VITAMIN D 25-HYDROXY: 6.1 NG/ML (ref 30–100)
WBC # BLD AUTO: 12.7 K/UL (ref 4.8–10.8)

## 2024-05-24 PROCEDURE — APPSS45 APP SPLIT SHARED TIME 31-45 MINUTES: Performed by: NURSE PRACTITIONER

## 2024-05-24 PROCEDURE — 84484 ASSAY OF TROPONIN QUANT: CPT

## 2024-05-24 PROCEDURE — 82306 VITAMIN D 25 HYDROXY: CPT

## 2024-05-24 PROCEDURE — 82746 ASSAY OF FOLIC ACID SERUM: CPT

## 2024-05-24 PROCEDURE — 80048 BASIC METABOLIC PNL TOTAL CA: CPT

## 2024-05-24 PROCEDURE — APPSS30 APP SPLIT SHARED TIME 16-30 MINUTES

## 2024-05-24 PROCEDURE — 99223 1ST HOSP IP/OBS HIGH 75: CPT | Performed by: INTERNAL MEDICINE

## 2024-05-24 PROCEDURE — 80076 HEPATIC FUNCTION PANEL: CPT

## 2024-05-24 PROCEDURE — 6370000000 HC RX 637 (ALT 250 FOR IP): Performed by: INTERNAL MEDICINE

## 2024-05-24 PROCEDURE — 76770 US EXAM ABDO BACK WALL COMP: CPT

## 2024-05-24 PROCEDURE — 83970 ASSAY OF PARATHORMONE: CPT

## 2024-05-24 PROCEDURE — 83735 ASSAY OF MAGNESIUM: CPT

## 2024-05-24 PROCEDURE — 6360000002 HC RX W HCPCS: Performed by: INTERNAL MEDICINE

## 2024-05-24 PROCEDURE — 1210000000 HC MED SURG R&B

## 2024-05-24 PROCEDURE — 99222 1ST HOSP IP/OBS MODERATE 55: CPT | Performed by: PSYCHIATRY & NEUROLOGY

## 2024-05-24 PROCEDURE — 93010 ELECTROCARDIOGRAM REPORT: CPT | Performed by: INTERNAL MEDICINE

## 2024-05-24 PROCEDURE — 2580000003 HC RX 258: Performed by: INTERNAL MEDICINE

## 2024-05-24 PROCEDURE — 6360000002 HC RX W HCPCS: Performed by: NURSE PRACTITIONER

## 2024-05-24 PROCEDURE — 82140 ASSAY OF AMMONIA: CPT

## 2024-05-24 PROCEDURE — 85025 COMPLETE CBC W/AUTO DIFF WBC: CPT

## 2024-05-24 PROCEDURE — 84100 ASSAY OF PHOSPHORUS: CPT

## 2024-05-24 PROCEDURE — 84443 ASSAY THYROID STIM HORMONE: CPT

## 2024-05-24 PROCEDURE — 82550 ASSAY OF CK (CPK): CPT

## 2024-05-24 PROCEDURE — 82607 VITAMIN B-12: CPT

## 2024-05-24 PROCEDURE — 36415 COLL VENOUS BLD VENIPUNCTURE: CPT

## 2024-05-24 RX ORDER — LABETALOL HYDROCHLORIDE 5 MG/ML
10 INJECTION, SOLUTION INTRAVENOUS EVERY 6 HOURS PRN
Status: DISCONTINUED | OUTPATIENT
Start: 2024-05-24 | End: 2024-05-29 | Stop reason: HOSPADM

## 2024-05-24 RX ORDER — ERGOCALCIFEROL 1.25 MG/1
50000 CAPSULE ORAL WEEKLY
Status: DISCONTINUED | OUTPATIENT
Start: 2024-05-25 | End: 2024-05-29 | Stop reason: HOSPADM

## 2024-05-24 RX ORDER — ALLOPURINOL 100 MG/1
100 TABLET ORAL DAILY
Status: DISCONTINUED | OUTPATIENT
Start: 2024-05-24 | End: 2024-05-29 | Stop reason: HOSPADM

## 2024-05-24 RX ORDER — ASPIRIN 81 MG/1
81 TABLET ORAL DAILY
Status: DISCONTINUED | OUTPATIENT
Start: 2024-05-24 | End: 2024-05-29 | Stop reason: HOSPADM

## 2024-05-24 RX ORDER — PREGABALIN 100 MG/1
100 CAPSULE ORAL DAILY
Status: DISCONTINUED | OUTPATIENT
Start: 2024-05-24 | End: 2024-05-25

## 2024-05-24 RX ORDER — CLOPIDOGREL BISULFATE 75 MG/1
75 TABLET ORAL DAILY
Status: DISCONTINUED | OUTPATIENT
Start: 2024-05-24 | End: 2024-05-29 | Stop reason: HOSPADM

## 2024-05-24 RX ORDER — LORAZEPAM 2 MG/ML
1 INJECTION INTRAMUSCULAR ONCE
Status: DISCONTINUED | OUTPATIENT
Start: 2024-05-24 | End: 2024-05-25

## 2024-05-24 RX ORDER — METOPROLOL SUCCINATE 25 MG/1
25 TABLET, EXTENDED RELEASE ORAL DAILY
Status: DISCONTINUED | OUTPATIENT
Start: 2024-05-24 | End: 2024-05-29 | Stop reason: HOSPADM

## 2024-05-24 RX ORDER — OMEGA-3-ACID ETHYL ESTERS 1 G/1
2 CAPSULE, LIQUID FILLED ORAL 2 TIMES DAILY
Status: DISCONTINUED | OUTPATIENT
Start: 2024-05-24 | End: 2024-05-29 | Stop reason: HOSPADM

## 2024-05-24 RX ADMIN — SODIUM CHLORIDE, POTASSIUM CHLORIDE, SODIUM LACTATE AND CALCIUM CHLORIDE: 600; 310; 30; 20 INJECTION, SOLUTION INTRAVENOUS at 11:00

## 2024-05-24 RX ADMIN — ASPIRIN 81 MG: 81 TABLET, COATED ORAL at 12:54

## 2024-05-24 RX ADMIN — Medication 10 ML: at 08:41

## 2024-05-24 RX ADMIN — METOPROLOL SUCCINATE 25 MG: 25 TABLET, FILM COATED, EXTENDED RELEASE ORAL at 12:53

## 2024-05-24 RX ADMIN — SODIUM CHLORIDE, POTASSIUM CHLORIDE, SODIUM LACTATE AND CALCIUM CHLORIDE: 600; 310; 30; 20 INJECTION, SOLUTION INTRAVENOUS at 21:35

## 2024-05-24 RX ADMIN — Medication 10 ML: at 20:40

## 2024-05-24 RX ADMIN — CLOPIDOGREL BISULFATE 75 MG: 75 TABLET ORAL at 12:53

## 2024-05-24 RX ADMIN — PREGABALIN 100 MG: 100 CAPSULE ORAL at 12:53

## 2024-05-24 RX ADMIN — ENOXAPARIN SODIUM 30 MG: 100 INJECTION SUBCUTANEOUS at 08:41

## 2024-05-24 RX ADMIN — OMEGA-3-ACID ETHYL ESTERS 2 G: 1 CAPSULE, LIQUID FILLED ORAL at 12:54

## 2024-05-24 RX ADMIN — LABETALOL HYDROCHLORIDE 10 MG: 5 INJECTION, SOLUTION INTRAVENOUS at 23:26

## 2024-05-24 RX ADMIN — OMEGA-3-ACID ETHYL ESTERS 2 G: 1 CAPSULE, LIQUID FILLED ORAL at 20:40

## 2024-05-24 RX ADMIN — ALLOPURINOL 100 MG: 100 TABLET ORAL at 12:53

## 2024-05-24 RX ADMIN — SODIUM CHLORIDE, POTASSIUM CHLORIDE, SODIUM LACTATE AND CALCIUM CHLORIDE: 600; 310; 30; 20 INJECTION, SOLUTION INTRAVENOUS at 16:11

## 2024-05-24 ASSESSMENT — PAIN DESCRIPTION - FREQUENCY: FREQUENCY: CONTINUOUS

## 2024-05-24 ASSESSMENT — PAIN SCALES - GENERAL
PAINLEVEL_OUTOF10: 0
PAINLEVEL_OUTOF10: 1
PAINLEVEL_OUTOF10: 0

## 2024-05-24 ASSESSMENT — PAIN DESCRIPTION - PAIN TYPE: TYPE: ACUTE PAIN

## 2024-05-24 ASSESSMENT — PAIN DESCRIPTION - ORIENTATION: ORIENTATION: LEFT

## 2024-05-24 NOTE — CONSULTS
abuse      PLAN:   As always, aggressive risk factor modification is strongly recommended. We should adhere to the JNC VIII guidelines for HTN management and the NCEPATP III guidelines for LDL-C management.  Continue to monitor on telemetry  Maximize cardiac medications  Continue aspirin, Plavix, Toprol XL 25 mg p.o. daily  Check 2D echocardiogram  Recommend ischemic evaluation in the future given multiple risk factors. However, not good candidate at this time due to kidney function and multiple issues.  Continue with IV hydration  Maintain potassium greater than 4 magnesium greater than 2  GI/DVT prophylaxis  Neurology recommendations   Further recommendations per Dr. Villatoro        Thank you for allowing me to participate in the care of your patient, please don't hesitate to contact me if you have any further questions.      Electronically signed by JESSICA Mayes CNP on 5/24/2024 at 9:13 AM        Attending Supervising Physician's Attestation Statement  The patient is a 63 y.o. male. I have performed a history and physical examination of the patient. I discussed the case with the nurse practitioner.    I reviewed the patient's Past Medical History, Past Surgical History, Medications, and Allergies.     Physical Exam:  Vitals:    05/23/24 1952 05/24/24 0715 05/24/24 1030 05/24/24 1230   BP: (!) 130/48 (!) 135/46 (!) 113/91 (!) 162/44   Pulse: 95 88 98 (!) 108   Resp: 20 18 16 18   Temp: 98.6 °F (37 °C) 98.6 °F (37 °C) 98.6 °F (37 °C) 98.7 °F (37.1 °C)   TempSrc:  Oral Oral Oral   SpO2: (!) 89% 92%     Weight:       Height:           Review of Systems - Respiratory ROS: no cough, shortness of breath, or wheezing  Cardiovascular ROS: no chest pain or dyspnea on exertion  Gastrointestinal ROS: no abdominal pain, change in bowel habits, or black or bloody stools    Pulmonary/Chest: clear to auscultation bilaterally- no wheezes, rales or rhonchi, normal air movement, no respiratory distress  Cardiovascular:  normal rate, normal S1 and S2, no gallops, intact distal pulses, and no carotid bruits  Abdomen: soft, non-tender, non-distended, normal bowel sounds, no masses or organomegaly    Active Hospital Problems    Diagnosis Date Noted    Pneumonia of left lower lobe due to infectious organism [J18.9] 05/24/2024     Priority: Low    Rhabdomyolysis [M62.82] 05/23/2024     Priority: Low        I reviewed and agree with the findings and plan documented in her note .            ASSESSMENT:  Rhabdomyolysis  Evaded troponin-flat pattern- secondary to Rhabdo. Rule out cardiac ischemia.   History of PAD status post PTA  History of occluded right ICA  History of left carotid endarterectomy in 2019  CHET  Leukocytosis  Elevated LFTs  Elevated WBC  Tobacco abuse  EtOH abuse        PLAN:   As always, aggressive risk factor modification is strongly recommended. We should adhere to the JNC VIII guidelines for HTN management and the NCEPATP III guidelines for LDL-C management.  Continue to monitor on telemetry  Maximize cardiac medications  Continue aspirin, Plavix, Toprol XL 25 mg p.o. daily  Check 2D echocardiogram  Recommend ischemic evaluation in the future given multiple risk factors. Not ideal candidate at this time.  function and multiple issues.  Continue with IV hydration  Maintain potassium greater than 4 magnesium greater than 2  GI/DVT prophylaxis  Neurology recommendations     Electronically signed by Jim Sandhu DO on 5/24/24 at 3:08 PM EDT

## 2024-05-24 NOTE — CONSULTS
[facility] Consult Note  Name: Ranjith Rodriguez  Age: 63 y.o.  Gender: male  CodeStatus: Full Code  Allergies: No Known Allergies    Chief Complaint:Fall    Primary Care Provider: Kiki Rashid PA-C  InpatientTreatment Team: Treatment Team: Attending Provider: Adan Rubio MD; Consulting Physician: Olvin Richards MD; Consulting Physician: Ethan Perales MD; Consulting Physician: Sandip Schneider MD; Nursing Student: Mary Clarke; : Priti Olmedo, ESTHER; Consulting Physician: Jim Sandhu DO; Patient Care Tech: Apolonia Burton; Registered Nurse: April Sousa, RN; Registered Nurse: Jocelyn Mishra, RN; Utilization Reviewer: Yari Gabriel RN  Admission Date: 5/23/2024      HPI   Consulting provider: Dr Adan Rubio for weakness, multiple falls  Pt seen and examined for neurology consult.  Patient is a 63-year-old  male with past medical history of T11 compression fracture, TBI, carotid stenosis status post left carotid endarterectomy in 2019, alcoholism, neuropathy, monoclonal gammopathy, hypertension, hyperlipidemia, COPD, 3rd nerve palsy, PVD, CAD who presented to [facility] emergency room via EMS on 5/23/2024 after falling at home and inability to get up.  Patient initially ported that he fell 2 days ago and had been on the floor ever since.  Patient was with his brother who then arrived and stated patient had had multiple falls over the course of 24 hours therefore EMS was called.  Patient was covered in feces and disheveled.   Vital signs in the emergency room 141/61, 112, 22, 99.1, 91%.  EKG showed sinus tachycardia at 103 bpm.  Chest x-ray showed left lower lobe infiltrate.  CT of the head negative for acute findings.  Lab testing remarkable for elevated white blood cell count of 17.5, BUN 70, creatinine of 3.47, total bilirubin of 1.4, ALT 65, , procalcitonin 0.27, CK level of 9559, high-sensitivity troponin of 126.     Patient is currently alert and oriented  the right MCA. There is mild stenosis at the proximal right right anterior cerebral artery. The  right and left MCA appear patent. The anterior cerebral artery appears patent, except at the proximal portion of the right WILLIAM. No demonstrable aneurysm or AVM. No left posterior commuting artery. Anterior communicating artery appears patent.    IMPRESSION:  OCCLUDED RIGHT ICA. THIS MAY REPRESENT A LONG-STANDING OCCLUSION.    THE RIGHT MCA HAS FLOW VIA THE RIGHT POSTERIOR AND ANTERIOR COMMUNICATING ARTERIES.    STENOSIS AT THE ORIGIN OF THE LEFT PCA AND AT THE ORIGIN OF RIGHT WILLIAM.                            MRA of the Head and Neck: No results found for this or any previous visit.  No results found for this or any previous visit.  Results for orders placed during the hospital encounter of 04/17/17    MRA head w/o contrast    Narrative  MRI BRAIN WITHOUT CONTRAST:    COMPARISONS:  NONE    CLINICAL HISTORY:  Slurring of speech, right-sided facial droop and headache for 3 days.    TECHNIQUE: Multiplanar, multi-sequence MRI was performed on a 1.5Tesla closed magnet.    FINDINGS:  There are no abnormal sites of restricted diffusion. The ventricles are normal In position. There is no evidence for intraaxial or extraaxial hemorrhage.  There is no evidence for mass effect. There is no shift of the midline structures.  There are a few foci of increased signal  on FLAIR and T2, in the cerebral deep white matter including the corona radiata and subcortical deep white matter, which may be secondary to small vessel ischemic changes, demyelination, or aging. There is mild  prominence of the cerebral sulci and ventricles, commensurate with the patient's age.  There is lack of flow-void in the right internal carotid artery, indicating occlusion. There are flow voids within both MCAs and anterior cerebral arteries, as well as  the posterior circulation.    There is mucosal thickening in the left maxillary sinus. Other sinuses are clear.

## 2024-05-24 NOTE — CARE COORDINATION
RYANW MET WITH THE PT AND HIS BROTHER AT BEDSIDE TO DISCUSS HIS DC PLAN.  PT IS CONFUSED, BROTHER SAID THAT THEY HAVE BEEN WORKING ON GETTING HIM INTO [facility] FROM HOME AND THAT IS STILL THEIR FIRST CHOICE.  REFERRAL WAS SENT TO [facility] AT [facility] TODAY.  AWAITING THERAPY EVALUATIONS FOR PRECERT TO BE STARTED.      [facility] IS OUT OF NETWORK WITH THE PT'S INSURANCE.  REFERRAL SENT TO [facility].

## 2024-05-24 NOTE — DISCHARGE INSTR - COC
Continuity of Care Form    Patient Name: Ranjith Rodriguez   :  1960  MRN:  [id number]    Admit date:  2024  Discharge date:  24    Code Status Order: Full Code   Advance Directives:     Admitting Physician:  Adan Rubio MD  PCP: Kiki Rashid PA-C    Discharging Nurse: Sherrie Carcamo RN  Discharging Hospital Unit/Room#: W274/W274-01  Discharging Unit Phone Number: [phone]    Emergency Contact:   Extended Emergency Contact Information  Primary Emergency Contact: Curly Rodriguez   [facility]  Home Phone: [phone]  Work Phone: [phone]  Mobile Phone: [phone]  Relation: Brother/Sister  Secondary Emergency Contact: JennyKhushbuThu  Home Phone: [phone]  Relation: Brother/Sister   needed? No    Past Surgical History:  Past Surgical History:   Procedure Laterality Date    CAROTID ENDARTERECTOMY Left 2019    LEFT CAROTID ENDARTERECTOMY performed by Miguel Morris MD at [facility] [place]    COLONOSCOPY  2014    COLONOSCOPY N/A 2019    COLONOSCOPY DIAGNOSTIC performed by Shahnaz Chavez MD at [facility]    ENDOSCOPY, COLON, DIAGNOSTIC      HERNIA REPAIR      TONSILLECTOMY      UPPER GASTROINTESTINAL ENDOSCOPY  2014    VENTRAL HERNIA REPAIR  09/01/15    W/MESH AND W/UMBILECTOMY       Immunization History:   Immunization History   Administered Date(s) Administered    COVID-19, MODERNA BLUE border, Primary or Immunocompromised, (age 12y+), IM, 100 mcg/0.5mL 2021, 2021, 2021    COVID-19, [place] Vaccine, Vaccine Unspecified 2022    Influenza Vaccine, unspecified formulation 10/26/2015, 2016, 09/15/2016, 08/15/2017    Influenza Virus Vaccine 10/15/2014, 2019, 2020, 2021    Influenza Whole 10/15/2014, 10/01/2015    Influenza, FLUARIX, FLULAVAL, FLUZONE (age 6 mo+) AND AFLURIA, (age 3 y+), PF, 0.5mL 08/15/2017, 10/17/2018, 2019, 2020, 2021    Influenza, FLUCELVAX, (age 6 mo+), MDCK,    Respiratory Treatments: none  Oxygen Therapy:  is not on home oxygen therapy.  Ventilator:    - No ventilator support    Rehab Therapies: Physical Therapy and Occupational Therapy, Speech Therapy  Weight Bearing Status/Restrictions: No weight bearing restrictions  Other Medical Equipment (for information only, NOT a DME order):  walker  Other Treatments: n/a    Patient's personal belongings (please select all that are sent with patient):  None    RN SIGNATURE:  Electronically signed by Sherrie Carcamo RN on 5/29/24 at 1:37 PM EDT    CASE MANAGEMENT/SOCIAL WORK SECTION    Inpatient Status Date: 5/23/24    Readmission Risk Assessment Score:  Readmission Risk              Risk of Unplanned Readmission:  13           Discharging to Facility/ Agency   Name: [facility]  Address:  Phone:[phone]  Fax:    Dialysis Facility (if applicable)   Name:  Address:  Dialysis Schedule:  Phone:  Fax:    / signature: Electronically signed by ALEX Avitia on 5/24/24 at 12:06 PM EDT    PHYSICIAN SECTION    Prognosis: {Prognosis:0170750636}    Condition at Discharge: { Patient Condition:698803538}    Rehab Potential (if transferring to Rehab): {Prognosis:5387532419}    Recommended Labs or Other Treatments After Discharge: ***    Physician Certification: I certify the above information and transfer of Ranjith Rodriguez  is necessary for the continuing treatment of the diagnosis listed and that he requires Skilled Nursing Facility for less 30 days.     Update Admission H&P: {CHP DME Changes in HandP:073971535}    PHYSICIAN SIGNATURE:  Electronically signed by Amelia Gibson MD on 5/29/24 at 2:19 PM EDT
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
## 2024-05-24 NOTE — PROGRESS NOTES
Hospitalist Progress Note      PCP: Kiki Rashid PA-C    Date of Admission: 5/23/2024    Chief Complaint:  no acute events, afebrile, stable HD    Medications:  Reviewed    Infusion Medications    sodium chloride      lactated ringers IV soln 200 mL/hr at 05/23/24 1913     Scheduled Medications    sodium chloride flush  5-40 mL IntraVENous 2 times per day    enoxaparin  30 mg SubCUTAneous Daily     PRN Meds: sodium chloride flush, sodium chloride, ondansetron **OR** ondansetron, polyethylene glycol, acetaminophen **OR** acetaminophen      Intake/Output Summary (Last 24 hours) at 5/24/2024 1139  Last data filed at 5/24/2024 0900  Gross per 24 hour   Intake --   Output 300 ml   Net -300 ml       Exam:    BP (!) 113/91   Pulse 98   Temp 98.6 °F (37 °C) (Oral)   Resp 16   Ht 1.803 m (5' 11\")   Wt 106.6 kg (235 lb)   SpO2 92%   BMI 32.78 kg/m²     General appearance: alert, cooperative  Lungs: clear to auscultation bilaterally  Heart: S1/S2, RRR  Abdomen: soft, active BS  Extremities: edema      Labs:   Recent Labs     05/23/24  1430   WBC 17.5*   HGB 15.8   HCT 47.0        Recent Labs     05/23/24  1451 05/24/24  0501    140   K 4.5 3.9    106   CO2 22 22   BUN 71* 55*   CREATININE 3.47* 2.21*   CALCIUM 9.7 8.9   PHOS  --  3.5     Recent Labs     05/23/24  1451 05/24/24  0501   * 174*   ALT 65* 61*   BILIDIR  --  0.4   BILITOT 1.4* 0.9*   ALKPHOS 55 43     No results for input(s): \"INR\" in the last 72 hours.  Recent Labs     05/23/24  1451 05/24/24  0501   CKTOTAL 9,559* 7,294*       Urinalysis:      Lab Results   Component Value Date/Time    NITRU Negative 05/23/2024 02:30 PM    WBCUA 6-9 05/23/2024 02:30 PM    BACTERIA Negative 05/23/2024 02:30 PM    RBCUA 0-2 05/23/2024 02:30 PM    BLOODU LARGE 05/23/2024 02:30 PM    GLUCOSEU Negative 05/23/2024 02:30 PM       Radiology:  US RETROPERITONEAL COMPLETE   Final Result   1. No hydronephrosis   2. Possible inferior nonobstructive left

## 2024-05-24 NOTE — CARE COORDINATION
Notified by room nurse that patient is requesting home Lyrica.  Has significant pain both from rhabdomyolysis after being stuck on ground ~2 days, and from cessation of baseline Lyrica which she takes at max diabetic neuropathy dose of 100 mg 3 times daily.  It sounds as though patient may have been administering himself 300 mg as single dose nightly.  Relative contraindication to any gabapentin noise at present, as both gabapentin and pregabalin have a rare but serious side effect of induction of rhabdomyolysis or myositis on their own.  Patient has a pre-existing rhabdomyolysis at admission, with CPK just under 10,000 on initial measurement.  More importantly, patient has significant acute kidney injury with initial creatinine clearance only 27 mL/minute, which is well below the threshold of 60 mL/minute that is the cutoff for discontinuation of pregabalin and renal impairment, per discussion with pharmacy.    Given acute contraindication to pregabalin and significant somatic pain, pain management consult has been placed      Electronically signed by Natalya Mcdonough DO on 5/23/2024 at 10:15 PM

## 2024-05-25 LAB
ALBUMIN SERPL-MCNC: 3.3 G/DL (ref 3.5–4.6)
ALP SERPL-CCNC: 45 U/L (ref 35–104)
ALT SERPL-CCNC: 71 U/L (ref 0–41)
ANION GAP SERPL CALCULATED.3IONS-SCNC: 9 MEQ/L (ref 9–15)
AST SERPL-CCNC: 156 U/L (ref 0–40)
BASOPHILS # BLD: 0.1 K/UL (ref 0–0.2)
BASOPHILS NFR BLD: 0.5 %
BILIRUB DIRECT SERPL-MCNC: 0.3 MG/DL (ref 0–0.4)
BILIRUB INDIRECT SERPL-MCNC: 0.5 MG/DL (ref 0–0.6)
BILIRUB SERPL-MCNC: 0.8 MG/DL (ref 0.2–0.7)
BUN SERPL-MCNC: 29 MG/DL (ref 8–23)
CALCIUM SERPL-MCNC: 9.2 MG/DL (ref 8.5–9.9)
CHLORIDE SERPL-SCNC: 106 MEQ/L (ref 95–107)
CK SERPL-CCNC: 3781 U/L (ref 0–190)
CO2 SERPL-SCNC: 24 MEQ/L (ref 20–31)
CREAT SERPL-MCNC: 1.19 MG/DL (ref 0.7–1.2)
EOSINOPHIL # BLD: 0.3 K/UL (ref 0–0.7)
EOSINOPHIL NFR BLD: 2.7 %
ERYTHROCYTE [DISTWIDTH] IN BLOOD BY AUTOMATED COUNT: 14.1 % (ref 11.5–14.5)
GLUCOSE SERPL-MCNC: 94 MG/DL (ref 70–99)
HCT VFR BLD AUTO: 42.6 % (ref 42–52)
HGB BLD-MCNC: 14.3 G/DL (ref 14–18)
LYMPHOCYTES # BLD: 1.7 K/UL (ref 1–4.8)
LYMPHOCYTES NFR BLD: 14.3 %
MAGNESIUM SERPL-MCNC: 1.5 MG/DL (ref 1.7–2.4)
MCH RBC QN AUTO: 30.2 PG (ref 27–31.3)
MCHC RBC AUTO-ENTMCNC: 33.6 % (ref 33–37)
MCV RBC AUTO: 89.9 FL (ref 79–92.2)
MONOCYTES # BLD: 1.3 K/UL (ref 0.2–0.8)
MONOCYTES NFR BLD: 10.4 %
NEUTROPHILS # BLD: 8.6 K/UL (ref 1.4–6.5)
NEUTS SEG NFR BLD: 71.6 %
PHOSPHATE SERPL-MCNC: 2.4 MG/DL (ref 2.3–4.8)
PLATELET # BLD AUTO: 282 K/UL (ref 130–400)
POTASSIUM SERPL-SCNC: 4.3 MEQ/L (ref 3.4–4.9)
PROT SERPL-MCNC: 6.4 G/DL (ref 6.3–8)
RBC # BLD AUTO: 4.74 M/UL (ref 4.7–6.1)
SODIUM SERPL-SCNC: 139 MEQ/L (ref 135–144)
WBC # BLD AUTO: 12.1 K/UL (ref 4.8–10.8)

## 2024-05-25 PROCEDURE — 99232 SBSQ HOSP IP/OBS MODERATE 35: CPT | Performed by: PSYCHIATRY & NEUROLOGY

## 2024-05-25 PROCEDURE — 97162 PT EVAL MOD COMPLEX 30 MIN: CPT

## 2024-05-25 PROCEDURE — 84100 ASSAY OF PHOSPHORUS: CPT

## 2024-05-25 PROCEDURE — 2580000003 HC RX 258: Performed by: INTERNAL MEDICINE

## 2024-05-25 PROCEDURE — 85025 COMPLETE CBC W/AUTO DIFF WBC: CPT

## 2024-05-25 PROCEDURE — 83735 ASSAY OF MAGNESIUM: CPT

## 2024-05-25 PROCEDURE — 99232 SBSQ HOSP IP/OBS MODERATE 35: CPT | Performed by: INTERNAL MEDICINE

## 2024-05-25 PROCEDURE — 6360000002 HC RX W HCPCS: Performed by: INTERNAL MEDICINE

## 2024-05-25 PROCEDURE — 6370000000 HC RX 637 (ALT 250 FOR IP): Performed by: INTERNAL MEDICINE

## 2024-05-25 PROCEDURE — 36415 COLL VENOUS BLD VENIPUNCTURE: CPT

## 2024-05-25 PROCEDURE — 1210000000 HC MED SURG R&B

## 2024-05-25 PROCEDURE — 80076 HEPATIC FUNCTION PANEL: CPT

## 2024-05-25 PROCEDURE — 80048 BASIC METABOLIC PNL TOTAL CA: CPT

## 2024-05-25 PROCEDURE — 82550 ASSAY OF CK (CPK): CPT

## 2024-05-25 RX ORDER — SODIUM CHLORIDE 0.9 % (FLUSH) 0.9 %
5-40 SYRINGE (ML) INJECTION EVERY 12 HOURS SCHEDULED
Status: DISCONTINUED | OUTPATIENT
Start: 2024-05-25 | End: 2024-05-29 | Stop reason: HOSPADM

## 2024-05-25 RX ORDER — MAGNESIUM SULFATE IN WATER 40 MG/ML
4000 INJECTION, SOLUTION INTRAVENOUS ONCE
Status: COMPLETED | OUTPATIENT
Start: 2024-05-25 | End: 2024-05-25

## 2024-05-25 RX ORDER — FOLIC ACID 1 MG/1
1 TABLET ORAL DAILY
Status: DISCONTINUED | OUTPATIENT
Start: 2024-05-25 | End: 2024-05-25

## 2024-05-25 RX ORDER — LORAZEPAM 1 MG/1
3 TABLET ORAL
Status: DISCONTINUED | OUTPATIENT
Start: 2024-05-25 | End: 2024-05-29 | Stop reason: HOSPADM

## 2024-05-25 RX ORDER — THIAMINE HYDROCHLORIDE 100 MG/ML
100 INJECTION, SOLUTION INTRAMUSCULAR; INTRAVENOUS DAILY
Status: DISCONTINUED | OUTPATIENT
Start: 2024-05-26 | End: 2024-05-29 | Stop reason: HOSPADM

## 2024-05-25 RX ORDER — LORAZEPAM 2 MG/ML
2 INJECTION INTRAMUSCULAR
Status: DISCONTINUED | OUTPATIENT
Start: 2024-05-25 | End: 2024-05-29 | Stop reason: HOSPADM

## 2024-05-25 RX ORDER — PREGABALIN 100 MG/1
100 CAPSULE ORAL 2 TIMES DAILY
Status: DISCONTINUED | OUTPATIENT
Start: 2024-05-25 | End: 2024-05-29 | Stop reason: HOSPADM

## 2024-05-25 RX ORDER — SODIUM CHLORIDE 9 MG/ML
INJECTION, SOLUTION INTRAVENOUS PRN
Status: DISCONTINUED | OUTPATIENT
Start: 2024-05-25 | End: 2024-05-29 | Stop reason: HOSPADM

## 2024-05-25 RX ORDER — LORAZEPAM 2 MG/ML
4 INJECTION INTRAMUSCULAR
Status: DISCONTINUED | OUTPATIENT
Start: 2024-05-25 | End: 2024-05-29 | Stop reason: HOSPADM

## 2024-05-25 RX ORDER — HALOPERIDOL 5 MG/ML
5 INJECTION INTRAMUSCULAR
Status: COMPLETED | OUTPATIENT
Start: 2024-05-25 | End: 2024-05-25

## 2024-05-25 RX ORDER — LORAZEPAM 1 MG/1
1 TABLET ORAL
Status: DISCONTINUED | OUTPATIENT
Start: 2024-05-25 | End: 2024-05-29 | Stop reason: HOSPADM

## 2024-05-25 RX ORDER — THIAMINE HYDROCHLORIDE 100 MG/ML
400 INJECTION, SOLUTION INTRAMUSCULAR; INTRAVENOUS ONCE
Status: DISCONTINUED | OUTPATIENT
Start: 2024-05-25 | End: 2024-05-25 | Stop reason: ALTCHOICE

## 2024-05-25 RX ORDER — LORAZEPAM 2 MG/ML
1 INJECTION INTRAMUSCULAR
Status: DISCONTINUED | OUTPATIENT
Start: 2024-05-25 | End: 2024-05-29 | Stop reason: HOSPADM

## 2024-05-25 RX ORDER — SODIUM CHLORIDE 0.9 % (FLUSH) 0.9 %
5-40 SYRINGE (ML) INJECTION PRN
Status: DISCONTINUED | OUTPATIENT
Start: 2024-05-25 | End: 2024-05-29 | Stop reason: HOSPADM

## 2024-05-25 RX ORDER — LORAZEPAM 1 MG/1
4 TABLET ORAL
Status: DISCONTINUED | OUTPATIENT
Start: 2024-05-25 | End: 2024-05-29 | Stop reason: HOSPADM

## 2024-05-25 RX ORDER — LORAZEPAM 2 MG/ML
3 INJECTION INTRAMUSCULAR
Status: DISCONTINUED | OUTPATIENT
Start: 2024-05-25 | End: 2024-05-29 | Stop reason: HOSPADM

## 2024-05-25 RX ORDER — LORAZEPAM 1 MG/1
2 TABLET ORAL
Status: DISCONTINUED | OUTPATIENT
Start: 2024-05-25 | End: 2024-05-29 | Stop reason: HOSPADM

## 2024-05-25 RX ADMIN — CLOPIDOGREL BISULFATE 75 MG: 75 TABLET ORAL at 09:36

## 2024-05-25 RX ADMIN — PREGABALIN 100 MG: 100 CAPSULE ORAL at 22:49

## 2024-05-25 RX ADMIN — ASPIRIN 81 MG: 81 TABLET, COATED ORAL at 09:37

## 2024-05-25 RX ADMIN — PREGABALIN 100 MG: 100 CAPSULE ORAL at 09:37

## 2024-05-25 RX ADMIN — SODIUM CHLORIDE, POTASSIUM CHLORIDE, SODIUM LACTATE AND CALCIUM CHLORIDE: 600; 310; 30; 20 INJECTION, SOLUTION INTRAVENOUS at 02:04

## 2024-05-25 RX ADMIN — LORAZEPAM 2 MG: 1 TABLET ORAL at 22:49

## 2024-05-25 RX ADMIN — HALOPERIDOL LACTATE 5 MG: 5 INJECTION, SOLUTION INTRAMUSCULAR at 22:00

## 2024-05-25 RX ADMIN — OMEGA-3-ACID ETHYL ESTERS 2 G: 1 CAPSULE, LIQUID FILLED ORAL at 09:37

## 2024-05-25 RX ADMIN — ENOXAPARIN SODIUM 30 MG: 100 INJECTION SUBCUTANEOUS at 09:36

## 2024-05-25 RX ADMIN — ALLOPURINOL 100 MG: 100 TABLET ORAL at 09:37

## 2024-05-25 RX ADMIN — MAGNESIUM SULFATE HEPTAHYDRATE 4000 MG: 40 INJECTION, SOLUTION INTRAVENOUS at 16:16

## 2024-05-25 RX ADMIN — METOPROLOL SUCCINATE 25 MG: 25 TABLET, FILM COATED, EXTENDED RELEASE ORAL at 09:37

## 2024-05-25 RX ADMIN — Medication 10 ML: at 09:54

## 2024-05-25 RX ADMIN — OMEGA-3-ACID ETHYL ESTERS 2 G: 1 CAPSULE, LIQUID FILLED ORAL at 22:48

## 2024-05-25 ASSESSMENT — PAIN DESCRIPTION - PAIN TYPE: TYPE: ACUTE PAIN

## 2024-05-25 ASSESSMENT — PAIN SCALES - GENERAL
PAINLEVEL_OUTOF10: 5
PAINLEVEL_OUTOF10: 4

## 2024-05-25 ASSESSMENT — PAIN DESCRIPTION - FREQUENCY: FREQUENCY: CONTINUOUS

## 2024-05-25 ASSESSMENT — PAIN DESCRIPTION - ORIENTATION: ORIENTATION: LEFT

## 2024-05-25 ASSESSMENT — PAIN DESCRIPTION - LOCATION
LOCATION: GENERALIZED
LOCATION: GENERALIZED
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
## 2024-05-25 NOTE — PROGRESS NOTES
PROGRESS NOTE       Patient is a 63 y.o. male with past medical history of PAD status post PTA in 2020, LEFT carotid stenosis status post carotid endarterectomy on 1/2019, occluded right ICA, hypertension, hyperlipidemia, tobacco abuse, alcohol abuse who presents with a chief complaint of fall. Patient is followed on a regular basis by Kiki Rowland PA-C.   Supposedly, patient has been following more frequently in the past few days at home.  He was found to be on the ground 2 days later and covered in dirt and feces.   Cardiology consulted for elevated troponin  In the ER blood pressure 141/61, heart rate 112, SpO2 91% on room air, respirations 22  Sodium 138, potassium 4.5, BUN 71, creatinine 3.47, GFR 18.9, magnesium 2.3, lactic acid 1.2, total CK 9559, proBNP 398  Troponins 126, 115, 120  EKG showing sinus tachycardia with no specific ST abnormalities  Last echo from 2017 showing normal LVEF with no major valvular abnormalities  Patient laying in bed at this time.  Patient reports frequent falls but not passing out. He says his legs just give out and he ends up being on the floor for hours or sometimes days. He is denying chest pain or shortness of breath.  He seems to have poor insight on his condition.  Patient follows with Dr. Houston and was recommended to have a stress test recently but patient declined.  Patient denies prior history of cardiac stents    5/25/24 covering Dr. Sandhu.    he denies CP. He is too weak to walk. He is very disheveled.     Past Medical History:   Diagnosis Date    Abnormal ankle brachial index (RAO)     Acute idiopathic gout of left wrist 12/16/2020    Alcoholic (HCC)-remote Hx 8/28/2015    In remission goes to AA daily--agrees not to overtake pain meds     Alcoholic polyneuropathy (HCC)     Asthma     CAD (coronary artery disease)     NOHC    Chronic back pain greater than 3 months duration     Claudication in peripheral vascular disease (HCC)     CN III palsy, right

## 2024-05-25 NOTE — PROGRESS NOTES
Nephrology Progress Note    Assessment:  Resolved CHET  Alcohol abuse history  Hypertension  IgA monoclonal dx-albuminuria  Rhabo improving      Plan: start to increase activity  follow CPK   renal status fine  BP labile based on aggitation    Patient Active Problem List:     Alcoholic polyneuropathy (HCC)     Personal history of alcoholism ([facility])     Basic learning disability, reading     Lumbosacral radiculopathy at S1     Tobacco abuse     DDD (degenerative disc disease), lumbar     Cognitive deficit due to old head injury     Anxiety disorder     Alkaline phosphatase elevation     High risk medication use - 01/25/18 OARRS PM&R, 03/23/18 OARRS PM&R, 02/15/17 Med Contract PM&R, 09/21/17 Urine Drug Screen: negative PM&R     Umbilical hernia     IgG monoclonal gammopathy of uncertain significance     COPD (chronic obstructive pulmonary disease) ([facility])     Muscle spasm of back     Generalized anxiety disorder     SI (sacroiliac) pain     Chronic midline low back pain with bilateral sciatica     Ptosis     CN III palsy, right eye     Carotid artery disorder ([facility])     HTN (hypertension)     Carotid stenosis, symptomatic w/o infarct, left     Constipation     Cerebrovascular disease     Polyp of colon     External hemorrhoid     Insulin resistance     PAD (peripheral artery disease) ([facility])     Edema of both lower extremities due to peripheral venous insufficiency     Diverticulosis     Idiopathic gout of left wrist     History of traumatic brain injury     Chronic pain syndrome     Prediabetes     Atherosclerosis of native artery of both lower extremities with intermittent claudication ([facility])     Coronary atherosclerosis     Embolism and thrombosis of arteries of lower extremity ([facility])     Iliac artery thrombosis, 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[facility])     Varicose veins of lower extremity with inflammation     Rhabdomyolysis     Pneumonia of left lower lobe due to infectious organism      Subjective:  Admit Date: 5/23/2024    Interval History:  wants to get out room and start exercising  denies pain  weak legs    Medications:  Scheduled Meds:   aspirin  81 mg Oral Daily    clopidogrel  75 mg Oral Daily    metoprolol succinate  25 mg Oral Daily    omega-3 acid ethyl esters  2 g Oral BID    allopurinol  100 mg Oral Daily    pregabalin  100 mg Oral Daily    LORazepam  1 mg IntraVENous Once    vitamin D  50,000 Units Oral Weekly    sodium chloride flush  5-40 mL IntraVENous 2 times per day    enoxaparin  30 mg SubCUTAneous Daily     Continuous Infusions:   sodium chloride      lactated ringers IV soln 200 mL/hr at 05/25/24 0204       CBC:   Recent Labs     05/24/24  1154 05/25/24  0458   WBC 12.7* 12.1*   HGB 14.4 14.3    282     CMP:    Recent Labs     05/23/24  1451 05/24/24  0501 05/25/24  0458    140 139   K 4.5 3.9 4.3    106 106   CO2 22 22 24   BUN 71* 55* 29*   CREATININE 3.47* 2.21* 1.19   GLUCOSE 101* 89 94   CALCIUM 9.7 8.9 9.2   LABGLOM 18.9* 32.5* 68.2     Troponin: No results for input(s): \"TROPONINI\" in the last 72 hours.  BNP: No results for input(s): \"BNP\" in the last 72 hours.  INR: No results for input(s): \"INR\" in the last 72 hours.  Lipids: No results for input(s): \"CHOL\", \"LDLDIRECT\", \"TRIG\", \"HDL\", \"AMYLASE\", \"LIPASE\" in the last 72 hours.  Liver:   Recent Labs     05/25/24  0458   *   ALT 71*   ALKPHOS 45   BILITOT 0.8*     Iron:  No results for input(s): \"FERRITIN\" in the last 72 hours.    Invalid input(s): \"IRONS\", \"LABIRONS\"  Urinalysis: No results for input(s): \"UA\" in the last 72 hours.    Objective:  Vitals: /68   Pulse 96   Temp 97.5 °F (36.4 °C) (Oral)   Resp 18   Ht 1.803 m (5' 11\")   Wt 106.6 kg (235 lb)   SpO2 92%   BMI 32.78 kg/m²    Wt Readings from Last 3 Encounters:   05/23/24 106.6 kg (235 lb)   04/30/24 110 kg (242 lb 8.1 oz)   01/30/24 106.6 kg (235 lb)      24HR INTAKE/OUTPUT:    Intake/Output Summary (Last 24 hours) at 5/25/2024 0731  Last data filed at 5/25/2024 0806  Gross per

## 2024-05-25 NOTE — CARE COORDINATION
[facility] HAS ACCEPTED THE PT AND STARTED PRECERT.  PT CONFIRMED WITH THE PT AND HIS BROTHER THAT THEY AGREE TO [facility] AT ME.

## 2024-05-25 NOTE — PROGRESS NOTES
Hospitalist Progress Note      PCP: Kiki Rashid PA-C    Date of Admission: 5/23/2024    Chief Complaint:  no acute events, afebrile, stable HD    Medications:  Reviewed    Infusion Medications    sodium chloride      lactated ringers IV soln 200 mL/hr at 05/25/24 0204     Scheduled Medications    folic acid  1 mg Oral Daily    aspirin  81 mg Oral Daily    clopidogrel  75 mg Oral Daily    metoprolol succinate  25 mg Oral Daily    omega-3 acid ethyl esters  2 g Oral BID    allopurinol  100 mg Oral Daily    pregabalin  100 mg Oral Daily    LORazepam  1 mg IntraVENous Once    vitamin D  50,000 Units Oral Weekly    sodium chloride flush  5-40 mL IntraVENous 2 times per day    enoxaparin  30 mg SubCUTAneous Daily     PRN Meds: labetalol, sodium chloride flush, sodium chloride, ondansetron **OR** ondansetron, polyethylene glycol, acetaminophen **OR** acetaminophen      Intake/Output Summary (Last 24 hours) at 5/25/2024 1405  Last data filed at 5/25/2024 1137  Gross per 24 hour   Intake 460 ml   Output 1750 ml   Net -1290 ml         Exam:    BP (!) 156/74   Pulse 92   Temp 98.1 °F (36.7 °C) (Oral)   Resp 18   Ht 1.803 m (5' 11\")   Wt 106.6 kg (235 lb)   SpO2 91%   BMI 32.78 kg/m²     General appearance: alert, cooperative  Lungs: clear to auscultation bilaterally  Heart: S1/S2, RRR  Abdomen: soft, active BS  Extremities: edema      Labs:   Recent Labs     05/23/24  1430 05/24/24  1154 05/25/24  0458   WBC 17.5* 12.7* 12.1*   HGB 15.8 14.4 14.3   HCT 47.0 43.0 42.6    286 282       Recent Labs     05/23/24  1451 05/24/24  0501 05/25/24  0458    140 139   K 4.5 3.9 4.3    106 106   CO2 22 22 24   BUN 71* 55* 29*   CREATININE 3.47* 2.21* 1.19   CALCIUM 9.7 8.9 9.2   PHOS  --  3.5 2.4       Recent Labs     05/23/24  1451 05/24/24  0501 05/25/24  0458   * 174* 156*   ALT 65* 61* 71*   BILIDIR  --  0.4 0.3   BILITOT 1.4* 0.9* 0.8*   ALKPHOS 55 43 45       No results for input(s): \"INR\" in the  supplement      Diet: ADULT DIET; Regular    Code Status: Full Code      Disposition - SNF, waiting for precert          Electronically signed by FINA MEMBRENO MD on 5/25/2024 at 2:05 PM

## 2024-05-25 NOTE — PLAN OF CARE
Problem: Pain  Goal: Verbalizes/displays adequate comfort level or baseline comfort level  5/24/2024 2313 by Cristina Barahona RN  Outcome: Progressing  5/24/2024 1146 by April Sousa RN  Outcome: Progressing     Problem: Skin/Tissue Integrity  Goal: Absence of new skin breakdown  Description: 1.  Monitor for areas of redness and/or skin breakdown  2.  Assess vascular access sites hourly  3.  Every 4-6 hours minimum:  Change oxygen saturation probe site  4.  Every 4-6 hours:  If on nasal continuous positive airway pressure, respiratory therapy assess nares and determine need for appliance change or resting period.  5/24/2024 2313 by Cristina Barahona, RN  Outcome: Progressing  5/24/2024 1146 by April Sousa, RN  Outcome: Progressing     Problem: Safety - Adult  Goal: Free from fall injury  5/24/2024 2313 by Cristina Barahona, RN  Outcome: Progressing  5/24/2024 1146 by April Sousa, RN  Outcome: Progressing

## 2024-05-25 NOTE — PROGRESS NOTES
Physical Therapy Med Surg Initial Assessment  Facility/Department: [address] ORTHO TELE  Room: [facility]       NAME: Ranjith Rodriguez  : 1960 (63 y.o.)  MRN: [id number]  CODE STATUS: Full Code    Date of Service: 2024    Patient Diagnosis(es): Rhabdomyolysis [M62.82]  Cardiac enzymes elevated [R74.8]  Traumatic rhabdomyolysis, initial encounter ([facility]) [T79.6XXA]  Acute renal failure, unspecified acute renal failure type ([facility]) [N17.9]  Pneumonia of left lower lobe due to infectious organism [J18.9]   Chief Complaint   Patient presents with    Fall     Patient Active Problem List    Diagnosis Date Noted    High risk medication use - 18 OARRS PM&R, 18 OARRS PM&R, 02/15/17 Med Contract PM&R, 17 Urine Drug Screen: negative PM&R 2015    Prediabetes 2023    Chronic pain syndrome 10/21/2022    Pneumonia of left lower lobe due to infectious organism 2024    Rhabdomyolysis 2024    Atherosclerosis of native artery of both lower extremities with intermittent claudication ([facility]) 2023    Coronary atherosclerosis 2023    Embolism and thrombosis of arteries of lower extremity ([facility]) 2023    Iliac artery thrombosis, 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[facility]) 2023    Varicose veins of lower extremity with inflammation 2023    History of traumatic brain injury 2022    Idiopathic gout of left wrist 2020    Diverticulosis 2020    Edema of both lower extremities due to peripheral venous insufficiency 2020    Insulin resistance 2020    PAD (peripheral artery disease) ([facility]) 2020    Polyp of colon     External hemorrhoid     Cerebrovascular disease 2019    Constipation 2019    Carotid stenosis, symptomatic w/o infarct, left 2019    Ptosis 2017    CN III palsy, right eye 2017    Carotid artery disorder (HCC) 2017    HTN (hypertension) 2017    Chronic midline low back pain with bilateral sciatica 2016    SI  functional    Neuro:  Balance  Sitting - Static: Good  Sitting - Dynamic: Fair  Standing - Static: Fair  Standing - Dynamic: Poor                      Tone: Normal  Coordination: Generally decreased, functional         Bed mobility  Rolling to Left: Moderate assistance  Supine to Sit: Maximum assistance  Sit to Supine: Maximum assistance    Transfers  Sit to Stand: Moderate Assistance  Stand to Sit: Moderate Assistance    Ambulation  Surface: Level tile  Device: Rolling Walker  Assistance: Minimal assistance  Quality of Gait: Heavy bracing on WW. FF posture. Decreased foot clearance bilaterall  Distance: 3 sidesteps                   Activity Tolerance  Activity Tolerance: Patient tolerated treatment well    Patient Education  Education Given To: Patient  Education Provided: Role of Therapy;Plan of Care  Education Method: Verbal  Education Outcome: Verbalized understanding;Continued education needed       ASSESSMENT:   Body Structures, Functions, Activity Limitations Requiring Skilled Therapeutic Intervention: Decreased functional mobility ;Decreased ADL status;Decreased body mechanics;Decreased tolerance to work activity;Decreased strength;Decreased safe awareness;Decreased endurance;Decreased balance;Decreased coordination  Decision Making: Medium Complexity  History: High  Exam: Med  Clinical Presentation: Med    Therapy Prognosis: Good  Barriers to Learning: insight/impulsive         DISCHARGE RECOMMENDATIONS:  Discharge Recommendations: Continue to assess pending progress, Therapy recommended at discharge    Assessment: Continued PT indicated to progress mobility and facilitate DC at highest level of indep and safety. Unsafe to return home at [facility] unless 24/7 phsyical assist able to be provided. Recommend therapy stay prior to DC home.  Requires PT Follow-Up: Yes       PLAN OF CARE:  Physical Therapy Plan  General Plan: 1 time a day 3-6 times a week  Current Treatment Recommendations: ROM, Strengthening,

## 2024-05-25 NOTE — PROGRESS NOTES
Neurology Follow up    SUBJECTIVE: Severe pain in the lower extremities so dysesthetic that he would not allow me to touch his legs.  This is his baseline and he does not ambulate at all at home.  He requires quite some help to even go to the bathroom in the wheelchair.  Current Facility-Administered Medications   Medication Dose Route Frequency Provider Last Rate Last Admin    aspirin EC tablet 81 mg  81 mg Oral Daily Adan Rubio MD   81 mg at 05/25/24 0937    clopidogrel (PLAVIX) tablet 75 mg  75 mg Oral Daily Adan Rubio MD   75 mg at 05/25/24 0936    metoprolol succinate (TOPROL XL) extended release tablet 25 mg  25 mg Oral Daily Adan Rubio MD   25 mg at 05/25/24 0937    omega-3 acid ethyl esters (LOVAZA) capsule 2 g  2 g Oral BID Adan Rubio MD   2 g at 05/25/24 0937    allopurinol (ZYLOPRIM) tablet 100 mg  100 mg Oral Daily Adan Rubio MD   100 mg at 05/25/24 0937    pregabalin (LYRICA) capsule 100 mg  100 mg Oral Daily Adan Rubio MD   100 mg at 05/25/24 0937    LORazepam (ATIVAN) injection 1 mg  1 mg IntraVENous Once Lucy Small APRN - CNP        vitamin D (ERGOCALCIFEROL) capsule 50,000 Units  50,000 Units Oral Weekly Adan Rubio MD        labetalol (NORMODYNE;TRANDATE) injection 10 mg  10 mg IntraVENous Q6H PRN Nancy Dominguez APRN - CNP   10 mg at 05/24/24 2326    sodium chloride flush 0.9 % injection 5-40 mL  5-40 mL IntraVENous 2 times per day Adan Rubio MD   10 mL at 05/25/24 0954    sodium chloride flush 0.9 % injection 5-40 mL  5-40 mL IntraVENous PRN Adan Rubio MD        0.9 % sodium chloride infusion   IntraVENous PRN Adan Rubio MD        enoxaparin Sodium (LOVENOX) injection 30 mg  30 mg SubCUTAneous Daily Adan Rubio MD   30 mg at 05/25/24 0936    ondansetron (ZOFRAN-ODT) disintegrating tablet 4 mg  4 mg Oral Q8H PRN Adan Rubio MD        Or    ondansetron (ZOFRAN) injection 4 mg  4 mg IntraVENous Q6H PRN Adan Rubio MD

## 2024-05-25 NOTE — FLOWSHEET NOTE
Assessment completed.VSS.Alert et oriented x4. Denies chest pain,sob or nausea. Family vat bedside.Call light within reach..Electronically signed by Carlotta Munoz RN on 5/25/2024 at 12:28 PM

## 2024-05-26 LAB
ALBUMIN SERPL-MCNC: 3.4 G/DL (ref 3.5–4.6)
ALP SERPL-CCNC: 49 U/L (ref 35–104)
ALT SERPL-CCNC: 71 U/L (ref 0–41)
AMMONIA PLAS-SCNC: 27 UMOL/L (ref 16–60)
AMPHET UR QL SCN: NORMAL
ANION GAP SERPL CALCULATED.3IONS-SCNC: 8 MEQ/L (ref 9–15)
AST SERPL-CCNC: 109 U/L (ref 0–40)
BARBITURATES UR QL SCN: NORMAL
BASOPHILS # BLD: 0.1 K/UL (ref 0–0.2)
BASOPHILS NFR BLD: 0.5 %
BENZODIAZ UR QL SCN: NORMAL
BILIRUB DIRECT SERPL-MCNC: 0.3 MG/DL (ref 0–0.4)
BILIRUB INDIRECT SERPL-MCNC: 0.4 MG/DL (ref 0–0.6)
BILIRUB SERPL-MCNC: 0.7 MG/DL (ref 0.2–0.7)
BUN SERPL-MCNC: 22 MG/DL (ref 8–23)
CALCIUM SERPL-MCNC: 9.4 MG/DL (ref 8.5–9.9)
CANNABINOIDS UR QL SCN: NORMAL
CHLORIDE SERPL-SCNC: 102 MEQ/L (ref 95–107)
CK SERPL-CCNC: 1718 U/L (ref 0–190)
CO2 SERPL-SCNC: 26 MEQ/L (ref 20–31)
COCAINE UR QL SCN: NORMAL
CREAT SERPL-MCNC: 1.14 MG/DL (ref 0.7–1.2)
DRUG SCREEN COMMENT UR-IMP: NORMAL
EOSINOPHIL # BLD: 0.4 K/UL (ref 0–0.7)
EOSINOPHIL NFR BLD: 3.4 %
ERYTHROCYTE [DISTWIDTH] IN BLOOD BY AUTOMATED COUNT: 14.1 % (ref 11.5–14.5)
FENTANYL SCREEN, URINE: NORMAL
GLUCOSE SERPL-MCNC: 92 MG/DL (ref 70–99)
HCT VFR BLD AUTO: 45.1 % (ref 42–52)
HGB BLD-MCNC: 15.2 G/DL (ref 14–18)
LIPASE SERPL-CCNC: 51 U/L (ref 12–95)
LYMPHOCYTES # BLD: 2.2 K/UL (ref 1–4.8)
LYMPHOCYTES NFR BLD: 19.5 %
MAGNESIUM SERPL-MCNC: 1.8 MG/DL (ref 1.7–2.4)
MCH RBC QN AUTO: 30.5 PG (ref 27–31.3)
MCHC RBC AUTO-ENTMCNC: 33.7 % (ref 33–37)
MCV RBC AUTO: 90.4 FL (ref 79–92.2)
METHADONE UR QL SCN: NORMAL
MISCELLANEOUS LAB TEST ORDER: NORMAL
MONOCYTES # BLD: 1.2 K/UL (ref 0.2–0.8)
MONOCYTES NFR BLD: 10.8 %
NEUTROPHILS # BLD: 7.2 K/UL (ref 1.4–6.5)
NEUTS SEG NFR BLD: 65.3 %
OPIATES UR QL SCN: NORMAL
OXYCODONE UR QL SCN: NORMAL
PCP UR QL SCN: NORMAL
PHOSPHATE SERPL-MCNC: 2.8 MG/DL (ref 2.3–4.8)
PHOSPHATE SERPL-MCNC: 2.9 MG/DL (ref 2.3–4.8)
PLATELET # BLD AUTO: 329 K/UL (ref 130–400)
POTASSIUM SERPL-SCNC: 4 MEQ/L (ref 3.4–4.9)
PROPOXYPH UR QL SCN: NORMAL
PROT SERPL-MCNC: 6.9 G/DL (ref 6.3–8)
RBC # BLD AUTO: 4.99 M/UL (ref 4.7–6.1)
SODIUM SERPL-SCNC: 136 MEQ/L (ref 135–144)
WBC # BLD AUTO: 11.1 K/UL (ref 4.8–10.8)
WHOPPER PROMPT: NORMAL

## 2024-05-26 PROCEDURE — 82140 ASSAY OF AMMONIA: CPT

## 2024-05-26 PROCEDURE — 6360000002 HC RX W HCPCS: Performed by: INTERNAL MEDICINE

## 2024-05-26 PROCEDURE — 1210000000 HC MED SURG R&B

## 2024-05-26 PROCEDURE — 6370000000 HC RX 637 (ALT 250 FOR IP): Performed by: INTERNAL MEDICINE

## 2024-05-26 PROCEDURE — 80307 DRUG TEST PRSMV CHEM ANLYZR: CPT

## 2024-05-26 PROCEDURE — 99232 SBSQ HOSP IP/OBS MODERATE 35: CPT | Performed by: PSYCHIATRY & NEUROLOGY

## 2024-05-26 PROCEDURE — 2580000003 HC RX 258: Performed by: INTERNAL MEDICINE

## 2024-05-26 PROCEDURE — 83690 ASSAY OF LIPASE: CPT

## 2024-05-26 PROCEDURE — 2500000003 HC RX 250 WO HCPCS: Performed by: INTERNAL MEDICINE

## 2024-05-26 PROCEDURE — 83735 ASSAY OF MAGNESIUM: CPT

## 2024-05-26 PROCEDURE — 36415 COLL VENOUS BLD VENIPUNCTURE: CPT

## 2024-05-26 PROCEDURE — 82550 ASSAY OF CK (CPK): CPT

## 2024-05-26 PROCEDURE — 85025 COMPLETE CBC W/AUTO DIFF WBC: CPT

## 2024-05-26 PROCEDURE — 80076 HEPATIC FUNCTION PANEL: CPT

## 2024-05-26 PROCEDURE — 99232 SBSQ HOSP IP/OBS MODERATE 35: CPT | Performed by: INTERNAL MEDICINE

## 2024-05-26 PROCEDURE — 80048 BASIC METABOLIC PNL TOTAL CA: CPT

## 2024-05-26 PROCEDURE — 84100 ASSAY OF PHOSPHORUS: CPT

## 2024-05-26 RX ORDER — VALSARTAN 40 MG/1
80 TABLET ORAL DAILY
Status: DISCONTINUED | OUTPATIENT
Start: 2024-05-26 | End: 2024-05-29 | Stop reason: HOSPADM

## 2024-05-26 RX ORDER — VALSARTAN AND HYDROCHLOROTHIAZIDE 80; 12.5 MG/1; MG/1
1 TABLET, FILM COATED ORAL DAILY
Status: DISCONTINUED | OUTPATIENT
Start: 2024-05-26 | End: 2024-05-26

## 2024-05-26 RX ORDER — HYDROCHLOROTHIAZIDE 25 MG/1
12.5 TABLET ORAL DAILY
Status: DISCONTINUED | OUTPATIENT
Start: 2024-05-26 | End: 2024-05-29 | Stop reason: HOSPADM

## 2024-05-26 RX ADMIN — HYDROCHLOROTHIAZIDE 12.5 MG: 25 TABLET ORAL at 12:17

## 2024-05-26 RX ADMIN — Medication 10 ML: at 09:06

## 2024-05-26 RX ADMIN — VALSARTAN 80 MG: 40 TABLET, FILM COATED ORAL at 12:16

## 2024-05-26 RX ADMIN — THIAMINE HYDROCHLORIDE 100 MG: 100 INJECTION, SOLUTION INTRAMUSCULAR; INTRAVENOUS at 08:00

## 2024-05-26 RX ADMIN — PREGABALIN 100 MG: 100 CAPSULE ORAL at 20:50

## 2024-05-26 RX ADMIN — ENOXAPARIN SODIUM 30 MG: 100 INJECTION SUBCUTANEOUS at 08:00

## 2024-05-26 RX ADMIN — CLOPIDOGREL BISULFATE 75 MG: 75 TABLET ORAL at 08:00

## 2024-05-26 RX ADMIN — THIAMINE HYDROCHLORIDE 400 MG: 100 INJECTION, SOLUTION INTRAMUSCULAR; INTRAVENOUS at 05:14

## 2024-05-26 RX ADMIN — METOPROLOL SUCCINATE 25 MG: 25 TABLET, FILM COATED, EXTENDED RELEASE ORAL at 08:00

## 2024-05-26 RX ADMIN — Medication 2 MG: at 16:14

## 2024-05-26 RX ADMIN — ASPIRIN 81 MG: 81 TABLET, COATED ORAL at 08:00

## 2024-05-26 RX ADMIN — PREGABALIN 100 MG: 100 CAPSULE ORAL at 12:16

## 2024-05-26 RX ADMIN — OMEGA-3-ACID ETHYL ESTERS 2 G: 1 CAPSULE, LIQUID FILLED ORAL at 08:00

## 2024-05-26 RX ADMIN — OMEGA-3-ACID ETHYL ESTERS 2 G: 1 CAPSULE, LIQUID FILLED ORAL at 20:50

## 2024-05-26 RX ADMIN — LORAZEPAM 2 MG: 1 TABLET ORAL at 08:00

## 2024-05-26 RX ADMIN — SODIUM CHLORIDE, PRESERVATIVE FREE 10 ML: 5 INJECTION INTRAVENOUS at 08:01

## 2024-05-26 RX ADMIN — ACETAMINOPHEN 650 MG: 325 TABLET ORAL at 12:20

## 2024-05-26 RX ADMIN — ALLOPURINOL 100 MG: 100 TABLET ORAL at 09:52

## 2024-05-26 RX ADMIN — FOLIC ACID 1 MG: 5 INJECTION, SOLUTION INTRAMUSCULAR; INTRAVENOUS; SUBCUTANEOUS at 09:05

## 2024-05-26 RX ADMIN — SODIUM CHLORIDE, PRESERVATIVE FREE 10 ML: 5 INJECTION INTRAVENOUS at 20:50

## 2024-05-26 ASSESSMENT — PAIN DESCRIPTION - LOCATION: LOCATION: OTHER (COMMENT)

## 2024-05-26 ASSESSMENT — PAIN SCALES - WONG BAKER: WONGBAKER_NUMERICALRESPONSE: NO HURT

## 2024-05-26 ASSESSMENT — PAIN DESCRIPTION - DESCRIPTORS: DESCRIPTORS: ACHING
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
## 2024-05-26 NOTE — PROGRESS NOTES
Neurology Follow up    SUBJECTIVE: Severe pain in the lower extremities so dysesthetic that he would not allow me to touch his legs.  This is his baseline and he does not ambulate at all at home.  He requires quite some help to even go to the bathroom in the wheelchair.    5/26  Somewhat lethargic but oriented.  He is still in considerable pain in the lower extremities.  Patient's CPK levels coming down and still has some muscle pain  Current Facility-Administered Medications   Medication Dose Route Frequency Provider Last Rate Last Admin    valsartan (DIOVAN) tablet 80 mg  80 mg Oral Daily Adan Rubio MD        And    hydroCHLOROthiazide (HYDRODIURIL) tablet 12.5 mg  12.5 mg Oral Daily Adan Rubio MD        pregabalin (LYRICA) capsule 100 mg  100 mg Oral BID Adan Rubio MD   100 mg at 05/25/24 2249    sodium chloride flush 0.9 % injection 5-40 mL  5-40 mL IntraVENous 2 times per day Natalya Mcdonough DO   10 mL at 05/26/24 0801    sodium chloride flush 0.9 % injection 5-40 mL  5-40 mL IntraVENous PRN Natalya Mcdonough DO        0.9 % sodium chloride infusion   IntraVENous PRN Natalya Mcdonough DO        thiamine (B-1) injection 100 mg  100 mg IntraVENous Daily Natalya Mcdonough DO   100 mg at 05/26/24 0800    folic acid 1 mg in sodium chloride 0.9 % 50 mL IVPB  1 mg IntraVENous Daily Natalya Mcdonough DO   Stopped at 05/26/24 0939    LORazepam (ATIVAN) tablet 1 mg  1 mg Oral Q1H PRN Natalya Mcdonough DO        Or    LORazepam (ATIVAN) injection 1 mg  1 mg IntraVENous Q1H PRN Natalya Mcdonough DO        Or    LORazepam (ATIVAN) tablet 2 mg  2 mg Oral Q1H PRN Natalya Mcdonough DO   2 mg at 05/26/24 0800    Or    LORazepam (ATIVAN) injection 2 mg  2 mg IntraVENous Q1H PRN Natalya Mcdonough DO        Or    LORazepam (ATIVAN) tablet 3 mg  3 mg Oral Q1H PRN Natalya Mcdonough DO        Or    LORazepam (ATIVAN) injection 3 mg  3 mg IntraVENous Q1H PRN Natalya Mcdonough DO        Or    LORazepam (ATIVAN) tablet 4 mg  4 mg Oral Q1H

## 2024-05-26 NOTE — PROGRESS NOTES
Nephrology Progress Note    Assessment:  Resolved CHET  CPK better  Hx ETOH abuse  Hx CVA  Hypertension  COPD  IgA monoclonal          Plan:  will sign off case  labs & vitals good    Patient Active Problem List:     Alcoholic polyneuropathy (HCC)     Personal history of alcoholism ([facility])     Basic learning disability, reading     Lumbosacral radiculopathy at S1     Tobacco abuse     DDD (degenerative disc disease), lumbar     Cognitive deficit due to old head injury     Anxiety disorder     Alkaline phosphatase elevation     High risk medication use - 01/25/18 OARRS PM&R, 03/23/18 OARRS PM&R, 02/15/17 Med Contract PM&R, 09/21/17 Urine Drug Screen: negative PM&R     Umbilical hernia     IgG monoclonal gammopathy of uncertain significance     COPD (chronic obstructive pulmonary disease) ([facility])     Muscle spasm of back     Generalized anxiety disorder     SI (sacroiliac) pain     Chronic midline low back pain with bilateral sciatica     Ptosis     CN III palsy, right eye     Carotid artery disorder ([facility])     HTN (hypertension)     Carotid stenosis, symptomatic w/o infarct, left     Constipation     Cerebrovascular disease     Polyp of colon     External hemorrhoid     Insulin resistance     PAD (peripheral artery disease) ([facility])     Edema of both lower extremities due to peripheral venous insufficiency     Diverticulosis     Idiopathic gout of left wrist     History of traumatic brain injury     Chronic pain syndrome     Prediabetes     Atherosclerosis of native artery of both lower extremities with intermittent claudication ([facility])     Coronary atherosclerosis     Embolism and thrombosis of arteries of lower extremity ([facility])     Iliac artery thrombosis, bilateral ([facility])     Varicose veins of lower extremity with inflammation     Rhabdomyolysis     Pneumonia of left lower lobe due to infectious organism      Subjective:  Admit Date: 5/23/2024    Interval History: stable    Medications:  Scheduled Meds:   pregabalin  100 mg

## 2024-05-26 NOTE — PROGRESS NOTES
ASA, Plavix  - holding Lipitor due to rhabdomyolysis    Neuropathy   - increase Lyrica given improvement of renal function    Folate / vit D deficiency  - started supplement      Diet: ADULT DIET; Regular    Code Status: Full Code      Disposition - SNF, waiting for precert          Electronically signed by FINA MEMBRENO MD on 5/26/2024 at 11:08 AM

## 2024-05-26 NOTE — CONSULTS
PSYCHIATRY ATTENDING CONSULT    REASON FOR CONSULT:  \"Acute agitation/threatening, suspected alcohol withdrawal and/or psychiatric issue\"    REQUESTING PHYSICIAN:  Dr. Mcdonough    CHIEF COMPLAINT: \"It's a long story\"    HISTORY OF PRESENT ILLNESS:  Ranjith Rodriguez  is a 63 y.o. male who was admitted on 5/23/2024  due to multiple falls at home; he reportedly was found by his brother on the ground where he had been laying on for two days, covered in dirt and feces (according to Epic notes). He was admitted to the hospital, but was repeatedly confused and aggressive. Psychiatry consulted as above.   When interviewed today, the patient said he \"started to fall down\" \"7-8-9-10 months ago\", and \"everybody kept asking him if he hit his head\". When asked about the most current event that led to his admission, he said he had \"slid down\" from his bed, which has an incline, and \"tried to get up\", \"I hit my head and I his my butt\". He was able to tell me that he had come here  \"3 days ago\". He said he had been \"on the floor\", and couldn't get up; his brother called [facility], and \"he helped me up, and helped me go to the bathroom\". When asked about the circumstances of the fall he could not recall any; when asked whether he felt his legs could not hold him, he said he thought his legs can hold him. He admitted to getting \"dizzy\" sometimes. He said his sleep and his appetite were OK. He said his mood was \"OK\" and he denied feeling depressed, denied having thoughts of suicide or of harming anyone else. He denied having auditory or visual hallucinations. He denied paranoia or other delusions.    PAST PSYCHIATRIC HISTORY:  The patient denied any previous history of mental illness; however he later said he had been on SSI since a very young age because when he was young his mother had taken him to see a doctor who had said he was \"crazy\". He could not tell me any diagnosis that he may have been given. He denied previous admissions to

## 2024-05-27 ENCOUNTER — APPOINTMENT (OUTPATIENT)
Age: 64
DRG: 565 | End: 2024-05-27
Payer: COMMERCIAL

## 2024-05-27 ENCOUNTER — APPOINTMENT (OUTPATIENT)
Dept: GENERAL RADIOLOGY | Age: 64
DRG: 565 | End: 2024-05-27
Payer: COMMERCIAL

## 2024-05-27 LAB
ALBUMIN SERPL-MCNC: 3.5 G/DL (ref 3.5–4.6)
ANION GAP SERPL CALCULATED.3IONS-SCNC: 13 MEQ/L (ref 9–15)
BASOPHILS # BLD: 0.1 K/UL (ref 0–0.2)
BASOPHILS NFR BLD: 0.8 %
BUN SERPL-MCNC: 22 MG/DL (ref 8–23)
CALCIUM SERPL-MCNC: 9.8 MG/DL (ref 8.5–9.9)
CHLORIDE SERPL-SCNC: 102 MEQ/L (ref 95–107)
CK SERPL-CCNC: 935 U/L (ref 0–190)
CO2 SERPL-SCNC: 24 MEQ/L (ref 20–31)
CREAT SERPL-MCNC: 1.17 MG/DL (ref 0.7–1.2)
ECHO AO ROOT DIAM: 2.7 CM
ECHO AO ROOT INDEX: 1.19 CM/M2
ECHO AR MAX VEL PISA: 3.8 M/S
ECHO AV AREA PEAK VELOCITY: 2.3 CM2
ECHO AV AREA VTI: 2.6 CM2
ECHO AV AREA/BSA PEAK VELOCITY: 1 CM2/M2
ECHO AV AREA/BSA VTI: 1.2 CM2/M2
ECHO AV CUSP MM: 2.3 CM
ECHO AV MEAN GRADIENT: 10 MMHG
ECHO AV MEAN VELOCITY: 1.4 M/S
ECHO AV PEAK GRADIENT: 20 MMHG
ECHO AV PEAK VELOCITY: 2.4 M/S
ECHO AV REGURGITANT PHT: 357.2 MILLISECOND
ECHO AV VELOCITY RATIO: 0.58
ECHO AV VTI: 32.1 CM
ECHO BSA: 2.31 M2
ECHO EST RA PRESSURE: 3 MMHG
ECHO LA DIAMETER INDEX: 1.73 CM/M2
ECHO LA DIAMETER: 3.9 CM
ECHO LA TO AORTIC ROOT RATIO: 1.44
ECHO LA VOL A-L A2C: 67 ML (ref 18–58)
ECHO LA VOL A-L A4C: 53 ML (ref 18–58)
ECHO LA VOL MOD A2C: 63 ML (ref 18–58)
ECHO LA VOL MOD A4C: 51 ML (ref 18–58)
ECHO LA VOLUME AREA LENGTH: 61 ML
ECHO LA VOLUME INDEX A-L A2C: 30 ML/M2 (ref 16–34)
ECHO LA VOLUME INDEX A-L A4C: 23 ML/M2 (ref 16–34)
ECHO LA VOLUME INDEX AREA LENGTH: 27 ML/M2 (ref 16–34)
ECHO LA VOLUME INDEX MOD A2C: 28 ML/M2 (ref 16–34)
ECHO LA VOLUME INDEX MOD A4C: 23 ML/M2 (ref 16–34)
ECHO LV E' LATERAL VELOCITY: 7 CM/S
ECHO LV E' SEPTAL VELOCITY: 6 CM/S
ECHO LV EDV A2C: 55 ML
ECHO LV EDV A4C: 77 ML
ECHO LV EDV BP: 67 ML (ref 67–155)
ECHO LV EDV INDEX A4C: 34 ML/M2
ECHO LV EDV INDEX BP: 30 ML/M2
ECHO LV EDV NDEX A2C: 24 ML/M2
ECHO LV EJECTION FRACTION A2C: 63 %
ECHO LV EJECTION FRACTION A4C: 77 %
ECHO LV EJECTION FRACTION BIPLANE: 71 % (ref 55–100)
ECHO LV ESV A2C: 20 ML
ECHO LV ESV A4C: 18 ML
ECHO LV ESV BP: 19 ML (ref 22–58)
ECHO LV ESV INDEX A2C: 9 ML/M2
ECHO LV ESV INDEX A4C: 8 ML/M2
ECHO LV ESV INDEX BP: 8 ML/M2
ECHO LV FRACTIONAL SHORTENING: 36 % (ref 28–44)
ECHO LV INTERNAL DIMENSION DIASTOLE INDEX: 1.95 CM/M2
ECHO LV INTERNAL DIMENSION DIASTOLIC: 4.4 CM (ref 4.2–5.9)
ECHO LV INTERNAL DIMENSION SYSTOLIC INDEX: 1.24 CM/M2
ECHO LV INTERNAL DIMENSION SYSTOLIC: 2.8 CM
ECHO LV IVSD: 1.6 CM (ref 0.6–1)
ECHO LV IVSS: 2.1 CM
ECHO LV MASS 2D: 240.3 G (ref 88–224)
ECHO LV MASS INDEX 2D: 106.3 G/M2 (ref 49–115)
ECHO LV POSTERIOR WALL DIASTOLIC: 1.2 CM (ref 0.6–1)
ECHO LV POSTERIOR WALL SYSTOLIC: 1.7 CM
ECHO LV RELATIVE WALL THICKNESS RATIO: 0.55
ECHO LVOT AREA: 3.8 CM2
ECHO LVOT AV VTI INDEX: 0.66
ECHO LVOT DIAM: 2.2 CM
ECHO LVOT MEAN GRADIENT: 4 MMHG
ECHO LVOT PEAK GRADIENT: 8 MMHG
ECHO LVOT PEAK VELOCITY: 1.4 M/S
ECHO LVOT STROKE VOLUME INDEX: 35.5 ML/M2
ECHO LVOT SV: 80.2 ML
ECHO LVOT VTI: 21.1 CM
ECHO MV A VELOCITY: 1.07 M/S
ECHO MV AREA VTI: 3 CM2
ECHO MV E DECELERATION TIME (DT): 206.8 MS
ECHO MV E VELOCITY: 0.68 M/S
ECHO MV E/A RATIO: 0.64
ECHO MV E/E' LATERAL: 9.71
ECHO MV E/E' RATIO (AVERAGED): 10.52
ECHO MV E/E' SEPTAL: 11.33
ECHO MV LVOT VTI INDEX: 1.28
ECHO MV MAX VELOCITY: 1.6 M/S
ECHO MV MEAN GRADIENT: 5 MMHG
ECHO MV MEAN VELOCITY: 1.1 M/S
ECHO MV PEAK GRADIENT: 10 MMHG
ECHO MV VTI: 27 CM
ECHO PV MAX VELOCITY: 1.4 M/S
ECHO PV PEAK GRADIENT: 8 MMHG
ECHO RIGHT VENTRICULAR SYSTOLIC PRESSURE (RVSP): 17 MMHG
ECHO RV TAPSE: 2.1 CM (ref 1.7–?)
ECHO TV REGURGITANT MAX VELOCITY: 1.87 M/S
ECHO TV REGURGITANT PEAK GRADIENT: 14 MMHG
EOSINOPHIL # BLD: 0.4 K/UL (ref 0–0.7)
EOSINOPHIL NFR BLD: 4 %
ERYTHROCYTE [DISTWIDTH] IN BLOOD BY AUTOMATED COUNT: 14.1 % (ref 11.5–14.5)
GLUCOSE SERPL-MCNC: 86 MG/DL (ref 70–99)
HCT VFR BLD AUTO: 46.6 % (ref 42–52)
HGB BLD-MCNC: 15.9 G/DL (ref 14–18)
LYMPHOCYTES # BLD: 1.7 K/UL (ref 1–4.8)
LYMPHOCYTES NFR BLD: 16.4 %
MCH RBC QN AUTO: 30.8 PG (ref 27–31.3)
MCHC RBC AUTO-ENTMCNC: 34.1 % (ref 33–37)
MCV RBC AUTO: 90.1 FL (ref 79–92.2)
MONOCYTES # BLD: 1 K/UL (ref 0.2–0.8)
MONOCYTES NFR BLD: 9.7 %
NEUTROPHILS # BLD: 6.9 K/UL (ref 1.4–6.5)
NEUTS SEG NFR BLD: 68.6 %
PHOSPHATE SERPL-MCNC: 3.4 MG/DL (ref 2.3–4.8)
PLATELET # BLD AUTO: 344 K/UL (ref 130–400)
POTASSIUM SERPL-SCNC: 4.8 MEQ/L (ref 3.4–4.9)
RBC # BLD AUTO: 5.17 M/UL (ref 4.7–6.1)
SODIUM SERPL-SCNC: 139 MEQ/L (ref 135–144)
WBC # BLD AUTO: 10.1 K/UL (ref 4.8–10.8)

## 2024-05-27 PROCEDURE — 71045 X-RAY EXAM CHEST 1 VIEW: CPT

## 2024-05-27 PROCEDURE — 2500000003 HC RX 250 WO HCPCS: Performed by: INTERNAL MEDICINE

## 2024-05-27 PROCEDURE — 2580000003 HC RX 258: Performed by: INTERNAL MEDICINE

## 2024-05-27 PROCEDURE — 6370000000 HC RX 637 (ALT 250 FOR IP): Performed by: INTERNAL MEDICINE

## 2024-05-27 PROCEDURE — 99232 SBSQ HOSP IP/OBS MODERATE 35: CPT | Performed by: INTERNAL MEDICINE

## 2024-05-27 PROCEDURE — 6360000002 HC RX W HCPCS: Performed by: INTERNAL MEDICINE

## 2024-05-27 PROCEDURE — 82550 ASSAY OF CK (CPK): CPT

## 2024-05-27 PROCEDURE — 1210000000 HC MED SURG R&B

## 2024-05-27 PROCEDURE — 97166 OT EVAL MOD COMPLEX 45 MIN: CPT

## 2024-05-27 PROCEDURE — 93306 TTE W/DOPPLER COMPLETE: CPT | Performed by: INTERNAL MEDICINE

## 2024-05-27 PROCEDURE — 85025 COMPLETE CBC W/AUTO DIFF WBC: CPT

## 2024-05-27 PROCEDURE — 36415 COLL VENOUS BLD VENIPUNCTURE: CPT

## 2024-05-27 PROCEDURE — 93306 TTE W/DOPPLER COMPLETE: CPT

## 2024-05-27 PROCEDURE — 80069 RENAL FUNCTION PANEL: CPT

## 2024-05-27 RX ADMIN — CLOPIDOGREL BISULFATE 75 MG: 75 TABLET ORAL at 09:15

## 2024-05-27 RX ADMIN — FOLIC ACID 1 MG: 5 INJECTION, SOLUTION INTRAMUSCULAR; INTRAVENOUS; SUBCUTANEOUS at 09:25

## 2024-05-27 RX ADMIN — HYDROCHLOROTHIAZIDE 12.5 MG: 25 TABLET ORAL at 09:15

## 2024-05-27 RX ADMIN — PREGABALIN 100 MG: 100 CAPSULE ORAL at 09:15

## 2024-05-27 RX ADMIN — THIAMINE HYDROCHLORIDE 100 MG: 100 INJECTION, SOLUTION INTRAMUSCULAR; INTRAVENOUS at 09:08

## 2024-05-27 RX ADMIN — SODIUM CHLORIDE, PRESERVATIVE FREE 10 ML: 5 INJECTION INTRAVENOUS at 20:27

## 2024-05-27 RX ADMIN — ENOXAPARIN SODIUM 30 MG: 100 INJECTION SUBCUTANEOUS at 09:07

## 2024-05-27 RX ADMIN — OMEGA-3-ACID ETHYL ESTERS 2 G: 1 CAPSULE, LIQUID FILLED ORAL at 20:27

## 2024-05-27 RX ADMIN — VALSARTAN 80 MG: 40 TABLET, FILM COATED ORAL at 09:10

## 2024-05-27 RX ADMIN — ASPIRIN 81 MG: 81 TABLET, COATED ORAL at 09:15

## 2024-05-27 RX ADMIN — SODIUM CHLORIDE, PRESERVATIVE FREE 10 ML: 5 INJECTION INTRAVENOUS at 09:08

## 2024-05-27 RX ADMIN — METOPROLOL SUCCINATE 25 MG: 25 TABLET, FILM COATED, EXTENDED RELEASE ORAL at 09:15

## 2024-05-27 RX ADMIN — OMEGA-3-ACID ETHYL ESTERS 2 G: 1 CAPSULE, LIQUID FILLED ORAL at 09:16

## 2024-05-27 RX ADMIN — ALLOPURINOL 100 MG: 100 TABLET ORAL at 09:15

## 2024-05-27 RX ADMIN — PREGABALIN 100 MG: 100 CAPSULE ORAL at 20:27

## 2024-05-27 ASSESSMENT — ENCOUNTER SYMPTOMS
VOMITING: 0
DIARRHEA: 0
NAUSEA: 0
COUGH: 0
SHORTNESS OF BREATH: 0
CHEST TIGHTNESS: 0

## 2024-05-27 ASSESSMENT — PAIN SCALES - GENERAL: PAINLEVEL_OUTOF10: 0

## 2024-05-27 NOTE — PROGRESS NOTES
[facility] OCCUPATIONAL THERAPY EVALUATION - ACUTE     NAME: Ranjith Rodriguez  : 1960 (63 y.o.)  MRN: [id number]  CODE STATUS: Full Code  Room: W274/W274-01    Date of Service: 2024    Patient Diagnosis(es): Rhabdomyolysis [M62.82]  Cardiac enzymes elevated [R74.8]  Traumatic rhabdomyolysis, initial encounter ([facility]) [T79.6XXA]  Acute renal failure, unspecified acute renal failure type ([facility]) [N17.9]  Pneumonia of left lower lobe due to infectious organism [J18.9]   Patient Active Problem List    Diagnosis Date Noted    High risk medication use - 18 OARRS PM&R, 18 OARRS PM&R, 02/15/17 Med Contract PM&R, 17 Urine Drug Screen: negative PM&R 2015    Prediabetes 2023    Chronic pain syndrome 10/21/2022    Pneumonia of left lower lobe due to infectious organism 2024    Rhabdomyolysis 2024    Atherosclerosis of native artery of both lower extremities with intermittent claudication ([facility]) 2023    Coronary atherosclerosis 2023    Embolism and thrombosis of arteries of lower extremity ([facility]) 2023    Iliac artery thrombosis, 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    Varicose veins of lower extremity with inflammation 2023    History of traumatic brain injury 2022    Idiopathic gout of left wrist 2020    Diverticulosis 2020    Edema of both lower extremities due to peripheral venous insufficiency 2020    Insulin resistance 2020    PAD (peripheral artery disease) ([facility]) 2020    Polyp of colon     External hemorrhoid     Cerebrovascular disease 2019    Constipation 2019    Carotid stenosis, symptomatic w/o infarct, left 2019    Ptosis 2017    CN III palsy, right eye 2017    Carotid artery disorder ([facility]) 2017    HTN (hypertension) 2017    Chronic midline low back pain with bilateral sciatica 2016    SI (sacroiliac) pain 2016    Generalized anxiety disorder 2016    Muscle spasm  COORDINATION:  Coordination: Generally decreased, functional    UE TONE:  Tone: Normal    UE SENSATION:  Sensation: Intact    Hand Dominance:  Hand Dominance  Hand Dominance: Right    ADL Status:  ADL  Feeding: Setup;Supervision  Grooming: Minimal assistance  UE Bathing: Maximum assistance  LE Bathing: Dependent/Total  UE Dressing: Maximum assistance  LE Dressing: Dependent/Total  Toileting: Dependent/Total  Additional Comments: Simulated ADLs as above. Limited d/t decreased cognition, weakness and decreased coordination. Poor initiation and follow through of cues.  Toilet Transfers  Toilet Transfer: Unable to assess  Toilet Transfers Comments: Anticipate dependent    Functional Mobility:    Transfers  Sit to stand: Unable to assess  Stand to sit: Unable to assess  Transfer Comments: Unsafe to progress    Patient 's ambulation NT due to unsafe to stand . Sits EOB only, progressing to SBA for standing.    Bed Mobility  Bed mobility  Rolling to Left: Moderate assistance  Supine to Sit: Maximum assistance  Sit to Supine: Maximum assistance  Bed Mobility Comments: Difficulty initiating and weight shifting, decreased balance and endurance, decreased coordination for reaching and using bed rails to assist mobility    Seated and Standing Balance:  Balance  Sitting: Impaired  Sitting - Static: Fair (occasional)  Sitting - Dynamic: Fair (occasional)  Standing:  (Unsafe to attempt)    Functional Endurance:  Activity Tolerance  Activity Tolerance: Patient limited by fatigue;Treatment limited secondary to decreased cognition    D/C Recommendations:  OT D/C RECOMMENDATIONS  REQUIRES OT FOLLOW-UP: Yes    Equipment Recommendations:  OT Equipment Recommendations  Equipment Needed: No    OT Education:   Patient Education  Education Given To: Patient  Education Provided: Role of Therapy;Plan of Care  Education Method: Verbal  Barriers to Learning: None  Education Outcome: Verbalized understanding    OT Follow Up:   OT D/C

## 2024-05-27 NOTE — PLAN OF CARE
See OT evaluation for all goals and OT POC. Electronically signed by Elif Shanks OTR/L on 5/27/2024 at 2:09 PM
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
## 2024-05-27 NOTE — PROGRESS NOTES
[facility]  Occupational Therapy        NAME:  Ranjith Rodriguez  ROOM: W274/W274-01  :  1960  DATE: 2024    Attempted to see Ranjith Rodriguez at 1021 on this date for:   [x]  Initial Evaluation   []  Treatment       Patient was unable to be seen due to:   [x] Pt having bedside test. Per RN, pt is more cooperative today and is appropriate for evaluation when finished.    Discussed with ESTHER Heath    Electronically signed by JAVIER Dela Cruz/L on 24 at 10:23 AM EDT

## 2024-05-27 NOTE — PROGRESS NOTES
Progress Note  Date:2024       Room:W274/W274-01  Patient Name:Ranjith Rodriguez     YOB: 1960     Age:63 y.o.        Subjective    Subjective:  Symptoms:  No shortness of breath, malaise, cough, chest pain, weakness, headache, chest pressure, anorexia, diarrhea or anxiety.    Diet:  No nausea or vomiting.       Review of Systems   Respiratory:  Negative for cough and shortness of breath.    Cardiovascular:  Negative for chest pain.   Gastrointestinal:  Negative for anorexia, diarrhea, nausea and vomiting.   Neurological:  Negative for weakness.     Objective         Vitals Last 24 Hours:  TEMPERATURE:  Temp  Av.4 °F (36.9 °C)  Min: 97.4 °F (36.3 °C)  Max: 99.5 °F (37.5 °C)  RESPIRATIONS RANGE: Resp  Av  Min: 18  Max: 18  PULSE OXIMETRY RANGE: SpO2  Av %  Min: 93 %  Max: 96 %  PULSE RANGE: Pulse  Av.4  Min: 78  Max: 100  BLOOD PRESSURE RANGE: Systolic (24hrs), Av , Min:130 , Max:167   ; Diastolic (24hrs), Av, Min:51, Max:72    I/O (24Hr):    Intake/Output Summary (Last 24 hours) at 2024 1051  Last data filed at 2024 0928  Gross per 24 hour   Intake 210 ml   Output 340 ml   Net -130 ml     Objective:  General Appearance:  Comfortable, well-appearing and in no acute distress.    Vital signs: (most recent): Blood pressure (!) 167/51, pulse 100, temperature 97.4 °F (36.3 °C), temperature source Oral, resp. rate 18, height 1.8 m (5' 10.87\"), weight 106.6 kg (235 lb), SpO2 95 %.    HEENT: Normal HEENT exam.    Lungs:  There are decreased breath sounds and rhonchi.    Heart: S1 normal and S2 normal.    Abdomen: Abdomen is soft.  Bowel sounds are normal.   There is no epigastric area or suprapubic area tenderness.     Neurological: Patient is alert.    Pupils:  Pupils are equal, round, and reactive to light.    Skin:  Warm.      Labs/Imaging/Diagnostics    Labs:  CBC:  Recent Labs     24  0458 24  0502 24  0544   WBC 12.1* 11.1* 10.1   RBC 4.74 4.99  Speech and swallow Spoke with nursing.  Continue with CIWA protocol, CHET resolved, elevated LFTS due to alcohol abuse TCT 50 min  Electronically signed by Amelia Gibson MD on 5/27/24 at 10:51 AM EDT

## 2024-05-28 LAB
ALBUMIN SERPL-MCNC: 3.5 G/DL (ref 3.5–4.6)
ANION GAP SERPL CALCULATED.3IONS-SCNC: 12 MEQ/L (ref 9–15)
BACTERIA BLD CULT ORG #2: NORMAL
BACTERIA BLD CULT: NORMAL
BASOPHILS # BLD: 0.1 K/UL (ref 0–0.2)
BASOPHILS NFR BLD: 0.8 %
BUN SERPL-MCNC: 26 MG/DL (ref 8–23)
CALCIUM SERPL-MCNC: 10 MG/DL (ref 8.5–9.9)
CHLORIDE SERPL-SCNC: 104 MEQ/L (ref 95–107)
CO2 SERPL-SCNC: 23 MEQ/L (ref 20–31)
CREAT SERPL-MCNC: 1.15 MG/DL (ref 0.7–1.2)
EOSINOPHIL # BLD: 0.3 K/UL (ref 0–0.7)
EOSINOPHIL NFR BLD: 3.5 %
ERYTHROCYTE [DISTWIDTH] IN BLOOD BY AUTOMATED COUNT: 14 % (ref 11.5–14.5)
GLUCOSE SERPL-MCNC: 97 MG/DL (ref 70–99)
HCT VFR BLD AUTO: 46.4 % (ref 42–52)
HGB BLD-MCNC: 15.7 G/DL (ref 14–18)
LYMPHOCYTES # BLD: 1.7 K/UL (ref 1–4.8)
LYMPHOCYTES NFR BLD: 17.9 %
MCH RBC QN AUTO: 30.4 PG (ref 27–31.3)
MCHC RBC AUTO-ENTMCNC: 33.8 % (ref 33–37)
MCV RBC AUTO: 89.7 FL (ref 79–92.2)
MONOCYTES # BLD: 1.1 K/UL (ref 0.2–0.8)
MONOCYTES NFR BLD: 10.8 %
NEUTROPHILS # BLD: 6.5 K/UL (ref 1.4–6.5)
NEUTS SEG NFR BLD: 66.3 %
PHOSPHATE SERPL-MCNC: 4 MG/DL (ref 2.3–4.8)
PLATELET # BLD AUTO: 341 K/UL (ref 130–400)
POTASSIUM SERPL-SCNC: 3.9 MEQ/L (ref 3.4–4.9)
RBC # BLD AUTO: 5.17 M/UL (ref 4.7–6.1)
SODIUM SERPL-SCNC: 139 MEQ/L (ref 135–144)
WBC # BLD AUTO: 9.7 K/UL (ref 4.8–10.8)

## 2024-05-28 PROCEDURE — 1210000000 HC MED SURG R&B

## 2024-05-28 PROCEDURE — 80069 RENAL FUNCTION PANEL: CPT

## 2024-05-28 PROCEDURE — 85025 COMPLETE CBC W/AUTO DIFF WBC: CPT

## 2024-05-28 PROCEDURE — 6360000002 HC RX W HCPCS: Performed by: INTERNAL MEDICINE

## 2024-05-28 PROCEDURE — 97535 SELF CARE MNGMENT TRAINING: CPT

## 2024-05-28 PROCEDURE — 6370000000 HC RX 637 (ALT 250 FOR IP): Performed by: INTERNAL MEDICINE

## 2024-05-28 PROCEDURE — 2580000003 HC RX 258: Performed by: INTERNAL MEDICINE

## 2024-05-28 PROCEDURE — 36415 COLL VENOUS BLD VENIPUNCTURE: CPT

## 2024-05-28 PROCEDURE — 2500000003 HC RX 250 WO HCPCS: Performed by: INTERNAL MEDICINE

## 2024-05-28 PROCEDURE — 97116 GAIT TRAINING THERAPY: CPT

## 2024-05-28 RX ORDER — ERGOCALCIFEROL 1.25 MG/1
50000 CAPSULE ORAL WEEKLY
Qty: 5 CAPSULE | Refills: 1 | Status: SHIPPED | OUTPATIENT
Start: 2024-06-01

## 2024-05-28 RX ORDER — PREGABALIN 100 MG/1
300 CAPSULE ORAL 3 TIMES DAILY
Qty: 810 CAPSULE | Refills: 0 | Status: SHIPPED | OUTPATIENT
Start: 2024-05-28 | End: 2024-08-26

## 2024-05-28 RX ORDER — THIAMINE MONONITRATE (VIT B1) 100 MG
100 TABLET ORAL DAILY
Qty: 30 TABLET | Refills: 3 | Status: SHIPPED | OUTPATIENT
Start: 2024-05-28

## 2024-05-28 RX ORDER — ASPIRIN 81 MG/1
81 TABLET ORAL DAILY
Qty: 30 TABLET | Refills: 3 | Status: SHIPPED | OUTPATIENT
Start: 2024-05-29

## 2024-05-28 RX ADMIN — HYDROCHLOROTHIAZIDE 12.5 MG: 25 TABLET ORAL at 08:34

## 2024-05-28 RX ADMIN — OMEGA-3-ACID ETHYL ESTERS 2 G: 1 CAPSULE, LIQUID FILLED ORAL at 09:46

## 2024-05-28 RX ADMIN — FOLIC ACID 1 MG: 5 INJECTION, SOLUTION INTRAMUSCULAR; INTRAVENOUS; SUBCUTANEOUS at 09:47

## 2024-05-28 RX ADMIN — ALLOPURINOL 100 MG: 100 TABLET ORAL at 08:27

## 2024-05-28 RX ADMIN — SODIUM CHLORIDE, PRESERVATIVE FREE 10 ML: 5 INJECTION INTRAVENOUS at 20:05

## 2024-05-28 RX ADMIN — ASPIRIN 81 MG: 81 TABLET, COATED ORAL at 08:27

## 2024-05-28 RX ADMIN — ENOXAPARIN SODIUM 30 MG: 100 INJECTION SUBCUTANEOUS at 08:27

## 2024-05-28 RX ADMIN — PREGABALIN 100 MG: 100 CAPSULE ORAL at 08:27

## 2024-05-28 RX ADMIN — THIAMINE HYDROCHLORIDE 100 MG: 100 INJECTION, SOLUTION INTRAMUSCULAR; INTRAVENOUS at 08:28

## 2024-05-28 RX ADMIN — METOPROLOL SUCCINATE 25 MG: 25 TABLET, FILM COATED, EXTENDED RELEASE ORAL at 08:27

## 2024-05-28 RX ADMIN — PREGABALIN 100 MG: 100 CAPSULE ORAL at 20:05

## 2024-05-28 RX ADMIN — VALSARTAN 80 MG: 40 TABLET, FILM COATED ORAL at 08:27

## 2024-05-28 RX ADMIN — SODIUM CHLORIDE, PRESERVATIVE FREE 10 ML: 5 INJECTION INTRAVENOUS at 08:28

## 2024-05-28 RX ADMIN — CLOPIDOGREL BISULFATE 75 MG: 75 TABLET ORAL at 08:27

## 2024-05-28 RX ADMIN — OMEGA-3-ACID ETHYL ESTERS 2 G: 1 CAPSULE, LIQUID FILLED ORAL at 20:05

## 2024-05-28 NOTE — PROGRESS NOTES
Physical Therapy Med Surg Daily Treatment Note  Facility/Department: [address] ORTHO TELE  Room: [place] [id number]       NAME: Ranjith Rodriguez  : 1960 (63 y.o.)  MRN: [id number]  CODE STATUS: Full Code    Date of Service: 2024    Patient Diagnosis(es): Rhabdomyolysis [M62.82]  Cardiac enzymes elevated [R74.8]  Traumatic rhabdomyolysis, initial encounter ([facility]) [T79.6XXA]  Acute renal failure, unspecified acute renal failure type ([facility]) [N17.9]  Pneumonia of left lower lobe due to infectious organism [J18.9]   Chief Complaint   Patient presents with    Fall     Patient Active Problem List    Diagnosis Date Noted    High risk medication use - 18 OARRS PM&R, 18 OARRS PM&R, 02/15/17 Med Contract PM&R, 17 Urine Drug Screen: negative PM&R 2015    Prediabetes 2023    Chronic pain syndrome 10/21/2022    Pneumonia of left lower lobe due to infectious organism 2024    Rhabdomyolysis 2024    Atherosclerosis of native artery of both lower extremities with intermittent claudication ([facility]) 2023    Coronary atherosclerosis 2023    Embolism and thrombosis of arteries of lower extremity ([facility]) 2023    Iliac artery thrombosis, 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    Varicose veins of lower extremity with inflammation 2023    History of traumatic brain injury 2022    Idiopathic gout of left wrist 2020    Diverticulosis 2020    Edema of both lower extremities due to peripheral venous insufficiency 2020    Insulin resistance 2020    PAD (peripheral artery disease) ([facility]) 2020    Polyp of colon     External hemorrhoid     Cerebrovascular disease 2019    Constipation 2019    Carotid stenosis, symptomatic w/o infarct, left 2019    Ptosis 2017    CN III palsy, right eye 2017    Carotid artery disorder ([facility]) 2017    HTN (hypertension) 2017    Chronic midline low back pain with bilateral sciatica 2016    SI

## 2024-05-28 NOTE — DISCHARGE SUMMARY
hypertension    atorvastatin (LIPITOR) 40 MG tablet  Take 1 tablet by mouth daily  Qty: 90 tablet Refills: 3  Associated Diagnoses:PAD (peripheral artery disease) (HCC)    valsartan-hydroCHLOROthiazide (DIOVAN-HCT) 80-12.5 MG per tablet  take 1 tablet by mouth once daily  Qty: 90 tablet Refills: 3  Associated Diagnoses:Essential hypertension    VASCEPA 1 g CAPS capsule  take 2 capsules by mouth twice a day  Qty: 360 capsule Refills: 3  Associated Diagnoses:PAD (peripheral artery disease) (HCC)    allopurinol (ZYLOPRIM) 100 MG tablet  Take 2 tablets by mouth daily  Qty: 180 tablet Refills: 4  Associated Diagnoses:Idiopathic gout of left wrist, unspecified chronicity    Hospital Bed MISC  by Does not apply route For orthopnea, dyspnea at night.  Elevated HOB to at least 45 degrees.  Qty: 1 each Refills: 0  Associated Diagnoses:Simple chronic bronchitis (HCC); Nocturnal hypoxia    !! Misc. Devices (RAISED TOILET SEAT) MISC  Use of general weakness, fall risk, strength loss of BLE.  Qty: 1 each Refills: 1  Associated Diagnoses:General weakness; At high risk for injury related to fall; Chronic pain syndrome; Alcoholic polyneuropathy (HCC)    !! Misc. Devices (BATH/SHOWER SEAT) MISC  For use while bathing/showering due to fall risk and leg weakness.  Qty: 1 each Refills: 0  Associated Diagnoses:Chronic pain syndrome; Edema of both lower extremities due to peripheral venous insufficiency    !! Misc. Devices (CANE) MISC  Wide base cane please, fall risk, chronic knee and back pain.  Qty: 1 each Refills: 0  Associated Diagnoses:Chronic pain syndrome; At maximum risk for fall    !! - Potential duplicate medications found. Please discuss with provider.          Current Discharge Medication List    STOP taking these medications    budesonide-formoterol (SYMBICORT) 80-4.5 MCG/ACT AERO  Comments:  Reason for Stopping:    docusate sodium (COLACE) 100 MG capsule  Comments:  Reason for Stopping:    Varenicline Tartrate, Starter,

## 2024-05-28 NOTE — PROGRESS NOTES
1330: Dr. Gibson aware of patient's blood pressure. New orders to hold patient's blood pressure medications.

## 2024-05-28 NOTE — CARE COORDINATION
[facility] HAS ACCEPTED THE PT AND A PRECERT WAS STARTED TODAY.      We have precert approval for the pt to admit to [facility] tomorrow.

## 2024-05-29 VITALS
HEART RATE: 60 BPM | RESPIRATION RATE: 18 BRPM | HEIGHT: 71 IN | SYSTOLIC BLOOD PRESSURE: 101 MMHG | OXYGEN SATURATION: 92 % | WEIGHT: 235 LBS | BODY MASS INDEX: 32.9 KG/M2 | TEMPERATURE: 97.7 F | DIASTOLIC BLOOD PRESSURE: 56 MMHG

## 2024-05-29 LAB
ALBUMIN SERPL-MCNC: 3.5 G/DL (ref 3.5–4.6)
ANION GAP SERPL CALCULATED.3IONS-SCNC: 10 MEQ/L (ref 9–15)
BASOPHILS # BLD: 0.1 K/UL (ref 0–0.2)
BASOPHILS NFR BLD: 1 %
BUN SERPL-MCNC: 43 MG/DL (ref 8–23)
CALCIUM SERPL-MCNC: 9.9 MG/DL (ref 8.5–9.9)
CHLORIDE SERPL-SCNC: 103 MEQ/L (ref 95–107)
CO2 SERPL-SCNC: 26 MEQ/L (ref 20–31)
CREAT SERPL-MCNC: 1.35 MG/DL (ref 0.7–1.2)
EOSINOPHIL # BLD: 0.5 K/UL (ref 0–0.7)
EOSINOPHIL NFR BLD: 4.2 %
ERYTHROCYTE [DISTWIDTH] IN BLOOD BY AUTOMATED COUNT: 14.1 % (ref 11.5–14.5)
GLUCOSE SERPL-MCNC: 95 MG/DL (ref 70–99)
HCT VFR BLD AUTO: 43.9 % (ref 42–52)
HGB BLD-MCNC: 14.5 G/DL (ref 14–18)
LYMPHOCYTES # BLD: 2.4 K/UL (ref 1–4.8)
LYMPHOCYTES NFR BLD: 21 %
MCH RBC QN AUTO: 30.1 PG (ref 27–31.3)
MCHC RBC AUTO-ENTMCNC: 33 % (ref 33–37)
MCV RBC AUTO: 91.3 FL (ref 79–92.2)
MONOCYTES # BLD: 1.2 K/UL (ref 0.2–0.8)
MONOCYTES NFR BLD: 10.2 %
NEUTROPHILS # BLD: 7.1 K/UL (ref 1.4–6.5)
NEUTS SEG NFR BLD: 62.4 %
PHOSPHATE SERPL-MCNC: 4.1 MG/DL (ref 2.3–4.8)
PLATELET # BLD AUTO: 357 K/UL (ref 130–400)
POTASSIUM SERPL-SCNC: 3.8 MEQ/L (ref 3.4–4.9)
RBC # BLD AUTO: 4.81 M/UL (ref 4.7–6.1)
SODIUM SERPL-SCNC: 139 MEQ/L (ref 135–144)
WBC # BLD AUTO: 11.3 K/UL (ref 4.8–10.8)

## 2024-05-29 PROCEDURE — 6370000000 HC RX 637 (ALT 250 FOR IP): Performed by: INTERNAL MEDICINE

## 2024-05-29 PROCEDURE — 2580000003 HC RX 258: Performed by: INTERNAL MEDICINE

## 2024-05-29 PROCEDURE — 80069 RENAL FUNCTION PANEL: CPT

## 2024-05-29 PROCEDURE — 97116 GAIT TRAINING THERAPY: CPT

## 2024-05-29 PROCEDURE — 6360000002 HC RX W HCPCS: Performed by: INTERNAL MEDICINE

## 2024-05-29 PROCEDURE — 97535 SELF CARE MNGMENT TRAINING: CPT

## 2024-05-29 PROCEDURE — 36415 COLL VENOUS BLD VENIPUNCTURE: CPT

## 2024-05-29 PROCEDURE — 2500000003 HC RX 250 WO HCPCS: Performed by: INTERNAL MEDICINE

## 2024-05-29 PROCEDURE — 85025 COMPLETE CBC W/AUTO DIFF WBC: CPT

## 2024-05-29 RX ADMIN — PREGABALIN 100 MG: 100 CAPSULE ORAL at 08:39

## 2024-05-29 RX ADMIN — OMEGA-3-ACID ETHYL ESTERS 2 G: 1 CAPSULE, LIQUID FILLED ORAL at 08:39

## 2024-05-29 RX ADMIN — FOLIC ACID 1 MG: 5 INJECTION, SOLUTION INTRAMUSCULAR; INTRAVENOUS; SUBCUTANEOUS at 12:53

## 2024-05-29 RX ADMIN — METOPROLOL SUCCINATE 25 MG: 25 TABLET, FILM COATED, EXTENDED RELEASE ORAL at 08:39

## 2024-05-29 RX ADMIN — Medication 10 ML: at 12:54

## 2024-05-29 RX ADMIN — THIAMINE HYDROCHLORIDE 100 MG: 100 INJECTION, SOLUTION INTRAMUSCULAR; INTRAVENOUS at 08:39

## 2024-05-29 RX ADMIN — SODIUM CHLORIDE, PRESERVATIVE FREE 10 ML: 5 INJECTION INTRAVENOUS at 08:39

## 2024-05-29 RX ADMIN — ALLOPURINOL 100 MG: 100 TABLET ORAL at 08:39

## 2024-05-29 RX ADMIN — CLOPIDOGREL BISULFATE 75 MG: 75 TABLET ORAL at 08:39

## 2024-05-29 RX ADMIN — ASPIRIN 81 MG: 81 TABLET, COATED ORAL at 08:39

## 2024-05-29 RX ADMIN — ENOXAPARIN SODIUM 30 MG: 100 INJECTION SUBCUTANEOUS at 08:39

## 2024-05-29 ASSESSMENT — ENCOUNTER SYMPTOMS
VOMITING: 0
COUGH: 0
DIARRHEA: 0
NAUSEA: 0
SHORTNESS OF BREATH: 0

## 2024-05-29 NOTE — CARE COORDINATION
RYANW spoke with [facility] to confirm precert approval.  [facility] for today at 5pm.  Pt's brother Curly aware of transfer today.  [id number] completed

## 2024-05-29 NOTE — FLOWSHEET NOTE
Pt alert to self only. Can not identify year or name of hospital he is in. Brother to bedside. Both verbalized understanding for patient to be transferred to [facility]. Pt has infrequent cough. Scattered rhonchi in both lungs. Bowel sounds active x4. Last bowel movement 5/28. Has scattered ecchymosis BUE and BLE. Worked with PT. Ambulated in [place] with walker and standby assist. Up to chair for a few hours. Impulsive. Avasys in room for safety.

## 2024-05-29 NOTE — PROGRESS NOTES
Progress Note  Date:2024       Room:W274/W274-01  Patient Name:Ranjith Rodriguez     YOB: 1960     Age:63 y.o.        Subjective    Subjective:  Symptoms:  No shortness of breath, malaise, cough, chest pain, weakness, headache, chest pressure, anorexia, diarrhea or anxiety.    Diet:  No nausea or vomiting.       Review of Systems   Respiratory:  Negative for cough and shortness of breath.    Cardiovascular:  Negative for chest pain.   Gastrointestinal:  Negative for anorexia, diarrhea, nausea and vomiting.   Neurological:  Negative for weakness.     Objective         Vitals Last 24 Hours:  TEMPERATURE:  Temp  Av.6 °F (36.4 °C)  Min: 97.3 °F (36.3 °C)  Max: 98.1 °F (36.7 °C)  RESPIRATIONS RANGE: Resp  Av.4  Min: 16  Max: 18  PULSE OXIMETRY RANGE: SpO2  Av.8 %  Min: 90 %  Max: 93 %  PULSE RANGE: Pulse  Av.2  Min: 67  Max: 86  BLOOD PRESSURE RANGE: Systolic (24hrs), Av , Min:94 , Max:110   ; Diastolic (24hrs), Av, Min:52, Max:61    I/O (24Hr):    Intake/Output Summary (Last 24 hours) at 2024 0940  Last data filed at 2024  Gross per 24 hour   Intake 5 ml   Output --   Net 5 ml       Objective:  General Appearance:  Comfortable, well-appearing and in no acute distress.    Vital signs: (most recent): Blood pressure 110/61, pulse 81, temperature 97.5 °F (36.4 °C), temperature source Oral, resp. rate 18, height 1.8 m (5' 10.87\"), weight 106.6 kg (235 lb), SpO2 93 %.    HEENT: Normal HEENT exam.    Lungs:  There are decreased breath sounds and rhonchi.    Heart: S1 normal and S2 normal.    Abdomen: Abdomen is soft.  Bowel sounds are normal.   There is no epigastric area or suprapubic area tenderness.     Neurological: Patient is alert.    Pupils:  Pupils are equal, round, and reactive to light.    Skin:  Warm.      Labs/Imaging/Diagnostics    Labs:  CBC:  Recent Labs     24  0544 24  0527 24  0455   WBC 10.1 9.7 11.3*   RBC 5.17 5.17 4.81   HGB 15.9

## 2024-05-29 NOTE — PROGRESS NOTES
Physical Therapy Med Surg Daily Treatment Note  Facility/Department: [address] ORTHO TELE  Room: [place] [id number]       NAME: Ranjith Rodriguez  : 1960 (63 y.o.)  MRN: [id number]  CODE STATUS: Full Code    Date of Service: 2024    Patient Diagnosis(es): Rhabdomyolysis [M62.82]  Cardiac enzymes elevated [R74.8]  Traumatic rhabdomyolysis, initial encounter ([facility]) [T79.6XXA]  Acute renal failure, unspecified acute renal failure type ([facility]) [N17.9]  Pneumonia of left lower lobe due to infectious organism [J18.9]   Chief Complaint   Patient presents with    Fall     Patient Active Problem List    Diagnosis Date Noted    High risk medication use - 18 OARRS PM&R, 18 OARRS PM&R, 02/15/17 Med Contract PM&R, 17 Urine Drug Screen: negative PM&R 2015    Prediabetes 2023    Chronic pain syndrome 10/21/2022    Pneumonia of left lower lobe due to infectious organism 2024    Rhabdomyolysis 2024    Atherosclerosis of native artery of both lower extremities with intermittent claudication ([facility]) 2023    Coronary atherosclerosis 2023    Embolism and thrombosis of arteries of lower extremity (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) 2020    Polyp of colon     External hemorrhoid     Cerebrovascular disease 2019    Constipation 2019    Carotid stenosis, symptomatic w/o infarct, left 2019    Ptosis 2017    CN III palsy, right eye 2017    Carotid artery disorder ([facility]) 2017    HTN (hypertension) 2017    Chronic midline low back pain with bilateral sciatica 2016    SI

## 2024-05-30 NOTE — PROGRESS NOTES
Physical Therapy  Facility/Department: [facility] MED SURG W274/W274-01  Physical Therapy Discharge      NAME: Ranjith Rodriguez    : 1960 (63 y.o.)  MRN: [id number]    Account: [id number]  Gender: male      Patient has been discharged from acute care hospital. DC patient from current PT program.      Electronically signed by Sherrie Marley PT on 24 at 3:54 PM EDT

## 2024-05-31 NOTE — PROGRESS NOTES
Physician Progress Note      PATIENT:               MELBA TANG  CSN #:                  [id number]  :                       1960  ADMIT DATE:       2024 2:12 PM  DISCH DATE:        2024 5:25 PM  RESPONDING  PROVIDER #:        Amelia PERRY MD          QUERY TEXT:    Patient admitted with traumatic rhabdomyolysis. Noted documentation of LLL   pneumonia, possible aspiration pneumonia and questionable aspiration in the IM   PN. The CXR shows no evidence of acute consolidation or infiltrate.  No   active pleural disease is seen.  Please document in the progress notes and   discharge summary if you are evaluating and treating:    The medical record reflects the following:  Risk Factors: alcoholism, CHET, traumatic rhabdomyolysis  Clinical Indicators: cough, fatigue, -112, Procalcitonin .27; LLL   pneumonia, possible aspiration pneumonia and questionable aspiration in the IM   PN. CXR: There is no evidence of acute consolidation or infiltrate.  No   active pleural disease is seen.  Treatment: Aspiration precautions, IV Zithromax, IV Rocephin, IVFB 1L, CXR,   labs and monitoring    Thank you,  Yari Santos   Clinical Documentation Improvement Specialist  W: [phone]  Options provided:  -- LLL pneumonia  -- aspiration pneumonia  -- pneumonia ruled out  -- Other - I will add my own diagnosis  -- Disagree - Not applicable / Not valid  -- Disagree - Clinically unable to determine / Unknown  -- Refer to Clinical Documentation Reviewer    PROVIDER RESPONSE TEXT:    After study, pneumonia has been ruled out in this patient.    Query created by: Yari Santos on 2024 10:47 AM      Electronically signed by:  Amelia PERRY MD 2024 8:03 AM

## 2024-07-11 DIAGNOSIS — M10.032 IDIOPATHIC GOUT OF LEFT WRIST, UNSPECIFIED CHRONICITY: ICD-10-CM

## 2024-07-12 RX ORDER — ALLOPURINOL 100 MG/1
200 TABLET ORAL DAILY
Qty: 180 TABLET | Refills: 4 | Status: SHIPPED | OUTPATIENT
Start: 2024-07-12

## 2024-07-19 DIAGNOSIS — I10 ESSENTIAL HYPERTENSION: ICD-10-CM

## 2024-07-19 DIAGNOSIS — I73.9 PAD (PERIPHERAL ARTERY DISEASE) (HCC): ICD-10-CM

## 2024-07-19 RX ORDER — METOPROLOL SUCCINATE 25 MG/1
TABLET, EXTENDED RELEASE ORAL
Qty: 90 TABLET | Refills: 3 | Status: SHIPPED | OUTPATIENT
Start: 2024-07-19

## 2024-07-19 RX ORDER — ICOSAPENT ETHYL 1000 MG/1
CAPSULE ORAL
Qty: 360 CAPSULE | Refills: 3 | Status: SHIPPED | OUTPATIENT
Start: 2024-07-19

## 2024-07-19 RX ORDER — ATORVASTATIN CALCIUM 40 MG/1
40 TABLET, FILM COATED ORAL DAILY
Qty: 90 TABLET | Refills: 3 | Status: SHIPPED | OUTPATIENT
Start: 2024-07-19

## 2024-07-19 RX ORDER — CLOPIDOGREL BISULFATE 75 MG/1
TABLET ORAL
Qty: 90 TABLET | Refills: 3 | Status: SHIPPED | OUTPATIENT
Start: 2024-07-19

## 2024-07-19 NOTE — TELEPHONE ENCOUNTER
Pt requesting scripts to go [facility] [address]    Rx requested:  Requested Prescriptions     Pending Prescriptions Disp Refills    atorvastatin (LIPITOR) 40 MG tablet 90 tablet 3     Sig: Take 1 tablet by mouth daily    clopidogrel (PLAVIX) 75 MG tablet 90 tablet 3     Si po QD    metoprolol succinate (TOPROL XL) 25 MG extended release tablet 90 tablet 3     Sig: take 1 tablet by mouth once daily    VASCEPA 1 g CAPS capsule 360 capsule 3     Sig: take 2 capsules by mouth twice a day         Last Office Visit:   2024      Next Visit Date:  Future Appointments   Date Time Provider Department Center   2024  1:15 PM Kiki Rashid PA-C [facility]   2024  2:00 PM Eric Houston MD [facility]

## 2024-07-30 ENCOUNTER — OFFICE VISIT (OUTPATIENT)
Dept: FAMILY MEDICINE CLINIC | Age: 64
End: 2024-07-30
Payer: COMMERCIAL

## 2024-07-30 VITALS
DIASTOLIC BLOOD PRESSURE: 50 MMHG | HEIGHT: 71 IN | WEIGHT: 211 LBS | SYSTOLIC BLOOD PRESSURE: 106 MMHG | OXYGEN SATURATION: 94 % | RESPIRATION RATE: 16 BRPM | HEART RATE: 73 BPM | BODY MASS INDEX: 29.54 KG/M2

## 2024-07-30 DIAGNOSIS — N28.9 DECREASED RENAL FUNCTION: ICD-10-CM

## 2024-07-30 DIAGNOSIS — N17.9 ACUTE KIDNEY INJURY (HCC): ICD-10-CM

## 2024-07-30 DIAGNOSIS — Z79.899 HIGH RISK MEDICATION USE: ICD-10-CM

## 2024-07-30 DIAGNOSIS — M51.36 DDD (DEGENERATIVE DISC DISEASE), LUMBAR: Chronic | ICD-10-CM

## 2024-07-30 DIAGNOSIS — N17.9 ACUTE KIDNEY INJURY (HCC): Primary | ICD-10-CM

## 2024-07-30 DIAGNOSIS — M53.3 SI (SACROILIAC) PAIN: ICD-10-CM

## 2024-07-30 DIAGNOSIS — M54.17 LUMBOSACRAL RADICULOPATHY AT S1: ICD-10-CM

## 2024-07-30 DIAGNOSIS — Z09 HOSPITAL DISCHARGE FOLLOW-UP: ICD-10-CM

## 2024-07-30 LAB
ALBUMIN SERPL-MCNC: 3.7 G/DL (ref 3.5–4.6)
ALP SERPL-CCNC: 67 U/L (ref 35–104)
ALT SERPL-CCNC: 29 U/L (ref 0–41)
ANION GAP SERPL CALCULATED.3IONS-SCNC: 13 MEQ/L (ref 9–15)
AST SERPL-CCNC: 34 U/L (ref 0–40)
BASOPHILS # BLD: 0.1 K/UL (ref 0–0.2)
BASOPHILS NFR BLD: 0.8 %
BILIRUB SERPL-MCNC: 0.7 MG/DL (ref 0.2–0.7)
BUN SERPL-MCNC: 39 MG/DL (ref 8–23)
CALCIUM SERPL-MCNC: 9.7 MG/DL (ref 8.5–9.9)
CHLORIDE SERPL-SCNC: 104 MEQ/L (ref 95–107)
CO2 SERPL-SCNC: 23 MEQ/L (ref 20–31)
CREAT SERPL-MCNC: 2.28 MG/DL (ref 0.7–1.2)
EOSINOPHIL # BLD: 0.2 K/UL (ref 0–0.7)
EOSINOPHIL NFR BLD: 2.3 %
ERYTHROCYTE [DISTWIDTH] IN BLOOD BY AUTOMATED COUNT: 15.2 % (ref 11.5–14.5)
GLOBULIN SER CALC-MCNC: 5.1 G/DL (ref 2.3–3.5)
GLUCOSE SERPL-MCNC: 158 MG/DL (ref 70–99)
HCT VFR BLD AUTO: 42.6 % (ref 42–52)
HGB BLD-MCNC: 13.5 G/DL (ref 14–18)
LYMPHOCYTES # BLD: 2.4 K/UL (ref 1–4.8)
LYMPHOCYTES NFR BLD: 23.4 %
MCH RBC QN AUTO: 29.5 PG (ref 27–31.3)
MCHC RBC AUTO-ENTMCNC: 31.7 % (ref 33–37)
MCV RBC AUTO: 93.2 FL (ref 79–92.2)
MONOCYTES # BLD: 0.9 K/UL (ref 0.2–0.8)
MONOCYTES NFR BLD: 9.2 %
NEUTROPHILS # BLD: 6.4 K/UL (ref 1.4–6.5)
NEUTS SEG NFR BLD: 63.5 %
PLATELET # BLD AUTO: 380 K/UL (ref 130–400)
POTASSIUM SERPL-SCNC: 4.2 MEQ/L (ref 3.4–4.9)
PROT SERPL-MCNC: 8.8 G/DL (ref 6.3–8)
RBC # BLD AUTO: 4.57 M/UL (ref 4.7–6.1)
SODIUM SERPL-SCNC: 140 MEQ/L (ref 135–144)
WBC # BLD AUTO: 10.1 K/UL (ref 4.8–10.8)

## 2024-07-30 PROCEDURE — G8427 DOCREV CUR MEDS BY ELIG CLIN: HCPCS | Performed by: PHYSICIAN ASSISTANT

## 2024-07-30 PROCEDURE — 4004F PT TOBACCO SCREEN RCVD TLK: CPT | Performed by: PHYSICIAN ASSISTANT

## 2024-07-30 PROCEDURE — 99215 OFFICE O/P EST HI 40 MIN: CPT | Performed by: PHYSICIAN ASSISTANT

## 2024-07-30 PROCEDURE — 3017F COLORECTAL CA SCREEN DOC REV: CPT | Performed by: PHYSICIAN ASSISTANT

## 2024-07-30 PROCEDURE — G8417 CALC BMI ABV UP PARAM F/U: HCPCS | Performed by: PHYSICIAN ASSISTANT

## 2024-07-30 PROCEDURE — 3078F DIAST BP <80 MM HG: CPT | Performed by: PHYSICIAN ASSISTANT

## 2024-07-30 PROCEDURE — 3074F SYST BP LT 130 MM HG: CPT | Performed by: PHYSICIAN ASSISTANT

## 2024-07-30 RX ORDER — OMEGA-3-ACID ETHYL ESTERS 1 G/1
CAPSULE, LIQUID FILLED ORAL
COMMUNITY
Start: 2024-06-24

## 2024-07-30 RX ORDER — METOPROLOL TARTRATE 50 MG/1
TABLET, FILM COATED ORAL
COMMUNITY
Start: 2024-06-24 | End: 2024-07-30 | Stop reason: SDUPTHER

## 2024-07-30 RX ORDER — LANOLIN ALCOHOL/MO/W.PET/CERES
100 CREAM (GRAM) TOPICAL DAILY
COMMUNITY
Start: 2024-06-24

## 2024-07-30 RX ORDER — METOPROLOL TARTRATE 50 MG/1
TABLET, FILM COATED ORAL
Qty: 60 TABLET | Refills: 2 | Status: SHIPPED | OUTPATIENT
Start: 2024-07-30 | End: 2024-07-30

## 2024-07-30 RX ORDER — PREGABALIN 100 MG/1
300 CAPSULE ORAL 3 TIMES DAILY
Qty: 90 CAPSULE | Refills: 2 | Status: SHIPPED | OUTPATIENT
Start: 2024-08-26 | End: 2024-11-24

## 2024-07-30 RX ORDER — METOPROLOL TARTRATE 50 MG/1
TABLET, FILM COATED ORAL
Qty: 60 TABLET | Refills: 2 | Status: SHIPPED | OUTPATIENT
Start: 2024-07-30

## 2024-07-30 RX ORDER — DILTIAZEM HYDROCHLORIDE 60 MG/1
TABLET, FILM COATED ORAL
COMMUNITY
Start: 2024-07-03

## 2024-07-30 ASSESSMENT — ENCOUNTER SYMPTOMS
ABDOMINAL PAIN: 0
DIARRHEA: 0
BLOOD IN STOOL: 0
COUGH: 0
SHORTNESS OF BREATH: 0
VOMITING: 0
PHOTOPHOBIA: 0
NAUSEA: 0
CHEST TIGHTNESS: 0

## 2024-07-30 NOTE — PROGRESS NOTES
OARRS PM&R, 02/15/17 Med Contract PM&R, 09/21/17 Urine Drug Screen: negative PM&R  pregabalin (LYRICA) 100 MG capsule      5. SI (sacroiliac) pain  pregabalin (LYRICA) 100 MG capsule      6. Lumbosacral radiculopathy at S1  pregabalin (LYRICA) 100 MG capsule      Repeat CBC/CMP for reevaluation of kidney function.  Reviewed his hospital course, he is home now from SNF.   OK to refill his lyrica.   3 month follow up.    Orders Placed This Encounter   Procedures    Comprehensive Metabolic Panel     Standing Status:   Future     Number of Occurrences:   1     Standing Expiration Date:   7/30/2025    CBC with Auto Differential     Standing Status:   Future     Number of Occurrences:   1     Standing Expiration Date:   7/30/2025     Orders Placed This Encounter   Medications    DISCONTD: metoprolol tartrate (LOPRESSOR) 50 MG tablet     Sig: take 1 tablet by mouth twice a day HOLD IF SBP <120 OR IF HR <70     Dispense:  60 tablet     Refill:  2    metoprolol tartrate (LOPRESSOR) 50 MG tablet     Sig: take 1 tablet by mouth twice a day HOLD IF SBP <120 OR IF HR <70     Dispense:  60 tablet     Refill:  2    pregabalin (LYRICA) 100 MG capsule     Sig: Take 3 capsules by mouth 3 times daily for 90 days. Max Daily Amount: 900 mg     Dispense:  90 capsule     Refill:  2     Medications Discontinued During This Encounter   Medication Reason    metoprolol tartrate (LOPRESSOR) 50 MG tablet REORDER    pregabalin (LYRICA) 100 MG capsule REORDER    metoprolol tartrate (LOPRESSOR) 50 MG tablet      No follow-ups on file.      Reviewed with the patient: current clinical status, medications, activities and diet.     Side effects, adverse effects of the medication prescribed today, as well as treatment plan/ rationale and result expectations have been discussed with the patient who expresses understanding and desires to proceed.    Close follow up to evaluate treatment results and for coordination of care.  I have reviewed the patient's

## 2024-07-31 DIAGNOSIS — N17.9 ACUTE KIDNEY INJURY (HCC): Primary | ICD-10-CM

## 2024-08-06 ENCOUNTER — OFFICE VISIT (OUTPATIENT)
Dept: CARDIOLOGY CLINIC | Age: 64
End: 2024-08-06
Payer: COMMERCIAL

## 2024-08-06 VITALS
WEIGHT: 218 LBS | SYSTOLIC BLOOD PRESSURE: 140 MMHG | BODY MASS INDEX: 30.52 KG/M2 | HEART RATE: 77 BPM | DIASTOLIC BLOOD PRESSURE: 56 MMHG | OXYGEN SATURATION: 95 %

## 2024-08-06 DIAGNOSIS — I73.9 PAD (PERIPHERAL ARTERY DISEASE) (HCC): Chronic | ICD-10-CM

## 2024-08-06 DIAGNOSIS — I65.22 CAROTID STENOSIS, SYMPTOMATIC W/O INFARCT, LEFT: ICD-10-CM

## 2024-08-06 DIAGNOSIS — I77.9 CAROTID ARTERY DISORDER (HCC): Primary | Chronic | ICD-10-CM

## 2024-08-06 PROCEDURE — 4004F PT TOBACCO SCREEN RCVD TLK: CPT | Performed by: INTERNAL MEDICINE

## 2024-08-06 PROCEDURE — 99214 OFFICE O/P EST MOD 30 MIN: CPT | Performed by: INTERNAL MEDICINE

## 2024-08-06 PROCEDURE — 3078F DIAST BP <80 MM HG: CPT | Performed by: INTERNAL MEDICINE

## 2024-08-06 PROCEDURE — 3017F COLORECTAL CA SCREEN DOC REV: CPT | Performed by: INTERNAL MEDICINE

## 2024-08-06 PROCEDURE — G8427 DOCREV CUR MEDS BY ELIG CLIN: HCPCS | Performed by: INTERNAL MEDICINE

## 2024-08-06 PROCEDURE — G8417 CALC BMI ABV UP PARAM F/U: HCPCS | Performed by: INTERNAL MEDICINE

## 2024-08-06 PROCEDURE — 3077F SYST BP >= 140 MM HG: CPT | Performed by: INTERNAL MEDICINE

## 2024-08-06 ASSESSMENT — ENCOUNTER SYMPTOMS
EYES NEGATIVE: 1
CHEST TIGHTNESS: 0
STRIDOR: 0
COUGH: 0
BACK PAIN: 1
RESPIRATORY NEGATIVE: 1
GASTROINTESTINAL NEGATIVE: 1
NAUSEA: 0
BLOOD IN STOOL: 0
SHORTNESS OF BREATH: 0
WHEEZING: 0

## 2024-08-06 NOTE — PROGRESS NOTES
OFFICE VISIT        Patient: Ranjith Rodriguez  YOB: 1960  MRN: [id number]    Chief Complaint: L carotid stent claudication   Chief Complaint   Patient presents with    Coronary Artery Disease    Hypertension       CV Data:  4/2017 echo ef 65  2019 Left Carotid CEA/patch - Dr. Morris  9/28/2020 B/l LE angio, left MAHAD stenting, left SFA ATH/PTA with DCB/stent.  Right LE will be staged  10/21/2020 RLE:  RSFA Stent  3/21 CUS - Dr. Arturo GAYTAN 100 LCA patent. Moderate plaque.   3/22 CUS- LUCILA 100%.  LCEA site patent  1/23 CUS  LUCILA 100%.  LCEA site patent  4/24 CUS LUCILA - 100%.  LICA mild   5/24 Echo EF 65-70 Mild MR    Subjective/HPI pt has significant claudication L>R. He has chronic cough. He is minimally active. Describes no cp nor sob currently. No bleed. Has back pain.     10/12/2020 Left leg feels better since PTA. Right leg still bothers him. No cp no sob. No bleed.     11/23/2020 no cp no sob no falls no bleed. Has not smoked in 1 week. Legs feel better     2/23/21 no cp some gaitan does not feel good. feeels tired today. Takes meds    8/5/21 denies CP no sob no bleed no fals.     3/17/22 doin wiliam no cp no sob told to Dr. Morris retiring. Still smoke.r walks regualrly.     9/23/22 still SOB still smoking no cp no falls nobleed.     5/22/23 doing well no cp no sob not very activ etakes meds. Still smokes    7/10/23 still smokes same GAITAN minimally active no CP no bleed no falls     1/8/24 no changes walks no cp some gaitan not very active though. No falls no bleed. Takes meds. Still makes.     8/6/24 GAITAN no cp no falls no bleed. Takes meds.     He has no cardiac stents.   Smoker  No etoh  Lives with brother  Disabled all his life.      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

## 2024-08-07 ENCOUNTER — TELEPHONE (OUTPATIENT)
Dept: FAMILY MEDICINE CLINIC | Age: 64
End: 2024-08-07

## 2024-08-07 DIAGNOSIS — G62.9 NEUROPATHY: ICD-10-CM

## 2024-08-07 DIAGNOSIS — G89.4 CHRONIC PAIN SYNDROME: Primary | ICD-10-CM

## 2024-08-07 NOTE — TELEPHONE ENCOUNTER
Patient insurance has a limit of 6 pills per day on Lyrica. Can the rx be changed to 300mg rather than 100mg? [facility] is sending a PA request over for this.

## 2024-08-08 RX ORDER — PREGABALIN 300 MG/1
300 CAPSULE ORAL 3 TIMES DAILY
Qty: 90 CAPSULE | Refills: 2 | Status: SHIPPED | OUTPATIENT
Start: 2024-08-08 | End: 2024-11-06

## 2024-08-08 NOTE — TELEPHONE ENCOUNTER
Caroline from [facility] called they are requesting 300 mg 3 x a day (this would be covered) please advise please  (he currently takes 3 100 mg 3 times a day.

## 2024-09-23 ENCOUNTER — TELEPHONE (OUTPATIENT)
Dept: CARDIOLOGY CLINIC | Age: 64
End: 2024-09-23

## 2024-09-23 DIAGNOSIS — I10 ESSENTIAL HYPERTENSION: Chronic | ICD-10-CM

## 2024-09-23 DIAGNOSIS — I73.9 PAD (PERIPHERAL ARTERY DISEASE) (HCC): ICD-10-CM

## 2024-09-24 RX ORDER — VALSARTAN AND HYDROCHLOROTHIAZIDE 80; 12.5 MG/1; MG/1
TABLET, FILM COATED ORAL
Qty: 90 TABLET | Refills: 3 | Status: CANCELLED | OUTPATIENT
Start: 2024-09-24

## 2024-09-24 RX ORDER — ATORVASTATIN CALCIUM 40 MG/1
40 TABLET, FILM COATED ORAL DAILY
Qty: 90 TABLET | Refills: 3 | Status: SHIPPED | OUTPATIENT
Start: 2024-09-24

## 2024-09-27 ENCOUNTER — OFFICE VISIT (OUTPATIENT)
Dept: CARDIOLOGY CLINIC | Age: 64
End: 2024-09-27
Payer: COMMERCIAL

## 2024-09-27 VITALS
OXYGEN SATURATION: 94 % | RESPIRATION RATE: 18 BRPM | DIASTOLIC BLOOD PRESSURE: 80 MMHG | WEIGHT: 212.2 LBS | BODY MASS INDEX: 29.7 KG/M2 | SYSTOLIC BLOOD PRESSURE: 166 MMHG | HEART RATE: 63 BPM

## 2024-09-27 DIAGNOSIS — I70.213 ATHEROSCLEROSIS OF NATIVE ARTERY OF BOTH LOWER EXTREMITIES WITH INTERMITTENT CLAUDICATION (HCC): ICD-10-CM

## 2024-09-27 DIAGNOSIS — I73.9 PAD (PERIPHERAL ARTERY DISEASE) (HCC): Chronic | ICD-10-CM

## 2024-09-27 DIAGNOSIS — I10 PRIMARY HYPERTENSION: Chronic | ICD-10-CM

## 2024-09-27 DIAGNOSIS — I77.9 CAROTID ARTERY DISORDER (HCC): Primary | Chronic | ICD-10-CM

## 2024-09-27 PROCEDURE — G8427 DOCREV CUR MEDS BY ELIG CLIN: HCPCS | Performed by: INTERNAL MEDICINE

## 2024-09-27 PROCEDURE — 3079F DIAST BP 80-89 MM HG: CPT | Performed by: INTERNAL MEDICINE

## 2024-09-27 PROCEDURE — 3017F COLORECTAL CA SCREEN DOC REV: CPT | Performed by: INTERNAL MEDICINE

## 2024-09-27 PROCEDURE — 3077F SYST BP >= 140 MM HG: CPT | Performed by: INTERNAL MEDICINE

## 2024-09-27 PROCEDURE — G8417 CALC BMI ABV UP PARAM F/U: HCPCS | Performed by: INTERNAL MEDICINE

## 2024-09-27 PROCEDURE — 4004F PT TOBACCO SCREEN RCVD TLK: CPT | Performed by: INTERNAL MEDICINE

## 2024-09-27 PROCEDURE — 99214 OFFICE O/P EST MOD 30 MIN: CPT | Performed by: INTERNAL MEDICINE

## 2024-09-27 RX ORDER — AMLODIPINE BESYLATE 10 MG/1
10 TABLET ORAL DAILY
Qty: 90 TABLET | Refills: 3 | Status: SHIPPED | OUTPATIENT
Start: 2024-09-27

## 2024-09-27 ASSESSMENT — ENCOUNTER SYMPTOMS
CHEST TIGHTNESS: 0
EYES NEGATIVE: 1
SHORTNESS OF BREATH: 0
NAUSEA: 0
BACK PAIN: 1
BLOOD IN STOOL: 0
GASTROINTESTINAL NEGATIVE: 1
STRIDOR: 0
COUGH: 0
RESPIRATORY NEGATIVE: 1
WHEEZING: 0

## 2024-10-18 ENCOUNTER — HOSPITAL ENCOUNTER (EMERGENCY)
Age: 64
Discharge: HOME | End: 2024-10-18
Payer: COMMERCIAL

## 2024-10-18 ENCOUNTER — APPOINTMENT (OUTPATIENT)
Dept: GENERAL RADIOLOGY | Age: 64
End: 2024-10-18
Payer: COMMERCIAL

## 2024-10-18 VITALS
WEIGHT: 230 LBS | HEART RATE: 86 BPM | OXYGEN SATURATION: 96 % | BODY MASS INDEX: 32.2 KG/M2 | SYSTOLIC BLOOD PRESSURE: 160 MMHG | HEIGHT: 71 IN | RESPIRATION RATE: 18 BRPM | DIASTOLIC BLOOD PRESSURE: 69 MMHG | TEMPERATURE: 98.7 F

## 2024-10-18 DIAGNOSIS — H61.23 BILATERAL HEARING LOSS DUE TO CERUMEN IMPACTION: Primary | ICD-10-CM

## 2024-10-18 DIAGNOSIS — R91.1 PULMONARY NODULE: ICD-10-CM

## 2024-10-18 PROCEDURE — 99283 EMERGENCY DEPT VISIT LOW MDM: CPT

## 2024-10-18 PROCEDURE — 71046 X-RAY EXAM CHEST 2 VIEWS: CPT

## 2024-10-18 PROCEDURE — 69209 REMOVE IMPACTED EAR WAX UNI: CPT

## 2024-10-18 ASSESSMENT — ENCOUNTER SYMPTOMS
ALLERGIC/IMMUNOLOGIC NEGATIVE: 1
EYE PAIN: 0
NAUSEA: 0
APNEA: 0
TROUBLE SWALLOWING: 0
VOMITING: 0
DIARRHEA: 0
COLOR CHANGE: 0
SHORTNESS OF BREATH: 0
ABDOMINAL PAIN: 0

## 2024-10-18 ASSESSMENT — LIFESTYLE VARIABLES
HOW MANY STANDARD DRINKS CONTAINING ALCOHOL DO YOU HAVE ON A TYPICAL DAY: PATIENT DOES NOT DRINK
HOW OFTEN DO YOU HAVE A DRINK CONTAINING ALCOHOL: NEVER

## 2024-10-18 ASSESSMENT — PAIN - FUNCTIONAL ASSESSMENT: PAIN_FUNCTIONAL_ASSESSMENT: NONE - DENIES PAIN

## 2024-10-18 NOTE — ED PROVIDER NOTES
[facility] ED  eMERGENCYdEPARTMENT eNCOUnter        Pt Name: Ranjith Rodriguez  MRN: [id number]  Birthdate 1960of evaluation: 10/18/2024  Provider:Nalini Peralta PA-C  10:58 AM EDT    CHIEF COMPLAINT       Chief Complaint   Patient presents with    Ear Fullness     Right ear =- Difficulty hearing out of ear         HISTORY OF PRESENT ILLNESS  (Location/Symptom, Timing/Onset, Context/Setting, Quality, Duration, Modifying Factors, Severity.)   Ranjith Rodriguez is a 64 y.o. male who presents to the emergency department for evaluation of difficulty hearing out of both ears.  Patient reports that he used to be in a nursing home but is not anymore and they cleaned his ears out in the nursing home when they got full like this so he came to the emergency department today to have them cleaned out.  Patient denies any pain to his bilateral ears.  Denies any fevers, shortness of breath, chest pain, abdominal pain, nausea, vomiting, diarrhea.  Patient does report that he has a history of COPD and is not using his inhalers currently. Denies any other complaints at this time.     HPI    Nursing Notes were reviewed and I agree.    REVIEW OF SYSTEMS    (2-9 systems for level 4, 10 or more for level 5)     Review of Systems   Constitutional:  Negative for diaphoresis and fever.   HENT:  Negative for ear discharge, ear pain, hearing loss and trouble swallowing.         Decreased hearing in bilateral ears.    Eyes:  Negative for pain.   Respiratory:  Negative for apnea and shortness of breath.    Cardiovascular:  Negative for chest pain.   Gastrointestinal:  Negative for abdominal pain, diarrhea, nausea and vomiting.   Endocrine: Negative.    Genitourinary:  Negative for hematuria.   Musculoskeletal:  Negative for neck pain and neck stiffness.   Skin:  Negative for color change.   Allergic/Immunologic: Negative.    Neurological:  Negative for dizziness and numbness.   Hematological: Negative.    Psychiatric/Behavioral:

## 2024-10-18 NOTE — ED TRIAGE NOTES
Patient presents via private car for ear fulling in right ear. States difficulty hearing but has hx of needing ear \"cleaned out\". Patient denies other complaints at this time. Patient arrives covered in urine and stool visible on clothing. Patient states he feels safe where he lives and he is capable of taking care of himself at home and does not want nursing home placement . Patient A&Ox4. ARNOLD Moncada aware of patient condition but denial of wanting help with self care and homecare needs.

## 2024-10-18 NOTE — DISCHARGE INSTRUCTIONS
CHEST X-RAY 10/18/2024 IMPRESSION:  1. Radiographic findings suggestive of COPD.  2. No acute active cardiopulmonary process.  3. Faint nodular density overlying the right lower lobe. It measures  approximately 9 mm. Recommend follow-up CT scan of the chest.    Make sure you take your COPD medications every day as prescribed.  Go to your scheduled follow-up appointment with your PCP on 10/22/2024 as scheduled. Talk with your PCP about regular ear cleaning and getting a CT scan to further investigate the pulmonary nodule found on the x-ray that was done today.   Return to the emergency department for any new or worsening symptoms.

## 2024-10-22 ENCOUNTER — OFFICE VISIT (OUTPATIENT)
Dept: FAMILY MEDICINE CLINIC | Age: 64
End: 2024-10-22
Payer: COMMERCIAL

## 2024-10-22 ENCOUNTER — TELEPHONE (OUTPATIENT)
Dept: FAMILY MEDICINE CLINIC | Age: 64
End: 2024-10-22

## 2024-10-22 VITALS
BODY MASS INDEX: 29.54 KG/M2 | WEIGHT: 211 LBS | DIASTOLIC BLOOD PRESSURE: 70 MMHG | HEIGHT: 71 IN | RESPIRATION RATE: 16 BRPM | SYSTOLIC BLOOD PRESSURE: 138 MMHG | OXYGEN SATURATION: 98 % | HEART RATE: 72 BPM

## 2024-10-22 DIAGNOSIS — R91.1 LUNG NODULE: ICD-10-CM

## 2024-10-22 DIAGNOSIS — G62.1 ALCOHOLIC POLYNEUROPATHY (HCC): Chronic | ICD-10-CM

## 2024-10-22 DIAGNOSIS — G89.4 CHRONIC PAIN SYNDROME: ICD-10-CM

## 2024-10-22 DIAGNOSIS — G62.1 ALCOHOLIC POLYNEUROPATHY (HCC): Primary | Chronic | ICD-10-CM

## 2024-10-22 DIAGNOSIS — G62.9 NEUROPATHY: ICD-10-CM

## 2024-10-22 DIAGNOSIS — R46.0 POOR HYGIENE: ICD-10-CM

## 2024-10-22 DIAGNOSIS — J41.0 SIMPLE CHRONIC BRONCHITIS (HCC): ICD-10-CM

## 2024-10-22 PROBLEM — I87.2 EDEMA OF BOTH LOWER EXTREMITIES DUE TO PERIPHERAL VENOUS INSUFFICIENCY: Status: RESOLVED | Noted: 2020-08-27 | Resolved: 2024-10-22

## 2024-10-22 PROBLEM — M10.032: Status: RESOLVED | Noted: 2020-12-16 | Resolved: 2024-10-22

## 2024-10-22 PROBLEM — N17.9 ACUTE KIDNEY INJURY (HCC): Status: RESOLVED | Noted: 2024-07-30 | Resolved: 2024-10-22

## 2024-10-22 PROBLEM — J18.9 PNEUMONIA OF LEFT LOWER LOBE DUE TO INFECTIOUS ORGANISM: Status: RESOLVED | Noted: 2024-05-24 | Resolved: 2024-10-22

## 2024-10-22 PROBLEM — M62.82 RHABDOMYOLYSIS: Status: RESOLVED | Noted: 2024-05-23 | Resolved: 2024-10-22

## 2024-10-22 PROCEDURE — 4004F PT TOBACCO SCREEN RCVD TLK: CPT | Performed by: PHYSICIAN ASSISTANT

## 2024-10-22 PROCEDURE — G8427 DOCREV CUR MEDS BY ELIG CLIN: HCPCS | Performed by: PHYSICIAN ASSISTANT

## 2024-10-22 PROCEDURE — 3023F SPIROM DOC REV: CPT | Performed by: PHYSICIAN ASSISTANT

## 2024-10-22 PROCEDURE — 3075F SYST BP GE 130 - 139MM HG: CPT | Performed by: PHYSICIAN ASSISTANT

## 2024-10-22 PROCEDURE — G8484 FLU IMMUNIZE NO ADMIN: HCPCS | Performed by: PHYSICIAN ASSISTANT

## 2024-10-22 PROCEDURE — G8417 CALC BMI ABV UP PARAM F/U: HCPCS | Performed by: PHYSICIAN ASSISTANT

## 2024-10-22 PROCEDURE — 99214 OFFICE O/P EST MOD 30 MIN: CPT | Performed by: PHYSICIAN ASSISTANT

## 2024-10-22 PROCEDURE — 3017F COLORECTAL CA SCREEN DOC REV: CPT | Performed by: PHYSICIAN ASSISTANT

## 2024-10-22 PROCEDURE — 3078F DIAST BP <80 MM HG: CPT | Performed by: PHYSICIAN ASSISTANT

## 2024-10-22 RX ORDER — PREGABALIN 300 MG/1
300 CAPSULE ORAL 3 TIMES DAILY
Qty: 90 CAPSULE | Refills: 2 | Status: SHIPPED | OUTPATIENT
Start: 2024-10-22 | End: 2024-10-22 | Stop reason: SDUPTHER

## 2024-10-22 RX ORDER — PREGABALIN 100 MG/1
300 CAPSULE ORAL 3 TIMES DAILY
Qty: 270 CAPSULE | Refills: 2 | Status: SHIPPED | OUTPATIENT
Start: 2024-10-22 | End: 2025-01-20

## 2024-10-22 ASSESSMENT — ENCOUNTER SYMPTOMS
DIARRHEA: 0
VOMITING: 0
BLOOD IN STOOL: 0
SHORTNESS OF BREATH: 0
CHEST TIGHTNESS: 0
NAUSEA: 0
PHOTOPHOBIA: 0
COUGH: 0
ABDOMINAL PAIN: 0

## 2024-10-22 NOTE — TELEPHONE ENCOUNTER
[facility] to let you know that pt insurance will not pay for LYRICA 300 MG CAP A DAY . HOW EVER IT WILL PAY FOR 3 100 MG CAP TID

## 2024-10-22 NOTE — PROGRESS NOTES
Misc. Devices (BATH/SHOWER SEAT) MISC For use while bathing/showering due to fall risk and leg weakness. 1 each 0    Misc. Devices (CANE) MISC Wide base cane please, fall risk, chronic knee and back pain. 1 each 0     No current facility-administered medications for this visit.     PMH, Surgical Hx, Family Hx, and Social Hx reviewed and updated.  Health Maintenance reviewed.    Objective  Vitals:    10/22/24 1321   BP: 138/70   Site: Left Upper Arm   Position: Sitting   Cuff Size: Medium Adult   Pulse: 72   Resp: 16   SpO2: 98%   Weight: 95.7 kg (211 lb)   Height: 1.803 m (5' 11\")     BP Readings from Last 3 Encounters:   10/22/24 138/70   10/18/24 (!) 160/69   09/27/24 (!) 166/80     Wt Readings from Last 3 Encounters:   10/22/24 95.7 kg (211 lb)   10/18/24 104.3 kg (230 lb)   09/27/24 96.3 kg (212 lb 3.2 oz)     Physical Exam  Vitals reviewed.   Constitutional:       General: He is not in acute distress.     Appearance: He is well-developed. He is not diaphoretic.      Comments: Very unkempt appearance, visible dirt/grim of skin, clothes.   HENT:      Head: Normocephalic and atraumatic.      Right Ear: Tympanic membrane, ear canal and external ear normal. There is no impacted cerumen.      Left Ear: Tympanic membrane, ear canal and external ear normal. There is no impacted cerumen.   Eyes:      Conjunctiva/sclera: Conjunctivae normal.   Cardiovascular:      Rate and Rhythm: Normal rate and regular rhythm.      Heart sounds: Murmur (2/6) heard.   Pulmonary:      Effort: Pulmonary effort is normal.      Breath sounds: Rhonchi present.   Musculoskeletal:      Cervical back: Normal range of motion.   Skin:     General: Skin is warm and dry.   Neurological:      General: No focal deficit present.      Mental Status: He is alert. Mental status is at baseline.      Cranial Nerves: No cranial nerve deficit.   Psychiatric:         Mood and Affect: Mood normal.         Behavior: Behavior normal.         Thought Content:

## 2024-10-24 NOTE — TELEPHONE ENCOUNTER
Pt called and stated that he has a paper script for Lyrica, and it will not be covered by his insurance.     I explained to the pt that Kiki changed the script so that the insurance will cover it, but pt is insisting that that script will not be covered either.    Pt stated that the new script wont be covered for three days.

## 2024-10-25 ENCOUNTER — TELEPHONE (OUTPATIENT)
Dept: CARDIOLOGY CLINIC | Age: 64
End: 2024-10-25

## 2024-10-25 NOTE — TELEPHONE ENCOUNTER
Received call from [facility] pharmacy regarding patient's medications Lopressor and Toprol XL. Per pharmacist, patient had been getting Toprol XL, but sees that patient also has an active prescription for Lopressor. Pharmacist clarifying whether patient is to be taking both.     Per Dr. Houston, patient to be taking Toprol XL. Discontinue Lopressor from patient's medication list.     Medication list updated. Called patient to notify him to make sure that he is only taking the Toprol XL, and not taking Lopressor. No answer, voicemail left for patient to call back if he had any questions regarding the medications.

## 2024-11-01 ENCOUNTER — TELEPHONE (OUTPATIENT)
Dept: CARDIOLOGY CLINIC | Age: 64
End: 2024-11-01

## 2024-11-01 DIAGNOSIS — I73.9 PAD (PERIPHERAL ARTERY DISEASE) (HCC): ICD-10-CM

## 2024-11-01 RX ORDER — ATORVASTATIN CALCIUM 40 MG/1
40 TABLET, FILM COATED ORAL DAILY
Qty: 90 TABLET | Refills: 3 | Status: SHIPPED | OUTPATIENT
Start: 2024-11-01

## 2024-11-01 NOTE — TELEPHONE ENCOUNTER
Requesting medication refill. Please approve or deny this request.    Rx requested:  Requested Prescriptions     Pending Prescriptions Disp Refills    atorvastatin (LIPITOR) 40 MG tablet 90 tablet 3     Sig: Take 1 tablet by mouth daily         Last Office Visit:   9/27/2024      Next Visit Date:  Future Appointments   Date Time Provider Department Center   1/28/2025 11:30 AM Kiki Rashid PA-C [facility] DEP   1/28/2025  1:15 PM Eric Houston MD [facility]               Please approve or deny.

## 2024-11-01 NOTE — TELEPHONE ENCOUNTER
Pt wants to know why he is taking ATORVASTATIN. He says he was told by the pharmacy not to take it

## 2024-11-01 NOTE — TELEPHONE ENCOUNTER
PATIENT AWARE TO CONTINUE MEDICATION. SPOKE WITH PATIENT'S PHARMACY AND THEY HAVE ALL MEDS CURRENT. PHARMACY HAD REACHED OUT TO OFFICE REGARDING 2 DIFFERENT RX OF METOPROLOL SUCC AND TART. THAT WAS ALREADY ADDRESSED WITH PHARMACY AND THEY HAVE NO QUESTIONS

## 2024-11-06 ENCOUNTER — HOSPITAL ENCOUNTER (EMERGENCY)
Age: 64
Discharge: HOME | End: 2024-11-06
Payer: COMMERCIAL

## 2024-11-06 VITALS
HEIGHT: 71 IN | SYSTOLIC BLOOD PRESSURE: 131 MMHG | RESPIRATION RATE: 15 BRPM | OXYGEN SATURATION: 96 % | HEART RATE: 91 BPM | DIASTOLIC BLOOD PRESSURE: 62 MMHG | WEIGHT: 230 LBS | BODY MASS INDEX: 32.2 KG/M2 | TEMPERATURE: 97.4 F

## 2024-11-06 DIAGNOSIS — H91.93 HEARING PROBLEM OF BOTH EARS: ICD-10-CM

## 2024-11-06 DIAGNOSIS — H93.8X3 SENSATION OF FULLNESS IN EAR, BILATERAL: Primary | ICD-10-CM

## 2024-11-06 PROCEDURE — 99282 EMERGENCY DEPT VISIT SF MDM: CPT

## 2024-11-06 ASSESSMENT — PAIN - FUNCTIONAL ASSESSMENT: PAIN_FUNCTIONAL_ASSESSMENT: NONE - DENIES PAIN

## 2024-11-06 NOTE — ED PROVIDER NOTES
Tympanic membrane, ear canal and external ear normal. No swelling or tenderness. There is no impacted cerumen. No mastoid tenderness. Tympanic membrane is not erythematous or bulging.      Nose: Nose normal.   Eyes:      Conjunctiva/sclera: Conjunctivae normal.   Cardiovascular:      Rate and Rhythm: Normal rate and regular rhythm.      Pulses: Normal pulses.   Pulmonary:      Effort: Pulmonary effort is normal.      Breath sounds: Normal breath sounds.   Abdominal:      Palpations: Abdomen is soft.      Tenderness: There is no abdominal tenderness.   Musculoskeletal:         General: Normal range of motion.      Cervical back: Normal range of motion and neck supple.   Skin:     General: Skin is warm.   Neurological:      General: No focal deficit present.      Mental Status: He is alert. Mental status is at baseline.         DIAGNOSTIC RESULTS     EKG: All EKG's are interpreted by the Emergency Department Physician who either signs or Co-signs this chart in the absence of a cardiologist.      RADIOLOGY:   Non-plain film images such as CT, Ultrasound and MRI are read by the radiologist. Plain radiographic images are visualized and preliminarily interpreted by the emergency physician with the below findings:      Interpretation per the Radiologist below, if available at the time of this note:    No orders to display         ED BEDSIDE ULTRASOUND:   Performed by ED Physician - none    LABS:  Labs Reviewed - No data to display    All other labs were within normal range or not returned as of this dictation.    EMERGENCY DEPARTMENT COURSE and DIFFERENTIAL DIAGNOSIS/MDM:   Vitals:    Vitals:    11/06/24 1351   BP: 131/62   Pulse: 91   Resp: 15   Temp: 97.4 °F (36.3 °C)   TempSrc: Tympanic   SpO2: 96%   Weight: 104.3 kg (230 lb)   Height: 1.803 m (5' 11\")         Medical Decision Making  64 y.o. male who presents to the emergency department with complaints of being unable to hear when the phone is lifted to his ear.

## 2024-11-06 NOTE — CARE COORDINATION
I was asked to make appt with ENT  which I was able to get pt into Dr Terrell [address] for 11/14--1000--[phone].    I called his brother Curly who brought him here. I assessed for needs at home. He states pt has not fallen and gets up and \"does what he wants\" brother does all house hold and cooks meals. Pt only sponge bathes and he is very stubborn about anyone helping him. He has a shower chair and brother states he will try and assist pt with this. He states pt will decline any help.    I met with pt who appeared very unkempt, soiled clothing, unshaven, greasy appearing hair. I asked is he is able to care for himself. He states he is scared to take a shower because \"the pipes back up\" and water will fill in tub while he is sitting on shower chair. \"cannot remember when he took a bath last.\"        Brother brought in from [facility] to inform of d/c plans and follow up. Pt seemed unhappy has to wait this long to get appt and wants something done today. I provided explanation of earliest appt. He can check for cancellations.  Brother tried convincing him to be patient. Pt finally accepted. Pt appears to have limited education and understanding. I inquired of any assistance needed at home. I offered some clean clothing he declined. Curly states he buys him new clothes, sweat pants \"from [employer] store\" when he needs them and he washes his clothes. Pt states he will try to take a shower after his brother convinced him about the \"pipes not backing up\" he states landlord \"fixed all that\".     I called [facility], [phone],spoke with Praveena and provided her with demographics and situation at hand. She will investigate.     Keanu BARRETT informed.      Electronically signed by Jackelin Walsh, RN, BSN on 11/6/2024 at 2:22 PM

## 2024-11-06 NOTE — DISCHARGE INSTRUCTIONS
An appointment with an Ear Nose & Throat Dr was made. Dr. Terrell in [facility] [address] [phone] for 11/4/24--1000.

## 2024-11-14 ENCOUNTER — OFFICE VISIT (OUTPATIENT)
Age: 64
End: 2024-11-14
Payer: COMMERCIAL

## 2024-11-14 VITALS
HEIGHT: 71 IN | HEART RATE: 72 BPM | DIASTOLIC BLOOD PRESSURE: 75 MMHG | TEMPERATURE: 96.6 F | RESPIRATION RATE: 14 BRPM | SYSTOLIC BLOOD PRESSURE: 120 MMHG | OXYGEN SATURATION: 96 % | WEIGHT: 230 LBS | BODY MASS INDEX: 32.2 KG/M2

## 2024-11-14 DIAGNOSIS — H91.93 BILATERAL HEARING LOSS, UNSPECIFIED HEARING LOSS TYPE: Primary | ICD-10-CM

## 2024-11-14 PROCEDURE — 3078F DIAST BP <80 MM HG: CPT | Performed by: STUDENT IN AN ORGANIZED HEALTH CARE EDUCATION/TRAINING PROGRAM

## 2024-11-14 PROCEDURE — 4004F PT TOBACCO SCREEN RCVD TLK: CPT | Performed by: STUDENT IN AN ORGANIZED HEALTH CARE EDUCATION/TRAINING PROGRAM

## 2024-11-14 PROCEDURE — 99202 OFFICE O/P NEW SF 15 MIN: CPT | Performed by: STUDENT IN AN ORGANIZED HEALTH CARE EDUCATION/TRAINING PROGRAM

## 2024-11-14 PROCEDURE — G8484 FLU IMMUNIZE NO ADMIN: HCPCS | Performed by: STUDENT IN AN ORGANIZED HEALTH CARE EDUCATION/TRAINING PROGRAM

## 2024-11-14 PROCEDURE — G8427 DOCREV CUR MEDS BY ELIG CLIN: HCPCS | Performed by: STUDENT IN AN ORGANIZED HEALTH CARE EDUCATION/TRAINING PROGRAM

## 2024-11-14 PROCEDURE — 3074F SYST BP LT 130 MM HG: CPT | Performed by: STUDENT IN AN ORGANIZED HEALTH CARE EDUCATION/TRAINING PROGRAM

## 2024-11-14 PROCEDURE — 3017F COLORECTAL CA SCREEN DOC REV: CPT | Performed by: STUDENT IN AN ORGANIZED HEALTH CARE EDUCATION/TRAINING PROGRAM

## 2024-11-14 PROCEDURE — G8417 CALC BMI ABV UP PARAM F/U: HCPCS | Performed by: STUDENT IN AN ORGANIZED HEALTH CARE EDUCATION/TRAINING PROGRAM

## 2024-11-14 NOTE — PROGRESS NOTES
[facility] PHYSICIANS [place] [facility], [facility] OTOLARYNGOLOGY  [address] [place] [address], [address]  [facility] [address]  Dept: [phone]  Dept Fax: [phone]  Loc: [phone]     11/14/2024    Visit type: New patient    Reason for Visit: New Patient (Ear pain ear fullness/)       ASSESSMENT/PLAN   1. Bilateral hearing loss, unspecified hearing loss type  -     External Referral to Audiology    No orders of the defined types were placed in this encounter.       Assessment & Plan        Subjective    Patient: Ranjith Rodriguez is a 64 y.o. male   HPI:    Referred by: No ref. provider found  I have personally reviewed the note by the referring provider ***    Presents today ***  History of Present Illness          Review of Systems     No Known Allergies    Current Outpatient Medications:     atorvastatin (LIPITOR) 40 MG tablet, Take 1 tablet by mouth daily, Disp: 90 tablet, Rfl: 3    pregabalin (LYRICA) 100 MG capsule, Take 3 capsules by mouth in the morning, at noon, and at bedtime for 90 days. Max Daily Amount: 900 mg, Disp: 270 capsule, Rfl: 2    amLODIPine (NORVASC) 10 MG tablet, Take 1 tablet by mouth daily, Disp: 90 tablet, Rfl: 3    SYMBICORT 80-4.5 MCG/ACT AERO, INHALE 2 PUFFS TWICE A DAY, Disp: , Rfl:     omega-3 acid ethyl esters (LOVAZA) 1 g capsule, take 2 capsules twice a day, Disp: , Rfl:     thiamine 100 MG tablet, Take 1 tablet by mouth daily, Disp: , Rfl:     clopidogrel (PLAVIX) 75 MG tablet, 1 po QD, Disp: 90 tablet, Rfl: 3    metoprolol succinate (TOPROL XL) 25 MG extended release tablet, take 1 tablet by mouth once daily, Disp: 90 tablet, Rfl: 3    VASCEPA 1 g CAPS capsule, take 2 capsules by mouth twice a day, Disp: 360 capsule, Rfl: 3    allopurinol (ZYLOPRIM) 100 MG tablet, Take 2 tablets by mouth daily, Disp: 180 tablet, Rfl: 4    aspirin (ASPIRIN LOW DOSE) 81 MG EC tablet, Take 1 tablet by mouth daily, Disp: 30 tablet, Rfl: 3    Ergocalciferol (VITAMIN D) 48702 units 
for 90 days. Max Daily Amount: 900 mg, Disp: 270 capsule, Rfl: 2    amLODIPine (NORVASC) 10 MG tablet, Take 1 tablet by mouth daily, Disp: 90 tablet, Rfl: 3    SYMBICORT 80-4.5 MCG/ACT AERO, INHALE 2 PUFFS TWICE A DAY, Disp: , Rfl:     omega-3 acid ethyl esters (LOVAZA) 1 g capsule, take 2 capsules twice a day, Disp: , Rfl:     thiamine 100 MG tablet, Take 1 tablet by mouth daily, Disp: , Rfl:     clopidogrel (PLAVIX) 75 MG tablet, 1 po QD, Disp: 90 tablet, Rfl: 3    metoprolol succinate (TOPROL XL) 25 MG extended release tablet, take 1 tablet by mouth once daily, Disp: 90 tablet, Rfl: 3    VASCEPA 1 g CAPS capsule, take 2 capsules by mouth twice a day, Disp: 360 capsule, Rfl: 3    allopurinol (ZYLOPRIM) 100 MG tablet, Take 2 tablets by mouth daily, Disp: 180 tablet, Rfl: 4    aspirin (ASPIRIN LOW DOSE) 81 MG EC tablet, Take 1 tablet by mouth daily, Disp: 30 tablet, Rfl: 3    Ergocalciferol (VITAMIN D) 61311 units CAPS, Take 50,000 Units by mouth once a week, Disp: 5 capsule, Rfl: 1    vitamin B-1 (THIAMINE) 100 MG tablet, Take 1 tablet by mouth daily, Disp: 30 tablet, Rfl: 3    fenofibrate (TRIGLIDE) 160 MG tablet, Take 1 tablet by mouth daily, Disp: 90 tablet, Rfl: 3    Hospital Bed MISC, by Does not apply route For orthopnea, dyspnea at night.  Elevated HOB to at least 45 degrees., Disp: 1 each, Rfl: 0    Misc. Devices (RAISED TOILET SEAT) MISC, Use of general weakness, fall risk, strength loss of BLE., Disp: 1 each, Rfl: 1    Misc. Devices (BATH/SHOWER SEAT) MISC, For use while bathing/showering due to fall risk and leg weakness., Disp: 1 each, Rfl: 0    Misc. Devices (CANE) MISC, Wide base cane please, fall risk, chronic knee and back pain., Disp: 1 each, Rfl: 0   Past Medical History:   Diagnosis Date    Abnormal ankle brachial index (RAO)     Acute idiopathic gout of left wrist 12/16/2020    Acute kidney injury (HCC) 07/30/2024    Alcoholic (HCC)-remote Hx 08/28/2015    In remission goes to AA daily--agrees

## 2024-11-18 ENCOUNTER — TELEPHONE (OUTPATIENT)
Age: 64
End: 2024-11-18

## 2024-11-29 ENCOUNTER — APPOINTMENT (OUTPATIENT)
Dept: GENERAL RADIOLOGY | Age: 64
DRG: 324 | End: 2024-11-29
Payer: COMMERCIAL

## 2024-11-29 ENCOUNTER — HOSPITAL ENCOUNTER (EMERGENCY)
Age: 64
Discharge: LEFT BEFORE DISCHARGE | DRG: 324 | End: 2024-11-29
Payer: COMMERCIAL

## 2024-11-29 VITALS
OXYGEN SATURATION: 97 % | SYSTOLIC BLOOD PRESSURE: 171 MMHG | RESPIRATION RATE: 16 BRPM | BODY MASS INDEX: 31.5 KG/M2 | TEMPERATURE: 97.2 F | HEART RATE: 85 BPM | HEIGHT: 71 IN | DIASTOLIC BLOOD PRESSURE: 54 MMHG | WEIGHT: 225 LBS

## 2024-11-29 DIAGNOSIS — I10 ESSENTIAL HYPERTENSION: ICD-10-CM

## 2024-11-29 DIAGNOSIS — I21.4 NSTEMI (NON-ST ELEVATED MYOCARDIAL INFARCTION) (HCC): ICD-10-CM

## 2024-11-29 DIAGNOSIS — R06.00 DYSPNEA, UNSPECIFIED TYPE: Primary | ICD-10-CM

## 2024-11-29 LAB
ALBUMIN SERPL-MCNC: 3.7 G/DL (ref 3.5–4.6)
ALP SERPL-CCNC: 114 U/L (ref 35–104)
ALT SERPL-CCNC: 31 U/L (ref 0–41)
ANION GAP SERPL CALCULATED.3IONS-SCNC: 6 MEQ/L (ref 9–15)
AST SERPL-CCNC: 28 U/L (ref 0–40)
BASOPHILS # BLD: 0.1 K/UL (ref 0–0.2)
BASOPHILS NFR BLD: 0.8 %
BILIRUB SERPL-MCNC: 0.4 MG/DL (ref 0.2–0.7)
BNP BLD-MCNC: 548 PG/ML
BUN SERPL-MCNC: 18 MG/DL (ref 8–23)
CALCIUM SERPL-MCNC: 9.1 MG/DL (ref 8.5–9.9)
CHLORIDE SERPL-SCNC: 106 MEQ/L (ref 95–107)
CO2 SERPL-SCNC: 30 MEQ/L (ref 20–31)
CREAT SERPL-MCNC: 1.19 MG/DL (ref 0.7–1.2)
EKG ATRIAL RATE: 83 BPM
EKG P AXIS: 61 DEGREES
EKG P-R INTERVAL: 168 MS
EKG Q-T INTERVAL: 396 MS
EKG QRS DURATION: 104 MS
EKG QTC CALCULATION (BAZETT): 465 MS
EKG R AXIS: 76 DEGREES
EKG T AXIS: 48 DEGREES
EKG VENTRICULAR RATE: 83 BPM
EOSINOPHIL # BLD: 0.3 K/UL (ref 0–0.7)
EOSINOPHIL NFR BLD: 2.9 %
ERYTHROCYTE [DISTWIDTH] IN BLOOD BY AUTOMATED COUNT: 14.4 % (ref 11.5–14.5)
GLOBULIN SER CALC-MCNC: 3.4 G/DL (ref 2.3–3.5)
GLUCOSE SERPL-MCNC: 106 MG/DL (ref 70–99)
HCT VFR BLD AUTO: 40.8 % (ref 42–52)
HGB BLD-MCNC: 13.7 G/DL (ref 14–18)
INR PPP: 1
LYMPHOCYTES # BLD: 2.5 K/UL (ref 1–4.8)
LYMPHOCYTES NFR BLD: 21.4 %
MCH RBC QN AUTO: 29.9 PG (ref 27–31.3)
MCHC RBC AUTO-ENTMCNC: 33.6 % (ref 33–37)
MCV RBC AUTO: 89.1 FL (ref 79–92.2)
MONOCYTES # BLD: 1.2 K/UL (ref 0.2–0.8)
MONOCYTES NFR BLD: 10.5 %
NEUTROPHILS # BLD: 7.3 K/UL (ref 1.4–6.5)
NEUTS SEG NFR BLD: 64 %
PLATELET # BLD AUTO: 378 K/UL (ref 130–400)
POTASSIUM SERPL-SCNC: 3.9 MEQ/L (ref 3.4–4.9)
PROT SERPL-MCNC: 7.1 G/DL (ref 6.3–8)
PROTHROMBIN TIME: 13.3 SEC (ref 12.3–14.9)
RBC # BLD AUTO: 4.58 M/UL (ref 4.7–6.1)
SARS-COV-2 RDRP RESP QL NAA+PROBE: NOT DETECTED
SODIUM SERPL-SCNC: 142 MEQ/L (ref 135–144)
TROPONIN, HIGH SENSITIVITY: 67 NG/L (ref 0–19)
WBC # BLD AUTO: 11.5 K/UL (ref 4.8–10.8)

## 2024-11-29 PROCEDURE — 80053 COMPREHEN METABOLIC PANEL: CPT

## 2024-11-29 PROCEDURE — 84484 ASSAY OF TROPONIN QUANT: CPT

## 2024-11-29 PROCEDURE — 71045 X-RAY EXAM CHEST 1 VIEW: CPT

## 2024-11-29 PROCEDURE — 87635 SARS-COV-2 COVID-19 AMP PRB: CPT

## 2024-11-29 PROCEDURE — 99285 EMERGENCY DEPT VISIT HI MDM: CPT

## 2024-11-29 PROCEDURE — 36415 COLL VENOUS BLD VENIPUNCTURE: CPT

## 2024-11-29 PROCEDURE — 85025 COMPLETE CBC W/AUTO DIFF WBC: CPT

## 2024-11-29 PROCEDURE — 83880 ASSAY OF NATRIURETIC PEPTIDE: CPT

## 2024-11-29 PROCEDURE — 85610 PROTHROMBIN TIME: CPT

## 2024-11-29 PROCEDURE — 93005 ELECTROCARDIOGRAM TRACING: CPT | Performed by: PHYSICIAN ASSISTANT

## 2024-11-29 ASSESSMENT — PAIN - FUNCTIONAL ASSESSMENT: PAIN_FUNCTIONAL_ASSESSMENT: NONE - DENIES PAIN

## 2024-11-29 NOTE — ED PROVIDER NOTES
Exam  Vitals and nursing note reviewed.   Constitutional:       General: He is not in acute distress.     Appearance: Normal appearance. He is normal weight. He is not ill-appearing, toxic-appearing or diaphoretic.      Comments: Unkempt   HENT:      Head: Normocephalic and atraumatic.      Right Ear: Tympanic membrane, ear canal and external ear normal.      Left Ear: Tympanic membrane, ear canal and external ear normal.      Nose: Nose normal. No congestion.      Mouth/Throat:      Mouth: Mucous membranes are moist.      Pharynx: Oropharynx is clear.   Eyes:      General: No scleral icterus.     Extraocular Movements: Extraocular movements intact.      Conjunctiva/sclera: Conjunctivae normal.      Pupils: Pupils are equal, round, and reactive to light.   Cardiovascular:      Rate and Rhythm: Normal rate.      Pulses: Normal pulses.      Heart sounds: Normal heart sounds. No murmur heard.     No friction rub. No gallop.   Pulmonary:      Effort: Pulmonary effort is normal. No respiratory distress.      Breath sounds: Normal breath sounds. No stridor.   Abdominal:      General: Abdomen is flat. Bowel sounds are normal.      Palpations: Abdomen is soft.      Tenderness: There is no abdominal tenderness. There is no guarding.   Musculoskeletal:         General: No swelling, tenderness, deformity or signs of injury. Normal range of motion.      Cervical back: Normal range of motion and neck supple. No rigidity or tenderness.      Right lower leg: No edema.      Left lower leg: No edema.   Lymphadenopathy:      Cervical: No cervical adenopathy.   Skin:     General: Skin is warm and dry.      Capillary Refill: Capillary refill takes less than 2 seconds.      Coloration: Skin is not jaundiced or pale.      Findings: No lesion.   Neurological:      General: No focal deficit present.      Mental Status: He is alert and oriented to person, place, and time. Mental status is at baseline.      Cranial Nerves: No cranial nerve  171/54   Pulse: 85    Resp: 16    Temp: 97.2 °F (36.2 °C)    TempSrc: Oral    SpO2: 97%    Weight: 102.1 kg (225 lb)    Height: 1.803 m (5' 11\")          Medical Decision Making  Patient oxygenating well in acute respiratory distress.  Appropriately broad differential considered, COPD exacerbation, ACS, hypertensive emergency, PE, pneumonia.  Presenting EKG demonstrated normal sinus rhythm without acute ischemic changes no significant changes from 5/20/2024.  Lab work was remarkable for presenting at bedtime troponin of 67.  Prior elevation per chart review does appear to be secondary to underlying rhabdomyolysis and CHET which is not present today.  Chest x-ray without acute cardiopulmonary process.  After discussing with results with patient him and brother were requesting discharge stating they would like to go home as he is not having any complaints right now.  Did discuss my concerns of elevated cardiac enzymes with anginal equivalents especially given patient's history and prior refusals of cardiac stress testing.  Patient ultimately elected to leave AGAINST MEDICAL ADVICE.  He is of sound mind with decision-making capacity.  He was discharged in stable condition ambulatory from the emergency department.  Was aware that he may return anytime for further evaluation and care including repeat cardiac enzymes and consideration of admission for cardiology evaluation.    Amount and/or Complexity of Data Reviewed  Labs: ordered.  Radiology: ordered.  ECG/medicine tests: ordered.       Coding     PROCEDURES:    Procedures      FINAL IMPRESSION      1. Dyspnea, unspecified type    2. NSTEMI (non-ST elevated myocardial infarction) (HCC)    3. Essential hypertension          DISPOSITION/PLAN   DISPOSITION [place] 11/29/2024 06:20:43 PM           PATIENT REFERRED TO:  Kiki Rashid PA-C  [id number] [facility] [id number] [phone]          Eric Houston MD  [address]

## 2024-11-29 NOTE — DISCHARGE INSTRUCTIONS
Follow close with your primary cardiology team.  You elected to leave AGAINST MEDICAL ADVICE however I recommend you return immediately for reevaluation at any time.

## 2024-11-29 NOTE — ED TRIAGE NOTES
Pt c/o SOB that started today, Hx of COPD, Pt is A&OX4, calm, ambulatory with a walker, afebrile, breathes are equal and unlabored, denies pain.

## 2024-11-30 ENCOUNTER — HOSPITAL ENCOUNTER (INPATIENT)
Age: 64
LOS: 2 days | Discharge: HOME | DRG: 324 | End: 2024-12-02
Attending: EMERGENCY MEDICINE | Admitting: INTERNAL MEDICINE
Payer: COMMERCIAL

## 2024-11-30 ENCOUNTER — APPOINTMENT (OUTPATIENT)
Dept: CT IMAGING | Age: 64
DRG: 324 | End: 2024-11-30
Payer: COMMERCIAL

## 2024-11-30 DIAGNOSIS — I21.4 NSTEMI (NON-ST ELEVATED MYOCARDIAL INFARCTION) (HCC): ICD-10-CM

## 2024-11-30 DIAGNOSIS — G89.4 CHRONIC PAIN SYNDROME: ICD-10-CM

## 2024-11-30 DIAGNOSIS — G62.1 ALCOHOLIC POLYNEUROPATHY (HCC): Chronic | ICD-10-CM

## 2024-11-30 DIAGNOSIS — R06.02 SHORTNESS OF BREATH: ICD-10-CM

## 2024-11-30 DIAGNOSIS — G62.9 NEUROPATHY: ICD-10-CM

## 2024-11-30 DIAGNOSIS — J44.1 COPD EXACERBATION (HCC): Primary | ICD-10-CM

## 2024-11-30 DIAGNOSIS — R07.9 CHEST PAIN: ICD-10-CM

## 2024-11-30 LAB
ANION GAP SERPL CALCULATED.3IONS-SCNC: 9 MEQ/L (ref 9–15)
BASOPHILS # BLD: 0.1 K/UL (ref 0–0.2)
BASOPHILS NFR BLD: 0.7 %
BUN SERPL-MCNC: 23 MG/DL (ref 8–23)
CALCIUM SERPL-MCNC: 9.7 MG/DL (ref 8.5–9.9)
CHLORIDE SERPL-SCNC: 105 MEQ/L (ref 95–107)
CO2 SERPL-SCNC: 29 MEQ/L (ref 20–31)
CREAT SERPL-MCNC: 1.35 MG/DL (ref 0.7–1.2)
EOSINOPHIL # BLD: 0.3 K/UL (ref 0–0.7)
EOSINOPHIL NFR BLD: 2.9 %
ERYTHROCYTE [DISTWIDTH] IN BLOOD BY AUTOMATED COUNT: 14.5 % (ref 11.5–14.5)
GLUCOSE SERPL-MCNC: 89 MG/DL (ref 70–99)
HCT VFR BLD AUTO: 41.6 % (ref 42–52)
HGB BLD-MCNC: 14.1 G/DL (ref 14–18)
LYMPHOCYTES # BLD: 2.3 K/UL (ref 1–4.8)
LYMPHOCYTES NFR BLD: 20.4 %
MCH RBC QN AUTO: 30.6 PG (ref 27–31.3)
MCHC RBC AUTO-ENTMCNC: 33.9 % (ref 33–37)
MCV RBC AUTO: 90.2 FL (ref 79–92.2)
MONOCYTES # BLD: 1.1 K/UL (ref 0.2–0.8)
MONOCYTES NFR BLD: 10 %
NEUTROPHILS # BLD: 7.3 K/UL (ref 1.4–6.5)
NEUTS SEG NFR BLD: 65.5 %
PLATELET # BLD AUTO: 389 K/UL (ref 130–400)
POTASSIUM SERPL-SCNC: 4.5 MEQ/L (ref 3.4–4.9)
RBC # BLD AUTO: 4.61 M/UL (ref 4.7–6.1)
SODIUM SERPL-SCNC: 143 MEQ/L (ref 135–144)
TROPONIN, HIGH SENSITIVITY: 66 NG/L (ref 0–19)
TROPONIN, HIGH SENSITIVITY: 72 NG/L (ref 0–19)
WBC # BLD AUTO: 11.2 K/UL (ref 4.8–10.8)

## 2024-11-30 PROCEDURE — 94640 AIRWAY INHALATION TREATMENT: CPT

## 2024-11-30 PROCEDURE — 36415 COLL VENOUS BLD VENIPUNCTURE: CPT

## 2024-11-30 PROCEDURE — 2060000000 HC ICU INTERMEDIATE R&B

## 2024-11-30 PROCEDURE — 6370000000 HC RX 637 (ALT 250 FOR IP): Performed by: EMERGENCY MEDICINE

## 2024-11-30 PROCEDURE — 6370000000 HC RX 637 (ALT 250 FOR IP): Performed by: INTERNAL MEDICINE

## 2024-11-30 PROCEDURE — 99285 EMERGENCY DEPT VISIT HI MDM: CPT

## 2024-11-30 PROCEDURE — 71275 CT ANGIOGRAPHY CHEST: CPT

## 2024-11-30 PROCEDURE — 6360000002 HC RX W HCPCS: Performed by: INTERNAL MEDICINE

## 2024-11-30 PROCEDURE — 85025 COMPLETE CBC W/AUTO DIFF WBC: CPT

## 2024-11-30 PROCEDURE — 84484 ASSAY OF TROPONIN QUANT: CPT

## 2024-11-30 PROCEDURE — 2580000003 HC RX 258: Performed by: INTERNAL MEDICINE

## 2024-11-30 PROCEDURE — 80048 BASIC METABOLIC PNL TOTAL CA: CPT

## 2024-11-30 PROCEDURE — 6360000002 HC RX W HCPCS: Performed by: EMERGENCY MEDICINE

## 2024-11-30 PROCEDURE — 94761 N-INVAS EAR/PLS OXIMETRY MLT: CPT

## 2024-11-30 PROCEDURE — 96372 THER/PROPH/DIAG INJ SC/IM: CPT

## 2024-11-30 PROCEDURE — 93005 ELECTROCARDIOGRAM TRACING: CPT | Performed by: EMERGENCY MEDICINE

## 2024-11-30 PROCEDURE — 6360000004 HC RX CONTRAST MEDICATION: Performed by: EMERGENCY MEDICINE

## 2024-11-30 RX ORDER — ACETAMINOPHEN 325 MG/1
650 TABLET ORAL EVERY 6 HOURS PRN
Status: DISCONTINUED | OUTPATIENT
Start: 2024-11-30 | End: 2024-12-02 | Stop reason: HOSPADM

## 2024-11-30 RX ORDER — PREDNISONE 20 MG/1
40 TABLET ORAL ONCE
Status: COMPLETED | OUTPATIENT
Start: 2024-11-30 | End: 2024-11-30

## 2024-11-30 RX ORDER — ENOXAPARIN SODIUM 100 MG/ML
1 INJECTION SUBCUTANEOUS ONCE
Status: COMPLETED | OUTPATIENT
Start: 2024-11-30 | End: 2024-11-30

## 2024-11-30 RX ORDER — IPRATROPIUM BROMIDE AND ALBUTEROL SULFATE 2.5; .5 MG/3ML; MG/3ML
1 SOLUTION RESPIRATORY (INHALATION)
Status: DISCONTINUED | OUTPATIENT
Start: 2024-12-01 | End: 2024-12-02

## 2024-11-30 RX ORDER — IOPAMIDOL 755 MG/ML
75 INJECTION, SOLUTION INTRAVASCULAR
Status: COMPLETED | OUTPATIENT
Start: 2024-11-30 | End: 2024-11-30

## 2024-11-30 RX ORDER — CLOPIDOGREL BISULFATE 75 MG/1
75 TABLET ORAL DAILY
Status: DISCONTINUED | OUTPATIENT
Start: 2024-11-30 | End: 2024-12-02 | Stop reason: HOSPADM

## 2024-11-30 RX ORDER — ENOXAPARIN SODIUM 100 MG/ML
30 INJECTION SUBCUTANEOUS 2 TIMES DAILY
Status: DISCONTINUED | OUTPATIENT
Start: 2024-11-30 | End: 2024-11-30

## 2024-11-30 RX ORDER — POTASSIUM CHLORIDE 7.45 MG/ML
10 INJECTION INTRAVENOUS PRN
Status: DISCONTINUED | OUTPATIENT
Start: 2024-11-30 | End: 2024-12-02 | Stop reason: HOSPADM

## 2024-11-30 RX ORDER — IPRATROPIUM BROMIDE AND ALBUTEROL SULFATE 2.5; .5 MG/3ML; MG/3ML
1 SOLUTION RESPIRATORY (INHALATION)
Status: DISCONTINUED | OUTPATIENT
Start: 2024-11-30 | End: 2024-11-30

## 2024-11-30 RX ORDER — PREGABALIN 150 MG/1
300 CAPSULE ORAL 3 TIMES DAILY
Status: DISCONTINUED | OUTPATIENT
Start: 2024-11-30 | End: 2024-12-02 | Stop reason: HOSPADM

## 2024-11-30 RX ORDER — ALLOPURINOL 100 MG/1
200 TABLET ORAL DAILY
Status: DISCONTINUED | OUTPATIENT
Start: 2024-11-30 | End: 2024-12-02 | Stop reason: HOSPADM

## 2024-11-30 RX ORDER — SODIUM CHLORIDE 0.9 % (FLUSH) 0.9 %
5-40 SYRINGE (ML) INJECTION PRN
Status: DISCONTINUED | OUTPATIENT
Start: 2024-11-30 | End: 2024-12-02 | Stop reason: HOSPADM

## 2024-11-30 RX ORDER — ATORVASTATIN CALCIUM 40 MG/1
40 TABLET, FILM COATED ORAL DAILY
Status: DISCONTINUED | OUTPATIENT
Start: 2024-11-30 | End: 2024-12-02 | Stop reason: HOSPADM

## 2024-11-30 RX ORDER — POTASSIUM CHLORIDE 1500 MG/1
40 TABLET, EXTENDED RELEASE ORAL PRN
Status: DISCONTINUED | OUTPATIENT
Start: 2024-11-30 | End: 2024-12-02 | Stop reason: HOSPADM

## 2024-11-30 RX ORDER — MAGNESIUM SULFATE IN WATER 40 MG/ML
2000 INJECTION, SOLUTION INTRAVENOUS PRN
Status: DISCONTINUED | OUTPATIENT
Start: 2024-11-30 | End: 2024-12-02 | Stop reason: HOSPADM

## 2024-11-30 RX ORDER — ENOXAPARIN SODIUM 100 MG/ML
1 INJECTION SUBCUTANEOUS 2 TIMES DAILY
Status: DISCONTINUED | OUTPATIENT
Start: 2024-12-01 | End: 2024-12-02

## 2024-11-30 RX ORDER — IPRATROPIUM BROMIDE AND ALBUTEROL SULFATE 2.5; .5 MG/3ML; MG/3ML
1 SOLUTION RESPIRATORY (INHALATION) ONCE
Status: COMPLETED | OUTPATIENT
Start: 2024-11-30 | End: 2024-11-30

## 2024-11-30 RX ORDER — PREDNISONE 20 MG/1
40 TABLET ORAL DAILY
Status: DISCONTINUED | OUTPATIENT
Start: 2024-11-30 | End: 2024-12-02 | Stop reason: HOSPADM

## 2024-11-30 RX ORDER — SODIUM CHLORIDE 9 MG/ML
INJECTION, SOLUTION INTRAVENOUS PRN
Status: DISCONTINUED | OUTPATIENT
Start: 2024-11-30 | End: 2024-12-02 | Stop reason: HOSPADM

## 2024-11-30 RX ORDER — POLYETHYLENE GLYCOL 3350 17 G/17G
17 POWDER, FOR SOLUTION ORAL DAILY PRN
Status: DISCONTINUED | OUTPATIENT
Start: 2024-11-30 | End: 2024-12-02 | Stop reason: HOSPADM

## 2024-11-30 RX ORDER — AMLODIPINE BESYLATE 10 MG/1
10 TABLET ORAL DAILY
Status: DISCONTINUED | OUTPATIENT
Start: 2024-11-30 | End: 2024-12-02 | Stop reason: HOSPADM

## 2024-11-30 RX ORDER — ASPIRIN 81 MG/1
81 TABLET, CHEWABLE ORAL DAILY
Status: DISCONTINUED | OUTPATIENT
Start: 2024-11-30 | End: 2024-12-02 | Stop reason: HOSPADM

## 2024-11-30 RX ORDER — ONDANSETRON 2 MG/ML
4 INJECTION INTRAMUSCULAR; INTRAVENOUS EVERY 6 HOURS PRN
Status: DISCONTINUED | OUTPATIENT
Start: 2024-11-30 | End: 2024-12-02 | Stop reason: HOSPADM

## 2024-11-30 RX ORDER — BUDESONIDE 0.5 MG/2ML
0.5 INHALANT ORAL
Status: DISCONTINUED | OUTPATIENT
Start: 2024-11-30 | End: 2024-12-02 | Stop reason: HOSPADM

## 2024-11-30 RX ORDER — SODIUM CHLORIDE 0.9 % (FLUSH) 0.9 %
5-40 SYRINGE (ML) INJECTION EVERY 12 HOURS SCHEDULED
Status: DISCONTINUED | OUTPATIENT
Start: 2024-11-30 | End: 2024-12-02 | Stop reason: HOSPADM

## 2024-11-30 RX ORDER — METOPROLOL SUCCINATE 25 MG/1
25 TABLET, EXTENDED RELEASE ORAL DAILY
Status: DISCONTINUED | OUTPATIENT
Start: 2024-11-30 | End: 2024-12-02 | Stop reason: HOSPADM

## 2024-11-30 RX ORDER — ACETAMINOPHEN 650 MG/1
650 SUPPOSITORY RECTAL EVERY 6 HOURS PRN
Status: DISCONTINUED | OUTPATIENT
Start: 2024-11-30 | End: 2024-12-02 | Stop reason: HOSPADM

## 2024-11-30 RX ORDER — GUAIFENESIN 600 MG/1
600 TABLET, EXTENDED RELEASE ORAL 2 TIMES DAILY
Status: DISCONTINUED | OUTPATIENT
Start: 2024-11-30 | End: 2024-12-02 | Stop reason: HOSPADM

## 2024-11-30 RX ORDER — OMEGA-3-ACID ETHYL ESTERS 1 G/1
2 CAPSULE, LIQUID FILLED ORAL DAILY
Status: DISCONTINUED | OUTPATIENT
Start: 2024-11-30 | End: 2024-12-02 | Stop reason: HOSPADM

## 2024-11-30 RX ORDER — AZITHROMYCIN 500 MG/1
500 TABLET, FILM COATED ORAL DAILY
Status: DISCONTINUED | OUTPATIENT
Start: 2024-12-01 | End: 2024-12-02 | Stop reason: HOSPADM

## 2024-11-30 RX ORDER — AZITHROMYCIN 500 MG/1
500 TABLET, FILM COATED ORAL ONCE
Status: COMPLETED | OUTPATIENT
Start: 2024-11-30 | End: 2024-11-30

## 2024-11-30 RX ORDER — IPRATROPIUM BROMIDE AND ALBUTEROL SULFATE 2.5; .5 MG/3ML; MG/3ML
1 SOLUTION RESPIRATORY (INHALATION) EVERY 4 HOURS PRN
Status: DISCONTINUED | OUTPATIENT
Start: 2024-11-30 | End: 2024-12-02

## 2024-11-30 RX ORDER — ONDANSETRON 4 MG/1
4 TABLET, ORALLY DISINTEGRATING ORAL EVERY 8 HOURS PRN
Status: DISCONTINUED | OUTPATIENT
Start: 2024-11-30 | End: 2024-12-02 | Stop reason: HOSPADM

## 2024-11-30 RX ADMIN — IPRATROPIUM BROMIDE AND ALBUTEROL SULFATE 1 DOSE: 2.5; .5 SOLUTION RESPIRATORY (INHALATION) at 12:07

## 2024-11-30 RX ADMIN — GUAIFENESIN 600 MG: 600 TABLET ORAL at 20:01

## 2024-11-30 RX ADMIN — IOPAMIDOL 75 ML: 755 INJECTION, SOLUTION INTRAVENOUS at 14:53

## 2024-11-30 RX ADMIN — ENOXAPARIN SODIUM 100 MG: 100 INJECTION SUBCUTANEOUS at 14:19

## 2024-11-30 RX ADMIN — BUDESONIDE 500 MCG: 0.5 SUSPENSION RESPIRATORY (INHALATION) at 20:19

## 2024-11-30 RX ADMIN — CLOPIDOGREL BISULFATE 75 MG: 75 TABLET ORAL at 21:30

## 2024-11-30 RX ADMIN — AZITHROMYCIN 500 MG: 500 TABLET, FILM COATED ORAL at 12:47

## 2024-11-30 RX ADMIN — ALLOPURINOL 200 MG: 100 TABLET ORAL at 21:30

## 2024-11-30 RX ADMIN — IPRATROPIUM BROMIDE AND ALBUTEROL SULFATE 1 DOSE: 2.5; .5 SOLUTION RESPIRATORY (INHALATION) at 15:45

## 2024-11-30 RX ADMIN — PREDNISONE 40 MG: 20 TABLET ORAL at 12:47

## 2024-11-30 RX ADMIN — ASPIRIN 81 MG: 81 TABLET, CHEWABLE ORAL at 17:18

## 2024-11-30 RX ADMIN — PREGABALIN 300 MG: 150 CAPSULE ORAL at 21:29

## 2024-11-30 RX ADMIN — OMEGA-3-ACID ETHYL ESTERS CAPSULES 2 G: 1 CAPSULE, LIQUID FILLED ORAL at 21:29

## 2024-11-30 RX ADMIN — METOPROLOL SUCCINATE 25 MG: 25 TABLET, EXTENDED RELEASE ORAL at 21:29

## 2024-11-30 RX ADMIN — Medication 10 ML: at 20:01

## 2024-11-30 RX ADMIN — IPRATROPIUM BROMIDE AND ALBUTEROL SULFATE 1 DOSE: 2.5; .5 SOLUTION RESPIRATORY (INHALATION) at 20:19

## 2024-11-30 RX ADMIN — ATORVASTATIN CALCIUM 40 MG: 40 TABLET, FILM COATED ORAL at 21:29

## 2024-11-30 RX ADMIN — IPRATROPIUM BROMIDE AND ALBUTEROL SULFATE 1 DOSE: 2.5; .5 SOLUTION RESPIRATORY (INHALATION) at 13:09

## 2024-11-30 RX ADMIN — AMLODIPINE BESYLATE 10 MG: 10 TABLET ORAL at 21:30

## 2024-11-30 ASSESSMENT — PAIN SCALES - GENERAL
PAINLEVEL_OUTOF10: 0
PAINLEVEL_OUTOF10: 6
PAINLEVEL_OUTOF10: 0
PAINLEVEL_OUTOF10: 0

## 2024-11-30 ASSESSMENT — PAIN DESCRIPTION - LOCATION: LOCATION: ABDOMEN

## 2024-11-30 ASSESSMENT — PAIN DESCRIPTION - PAIN TYPE
TYPE: ACUTE PAIN
TYPE: ACUTE PAIN

## 2024-11-30 ASSESSMENT — PAIN - FUNCTIONAL ASSESSMENT: PAIN_FUNCTIONAL_ASSESSMENT: 0-10

## 2024-11-30 NOTE — ED TRIAGE NOTES
A & Ox4. Skin pink warm and dry. Pt complains of SOB at rest as well as with exertion. Unsure if legs are swollen. Denies chest pain. Complains of generalized abdominal pain

## 2024-11-30 NOTE — CARE COORDINATION
The patient's nurse Emilio SANTANA stated that the patient was concerned because he had 2 Dr appointments scheduled for the same day. The patient does have appointments to see Kiki BARRETT 1/28/25 @ 1130 and Dr Houston, that same day, at 1315. This information was printed out for him. The patient kept stating \"they knew I was here yesterday\" and that was why he couldn't change his appointments. This nurse did explain to him that his appointment times can be changed if these that are made are not acceptable to him. The patient did not state clearly his understanding. He has a very moist cough and his voice is hoarse.
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
## 2024-11-30 NOTE — FLOWSHEET NOTE
1709- Patient arrived from ER. Patient appears to be impulsive and anxious. Patient is ambulatory using his Rolator. Patient is anox4. No signs of acute distress. Admission completed.   1730- Spoke to brother Culry. Asked if he could tell me which medications patient is currently taking. Said he does not know and he is unable to bring the list today. Will attempt to call his pharmacy to provide list in order to reconcile meds.   1745- Spoke with [facility]. Patient med rec completed.     Electronically signed by Pauline Armenta RN on 11/30/2024 at 5:58 PM

## 2024-11-30 NOTE — RT PROTOCOL NOTE
RT Nebulizer Bronchodilator Protocol Note    There is a bronchodilator order in the chart from a provider indicating to follow the RT Bronchodilator Protocol and there is an “Initiate RT Bronchodilator Protocol” order as well (see protocol at bottom of note).    CXR Findings:  XR CHEST PORTABLE    Result Date: 11/29/2024  Chronic findings without appreciable acute process.       The findings from the last RT Protocol Assessment were as follows:  Smoking: Chronic pulmonary disease  Respiratory Pattern: Regular pattern and RR 12-20 bpm  Breath Sounds: Slightly diminished and/or crackles  Cough: Strong, productive  Indication for Bronchodilator Therapy: Decreased or absent breath sounds  Bronchodilator Assessment Score: 5    Aerosolized bronchodilator medication orders have been revised according to the RT Nebulizer Bronchodilator Protocol below.    Respiratory Therapist to perform RT Therapy Protocol Assessment initially then follow the protocol.  Repeat RT Therapy Protocol Assessment PRN for score 0-3 or on second treatment, BID, and PRN for scores above 3.    No Indications - adjust the frequency to every 6 hours PRN wheezing or bronchospasm, if no treatments needed after 48 hours then discontinue using Per Protocol order mode.     If indication present, adjust the RT bronchodilator orders based on the Bronchodilator Assessment Score as indicated below.  If a patient is on this medication at home then do not decrease Frequency below that used at home.    0-3 - enter or revise RT bronchodilator order(s) to equivalent RT Bronchodilator order with Frequency of every 4 hours PRN for wheezing or increased work of breathing using Per Protocol order mode.       4-6 - enter or revise RT Bronchodilator order(s) to two equivalent RT bronchodilator orders with one order with BID Frequency and one order with Frequency of every 4 hours PRN wheezing or increased work of breathing using Per Protocol order mode.         7-10 -

## 2024-11-30 NOTE — ED NOTES
Patient in bed watching television. Patient stated that the breathing treatment and received some relief. Patient asked for another breathing treatment. Advised patient that I would notified provider

## 2024-11-30 NOTE — CARE COORDINATION
Case Management Assessment  Initial Evaluation    Date/Time of Evaluation: 11/30/2024 4:50 PM  Assessment Completed by: Angelia Brown RN    If patient is discharged prior to next notation, then this note serves as note for discharge by case management.    Patient Name: Ranjith Rodriguez                   YOB: 1960  Diagnosis: COPD exacerbation (HCC) [J44.1]  NSTEMI (non-ST elevated myocardial infarction) (HCC) [I21.4]                   Date / Time: 11/30/2024 10:26 AM    Patient Admission Status: Inpatient   Readmission Risk (Low < 19, Mod (19-27), High > 27): Readmission Risk Score: 14.9    Current PCP: Kiki Rashid PA-C  PCP verified by CM? (P) Yes    Chart Reviewed: Yes      History Provided by: (P) Patient, Child/Family  Patient Orientation: (P) Alert and Oriented, Person, Place, Situation, Self    Patient Cognition: (P) Alert    Hospitalization in the last 30 days (Readmission):  No    If yes, Readmission Assessment in CM Navigator will be completed.    Advance Directives:      Code Status: Full Code   Patient's Primary Decision Maker is: (P) Legal Next of Kin (brother Curly, sister Thu)    Primary Decision Maker: Thu Alvarado - Brother/Sister - [phone]    Secondary Decision Maker: Curly Rodriguez - Brother/Sister - [phone]    Discharge Planning:    Patient lives with: (P) Family Members Type of Home: (P) Apartment  Primary Care Giver: (P) Self  Patient Support Systems include: (P) Family Members   Current Financial resources: (P) Medicaid, Medicare  Current community resources: (P) None  Current services prior to admission: (P) Durable Medical Equipment            Current DME: (P) Cane, Shower Chair, Other (Comment) (rollator.)            Type of Home Care services:  (P) None    ADLS  Prior functional level: (P) Independent in ADLs/IADLs, Cooking, Housework, Shopping  Current functional level: (P) Independent in ADLs/IADLs, Other (see comment), Shopping, Housework, Cooking (his

## 2024-11-30 NOTE — ED PROVIDER NOTES
TABLET    Take 1 tablet by mouth daily    CLOPIDOGREL (PLAVIX) 75 MG TABLET    1 po QD    ERGOCALCIFEROL (VITAMIN D) 80333 UNITS CAPS    Take 50,000 Units by mouth once a week    FENOFIBRATE (TRIGLIDE) 160 MG TABLET    Take 1 tablet by mouth daily    HOSPITAL BED MISC    by Does not apply route For orthopnea, dyspnea at night.  Elevated HOB to at least 45 degrees.    METOPROLOL SUCCINATE (TOPROL XL) 25 MG EXTENDED RELEASE TABLET    take 1 tablet by mouth once daily    MISC. DEVICES (BATH/SHOWER SEAT) MISC    For use while bathing/showering due to fall risk and leg weakness.    MISC. DEVICES (CANE) MISC    Wide base cane please, fall risk, chronic knee and back pain.    MISC. DEVICES (RAISED TOILET SEAT) MISC    Use of general weakness, fall risk, strength loss of BLE.    OMEGA-3 ACID ETHYL ESTERS (LOVAZA) 1 G CAPSULE    take 2 capsules twice a day    PREGABALIN (LYRICA) 100 MG CAPSULE    Take 3 capsules by mouth in the morning, at noon, and at bedtime for 90 days. Max Daily Amount: 900 mg    SYMBICORT 80-4.5 MCG/ACT AERO    INHALE 2 PUFFS TWICE A DAY    THIAMINE 100 MG TABLET    Take 1 tablet by mouth daily    VASCEPA 1 G CAPS CAPSULE    take 2 capsules by mouth twice a day    VITAMIN B-1 (THIAMINE) 100 MG TABLET    Take 1 tablet by mouth daily       ALLERGIES     Patient has no known allergies.    FAMILY HISTORY       Family History   Problem Relation Age of Onset    Cancer Mother         brain, dec 67    Alcohol Abuse Father     Other Sister         unknown medical history    Other Brother         unknown medical history    High Blood Pressure Brother     Alcohol Abuse Daughter           SOCIAL HISTORY       Social History     Socioeconomic History    Marital status: Single    Number of children: 1   Occupational History    Occupation: [employer] for learning disability   Tobacco Use    Smoking status: Every Day     Current packs/day: 0.50     Average packs/day: 0.5 packs/day for 56.9 years (28.5 ttl pk-yrs)     Types:

## 2024-11-30 NOTE — ACP (ADVANCE CARE PLANNING)
Advance Care Planning     Advance Care Planning Activator (Inpatient)  Conversation Note      Date of ACP Conversation: 11/30/2024     Conversation Conducted with: Patient with Decision Making Capacity    ACP Activator: Angelia Brown RN        Health Care Decision Maker:     Current Designated Health Care Decision Maker:     Primary Decision Maker: Thu Alvarado - Brother/Sister - [phone]    Secondary Decision Maker: Curly Rodriguez - Brother/Sister - [phone]

## 2024-11-30 NOTE — ED NOTES
Patient in bed watching television. Patient wanted to know when he was getting out of here. Advised patient that he can not eat at time until after CT. Patient wanted to know how long that will be. Patient requested that this nurse changes his channel

## 2024-12-01 PROBLEM — R06.02 SHORTNESS OF BREATH: Status: ACTIVE | Noted: 2024-12-01

## 2024-12-01 PROBLEM — R79.89 ELEVATED TROPONIN: Status: ACTIVE | Noted: 2024-12-01

## 2024-12-01 PROBLEM — J44.1 COPD EXACERBATION (HCC): Status: ACTIVE | Noted: 2024-12-01

## 2024-12-01 PROBLEM — R07.9 CHEST PAIN: Status: ACTIVE | Noted: 2024-11-30

## 2024-12-01 LAB
ANION GAP SERPL CALCULATED.3IONS-SCNC: 9 MEQ/L (ref 9–15)
BASOPHILS # BLD: 0.1 K/UL (ref 0–0.2)
BASOPHILS NFR BLD: 0.5 %
BUN SERPL-MCNC: 25 MG/DL (ref 8–23)
CALCIUM SERPL-MCNC: 9.3 MG/DL (ref 8.5–9.9)
CHLORIDE SERPL-SCNC: 105 MEQ/L (ref 95–107)
CO2 SERPL-SCNC: 26 MEQ/L (ref 20–31)
CREAT SERPL-MCNC: 1.17 MG/DL (ref 0.7–1.2)
EOSINOPHIL # BLD: 0.1 K/UL (ref 0–0.7)
EOSINOPHIL NFR BLD: 0.5 %
ERYTHROCYTE [DISTWIDTH] IN BLOOD BY AUTOMATED COUNT: 14.3 % (ref 11.5–14.5)
GLUCOSE BLD-MCNC: 202 MG/DL (ref 70–99)
GLUCOSE SERPL-MCNC: 101 MG/DL (ref 70–99)
HCT VFR BLD AUTO: 36.9 % (ref 42–52)
HGB BLD-MCNC: 12.4 G/DL (ref 14–18)
LYMPHOCYTES # BLD: 2.3 K/UL (ref 1–4.8)
LYMPHOCYTES NFR BLD: 17.1 %
MCH RBC QN AUTO: 30 PG (ref 27–31.3)
MCHC RBC AUTO-ENTMCNC: 33.6 % (ref 33–37)
MCV RBC AUTO: 89.3 FL (ref 79–92.2)
MONOCYTES # BLD: 1.2 K/UL (ref 0.2–0.8)
MONOCYTES NFR BLD: 9.4 %
NEUTROPHILS # BLD: 9.5 K/UL (ref 1.4–6.5)
NEUTS SEG NFR BLD: 72 %
PERFORMED ON: ABNORMAL
PLATELET # BLD AUTO: 354 K/UL (ref 130–400)
POTASSIUM SERPL-SCNC: 3.8 MEQ/L (ref 3.4–4.9)
RBC # BLD AUTO: 4.13 M/UL (ref 4.7–6.1)
SODIUM SERPL-SCNC: 140 MEQ/L (ref 135–144)
WBC # BLD AUTO: 13.1 K/UL (ref 4.8–10.8)

## 2024-12-01 PROCEDURE — APPSS60 APP SPLIT SHARED TIME 46-60 MINUTES

## 2024-12-01 PROCEDURE — 2060000000 HC ICU INTERMEDIATE R&B

## 2024-12-01 PROCEDURE — 6360000002 HC RX W HCPCS: Performed by: INTERNAL MEDICINE

## 2024-12-01 PROCEDURE — 6370000000 HC RX 637 (ALT 250 FOR IP): Performed by: INTERNAL MEDICINE

## 2024-12-01 PROCEDURE — 85025 COMPLETE CBC W/AUTO DIFF WBC: CPT

## 2024-12-01 PROCEDURE — 97166 OT EVAL MOD COMPLEX 45 MIN: CPT

## 2024-12-01 PROCEDURE — 94761 N-INVAS EAR/PLS OXIMETRY MLT: CPT

## 2024-12-01 PROCEDURE — 36415 COLL VENOUS BLD VENIPUNCTURE: CPT

## 2024-12-01 PROCEDURE — 94640 AIRWAY INHALATION TREATMENT: CPT

## 2024-12-01 PROCEDURE — 99223 1ST HOSP IP/OBS HIGH 75: CPT | Performed by: INTERNAL MEDICINE

## 2024-12-01 PROCEDURE — 2580000003 HC RX 258: Performed by: INTERNAL MEDICINE

## 2024-12-01 PROCEDURE — 97162 PT EVAL MOD COMPLEX 30 MIN: CPT

## 2024-12-01 PROCEDURE — 80048 BASIC METABOLIC PNL TOTAL CA: CPT

## 2024-12-01 RX ADMIN — PREDNISONE 40 MG: 20 TABLET ORAL at 08:21

## 2024-12-01 RX ADMIN — ALLOPURINOL 200 MG: 100 TABLET ORAL at 08:20

## 2024-12-01 RX ADMIN — PREGABALIN 300 MG: 150 CAPSULE ORAL at 15:39

## 2024-12-01 RX ADMIN — ENOXAPARIN SODIUM 100 MG: 100 INJECTION SUBCUTANEOUS at 08:21

## 2024-12-01 RX ADMIN — Medication 10 ML: at 21:15

## 2024-12-01 RX ADMIN — IPRATROPIUM BROMIDE AND ALBUTEROL SULFATE 1 DOSE: 2.5; .5 SOLUTION RESPIRATORY (INHALATION) at 18:58

## 2024-12-01 RX ADMIN — ASPIRIN 81 MG: 81 TABLET, CHEWABLE ORAL at 08:21

## 2024-12-01 RX ADMIN — AZITHROMYCIN 500 MG: 500 TABLET, FILM COATED ORAL at 08:56

## 2024-12-01 RX ADMIN — METOPROLOL SUCCINATE 25 MG: 25 TABLET, EXTENDED RELEASE ORAL at 08:21

## 2024-12-01 RX ADMIN — Medication 10 ML: at 08:21

## 2024-12-01 RX ADMIN — CLOPIDOGREL BISULFATE 75 MG: 75 TABLET ORAL at 08:21

## 2024-12-01 RX ADMIN — ENOXAPARIN SODIUM 100 MG: 100 INJECTION SUBCUTANEOUS at 21:15

## 2024-12-01 RX ADMIN — ATORVASTATIN CALCIUM 40 MG: 40 TABLET, FILM COATED ORAL at 21:13

## 2024-12-01 RX ADMIN — GUAIFENESIN 600 MG: 600 TABLET ORAL at 21:13

## 2024-12-01 RX ADMIN — GUAIFENESIN 600 MG: 600 TABLET ORAL at 08:20

## 2024-12-01 RX ADMIN — PREGABALIN 300 MG: 150 CAPSULE ORAL at 08:20

## 2024-12-01 RX ADMIN — PREGABALIN 300 MG: 150 CAPSULE ORAL at 21:14

## 2024-12-01 RX ADMIN — AMLODIPINE BESYLATE 10 MG: 10 TABLET ORAL at 08:21

## 2024-12-01 RX ADMIN — OMEGA-3-ACID ETHYL ESTERS CAPSULES 2 G: 1 CAPSULE, LIQUID FILLED ORAL at 08:20

## 2024-12-01 RX ADMIN — BUDESONIDE 500 MCG: 0.5 SUSPENSION RESPIRATORY (INHALATION) at 18:58

## 2024-12-01 RX ADMIN — IPRATROPIUM BROMIDE AND ALBUTEROL SULFATE 1 DOSE: 2.5; .5 SOLUTION RESPIRATORY (INHALATION) at 07:54

## 2024-12-01 RX ADMIN — BUDESONIDE 500 MCG: 0.5 SUSPENSION RESPIRATORY (INHALATION) at 07:54

## 2024-12-01 ASSESSMENT — PAIN SCALES - GENERAL
PAINLEVEL_OUTOF10: 0

## 2024-12-01 ASSESSMENT — PAIN DESCRIPTION - PAIN TYPE: TYPE: ACUTE PAIN

## 2024-12-01 NOTE — H&P (VIEW-ONLY)
DATE OF CONSULTATION  12/1/2024    CONSULTANT  Jim Sandhu DO    REQUESTING PHYSICIAN  Amelia Gibson MD     PRIMARY CARDIOLOGIST  Dr. Houston     REASON FOR CONSULTATION  Chief Complaint   Patient presents with    Shortness of Breath     Seen here last night for the same. They wanted to do a 2nd heart enzyme but he had to leave. Came back today to get it done.        Hospital Day: 1      Patient is a 64 y.o. male with past medical history of hypertension, PAD status post bilateral lower extremity stents in 2020, COPD, tobacco abuse who presents with a chief complaint of shortness of breath. Patient is followed on a regular basis by Kiki Rowland PA-C.  Patient reports shortness of breath worsening over the past week.  He reports shortness of breath mostly with exertion.  He denies anginal symptoms.  Workup in the emergency room includes CTA chest which was negative for PE, and showed clear lungs with no acute thoracic pathology.  Chest x-ray showing no acute cardiopulmonary process.  proBNP 548.  Troponin 67, 72, 66.  EKG without signs of ischemia.  Most recent cardiac workup includes echocardiogram on 5/2024 showing EF of 65 to 70% with mild MR. Per the last office visit with Dr. Houston, patient has been refusing stress test.  Patient has never had a prior MI or CHF diagnosis.  No cardiac catheterization.       Past Medical History:   Diagnosis Date    Abnormal ankle brachial index (RAO)     Acute idiopathic gout of left wrist 12/16/2020    Acute kidney injury (HCC) 07/30/2024    Alcoholic (HCC)-remote Hx 08/28/2015    In remission goes to AA daily--agrees not to overtake pain meds     Alcoholic polyneuropathy (HCC)     Asthma     CAD (coronary artery disease)     [facility]    Chronic back pain greater than 3 months duration     Claudication in peripheral vascular disease (HCC)     CN III palsy, right eye 2017    Dr Perales    COPD (chronic obstructive pulmonary disease) ([facility]) 2014    DDD (degenerative disc disease),

## 2024-12-01 NOTE — CONSULTS
CHEST W WO CONTRAST    Result Date: 11/30/2024  EXAMINATION: CTA OF THE CHEST WITH AND WITHOUT CONTRAST 11/30/2024 2:53 pm TECHNIQUE: CTA of the chest was performed before and after the administration of intravenous contrast.  Multiplanar reformatted images are provided for review.  MIP images are provided for review. Automated exposure control, iterative reconstruction, and/or weight based adjustment of the mA/kV was utilized to reduce the radiation dose to as low as reasonably achievable. COMPARISON: None. HISTORY: ORDERING SYSTEM PROVIDED HISTORY: Rule out PE TECHNOLOGIST PROVIDED HISTORY: Reason for exam:->Rule out PE Decision Support Exception - unselect if not a suspected or confirmed emergency medical condition->Emergency Medical Condition (MA) What reading provider will be dictating this exam?->CRC FINDINGS: Aorta: No evidence of thoracic aortic aneurysm or dissection.  No acute abnormality of the aorta. Pulmonary arteries: The pulmonary arteries are well pacified with IV contrast.  There is no pulmonary embolus. Mediastinum: No evidence of mediastinal lymphadenopathy.  The heart and pericardium demonstrate no acute abnormality. Lungs/Pleura: The lungs are without acute process.  No focal consolidation or pulmonary edema.  No evidence of pleural effusion or pneumothorax.  Calcified pleural plaques are noted within the posterior aspect of the left lung. Upper Abdomen: Limited images of the upper abdomen are unremarkable. Soft Tissues/Bones: No acute bone or soft tissue abnormality.     1. There is no pulmonary embolus. 2. The lungs are clear. 3. There is no acute thoracic pathology. 4.    XR CHEST PORTABLE    Result Date: 11/29/2024  EXAMINATION: ONE XRAY VIEW OF THE CHEST 11/29/2024 5:11 pm COMPARISON: 10/18/2024 HISTORY: ORDERING SYSTEM PROVIDED HISTORY: dyspnea TECHNOLOGIST PROVIDED HISTORY: Reason for exam:->dyspnea What reading provider will be dictating this exam?->CRC FINDINGS: ASVD.  Hyperinflation

## 2024-12-02 ENCOUNTER — TELEPHONE (OUTPATIENT)
Dept: CARDIOLOGY CLINIC | Age: 64
End: 2024-12-02

## 2024-12-02 VITALS
OXYGEN SATURATION: 95 % | RESPIRATION RATE: 18 BRPM | DIASTOLIC BLOOD PRESSURE: 50 MMHG | TEMPERATURE: 98.6 F | WEIGHT: 225 LBS | SYSTOLIC BLOOD PRESSURE: 152 MMHG | HEART RATE: 70 BPM | BODY MASS INDEX: 30.48 KG/M2 | HEIGHT: 72 IN

## 2024-12-02 LAB
ACANTHOCYTES BLD QL SMEAR: ABNORMAL
BASOPHILS # BLD: 0.1 K/UL (ref 0–0.2)
BASOPHILS NFR BLD: 1 %
BURR CELLS: ABNORMAL
EKG ATRIAL RATE: 79 BPM
EKG ATRIAL RATE: 83 BPM
EKG P AXIS: -3 DEGREES
EKG P AXIS: 61 DEGREES
EKG P-R INTERVAL: 134 MS
EKG P-R INTERVAL: 168 MS
EKG Q-T INTERVAL: 396 MS
EKG Q-T INTERVAL: 400 MS
EKG QRS DURATION: 104 MS
EKG QRS DURATION: 96 MS
EKG QTC CALCULATION (BAZETT): 458 MS
EKG QTC CALCULATION (BAZETT): 465 MS
EKG R AXIS: 76 DEGREES
EKG R AXIS: 78 DEGREES
EKG T AXIS: 48 DEGREES
EKG T AXIS: 53 DEGREES
EKG VENTRICULAR RATE: 79 BPM
EKG VENTRICULAR RATE: 83 BPM
EOSINOPHIL # BLD: 0 K/UL (ref 0–0.7)
EOSINOPHIL NFR BLD: 0.8 %
ERYTHROCYTE [DISTWIDTH] IN BLOOD BY AUTOMATED COUNT: 14.6 % (ref 11.5–14.5)
HCT VFR BLD AUTO: 36.4 % (ref 42–52)
HGB BLD-MCNC: 12 G/DL (ref 14–18)
LYMPHOCYTES # BLD: 3.5 K/UL (ref 1–4.8)
LYMPHOCYTES NFR BLD: 24 %
MCH RBC QN AUTO: 29.4 PG (ref 27–31.3)
MCHC RBC AUTO-ENTMCNC: 33 % (ref 33–37)
MCV RBC AUTO: 89.2 FL (ref 79–92.2)
MONOCYTES # BLD: 0.9 K/UL (ref 0.2–0.8)
MONOCYTES NFR BLD: 5.7 %
NEUTROPHILS # BLD: 10.2 K/UL (ref 1.4–6.5)
NEUTS SEG NFR BLD: 70 %
PATH INTERP BLD-IMP: NORMAL
PATH INTERP BLD-IMP: YES
PLATELET # BLD AUTO: 373 K/UL (ref 130–400)
PLATELET BLD QL SMEAR: ADEQUATE
POIKILOCYTOSIS BLD QL SMEAR: ABNORMAL
RBC # BLD AUTO: 4.08 M/UL (ref 4.7–6.1)
REASON FOR REJECTION: NORMAL
REJECTED TEST: NORMAL
SLIDE REVIEW: ABNORMAL
WBC # BLD AUTO: 14.6 K/UL (ref 4.8–10.8)

## 2024-12-02 PROCEDURE — 92972 PERQ TRLUML CORONRY LITHOTRP: CPT | Performed by: INTERNAL MEDICINE

## 2024-12-02 PROCEDURE — C1874 STENT, COATED/COV W/DEL SYS: HCPCS | Performed by: INTERNAL MEDICINE

## 2024-12-02 PROCEDURE — C1769 GUIDE WIRE: HCPCS | Performed by: INTERNAL MEDICINE

## 2024-12-02 PROCEDURE — 6370000000 HC RX 637 (ALT 250 FOR IP): Performed by: INTERNAL MEDICINE

## 2024-12-02 PROCEDURE — 2580000003 HC RX 258: Performed by: INTERNAL MEDICINE

## 2024-12-02 PROCEDURE — 94640 AIRWAY INHALATION TREATMENT: CPT

## 2024-12-02 PROCEDURE — 027034Z DILATION OF CORONARY ARTERY, ONE ARTERY WITH DRUG-ELUTING INTRALUMINAL DEVICE, PERCUTANEOUS APPROACH: ICD-10-PCS | Performed by: INTERNAL MEDICINE

## 2024-12-02 PROCEDURE — B2111ZZ FLUOROSCOPY OF MULTIPLE CORONARY ARTERIES USING LOW OSMOLAR CONTRAST: ICD-10-PCS | Performed by: INTERNAL MEDICINE

## 2024-12-02 PROCEDURE — 2709999900 HC NON-CHARGEABLE SUPPLY: Performed by: INTERNAL MEDICINE

## 2024-12-02 PROCEDURE — 4A023N7 MEASUREMENT OF CARDIAC SAMPLING AND PRESSURE, LEFT HEART, PERCUTANEOUS APPROACH: ICD-10-PCS | Performed by: INTERNAL MEDICINE

## 2024-12-02 PROCEDURE — 6360000002 HC RX W HCPCS: Performed by: INTERNAL MEDICINE

## 2024-12-02 PROCEDURE — 99152 MOD SED SAME PHYS/QHP 5/>YRS: CPT | Performed by: INTERNAL MEDICINE

## 2024-12-02 PROCEDURE — 93458 L HRT ARTERY/VENTRICLE ANGIO: CPT | Performed by: INTERNAL MEDICINE

## 2024-12-02 PROCEDURE — 7100000010 HC PHASE II RECOVERY - FIRST 15 MIN: Performed by: INTERNAL MEDICINE

## 2024-12-02 PROCEDURE — 94761 N-INVAS EAR/PLS OXIMETRY MLT: CPT

## 2024-12-02 PROCEDURE — C1725 CATH, TRANSLUMIN NON-LASER: HCPCS | Performed by: INTERNAL MEDICINE

## 2024-12-02 PROCEDURE — 85347 COAGULATION TIME ACTIVATED: CPT

## 2024-12-02 PROCEDURE — 92928 PRQ TCAT PLMT NTRAC ST 1 LES: CPT | Performed by: INTERNAL MEDICINE

## 2024-12-02 PROCEDURE — 93005 ELECTROCARDIOGRAM TRACING: CPT | Performed by: INTERNAL MEDICINE

## 2024-12-02 PROCEDURE — C1761 HC CATH TRANSLUM INTRAVASCULAR LITHOTRIPSY CORONARY: HCPCS | Performed by: INTERNAL MEDICINE

## 2024-12-02 PROCEDURE — 02F03ZZ FRAGMENTATION IN CORONARY ARTERY, ONE ARTERY, PERCUTANEOUS APPROACH: ICD-10-PCS | Performed by: INTERNAL MEDICINE

## 2024-12-02 PROCEDURE — 85025 COMPLETE CBC W/AUTO DIFF WBC: CPT

## 2024-12-02 PROCEDURE — C1894 INTRO/SHEATH, NON-LASER: HCPCS | Performed by: INTERNAL MEDICINE

## 2024-12-02 PROCEDURE — 99153 MOD SED SAME PHYS/QHP EA: CPT | Performed by: INTERNAL MEDICINE

## 2024-12-02 PROCEDURE — 7100000011 HC PHASE II RECOVERY - ADDTL 15 MIN: Performed by: INTERNAL MEDICINE

## 2024-12-02 PROCEDURE — 6360000004 HC RX CONTRAST MEDICATION: Performed by: INTERNAL MEDICINE

## 2024-12-02 DEVICE — STENT CORONARY ONYX FRONTIER RX 3.5X30 MM ZOTAROLIMUS ELUT: Type: IMPLANTABLE DEVICE | Status: FUNCTIONAL

## 2024-12-02 RX ORDER — CLOPIDOGREL BISULFATE 75 MG/1
TABLET ORAL PRN
Status: DISCONTINUED | OUTPATIENT
Start: 2024-12-02 | End: 2024-12-02 | Stop reason: HOSPADM

## 2024-12-02 RX ORDER — AZITHROMYCIN 500 MG/1
500 TABLET, FILM COATED ORAL DAILY
Qty: 2 TABLET | Refills: 0 | Status: SHIPPED | OUTPATIENT
Start: 2024-12-03 | End: 2024-12-05

## 2024-12-02 RX ORDER — FENTANYL CITRATE 50 UG/ML
INJECTION, SOLUTION INTRAMUSCULAR; INTRAVENOUS PRN
Status: DISCONTINUED | OUTPATIENT
Start: 2024-12-02 | End: 2024-12-02 | Stop reason: HOSPADM

## 2024-12-02 RX ORDER — IOPAMIDOL 612 MG/ML
INJECTION, SOLUTION INTRAVASCULAR PRN
Status: DISCONTINUED | OUTPATIENT
Start: 2024-12-02 | End: 2024-12-02 | Stop reason: HOSPADM

## 2024-12-02 RX ORDER — HEPARIN SODIUM 200 [USP'U]/100ML
INJECTION, SOLUTION INTRAVENOUS CONTINUOUS PRN
Status: COMPLETED | OUTPATIENT
Start: 2024-12-02 | End: 2024-12-02

## 2024-12-02 RX ORDER — SODIUM CHLORIDE 0.9 % (FLUSH) 0.9 %
5-40 SYRINGE (ML) INJECTION PRN
Status: DISCONTINUED | OUTPATIENT
Start: 2024-12-02 | End: 2024-12-02 | Stop reason: HOSPADM

## 2024-12-02 RX ORDER — HEPARIN SODIUM 1000 [USP'U]/ML
INJECTION, SOLUTION INTRAVENOUS; SUBCUTANEOUS PRN
Status: DISCONTINUED | OUTPATIENT
Start: 2024-12-02 | End: 2024-12-02 | Stop reason: HOSPADM

## 2024-12-02 RX ORDER — SODIUM CHLORIDE 9 MG/ML
INJECTION, SOLUTION INTRAVENOUS PRN
Status: DISCONTINUED | OUTPATIENT
Start: 2024-12-02 | End: 2024-12-02 | Stop reason: HOSPADM

## 2024-12-02 RX ORDER — SODIUM CHLORIDE 0.9 % (FLUSH) 0.9 %
5-40 SYRINGE (ML) INJECTION EVERY 12 HOURS SCHEDULED
Status: DISCONTINUED | OUTPATIENT
Start: 2024-12-02 | End: 2024-12-02 | Stop reason: HOSPADM

## 2024-12-02 RX ORDER — NITROGLYCERIN 20 MG/100ML
INJECTION INTRAVENOUS CONTINUOUS PRN
Status: COMPLETED | OUTPATIENT
Start: 2024-12-02 | End: 2024-12-02

## 2024-12-02 RX ORDER — SODIUM CHLORIDE 9 MG/ML
INJECTION, SOLUTION INTRAVENOUS CONTINUOUS
Status: ACTIVE | OUTPATIENT
Start: 2024-12-02 | End: 2024-12-02

## 2024-12-02 RX ORDER — PREGABALIN 100 MG/1
100 CAPSULE ORAL 3 TIMES DAILY
Qty: 90 CAPSULE | Refills: 2 | Status: SHIPPED | OUTPATIENT
Start: 2024-12-02 | End: 2025-03-02

## 2024-12-02 RX ORDER — MIDAZOLAM HYDROCHLORIDE 1 MG/ML
INJECTION, SOLUTION INTRAMUSCULAR; INTRAVENOUS PRN
Status: DISCONTINUED | OUTPATIENT
Start: 2024-12-02 | End: 2024-12-02 | Stop reason: HOSPADM

## 2024-12-02 RX ORDER — ENOXAPARIN SODIUM 100 MG/ML
40 INJECTION SUBCUTANEOUS DAILY
Status: DISCONTINUED | OUTPATIENT
Start: 2024-12-03 | End: 2024-12-02 | Stop reason: HOSPADM

## 2024-12-02 RX ORDER — IPRATROPIUM BROMIDE AND ALBUTEROL SULFATE 2.5; .5 MG/3ML; MG/3ML
1 SOLUTION RESPIRATORY (INHALATION) EVERY 4 HOURS PRN
Status: DISCONTINUED | OUTPATIENT
Start: 2024-12-02 | End: 2024-12-02 | Stop reason: HOSPADM

## 2024-12-02 RX ORDER — PREDNISONE 20 MG/1
40 TABLET ORAL DAILY
Qty: 2 TABLET | Refills: 0 | Status: SHIPPED | OUTPATIENT
Start: 2024-12-03 | End: 2024-12-05

## 2024-12-02 RX ADMIN — SODIUM CHLORIDE: 9 INJECTION, SOLUTION INTRAVENOUS at 13:46

## 2024-12-02 RX ADMIN — SODIUM CHLORIDE: 9 INJECTION, SOLUTION INTRAVENOUS at 11:20

## 2024-12-02 RX ADMIN — PREGABALIN 300 MG: 150 CAPSULE ORAL at 16:51

## 2024-12-02 RX ADMIN — CLOPIDOGREL BISULFATE 75 MG: 75 TABLET ORAL at 08:23

## 2024-12-02 RX ADMIN — IPRATROPIUM BROMIDE AND ALBUTEROL SULFATE 1 DOSE: 2.5; .5 SOLUTION RESPIRATORY (INHALATION) at 07:23

## 2024-12-02 RX ADMIN — PREGABALIN 300 MG: 150 CAPSULE ORAL at 08:23

## 2024-12-02 RX ADMIN — AZITHROMYCIN 500 MG: 500 TABLET, FILM COATED ORAL at 08:23

## 2024-12-02 RX ADMIN — Medication 10 ML: at 08:29

## 2024-12-02 RX ADMIN — AMLODIPINE BESYLATE 10 MG: 10 TABLET ORAL at 08:23

## 2024-12-02 RX ADMIN — OMEGA-3-ACID ETHYL ESTERS CAPSULES 2 G: 1 CAPSULE, LIQUID FILLED ORAL at 08:23

## 2024-12-02 RX ADMIN — PREDNISONE 40 MG: 20 TABLET ORAL at 08:23

## 2024-12-02 RX ADMIN — GUAIFENESIN 600 MG: 600 TABLET ORAL at 08:23

## 2024-12-02 RX ADMIN — METOPROLOL SUCCINATE 25 MG: 25 TABLET, EXTENDED RELEASE ORAL at 08:23

## 2024-12-02 RX ADMIN — BUDESONIDE 500 MCG: 0.5 SUSPENSION RESPIRATORY (INHALATION) at 07:23

## 2024-12-02 RX ADMIN — ALLOPURINOL 200 MG: 100 TABLET ORAL at 08:23

## 2024-12-02 RX ADMIN — ASPIRIN 81 MG: 81 TABLET, CHEWABLE ORAL at 08:23

## 2024-12-02 ASSESSMENT — PAIN SCALES - GENERAL
PAINLEVEL_OUTOF10: 0

## 2024-12-02 NOTE — FLOWSHEET NOTE
Nurse reviewed discharge paperwork with patient. Patient states they have no further questions regarding discharge plan. IV and heart monitor removed. Brother contacted, ride on the way.    1755- patient wheeled out by nurse.Electronically signed by Lizett Cedeño RN on 12/2/2024 at 6:08 PM

## 2024-12-02 NOTE — DISCHARGE INSTR - DIET

## 2024-12-02 NOTE — PRE SEDATION
Sedation Plan  ASA: class 2 - patient with mild systemic disease     Mallampati class: I - soft palate, uvula, fauces, pillars visible.    Sedation plan: local anesthesia and minimal sedation    Risks, benefits, and alternatives discussed with patient.  Use of blood products discussed with patient who.       Immediate reassessment prior to sedation:  Patient's status reviewed and vital signs assessed; acceptable to perform procedure and proceed to administer sedation as planned.

## 2024-12-02 NOTE — BRIEF OP NOTE
Section of Cardiology  Adult Brief Cardiac Cath Procedure Note        Procedure(s):  LHC, b/l coronary angio, PCI of prox LAD with DESx1    Pre-operative Diagnosis:  NSTEMI, SOB.     H&P Status: Completed and reviewed.     Post-operative Diagnosis:      LV ejection fraction of 60 to 65%.  Normal LVEDP  Left main large-caliber vessel, calcified, mild disease  LAD large caliber vessel, heavily calcified.  85% proximal stenosis.  Otherwise mild diffuse disease  Circumflex, large caliber dominant mild the diffuse disease.  No focal lesions  RCA, small nondominant.  Mild diffuse disease    Findings:  See full report    Complications:  none    Primary Proceduralist:   Dr.Wes Sandhu DO    Plan:  DAPT  RFM  Max med rx    Cardiac rehab nurse will follow-up with the patient and provide further education.  Phase 1 and 2 CR order was initiated and face to order sent outpatient cardiac rehab program, ADLs, smoking cessation, home exercise program, cardiac risk factor modification, heart healthy nutrition and cardiac medication education    Full procedure note to follow

## 2024-12-02 NOTE — DISCHARGE SUMMARY
and leg weakness.     * Raised Toilet Seat Misc  Use of general weakness, fall risk, strength loss of BLE.     clopidogrel 75 MG tablet  Commonly known as: PLAVIX  1 po QD     fenofibrate 160 MG tablet  Take 1 tablet by mouth daily     Hospital Bed Misc  by Does not apply route For orthopnea, dyspnea at night.  Elevated HOB to at least 45 degrees.     metoprolol succinate 25 MG extended release tablet  Commonly known as: TOPROL XL  take 1 tablet by mouth once daily     omega-3 acid ethyl esters 1 g capsule  Commonly known as: LOVAZA     pregabalin 100 MG capsule  Commonly known as: Lyrica  Take 3 capsules by mouth in the morning, at noon, and at bedtime for 90 days. Max Daily Amount: 900 mg     Symbicort 80-4.5 MCG/ACT Aero  Generic drug: budesonide-formoterol     Vascepa 1 g Caps capsule  Generic drug: Icosapent Ethyl  take 2 capsules by mouth twice a day           * This list has 3 medication(s) that are the same as other medications prescribed for you. Read the directions carefully, and ask your doctor or other care provider to review them with you.                STOP taking these medications      thiamine 100 MG tablet     vitamin B-1 100 MG tablet  Commonly known as: THIAMINE     Vitamin D (Ergocalciferol) 86196 units Caps               Where to Get Your Medications        These medications were sent to [facility]/pharmacy #[id number] - [facility] [address] [facility] -  [phone] - F [phone]  [address] [place] [id number]      Phone: [phone]   azithromycin 500 MG tablet  predniSONE 20 MG tablet         Activity: activity as tolerated    Diet: regular diet    Follow-up: PCP within a week, follow-up with cardiology outpatient      I spent 35 min discharging the patient.    Alex Villalta MD   12/2/24  1:13 PM EST

## 2024-12-02 NOTE — INTERVAL H&P NOTE
Update History & Physical    The patient's History and Physical of December 1, 24 was reviewed with the patient and I examined the patient. There was no change. The surgical site was confirmed by the patient and me.     Plan: The risks, benefits, expected outcome, and alternative to the recommended procedure have been discussed with the patient. Patient understands and wants to proceed with the procedure.     Electronically signed by Jim Sandhu DO on 12/2/2024 at 8:12 AM

## 2024-12-02 NOTE — CARE COORDINATION
Pt is very [place].  Definition of COPD discussed.   Lung function explained.   Types of COPD differentiated for patient.   Symptoms discussed including: difficulty breathing, SOB, coughing, excess mucous, weakness and fatigue, or wheezing.   Causes discussed.   Pt currently smoking, and has been exposed to air pollution or dust. Smoking cessation strongly encouraged and booklet provided.   Different testing for COPD and most common treatments reviewed.  Importance of preventing infection including hand hygiene, keeping inhaler mouthpieces clean, and getting the flu and pneumonia vaccinations.  Care Map of what to expect while hospitalized reviewed with patient.  Ways to ease SOB explained including pursed lip breathing and diaphragmatic breathing.  Energy conservation discussed.  Possible medications that may be ordered were reviewed. I stressed that their physician would make that decision and explained the importance of taking the medication as directed.   Good nutrition choices discussed.   COPD booklet and zone pamphlet left for patient review.     Definition and description of a heart attack explained.   Brief overview of the heart anatomy reviewed with pt.  CAD progression discussed.  During a MI, lack of oxygen and damage to heart muscle explained.   Symptoms of a MI reviewed.   Pt. reports experiencing: shortness of breath  Post MI complications reviewed including arrhythmias, decreased cardiac output, and inflammation (pericarditis).  Hospital course reviewed, including testing: Lab work, B/P, ECG, ECHO, Stress testing, and Heart Cath.   Treatments also reviewed from medication, angioplasty and stenting, to CABG.   Patient had: PCI and stenting to his LAD by Dr. Sandhu.   Plan post discharge includes follow up with cardiology and cardiac rehab.  Physical activity discussed including cardiac rehab. Pt advised to follow their physician's discharge instructions for activity restrictions, if any.   Risk factors

## 2024-12-02 NOTE — TELEPHONE ENCOUNTER
----- Message from Dr. Jim Sandhu DO sent at 12/2/2024 11:11 AM EST -----  Regarding: post hospitalization  See me or my in 4 weeks

## 2024-12-03 ENCOUNTER — TELEPHONE (OUTPATIENT)
Age: 64
End: 2024-12-03

## 2024-12-03 LAB
EKG ATRIAL RATE: 67 BPM
EKG P AXIS: 24 DEGREES
EKG P-R INTERVAL: 94 MS
EKG Q-T INTERVAL: 408 MS
EKG QRS DURATION: 92 MS
EKG QTC CALCULATION (BAZETT): 431 MS
EKG R AXIS: 85 DEGREES
EKG T AXIS: 91 DEGREES
EKG VENTRICULAR RATE: 67 BPM

## 2024-12-03 NOTE — TELEPHONE ENCOUNTER
Care Transitions Initial Follow Up Call    Outreach made within 2 business days of discharge: Yes    Patient: Ranjith Rodriguez Patient : 1960   MRN: [id number]  Reason for Admission: NSTEMI (non-ST elevated myocardial infarction) (HCC)   Discharge Date: 24       Spoke with: pt    Discharge department/facility: [facility]    TCM Interactive Patient Contact:  Was patient able to fill all prescriptions: Yes  Was patient instructed to bring all medications to the follow-up visit: Yes  Is patient taking all medications as directed in the discharge summary? Yes  Does patient understand their discharge instructions: Yes  Does patient have questions or concerns that need addressed prior to 7-14 day follow up office visit: NO   -     Additional needs identified to be addressed with provider  No needs identified             Scheduled appointment with PCP within 7-14 days    Follow Up  Future Appointments   Date Time Provider Department Center   2024  3:00 PM Kiki Rashid PA-C [facility]   2024  1:15 PM Jim Sandhu DO [facility]   2025 11:30 AM Kiki Rashid PA-C [facility]   2025  1:15 PM Eric Houston MD [facility] [place], [place]

## 2024-12-03 NOTE — TELEPHONE ENCOUNTER
Explained to pt about hearing aid process, gave him information for [facility] so that he can contact them to schedule an appointment. Explained hearing aid process multiple times. Told him to call [phone] back if any issues.

## 2024-12-03 NOTE — TELEPHONE ENCOUNTER
Pt has called Dr. Rashid multiple times inquiring about hearing aids. LMTCB about hearing aids and referral to Audiology.

## 2024-12-03 NOTE — TELEPHONE ENCOUNTER
Care Transitions Initial Follow Up Call    Outreach made within 2 business days of discharge: Yes    Patient: Ranjith Rodriguez Patient : 1960   MRN: [id number]  Reason for Admission: NSTEMI (non-ST elevated myocardial infarction) (HCC)   Discharge Date: 24       Spoke with: ROBER X1    Discharge department/facility: [facility]        Scheduled appointment with PCP within 7-14 days    Follow Up  Future Appointments   Date Time Provider Department Center   2024  3:00 PM [facility]   2024  1:15 PM Jim Sandhu DO [facility]   2025 11:30 AM [facility]   2025  1:15 PM Eric Houston MD [facility] [place], [place]

## 2024-12-04 LAB — ECHO BSA: 2.27 M2

## 2024-12-05 LAB — POC ACT LR: 283 SEC

## 2024-12-06 ENCOUNTER — TELEPHONE (OUTPATIENT)
Dept: CARDIOLOGY CLINIC | Age: 64
End: 2024-12-06

## 2024-12-06 NOTE — PROGRESS NOTES
11/30/24 1545   RT Protocol   History Pulmonary Disease 2   Respiratory pattern 0   Breath sounds 2   Cough 1   Indications for Bronchodilator Therapy Decreased or absent breath sounds   Bronchodilator Assessment Score 5       
   11/30/24 2019   RT Protocol   History Pulmonary Disease 2   Respiratory pattern 0   Breath sounds 2   Cough 0   Indications for Bronchodilator Therapy Decreased or absent breath sounds   Bronchodilator Assessment Score 4       
   12/01/24 0800   RT Protocol   History Pulmonary Disease 2   Respiratory pattern 0   Breath sounds 2   Cough 0   Indications for Bronchodilator Therapy Decreased or absent breath sounds   Bronchodilator Assessment Score 4       
   12/01/24 2100   RT Protocol   History Pulmonary Disease 2   Respiratory pattern 0   Breath sounds 2   Cough 0   Indications for Bronchodilator Therapy Decreased or absent breath sounds   Bronchodilator Assessment Score 4       
   12/02/24 0907   RT Protocol   History Pulmonary Disease 2   Respiratory pattern 0   Breath sounds 0   Cough 0   Indications for Bronchodilator Therapy None   Bronchodilator Assessment Score 2       
  Physician Progress Note      PATIENT:               MELBA TANG  [facility] #:                  [id number]  :                       1960  ADMIT DATE:       2024 10:26 AM  DISCH DATE:        2024 6:09 PM  RESPONDING  PROVIDER #:        Alex Villalta MD          QUERY TEXT:    Patient admitted with Acute exacerbation COPD and NSTEMI.  Noted documentation   of Acute Kidney Injury in the H&P and PN.  Noted to have sCr/GFR    1.19/68-1.35/58.5 on admission with historical sCr/GFR 1.14/71.8 - 1.17/69.6.  In order to support the diagnosis of CHET, please include additional clinical   indicators in your documentation.? Or please document if the diagnosis of CHET   has been ruled out after further study.    The medical record reflects the following:  Risk Factors: Acute exacerbation of COPD, NSTEMI; History of CHET on    admission for Pneumonia and  during an Office visit.  Clinical Indicators: sCr/GFR  1.19/68-1.35/58.5 on admission with historical   sCr/GFR 1.14/71.8 - 1.17/69.6.  Treatment: Labs and monitoring    Defined by Kidney Disease Improving Global Outcomes (KDIGO) clinical practice   guideline for acute kidney injury:  -Increase in SCr by greater than or equal to 0.3 mg/dl within 48 hours; or  -Increase or decrease in SCr to greater than or equal to 1.5 times baseline,   which is known or presumed to have occurred within the prior 7 days; or  -Urine volume < 0.5ml/kg/h for 6 hours.    Thank you,  Yari Santos   Clinical Documentation Improvement Specialist  W: [phone]  Options provided:  -- Acute kidney injury ruled out after study  -- Acute kidney injury evidenced by, Please document evidence as well as a   numerical baseline creatinine, if known.  -- Currently resolved acute kidney injury was evidenced by, Please document   evidence as well as a numerical baseline creatinine, if known.  -- Other - I will add my own diagnosis  -- Disagree - Not applicable / Not valid  -- Disagree - 
CLINICAL PHARMACY NOTE: MEDS TO BEDS    Total # of Prescriptions Filled: 1   The following medications were delivered to the patient:  Pregabalin too soon... Called nurse to let her know... Azithromycin and prednisone forced out... Nurse said no problem she'll tell doctor and have him send new rx to [facility] as well    Additional Documentation:    
[facility] OCCUPATIONAL THERAPY EVALUATION - ACUTE     NAME: Ranjith Rodriguez  : 1960 (64 y.o.)  MRN: [id number]  CODE STATUS: Full Code  Room: 70/W170-01    Date of Service: 2024    Patient Diagnosis(es): COPD exacerbation ([facility]) [J44.1]  NSTEMI (non-ST elevated myocardial infarction) ([facility]) [I21.4]   Patient Active Problem List    Diagnosis Date Noted    COPD exacerbation (HCC) 2024    Shortness of breath 2024    Elevated troponin 2024    High risk medication use - 18 OARRS PM&R, 18 OARRS PM&R, 02/15/17 Med Contract PM&R, 17 Urine Drug Screen: negative PM&R 2015    Prediabetes 2023    Chronic pain syndrome 10/21/2022    NSTEMI (non-ST elevated myocardial infarction) ([facility]) 2024    Chest pain 2024    Poor hygiene 10/22/2024    Lung nodule 10/22/2024    Atherosclerosis of native artery of both lower extremities with intermittent claudication ([facility]) 2023    Coronary atherosclerosis 2023    Embolism and thrombosis of arteries of lower extremity ([facility]) 2023    Iliac artery thrombosis, 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    Varicose veins of lower extremity with inflammation 2023    History of traumatic brain injury 2022    Diverticulosis 2020    Insulin resistance 2020    PAD (peripheral artery disease) ([facility]) 2020    Polyp of colon     External hemorrhoid     Cerebrovascular disease 2019    Constipation 2019    Carotid stenosis, symptomatic w/o infarct, left 2019    Ptosis 2017    CN III palsy, right eye 2017    Carotid artery disorder ([facility]) 2017    HTN (hypertension) 2017    Chronic midline low back pain with bilateral sciatica 2016    SI (sacroiliac) pain 2016    Generalized anxiety disorder 2016    Muscle spasm of back 2016    COPD (chronic obstructive pulmonary disease) ([facility])     IgG monoclonal gammopathy of uncertain significance 2015    
Physical Therapy  Facility/Department: [facility] MED SURG W170/W170-01  Physical Therapy Discharge      NAME: Ranjith Rodriguez    : 1960 (64 y.o.)  MRN: [id number]    Account: [id number]  Gender: male      Patient has been discharged from acute care hospital. DC patient from current PT program.      Electronically signed by Sherrie Marley PT on 12/3/24 at 8:25 AM EST      
Physical Therapy Med Surg Initial Assessment  Facility/Department: [address] TELEMETRY  Room: [place] [address]       NAME: Ranjith Rodriguez  : 1960 (64 y.o.)  MRN: [id number]  CODE STATUS: Full Code    Date of Service: 2024    Patient Diagnosis(es): COPD exacerbation ([facility]) [J44.1]  NSTEMI (non-ST elevated myocardial infarction) ([facility]) [I21.4]   Chief Complaint   Patient presents with    Shortness of Breath     Seen here last night for the same. They wanted to do a 2nd heart enzyme but he had to leave. Came back today to get it done.      Patient Active Problem List    Diagnosis Date Noted    High risk medication use - 18 OARRS PM&R, 18 OARRS PM&R, 02/15/17 Med Contract PM&R, 17 Urine Drug Screen: negative PM&R 2015    Prediabetes 2023    Chronic pain syndrome 10/21/2022    NSTEMI (non-ST elevated myocardial infarction) ([facility]) 2024    Poor hygiene 10/22/2024    Lung nodule 10/22/2024    Atherosclerosis of native artery of both lower extremities with intermittent claudication ([facility]) 2023    Coronary atherosclerosis 2023    Embolism and thrombosis of arteries of lower extremity ([facility]) 2023    Iliac artery thrombosis, 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    Varicose veins of lower extremity with inflammation 2023    History of traumatic brain injury 2022    Diverticulosis 2020    Insulin resistance 2020    PAD (peripheral artery disease) ([facility]) 2020    Polyp of colon     External hemorrhoid     Cerebrovascular disease 2019    Constipation 2019    Carotid stenosis, symptomatic w/o infarct, left 2019    Ptosis 2017    CN III palsy, right eye 2017    Carotid artery disorder ([facility]) 2017    HTN (hypertension) 2017    Chronic midline low back pain with bilateral sciatica 2016    SI (sacroiliac) pain 2016    Generalized anxiety disorder 2016    Muscle spasm of back 2016    COPD (chronic 
Progress Note  Date:2024       [facility]  Patient Name:Ranjith Rodriguez     YOB: 1960     Age:64 y.o.        Subjective    Subjective:  Symptoms:  He reports malaise, cough and weakness.  No shortness of breath, chest pain, headache, chest pressure, anorexia, diarrhea or anxiety.    Diet:  No nausea or vomiting.       Review of Systems   Respiratory:  Positive for cough. Negative for shortness of breath.    Cardiovascular:  Negative for chest pain.   Gastrointestinal:  Negative for anorexia, diarrhea, nausea and vomiting.   Neurological:  Positive for weakness.     Objective         Vitals Last 24 Hours:  TEMPERATURE:  Temp  Av.8 °F (36.6 °C)  Min: 97.5 °F (36.4 °C)  Max: 98.9 °F (37.2 °C)  RESPIRATIONS RANGE: Resp  Av.1  Min: 16  Max: 20  PULSE OXIMETRY RANGE: SpO2  Av.7 %  Min: 93 %  Max: 100 %  PULSE RANGE: Pulse  Av.7  Min: 71  Max: 100  BLOOD PRESSURE RANGE: Systolic (24hrs), Av , Min:131 , Max:177   ; Diastolic (24hrs), Av, Min:50, Max:94    I/O (24Hr):  No intake or output data in the 24 hours ending 24 1403  Objective:  General Appearance:  Comfortable, well-appearing and in no acute distress.    Vital signs: (most recent): Blood pressure (!) 150/57, pulse 71, temperature 97.7 °F (36.5 °C), temperature source Oral, resp. rate 18, height 1.816 m (5' 11.5\"), weight 102.1 kg (225 lb), SpO2 94%.    HEENT: Normal HEENT exam.    Lungs:  There are decreased breath sounds and wheezes.    Heart: S1 normal and S2 normal.    Abdomen: Abdomen is soft.  Bowel sounds are normal.   There is no epigastric area tenderness.     Pulses: Distal pulses are intact.    Neurological: Patient is alert.    Skin:  Warm and dry.      Labs/Imaging/Diagnostics    Labs:  CBC:  Recent Labs     24  1743 24  1151 24  0518   WBC 11.5* 11.2* 13.1*   RBC 4.58* 4.61* 4.13*   HGB 13.7* 14.1 12.4*   HCT 40.8* 41.6* 36.9*   MCV 89.1 90.2 89.3   RDW 14.4 14.5 14.3   PLT 
Pt is in bed watching TV. He is A/O/ x 4, Anxious/Impulsive. Pt takes his po medications whole w/water. Last BM on 11/30/2024, no c/o constipation, abd pain, or diarrhea. He has a 20 ga IV in his Left AC that is clean, dry, and intact. Bilat lungs are clr/dim, POX on RA at 95%, no c/o SOB or difficulties breathing. Bilat. UE pulses are palpable at +2, Bilat LE pulses are palpable at +1, trace edema noted. No c/o pain. Call light is w/in reach.   
Pt is in bed watching TV. He is A/O/ x 4, Anxious/Impulsive. Pt takes his po medications whole w/water. NPO at midnight for Heart Cath procedure. Last BM on 12/01/2024, no c/o constipation, abd pain, or diarrhea. He has a 20 ga IV in his Left AC that is clean, dry, and intact. Bilat lungs are clr/dim, POX on RA at 95%, no c/o SOB or difficulties breathing. Bilat. UE pulses are palpable at +2, Bilat LE pulses are palpable at +1, trace edema noted. No c/o pain. Call light is w/in reach.    Pt cleaned and prep for Heart Cath.   
Pt report given to Lizett SANTANA. Pt denies any pain. Rt radial access site stable with no hematoma or bleeding noted. Transport requested. Pt placed on tele monitor.   
Pt returned to pre/post cath lab via cart. A&O x4. Report received from Padmaja SANTANA. Pt denies any pain. Rt radial access site stable with no bleeding, hematoma, or swelling noted. Radial band in place with 8 ml air. Pt placed on bedside monitor. NSR. 89-80% on RA. Placed pt on 2L O2 via NC. Now 96-97%.   
awake  Oxygen Saturation (color):  2 - Able to maintain oxygen saturation >92% on room air      Prior to Admission medications    Medication Sig Start Date End Date Taking? Authorizing Provider   atorvastatin (LIPITOR) 40 MG tablet Take 1 tablet by mouth daily 11/1/24  Yes Eric Houston MD   pregabalin (LYRICA) 100 MG capsule Take 3 capsules by mouth in the morning, at noon, and at bedtime for 90 days. Max Daily Amount: 900 mg 10/22/24 1/20/25 Yes Kiki Rashdi PA-C   amLODIPine (NORVASC) 10 MG tablet Take 1 tablet by mouth daily 9/27/24  Yes Eric Houston MD   SYMBICORT 80-4.5 MCG/ACT AERO INHALE 2 PUFFS TWICE A DAY 7/3/24  Yes Tim De Oliveira MD   omega-3 acid ethyl esters (LOVAZA) 1 g capsule take 2 capsules twice a day 6/24/24  Yes Tim De Oliveira MD   clopidogrel (PLAVIX) 75 MG tablet 1 po QD 7/19/24  Yes Eric Houston MD   metoprolol succinate (TOPROL XL) 25 MG extended release tablet take 1 tablet by mouth once daily 7/19/24  Yes Eric Houston MD   VASCEPA 1 g CAPS capsule take 2 capsules by mouth twice a day 7/19/24  Yes Eric Houston MD   allopurinol (ZYLOPRIM) 100 MG tablet Take 2 tablets by mouth daily 7/12/24  Yes Kiki Rashid PA-C   aspirin (ASPIRIN LOW DOSE) 81 MG EC tablet Take 1 tablet by mouth daily 5/29/24  Yes Amelia Gibson MD   fenofibrate (TRIGLIDE) 160 MG tablet Take 1 tablet by mouth daily 4/16/24  Yes Eric Houston MD   thiamine 100 MG tablet Take 1 tablet by mouth daily  Patient not taking: Reported on 11/30/2024 6/24/24   Tim De Oliveira MD   Ergocalciferol (VITAMIN D) 89752 units CAPS Take 50,000 Units by mouth once a week  Patient not taking: Reported on 11/30/2024 6/1/24   Amelia Gibson MD   vitamin B-1 (THIAMINE) 100 MG tablet Take 1 tablet by mouth daily  Patient not taking: Reported on 11/30/2024 5/28/24   Amelia Gibson MD   Hospital Bed MISC by Does not apply route For orthopnea, dyspnea at night.  Elevated HOB to at least 45 degrees. 10/17/23   Gay,

## 2024-12-06 NOTE — TELEPHONE ENCOUNTER
Pt called back and it was very hard to understand him and staticy, I asked if he would call back to see if it was the connection. Pt was suppose to call back

## 2024-12-16 ENCOUNTER — TELEPHONE (OUTPATIENT)
Age: 64
End: 2024-12-16

## 2024-12-16 NOTE — PROGRESS NOTES
The patient denies any wheezing or dyspnea today. No symptoms of active infection and patient is not using any inhaler. CPM, report any exacerbations.       is committed to no more ETOH use. The current episode started more than 1 month ago. This is a chronic problem. The onset of the illness is precipitated by a stressful event. The degree of incapacity that he is experiencing as a consequence of his illness is mild. Additional symptoms of the illness do not include appetite change, unexpected weight change, fatigue, visual change, headaches or abdominal pain. He does not admit to suicidal ideas. He does not have a plan to attempt suicide. He does not contemplate harming himself. He has not already injured self. He does not contemplate injuring another person. He has not already  injured another person. Risk factors that are present for mental illness include substance abuse.        Past Medical History:   Diagnosis Date    Acute idiopathic gout of left wrist 24/11/8771    Alcoholic (HCC)-remote Hx 3/67/0743    In remission goes to AA daily--agrees not to overtake pain meds     Alcoholic polyneuropathy ([address]     Asthma     CAD (coronary artery disease)     [facility]    Chronic back pain greater than 3 months duration     CN III palsy, right eye 2017    Dr Orly Chaudhary    COPD (chronic obstructive pulmonary disease) ([address] 2014    DDD (degenerative disc disease), lumbar 3/23/2015    Elevated liver enzymes 2014    Hyperlipidemia     on med > 10 yrs    Hypertension     on meds x 1 yr    IgA monoclonal gammopathy 2015    Dr Maragrita Araiza    Insomnia, unspecified     LBP (low back pain)     Dr Tristin Zhao Neuropathy     Occlusion and stenosis of carotid artery without mention of cerebral infarction     Dr Erin Leiva Personal history of alcoholism ([address]     quit 11/05/2010, relapsed 2016    S/P carotid endarterectomy 01/2019    Dr Rashel Vanegas    Sensorimotor neuropathy     Bilateral lower extremities-EMG 03/23/15 (Monty Boogie)    Smoking trying to quit     Traumatic compression fracture of T11 thoracic vertebra ([address] 0801    Umbilical hernia      Past Surgical History:   Procedure Laterality Date    CAROTID ENDARTERECTOMY Left 1/18/2019    LEFT CAROTID ENDARTERECTOMY performed by Mariann Reddy MD at [facility]  8/19/2014    COLONOSCOPY N/A 12/18/2019    COLONOSCOPY DIAGNOSTIC performed by Douglas Kent MD at [facility], COLON, DIAGNOSTIC      HERNIA REPAIR      TONSILLECTOMY      UPPER GASTROINTESTINAL ENDOSCOPY  8/19/2014    VENTRAL HERNIA REPAIR  09/01/15    W/MESH AND W/UMBILECTOMY     Social History     Socioeconomic History    Marital status: Single     Spouse name: None    Number of children: 1    Years of education: None    Highest education level: None   Occupational History    Occupation: SSI for learning disability   Tobacco Use    Smoking status: Current Every Day Smoker     Packs/day: 0.50     Years: 40.00     Pack years: 20.00     Types: Cigarettes     Start date: 1968    Smokeless tobacco: Never Used   Vaping Use    Vaping Use: Never used   Substance and Sexual Activity    Alcohol use: No     Alcohol/week: 0.0 standard drinks     Comment: 8/31/2016 Quit Date    Drug use: No    Sexual activity: None   Other Topics Concern    None   Social History Narrative    Born in [place], one of 5    Came to [place] at age 1 with parents    Never , one daughter    Never worked, disabled since 12 (mental)    Lives in [place] [facility] with brother, in a house        Attends [employer] daily    [address] riding bike, TV    One daughter, does keep in touch      Social Determinants of Health     Financial Resource Strain: Low Risk     Difficulty of Paying Living Expenses: Not hard at [address]:     Worried About [facility] in the Last Year:    [address] in the Last Year:    Transportation Needs: No Transportation Needs    Lack of Transportation (Medical): No    Lack of Transportation (Non-Medical):  No   Physical Activity:     Days of Exercise per Week:     Minutes of Exercise per Session:    Stress:     Feeling of Stress :    Social Connections:     Frequency of Communication with Friends and Family:     Frequency of Social Gatherings with Friends and Family:     Attends Holiness Services:     Active Member of Clubs or Organizations:     Attends Club or Organization Meetings:     Marital Status:    Intimate Partner Violence:     Fear of Current or Ex-Partner:     Emotionally Abused:     Physically Abused:     Sexually Abused:      Family History   Problem Relation Age of Onset    Cancer Mother         brain, dec 79    Alcohol Abuse Father     Other Sister         unknown medical history    Other Brother         unknown medical history    High Blood Pressure Brother     Alcohol Abuse Daughter        No Known Allergies    Review of Systems   Constitutional: Negative for activity change, appetite change, fatigue, fever and unexpected weight change. HENT: Positive for voice change. Eyes: Negative for photophobia, pain and visual disturbance. Respiratory: Negative for apnea, cough, chest tightness, shortness of breath and wheezing. Cardiovascular: Negative for chest pain, palpitations and leg swelling. Gastrointestinal: Positive for constipation. Negative for abdominal pain, bowel incontinence, diarrhea, nausea and vomiting. Endocrine: Positive for cold intolerance. Genitourinary: Negative. Negative for bladder incontinence and dysuria. Musculoskeletal: Positive for arthralgias, back pain and myalgias. Negative for gait problem, joint swelling, neck pain and neck stiffness. Skin: Negative. Negative for color change, pallor and wound. Allergic/Immunologic: Positive for immunocompromised state. Negative for environmental allergies and food allergies. Neurological: Positive for weakness and numbness (Left side of throat). Negative for dizziness, light-headedness and headaches. Hematological: Negative. Psychiatric/Behavioral: Positive for decreased concentration.  Negative for dysphoric mood, hallucinations and sleep disturbance. The patient is not nervous/anxious. Objective    Vitals:    07/01/21 1138   BP: 130/60   Site: Left Upper Arm   Position: Sitting   Cuff Size: Large Adult   Weight: 230 lb (104.3 kg)   Height: 5' 11.5\" (1.816 m)     Pain Score: Five (With medication)       Physical Exam  Vitals reviewed. Constitutional:       General: He is not in acute distress. Appearance: He is well-developed. He is not ill-appearing, toxic-appearing or diaphoretic. Comments:         HENT:      Head: Normocephalic and atraumatic. Right Ear: Hearing normal.      Left Ear: Hearing normal.      Nose: Nose normal.      Mouth/Throat:      Mouth: No oral lesions. Dentition: Normal dentition. Pharynx: No oropharyngeal exudate. Eyes:      General: No scleral icterus. Left eye: No discharge. Extraocular Movements:      Right eye: Abnormal extraocular motion present. Conjunctiva/sclera: Conjunctivae normal.      Left eye: No chemosis or exudate. Pupils: Pupils are equal, round, and reactive to light. Comments: Right 3rd nerve palsy   Neck:      Thyroid: No thyromegaly. Vascular: No JVD. Trachea: No tracheal deviation. Pulmonary:      Effort: Pulmonary effort is normal. No tachypnea, bradypnea, accessory muscle usage or respiratory distress. Breath sounds: No wheezing. Chest:      Chest wall: No tenderness. Abdominal:      General: There is no distension. Comments: Umbilical hernia   Musculoskeletal:         General: Tenderness present. Right shoulder: Normal.      Left shoulder: Normal.      Right upper arm: Normal.      Left upper arm: Normal.      Right elbow: Normal.      Left elbow: Normal.      Right forearm: Normal.      Left forearm: Normal.      Right wrist: Normal.      Left wrist: Normal.      Right hand: Normal.      Left hand: Normal.      Cervical back: Normal range of motion and neck supple. Spasms, tenderness and bony tenderness present. No edema or rigidity. Thoracic back: Spasms, tenderness and bony tenderness present. Decreased range of motion. Lumbar back: Tenderness and bony tenderness present. No swelling, edema, deformity, lacerations or spasms. Decreased range of motion. Back:       Right hip: Tenderness present. Decreased range of motion. Decreased strength. Left hip: Tenderness present. Decreased range of motion. Right upper leg: Normal.      Left upper leg: Normal.      Right knee: Normal.      Left knee: Normal.      Right lower leg: Normal.      Left lower leg: Normal.      Right ankle: Normal.      Right Achilles Tendon: Normal.      Left ankle: Normal.      Left Achilles Tendon: Normal.      Right foot: Normal.      Left foot: Normal.        Legs:       Comments: Tender areas are indicated by numbered spot         Skin:     General: Skin is warm and dry. Coloration: Skin is not pale. Findings: No abrasion, bruising, ecchymosis, erythema, laceration, petechiae or rash. Rash is not macular, pustular or urticarial.      Nails: There is no clubbing. Neurological:      Mental Status: He is alert and oriented to person, place, and time. Cranial Nerves: No cranial nerve deficit. Sensory: Sensory deficit present. Motor: No tremor, atrophy or abnormal muscle tone. Coordination: Coordination normal.      Deep Tendon Reflexes: Reflexes abnormal. Babinski sign absent on the right side. Babinski sign absent on the left side. Psychiatric:         Attention and Perception: He is attentive. Mood and Affect: Mood is not anxious or depressed. Affect is not labile, blunt, angry or inappropriate. Speech: He is communicative. Speech is not rapid and pressured, delayed, slurred or tangential.         Behavior: Behavior normal. Behavior is not agitated, slowed, aggressive, withdrawn, hyperactive or combative.          Thought Content: Thought content normal. Thought content is not paranoid or delusional. Thought content does not include homicidal or suicidal ideation. Thought content does not include homicidal or suicidal plan. Cognition and Memory: Memory is not impaired. He does not exhibit impaired recent memory or impaired remote memory. Judgment: Judgment normal. Judgment is not impulsive or inappropriate. Ortho Exam  Neurologic Exam     Mental Status   Oriented to person, place, and time. Speech: not slurred   Level of consciousness: alert  Knowledge: good. Cranial Nerves     CN III, IV, VI   Pupils are equal, round, and reactive to light. Motor Exam   Muscle bulk: normal  Overall muscle tone: normal      After a thorough review and discussion of the previous medical records, patient comprehensive medical, surgical, and family and social history, Review of Systems, their OARRS, their Screener and Opioid Assessment for Patients with Pain (SOAPP®-R), recent diagnostics, and symptomatic results to previous treatment, it is my impression that the patients is suffering with progressive and severe:     Diagnosis Orders   1. Myalgia  GA INJECT TRIGGER POINTS, 3 OR GREATER    GA INJECT TRIGGER POINTS, 3 OR GREATER    GA INJECT TRIGGER POINTS, 3 OR GREATER   2. SI (sacroiliac) pain     3. DDD (degenerative disc disease), lumbar     4. Lumbosacral radiculopathy at S1     5.  High risk medication use - 01/25/18 OARRS PM&R, 03/23/18 OARRS PM&R, 02/15/17 Med Contract PM&R, 09/21/17 Urine Drug Screen: negative PM&R     6. IgG monoclonal gammopathy of uncertain significance         I am also concerned by lifestyle and mood issues including:    Past Medical History:   Diagnosis Date    Acute idiopathic gout of left wrist 53/98/2137    Alcoholic (HCC)-remote Hx 9/41/3094    In remission goes to AA daily--agrees not to overtake pain meds     Alcoholic polyneuropathy ([address]     Asthma     [facility]onary [facility]) [facility]    Chronic back pain greater than 3 months duration     CN III palsy, right eye 2017    Dr Orly Chaudhary    COPD ([facility]) [facility] 2014    DDD (degenerative disc disease), lumbar 3/23/2015    Elevated liver enzymes 2014    Hyperlipidemia     on med > 10 yrs    Hypertension     on meds x 1 yr    IgA monoclonal gammopathy 2015    Dr Margarita Araiza    Insomnia, unspecified     LBP (low back pain)     Dr Tristin Zhao Neuropathy     Occlusion and stenosis of carotid artery without mention of cerebral infarction     Dr Hamilton Gary history of alcoholism ([facility]     quit 11/05/2010, relapsed 2016    S/P carotid endarterectomy 01/2019    Dr Rashel Vanegas    Sensorimotor neuropathy     Bilateral lower extremities-EMG 03/23/15 (Monty Boogie)    Smoking trying to quit     Traumatic compression fracture of T11 thoracic vertebra ([facility] 9419    Umbilical hernia            Given their medication, chronic pain and lifestyle and medications they are at risk for :    Falls, constipation, addiction, toxicity on opiates  Loss of livelyhood due to severe pain, debility, weight gain and  vitamin D deficiency    The patient was educated regarding proper diet, fitness routine, and regulatory restrictions concerning pain medications. Previous notes, comprehensive past medical, surgical, family history, and diagnostics were reviewed. Patient education and councelling were provided regarding off label use,treatment options and medication and injection risks. Current and old OARRS ([place] Automated Prescription Reporting System) records reviewed, all refills reviewed since last visit,  Behavioral agreement/MICK regulations   and Toxicology screen was reviewed with patient and is up to date.       There are   no current red flags. high risk meds review re intensive monitoring for toxicity and addiction,  They are making good progress regarding pain relief, they are performing at a functional level regarding activities of daily living, family interactions and psychological functioning, they're not having any adverse effects or side effects from the current medications, and I see no findings of aberrant drug taking or addiction related behaviors. The patient is aware that they have a chronic pain condition and they may require opiates dosing for life. All efforts will be made to wean to the lowest effective dose. Other therapies for pain have not been effective including nonopiate medications. Injections and exercises are only partially effective. A Rx for Narcan was offered to help prevent accidental overdose. RX Monitoring 7/15/2020   Attestation -   Acute Pain Prescriptions Prescription exceeds daily limit for a specific reason. See comments or note. ;Not required given exclusionary diagnoses. ..;Severe pain not adequately treated with lower dose. Periodic Controlled Substance Monitoring Possible medication side effects, risk of tolerance/dependence & alternative treatments discussed. ;No signs of potential drug abuse or diversion identified. ;Assessed functional status. ;Obtaining appropriate analgesic effect of treatment. Chronic Pain > 50 MEDD Re-evaluated the status of the patient's underlying condition causing pain. ;Considered consultation with a specialist.;Obtained or confirmed \"Consent for Opioid Use\" on file. Chronic Pain > 80 MEDD -               Patient is currently taking:       I am having Ranjith Rodriguez maintain his Refresh Tears, nitroGLYCERIN, atorvastatin, clopidogrel, aspirin EC, Narcan, Vascepa, indomethacin, Medical Compression Stockings, polyethylene glycol, vitamin B-12, allopurinol, senna-docusate, budesonide-formoterol, metoprolol succinate, docusate sodium, pregabalin, vitamin D, valsartan-hydroCHLOROthiazide, and HYDROcodone-acetaminophen.               I also recommend the following Medications:    No orders of the defined types were placed in this encounter.       -which helps with pain and function. Otherwise, continue the current pain medications that I have prescibed. Radiologic:   Old  unique films results reviewed  ,     I discussed results with patients. see Follow up plans below  For any new studies. Unique source and Care Everywhere Updates:  prior external notes requested and reviewed. No new issues noted. Unique History obtained by caregiver/independent historian-and verified with SOAPPR. Electrodiagnostic:  Previous studies requested,     I discussed results with patient. See follow-up plans for new studies.         Labs:  Previous labs reviewed     Lab Results   Component Value Date     04/02/2021    K 4.2 04/02/2021     04/02/2021    CO2 25 04/02/2021    BUN 15 04/02/2021    CREATININE 0.94 04/02/2021    CALCIUM 10.1 04/02/2021    LABALBU 3.99 04/02/2021    LABALBU 4.1 04/02/2021    BILITOT 0.5 04/02/2021    ALKPHOS 118 04/02/2021    AST 26 04/02/2021    ALT 39 04/02/2021     Lab Results   Component Value Date    WBC 11.9 04/02/2021    RBC 5.53 04/02/2021    HGB 16.6 04/02/2021    HCT 49.0 04/02/2021    MCV 88.6 04/02/2021    MCH 30.1 04/02/2021    MCHC 33.9 04/02/2021    RDW 14.9 04/02/2021     04/02/2021    MPV 8.8 08/25/2015       Lab Results   Component Value Date    LABAMPH Neg 07/20/2020    BARBSCNU Neg 07/20/2020    LABBENZ Neg 07/20/2020    CANSU Neg 07/20/2020    COCAIMETSCRU Neg 07/20/2020    PHENCYCLIDINESCREENURINE Neg 07/20/2020    DSCOMMENT see below 07/20/2020       Lab Results   Component Value Date    CODEINE Not Detected 09/20/2016    MORPHINE Not Detected 09/20/2016    ACETYLMORPHI Not Detected 09/20/2016    OXYCODONE Not Detected 09/20/2016    NOROXYCODONE Not Detected 09/20/2016    NOROXYMU Not Detected 09/20/2016    [facility] Not Detected 09/20/2016    NORHYDU Not Detected 09/20/2016    HYDROMO Not Detected 09/20/2016    BUPREN Not Detected 09/20/2016    [facility] Not Detected 09/20/2016    FENTA Not Detected 09/20/2016    NORFENT Not Detected 09/20/2016    MEPERIDINE Not Detected 09/20/2016    TAPENU Not Detected 09/20/2016    TAPOSULFUR Not Detected 09/20/2016    METHADONE Not Detected 09/20/2016    LABPROP Not Detected 09/20/2016    TRAM Not Detected 09/20/2016    AMPH Not Detected 09/20/2016    METHAMP Not Detected 09/20/2016    MDMA Not Detected 09/20/2016    ECMDA Not Detected 09/20/2016       Lab Results   Component Value Date    PHENTERMINE Not Detected 09/20/2016    BENZOYL Not Detected 09/20/2016    Jane Hugger Not Detected 09/20/2016    ALPHAOHALPRA Not Detected 09/20/2016    CLONAZEPAM Not Detected 09/20/2016    Henery Inés Not Detected 09/20/2016    DIAZEP Not Detected 09/20/2016    CESAR Not Detected 09/20/2016    OXAZ Not Detected 09/20/2016    Almon Balzarine Not Detected 09/20/2016    LORAZEPAM Not Detected 09/20/2016    MIDAZOLAM Not Detected 09/20/2016    ZOLPIDEM Not Detected 09/20/2016    CORWIN Not Detected 09/20/2016    ETG Not Detected 09/20/2016    MARIJMET Not Detected 09/20/2016    PCP Not Detected 09/20/2016    PAINMGTDRUGP See Below 09/20/2016    EERPAINMGTPA See Note 09/20/2016    LABCREA 55.5 09/20/2016         , I discussed results with patient. See follow-up plans for new studies. Therapies:  HEP-gentle stretching and relaxation techniques-demonstrated with patient-they are to do them twice a day.   They are also advised to make the following lifestyle changes:  Goals      Exercise 3x per week (30 min per time)      SOAPP-R GOAL LESS THAN       09/20/16 SCORE: 13-MODERATE RISK   12/14/16 score: 9-low-moderate risk   02/15/17 score: 12-moderate risk   05/18/17 score: 14-moderate risk  07/21/17 score: 24-high risk  09/21/17 score: 11-moderate risk  11/27/17 score: 10-moderate risk  01/26/18 score: 8-low risk  03/26/18 score: 12-moderate risk  6/14/18 SCORE: 4-LOW RISK  8/7/18 SCORE: 6-LOW RISK   10/4/18 score: 11- moderate risk  3/6/19 score: 6- low risk  5/21/19 score 11- low risk  7/25/19 score: 1- low risk   11/19/19 score: 8- low risk   3/12/20 score: 15- moderate risk   7/15/20 score: 10- moderate risk  9/16/20 score: 15- moderate risk  1/20/21 score: 16- moderate risk  4/23/21 score: 9- moderate risk  7/1/21 score: 11- moderate risk       Stop Cigarette/Tobacco use           Injections or Epidurals:  Injection options were discussed. Patient gave verbal consent to ordered injections. See follow-up plans for planned injections. Supplements:  Vitamin D with increased dosing during the rainy months,   Education was given on:   Dietary and Fitness--daily stretches and low carb diet-in chair Yoga when possible             Follow up with Primary Care Physician regarding their general medical needs. Stressed the importance of following up with PCP and specialists for his/her chronic diseases, health, CV, and cancer screening and continued care. Will follow disease activity/progression and adjust therapeutic regimen to disease activity and severity. Discussed medication dosage, usage, goals of therapy, and side effects. Available test results were reviewed -Discussed findings, impression and plan with patient. An additional  6 minutes were spent outside of the patient visit to review records. Additional time spent with the patient to discuss their questions. Additional time spent with the patient devoted to discussing treatment strategy, planning, and implementation. Patient understands above plan; questions asked and answered. Patient agrees to plan as noted above. At least 50% of the visit was involved in the discussion of the options for treatment. We discussed exercises, medication, interventional therapies and surgery. Healthy life style is essential with patient hard work to achieve the wellness.  In addition; discussion with the patient and/or family about patient's functional status any of the diagnostic results, impressions and/or recommended diagnostic studies, prognosis, risks and benefits of treatment options, instructions for treatment and/or follow-up, importance of compliance with chosen treatment options, risk-factor reduction, and patient/family education. They are to follow up in 2-2 1/2 months to review medication, efficacy of injections, pill counts, OARRS check, high risk med review re intensive monitoring for toxicity and addiction, SOAPPR assessment, review diagnostics, to review previous and future treatment plans and assess appropriateness for continued therapy.         New Diagnostics  Orders Placed This Encounter   Procedures    IA INJECT TRIGGER POINTS, 3 OR GREATER    IA INJECT TRIGGER POINTS, 3 OR GREATER    IA INJECT TRIGGER POINTS, 3 OR GREATER       Sherrie Sosa, DO

## 2024-12-31 PROBLEM — R79.89 ELEVATED TROPONIN: Status: RESOLVED | Noted: 2024-12-01 | Resolved: 2024-12-31

## 2025-01-28 ENCOUNTER — OFFICE VISIT (OUTPATIENT)
Age: 65
End: 2025-01-28
Payer: MEDICARE

## 2025-01-28 ENCOUNTER — OFFICE VISIT (OUTPATIENT)
Dept: CARDIOLOGY CLINIC | Age: 65
End: 2025-01-28
Payer: MEDICARE

## 2025-01-28 VITALS
DIASTOLIC BLOOD PRESSURE: 56 MMHG | WEIGHT: 214 LBS | RESPIRATION RATE: 16 BRPM | BODY MASS INDEX: 29.43 KG/M2 | SYSTOLIC BLOOD PRESSURE: 155 MMHG | OXYGEN SATURATION: 98 % | HEART RATE: 75 BPM

## 2025-01-28 VITALS
HEART RATE: 64 BPM | SYSTOLIC BLOOD PRESSURE: 128 MMHG | WEIGHT: 214 LBS | HEIGHT: 72 IN | OXYGEN SATURATION: 96 % | BODY MASS INDEX: 28.99 KG/M2 | RESPIRATION RATE: 16 BRPM | DIASTOLIC BLOOD PRESSURE: 70 MMHG

## 2025-01-28 DIAGNOSIS — Z09 HOSPITAL DISCHARGE FOLLOW-UP: ICD-10-CM

## 2025-01-28 DIAGNOSIS — I73.9 PAD (PERIPHERAL ARTERY DISEASE) (HCC): ICD-10-CM

## 2025-01-28 DIAGNOSIS — Z91.81 AT HIGH RISK FOR INJURY RELATED TO FALL: ICD-10-CM

## 2025-01-28 DIAGNOSIS — I10 ESSENTIAL HYPERTENSION: ICD-10-CM

## 2025-01-28 DIAGNOSIS — E53.8 FOLATE DEFICIENCY: ICD-10-CM

## 2025-01-28 DIAGNOSIS — G47.34 NOCTURNAL HYPOXIA: ICD-10-CM

## 2025-01-28 DIAGNOSIS — G62.1 ALCOHOLIC POLYNEUROPATHY (HCC): ICD-10-CM

## 2025-01-28 DIAGNOSIS — R73.09 ABNORMAL GLUCOSE: ICD-10-CM

## 2025-01-28 DIAGNOSIS — I25.10 ATHEROSCLEROSIS OF NATIVE CORONARY ARTERY OF NATIVE HEART WITHOUT ANGINA PECTORIS: ICD-10-CM

## 2025-01-28 DIAGNOSIS — J41.0 SIMPLE CHRONIC BRONCHITIS (HCC): Primary | ICD-10-CM

## 2025-01-28 DIAGNOSIS — M10.032 IDIOPATHIC GOUT OF LEFT WRIST, UNSPECIFIED CHRONICITY: ICD-10-CM

## 2025-01-28 DIAGNOSIS — G89.4 CHRONIC PAIN SYNDROME: ICD-10-CM

## 2025-01-28 DIAGNOSIS — G62.9 NEUROPATHY: ICD-10-CM

## 2025-01-28 DIAGNOSIS — I77.9 CAROTID ARTERY DISORDER (HCC): Primary | Chronic | ICD-10-CM

## 2025-01-28 DIAGNOSIS — R53.1 GENERAL WEAKNESS: ICD-10-CM

## 2025-01-28 PROCEDURE — 3023F SPIROM DOC REV: CPT | Performed by: PHYSICIAN ASSISTANT

## 2025-01-28 PROCEDURE — 3078F DIAST BP <80 MM HG: CPT | Performed by: INTERNAL MEDICINE

## 2025-01-28 PROCEDURE — 3074F SYST BP LT 130 MM HG: CPT | Performed by: PHYSICIAN ASSISTANT

## 2025-01-28 PROCEDURE — 3077F SYST BP >= 140 MM HG: CPT | Performed by: INTERNAL MEDICINE

## 2025-01-28 PROCEDURE — 3017F COLORECTAL CA SCREEN DOC REV: CPT | Performed by: INTERNAL MEDICINE

## 2025-01-28 PROCEDURE — 99214 OFFICE O/P EST MOD 30 MIN: CPT | Performed by: PHYSICIAN ASSISTANT

## 2025-01-28 PROCEDURE — G8427 DOCREV CUR MEDS BY ELIG CLIN: HCPCS | Performed by: PHYSICIAN ASSISTANT

## 2025-01-28 PROCEDURE — 3078F DIAST BP <80 MM HG: CPT | Performed by: PHYSICIAN ASSISTANT

## 2025-01-28 PROCEDURE — G8417 CALC BMI ABV UP PARAM F/U: HCPCS | Performed by: PHYSICIAN ASSISTANT

## 2025-01-28 PROCEDURE — G8427 DOCREV CUR MEDS BY ELIG CLIN: HCPCS | Performed by: INTERNAL MEDICINE

## 2025-01-28 PROCEDURE — 3017F COLORECTAL CA SCREEN DOC REV: CPT | Performed by: PHYSICIAN ASSISTANT

## 2025-01-28 PROCEDURE — 4004F PT TOBACCO SCREEN RCVD TLK: CPT | Performed by: INTERNAL MEDICINE

## 2025-01-28 PROCEDURE — G8417 CALC BMI ABV UP PARAM F/U: HCPCS | Performed by: INTERNAL MEDICINE

## 2025-01-28 PROCEDURE — 4004F PT TOBACCO SCREEN RCVD TLK: CPT | Performed by: PHYSICIAN ASSISTANT

## 2025-01-28 PROCEDURE — 99214 OFFICE O/P EST MOD 30 MIN: CPT | Performed by: INTERNAL MEDICINE

## 2025-01-28 PROCEDURE — G2211 COMPLEX E/M VISIT ADD ON: HCPCS | Performed by: PHYSICIAN ASSISTANT

## 2025-01-28 RX ORDER — AMLODIPINE BESYLATE 10 MG/1
10 TABLET ORAL DAILY
Qty: 90 TABLET | Refills: 3 | Status: SHIPPED | OUTPATIENT
Start: 2025-01-28

## 2025-01-28 RX ORDER — METOPROLOL SUCCINATE 25 MG/1
TABLET, EXTENDED RELEASE ORAL
Qty: 90 TABLET | Refills: 3 | Status: SHIPPED | OUTPATIENT
Start: 2025-01-28

## 2025-01-28 RX ORDER — CLOPIDOGREL BISULFATE 75 MG/1
TABLET ORAL
Qty: 90 TABLET | Refills: 3 | Status: SHIPPED | OUTPATIENT
Start: 2025-01-28

## 2025-01-28 RX ORDER — ICOSAPENT ETHYL 1000 MG/1
CAPSULE ORAL
Qty: 360 CAPSULE | Refills: 3 | Status: SHIPPED | OUTPATIENT
Start: 2025-01-28

## 2025-01-28 RX ORDER — ATORVASTATIN CALCIUM 40 MG/1
40 TABLET, FILM COATED ORAL DAILY
Qty: 90 TABLET | Refills: 3 | Status: SHIPPED | OUTPATIENT
Start: 2025-01-28

## 2025-01-28 RX ORDER — ALLOPURINOL 100 MG/1
200 TABLET ORAL DAILY
Qty: 180 TABLET | Refills: 4 | Status: SHIPPED | OUTPATIENT
Start: 2025-01-28

## 2025-01-28 RX ORDER — PREGABALIN 100 MG/1
100 CAPSULE ORAL 3 TIMES DAILY
Qty: 90 CAPSULE | Refills: 2 | Status: SHIPPED | OUTPATIENT
Start: 2025-01-28 | End: 2025-04-28

## 2025-01-28 RX ORDER — ASPIRIN 81 MG/1
81 TABLET ORAL DAILY
Qty: 90 TABLET | Refills: 3 | Status: SHIPPED | OUTPATIENT
Start: 2025-01-28

## 2025-01-28 SDOH — ECONOMIC STABILITY: FOOD INSECURITY: WITHIN THE PAST 12 MONTHS, YOU WORRIED THAT YOUR FOOD WOULD RUN OUT BEFORE YOU GOT MONEY TO BUY MORE.: NEVER TRUE

## 2025-01-28 SDOH — ECONOMIC STABILITY: FOOD INSECURITY: WITHIN THE PAST 12 MONTHS, THE FOOD YOU BOUGHT JUST DIDN'T LAST AND YOU DIDN'T HAVE MONEY TO GET MORE.: NEVER TRUE

## 2025-01-28 ASSESSMENT — ENCOUNTER SYMPTOMS
GASTROINTESTINAL NEGATIVE: 1
EYES NEGATIVE: 1
COUGH: 0
BLOOD IN STOOL: 0
DIARRHEA: 0
NAUSEA: 0
CHEST TIGHTNESS: 0
STRIDOR: 0
CHEST TIGHTNESS: 0
BLOOD IN STOOL: 0
COUGH: 0
VOMITING: 0
PHOTOPHOBIA: 0
SHORTNESS OF BREATH: 0
NAUSEA: 0
BACK PAIN: 1
WHEEZING: 0
SHORTNESS OF BREATH: 0
RESPIRATORY NEGATIVE: 1
ABDOMINAL PAIN: 0

## 2025-01-28 ASSESSMENT — PATIENT HEALTH QUESTIONNAIRE - PHQ9
SUM OF ALL RESPONSES TO PHQ QUESTIONS 1-9: 0
SUM OF ALL RESPONSES TO PHQ QUESTIONS 1-9: 0
SUM OF ALL RESPONSES TO PHQ9 QUESTIONS 1 & 2: 0
SUM OF ALL RESPONSES TO PHQ QUESTIONS 1-9: 0
SUM OF ALL RESPONSES TO PHQ QUESTIONS 1-9: 0
2. FEELING DOWN, DEPRESSED OR HOPELESS: NOT AT ALL
1. LITTLE INTEREST OR PLEASURE IN DOING THINGS: NOT AT ALL

## 2025-01-28 NOTE — PROGRESS NOTES
due to fall risk and leg weakness. 1 each 0    Misc. Devices (CANE) MISC Wide base cane please, fall risk, chronic knee and back pain. 1 each 0     No current facility-administered medications for this visit.     PMH, Surgical Hx, Family Hx, and Social Hx reviewed and updated.  Health Maintenance reviewed.    Objective  Vitals:    01/28/25 1123   BP: 128/70   Site: Right Upper Arm   Position: Sitting   Cuff Size: Medium Adult   Pulse: 64   Resp: 16   SpO2: 96%   Weight: 97.1 kg (214 lb)   Height: 1.816 m (5' 11.5\")     BP Readings from Last 3 Encounters:   01/28/25 128/70   12/02/24 (!) 152/50   11/29/24 (!) 171/54     Wt Readings from Last 3 Encounters:   01/28/25 97.1 kg (214 lb)   11/30/24 102.1 kg (225 lb)   11/29/24 102.1 kg (225 lb)     Physical Exam  Vitals reviewed.   Constitutional:       General: He is not in acute distress.     Appearance: He is well-developed. He is not diaphoretic.      Comments: Very unkempt appearance, visible dirt/grim of skin, clothes.   HENT:      Head: Normocephalic and atraumatic.      Right Ear: Tympanic membrane, ear canal and external ear normal. There is no impacted cerumen.      Left Ear: Tympanic membrane, ear canal and external ear normal. There is no impacted cerumen.   Eyes:      Conjunctiva/sclera: Conjunctivae normal.   Cardiovascular:      Rate and Rhythm: Normal rate and regular rhythm.      Heart sounds: Murmur (2/6) heard.   Pulmonary:      Effort: Pulmonary effort is normal.      Breath sounds: Rhonchi present.   Musculoskeletal:      Cervical back: Normal range of motion.   Skin:     General: Skin is warm and dry.   Neurological:      General: No focal deficit present.      Mental Status: He is alert. Mental status is at baseline.      Cranial Nerves: No cranial nerve deficit.   Psychiatric:         Mood and Affect: Mood normal.         Behavior: Behavior normal.         Thought Content: Thought content normal.         Judgment: Judgment normal.       Assessment

## 2025-01-28 NOTE — PROGRESS NOTES
OFFICE VISIT        Patient: Ranjith Rodriguez  YOB: 1960  MRN: [id number]    Chief Complaint: L carotid stent claudication   Chief Complaint   Patient presents with    Follow-up       CV Data:  4/2017 echo ef 65  2019 Left Carotid CEA/patch - Dr. Morris  9/28/2020 B/l LE angio, left MAHAD stenting, left SFA ATH/PTA with DCB/stent.  Right LE will be staged  10/21/2020 RLE:  RSFA Stent  3/21 CUS - Dr. Morris LUCILA 100 LCA patent. Moderate plaque.   3/22 CUS- LUCILA 100%.  LCEA site patent  1/23 CUS  LUCILA 100%.  LCEA site patent  4/24 CUS LUCILA - 100%.  LICA mild   5/24 Echo EF 65-70 Mild MR  12/24 Cath LAD JON x1    Subjective/HPI pt has significant claudication L>R. He has chronic cough. He is minimally active. Describes no cp nor sob currently. No bleed. Has back pain.     10/12/2020 Left leg feels better since PTA. Right leg still bothers him. No cp no sob. No bleed.     11/23/2020 no cp no sob no falls no bleed. Has not smoked in 1 week. Legs feel better     2/23/21 no cp some gaitan does not feel good. feeels tired today. Takes meds    8/5/21 denies CP no sob no bleed no fals.     3/17/22 amber swain no cp no sob told to Dr. Morris retiring. Still smoke.r walks regualrly.     9/23/22 still SOB still smoking no cp no falls nobleed.     5/22/23 doing well no cp no sob not very activ etakes meds. Still smokes    7/10/23 still smokes same GAITAN minimally active no CP no bleed no falls     1/8/24 no changes walks no cp some gaitan not very active though. No falls no bleed. Takes meds. Still makes.     8/6/24 GAITAN no cp no falls no bleed. Takes meds.     9/27/24 pt has CHET from July.  Did not see Nephrology as pout pt. Still taking Valsartan that we stopped states he feels  well no cp no sob no falls no bleed    1/28/25 doing fine lives w a friend no cp no spb no falls no bleed not very active. Still smokes    He has no cardiac stents.   Smoker  No etoh  Lives with brother  Disabled all his life.      EKG: SR 75    Past

## 2025-03-25 ENCOUNTER — APPOINTMENT (OUTPATIENT)
Dept: GENERAL RADIOLOGY | Age: 65
End: 2025-03-25
Payer: MEDICARE

## 2025-03-25 ENCOUNTER — HOSPITAL ENCOUNTER (EMERGENCY)
Age: 65
Discharge: HOME | End: 2025-03-25
Attending: STUDENT IN AN ORGANIZED HEALTH CARE EDUCATION/TRAINING PROGRAM
Payer: MEDICARE

## 2025-03-25 ENCOUNTER — APPOINTMENT (OUTPATIENT)
Dept: CT IMAGING | Age: 65
End: 2025-03-25
Payer: MEDICARE

## 2025-03-25 VITALS
OXYGEN SATURATION: 95 % | SYSTOLIC BLOOD PRESSURE: 126 MMHG | DIASTOLIC BLOOD PRESSURE: 66 MMHG | RESPIRATION RATE: 18 BRPM | HEART RATE: 63 BPM | TEMPERATURE: 98.4 F

## 2025-03-25 DIAGNOSIS — H93.13 TINNITUS OF BOTH EARS: Primary | ICD-10-CM

## 2025-03-25 LAB
ALBUMIN SERPL-MCNC: 3.9 G/DL (ref 3.5–4.6)
ALP SERPL-CCNC: 69 U/L (ref 35–104)
ALT SERPL-CCNC: 17 U/L (ref 0–41)
AMPHET UR QL SCN: NORMAL
ANION GAP SERPL CALCULATED.3IONS-SCNC: 9 MEQ/L (ref 9–15)
APTT PPP: 30.1 SEC (ref 24.4–36.8)
AST SERPL-CCNC: 19 U/L (ref 0–40)
BARBITURATES UR QL SCN: NORMAL
BASOPHILS # BLD: 0.1 K/UL (ref 0–0.2)
BASOPHILS NFR BLD: 0.6 %
BENZODIAZ UR QL SCN: NORMAL
BILIRUB SERPL-MCNC: 0.5 MG/DL (ref 0.2–0.7)
BILIRUB UR QL STRIP: NEGATIVE
BUN SERPL-MCNC: 29 MG/DL (ref 8–23)
CALCIUM SERPL-MCNC: 10 MG/DL (ref 8.5–9.9)
CANNABINOIDS UR QL SCN: NORMAL
CHLORIDE SERPL-SCNC: 104 MEQ/L (ref 95–107)
CLARITY UR: CLEAR
CO2 SERPL-SCNC: 28 MEQ/L (ref 20–31)
COCAINE UR QL SCN: NORMAL
COLOR UR: YELLOW
CREAT SERPL-MCNC: 1.39 MG/DL (ref 0.7–1.2)
DRUG SCREEN COMMENT UR-IMP: NORMAL
EOSINOPHIL # BLD: 0.2 K/UL (ref 0–0.7)
EOSINOPHIL NFR BLD: 1.3 %
ERYTHROCYTE [DISTWIDTH] IN BLOOD BY AUTOMATED COUNT: 14.2 % (ref 11.5–14.5)
ETHANOL PERCENT: NORMAL G/DL
ETHANOLAMINE SERPL-MCNC: <10 MG/DL (ref 0–0.08)
FENTANYL SCREEN, URINE: NORMAL
GLOBULIN SER CALC-MCNC: 3.4 G/DL (ref 2.3–3.5)
GLUCOSE SERPL-MCNC: 111 MG/DL (ref 70–99)
GLUCOSE UR STRIP-MCNC: NEGATIVE MG/DL
HCT VFR BLD AUTO: 45.8 % (ref 42–52)
HGB BLD-MCNC: 15.1 G/DL (ref 14–18)
HGB UR QL STRIP: NEGATIVE
INR PPP: 1.1
KETONES UR STRIP-MCNC: NEGATIVE MG/DL
LACTATE BLDV-SCNC: 0.7 MMOL/L (ref 0.5–2.2)
LEUKOCYTE ESTERASE UR QL STRIP: NEGATIVE
LYMPHOCYTES # BLD: 1.7 K/UL (ref 1–4.8)
LYMPHOCYTES NFR BLD: 14.3 %
MCH RBC QN AUTO: 29.9 PG (ref 27–31.3)
MCHC RBC AUTO-ENTMCNC: 33 % (ref 33–37)
MCV RBC AUTO: 90.7 FL (ref 79–92.2)
METHADONE UR QL SCN: NORMAL
MONOCYTES # BLD: 0.9 K/UL (ref 0.2–0.8)
MONOCYTES NFR BLD: 7.3 %
NEUTROPHILS # BLD: 8.8 K/UL (ref 1.4–6.5)
NEUTS SEG NFR BLD: 76.1 %
NITRITE UR QL STRIP: NEGATIVE
OPIATES UR QL SCN: NORMAL
OXYCODONE UR QL SCN: NORMAL
PCP UR QL SCN: NORMAL
PH UR STRIP: 6 [PH] (ref 5–9)
PLATELET # BLD AUTO: 366 K/UL (ref 130–400)
POTASSIUM SERPL-SCNC: 4.4 MEQ/L (ref 3.4–4.9)
PROPOXYPH UR QL SCN: NORMAL
PROT SERPL-MCNC: 7.3 G/DL (ref 6.3–8)
PROT UR STRIP-MCNC: ABNORMAL MG/DL
PROTHROMBIN TIME: 14.1 SEC (ref 12.3–14.9)
RBC # BLD AUTO: 5.05 M/UL (ref 4.7–6.1)
SODIUM SERPL-SCNC: 141 MEQ/L (ref 135–144)
SP GR UR STRIP: 1.01 (ref 1–1.03)
TROPONIN, HIGH SENSITIVITY: 43 NG/L (ref 0–19)
TROPONIN, HIGH SENSITIVITY: 47 NG/L (ref 0–19)
URINE REFLEX TO CULTURE: ABNORMAL
UROBILINOGEN UR STRIP-ACNC: 0.2 E.U./DL
WBC # BLD AUTO: 11.6 K/UL (ref 4.8–10.8)

## 2025-03-25 PROCEDURE — 2580000003 HC RX 258: Performed by: STUDENT IN AN ORGANIZED HEALTH CARE EDUCATION/TRAINING PROGRAM

## 2025-03-25 PROCEDURE — 80053 COMPREHEN METABOLIC PANEL: CPT

## 2025-03-25 PROCEDURE — 93005 ELECTROCARDIOGRAM TRACING: CPT | Performed by: STUDENT IN AN ORGANIZED HEALTH CARE EDUCATION/TRAINING PROGRAM

## 2025-03-25 PROCEDURE — 82077 ASSAY SPEC XCP UR&BREATH IA: CPT

## 2025-03-25 PROCEDURE — 71045 X-RAY EXAM CHEST 1 VIEW: CPT

## 2025-03-25 PROCEDURE — 81003 URINALYSIS AUTO W/O SCOPE: CPT

## 2025-03-25 PROCEDURE — 83605 ASSAY OF LACTIC ACID: CPT

## 2025-03-25 PROCEDURE — 36415 COLL VENOUS BLD VENIPUNCTURE: CPT

## 2025-03-25 PROCEDURE — 99285 EMERGENCY DEPT VISIT HI MDM: CPT

## 2025-03-25 PROCEDURE — 80307 DRUG TEST PRSMV CHEM ANLYZR: CPT

## 2025-03-25 PROCEDURE — 70450 CT HEAD/BRAIN W/O DYE: CPT

## 2025-03-25 PROCEDURE — 85610 PROTHROMBIN TIME: CPT

## 2025-03-25 PROCEDURE — 85025 COMPLETE CBC W/AUTO DIFF WBC: CPT

## 2025-03-25 PROCEDURE — 84484 ASSAY OF TROPONIN QUANT: CPT

## 2025-03-25 PROCEDURE — 85730 THROMBOPLASTIN TIME PARTIAL: CPT

## 2025-03-25 RX ORDER — 0.9 % SODIUM CHLORIDE 0.9 %
500 INTRAVENOUS SOLUTION INTRAVENOUS ONCE
Status: COMPLETED | OUTPATIENT
Start: 2025-03-25 | End: 2025-03-25

## 2025-03-25 RX ADMIN — SODIUM CHLORIDE 500 ML: 0.9 INJECTION, SOLUTION INTRAVENOUS at 12:34

## 2025-03-25 ASSESSMENT — PAIN - FUNCTIONAL ASSESSMENT: PAIN_FUNCTIONAL_ASSESSMENT: NONE - DENIES PAIN

## 2025-03-25 ASSESSMENT — PAIN SCALES - GENERAL: PAINLEVEL_OUTOF10: 0

## 2025-03-25 NOTE — DISCHARGE INSTRUCTIONS
Please call the ENT doctor to have a follow-up appointment  Continue your medications as prescribed    For pain use acetaminophen (Tylenol) or ibuprofen (Motrin / Advil), unless prescribed medications that have acetaminophen or ibuprofen (or similar medications) in it.  You can take over the counter acetaminophen tablets (1 - 2 tablets of the 500-mg strength every 6 hours) or ibuprofen tablets (2 tablets every 4 hours).   Avoid taking narcotics for headaches (can cause a worse headache in several hours after taking the medication).  Make sure that you drink plenty of water or Gatorade (or similar solution) to keep yourself hydrated.    PLEASE RETURN TO THE EMERGENCY DEPARTMENT IMMEDIATELY for worsening symptoms, change in vision / hearing / taste, ringing in your ears, loss of sensation or difficulty moving your arms or legs, or if you develop any concerning symptoms such as: high fever not relieved by acetaminophen (Tylenol) and/or ibuprofen (Motrin / Advil), chills, shortness of breath, chest pain, feeling of your heart fluttering or racing, persistent nausea and/or vomiting, vomiting up blood, blood in your stool, numbness, loss of consciousness, weakness or tingling in the arms or legs or change in color of the extremities, changes in mental status, persistent headache, blurry vision, loss of bladder / bowel control, unable to follow up with your physician, or other any other care or concern

## 2025-03-25 NOTE — ED TRIAGE NOTES
Pt presents to ED with co ringing in his ears x 1 month. Pt denies pain. Pt hypotensive in triage and placed in D to reassess BP.

## 2025-03-25 NOTE — ED PROVIDER NOTES
Negative    Ketones, Urine Negative Negative mg/dL    Specific Gravity, UA 1.014 1.005 - 1.030    Blood, Urine Negative Negative    pH, Urine 6.0 5.0 - 9.0    Protein, UA TRACE (A) Negative mg/dL    Urobilinogen, Urine 0.2 <2.0 E.U./dL    Nitrite, Urine Negative Negative    Leukocyte Esterase, Urine Negative Negative    Urine Reflex to Culture Not Indicated    Lactic Acid   Result Value Ref Range    Lactic Acid 0.7 0.5 - 2.2 mmol/L   EKG 12 Lead   Result Value Ref Range    Ventricular Rate 66 BPM    Atrial Rate 66 BPM    P-R Interval 174 ms    QRS Duration 104 ms    Q-T Interval 414 ms    QTc Calculation (Bazett) 434 ms    P Axis 61 degrees    R Axis 82 degrees    T Axis 66 degrees         IMPRESSION/MDM/ED COURSE:  64 y.o. male presented with chronic tinnitus  Differential: Cerumen impaction, no obvious otitis media or externa, not obviously of stroke, no signs of trauma  VS: WNL    External documentation reviewed: Yes reviewed outpatient ENT note from last month with plans to get hearing aids and consider an MRI of IAC  -Reviewed CTA chest done November of last year showing no aortic pathology  -Reviewed cardiology note and carotid Dopplers patient with known right ICA occlusion, normal left    EKG my interpretation: EKG my interpretation normal sinus rhythm normal intervals normal axis no significant ST segment or T wave abnormality    Impression/Plan:     ED Course as of 03/25/25 1455   Tue Mar 25, 2025   1208 Triage blood pressure was 88/61, repeat back in the room is 120/30, these did not correlate clinically  I believe the accurate blood pressure was 142/96 which was obtained on the third attempt [SF]   1258 Consulted with Dr. Perales, agrees no need for CTAs at this time, I will do a CT head given him on Plavix although I believe his symptoms are more chronic in nature, no other acute concerns at this time [SF]   1330 Labs my interpretation show slightly elevated creatinine not consistent with CHET yet, CBC

## 2025-03-26 LAB
EKG ATRIAL RATE: 66 BPM
EKG P AXIS: 61 DEGREES
EKG P-R INTERVAL: 174 MS
EKG Q-T INTERVAL: 414 MS
EKG QRS DURATION: 104 MS
EKG QTC CALCULATION (BAZETT): 434 MS
EKG R AXIS: 82 DEGREES
EKG T AXIS: 66 DEGREES
EKG VENTRICULAR RATE: 66 BPM

## 2025-04-14 ENCOUNTER — TELEPHONE (OUTPATIENT)
Age: 65
End: 2025-04-14

## 2025-04-14 NOTE — TELEPHONE ENCOUNTER
Patient brother called stating patient is trying to get into a nursing facility/assisted living. I advised to discuss with Kiki at appointment on 4/28/25

## 2025-04-16 ENCOUNTER — HOSPITAL ENCOUNTER (INPATIENT)
Age: 65
LOS: 1 days | Discharge: NURSING, REHAB OR LONG-TERM CARE | DRG: 641 | End: 2025-04-18
Attending: EMERGENCY MEDICINE | Admitting: INTERNAL MEDICINE
Payer: MEDICARE

## 2025-04-16 ENCOUNTER — APPOINTMENT (OUTPATIENT)
Dept: CT IMAGING | Age: 65
DRG: 641 | End: 2025-04-16
Payer: MEDICARE

## 2025-04-16 ENCOUNTER — APPOINTMENT (OUTPATIENT)
Dept: GENERAL RADIOLOGY | Age: 65
DRG: 641 | End: 2025-04-16
Payer: MEDICARE

## 2025-04-16 DIAGNOSIS — R62.7 FAILURE TO THRIVE IN ADULT: Primary | ICD-10-CM

## 2025-04-16 DIAGNOSIS — N18.9 ACUTE RENAL FAILURE SUPERIMPOSED ON CHRONIC KIDNEY DISEASE, UNSPECIFIED ACUTE RENAL FAILURE TYPE, UNSPECIFIED CKD STAGE: ICD-10-CM

## 2025-04-16 DIAGNOSIS — N17.9 ACUTE RENAL FAILURE SUPERIMPOSED ON CHRONIC KIDNEY DISEASE, UNSPECIFIED ACUTE RENAL FAILURE TYPE, UNSPECIFIED CKD STAGE: ICD-10-CM

## 2025-04-16 PROBLEM — R53.1 WEAKNESS: Status: ACTIVE | Noted: 2025-04-16

## 2025-04-16 LAB
ALBUMIN SERPL-MCNC: 4.1 G/DL (ref 3.5–4.6)
ALP SERPL-CCNC: 69 U/L (ref 35–104)
ALT SERPL-CCNC: 36 U/L (ref 0–41)
AMMONIA PLAS-SCNC: 23 UMOL/L (ref 16–60)
AMPHET UR QL SCN: NORMAL
ANION GAP SERPL CALCULATED.3IONS-SCNC: 11 MEQ/L (ref 9–15)
APAP SERPL-MCNC: <5 UG/ML (ref 10–30)
AST SERPL-CCNC: 43 U/L (ref 0–40)
BACTERIA URNS QL MICRO: NEGATIVE /HPF
BARBITURATES UR QL SCN: NORMAL
BASOPHILS # BLD: 0.1 K/UL (ref 0–0.2)
BASOPHILS NFR BLD: 0.6 %
BENZODIAZ UR QL SCN: NORMAL
BILIRUB SERPL-MCNC: 0.7 MG/DL (ref 0.2–0.7)
BILIRUB UR QL STRIP: NEGATIVE
BUN SERPL-MCNC: 43 MG/DL (ref 8–23)
CALCIUM SERPL-MCNC: 9.5 MG/DL (ref 8.5–9.9)
CANNABINOIDS UR QL SCN: NORMAL
CHLORIDE SERPL-SCNC: 108 MEQ/L (ref 95–107)
CK SERPL-CCNC: 734 U/L (ref 0–190)
CLARITY UR: CLEAR
CO2 SERPL-SCNC: 23 MEQ/L (ref 20–31)
COCAINE UR QL SCN: NORMAL
COLOR UR: YELLOW
CREAT SERPL-MCNC: 1.65 MG/DL (ref 0.7–1.2)
DRUG SCREEN COMMENT UR-IMP: NORMAL
EOSINOPHIL # BLD: 0 K/UL (ref 0–0.7)
EOSINOPHIL NFR BLD: 0.1 %
EPI CELLS #/AREA URNS AUTO: ABNORMAL /HPF (ref 0–5)
ERYTHROCYTE [DISTWIDTH] IN BLOOD BY AUTOMATED COUNT: 14 % (ref 11.5–14.5)
ETHANOL PERCENT: NORMAL G/DL
ETHANOLAMINE SERPL-MCNC: <10 MG/DL (ref 0–0.08)
FENTANYL SCREEN, URINE: NORMAL
GLOBULIN SER CALC-MCNC: 4.2 G/DL (ref 2.3–3.5)
GLUCOSE SERPL-MCNC: 91 MG/DL (ref 70–99)
GLUCOSE UR STRIP-MCNC: NEGATIVE MG/DL
HCT VFR BLD AUTO: 44.9 % (ref 42–52)
HGB BLD-MCNC: 14.7 G/DL (ref 14–18)
HGB UR QL STRIP: ABNORMAL
HYALINE CASTS #/AREA URNS AUTO: ABNORMAL /HPF (ref 0–5)
KETONES UR STRIP-MCNC: NEGATIVE MG/DL
LACTIC ACID, SEPSIS: 0.8 MMOL/L (ref 0.5–1.9)
LACTIC ACID, SEPSIS: 1.3 MMOL/L (ref 0.5–1.9)
LEUKOCYTE ESTERASE UR QL STRIP: NEGATIVE
LYMPHOCYTES # BLD: 1 K/UL (ref 1–4.8)
LYMPHOCYTES NFR BLD: 9.3 %
MAGNESIUM SERPL-MCNC: 2.3 MG/DL (ref 1.7–2.4)
MCH RBC QN AUTO: 30 PG (ref 27–31.3)
MCHC RBC AUTO-ENTMCNC: 32.7 % (ref 33–37)
MCV RBC AUTO: 91.6 FL (ref 79–92.2)
METHADONE UR QL SCN: NORMAL
MONOCYTES # BLD: 1 K/UL (ref 0.2–0.8)
MONOCYTES NFR BLD: 8.9 %
NEUTROPHILS # BLD: 8.7 K/UL (ref 1.4–6.5)
NEUTS SEG NFR BLD: 80.6 %
NITRITE UR QL STRIP: NEGATIVE
OPIATES UR QL SCN: NORMAL
OXYCODONE UR QL SCN: NORMAL
PCP UR QL SCN: NORMAL
PH UR STRIP: 5 [PH] (ref 5–9)
PLATELET # BLD AUTO: 381 K/UL (ref 130–400)
POTASSIUM SERPL-SCNC: 4.4 MEQ/L (ref 3.4–4.9)
PROCALCITONIN SERPL IA-MCNC: 0.09 NG/ML (ref 0–0.15)
PROPOXYPH UR QL SCN: NORMAL
PROT SERPL-MCNC: 8.3 G/DL (ref 6.3–8)
PROT UR STRIP-MCNC: ABNORMAL MG/DL
RBC # BLD AUTO: 4.9 M/UL (ref 4.7–6.1)
RBC #/AREA URNS AUTO: ABNORMAL /HPF (ref 0–5)
SALICYLATES SERPL-MCNC: 0.4 MG/DL (ref 15–30)
SODIUM SERPL-SCNC: 142 MEQ/L (ref 135–144)
SP GR UR STRIP: 1.02 (ref 1–1.03)
TROPONIN, HIGH SENSITIVITY: 50 NG/L (ref 0–19)
TROPONIN, HIGH SENSITIVITY: 56 NG/L (ref 0–19)
TSH SERPL-MCNC: 1.73 UIU/ML (ref 0.44–3.86)
URINE REFLEX TO CULTURE: ABNORMAL
UROBILINOGEN UR STRIP-ACNC: 0.2 E.U./DL
WBC # BLD AUTO: 10.8 K/UL (ref 4.8–10.8)
WBC #/AREA URNS AUTO: ABNORMAL /HPF (ref 0–5)

## 2025-04-16 PROCEDURE — 2500000003 HC RX 250 WO HCPCS: Performed by: INTERNAL MEDICINE

## 2025-04-16 PROCEDURE — 80053 COMPREHEN METABOLIC PANEL: CPT

## 2025-04-16 PROCEDURE — 99285 EMERGENCY DEPT VISIT HI MDM: CPT

## 2025-04-16 PROCEDURE — 94761 N-INVAS EAR/PLS OXIMETRY MLT: CPT

## 2025-04-16 PROCEDURE — 85025 COMPLETE CBC W/AUTO DIFF WBC: CPT

## 2025-04-16 PROCEDURE — 84145 PROCALCITONIN (PCT): CPT

## 2025-04-16 PROCEDURE — 82550 ASSAY OF CK (CPK): CPT

## 2025-04-16 PROCEDURE — 80307 DRUG TEST PRSMV CHEM ANLYZR: CPT

## 2025-04-16 PROCEDURE — 71045 X-RAY EXAM CHEST 1 VIEW: CPT

## 2025-04-16 PROCEDURE — G0378 HOSPITAL OBSERVATION PER HR: HCPCS

## 2025-04-16 PROCEDURE — 80143 DRUG ASSAY ACETAMINOPHEN: CPT

## 2025-04-16 PROCEDURE — 82140 ASSAY OF AMMONIA: CPT

## 2025-04-16 PROCEDURE — 97166 OT EVAL MOD COMPLEX 45 MIN: CPT

## 2025-04-16 PROCEDURE — 93005 ELECTROCARDIOGRAM TRACING: CPT | Performed by: EMERGENCY MEDICINE

## 2025-04-16 PROCEDURE — 36415 COLL VENOUS BLD VENIPUNCTURE: CPT

## 2025-04-16 PROCEDURE — 80179 DRUG ASSAY SALICYLATE: CPT

## 2025-04-16 PROCEDURE — 6370000000 HC RX 637 (ALT 250 FOR IP): Performed by: INTERNAL MEDICINE

## 2025-04-16 PROCEDURE — 70450 CT HEAD/BRAIN W/O DYE: CPT

## 2025-04-16 PROCEDURE — 94640 AIRWAY INHALATION TREATMENT: CPT

## 2025-04-16 PROCEDURE — 83735 ASSAY OF MAGNESIUM: CPT

## 2025-04-16 PROCEDURE — 2580000003 HC RX 258: Performed by: EMERGENCY MEDICINE

## 2025-04-16 PROCEDURE — 81001 URINALYSIS AUTO W/SCOPE: CPT

## 2025-04-16 PROCEDURE — 84484 ASSAY OF TROPONIN QUANT: CPT

## 2025-04-16 PROCEDURE — 96360 HYDRATION IV INFUSION INIT: CPT

## 2025-04-16 PROCEDURE — 96361 HYDRATE IV INFUSION ADD-ON: CPT

## 2025-04-16 PROCEDURE — 83605 ASSAY OF LACTIC ACID: CPT

## 2025-04-16 PROCEDURE — 84443 ASSAY THYROID STIM HORMONE: CPT

## 2025-04-16 PROCEDURE — 87040 BLOOD CULTURE FOR BACTERIA: CPT

## 2025-04-16 PROCEDURE — 82077 ASSAY SPEC XCP UR&BREATH IA: CPT

## 2025-04-16 PROCEDURE — 97162 PT EVAL MOD COMPLEX 30 MIN: CPT

## 2025-04-16 RX ORDER — MAGNESIUM SULFATE IN WATER 40 MG/ML
2000 INJECTION, SOLUTION INTRAVENOUS PRN
Status: DISCONTINUED | OUTPATIENT
Start: 2025-04-16 | End: 2025-04-18 | Stop reason: HOSPADM

## 2025-04-16 RX ORDER — POTASSIUM CHLORIDE 1500 MG/1
40 TABLET, EXTENDED RELEASE ORAL PRN
Status: DISCONTINUED | OUTPATIENT
Start: 2025-04-16 | End: 2025-04-18 | Stop reason: HOSPADM

## 2025-04-16 RX ORDER — ONDANSETRON 2 MG/ML
4 INJECTION INTRAMUSCULAR; INTRAVENOUS EVERY 6 HOURS PRN
Status: DISCONTINUED | OUTPATIENT
Start: 2025-04-16 | End: 2025-04-18 | Stop reason: HOSPADM

## 2025-04-16 RX ORDER — POTASSIUM CHLORIDE 7.45 MG/ML
10 INJECTION INTRAVENOUS PRN
Status: DISCONTINUED | OUTPATIENT
Start: 2025-04-16 | End: 2025-04-18 | Stop reason: HOSPADM

## 2025-04-16 RX ORDER — ACETAMINOPHEN 650 MG/1
650 SUPPOSITORY RECTAL EVERY 6 HOURS PRN
Status: DISCONTINUED | OUTPATIENT
Start: 2025-04-16 | End: 2025-04-18 | Stop reason: HOSPADM

## 2025-04-16 RX ORDER — ACETAMINOPHEN 325 MG/1
650 TABLET ORAL EVERY 6 HOURS PRN
Status: DISCONTINUED | OUTPATIENT
Start: 2025-04-16 | End: 2025-04-18 | Stop reason: HOSPADM

## 2025-04-16 RX ORDER — SODIUM CHLORIDE 9 MG/ML
INJECTION, SOLUTION INTRAVENOUS CONTINUOUS
Status: DISCONTINUED | OUTPATIENT
Start: 2025-04-16 | End: 2025-04-17

## 2025-04-16 RX ORDER — SODIUM CHLORIDE 0.9 % (FLUSH) 0.9 %
5-40 SYRINGE (ML) INJECTION EVERY 12 HOURS SCHEDULED
Status: DISCONTINUED | OUTPATIENT
Start: 2025-04-16 | End: 2025-04-18 | Stop reason: HOSPADM

## 2025-04-16 RX ORDER — SODIUM CHLORIDE 0.9 % (FLUSH) 0.9 %
5-40 SYRINGE (ML) INJECTION PRN
Status: DISCONTINUED | OUTPATIENT
Start: 2025-04-16 | End: 2025-04-18 | Stop reason: HOSPADM

## 2025-04-16 RX ORDER — SODIUM CHLORIDE 9 MG/ML
INJECTION, SOLUTION INTRAVENOUS PRN
Status: DISCONTINUED | OUTPATIENT
Start: 2025-04-16 | End: 2025-04-18 | Stop reason: HOSPADM

## 2025-04-16 RX ORDER — ONDANSETRON 4 MG/1
4 TABLET, ORALLY DISINTEGRATING ORAL EVERY 8 HOURS PRN
Status: DISCONTINUED | OUTPATIENT
Start: 2025-04-16 | End: 2025-04-18 | Stop reason: HOSPADM

## 2025-04-16 RX ORDER — SODIUM CHLORIDE 9 MG/ML
INJECTION, SOLUTION INTRAVENOUS
Status: DISPENSED
Start: 2025-04-16 | End: 2025-04-17

## 2025-04-16 RX ORDER — HYDRALAZINE HYDROCHLORIDE 20 MG/ML
10 INJECTION INTRAMUSCULAR; INTRAVENOUS EVERY 6 HOURS PRN
Status: DISCONTINUED | OUTPATIENT
Start: 2025-04-16 | End: 2025-04-18 | Stop reason: HOSPADM

## 2025-04-16 RX ORDER — ENOXAPARIN SODIUM 100 MG/ML
40 INJECTION SUBCUTANEOUS DAILY
Status: DISCONTINUED | OUTPATIENT
Start: 2025-04-16 | End: 2025-04-18 | Stop reason: HOSPADM

## 2025-04-16 RX ORDER — BUDESONIDE AND FORMOTEROL FUMARATE DIHYDRATE 80; 4.5 UG/1; UG/1
2 AEROSOL RESPIRATORY (INHALATION)
Status: DISCONTINUED | OUTPATIENT
Start: 2025-04-16 | End: 2025-04-18 | Stop reason: HOSPADM

## 2025-04-16 RX ORDER — POLYETHYLENE GLYCOL 3350 17 G/17G
17 POWDER, FOR SOLUTION ORAL DAILY PRN
Status: DISCONTINUED | OUTPATIENT
Start: 2025-04-16 | End: 2025-04-18 | Stop reason: HOSPADM

## 2025-04-16 RX ORDER — 0.9 % SODIUM CHLORIDE 0.9 %
1000 INTRAVENOUS SOLUTION INTRAVENOUS ONCE
Status: COMPLETED | OUTPATIENT
Start: 2025-04-16 | End: 2025-04-16

## 2025-04-16 RX ADMIN — SODIUM CHLORIDE: 0.9 INJECTION, SOLUTION INTRAVENOUS at 15:27

## 2025-04-16 RX ADMIN — SODIUM CHLORIDE, PRESERVATIVE FREE 10 ML: 5 INJECTION INTRAVENOUS at 21:39

## 2025-04-16 RX ADMIN — SODIUM CHLORIDE 1000 ML: 0.9 INJECTION, SOLUTION INTRAVENOUS at 11:45

## 2025-04-16 RX ADMIN — BUDESONIDE AND FORMOTEROL FUMARATE DIHYDRATE 2 PUFF: 80; 4.5 AEROSOL RESPIRATORY (INHALATION) at 19:37

## 2025-04-16 NOTE — ACP (ADVANCE CARE PLANNING)
Advance Care Planning     Advance Care Planning Activator (Inpatient)  Conversation Note      Date of ACP Conversation: 4/16/2025     Conversation Conducted with: Legal next of kin    ACP Activator: Angelia Brown RN        Health Care Decision Maker:     Current Designated Health Care Decision Maker:     Primary Decision Maker: Curly Rodriguez - Brother/Sister - [phone]    Secondary Decision Maker: hTu Alvarado - Brother/Sister - [phone]

## 2025-04-16 NOTE — CARE COORDINATION
Case Management Assessment  Initial Evaluation    Date/Time of Evaluation: 4/16/2025 6:23 PM  Assessment Completed by: Angelia Brown RN    If patient is discharged prior to next notation, then this note serves as note for discharge by case management.    Patient Name: Ranjith Rodriguez                   YOB: 1960  Diagnosis: Weakness [R53.1]  Failure to thrive in adult [R62.7]  Acute renal failure superimposed on chronic kidney disease, unspecified acute renal failure type, unspecified CKD stage [N17.9, N18.9]                   Date / Time: 4/16/2025 11:00 AM    Patient Admission Status: Observation   Readmission Risk (Low < 19, Mod (19-27), High > 27): Readmission Risk Score: 17.3    Current PCP: Kiki Rashid PA-C  PCP verified by CM? (P) Yes    Chart Reviewed: Yes      History Provided by: (P) Child/Family  Patient Orientation: (P) Alert and Oriented, Person, Place, Self, Other (see comment) (states \"[facility]\", stated month, could not recall year.)    Patient Cognition: (P) Other (see comment) (h/o problems with his memory, per his brother more confused for the past month. Per Dr Resendiz's notes, possible encephalopathy related to past alcohol abuse.)    Hospitalization in the last 30 days (Readmission):  No    If yes, Readmission Assessment in CM Navigator will be completed.    Advance Directives:      Code Status: Full Code   Patient's Primary Decision Maker is: (P) Legal Next of Kin (brother Curly. His sister Alexus is not involved much with the patient.)    Primary Decision Maker: Curly Rodriguez - Brother/Sister - [phone]    Secondary Decision Maker: Thu Alvarado - Brother/Sister - [phone]    Discharge Planning:    Patient lives with: (P) Family Members Type of Home: (P) Apartment  Primary Care Giver: (P) Family  Patient Support Systems include: (P) Family Members   Current Financial resources: (P) Medicare, Medicaid  Current community resources: (P) None  Current services prior

## 2025-04-16 NOTE — PROGRESS NOTES
[facility] OCCUPATIONAL THERAPY EVALUATION - ACUTE     NAME: Ranjith Rodriguez  : 1960 (64 y.o.)  MRN: [id number]  CODE STATUS: Prior  Room:     Date of Service: 2025    Patient Diagnosis(es): No admission diagnoses are documented for this encounter.   Patient Active Problem List    Diagnosis Date Noted    COPD exacerbation (HCC) 2024    Shortness of breath 2024    High risk medication use - 18 OARRS PM&R, 18 OARRS PM&R, 02/15/17 Med Contract PM&R, 17 Urine Drug Screen: negative PM&R 2015    Prediabetes 2023    Chronic pain syndrome 10/21/2022    Nocturnal hypoxia 2025    General weakness 2025    At high risk for injury related to fall 2025    Neuropathy 2025    Abnormal glucose 2025    Folate deficiency 2025    NSTEMI (non-ST elevated myocardial infarction) (HCC) 2024    Chest pain 2024    Poor hygiene 10/22/2024    Lung nodule 10/22/2024    Atherosclerosis of native artery of both lower extremities with intermittent claudication 2023    Coronary atherosclerosis 2023    Embolism and thrombosis of arteries of lower extremity 2023    Iliac artery thrombosis, bilateral (HCC) 2023    Varicose veins of lower extremity with inflammation 2023    History of traumatic brain injury 2022    Idiopathic gout of left wrist 2020    Diverticulosis 2020    Insulin resistance 2020    PAD (peripheral artery disease) 2020    Polyp of colon     External hemorrhoid     Cerebrovascular disease 2019    Constipation 2019    Carotid stenosis, symptomatic w/o infarct, left 2019    Ptosis 2017    CN III palsy, right eye 2017    Carotid artery disorder 2017    HTN (hypertension) 2017    Chronic midline low back pain with bilateral sciatica 2016    SI (sacroiliac) pain 2016    Generalized anxiety disorder 2016    Muscle

## 2025-04-16 NOTE — CARE COORDINATION
This nurse called the patient's brother Curly Rodriguez regarding EMS's report that the patient was throwing feces. He states that this is not true, that the patient was sitting in it \"because he can't get up to the bathroom.\" Estela Tompkins from [facility] and Cm Solares RN are informed. Estela states that they can accept the patient today if the pre cert for the patient's insurance goes through.

## 2025-04-16 NOTE — CARE COORDINATION
PT/OT evaluated the patient and are recommending SNF for rehab. The therapists talked with Dr Resendiz. This nurse spoke with Alice the admission coordinator who states that she can see the patient's notes in EPIC and will check the patient's information. This nurse contacted Cm Solares RN who can help with coordinating with the patient's insurance.
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
## 2025-04-16 NOTE — PROGRESS NOTES
Physical Therapy Med Surg Initial Assessment  Facility/Department: [facility] EMERGENCY DEPARTMENT  Room:        NAME: Ranjith Rodriguez  : 1960 (64 y.o.)  MRN: [id number]  CODE STATUS: Prior    Date of Service: 2025    Patient Diagnosis(es): No admission diagnoses are documented for this encounter.   Chief Complaint   Patient presents with    Failure To Thrive     Patient Active Problem List    Diagnosis Date Noted    COPD exacerbation (HCC) 2024    Shortness of breath 2024    High risk medication use - 18 OARRS PM&R, 18 OARRS PM&R, 02/15/17 Med Contract PM&R, 17 Urine Drug Screen: negative PM&R 2015    Prediabetes 2023    Chronic pain syndrome 10/21/2022    Nocturnal hypoxia 2025    General weakness 2025    At high risk for injury related to fall 2025    Neuropathy 2025    Abnormal glucose 2025    Folate deficiency 2025    NSTEMI (non-ST elevated myocardial infarction) (HCC) 2024    Chest pain 2024    Poor hygiene 10/22/2024    Lung nodule 10/22/2024    Atherosclerosis of native artery of both lower extremities with intermittent claudication 2023    Coronary atherosclerosis 2023    Embolism and thrombosis of arteries of lower extremity 2023    Iliac artery thrombosis, bilateral (HCC) 2023    Varicose veins of lower extremity with inflammation 2023    History of traumatic brain injury 2022    Idiopathic gout of left wrist 2020    Diverticulosis 2020    Insulin resistance 2020    PAD (peripheral artery disease) 2020    Polyp of colon     External hemorrhoid     Cerebrovascular disease 2019    Constipation 2019    Carotid stenosis, symptomatic w/o infarct, left 2019    Ptosis 2017    CN III palsy, right eye 2017    Carotid artery disorder 2017    HTN (hypertension) 2017    Chronic midline low back pain with
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
## 2025-04-16 NOTE — DISCHARGE INSTR - COC
Continuity of Care Form    Patient Name: Ranjith Rodriguez   :  1960  MRN:  [id number]    Admit date:  2025  Discharge date:      Code Status Order: Prior   Advance Directives:     Admitting Physician:  No admitting provider for patient encounter.  PCP: Kiki Rashid PA-C    Discharging Nurse: [place]  Discharging Hospital Unit/Room#:   Discharging Unit Phone Number: [phone]    Emergency Contact:   Extended Emergency Contact Information  Primary Emergency Contact: Curly Rodriguez   [facility]  Home Phone: [phone]  Work Phone: [phone]  Mobile Phone: [phone]  Relation: Brother/Sister  Secondary Emergency Contact: JennyKhushbuThu  Home Phone: [phone]  Relation: Brother/Sister   needed? No    Past Surgical History:  Past Surgical History:   Procedure Laterality Date    CARDIAC PROCEDURE N/A 2024    Left heart cath / coronary angiography performed by Jim Sandhu DO at [facility] CARD[facility]AC [facility]ATH LAB    CARDIAC PROCEDURE N/A 2024    Percutaneous coronary intervention performed by Jim Sandhu DO at [facility] CARDIAC CATH LAB    CAROTID ENDARTERECTOMY Left 2019    LEFT CAROTID ENDARTERECTOMY performed by Miguel Morris MD at [facility] OR    COLONOSCOPY  2014    COLONOSCOPY N/A 2019    COLONOSCOPY DIAGNOSTIC performed by Shahnaz Chavez MD at [facility] CENTER    ENDOSCOPY, COLON, DIAGNOSTIC      HERNIA REPAIR      TONSILLECTOMY      UPPER GASTROINTESTINAL ENDOSCOPY  2014    VENTRAL HERNIA REPAIR  09/01/15    W/MESH AND W/UMBILECTOMY       Immunization History:   Immunization History   Administered Date(s) Administered    COVID-19, MODERNA BLUE border, Primary or Immunocompromised, (age 12y+), IM, 100 mcg/0.5mL 2021, 2021, 2021    COVID-19, [place] Vaccine, Vaccine Unspecified 2022    Influenza Vaccine, unspecified formulation 10/26/2015, 2016, 09/15/2016, 08/15/2017    Influenza Virus Vaccine 10/15/2014, 2019,  recorded.    Safety Concerns:     At Risk for Falls    Impairments/Disabilities:      None    Nutrition Therapy:  Current Nutrition Therapy:   - Oral Diet:  General    Routes of Feeding: Oral  Liquids: Thin Liquids  Daily Fluid Restriction: no  Last Modified Barium Swallow with Video (Video Swallowing Test): not done    Treatments at the Time of Hospital Discharge:   Respiratory Treatments: N/A  Oxygen Therapy:  is not on home oxygen therapy.  Ventilator:    - No ventilator support    Rehab Therapies: Physical Therapy and Occupational Therapy  Weight Bearing Status/Restrictions: No weight bearing restrictions  Other Medical Equipment (for information only, NOT a DME order):  wheelchair and walker  Other Treatments:   --Zinc to bottom (redness)  --Band-aid with liquid barrier film to L great toe    CASE MANAGEMENT/SOCIAL WORK SECTION    Inpatient Status Date: ***    Readmission Risk Assessment Score:  [facility] RISK OF UNPLANNED READMISSION 2.0             0 Total Score        Discharging to Facility/ Agency   Name: [facility]   Address:  Phone: [phone]  Fax:    / signature: Electronically signed by ALEX New on 4/16/25 at 3:44 PM EDT    PHYSICIAN SECTION    Prognosis: {Prognosis:3385449830}    Condition at Discharge: { Patient Condition:858482323}    Rehab Potential (if transferring to Rehab): {Prognosis:9373016932}    Recommended Labs or Other Treatments After Discharge: ***    Physician Certification: I certify the above information and transfer of Ranjith Rodriguez  is necessary for the continuing treatment of the diagnosis listed and that he requires Skilled Nursing Facility for less 30 days.   The individual is being discharged to a nursing facility directly from the hospital after receiving acute patient care at the hospital; and    Requires nursing facility services for the condition for which the patient received care in the hospital; and    As the attending physician,

## 2025-04-16 NOTE — ED NOTES
Patient more alert at this time, talking more and asking for food. Patient a/ox2-3. Dr. Resendiz aware patient wants food, states not at this time but patient allowed to have [employer]eps[employer].

## 2025-04-16 NOTE — CARE COORDINATION
Per Cm Solares RN the patient's insurance approval is not received yet and the patient will need to be admitted pending insurance pre certification.

## 2025-04-16 NOTE — ED PROVIDER NOTES
[facility] EMERGENCY DEPARTMENT  EMERGENCY DEPARTMENT ENCOUNTER      Pt Name: Ranjith Rodriguez  MRN: [id number]  Birthdate 1960  Date of evaluation: 4/16/2025  Provider: Demian Resendiz DO  11:02 AM EDT    CHIEF COMPLAINT       Chief Complaint   Patient presents with    Failure To Thrive         HISTORY OF PRESENT ILLNESS   (Location/Symptom, Timing/Onset, Context/Setting, Quality, Duration, Modifying Factors, Severity)  Note limiting factors.   Ranjith Rodriguez is a 64 y.o. male who presents to the emergency department for failure to thrive per EMS.  Patient arrives from home.  History is limited given patient is alert and oriented to himself.  Patient has significant history of type 2 diabetes, coronary artery disease, TBI, tobacco use it is noted in his home medications that he takes Plavix at home.    HPI    Nursing Notes were reviewed.    REVIEW OF SYSTEMS    (2-9 systems for level 4, 10 or more for level 5)     Review of Systems   Unable to perform ROS: Mental status change       Except as noted above the remainder of the review of systems was reviewed and negative.       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    In remission goes to AA daily--agrees not to overtake pain meds     Alcoholic polyneuropathy     Asthma     CAD (coronary artery disease)     [facility]    Chronic back pain greater than 3 months duration     Claudication in peripheral vascular disease     CN III palsy, right eye 2017    Dr Perales    COPD (chronic obstructive pulmonary disease) ([facility]) 2014    DDD (degenerative disc disease), lumbar 03/23/2015    Elevated liver enzymes 2014    History of traumatic brain injury 02/01/2022    Hyperlipidemia     on med > 10 yrs    Hypertension     on meds x 1 yr    IgA monoclonal gammopathy 2015    Dr Villegas    Incarcerated  clear.   Eyes:      Extraocular Movements: Extraocular movements intact.   Cardiovascular:      Rate and Rhythm: Normal rate and regular rhythm.      Pulses: Normal pulses.   Pulmonary:      Effort: Pulmonary effort is normal.      Breath sounds: Normal breath sounds.   Abdominal:      Palpations: Abdomen is soft.   Musculoskeletal:         General: Normal range of motion.      Cervical back: Normal range of motion.   Skin:     General: Skin is warm and dry.      Findings: Bruising, ecchymosis, erythema and signs of injury present.      Comments: Buttocks and perineum are significantly erythematous.  There is no obvious decubitus ulcer however.  Patient is covered in urine and feces.   Neurological:      General: No focal deficit present.      Mental Status: He is alert.      Comments: No obvious focal deficit.  Patient is oriented to himself only.  Uncertain if this is his baseline.  No obvious weakness in his extremities.  He is moving all of his extremities without any difficulty.   Psychiatric:         Mood and Affect: Mood normal.         DIAGNOSTIC RESULTS     EKG: All EKG's are interpreted by the Emergency Department Physician who either signs or Co-signs this chart in the absence of a cardiologist.        RADIOLOGY:   Non-plain film images such as CT, Ultrasound and MRI are read by the radiologist. Plain radiographic images are visualized and preliminarily interpreted by the emergency physician with the below findings:        Interpretation per the Radiologist below, if available at the time of this note:    CT HEAD WO CONTRAST   Final Result   1. Stable chronic changes of the brain with no evidence of an acute or   subacute intracranial abnormality.         XR CHEST PORTABLE   Final Result   1. Persistent chronic interstitial lung changes with no evidence of acute   alveolar infiltrate or effusion.   2. Calcification along the left hemidiaphragms suspicious for asbestos   exposure.               ED BEDSIDE

## 2025-04-16 NOTE — CARE COORDINATION
PT/OT therapists were informed of Dr Resendiz's orders and they are here to evaluate the patient. This nurse spoke with the patient's brother Curly who lives with the patient. He states that the patient \"just sits in his chair and don't do nothing.\" He states \"he won't go the the bathroom or change his clothes\" and is soiled with urine and stool refusing to bathe. He states that he will bring food that he prepares for him \"but he won't eat, it just sits there.\" He states that the patient \"throws his pills all over the floor\" and he is not sure if he is taking any of his meds. He states that for the past month the patient has been more confused and that he has fallen twice with the last time being 2 weeks ago. He states \"I'm 2 years older and I get short of breath, I can't take care of him.\" He states that the patient \"throws his cigarettes on the floor\". He states that he had been in contact with Jaclyn the admission coordinator for [facility] about getting him admitted there. This nurse called [facility] and got her voicemail. I left a message for her to call me back.     I contacted [facility] and received a secure voicemail. A message was left for a return call. They had been notified of the patient in the past. I did speak with Dr Resendiz.

## 2025-04-16 NOTE — PROGRESS NOTES
1830 Nurse showered patient. Noted redness from groin to buttocks. Also patient had left great toe nail missing with bloody toe nails. Covered left foot with ABD and ace bandage. Wound orders placed.

## 2025-04-17 PROBLEM — R41.0 DELIRIUM: Status: ACTIVE | Noted: 2025-04-17

## 2025-04-17 LAB
BACTERIA BLD CULT ORG #2: NORMAL
BACTERIA BLD CULT: NORMAL
EKG ATRIAL RATE: 86 BPM
EKG P AXIS: 55 DEGREES
EKG P-R INTERVAL: 156 MS
EKG Q-T INTERVAL: 386 MS
EKG QRS DURATION: 100 MS
EKG QTC CALCULATION (BAZETT): 461 MS
EKG R AXIS: 77 DEGREES
EKG T AXIS: 64 DEGREES
EKG VENTRICULAR RATE: 86 BPM

## 2025-04-17 PROCEDURE — 2500000003 HC RX 250 WO HCPCS: Performed by: INTERNAL MEDICINE

## 2025-04-17 PROCEDURE — 96372 THER/PROPH/DIAG INJ SC/IM: CPT

## 2025-04-17 PROCEDURE — 97535 SELF CARE MNGMENT TRAINING: CPT

## 2025-04-17 PROCEDURE — 2580000003 HC RX 258: Performed by: EMERGENCY MEDICINE

## 2025-04-17 PROCEDURE — 96361 HYDRATE IV INFUSION ADD-ON: CPT

## 2025-04-17 PROCEDURE — 94640 AIRWAY INHALATION TREATMENT: CPT

## 2025-04-17 PROCEDURE — G0378 HOSPITAL OBSERVATION PER HR: HCPCS

## 2025-04-17 PROCEDURE — 92610 EVALUATE SWALLOWING FUNCTION: CPT

## 2025-04-17 PROCEDURE — 6370000000 HC RX 637 (ALT 250 FOR IP): Performed by: INTERNAL MEDICINE

## 2025-04-17 PROCEDURE — 6360000002 HC RX W HCPCS: Performed by: INTERNAL MEDICINE

## 2025-04-17 PROCEDURE — 94761 N-INVAS EAR/PLS OXIMETRY MLT: CPT

## 2025-04-17 RX ORDER — BUDESONIDE AND FORMOTEROL FUMARATE DIHYDRATE 80; 4.5 UG/1; UG/1
2 AEROSOL RESPIRATORY (INHALATION)
Status: ON HOLD | COMMUNITY
End: 2025-04-18 | Stop reason: HOSPADM

## 2025-04-17 RX ORDER — CARBOXYMETHYLCELLULOSE SODIUM 5 MG/ML
1 SOLUTION/ DROPS OPHTHALMIC 2 TIMES DAILY
COMMUNITY

## 2025-04-17 RX ORDER — VALSARTAN AND HYDROCHLOROTHIAZIDE 80; 12.5 MG/1; MG/1
1 TABLET, FILM COATED ORAL DAILY
Status: ON HOLD | COMMUNITY
End: 2025-04-18 | Stop reason: HOSPADM

## 2025-04-17 RX ORDER — ASPIRIN 81 MG/1
81 TABLET ORAL DAILY
Status: DISCONTINUED | OUTPATIENT
Start: 2025-04-17 | End: 2025-04-18 | Stop reason: HOSPADM

## 2025-04-17 RX ORDER — ATORVASTATIN CALCIUM 40 MG/1
40 TABLET, FILM COATED ORAL DAILY
COMMUNITY

## 2025-04-17 RX ORDER — SENNA AND DOCUSATE SODIUM 50; 8.6 MG/1; MG/1
2 TABLET, FILM COATED ORAL DAILY
Status: ON HOLD | COMMUNITY
End: 2025-04-18 | Stop reason: HOSPADM

## 2025-04-17 RX ADMIN — ENOXAPARIN SODIUM 40 MG: 100 INJECTION SUBCUTANEOUS at 21:46

## 2025-04-17 RX ADMIN — MICONAZOLE NITRATE: 2 POWDER TOPICAL at 21:49

## 2025-04-17 RX ADMIN — ACETAMINOPHEN 650 MG: 325 TABLET ORAL at 15:04

## 2025-04-17 RX ADMIN — SODIUM CHLORIDE, PRESERVATIVE FREE 10 ML: 5 INJECTION INTRAVENOUS at 21:47

## 2025-04-17 RX ADMIN — BUDESONIDE AND FORMOTEROL FUMARATE DIHYDRATE 2 PUFF: 80; 4.5 AEROSOL RESPIRATORY (INHALATION) at 20:52

## 2025-04-17 RX ADMIN — ASPIRIN 81 MG: 81 TABLET, COATED ORAL at 14:22

## 2025-04-17 RX ADMIN — SODIUM CHLORIDE: 0.9 INJECTION, SOLUTION INTRAVENOUS at 07:20

## 2025-04-17 RX ADMIN — BUDESONIDE AND FORMOTEROL FUMARATE DIHYDRATE 2 PUFF: 80; 4.5 AEROSOL RESPIRATORY (INHALATION) at 07:27

## 2025-04-17 ASSESSMENT — ENCOUNTER SYMPTOMS
COUGH: 0
SHORTNESS OF BREATH: 0
DIARRHEA: 0

## 2025-04-17 ASSESSMENT — PAIN SCALES - WONG BAKER
WONGBAKER_NUMERICALRESPONSE: NO HURT

## 2025-04-17 ASSESSMENT — PAIN SCALES - GENERAL
PAINLEVEL_OUTOF10: 0
PAINLEVEL_OUTOF10: 0
PAINLEVEL_OUTOF10: 3
PAINLEVEL_OUTOF10: 0

## 2025-04-17 ASSESSMENT — PAIN DESCRIPTION - LOCATION: LOCATION: TOE (COMMENT WHICH ONE)

## 2025-04-17 NOTE — PROGRESS NOTES
Spoke with patients brother Curly in regards to his admission. He was unable to provide what medications the patient takes. He state he would try to get a list together and bring them in the morning. Pt pharmacy is [facility] in [place]. I will attempt to contact them in the morning.     Electronically signed by Herminia Rodríguez RN on 4/16/2025 at 10:26 PM

## 2025-04-17 NOTE — PLAN OF CARE
Problem: Occupational Therapy - Adult  Goal: By Discharge: Performs self-care activities at highest level of function for planned discharge setting.  See evaluation for individualized goals.  4/16/2025 1515 by Elif Shanks, OTR/L  Outcome: Progressing     Problem: Safety - Adult  Goal: Free from fall injury  Outcome: Progressing     Problem: Discharge Planning  Goal: Discharge to home or other facility with appropriate resources  Outcome: Progressing     Problem: Skin/Tissue Integrity  Goal: Skin integrity remains intact  Description: 1.  Monitor for areas of redness and/or skin breakdown2.  Assess vascular access sites hourly3.  Every 4-6 hours minimum:  Change oxygen saturation probe site4.  Every 4-6 hours:  If on nasal continuous positive airway pressure, respiratory therapy assess nares and determine need for appliance change or resting period  Outcome: Progressing

## 2025-04-17 NOTE — CARE COORDINATION
This LSW visited patient at bedside this am. I completed social work consult with patient for discharge planning/placement.  Per patient and family request patients referral has been sent and accepted at [facility].  I spoke with Law liaison for [facility].   Per Estela insurance authorization has been initiated and is pending at this time.  Patient will transfer to [facility] once insurance authorization is approved.  Electronically signed by ALEX New on 4/17/25 at 10:21 AM EDT

## 2025-04-17 NOTE — PLAN OF CARE
Problem: Safety - Adult  Goal: Free from fall injury  4/17/2025 0914 by Chelsea Graham RN  Outcome: Progressing  4/16/2025 2247 by Herminia Rodríguez RN  Outcome: Progressing     Problem: Discharge Planning  Goal: Discharge to home or other facility with appropriate resources  4/17/2025 0914 by Chelsea Graham RN  Outcome: Progressing  4/16/2025 2247 by Herminia Rodríguez RN  Outcome: Progressing     Problem: Skin/Tissue Integrity  Goal: Skin integrity remains intact  Description: 1.  Monitor for areas of redness and/or skin breakdown2.  Assess vascular access sites hourly3.  Every 4-6 hours minimum:  Change oxygen saturation probe site4.  Every 4-6 hours:  If on nasal continuous positive airway pressure, respiratory therapy assess nares and determine need for appliance change or resting period  4/17/2025 0914 by Chelsea Graham RN  Outcome: Progressing  4/16/2025 2247 by Herminia Rodríguez RN  Outcome: Progressing

## 2025-04-17 NOTE — PROGRESS NOTES
Comprehensive Nutrition Assessment    Type and Reason for Visit:  Initial, Positive nutrition screen, Wound    Nutrition Recommendations/Plan:   Continue general diet as tolerated  Add clear oral supplement @ B, frozen @ L and high calorie high protein supplement @ D  Follow yo for acceptance  D/c ONS when po >75% meals     Malnutrition Assessment:  Malnutrition Status:  Insufficient data (04/17/25 7211)        Nutrition Assessment:    Unable to obtain detailed diet hx from pt due to confusion, per chart review, hx of signficant weight loss after hospitalization  then rehab stay ( 7/2024), Weight apperas stable x 6 months. Current diet appropriate, intake noted < 50%, will begin a variety of oral nutrition supplements to aid in meeting energy and protein needs    Nutrition Related Findings:    \"patient is poor historian has history of  (COPD, ETOH), traumatic brain injury as per ER physician.  Alert oriented x 1-2.  Was covered in feces when EMS arrived.  Patient was medically cleared by ER physician to be discharged to SNF but due to insurance patient will be admitted for placement.\" Wound Type: Wound Consult Pending (healing stage 2 pressure injury to the coccyx, traumatic wound to the left great toe, and a generalized resolving rash throughout abdomen and thighs/buttocks)       Current Nutrition Intake & Therapies:    Average Meal Intake: 1-25%  Average Supplements Intake: None Ordered  ADULT DIET; Regular    Anthropometric Measures:  Height: 180.3 cm (5' 11\")  Ideal Body Weight (IBW): 172 lbs (78 kg)    Admission Body Weight: 96.6 kg (213 lb)  Current Body Weight: 96.6 kg (213 lb) (* edema), 123.8 % IBW. Weight Source: Bed scale  Current BMI (kg/m2): 29.7  Usual Body Weight: 109.8 kg (242 lb) (( 4/2024), 211# ( 7/2024), 212# ( 1/2025))     % Weight Change (Calculated): -12                    BMI Categories: Overweight (BMI 25.0-29.9)    Estimated Daily Nutrient Needs:  Energy Requirements Based On:

## 2025-04-17 NOTE — PROGRESS NOTES
Wound Ostomy Continence Nurse  Consult Note       NAME:  Ranjith Rodriguez  MEDICAL RECORD NUMBER:  [id number]  AGE: 64 y.o.   GENDER: male  : 1960  TODAY'S DATE:  2025    Subjective   Reason for WOC Nurse Evaluation and Assessment: L Great Toe, Coccyx, & Dry Skin      Ranjith Rodriguez is a 64 y.o. male referred by:   [x] Physician  [] Nursing  [] Other:     Wound Identification:  Wound Type: pressure and traumatic  Contributing Factors: chronic pressure, decreased mobility, and shear force    Wound History: Patient admitted to [facility] with a healing stage 2 pressure injury to the coccyx, traumatic wound to the left great toe, and a generalized resolving rash throughout abdomen and thighs/buttocks  Current Wound Care Treatment:  recommending 1) continue pressure injury prevention interventions, including low air-loss mattress 2) Topical zinc paste to buttocks.coccyx areas, 2x daily and as needed for skin protection 3) Daily pain the left great toe with betadine 4) Topical moisturizing to LEs & abdomen for dry skin    Patient Goal of Care:  [x] Wound Healing  [] Odor Control  [] Palliative Care  [] Pain Control   [] Other:         PAST MEDICAL HISTORY        Diagnosis Date    Abnormal ankle brachial index (RAO)     Acute idiopathic gout of left wrist 2020    Acute kidney injury 2024    Alcoholic (HCC)-remote Hx 2015    In remission goes to AA daily--agrees not to overtake pain meds     Alcoholic polyneuropathy     Asthma     CAD (coronary artery disease)     [facility]    Chronic back pain greater than 3 months duration     Claudication in peripheral vascular disease     CN III palsy, right eye     Dr Perales    COPD (chronic obstructive pulmonary disease) ([facility]) 2014    DDD (degenerative disc disease), lumbar 2015    Elevated liver enzymes 2014    History of traumatic brain injury 2022    Hyperlipidemia     on med > 10 yrs    Hypertension     on meds x 1 yr    IgA  atherosclerosis    Embolism and thrombosis of arteries of lower extremity    Iliac artery thrombosis, bilateral (HCC)    Varicose veins of lower extremity with inflammation    Poor hygiene    Lung nodule    NSTEMI (non-ST elevated myocardial infarction) (HCC)    COPD exacerbation (HCC)    Shortness of breath    Chest pain    Nocturnal hypoxia    General weakness    At high risk for injury related to fall    Neuropathy    Abnormal glucose    Folate deficiency    Weakness       Measurements:  Wound 04/16/25 Coccyx (Active)   Wound Etiology Pressure Stage 2 04/17/25 0840   Dressing Status New dressing applied 04/17/25 0840   Wound Cleansed Cleansed with saline 04/17/25 0840   Dressing/Treatment Zinc paste 04/17/25 0840   Dressing Change Due 04/18/25 04/17/25 0840   Wound Length (cm) 1 cm 04/17/25 0840   Wound Width (cm) 0.5 cm 04/17/25 0840   Wound Depth (cm) 0 cm 04/17/25 0840   Wound Surface Area (cm^2) 0.5 cm^2 04/17/25 0840   Wound Volume (cm^3) 0 cm^3 04/17/25 0840   Wound Assessment Dry;Epithelialization 04/17/25 0840   Drainage Amount None (dry) 04/17/25 0840   Odor None 04/17/25 0840   Mary-wound Assessment Blanchable erythema 04/17/25 0840   Margins Defined edges 04/17/25 0840   Number of days: 1       Wound 04/16/25 Toe (Comment  which one) Left Great Toe (Active)   Wound Etiology Traumatic 04/17/25 0840   Dressing Status New dressing applied 04/17/25 0840   Wound Cleansed Betadine/povidone iodine 04/17/25 0840   Dressing/Treatment Betadine swabs/povidone iodine 04/17/25 0840   Dressing Change Due 04/18/25 04/17/25 0840   Wound Length (cm) 1 cm 04/17/25 0840   Wound Width (cm) 1.5 cm 04/17/25 0840   Wound Depth (cm) 0 cm 04/17/25 0840   Wound Surface Area (cm^2) 1.5 cm^2 04/17/25 0840   Wound Volume (cm^3) 0 cm^3 04/17/25 0840   Wound Assessment Dry;Eschar dry 04/17/25 0840   Drainage Amount None (dry) 04/17/25 0840   Odor None 04/17/25 0840   Mary-wound Assessment Intact 04/17/25 0840   Margins Defined edges

## 2025-04-17 NOTE — PROGRESS NOTES
Pt continues to get out of bed impulsively without calling. Avasys ordered and in place.     Electronically signed by Herminia Rodríguez RN on 4/17/2025 at 1:49 AM

## 2025-04-17 NOTE — PROGRESS NOTES
0915: Alert to person only. Repositioned. No complaints of pain, nausea, dizziness, or SOB. Avasys in place for pt safety. Reported that pt uses wheelchair at home. Bed alarm on. Call light within reach. Safety maintained.    1400: Home meds supplied by pt brother with copy of AVS from previous stay. Brother states he is unsure if supplied list is the most recent or accurate copy.     Electronically signed by Chelsea Graham RN on 4/17/2025 at 9:16 AM

## 2025-04-17 NOTE — CONSULTS
[facility], Department of Psychiatry  Behavioral Health Consult    REASON FOR CONSULT: Capacity eval    CONSULTING PHYSICIAN: Dr Gibson    History obtained from: Patient    HISTORY OF PRESENT ILLNESS:      The patient is a 64 y.o. male with multiple medical issues present with AMS  Pt was consulted for capacity to make decision    1. Unable to understand the nature and purpose of the proposed treatment  2. Unable to understand the risk benefit alternative options to the proposed treatment  3. Decision making process is not affected by depression, psychosis  4. Alert and oriented x 1  5. unable to retain information given   6. unable to arrive at the decision based on the information available           The patient is not currently receiving care for the above psychiatric illness.      Psychiatric Review of Systems     Unable to obtain details        Past Psychiatric History:  Unable to obtain details    Past Medical History:        Diagnosis Date    Abnormal ankle brachial index (RAO)     Acute idiopathic gout of left wrist 12/16/2020    Acute kidney injury 07/30/2024    Alcoholic (HCC)-remote Hx 08/28/2015    In remission goes to AA daily--agrees not to overtake pain meds     Alcoholic polyneuropathy     Asthma     CAD (coronary artery disease)     [facility]    Chronic back pain greater than 3 months duration     Claudication in peripheral vascular disease     CN III palsy, right eye 2017    Dr Perales    COPD (chronic obstructive pulmonary disease) (HCC) 2014    DDD (degenerative disc disease), lumbar 03/23/2015    Elevated liver enzymes 2014    History of traumatic brain injury 02/01/2022    Hyperlipidemia     on med > 10 yrs    Hypertension     on meds x 1 yr    IgA monoclonal gammopathy 2015    Dr Villegas    Incarcerated ventral hernia 08/12/2015    Insomnia, unspecified     LBP (low back pain)     Dr Swan    Neuropathy     Occlusion and stenosis of carotid artery without mention of cerebral infarction

## 2025-04-17 NOTE — PROGRESS NOTES
[facility]   Facility/Department: [address] MED SURG UNIT  Speech Language Pathology  Clinical Bedside Swallow Evaluation    NAME:Ranjith Rodriguez  : 1960 (64 y.o.)   [x]   confirmed    MRN: [id number]  ROOM: [place] [address]  ADMISSION DATE: 2025  PATIENT DIAGNOSIS(ES): Weakness [R53.1]  Failure to thrive in adult [R62.7]  Acute renal failure superimposed on chronic kidney disease, unspecified acute renal failure type, unspecified CKD stage [N17.9, N18.9]  Chief Complaint   Patient presents with    Failure To Thrive     Patient Active Problem List    Diagnosis Date Noted    COPD exacerbation (HCC) 2024    Shortness of breath 2024    High risk medication use - 18 OARRS PM&R, 18 OARRS PM&R, 02/15/17 Med Contract PM&R, 17 Urine Drug Screen: negative PM&R 2015    Prediabetes 2023    Chronic pain syndrome 10/21/2022    Weakness 2025    Nocturnal hypoxia 2025    General weakness 2025    At high risk for injury related to fall 2025    Neuropathy 2025    Abnormal glucose 2025    Folate deficiency 2025    NSTEMI (non-ST elevated myocardial infarction) (HCC) 2024    Chest pain 2024    Poor hygiene 10/22/2024    Lung nodule 10/22/2024    Atherosclerosis of native artery of both lower extremities with intermittent claudication 2023    Coronary atherosclerosis 2023    Embolism and thrombosis of arteries of lower extremity 2023    Iliac artery thrombosis, bilateral (HCC) 2023    Varicose veins of lower extremity with inflammation 2023    History of traumatic brain injury 2022    Idiopathic gout of left wrist 2020    Diverticulosis 2020    Insulin resistance 2020    PAD (peripheral artery disease) 2020    Polyp of colon     External hemorrhoid     Cerebrovascular disease 2019    Constipation 2019    Carotid stenosis, symptomatic w/o infarct, left

## 2025-04-17 NOTE — PROGRESS NOTES
Physical Therapy Med Surg Daily Treatment Note  Facility/Department: [address] MED SURG UNIT  Room: [place] [address]       NAME: Ranjith Rodriguez  : 1960 (64 y.o.)  MRN: [id number]  CODE STATUS: Full Code    Date of Service: 2025    Patient Diagnosis(es): Weakness [R53.1]  Failure to thrive in adult [R62.7]  Acute renal failure superimposed on chronic kidney disease, unspecified acute renal failure type, unspecified CKD stage [N17.9, N18.9]   Chief Complaint   Patient presents with    Failure To Thrive     Patient Active Problem List    Diagnosis Date Noted    COPD exacerbation (HCC) 2024    Shortness of breath 2024    High risk medication use - 18 OARRS PM&R, 18 OARRS PM&R, 02/15/17 Med Contract PM&R, 17 Urine Drug Screen: negative PM&R 2015    Prediabetes 2023    Chronic pain syndrome 10/21/2022    Weakness 2025    Nocturnal hypoxia 2025    General weakness 2025    At high risk for injury related to fall 2025    Neuropathy 2025    Abnormal glucose 2025    Folate deficiency 2025    NSTEMI (non-ST elevated myocardial infarction) (HCC) 2024    Chest pain 2024    Poor hygiene 10/22/2024    Lung nodule 10/22/2024    Atherosclerosis of native artery of both lower extremities with intermittent claudication 2023    Coronary atherosclerosis 2023    Embolism and thrombosis of arteries of lower extremity 2023    Iliac artery thrombosis, bilateral (HCC) 2023    Varicose veins of lower extremity with inflammation 2023    History of traumatic brain injury 2022    Idiopathic gout of left wrist 2020    Diverticulosis 2020    Insulin resistance 2020    PAD (peripheral artery disease) 2020    Polyp of colon     External hemorrhoid     Cerebrovascular disease 2019    Constipation 2019    Carotid stenosis, symptomatic w/o infarct, left 2019    Ptosis

## 2025-04-18 VITALS
WEIGHT: 213 LBS | HEIGHT: 71 IN | RESPIRATION RATE: 18 BRPM | HEART RATE: 72 BPM | OXYGEN SATURATION: 93 % | TEMPERATURE: 97.7 F | DIASTOLIC BLOOD PRESSURE: 48 MMHG | BODY MASS INDEX: 29.82 KG/M2 | SYSTOLIC BLOOD PRESSURE: 129 MMHG

## 2025-04-18 PROCEDURE — 97535 SELF CARE MNGMENT TRAINING: CPT

## 2025-04-18 PROCEDURE — 1210000000 HC MED SURG R&B

## 2025-04-18 PROCEDURE — 97116 GAIT TRAINING THERAPY: CPT

## 2025-04-18 PROCEDURE — 94761 N-INVAS EAR/PLS OXIMETRY MLT: CPT

## 2025-04-18 PROCEDURE — 6370000000 HC RX 637 (ALT 250 FOR IP): Performed by: INTERNAL MEDICINE

## 2025-04-18 PROCEDURE — 94640 AIRWAY INHALATION TREATMENT: CPT

## 2025-04-18 RX ADMIN — MICONAZOLE NITRATE: 2 POWDER TOPICAL at 07:38

## 2025-04-18 RX ADMIN — ASPIRIN 81 MG: 81 TABLET, COATED ORAL at 07:38

## 2025-04-18 RX ADMIN — BUDESONIDE AND FORMOTEROL FUMARATE DIHYDRATE 2 PUFF: 80; 4.5 AEROSOL RESPIRATORY (INHALATION) at 07:20

## 2025-04-18 ASSESSMENT — PAIN SCALES - WONG BAKER
WONGBAKER_NUMERICALRESPONSE: NO HURT
WONGBAKER_NUMERICALRESPONSE: NO HURT

## 2025-04-18 ASSESSMENT — PAIN SCALES - GENERAL: PAINLEVEL_OUTOF10: 0

## 2025-04-18 NOTE — PLAN OF CARE
Problem: Safety - Adult  Goal: Free from fall injury  4/18/2025 0839 by Chelsea Graham RN  Outcome: Progressing  4/17/2025 2305 by Cristina Barahona RN  Outcome: Progressing     Problem: Discharge Planning  Goal: Discharge to home or other facility with appropriate resources  4/18/2025 0839 by Chelsea Graham RN  Outcome: Progressing  4/17/2025 2305 by Cristina Barahona RN  Outcome: Progressing     Problem: Skin/Tissue Integrity  Goal: Skin integrity remains intact  Description: 1.  Monitor for areas of redness and/or skin breakdown2.  Assess vascular access sites hourly3.  Every 4-6 hours minimum:  Change oxygen saturation probe site4.  Every 4-6 hours:  If on nasal continuous positive airway pressure, respiratory therapy assess nares and determine need for appliance change or resting period  4/17/2025 2305 by Cristina Barahona RN  Outcome: Progressing     Problem: Pain  Goal: Verbalizes/displays adequate comfort level or baseline comfort level  4/17/2025 2305 by Cristina Barahona RN  Outcome: Progressing

## 2025-04-18 NOTE — PROGRESS NOTES
Physical Therapy Med Surg Daily Treatment Note  Facility/Department: [address] MED SURG UNIT  Room: [place] [address]       NAME: Ranjith Rodriguez  : 1960 (64 y.o.)  MRN: [id number]  CODE STATUS: Full Code    Date of Service: 2025    Patient Diagnosis(es): Weakness [R53.1]  Failure to thrive in adult [R62.7]  Acute renal failure superimposed on chronic kidney disease, unspecified acute renal failure type, unspecified CKD stage [N17.9, N18.9]   Chief Complaint   Patient presents with    Failure To Thrive     Patient Active Problem List    Diagnosis Date Noted    COPD exacerbation (HCC) 2024    Shortness of breath 2024    High risk medication use - 18 OARRS PM&R, 18 OARRS PM&R, 02/15/17 Med Contract PM&R, 17 Urine Drug Screen: negative PM&R 2015    Prediabetes 2023    Chronic pain syndrome 10/21/2022    Delirium 2025    Weakness 2025    Nocturnal hypoxia 2025    General weakness 2025    At high risk for injury related to fall 2025    Neuropathy 2025    Abnormal glucose 2025    Folate deficiency 2025    NSTEMI (non-ST elevated myocardial infarction) (HCC) 2024    Chest pain 2024    Poor hygiene 10/22/2024    Lung nodule 10/22/2024    Atherosclerosis of native artery of both lower extremities with intermittent claudication 2023    Coronary atherosclerosis 2023    Embolism and thrombosis of arteries of lower extremity 2023    Iliac artery thrombosis, bilateral (HCC) 2023    Varicose veins of lower extremity with inflammation 2023    History of traumatic brain injury 2022    Idiopathic gout of left wrist 2020    Diverticulosis 2020    Insulin resistance 2020    PAD (peripheral artery disease) 2020    Polyp of colon     External hemorrhoid     Cerebrovascular disease 2019    Constipation 2019    Carotid stenosis, symptomatic w/o infarct, left

## 2025-04-18 NOTE — PLAN OF CARE
Problem: SLP Adult - Impaired Swallowing  Goal: By Discharge: Advance to least restrictive diet without signs or symptoms of aspiration for planned discharge setting.  See evaluation for individualized goals.  4/17/2025 0949 by Beatrice Tilley SLP  Outcome: Progressing     Problem: Safety - Adult  Goal: Free from fall injury  4/17/2025 2305 by Cristina Barahona RN  Outcome: Progressing  4/17/2025 0914 by Chelsea Graham RN  Outcome: Progressing     Problem: Discharge Planning  Goal: Discharge to home or other facility with appropriate resources  4/17/2025 2305 by Cristina Barahona RN  Outcome: Progressing  4/17/2025 0914 by Chelsea Graham RN  Outcome: Progressing     Problem: Skin/Tissue Integrity  Goal: Skin integrity remains intact  Description: 1.  Monitor for areas of redness and/or skin breakdown2.  Assess vascular access sites hourly3.  Every 4-6 hours minimum:  Change oxygen saturation probe site4.  Every 4-6 hours:  If on nasal continuous positive airway pressure, respiratory therapy assess nares and determine need for appliance change or resting period  4/17/2025 2305 by Cristina Barahona RN  Outcome: Progressing  4/17/2025 0914 by Chelsea Graham RN  Outcome: Progressing     Problem: Pain  Goal: Verbalizes/displays adequate comfort level or baseline comfort level  Outcome: Progressing     Problem: Nutrition Deficit:  Goal: Optimize nutritional status  4/17/2025 2305 by Cristina Barahona RN  Outcome: Progressing  4/17/2025 1431 by Lucy Soliz RD, LD  Flowsheets (Taken 4/17/2025 1431)  Nutrient intake appropriate for improving, restoring, or maintaining nutritional needs:   Monitor oral intake, labs, and treatment plans   Assess nutritional status and recommend course of action   Recommend appropriate diets, oral nutritional supplements, and vitamin/mineral supplements

## 2025-04-18 NOTE — PROGRESS NOTES
0840: Alert and oriented to person only, cooperative. Repositioned. Zinc to bottom. Dressing to L great toe CDI. No complaints of pain, nausea, dizziness, or pain. Bed alarm on. Call light within reach. Safety maintained.    1020: Call to vijay Rawls brother, for update of 1200  time to [place].     1145: Belongings, including glasses and home meds, and clothing at bedside for transfer. AVS given to , Soraya, for packet upon transport.     1400: Pt picked up at this time. Belongings went to facility with patient.       Electronically signed by Chelsea Graham RN on 4/18/2025 at 8:41 AM

## 2025-04-18 NOTE — CARE COORDINATION
This LSW received notification that patients insurance has given authorization for SNF transfer.  Patient will transfer to [facility].  I arranged transportation via [facility].  Transport is scheduled for 1:00pm.  Patient, family, ESTHER Tran and [place] at [facility] are all aware.  I have completed [id number] for SNF transfer.  Electronically signed by ALEX New on 4/18/25 at 10:24 AM EDT

## 2025-04-18 NOTE — DISCHARGE SUMMARY
Discharge Summary    Date: 4/18/2025  Patient Name: Ranjith Rodriguez    YOB: 1960     Age: 64 y.o.    Admit Date: 4/16/2025  Discharge Date: 4/18/2025  Discharge Condition: Fair    Admission Diagnosis  Weakness [R53.1];Failure to thrive in adult [R62.7];Acute renal failure superimposed on chronic kidney disease, unspecified acute renal failure type, unspecified CKD stage [N17.9, N18.9]      Discharge Diagnosis  Principal Problem:    Weakness  Active Problems:    Delirium  Resolved Problems:    * No resolved hospital problems. *      Hospital Stay  Narrative of Hospital Course:  Patient comes in for failure to thrive was discharged to  [facility] in  stable condition I will personally follow the patient over the.    Consultants:  IP CONSULT TO SOCIAL WORK  IP CONSULT TO PSYCHIATRY    Surgeries/procedures Performed:      Treatments:            Discharge Plan/Disposition:  Home    Hospital/Incidental Findings Requiring Follow Up:    Patient Instructions:    Diet:    Activity:  For number of days (if applicable):      Other Instructions:    Provider Follow-Up:   No follow-ups on file.     Significant Diagnostic Studies:    Recent Labs:  Admission on 04/16/2025  Blood Culture, Routine                        Date: 04/16/2025  Value: No Growth to date.  Any change in status will be *                       Status: Preliminary  Culture, Blood 2                              Date: 04/16/2025  Value: No Growth to date.  Any change in status will be *                       Status: Preliminary  WBC                                           Date: 04/16/2025  Value: 10.8        Ref range: 4.8 - 10.8 K/uL    Status: Final  RBC                                           Date: 04/16/2025  Value: 4.90        Ref range: 4.70 - 6.10 M/uL   Status: Final  Hemoglobin                                    Date: 04/16/2025  Value: 14.7        Ref range: 14.0 - 18.0 g/dL   Status: Final  Hematocrit                                    Date:                         Date: 04/16/2025  Value: 64          Ref range: degrees            Status: Final  Lactic Acid, Sepsis                           Date: 04/16/2025  Value: 1.3         Ref range: 0.5 - 1.9 mmol/L   Status: Final  Lactic Acid, Sepsis                           Date: 04/16/2025  Value: 0.8         Ref range: 0.5 - 1.9 mmol/L   Status: Final  Magnesium                                     Date: 04/16/2025  Value: 2.3         Ref range: 1.7 - 2.4 mg/dL    Status: Final  Procalcitonin                                 Date: 04/16/2025  Value: 0.09        Ref range: 0.00 - 0.15 ng/mL  Status: Final                Comment: Suspected Sepsis:  Low likelihood of sepsis  <.50 ng/mL    Increased likelihood of sepsis 0.50-2.00 ng/mL  Antibiotics encouraged    High risk of sepsis/shock   >2.00 ng/mL  Antibiotics strongly encouraged    Suspected Lower Respiratory Tract Infections:  Low likelihood of bacterial infection  <0.24 ng/mL    Increased likelihood of bacterial infection >0.24 ng/mL  Antibiotics encouraged    With successful antibiotic therapy, PCT levels should decrease  rapidly. (Half-life of 24 to 36 hours.)    Procalcitonin values from samples collected within the first  6 hours of systemic infection may still be low.  Retesting may be indicated.  Values from day 1 and day 4 can be entered into the Change in  Procalcitonin Calculator to determine the patient's  Mortality Risk Prognosis  (www.[facility]-pct-calculator.Confluent ([employer] / [employer]))    In healthy neonates, plasma Procalcitonin (PCT) concentrations  increase gradually after birth, reaching peak values at about  24 hours of age then decrease to normal values below 0.5                          ng/mL  by 48-72 hours of age.    Color, UA                                     Date: 04/16/2025  Value: Yellow      Ref range: Straw/Yellow       Status: Final  Clarity, UA                                   Date: 04/16/2025  Value: Clear       Ref range: Clear              Status:

## 2025-04-21 LAB
BACTERIA BLD CULT ORG #2: NORMAL
BACTERIA BLD CULT: NORMAL

## 2025-04-21 NOTE — PROGRESS NOTES
Physical Therapy  Facility/Department: [facility] MED SURG W474/W474-01  Physical Therapy Discharge      NAME: Ranjith Rodriguez    : 1960 (64 y.o.)  MRN: [id number]    Account: [id number]  Gender: male      Patient has been discharged from acute care hospital. DC patient from current PT program.      Electronically signed by Sherrie Marley PT on 25 at 8:26 AM EDT

## 2025-04-28 ENCOUNTER — TELEPHONE (OUTPATIENT)
Age: 65
End: 2025-04-28

## 2025-06-18 ENCOUNTER — APPOINTMENT (OUTPATIENT)
Age: 65
End: 2025-06-18
Payer: MEDICARE

## 2025-06-18 ENCOUNTER — APPOINTMENT (OUTPATIENT)
Dept: GENERAL RADIOLOGY | Age: 65
End: 2025-06-18
Payer: MEDICARE

## 2025-06-18 ENCOUNTER — HOSPITAL ENCOUNTER (OUTPATIENT)
Age: 65
Setting detail: OBSERVATION
Discharge: NURSING, REHAB OR LONG-TERM CARE | End: 2025-06-19
Attending: INTERNAL MEDICINE | Admitting: INTERNAL MEDICINE
Payer: MEDICARE

## 2025-06-18 DIAGNOSIS — R79.89 ELEVATED TROPONIN: Primary | ICD-10-CM

## 2025-06-18 DIAGNOSIS — I10 ESSENTIAL HYPERTENSION: ICD-10-CM

## 2025-06-18 DIAGNOSIS — I21.4 NSTEMI (NON-ST ELEVATED MYOCARDIAL INFARCTION) (HCC): ICD-10-CM

## 2025-06-18 DIAGNOSIS — I50.9 CONGESTIVE HEART FAILURE, UNSPECIFIED HF CHRONICITY, UNSPECIFIED HEART FAILURE TYPE (HCC): ICD-10-CM

## 2025-06-18 PROBLEM — I25.5 ISCHEMIC CARDIOMYOPATHY: Status: ACTIVE | Noted: 2025-06-18

## 2025-06-18 PROBLEM — I24.9 ACUTE CORONARY SYNDROME (HCC): Status: ACTIVE | Noted: 2025-06-18

## 2025-06-18 LAB
ALBUMIN SERPL-MCNC: 3.5 G/DL (ref 3.5–4.6)
ALP SERPL-CCNC: 74 U/L (ref 35–104)
ALT SERPL-CCNC: 49 U/L (ref 0–41)
AMPHET UR QL SCN: NORMAL
ANION GAP SERPL CALCULATED.3IONS-SCNC: 10 MEQ/L (ref 9–15)
AST SERPL-CCNC: 60 U/L (ref 0–40)
BARBITURATES UR QL SCN: NORMAL
BASOPHILS # BLD: 0 K/UL (ref 0–0.2)
BASOPHILS NFR BLD: 0.4 %
BENZODIAZ UR QL SCN: NORMAL
BILIRUB SERPL-MCNC: 0.6 MG/DL (ref 0.2–0.7)
BILIRUB UR QL STRIP: NEGATIVE
BNP BLD-MCNC: 6873 PG/ML
BUN SERPL-MCNC: 20 MG/DL (ref 8–23)
CALCIUM SERPL-MCNC: 9.6 MG/DL (ref 8.5–9.9)
CANNABINOIDS UR QL SCN: NORMAL
CHLORIDE SERPL-SCNC: 103 MEQ/L (ref 95–107)
CHP ED QC CHECK: NORMAL
CLARITY UR: CLEAR
CO2 SERPL-SCNC: 28 MEQ/L (ref 20–31)
COCAINE UR QL SCN: NORMAL
COLOR UR: YELLOW
CREAT SERPL-MCNC: 1.14 MG/DL (ref 0.7–1.2)
DRUG SCREEN COMMENT UR-IMP: NORMAL
ECHO BSA: 2.06 M2
EOSINOPHIL # BLD: 0.2 K/UL (ref 0–0.7)
EOSINOPHIL NFR BLD: 1.5 %
ERYTHROCYTE [DISTWIDTH] IN BLOOD BY AUTOMATED COUNT: 14.3 % (ref 11.5–14.5)
ETHANOL PERCENT: NORMAL G/DL
ETHANOLAMINE SERPL-MCNC: <10 MG/DL (ref 0–0.08)
FENTANYL SCREEN, URINE: NORMAL
GLOBULIN SER CALC-MCNC: 3.6 G/DL (ref 2.3–3.5)
GLUCOSE BLD-MCNC: 90 MG/DL
GLUCOSE BLD-MCNC: 90 MG/DL (ref 70–99)
GLUCOSE SERPL-MCNC: 85 MG/DL (ref 70–99)
GLUCOSE UR STRIP-MCNC: NEGATIVE MG/DL
HCT VFR BLD AUTO: 38.7 % (ref 42–52)
HGB BLD-MCNC: 12.2 G/DL (ref 14–18)
HGB UR QL STRIP: NEGATIVE
KETONES UR STRIP-MCNC: NEGATIVE MG/DL
LACTATE BLDV-SCNC: 1.3 MMOL/L (ref 0.5–2.2)
LEUKOCYTE ESTERASE UR QL STRIP: NEGATIVE
LYMPHOCYTES # BLD: 1.5 K/UL (ref 1–4.8)
LYMPHOCYTES NFR BLD: 14.5 %
MCH RBC QN AUTO: 28.6 PG (ref 27–31.3)
MCHC RBC AUTO-ENTMCNC: 31.5 % (ref 33–37)
MCV RBC AUTO: 90.6 FL (ref 79–92.2)
METHADONE UR QL SCN: NORMAL
MONOCYTES # BLD: 1 K/UL (ref 0.2–0.8)
MONOCYTES NFR BLD: 9.8 %
NEUTROPHILS # BLD: 7.4 K/UL (ref 1.4–6.5)
NEUTS SEG NFR BLD: 73.2 %
NITRITE UR QL STRIP: NEGATIVE
OPIATES UR QL SCN: NORMAL
OXYCODONE UR QL SCN: NORMAL
PCP UR QL SCN: NORMAL
PERFORMED ON: NORMAL
PH UR STRIP: 6.5 [PH] (ref 5–9)
PLATELET # BLD AUTO: 446 K/UL (ref 130–400)
POTASSIUM SERPL-SCNC: 3.7 MEQ/L (ref 3.4–4.9)
PROPOXYPH UR QL SCN: NORMAL
PROT SERPL-MCNC: 7.1 G/DL (ref 6.3–8)
PROT UR STRIP-MCNC: NEGATIVE MG/DL
RBC # BLD AUTO: 4.27 M/UL (ref 4.7–6.1)
SODIUM SERPL-SCNC: 141 MEQ/L (ref 135–144)
SP GR UR STRIP: 1.02 (ref 1–1.03)
TROPONIN, HIGH SENSITIVITY: 126 NG/L (ref 0–19)
TROPONIN, HIGH SENSITIVITY: 127 NG/L (ref 0–19)
TROPONIN, HIGH SENSITIVITY: 136 NG/L (ref 0–19)
TSH SERPL-MCNC: 0.65 UIU/ML (ref 0.44–3.86)
URINE REFLEX TO CULTURE: NORMAL
UROBILINOGEN UR STRIP-ACNC: 1 E.U./DL
WBC # BLD AUTO: 10.1 K/UL (ref 4.8–10.8)

## 2025-06-18 PROCEDURE — 6360000004 HC RX CONTRAST MEDICATION: Performed by: INTERNAL MEDICINE

## 2025-06-18 PROCEDURE — 6360000002 HC RX W HCPCS

## 2025-06-18 PROCEDURE — 82077 ASSAY SPEC XCP UR&BREATH IA: CPT

## 2025-06-18 PROCEDURE — 7100000010 HC PHASE II RECOVERY - FIRST 15 MIN: Performed by: INTERNAL MEDICINE

## 2025-06-18 PROCEDURE — 7100000011 HC PHASE II RECOVERY - ADDTL 15 MIN: Performed by: INTERNAL MEDICINE

## 2025-06-18 PROCEDURE — 6360000002 HC RX W HCPCS: Performed by: INTERNAL MEDICINE

## 2025-06-18 PROCEDURE — G0378 HOSPITAL OBSERVATION PER HR: HCPCS

## 2025-06-18 PROCEDURE — 93458 L HRT ARTERY/VENTRICLE ANGIO: CPT | Performed by: INTERNAL MEDICINE

## 2025-06-18 PROCEDURE — 0913T PRQ TCAT THER RX NTRAC BALO1: CPT | Performed by: INTERNAL MEDICINE

## 2025-06-18 PROCEDURE — 81003 URINALYSIS AUTO W/O SCOPE: CPT

## 2025-06-18 PROCEDURE — 83605 ASSAY OF LACTIC ACID: CPT

## 2025-06-18 PROCEDURE — 83880 ASSAY OF NATRIURETIC PEPTIDE: CPT

## 2025-06-18 PROCEDURE — 99223 1ST HOSP IP/OBS HIGH 75: CPT | Performed by: INTERNAL MEDICINE

## 2025-06-18 PROCEDURE — 6370000000 HC RX 637 (ALT 250 FOR IP): Performed by: INTERNAL MEDICINE

## 2025-06-18 PROCEDURE — C1725 CATH, TRANSLUMIN NON-LASER: HCPCS | Performed by: INTERNAL MEDICINE

## 2025-06-18 PROCEDURE — C1894 INTRO/SHEATH, NON-LASER: HCPCS | Performed by: INTERNAL MEDICINE

## 2025-06-18 PROCEDURE — 93005 ELECTROCARDIOGRAM TRACING: CPT

## 2025-06-18 PROCEDURE — 2500000003 HC RX 250 WO HCPCS

## 2025-06-18 PROCEDURE — 99285 EMERGENCY DEPT VISIT HI MDM: CPT

## 2025-06-18 PROCEDURE — 92920 PRQ TRLUML C ANGIOP 1ART&/BR: CPT | Performed by: INTERNAL MEDICINE

## 2025-06-18 PROCEDURE — C1769 GUIDE WIRE: HCPCS | Performed by: INTERNAL MEDICINE

## 2025-06-18 PROCEDURE — 92928 PRQ TCAT PLMT NTRAC ST 1 LES: CPT | Performed by: INTERNAL MEDICINE

## 2025-06-18 PROCEDURE — 36415 COLL VENOUS BLD VENIPUNCTURE: CPT

## 2025-06-18 PROCEDURE — 80053 COMPREHEN METABOLIC PANEL: CPT

## 2025-06-18 PROCEDURE — 51701 INSERT BLADDER CATHETER: CPT

## 2025-06-18 PROCEDURE — C8929 TTE W OR WO FOL WCON,DOPPLER: HCPCS

## 2025-06-18 PROCEDURE — 6370000000 HC RX 637 (ALT 250 FOR IP)

## 2025-06-18 PROCEDURE — 2709999900 HC NON-CHARGEABLE SUPPLY: Performed by: INTERNAL MEDICINE

## 2025-06-18 PROCEDURE — 85347 COAGULATION TIME ACTIVATED: CPT

## 2025-06-18 PROCEDURE — 85025 COMPLETE CBC W/AUTO DIFF WBC: CPT

## 2025-06-18 PROCEDURE — 84443 ASSAY THYROID STIM HORMONE: CPT

## 2025-06-18 PROCEDURE — 99153 MOD SED SAME PHYS/QHP EA: CPT | Performed by: INTERNAL MEDICINE

## 2025-06-18 PROCEDURE — 99152 MOD SED SAME PHYS/QHP 5/>YRS: CPT | Performed by: INTERNAL MEDICINE

## 2025-06-18 PROCEDURE — 96365 THER/PROPH/DIAG IV INF INIT: CPT

## 2025-06-18 PROCEDURE — C1887 CATHETER, GUIDING: HCPCS | Performed by: INTERNAL MEDICINE

## 2025-06-18 PROCEDURE — 92978 ENDOLUMINL IVUS OCT C 1ST: CPT | Performed by: INTERNAL MEDICINE

## 2025-06-18 PROCEDURE — APPSS45 APP SPLIT SHARED TIME 31-45 MINUTES

## 2025-06-18 PROCEDURE — 71045 X-RAY EXAM CHEST 1 VIEW: CPT

## 2025-06-18 PROCEDURE — 80307 DRUG TEST PRSMV CHEM ANLYZR: CPT

## 2025-06-18 PROCEDURE — C1753 CATH, INTRAVAS ULTRASOUND: HCPCS | Performed by: INTERNAL MEDICINE

## 2025-06-18 PROCEDURE — 84484 ASSAY OF TROPONIN QUANT: CPT

## 2025-06-18 RX ORDER — ONDANSETRON 2 MG/ML
4 INJECTION INTRAMUSCULAR; INTRAVENOUS EVERY 6 HOURS PRN
Status: DISCONTINUED | OUTPATIENT
Start: 2025-06-18 | End: 2025-06-19 | Stop reason: HOSPADM

## 2025-06-18 RX ORDER — POLYETHYLENE GLYCOL 3350 17 G/17G
17 POWDER, FOR SOLUTION ORAL DAILY PRN
Status: DISCONTINUED | OUTPATIENT
Start: 2025-06-18 | End: 2025-06-19 | Stop reason: HOSPADM

## 2025-06-18 RX ORDER — SODIUM CHLORIDE 9 MG/ML
INJECTION, SOLUTION INTRAVENOUS PRN
Status: DISCONTINUED | OUTPATIENT
Start: 2025-06-18 | End: 2025-06-18 | Stop reason: HOSPADM

## 2025-06-18 RX ORDER — ENOXAPARIN SODIUM 100 MG/ML
1 INJECTION SUBCUTANEOUS ONCE
Status: DISCONTINUED | OUTPATIENT
Start: 2025-06-18 | End: 2025-06-19 | Stop reason: HOSPADM

## 2025-06-18 RX ORDER — TICAGRELOR 90 MG/1
TABLET, FILM COATED ORAL PRN
Status: DISCONTINUED | OUTPATIENT
Start: 2025-06-18 | End: 2025-06-18 | Stop reason: HOSPADM

## 2025-06-18 RX ORDER — MAGNESIUM SULFATE IN WATER 40 MG/ML
2000 INJECTION, SOLUTION INTRAVENOUS ONCE
Status: COMPLETED | OUTPATIENT
Start: 2025-06-18 | End: 2025-06-18

## 2025-06-18 RX ORDER — POTASSIUM CHLORIDE 7.45 MG/ML
10 INJECTION INTRAVENOUS PRN
Status: DISCONTINUED | OUTPATIENT
Start: 2025-06-18 | End: 2025-06-19 | Stop reason: HOSPADM

## 2025-06-18 RX ORDER — SODIUM CHLORIDE 9 MG/ML
INJECTION, SOLUTION INTRAVENOUS PRN
Status: DISCONTINUED | OUTPATIENT
Start: 2025-06-18 | End: 2025-06-19 | Stop reason: HOSPADM

## 2025-06-18 RX ORDER — QUETIAPINE FUMARATE 100 MG/1
100 TABLET, FILM COATED ORAL NIGHTLY
COMMUNITY
Start: 2025-05-30

## 2025-06-18 RX ORDER — QUETIAPINE FUMARATE 50 MG/1
50 TABLET, FILM COATED ORAL EVERY MORNING
COMMUNITY
Start: 2025-05-26

## 2025-06-18 RX ORDER — MAGNESIUM SULFATE IN WATER 40 MG/ML
2000 INJECTION, SOLUTION INTRAVENOUS PRN
Status: DISCONTINUED | OUTPATIENT
Start: 2025-06-18 | End: 2025-06-19 | Stop reason: HOSPADM

## 2025-06-18 RX ORDER — PHENYLEPHRINE HYDROCHLORIDE 10 MG/ML
INJECTION INTRAVENOUS PRN
Status: DISCONTINUED | OUTPATIENT
Start: 2025-06-18 | End: 2025-06-18 | Stop reason: HOSPADM

## 2025-06-18 RX ORDER — ACETAMINOPHEN 650 MG/1
650 SUPPOSITORY RECTAL EVERY 6 HOURS PRN
Status: DISCONTINUED | OUTPATIENT
Start: 2025-06-18 | End: 2025-06-19 | Stop reason: HOSPADM

## 2025-06-18 RX ORDER — ASPIRIN 81 MG/1
81 TABLET ORAL DAILY
Status: DISCONTINUED | OUTPATIENT
Start: 2025-06-19 | End: 2025-06-19 | Stop reason: HOSPADM

## 2025-06-18 RX ORDER — ONDANSETRON 4 MG/1
4 TABLET, ORALLY DISINTEGRATING ORAL EVERY 8 HOURS PRN
Status: DISCONTINUED | OUTPATIENT
Start: 2025-06-18 | End: 2025-06-19 | Stop reason: HOSPADM

## 2025-06-18 RX ORDER — SODIUM CHLORIDE 0.9 % (FLUSH) 0.9 %
5-40 SYRINGE (ML) INJECTION EVERY 12 HOURS SCHEDULED
Status: DISCONTINUED | OUTPATIENT
Start: 2025-06-18 | End: 2025-06-19 | Stop reason: HOSPADM

## 2025-06-18 RX ORDER — SODIUM CHLORIDE 0.9 % (FLUSH) 0.9 %
5-40 SYRINGE (ML) INJECTION EVERY 12 HOURS SCHEDULED
Status: DISCONTINUED | OUTPATIENT
Start: 2025-06-18 | End: 2025-06-18 | Stop reason: HOSPADM

## 2025-06-18 RX ORDER — IOPAMIDOL 612 MG/ML
INJECTION, SOLUTION INTRAVASCULAR PRN
Status: DISCONTINUED | OUTPATIENT
Start: 2025-06-18 | End: 2025-06-18 | Stop reason: HOSPADM

## 2025-06-18 RX ORDER — SODIUM CHLORIDE 0.9 % (FLUSH) 0.9 %
5-40 SYRINGE (ML) INJECTION PRN
Status: DISCONTINUED | OUTPATIENT
Start: 2025-06-18 | End: 2025-06-18 | Stop reason: HOSPADM

## 2025-06-18 RX ORDER — HEPARIN 100 UNIT/ML
SYRINGE INTRAVENOUS PRN
Status: DISCONTINUED | OUTPATIENT
Start: 2025-06-18 | End: 2025-06-18 | Stop reason: HOSPADM

## 2025-06-18 RX ORDER — METOPROLOL SUCCINATE 25 MG/1
25 TABLET, EXTENDED RELEASE ORAL DAILY
Status: DISCONTINUED | OUTPATIENT
Start: 2025-06-18 | End: 2025-06-19 | Stop reason: HOSPADM

## 2025-06-18 RX ORDER — SODIUM CHLORIDE 9 MG/ML
INJECTION, SOLUTION INTRAVENOUS CONTINUOUS
Status: ACTIVE | OUTPATIENT
Start: 2025-06-18 | End: 2025-06-18

## 2025-06-18 RX ORDER — ACETAMINOPHEN 325 MG/1
650 TABLET ORAL EVERY 6 HOURS PRN
Status: DISCONTINUED | OUTPATIENT
Start: 2025-06-18 | End: 2025-06-19 | Stop reason: HOSPADM

## 2025-06-18 RX ORDER — FENTANYL CITRATE 50 UG/ML
INJECTION, SOLUTION INTRAMUSCULAR; INTRAVENOUS PRN
Status: DISCONTINUED | OUTPATIENT
Start: 2025-06-18 | End: 2025-06-18 | Stop reason: HOSPADM

## 2025-06-18 RX ORDER — MIDAZOLAM HYDROCHLORIDE 1 MG/ML
INJECTION, SOLUTION INTRAMUSCULAR; INTRAVENOUS PRN
Status: DISCONTINUED | OUTPATIENT
Start: 2025-06-18 | End: 2025-06-18 | Stop reason: HOSPADM

## 2025-06-18 RX ORDER — SODIUM CHLORIDE 0.9 % (FLUSH) 0.9 %
5-40 SYRINGE (ML) INJECTION PRN
Status: DISCONTINUED | OUTPATIENT
Start: 2025-06-18 | End: 2025-06-19 | Stop reason: HOSPADM

## 2025-06-18 RX ORDER — ASPIRIN 81 MG/1
324 TABLET, CHEWABLE ORAL ONCE
Status: COMPLETED | OUTPATIENT
Start: 2025-06-18 | End: 2025-06-18

## 2025-06-18 RX ORDER — POTASSIUM CHLORIDE 1500 MG/1
40 TABLET, EXTENDED RELEASE ORAL PRN
Status: DISCONTINUED | OUTPATIENT
Start: 2025-06-18 | End: 2025-06-19 | Stop reason: HOSPADM

## 2025-06-18 RX ORDER — MORPHINE SULFATE 4 MG/ML
1 INJECTION, SOLUTION INTRAMUSCULAR; INTRAVENOUS ONCE
Status: COMPLETED | OUTPATIENT
Start: 2025-06-18 | End: 2025-06-18

## 2025-06-18 RX ORDER — TICAGRELOR 90 MG/1
90 TABLET, FILM COATED ORAL 2 TIMES DAILY
Status: DISCONTINUED | OUTPATIENT
Start: 2025-06-18 | End: 2025-06-19 | Stop reason: HOSPADM

## 2025-06-18 RX ORDER — HALOPERIDOL 0.5 MG/1
0.5 TABLET ORAL 2 TIMES DAILY
COMMUNITY
Start: 2025-05-30

## 2025-06-18 RX ORDER — ATORVASTATIN CALCIUM 40 MG/1
40 TABLET, FILM COATED ORAL NIGHTLY
Status: DISCONTINUED | OUTPATIENT
Start: 2025-06-18 | End: 2025-06-19 | Stop reason: HOSPADM

## 2025-06-18 RX ORDER — DIVALPROEX SODIUM 125 MG/1
125 TABLET, DELAYED RELEASE ORAL NIGHTLY
COMMUNITY
Start: 2025-06-02

## 2025-06-18 RX ORDER — HEPARIN SODIUM 1000 [USP'U]/ML
INJECTION, SOLUTION INTRAVENOUS; SUBCUTANEOUS PRN
Status: DISCONTINUED | OUTPATIENT
Start: 2025-06-18 | End: 2025-06-18 | Stop reason: HOSPADM

## 2025-06-18 RX ADMIN — MAGNESIUM SULFATE HEPTAHYDRATE 2000 MG: 40 INJECTION, SOLUTION INTRAVENOUS at 12:00

## 2025-06-18 RX ADMIN — ATORVASTATIN CALCIUM 40 MG: 40 TABLET, FILM COATED ORAL at 21:37

## 2025-06-18 RX ADMIN — MORPHINE SULFATE 1 MG: 4 INJECTION, SOLUTION INTRAMUSCULAR; INTRAVENOUS at 16:26

## 2025-06-18 RX ADMIN — ASPIRIN 324 MG: 81 TABLET, CHEWABLE ORAL at 12:00

## 2025-06-18 RX ADMIN — SODIUM CHLORIDE, PRESERVATIVE FREE 10 ML: 5 INJECTION INTRAVENOUS at 21:36

## 2025-06-18 RX ADMIN — TICAGRELOR 90 MG: 90 TABLET ORAL at 21:36

## 2025-06-18 RX ADMIN — METOPROLOL SUCCINATE 25 MG: 25 TABLET, EXTENDED RELEASE ORAL at 21:37

## 2025-06-18 ASSESSMENT — PAIN DESCRIPTION - ORIENTATION: ORIENTATION: RIGHT

## 2025-06-18 ASSESSMENT — PAIN DESCRIPTION - DESCRIPTORS: DESCRIPTORS: ACHING

## 2025-06-18 ASSESSMENT — ENCOUNTER SYMPTOMS: SHORTNESS OF BREATH: 0

## 2025-06-18 ASSESSMENT — PAIN - FUNCTIONAL ASSESSMENT: PAIN_FUNCTIONAL_ASSESSMENT: ADULT NONVERBAL PAIN SCALE (NPVS)

## 2025-06-18 ASSESSMENT — PAIN DESCRIPTION - LOCATION: LOCATION: ARM

## 2025-06-18 ASSESSMENT — PAIN SCALES - GENERAL: PAINLEVEL_OUTOF10: 10

## 2025-06-18 NOTE — PROGRESS NOTES
Aydee López  [address] [place] [address] [place] [address]    April 16, 2025     Dear Ms. López:    Below are the results from your recent visit: Stable labs    Resulted Orders   CBC Auto Differential   Result Value Ref Range    WBC 5.99 3.40 - 10.80 10*3/mm3    RBC 4.91 3.77 - 5.28 10*6/mm3    Hemoglobin 12.3 12.0 - 15.9 g/dL    Hematocrit 39.1 34.0 - 46.6 %    MCV 79.6 79.0 - 97.0 fL    MCH 25.1 (L) 26.6 - 33.0 pg    MCHC 31.5 31.5 - 35.7 g/dL    RDW 14.3 12.3 - 15.4 %    RDW-SD 40.6 37.0 - 54.0 fl    MPV 9.3 6.0 - 12.0 fL    Platelets 393 140 - 450 10*3/mm3    Neutrophil % 60.6 42.7 - 76.0 %    Lymphocyte % 25.9 19.6 - 45.3 %    Monocyte % 9.2 5.0 - 12.0 %    Eosinophil % 3.0 0.3 - 6.2 %    Basophil % 1.0 0.0 - 1.5 %    Immature Grans % 0.3 0.0 - 0.5 %    Neutrophils, Absolute 3.63 1.70 - 7.00 10*3/mm3    Lymphocytes, Absolute 1.55 0.70 - 3.10 10*3/mm3    Monocytes, Absolute 0.55 0.10 - 0.90 10*3/mm3    Eosinophils, Absolute 0.18 0.00 - 0.40 10*3/mm3    Basophils, Absolute 0.06 0.00 - 0.20 10*3/mm3    Immature Grans, Absolute 0.02 0.00 - 0.05 10*3/mm3    nRBC 0.0 0.0 - 0.2 /100 WBC   Peripheral Blood Smear   Result Value Ref Range    Pathology Review Yes    Pathology Consultation   Result Value Ref Range    Final Diagnosis       Peripheral Blood, Smear (Two Preparations):   A. Erythrocyte morphology within normal limits.    B. White blood cell morphology within normal limits.   C. Platelet morphology within normal limits.  D. See comment.    Comment: The peripheral blood smear is largely unremarkable with no significant cytomorphologic changes.  CBC indices are reviewed.      Clinical correlation is recommended.      Case Report       Surgical Pathology Report                         Case: [id number]                                  Authorizing Provider:  Jessica Tolliver APRN     Collected:           04/14/2025 02:13 PM          Ordering Location:     [facility]     Received:            04/15/2025  Report received from Yari SANTANA from ER> Consent obtained VIA phone by brother. Patient alert to self and place Patient prepped for procedure   02:22 AM                                 [facility]                                                        Pathologist:           Lela Mathis MD                                                        Specimen:    Arm, Left, peripheral blood smear                                                                 If you have any questions or concerns, please don't hesitate to call.         Sincerely,        BAR Art

## 2025-06-18 NOTE — ED PROVIDER NOTES
[facility] EMERGENCY DEPARTMENT  EMERGENCY DEPARTMENT ENCOUNTER      Pt Name: Ranjith Rodriguez  MRN: [id number]  Birthdate 1960  Date of evaluation: 6/18/2025  Provider: ARNOLD Quintana  9:09 AM EDT      CHIEF COMPLAINT       Chief Complaint   Patient presents with    abnormal blood pressure     Per facility hypotensive         HISTORY OF PRESENT ILLNESS   (Location/Symptom, Timing/Onset, Context/Setting, Quality, Duration, Modifying Factors, Severity)  Note limiting factors.   Ranjith Rodriguez is a 65 y.o. male who presents to the emergency department with PMHx CKD, failure to thrive, anxiety disorder, CVA, COPD, coronary artery disease, delirium, DVT, TBI, hypertension, iliac artery thrombosis, alcohol abuse, insulin resistance.    Patient presents to the ED for evaluation of concern of hypotension.  Patient coming from nursing facility, [facility], reportedly they took his blood pressure this morning for normal rounds and noted that his blood pressure was 70s over 40s.  EMS was contacted.  EMS states on their assessment patient's blood pressure remained above 90 systolic.  Presents here with a blood pressure of 129/51.  Per facility, patient with history of TBI, confused at baseline, they state he has been acting appropriately for them.  No recent change in behavior, lethargy.  Patient is able to shake his head yes or no to questions on arrival.  Denies any pain.  Facility also states no recent noted fevers, no change in appetite, no vomiting or diarrhea, no recent URI symptoms noted.    HPI    Nursing Notes were reviewed.    REVIEW OF SYSTEMS    (2-9 systems for level 4, 10 or more for level 5)     Review of Systems   Unable to perform ROS: Other (Cognitive deficit chronically disoriented secondary to prior brain injury)       Except as noted above the remainder of the review of systems was reviewed and negative.       PAST MEDICAL HISTORY     Past Medical History:   Diagnosis Date    Abnormal ankle  level 4, 8 or more for level 5)     ED Triage Vitals   BP Systolic BP Percentile Diastolic BP Percentile Temp Temp src Pulse Resp SpO2   -- -- -- -- -- -- -- --      Height Weight         -- --             Physical Exam  Constitutional:       General: He is not in acute distress.     Appearance: Normal appearance. He is not diaphoretic.      Comments: Awake and alert.  Following all commands.  Nodding yes or no to questions.  Not upper motor lethargic.  Not confused in appearance.   HENT:      Head: Normocephalic and atraumatic.      Comments: No overt signs of trauma.     Right Ear: External ear normal.      Left Ear: External ear normal.      Nose: Nose normal.      Mouth/Throat:      Mouth: Mucous membranes are moist.      Pharynx: Oropharynx is clear.   Eyes:      Extraocular Movements: Extraocular movements intact.      Comments: Patient grossly intact.  EOM intact.  PERRL.   Cardiovascular:      Rate and Rhythm: Normal rate and regular rhythm.   Pulmonary:      Effort: Pulmonary effort is normal. No respiratory distress.      Breath sounds: Normal breath sounds.      Comments: LCTAB.  No accessory muscle use.  Abdominal:      General: Bowel sounds are normal.      Palpations: Abdomen is soft.      Tenderness: There is no abdominal tenderness.      Comments: No tenderness, guarding.   Musculoskeletal:         General: Normal range of motion.      Cervical back: Normal range of motion.   Skin:     General: Skin is warm.   Neurological:      General: No focal deficit present.      Mental Status: He is alert. Mental status is at baseline.      Comments: No facial asymmetry.  Vision grossly intact.  Sensation grossly intact throughout.  Patient moving all extremities equally and spontaneously unresponsive to commands.  Intact strength bilateral upper and lower extremities.  Confused speech but able to clearly understand, not dysarthric.   Psychiatric:         Mood and Affect: Mood normal.         Behavior: Behavior

## 2025-06-18 NOTE — H&P
CARDIOLOGY H and P   [facility]    DATE OF ADMISSION   6/18/2025    ADMITTING PHYSICIAN  Tiffanie Jackson DO     PRIMARY CARDIOLOGIST  Dr. Houston     CHIEF COMPLAINT  Chief Complaint   Patient presents with    abnormal blood pressure     Per facility hypotensive       Hospital Day: 0    wai is a 65 y.o. male with past medical history of hypertension, hyperlipidemia, PAD status post stenting, CAD status post PCI/JON to LAD in 12/2024, nicotine dependence who presents with a chief complaint of hypotension.     All information was obtained via chart review. Patient is alert and oriented x3 however very poor historian due to history of traumatic brain injury. Unable to provide medical history.   Patient presented to the ER from [facility] for hypotension. On admission blood pressure has been stable 110s-130s.   Patient denies any chest pain, palpitations or shortness of breath. EKG and troponin found to be abnormal.     Troponin: 136  TTE 05/2024: EF 65-70%  Left heart cath 12/2024:   LV ejection fraction of 60 to 65%.  Normal LVEDP  Left main large-caliber vessel, calcified, mild disease  LAD large caliber vessel, heavily calcified.  85% proximal stenosis.  Otherwise mild diffuse disease  Circumflex, large caliber dominant mild the diffuse disease.  No focal lesions  RCA, small nondominant.  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

## 2025-06-18 NOTE — INTERVAL H&P NOTE
Update History & Physical    The patient's History and Physical of June 18, 25 was reviewed with the patient and I examined the patient. There was no change. The surgical site was confirmed by the patient and me.     Plan: The risks, benefits, expected outcome, and alternative to the recommended procedure have been discussed with the patient. Patient understands and wants to proceed with the procedure.     Electronically signed by Jim Sandhu DO on 6/18/2025 at 3:22 PM       Patient/Caregiver provided printed discharge information.

## 2025-06-18 NOTE — PROGRESS NOTES
Patient taking off monitors and hospital gown. Throwing pillows and blankets on the floor. Redirected patient multiple times.

## 2025-06-18 NOTE — ED NOTES
Pt laying in room at this time   Pt has slow even breathes at this time   Pt is nonverbal at baseline but is alert to pain

## 2025-06-18 NOTE — PROGRESS NOTES
Report received from Padmaja SANTANA. Patient has complaints of 10/10 arm pain. Physician aware. Orders received. See emar. Right radial access stable. No hematoma or oozing noted. VSS and monitored.

## 2025-06-18 NOTE — CONSULTS
DATE OF CONSULTATION  6/18/2025    CONSULTANT  Dr. Jackson    REQUESTING PHYSICIAN  No att. providers found     PRIMARY CARDIOLOGIST  Dr. Houston     REASON FOR CONSULTATION  Chief Complaint   Patient presents with    abnormal blood pressure     Per facility hypotensive       Hospital Day: 0       Patient is a 65 y.o. male with past medical history of hypertension, hyperlipidemia, PAD status post stenting, CAD status post PCI/JON to LAD in 12/2024, nicotine dependence who presents with a chief complaint of hypotension. Patient is followed on a regular basis by Amelia Matias MD.     Cardiology was consulted for NSTEMI.    All information was obtained via chart review. Patient is alert and oriented x3 however very poor historian. Unable to provide medical history.   Patient presented to the ER from [facility] for hypotension. On admission blood pressure has been stable 110s-130s.   Patient denies any chest pain, palpitations or shortness of breath.     Troponin: 136  TTE 05/2024: EF 65-70%  Left heart cath 12/2024:   LV ejection fraction of 60 to 65%.  Normal LVEDP  Left main large-caliber vessel, calcified, mild disease  LAD large caliber vessel, heavily calcified.  85% proximal stenosis.  Otherwise mild diffuse disease  Circumflex, large caliber dominant mild the diffuse disease.  No focal lesions  RCA, small nondominant.  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

## 2025-06-18 NOTE — ED NOTES
Spoke with cath lab at this time and pt to go to cath lab at this time   Laurie SANTANA  in cath lab report given

## 2025-06-18 NOTE — BRIEF OP NOTE
Section of Cardiology  Adult Brief Cardiac Cath Procedure Note        Procedure(s):  LHC, b/l coronary angio, PTCA of proximal LAD in-stent restenosis with a 4 oh by 15 NC balloon at 20 chiara    Pre-operative Diagnosis: Non-STEMI, cardiomyopathy    H&P Status: Completed and reviewed.     Post-operative Diagnosis:      LV ejection fraction of 15%.  Severe anterior/apical and distal inferior wall hypokinesis, normal LVEDP  Left main moderate caliber.  Mild disease  LAD large caliber vessel.  95% proximal ISR.  Otherwise mild diffuse disease  Circumflex large-caliber vessel.  Mild diffuse disease  RCA known small nondominant    Findings:  See full report    Estimated blood loss    Complications:  none    Primary Proceduralist:   Dr.Wes Sandhu DO    Plan:  Maximize medical therapy  Brilinta 180 mg followed by 90 mg twice daily  Aspirin 81 mg daily  Maximize cardiac medications  Full procedure note to follow

## 2025-06-18 NOTE — PROGRESS NOTES
Sedation Pre- Procedure Evaluation    Patient name: Ranjith Rodriguez  YOB: 1960  MRN: [id number]   Allergies:   Patient has no known allergies.        Type Of Sedation  [x]local anesthesia  [x]moderate (conscious sedation)  []deep/analgesia      RN Focused History    Yes        No  N/A  []   [x]  Previous problem with airway anesthesia, sedation, or family history of the same    [x]   []  History Of tobacco, alcohol, or substance abuse     [x]   []  Problems associated with stridor, snoring, or sleep apnea    []   [] [x] Pediatric patient, history of premature birth or ventilator support (Moderate Sedation Only)     [x]   [] [] Moderate Sedation: Clear liquids within 6 hours of sedation and NPO within 2 hours    []   [] [x] Deep Sedation:Clear liquids within 6 hours of sedation and NPO within 2 hours      NPO since: unknown       Vitals, Cardiac Rhythm, Pain:   Vitals  Temp: 97.9 °F (36.6 °C)  Temp Source: Oral  Pulse: 92  Heart Rate Source: (P) Monitor  Respirations: (!) 8  BP: (!) 147/56  MAP (Calculated): 86  MAP (mmHg): 84  BP Location: Right upper arm  BP Method: Automatic  Patient Position: Sitting  Pain Assessment  Pain Assessment: None - Denies Pain      EKG:  EKG Interpretation: normal EKG, normal sinus rhythm, unchanged from previous tracings.      Inocencia Scale: Activity: Able to move four extremities voluntarily or on command  Respiration: Able to breathe and cough freely  Circulation: Blood pressure within 20% of preanesthesia level  Consciousness: Fully awake  Oxygen: SAO2 > 92% on room air   Inocencia Score: 10     Activity:  2 - Able to move 4 extremities voluntarily on command  Respiration:  2 - Able to breathe deeply and cough freely  Circulation:  2 - BP+/- 20mmHg of normal  Consciousness:  2 - Fully awake  Oxygen Saturation (color):  2 - Able to maintain oxygen saturation >92% on room air      Prior to Admission medications    Medication Sig Start Date End Date Taking? Authorizing  Provider   miconazole (MICOTIN) 2 % powder Apply topically 2 times daily. 4/18/25   Amelia Gibson MD   atorvastatin (LIPITOR) 40 MG tablet Take 1 tablet by mouth daily    Tim De Oliveira MD   carboxymethylcellulose (REFRESH PLUS) 0.5 % SOLN ophthalmic solution Place 1 drop into both eyes 2 times daily    Tim De Oliveira MD   allopurinol (ZYLOPRIM) 100 MG tablet Take 2 tablets by mouth daily 1/28/25   Kiki Rashid PA-C   clopidogrel (PLAVIX) 75 MG tablet 1 po QD 1/28/25   Eric Houston MD   metoprolol succinate (TOPROL XL) 25 MG extended release tablet take 1 tablet by mouth once daily 1/28/25   Eric Houston MD   VASCEPA 1 g CAPS capsule take 2 capsules by mouth twice a day 1/28/25   Eric Houston MD   aspirin (ASPIRIN LOW DOSE) 81 MG EC tablet Take 1 tablet by mouth daily 1/28/25   Eric Houston MD   SYMBICORT 80-4.5 MCG/ACT AERO INHALE 2 PUFFS TWICE A DAY 7/3/24   Tim De Oliveira MD   fenofibrate (TRIGLIDE) 160 MG tablet Take 1 tablet by mouth daily 4/16/24   Eric Houston MD   Hospital Bed MISC by Does not apply route For orthopnea, dyspnea at night.  Elevated HOB to at least 45 degrees. 10/17/23   Kiki Rashid PA-C   Misc. Devices (RAISED TOILET SEAT) MISC Use of general weakness, fall risk, strength loss of BLE. 3/9/23   Kiki Rashid PA-C   Misc. Devices (BATH/SHOWER SEAT) MISC For use while bathing/showering due to fall risk and leg weakness. 2/2/23   Kiki Rashid PA-C   Misc. Devices (CANE) MISC Wide base cane please, fall risk, chronic knee and back pain. 10/21/22   Kiki Rashid PA-C        Electronically signed by Laurie Tan RN on 6/18/2025 at 2:55 PM

## 2025-06-18 NOTE — ED NOTES
Pt has tele in placed. Spoke with Mackenzie in tele room and updated that pt is in cath lab at this time and that monitor may be removed

## 2025-06-18 NOTE — ED TRIAGE NOTES
Patient arrived via EMS due to the facility stating he is hypotensive but at his normal baseline of A/O 1. He has a TBI and is noon verbal. He can follow commands. Per EMS depending on the arm is depending on the blood pressure reading. He is a fall risk.

## 2025-06-19 VITALS
WEIGHT: 183.64 LBS | HEIGHT: 71 IN | OXYGEN SATURATION: 92 % | SYSTOLIC BLOOD PRESSURE: 133 MMHG | HEART RATE: 88 BPM | BODY MASS INDEX: 25.71 KG/M2 | DIASTOLIC BLOOD PRESSURE: 49 MMHG | RESPIRATION RATE: 19 BRPM | TEMPERATURE: 97.5 F

## 2025-06-19 LAB
ECHO AO ROOT DIAM: 3.5 CM
ECHO AO ROOT INDEX: 1.71 CM/M2
ECHO AR MAX VEL PISA: 3.5 M/S
ECHO AV AREA PEAK VELOCITY: 1.5 CM2
ECHO AV AREA VTI: 1.6 CM2
ECHO AV AREA/BSA PEAK VELOCITY: 0.7 CM2/M2
ECHO AV AREA/BSA VTI: 0.8 CM2/M2
ECHO AV MEAN GRADIENT: 5 MMHG
ECHO AV MEAN VELOCITY: 1.1 M/S
ECHO AV PEAK GRADIENT: 11 MMHG
ECHO AV PEAK VELOCITY: 1.8 M/S
ECHO AV REGURGITANT PHT: 567 MS
ECHO AV VELOCITY RATIO: 0.61
ECHO AV VTI: 36.7 CM
ECHO BSA: 2.06 M2
ECHO EST RA PRESSURE: 3 MMHG
ECHO LA DIAMETER INDEX: 1.9 CM/M2
ECHO LA DIAMETER: 3.9 CM
ECHO LA TO AORTIC ROOT RATIO: 1.11
ECHO LA VOL A-L A2C: 116 ML (ref 18–58)
ECHO LA VOL A-L A4C: 74 ML (ref 18–58)
ECHO LA VOL MOD A2C: 107 ML (ref 18–58)
ECHO LA VOL MOD A4C: 67 ML (ref 18–58)
ECHO LA VOLUME AREA LENGTH: 100 ML
ECHO LA VOLUME INDEX A-L A2C: 57 ML/M2 (ref 16–34)
ECHO LA VOLUME INDEX A-L A4C: 36 ML/M2 (ref 16–34)
ECHO LA VOLUME INDEX AREA LENGTH: 49 ML/M2 (ref 16–34)
ECHO LA VOLUME INDEX MOD A2C: 52 ML/M2 (ref 16–34)
ECHO LA VOLUME INDEX MOD A4C: 33 ML/M2 (ref 16–34)
ECHO LV E' LATERAL VELOCITY: 6.37 CM/S
ECHO LV EDV A2C: 138 ML
ECHO LV EDV A4C: 207 ML
ECHO LV EDV BP: 173 ML (ref 67–155)
ECHO LV EDV INDEX A4C: 101 ML/M2
ECHO LV EDV INDEX BP: 84 ML/M2
ECHO LV EDV NDEX A2C: 67 ML/M2
ECHO LV EF PHYSICIAN: 25 %
ECHO LV EJECTION FRACTION A2C: 33 %
ECHO LV EJECTION FRACTION A4C: 36 %
ECHO LV EJECTION FRACTION BIPLANE: 34 % (ref 55–100)
ECHO LV ESV A2C: 93 ML
ECHO LV ESV A4C: 134 ML
ECHO LV ESV BP: 114 ML (ref 22–58)
ECHO LV ESV INDEX A2C: 45 ML/M2
ECHO LV ESV INDEX A4C: 65 ML/M2
ECHO LV ESV INDEX BP: 56 ML/M2
ECHO LV FRACTIONAL SHORTENING: 19 % (ref 28–44)
ECHO LV INTERNAL DIMENSION DIASTOLE INDEX: 2.63 CM/M2
ECHO LV INTERNAL DIMENSION DIASTOLIC: 5.4 CM (ref 4.2–5.9)
ECHO LV INTERNAL DIMENSION SYSTOLIC INDEX: 2.15 CM/M2
ECHO LV INTERNAL DIMENSION SYSTOLIC: 4.4 CM
ECHO LV IVSD: 1.3 CM (ref 0.6–1)
ECHO LV IVSS: 1.2 CM
ECHO LV MASS 2D: 295.6 G (ref 88–224)
ECHO LV MASS INDEX 2D: 144.2 G/M2 (ref 49–115)
ECHO LV POSTERIOR WALL DIASTOLIC: 1.3 CM (ref 0.6–1)
ECHO LV POSTERIOR WALL SYSTOLIC: 1.6 CM
ECHO LV RELATIVE WALL THICKNESS RATIO: 0.48
ECHO LVOT AREA: 2.5 CM2
ECHO LVOT AV VTI INDEX: 0.65
ECHO LVOT DIAM: 1.8 CM
ECHO LVOT MEAN GRADIENT: 2 MMHG
ECHO LVOT PEAK GRADIENT: 4 MMHG
ECHO LVOT PEAK VELOCITY: 1.1 M/S
ECHO LVOT STROKE VOLUME INDEX: 29.7 ML/M2
ECHO LVOT SV: 60.8 ML
ECHO LVOT VTI: 23.9 CM
ECHO MV A VELOCITY: 1.35 M/S
ECHO MV E DECELERATION TIME (DT): 338 MS
ECHO MV E VELOCITY: 0.96 M/S
ECHO MV E/A RATIO: 0.71
ECHO MV E/E' LATERAL: 15.07
ECHO RV TAPSE: 1.7 CM (ref 1.7–?)
EKG ATRIAL RATE: 84 BPM
EKG ATRIAL RATE: 92 BPM
EKG DIAGNOSIS: NORMAL
EKG DIAGNOSIS: NORMAL
EKG P AXIS: 48 DEGREES
EKG P AXIS: 74 DEGREES
EKG P-R INTERVAL: 166 MS
EKG P-R INTERVAL: 168 MS
EKG Q-T INTERVAL: 370 MS
EKG Q-T INTERVAL: 408 MS
EKG QRS DURATION: 108 MS
EKG QRS DURATION: 96 MS
EKG QTC CALCULATION (BAZETT): 457 MS
EKG QTC CALCULATION (BAZETT): 482 MS
EKG R AXIS: 93 DEGREES
EKG R AXIS: 97 DEGREES
EKG T AXIS: 119 DEGREES
EKG T AXIS: 87 DEGREES
EKG VENTRICULAR RATE: 84 BPM
EKG VENTRICULAR RATE: 92 BPM

## 2025-06-19 PROCEDURE — G0378 HOSPITAL OBSERVATION PER HR: HCPCS

## 2025-06-19 PROCEDURE — 93306 TTE W/DOPPLER COMPLETE: CPT | Performed by: INTERNAL MEDICINE

## 2025-06-19 PROCEDURE — 93005 ELECTROCARDIOGRAM TRACING: CPT | Performed by: INTERNAL MEDICINE

## 2025-06-19 PROCEDURE — APPSS45 APP SPLIT SHARED TIME 31-45 MINUTES

## 2025-06-19 PROCEDURE — 6370000000 HC RX 637 (ALT 250 FOR IP): Performed by: INTERNAL MEDICINE

## 2025-06-19 PROCEDURE — 99239 HOSP IP/OBS DSCHRG MGMT >30: CPT | Performed by: INTERNAL MEDICINE

## 2025-06-19 RX ORDER — METOPROLOL SUCCINATE 50 MG/1
TABLET, EXTENDED RELEASE ORAL
Qty: 30 TABLET | Refills: 3 | Status: SHIPPED | OUTPATIENT
Start: 2025-06-19

## 2025-06-19 RX ORDER — TICAGRELOR 90 MG/1
90 TABLET, FILM COATED ORAL 2 TIMES DAILY
Qty: 60 TABLET | Refills: 3 | Status: SHIPPED | OUTPATIENT
Start: 2025-06-19

## 2025-06-19 RX ADMIN — ASPIRIN 81 MG: 81 TABLET, COATED ORAL at 11:03

## 2025-06-19 RX ADMIN — TICAGRELOR 90 MG: 90 TABLET ORAL at 04:48

## 2025-06-19 RX ADMIN — METOPROLOL SUCCINATE 25 MG: 25 TABLET, EXTENDED RELEASE ORAL at 11:03

## 2025-06-19 NOTE — DISCHARGE INSTR - COC
Continuity of Care Form    Patient Name: Ranjith Rodriguez   :  1960  MRN:  [id number]    Admit date:  2025  Discharge date:  25    Code Status Order: Full Code   Advance Directives:     Admitting Physician:  Tiffanie Jackson DO  PCP: Amelia Gibson MD    Discharging Nurse: ***  Discharging Hospital Unit/Room#: W163/W163-01  Discharging Unit Phone Number: [phone]    Emergency Contact:   Extended Emergency Contact Information  Primary Emergency Contact: Curly Rodriguez   [facility]  Home Phone: [phone]  Work Phone: [phone]  Mobile Phone: [phone]  Relation: Brother/Sister  Secondary Emergency Contact: Thu Alvarado  Home Phone: [phone]  Relation: Brother/Sister   needed? No    Past Surgical History:  Past Surgical History:   Procedure Laterality Date    CARDIAC PROCEDURE N/A 2024    Left heart cath / coronary angiography performed by Jim Sandhu DO at [facility]    CARDIAC PROCEDURE N/A 2024    Percutaneous coronary intervention performed by Jim Sandhu DO at [facility]ARD[facility] C[facility]    CAROTID ENDARTERECTOMY Left 2019    LEFT CAROTID ENDARTERECTOMY performed by Miguel Morris MD at [facility] OR    COLONOSCOPY  2014    COLONOSCOPY N/A 2019    COLONOSCOPY DIAGNOSTIC performed by Shahnaz Chavez MD at [facility] C[facility]    ENDOSCOPY, COLON, DIAGNOSTIC      HERNIA REPAIR      TONSILLECTOMY      UPPER GASTROINTESTINAL ENDOSCOPY  2014    VENTRAL HERNIA REPAIR  09/01/15    W/MESH AND W/UMBILECTOMY       Immunization History:   Immunization History   Administered Date(s) Administered    COVID-19, MODERNA BLUE border, Primary or Immunocompromised, (age 12y+), IM, 100 mcg/0.5mL 2021, 2021, 2021    COVID-19, [place] Vaccine, Vaccine Unspecified 2022    Influenza Vaccine, unspecified formulation 10/26/2015, 2016, 09/15/2016, 08/15/2017    Influenza Virus Vaccine 10/15/2014, 2019, 2020,

## 2025-06-19 NOTE — FLOWSHEET NOTE
Pt being dcd this am.  Pt has been intermittently uncooperative with staff.  Pt has been spitting and pinching.  Pt currently resting quietly. Pt initially was refusing to take his meds because he couldn't find his \"couch\".  Pt eventually agreed to take them.    1120  Report called to [place]      1130  Pt left via ambulance

## 2025-06-19 NOTE — CARE COORDINATION
Case Management Assessment  Initial Evaluation    Date/Time of Evaluation: 6/19/2025 9:59 AM  Assessment Completed by: ALEX New    If patient is discharged prior to next notation, then this note serves as note for discharge by case management.    Patient Name: Ranjith Rodriguez                   YOB: 1960  Diagnosis: Elevated troponin [R79.89]  NSTEMI (non-ST elevated myocardial infarction) (HCC) [I21.4]  Congestive heart failure, unspecified HF chronicity, unspecified heart failure type (HCC) [I50.9]                   Date / Time: 6/18/2025  9:06 AM    Patient Admission Status: Observation   Readmission Risk (Low < 19, Mod (19-27), High > 27): Readmission Risk Score: 17.1    Current PCP: Amelia Gibson MD  PCP verified by CM? Yes    Chart Reviewed: Yes      History Provided by: Child/Family  Patient Orientation: Person    Patient Cognition: Alert    Hospitalization in the last 30 days (Readmission):  No    If yes, Readmission Assessment in  Navigator will be completed.    Advance Directives:      Code Status: Full Code   Patient's Primary Decision Maker is: Legal Next of Kin    Primary Decision Maker: Curly Rodriguez - Brother/Sister - [phone]    Secondary Decision Maker: Thu Alvarado - Brother/Sister - [phone]    Discharge Planning:    Patient lives with: Family Members Type of Home: Skilled Nursing Facility  Primary Care Giver: Other (Comment) ([facility])  Patient Support Systems include: Family Members   Current Financial resources:    Current community resources:    Current services prior to admission: Skilled Nursing Facility            Current DME:              Type of Home Care services:  None    ADLS  Prior functional level: Assistance with the following:, Bathing, Dressing, Toileting  Current functional level: Assistance with the following:, Bathing, Dressing, Toileting    PT AM-PAC:   /24  OT AM-PAC:   /24    Family can provide assistance at DC: No  Would you like Case

## 2025-06-19 NOTE — CARE COORDINATION
DISCUSSED [facility] WITH DR ROSAS, UPDATED ON FACILITY NEEDING TRAINING BUT WILL NOT BE RESPONSIBLE FOR OPERATING THE VEST.  PT IS/HAS BEEN PULLING AT LINES AND TAKING OFF GOWN.  DR ROSAS HAS DECIDED PT IS NOT APPROPRIATE FOR A [facility] AT THIS TIME.  [facility] NOTIFIED.  [facility] NOTIFIED.

## 2025-06-19 NOTE — CARE COORDINATION
This LSW arranged transportation for patient to return to [facility], today 6/19/25, via [facility].  Transport is scheduled for 10:30 AM.  Patient, patients anirudherCurly Theresa, RN and Estela Tompkins,admissions liaison are all aware.  Electronically signed by ALEX New on 6/19/25 at 10:19 AM EDT

## 2025-06-19 NOTE — PROGRESS NOTES
Progress Note  Patient: Ranjith Rodriguez  Unit/Bed: W163/W163-01  YOB: 1960  MRN: [id number]  Acct: [id number]   Admitting Diagnosis: Elevated troponin [R79.89]  NSTEMI (non-ST elevated myocardial infarction) (HCC) [I21.4]  Congestive heart failure, unspecified HF chronicity, unspecified heart failure type (HCC) [I50.9]  Date:  6/18/2025  Hospital Day: 0    Subjective  Patient sitting in bed comfortably. Appears to be in no acute distress. Nurse at bedside. No family present.   Denies any chest pain, shortness of breath.     Review of Systems:   As noted in the HPI*    Physical Examination:    BP (!) 133/49   Pulse 88   Temp 97.5 °F (36.4 °C) (Oral)   Resp 19   Ht 1.803 m (5' 11\")   Wt 83.3 kg (183 lb 10.3 oz)   SpO2 92%   BMI 25.61 kg/m²    Physical Exam  Constitutional:       General: He is not in acute distress.  HENT:      Head: Normocephalic and atraumatic.      Nose: Nose normal.   Eyes:      Conjunctiva/sclera: Conjunctivae normal.   Cardiovascular:      Rate and Rhythm: Normal rate and regular rhythm.   Pulmonary:      Effort: Pulmonary effort is normal. No respiratory distress.   Musculoskeletal:      Right lower leg: No edema.      Left lower leg: No edema.   Skin:     General: Skin is warm.   Neurological:      Mental Status: He is alert.         LABS:  CBC:   Lab Results   Component Value Date/Time    WBC 10.1 06/18/2025 10:16 AM    RBC 4.27 06/18/2025 10:16 AM    HGB 12.2 06/18/2025 10:16 AM    HCT 38.7 06/18/2025 10:16 AM    MCV 90.6 06/18/2025 10:16 AM    MCH 28.6 06/18/2025 10:16 AM    MCHC 31.5 06/18/2025 10:16 AM    RDW 14.3 06/18/2025 10:16 AM     06/18/2025 10:16 AM    MPV 8.8 08/25/2015 01:55 PM     CBC with Differential:   Lab Results   Component Value Date/Time    WBC 10.1 06/18/2025 10:16 AM    RBC 4.27 06/18/2025 10:16 AM    HGB 12.2 06/18/2025 10:16 AM    HCT 38.7 06/18/2025 10:16 AM     06/18/2025 10:16 AM    MCV 90.6 06/18/2025 10:16 AM    MCH 28.6

## 2025-06-19 NOTE — CONSULTS
Patient residing at [facility], patient not eligible for outpatient rehab services if residing in SNF. Electronically signed by Akash Powell on 6/19/2025 at 9:18 AM

## 2025-06-19 NOTE — PROGRESS NOTES
CLINICAL PHARMACY NOTE: MEDS TO BEDS    Total # of Prescriptions Filled: 2   The following medications were delivered to the patient:  Brilinta 90 mg Tab  Metoprolol Succ Er 50 mg Tab    Additional Documentation:

## 2025-06-19 NOTE — DISCHARGE SUMMARY
[place], [place]     DISCHARGE SUMMARY    NAME: Ranjith Rodriguez      MR#: [id number]  ATTENDING: No att. providers found                         CSN#: [id number]  SEX: male   : 1960            DISCHDATE: 2025 11:30 AM  ADMIT DATE: 2025                                      ROOM: [facility]/W163-01  ________________________________________________________________________    Admitting Physician: Tiffanie Jackson DO     Discharge Physician: Tiffanie Jackson DO    Admission Diagnoses: Acute coronary syndrome.     Discharge Diagnoses:   NSTEMI/Acute coronary syndrome, status post PTCA of prox LAD in stent restenosis   Ischemic cardiomyopathy, EF 20-25% by echocardiogram 2025  HFrEF without overt decompensation.   Hypertension  Hyperlipidemia  PAD status post stenting to R SFA, L ICA and SFA  Carotid artery stenosis   Tobacco abuse.   TBI - lives in nursing home.     Admission Condition: serious    Discharged Condition:  stable    Hospital Course: 65 y.o. male with past medical history of hypertension, hyperlipidemia, PAD status post stenting, CAD status post PCI/JON to LAD in 2024, nicotine dependence who presents with a chief complaint of hypotension.      All information was obtained via chart review. Patient is alert but very poor historian due to history of traumatic brain injury. Unable to provide medical history.   Patient presented to the ER from [facility] for hypotension. On admission blood pressure has been stable 110s-130s.   Patient denies any chest pain, palpitations or shortness of breath. EKG and troponin found to be abnormal suggestive of recent MI. He was recommended urgent cardiac catheterization. He was found to have severe 95% in stent stenosis of previous proximal LAD stent treated with balloon angioplasty. Echocardiogram showed new LV dysfunciton with wall motion abnormalities in LAD territory EF 20-25%. He did well post procedure and monitored closely overnight. He

## 2025-06-19 NOTE — ACP (ADVANCE CARE PLANNING)
Advance Care Planning   Healthcare Decision Maker:    Primary Decision Maker: Curly Rodriguez - Brother/Sister - [phone]    Secondary Decision Maker: Thu Alvarado - Brother/Sister - [phone]    Click here to complete Healthcare Decision Makers including selection of the Healthcare Decision Maker Relationship (ie \"Primary\").            
No

## 2025-06-19 NOTE — CARE COORDINATION
[facility] ORDERED.  INFO/ORDER FAXED TO [facility].  SPOKE WITH [facility], [facility], THEY CAN TAKE PT WITH [facility] BUT WILL NEED TRAINING.  [facility] AWARE AND WILL CONTACT [facility] FOR TEACHING.

## 2025-06-22 LAB — POC ACT LR: 293 SEC

## 2025-06-23 LAB — ECHO BSA: 2.06 M2

## 2025-07-18 PROBLEM — R79.89 ELEVATED TROPONIN: Status: RESOLVED | Noted: 2025-06-18 | Resolved: 2025-07-18

## 2025-08-14 ENCOUNTER — TELEPHONE (OUTPATIENT)
Age: 65
End: 2025-08-14

## (undated) DEVICE — Device

## (undated) DEVICE — SYRINGE MED 3ML CLR PLAS STD N CTRL LUERLOCK TIP DISP

## (undated) DEVICE — SET IV ADMINISTRATION WING 21GA 12IN LF

## (undated) DEVICE — 3M™ STERI-DRAPE™ INSTRUMENT POUCH 1018: Brand: STERI-DRAPE™

## (undated) DEVICE — TOWEL,OR,DSP,ST,BLUE,STD,4/PK,20PK/CS: Brand: MEDLINE

## (undated) DEVICE — BAND COMPR L24CM REG CLR PLAS HEMSTAT EXT HK AND LOOP RETEN

## (undated) DEVICE — INTENDED FOR TISSUE SEPARATION, AND OTHER PROCEDURES THAT REQUIRE A SHARP SURGICAL BLADE TO PUNCTURE OR CUT.: Brand: BARD-PARKER ® CARBON RIB-BACK BLADES

## (undated) DEVICE — PACK,LAPAROTOMY,NO GOWNS: Brand: MEDLINE

## (undated) DEVICE — GLIDESHEATH SLENDER STAINLESS STEEL KIT: Brand: GLIDESHEATH SLENDER

## (undated) DEVICE — PIN SFTY LG ST

## (undated) DEVICE — GLOVE SURG SZ 65 THK91MIL LTX FREE SYN POLYISOPRENE

## (undated) DEVICE — MAGNETIC INSTR DRAPE 20X16: Brand: MEDLINE INDUSTRIES, INC.

## (undated) DEVICE — KIT ANGIO W/ AT P65 PREM HND CTRL FOR CNTRST DEL ANGIOTOUCH

## (undated) DEVICE — KIT MFLD ISOLATN NACL CNTRST PRT TBNG SPIK W/ PRSS TRNSDUC

## (undated) DEVICE — ELECTRODE PT RET AD L9FT HI MOIST COND ADH HYDRGEL CORDED

## (undated) DEVICE — 3M™ STERI-DRAPE™ INCISE DRAPE 1050 (60CM X 45CM): Brand: STERI-DRAPE™

## (undated) DEVICE — INTENDED USED TO PROTECT, TAG AND HELP LOCATED SUTURES DURING SURGERY: Brand: STERION®SUTURE AID BOOTIES

## (undated) DEVICE — MARKER SURG SKIN GENTIAN VLT REG TIP W/ 6IN RUL

## (undated) DEVICE — SPONGE GZ W4XL4IN RAYON POLY FILL CVR W/ NONWOVEN FAB

## (undated) DEVICE — LOOP VES W13MM THK09MM MINI RED SIL FLD REPELLENT

## (undated) DEVICE — Z DISCONTINUED NO SUB IDED TAPE UMB W1/8XL36IN WHT COT STRND NONRADIOPAQUE

## (undated) DEVICE — CATHETER COR DIAG AMPLATZ R 2.0 CRV 5FR 100CM 0 SIDE H

## (undated) DEVICE — CATHETER DIAG 5FR L100CM LUMN ID0.047IN JL4 CRV 0 SIDE H

## (undated) DEVICE — APPLIER CLP L9.38IN M LIG TI DISP STR RNG HNDL LIGACLP

## (undated) DEVICE — SUTURE VCRL SZ 2-0 L27IN ABSRB UD L26MM SH 1/2 CIR J417H

## (undated) DEVICE — GAUZE,SPONGE,4"X4",16PLY,XRAY,STRL,LF: Brand: MEDLINE

## (undated) DEVICE — HEMOSTASIS VALVE PHD SM BORE WTH SPRNG METAL INSRT TOOL TRQU

## (undated) DEVICE — COVER,MAYO STAND,STERILE: Brand: MEDLINE

## (undated) DEVICE — Z INACTIVE NO SUPPLIER IDENTIFIED STOPCOCK EZFLO STRL 3WAY

## (undated) DEVICE — SUCKER CARD L9IN 0.25IN CONN MINI RIG SFT TIP W/ SIDE PRT

## (undated) DEVICE — I.V. DRAIN SPONGES: Brand: DERMACEA

## (undated) DEVICE — SYRINGE IRRIG 60ML SFT PLIABLE BLB EZ TO GRP 1 HND USE W/

## (undated) DEVICE — CATHETER GUID 6FR L100CM DIA0.071IN NYL SHFT EBU3.5

## (undated) DEVICE — HI-TORQUE BALANCE MIDDLEWEIGHT UNIVERSAL GUIDE WIRE .014 J TIP 3.0 CM X 190 CM: Brand: HI-TORQUE BALANCE MIDDLEWEIGHT UNIVERSAL

## (undated) DEVICE — APPLIER CLP L9.375IN APER 2.1MM CLS L3.8MM 20 SM TI CLP

## (undated) DEVICE — RADIFOCUS GLIDEWIRE ADVANTAGE GUIDEWIRE: Brand: GLIDEWIRE ADVANTAGE

## (undated) DEVICE — CATH BLLN ANGIO 3.50X15MM NC EUPHORA RX

## (undated) DEVICE — DRAPE SURG BRACH STRL

## (undated) DEVICE — Device: Brand: EAGLE EYE PLATINUM RX DIGITAL IVUS CATHETER

## (undated) DEVICE — GUIDEWIRE VASC L260CM DIA0.035IN TIP L3MM STD EXCHG PTFE J

## (undated) DEVICE — SUTURE PERMAHAND SZ 3-0 L18IN NONABSORBABLE BLK SILK BRAID A184H

## (undated) DEVICE — GLOVE SURG SZ 85 L12IN FNGR THK94MIL TRNSLUC YEL LTX

## (undated) DEVICE — SPONGE,LAP,18"X18",DLX,XR,ST,5/PK,40/PK: Brand: MEDLINE

## (undated) DEVICE — SUTURE VCRL SZ 4-0 L18IN ABSRB UD L19MM PS-2 3/8 CIR PRIM J496H

## (undated) DEVICE — GLOVE ORANGE PI 7 1/2   MSG9075

## (undated) DEVICE — SHEET,DRAPE,53X77,STERILE: Brand: MEDLINE

## (undated) DEVICE — CHLORAPREP 26ML ORANGE

## (undated) DEVICE — TUBING, SUCTION, 1/4" X 10', STRAIGHT: Brand: MEDLINE

## (undated) DEVICE — SUTURE PRONOVA DBL ARMED 6-0 BV-1 24IN N ABSRB MFIL BLU PN3805H

## (undated) DEVICE — CANNULA PERF L2IN BLNT TIP 3MM VES CLR RADPQ BODY FEM LUER D

## (undated) DEVICE — 3M™ STERI-STRIP™ REINFORCED ADHESIVE SKIN CLOSURES, R1547, 1/2 IN X 4 IN (12 MM X 100 MM), 6 STRIPS/ENVELOPE: Brand: 3M™ STERI-STRIP™

## (undated) DEVICE — 35 ML SYRINGE LUER-LOCK TIP: Brand: MONOJECT

## (undated) DEVICE — GUIDEWIRE VASC .035IN X 260CM ANGL TIP STIFF

## (undated) DEVICE — SECTO® DISSECTOR, KITTNER, 5/16 IN DIAMETER, (5 EA/POUCH, 24 POUCHES/PK, 4 PK/BX): Brand: SYMMETRY SURGICAL

## (undated) DEVICE — ELECTROSURGICAL PENCIL BUTTON SWITCH E-Z CLEAN COATED BLADE ELECTRODE 10 FT (3 M) CORD HOLSTER: Brand: MEGADYNE

## (undated) DEVICE — LOOP VES L12IN DIA0.066IN WHT SIL THN WALL AIR CUSH

## (undated) DEVICE — NEEDLE HYPO 25GA L1.5IN BLU POLYPR HUB S STL REG BVL STR

## (undated) DEVICE — DRAPE SURG W41XL74IN CLR FULL SZ C ARM 3 ADH POLY STRP E

## (undated) DEVICE — 3M™ TEGADERM™ TRANSPARENT FILM DRESSING FRAME STYLE, 1626W, 4 IN X 4-3/4 IN (10 CM X 12 CM), 50/CT 4CT/CASE: Brand: 3M™ TEGADERM™

## (undated) DEVICE — SUTURE NABSORBABLE PRONOVA L24IN SZ 5-0 BLU C-1 L13MM 3/8 CIR REV PN3725H

## (undated) DEVICE — CATHETER COR DIAG PIGTAILS PIG 145 CRV 5FR 110CM 6 SIDE H

## (undated) DEVICE — CATHETER DIAG 5FR L100CM LUMN ID0.047IN JL3.5 CRV 0 SIDE H

## (undated) DEVICE — SHUNT CV L30CM DIA4X5MM SIL EXT LOOP FULL SPR REINF SUNDT

## (undated) DEVICE — 3M™ TEGADERM™ I.V. ADVANCED SECUREMENT DRESSING, 1683, 2-1/2 IN X 2-3/4 IN, 6.5 CM X 7 CM, 100/CT 4CT/CASE: Brand: 3M™ TEGADERM™

## (undated) DEVICE — COUNTER NDL 40 COUNT HLD 70 FOAM BLK ADH W/ MAG

## (undated) DEVICE — PML 12 ADULT FLL-MLL PG: Brand: NAMIC

## (undated) DEVICE — LABEL MED MINI W/ MARKER

## (undated) DEVICE — CATHETER IVL C2PLUS SHOCKWAVE 3.5MM X 12MM

## (undated) DEVICE — DECANTER FLD 9IN ST BG FOR ASEP TRNSF OF FLD

## (undated) DEVICE — SYRINGE MED 10ML LUERLOCK TIP W/O SFTY DISP

## (undated) DEVICE — Z DISCONTINUED USE 2271144 DRAIN SURG W0.25XL18IN PRECUT UNIF WALL THICKNESS PENROSE

## (undated) DEVICE — FOGARTY - HYDRAGRIP SURGICAL - CLAMP INSERTS: Brand: FOGARTY SAFEJAW

## (undated) DEVICE — CATH BLLN ANGIO 4X15MM NC EUPHORIA RX

## (undated) DEVICE — MEDI-VAC YANKAUER SUCTION HANDLE W/BULBOUS TIP: Brand: CARDINAL HEALTH